# Patient Record
Sex: MALE | Race: OTHER | HISPANIC OR LATINO | Employment: UNEMPLOYED | URBAN - METROPOLITAN AREA
[De-identification: names, ages, dates, MRNs, and addresses within clinical notes are randomized per-mention and may not be internally consistent; named-entity substitution may affect disease eponyms.]

---

## 2024-02-15 ENCOUNTER — HOSPITAL ENCOUNTER (INPATIENT)
Facility: HOSPITAL | Age: 79
LOS: 2 days | Discharge: OTHER INSTITUTION | DRG: 281 | End: 2024-02-21
Attending: INTERNAL MEDICINE | Admitting: INTERNAL MEDICINE

## 2024-02-15 DIAGNOSIS — E83.51 HYPOCALCEMIA: ICD-10-CM

## 2024-02-15 DIAGNOSIS — I25.10 CORONARY ARTERY DISEASE, UNSPECIFIED VESSEL OR LESION TYPE, UNSPECIFIED WHETHER ANGINA PRESENT, UNSPECIFIED WHETHER NATIVE OR TRANSPLANTED HEART: ICD-10-CM

## 2024-02-15 DIAGNOSIS — N17.9 AKI (ACUTE KIDNEY INJURY): Primary | ICD-10-CM

## 2024-02-15 DIAGNOSIS — I48.91 ATRIAL FIBRILLATION, UNSPECIFIED TYPE: ICD-10-CM

## 2024-02-15 DIAGNOSIS — I21.4 NSTEMI (NON-ST ELEVATED MYOCARDIAL INFARCTION): ICD-10-CM

## 2024-02-15 DIAGNOSIS — I35.1 AORTIC VALVE INSUFFICIENCY, ETIOLOGY OF CARDIAC VALVE DISEASE UNSPECIFIED: ICD-10-CM

## 2024-02-15 DIAGNOSIS — I35.0 SEVERE AORTIC STENOSIS: ICD-10-CM

## 2024-02-15 DIAGNOSIS — R07.9 CHEST PAIN: ICD-10-CM

## 2024-02-15 PROBLEM — I42.9 CARDIOMYOPATHY: Status: ACTIVE | Noted: 2024-02-15

## 2024-02-15 PROBLEM — I35.2 NONRHEUMATIC AORTIC INSUFFICIENCY WITH AORTIC STENOSIS: Status: ACTIVE | Noted: 2024-02-15

## 2024-02-15 PROBLEM — I25.110 CORONARY ARTERY DISEASE INVOLVING NATIVE CORONARY ARTERY OF NATIVE HEART WITH UNSTABLE ANGINA PECTORIS: Status: ACTIVE | Noted: 2024-02-15

## 2024-02-15 PROBLEM — I10 HYPERTENSION: Status: ACTIVE | Noted: 2024-02-15

## 2024-02-15 PROBLEM — E78.2 MIXED HYPERLIPIDEMIA: Status: ACTIVE | Noted: 2024-02-15

## 2024-02-15 LAB
ALBUMIN SERPL-MCNC: 3.9 G/DL (ref 3.4–4.8)
ALBUMIN/GLOB SERPL: 1 RATIO (ref 1.1–2)
ALP SERPL-CCNC: 82 UNIT/L (ref 40–150)
ALT SERPL-CCNC: 19 UNIT/L (ref 0–55)
APTT PPP: 42.6 SECONDS (ref 23.2–33.7)
AST SERPL-CCNC: 19 UNIT/L (ref 5–34)
BASOPHILS # BLD AUTO: 0.06 X10(3)/MCL
BASOPHILS NFR BLD AUTO: 0.6 %
BILIRUB SERPL-MCNC: 0.8 MG/DL
BNP BLD-MCNC: 2145.5 PG/ML
BUN SERPL-MCNC: 36.5 MG/DL (ref 8.4–25.7)
CALCIUM SERPL-MCNC: 9 MG/DL (ref 8.8–10)
CHLORIDE SERPL-SCNC: 103 MMOL/L (ref 98–107)
CO2 SERPL-SCNC: 26 MMOL/L (ref 23–31)
CREAT SERPL-MCNC: 2.15 MG/DL (ref 0.73–1.18)
EOSINOPHIL # BLD AUTO: 0.27 X10(3)/MCL (ref 0–0.9)
EOSINOPHIL NFR BLD AUTO: 2.9 %
ERYTHROCYTE [DISTWIDTH] IN BLOOD BY AUTOMATED COUNT: 15.2 % (ref 11.5–17)
FLUAV AG UPPER RESP QL IA.RAPID: NOT DETECTED
FLUBV AG UPPER RESP QL IA.RAPID: NOT DETECTED
GFR SERPLBLD CREATININE-BSD FMLA CKD-EPI: 31 MLS/MIN/1.73/M2
GLOBULIN SER-MCNC: 3.9 GM/DL (ref 2.4–3.5)
GLUCOSE SERPL-MCNC: 120 MG/DL (ref 82–115)
HCT VFR BLD AUTO: 39.4 % (ref 42–52)
HGB BLD-MCNC: 12.2 G/DL (ref 14–18)
HOLD SPECIMEN: NORMAL
IMM GRANULOCYTES # BLD AUTO: 0.08 X10(3)/MCL (ref 0–0.04)
IMM GRANULOCYTES NFR BLD AUTO: 0.9 %
INR PPP: 1.8
LYMPHOCYTES # BLD AUTO: 1.61 X10(3)/MCL (ref 0.6–4.6)
LYMPHOCYTES NFR BLD AUTO: 17.3 %
MCH RBC QN AUTO: 27.4 PG (ref 27–31)
MCHC RBC AUTO-ENTMCNC: 31 G/DL (ref 33–36)
MCV RBC AUTO: 88.5 FL (ref 80–94)
MONOCYTES # BLD AUTO: 0.71 X10(3)/MCL (ref 0.1–1.3)
MONOCYTES NFR BLD AUTO: 7.7 %
NEUTROPHILS # BLD AUTO: 6.55 X10(3)/MCL (ref 2.1–9.2)
NEUTROPHILS NFR BLD AUTO: 70.6 %
NRBC BLD AUTO-RTO: 0 %
OHS QRS DURATION: 178 MS
OHS QTC CALCULATION: 571 MS
PLATELET # BLD AUTO: 304 X10(3)/MCL (ref 130–400)
PMV BLD AUTO: 11 FL (ref 7.4–10.4)
POTASSIUM SERPL-SCNC: 4.8 MMOL/L (ref 3.5–5.1)
PROT SERPL-MCNC: 7.8 GM/DL (ref 5.8–7.6)
PROTHROMBIN TIME: 20.9 SECONDS (ref 11.4–14)
RBC # BLD AUTO: 4.45 X10(6)/MCL (ref 4.7–6.1)
RSV A 5' UTR RNA NPH QL NAA+PROBE: NOT DETECTED
SARS-COV-2 RNA RESP QL NAA+PROBE: NOT DETECTED
SODIUM SERPL-SCNC: 138 MMOL/L (ref 136–145)
TROPONIN I SERPL-MCNC: 0.13 NG/ML (ref 0–0.04)
TROPONIN I SERPL-MCNC: 0.17 NG/ML (ref 0–0.04)
TROPONIN I SERPL-MCNC: 0.17 NG/ML (ref 0–0.04)
WBC # SPEC AUTO: 9.28 X10(3)/MCL (ref 4.5–11.5)

## 2024-02-15 PROCEDURE — 25000003 PHARM REV CODE 250

## 2024-02-15 PROCEDURE — G0378 HOSPITAL OBSERVATION PER HR: HCPCS

## 2024-02-15 PROCEDURE — 0241U COVID/RSV/FLU A&B PCR: CPT | Performed by: EMERGENCY MEDICINE

## 2024-02-15 PROCEDURE — 80053 COMPREHEN METABOLIC PANEL: CPT | Performed by: EMERGENCY MEDICINE

## 2024-02-15 PROCEDURE — 96365 THER/PROPH/DIAG IV INF INIT: CPT

## 2024-02-15 PROCEDURE — 84484 ASSAY OF TROPONIN QUANT: CPT | Mod: 91

## 2024-02-15 PROCEDURE — 63600175 PHARM REV CODE 636 W HCPCS

## 2024-02-15 PROCEDURE — 85730 THROMBOPLASTIN TIME PARTIAL: CPT | Mod: 91

## 2024-02-15 PROCEDURE — 83880 ASSAY OF NATRIURETIC PEPTIDE: CPT | Performed by: EMERGENCY MEDICINE

## 2024-02-15 PROCEDURE — 96366 THER/PROPH/DIAG IV INF ADDON: CPT

## 2024-02-15 PROCEDURE — 84484 ASSAY OF TROPONIN QUANT: CPT | Performed by: EMERGENCY MEDICINE

## 2024-02-15 PROCEDURE — 85610 PROTHROMBIN TIME: CPT | Performed by: EMERGENCY MEDICINE

## 2024-02-15 PROCEDURE — 93005 ELECTROCARDIOGRAM TRACING: CPT

## 2024-02-15 PROCEDURE — 85025 COMPLETE CBC W/AUTO DIFF WBC: CPT | Performed by: EMERGENCY MEDICINE

## 2024-02-15 PROCEDURE — 85730 THROMBOPLASTIN TIME PARTIAL: CPT

## 2024-02-15 PROCEDURE — 99285 EMERGENCY DEPT VISIT HI MDM: CPT | Mod: 25

## 2024-02-15 RX ORDER — NITROGLYCERIN 0.4 MG/1
0.4 TABLET SUBLINGUAL EVERY 5 MIN PRN
Status: DISCONTINUED | OUTPATIENT
Start: 2024-02-15 | End: 2024-02-21 | Stop reason: HOSPADM

## 2024-02-15 RX ORDER — SODIUM CHLORIDE 0.9 % (FLUSH) 0.9 %
10 SYRINGE (ML) INJECTION
Status: DISCONTINUED | OUTPATIENT
Start: 2024-02-15 | End: 2024-02-21 | Stop reason: HOSPADM

## 2024-02-15 RX ORDER — HEPARIN SODIUM,PORCINE/D5W 25000/250
0-40 INTRAVENOUS SOLUTION INTRAVENOUS CONTINUOUS
Status: DISCONTINUED | OUTPATIENT
Start: 2024-02-15 | End: 2024-02-16

## 2024-02-15 RX ORDER — FUROSEMIDE 40 MG/1
40 TABLET ORAL DAILY
Status: ON HOLD | COMMUNITY
End: 2024-03-26 | Stop reason: HOSPADM

## 2024-02-15 RX ORDER — AMIODARONE HYDROCHLORIDE 200 MG/1
400 TABLET ORAL 2 TIMES DAILY
Status: DISCONTINUED | OUTPATIENT
Start: 2024-02-15 | End: 2024-02-16

## 2024-02-15 RX ORDER — ATORVASTATIN CALCIUM 40 MG/1
40 TABLET, FILM COATED ORAL NIGHTLY
Status: DISCONTINUED | OUTPATIENT
Start: 2024-02-15 | End: 2024-02-21 | Stop reason: HOSPADM

## 2024-02-15 RX ORDER — METOPROLOL TARTRATE 25 MG/1
25 TABLET, FILM COATED ORAL 2 TIMES DAILY
Status: DISCONTINUED | OUTPATIENT
Start: 2024-02-15 | End: 2024-02-16

## 2024-02-15 RX ORDER — AMIODARONE HYDROCHLORIDE 200 MG/1
400 TABLET ORAL 2 TIMES DAILY
Status: ON HOLD | COMMUNITY
End: 2024-03-26 | Stop reason: HOSPADM

## 2024-02-15 RX ORDER — POLYETHYLENE GLYCOL 3350 17 G/17G
17 POWDER, FOR SOLUTION ORAL DAILY
Status: DISCONTINUED | OUTPATIENT
Start: 2024-02-16 | End: 2024-02-21 | Stop reason: HOSPADM

## 2024-02-15 RX ORDER — FUROSEMIDE 20 MG/1
40 TABLET ORAL DAILY
Status: DISCONTINUED | OUTPATIENT
Start: 2024-02-16 | End: 2024-02-18

## 2024-02-15 RX ORDER — ASPIRIN 325 MG
325 TABLET, DELAYED RELEASE (ENTERIC COATED) ORAL ONCE
Status: COMPLETED | OUTPATIENT
Start: 2024-02-15 | End: 2024-02-15

## 2024-02-15 RX ORDER — ATORVASTATIN CALCIUM 20 MG/1
40 TABLET, FILM COATED ORAL NIGHTLY
Status: ON HOLD | COMMUNITY
End: 2024-03-26 | Stop reason: HOSPADM

## 2024-02-15 RX ORDER — TALC
6 POWDER (GRAM) TOPICAL NIGHTLY PRN
Status: DISCONTINUED | OUTPATIENT
Start: 2024-02-15 | End: 2024-02-21 | Stop reason: HOSPADM

## 2024-02-15 RX ORDER — ASPIRIN 81 MG/1
81 TABLET ORAL DAILY
Status: DISCONTINUED | OUTPATIENT
Start: 2024-02-16 | End: 2024-02-21 | Stop reason: HOSPADM

## 2024-02-15 RX ORDER — ACETAMINOPHEN 325 MG/1
650 TABLET ORAL EVERY 6 HOURS PRN
Status: DISCONTINUED | OUTPATIENT
Start: 2024-02-15 | End: 2024-02-18

## 2024-02-15 RX ADMIN — HEPARIN SODIUM 12 UNITS/KG/HR: 10000 INJECTION, SOLUTION INTRAVENOUS at 04:02

## 2024-02-15 RX ADMIN — AMIODARONE HYDROCHLORIDE 400 MG: 200 TABLET ORAL at 08:02

## 2024-02-15 RX ADMIN — ATORVASTATIN CALCIUM 40 MG: 40 TABLET, FILM COATED ORAL at 08:02

## 2024-02-15 RX ADMIN — ASPIRIN 325 MG: 325 TABLET, COATED ORAL at 03:02

## 2024-02-15 NOTE — Clinical Note
53 ml of contrast were injected throughout the case. 10 mL of contrast was the total wasted during the case. 63 mL was the total amount used during the case.

## 2024-02-15 NOTE — ED PROVIDER NOTES
Encounter Date: 2/15/2024    I, Michael Wylie MD, did conduct a face to face encounter with this patient initially seen by our advanced practice provider. I did perform a history and physical, did direct the evaluation and management, and do agree with the care as documented.         History     Chief Complaint   Patient presents with    Chest Pain     Chest pain x 8 days. Recent NSTEMI admit.     Patient reports to the emergency room with complaints of chest pain for the past 8 days that is worse at night; family reports that he was recently admitted to our [facility] and discharged for cardiology follow up    The history is provided by the patient.   Chest Pain  The current episode started several days ago. Chest pain occurs intermittently. At its most intense, the chest pain is at 7/10. The chest pain is currently at 4/10. The quality of the pain is described as sharp and squeezing. Pertinent negatives for primary symptoms include no fever, no shortness of breath, no cough, no abdominal pain, no nausea and no vomiting.   Pertinent negatives for associated symptoms include no weakness.   His past medical history is significant for hypertension and MI.     Review of patient's allergies indicates:  No Known Allergies  Past Medical History:   Diagnosis Date    A-fib     Aortic insufficiency     CAD (coronary artery disease)     Hypertension      History reviewed. No pertinent surgical history.  History reviewed. No pertinent family history.  Social History     Tobacco Use    Smoking status: Former     Types: Cigarettes    Smokeless tobacco: Never   Substance Use Topics    Alcohol use: Not Currently    Drug use: Not Currently     Review of Systems   Constitutional:  Negative for fever.   HENT:  Negative for sore throat.    Respiratory:  Negative for cough and shortness of breath.    Cardiovascular:  Positive for chest pain.   Gastrointestinal:  Negative for abdominal pain, nausea and vomiting.    Genitourinary:  Negative for dysuria.   Musculoskeletal:  Negative for back pain.   Skin:  Negative for rash.   Neurological:  Negative for weakness.   Hematological:  Does not bruise/bleed easily.   Psychiatric/Behavioral: Negative.         Physical Exam     Initial Vitals [02/15/24 1217]   BP Pulse Resp Temp SpO2   (!) 102/59 66 16 97.8 °F (36.6 °C) 99 %      MAP       --         Physical Exam    Vitals reviewed.  Constitutional: He appears well-developed and well-nourished.   HENT:   Head: Normocephalic and atraumatic.   Eyes: Conjunctivae and EOM are normal. Pupils are equal, round, and reactive to light.   Neck:   Normal range of motion.  Cardiovascular:  Normal rate, regular rhythm, normal heart sounds and intact distal pulses.           Pulmonary/Chest: Breath sounds normal. No respiratory distress. He has no wheezes. He exhibits no tenderness.   Abdominal: Abdomen is soft. Bowel sounds are normal. He exhibits no distension. There is no abdominal tenderness.   Musculoskeletal:         General: Normal range of motion.      Cervical back: Normal range of motion.     Neurological: He is alert and oriented to person, place, and time. He displays normal reflexes. No cranial nerve deficit or sensory deficit. GCS score is 15. GCS eye subscore is 4. GCS verbal subscore is 5. GCS motor subscore is 6.   Skin: Skin is warm. No pallor.   Psychiatric: He has a normal mood and affect. His behavior is normal. Judgment and thought content normal.         ED Course   Procedures  Labs Reviewed   COMPREHENSIVE METABOLIC PANEL - Abnormal; Notable for the following components:       Result Value    Glucose Level 120 (*)     Blood Urea Nitrogen 36.5 (*)     Creatinine 2.15 (*)     Protein Total 7.8 (*)     Globulin 3.9 (*)     Albumin/Globulin Ratio 1.0 (*)     All other components within normal limits   B-TYPE NATRIURETIC PEPTIDE - Abnormal; Notable for the following components:    Natriuretic Peptide 2,145.5 (*)     All other  components within normal limits   TROPONIN I - Abnormal; Notable for the following components:    Troponin-I 0.166 (*)     All other components within normal limits   PROTIME-INR - Abnormal; Notable for the following components:    PT 20.9 (*)     INR 1.8 (*)     All other components within normal limits   CBC WITH DIFFERENTIAL - Abnormal; Notable for the following components:    RBC 4.45 (*)     Hgb 12.2 (*)     Hct 39.4 (*)     MCHC 31.0 (*)     MPV 11.0 (*)     IG# 0.08 (*)     All other components within normal limits   APTT - Abnormal; Notable for the following components:    PTT 42.6 (*)     All other components within normal limits   COVID/RSV/FLU A&B PCR - Normal    Narrative:     The Xpert Xpress SARS-CoV-2/FLU/RSV plus is a rapid, multiplexed real-time PCR test intended for the simultaneous qualitative detection and differentiation of SARS-CoV-2, Influenza A, Influenza B, and respiratory syncytial virus (RSV) viral RNA in either nasopharyngeal swab or nasal swab specimens.         CBC W/ AUTO DIFFERENTIAL    Narrative:     The following orders were created for panel order CBC auto differential.  Procedure                               Abnormality         Status                     ---------                               -----------         ------                     CBC with Differential[[id number]]       Abnormal            Final result                 Please view results for these tests on the individual orders.   EXTRA TUBES    Narrative:     The following orders were created for panel order EXTRA TUBES.  Procedure                               Abnormality         Status                     ---------                               -----------         ------                     Gold Top Hold[[id number]]                                   Final result               Pink Top Hold[[id number]]                                   Final result                 Please view results for these tests on the individual  orders.   GOLD TOP HOLD   PINK TOP HOLD   EXTRA TUBES    Narrative:     The following orders were created for panel order EXTRA TUBES.  Procedure                               Abnormality         Status                     ---------                               -----------         ------                     Light Green Top Hold[[id number]]                            Final result               Lavender Top Hold[[id number]]                               Final result                 Please view results for these tests on the individual orders.   LIGHT GREEN TOP HOLD   LAVENDER TOP HOLD     EKG Readings: (Independently Interpreted)   Initial Reading: No STEMI. Rhythm: Normal Sinus Rhythm. Heart Rate: 64. Ectopy: No Ectopy. Conduction: Normal. ST Segments: Normal ST Segments. T Waves: Normal. Axis: Right Axis Deviation. Clinical Impression: Normal Sinus Rhythm     ECG Results              EKG 12-lead (Chest Pain) Age >30 (Final result)        Collection Time Result Time QRS Duration OHS QTC Calculation    02/15/24 16:18:47 02/16/24 11:42:12 178 580                     Final result by Interface, Lab In [facility] (02/16/24 11:42:21)                   Narrative:    Test Reason : R07.9,    Vent. Rate : 054 BPM     Atrial Rate : 054 BPM     P-R Int : 232 ms          QRS Dur : 178 ms      QT Int : 612 ms       P-R-T Axes : 048 -32 101 degrees     QTc Int : 580 ms    Sinus bradycardia with 1st degree A-V block  Left axis deviation  Left bundle branch block  Abnormal ECG    Confirmed by Vasile Callahan MD ([id number]) on 2/16/2024 11:42:09 AM    Referred By: [email or web address]   SELF           Confirmed By:Vasile Callahan MD                                     EKG 12-lead (Final result)        Collection Time Result Time QRS Duration OHS QTC Calculation    02/15/24 12:09:22 02/15/24 16:40:27 178 571                     Final result by Interface, Lab In [facility] (02/15/24 16:40:30)                   Narrative:    Test Reason : R07.9,    Vent. Rate  : 064 BPM     Atrial Rate : 064 BPM     P-R Int : 198 ms          QRS Dur : 178 ms      QT Int : 554 ms       P-R-T Axes : 022 146 032 degrees     QTc Int : 571 ms    Normal sinus rhythm  Right axis deviation  Nonspecific intraventricular block  Abnormal ECG  No previous ECGs available  Confirmed by Wendie Rudolph MD ([id number]) on 2/15/2024 4:40:24 PM    Referred By:             Confirmed By:Wendie Rudolph MD                                  Imaging Results              X-Ray Chest PA And Lateral (Final result)  Result time 02/15/24 13:09:38      Final result by Ananth Coker MD (02/15/24 13:09:38)                   Impression:      Increase interstitial markings throughout.    Mild cardiomegaly      Electronically signed by: Ananth Coker  Date:    02/15/2024  Time:    13:09               Narrative:    EXAMINATION:  XR CHEST PA AND LATERAL    CLINICAL HISTORY:  Chest Pain;, .    FINDINGS:  Examination reveals mediastinal silhouette to be within normal limits cardiac silhouette is mildly enlarged lung fields reveal some increase interstitial markings with no focal consolidative changes atelectases effusions or pneumothoraces                                       Medications   atorvastatin tablet 40 mg (40 mg Oral Given 2/16/24 2034)   furosemide tablet 40 mg (40 mg Oral Given 2/16/24 0929)   sodium chloride 0.9% flush 10 mL (has no administration in time range)   melatonin tablet 6 mg (has no administration in time range)   aspirin EC tablet 81 mg (81 mg Oral Given 2/17/24 0930)   nitroGLYCERIN SL tablet 0.4 mg (has no administration in time range)   polyethylene glycol packet 17 g (17 g Oral Given 2/17/24 0930)   acetaminophen tablet 650 mg (has no administration in time range)   pantoprazole EC tablet 40 mg (40 mg Oral Given 2/17/24 0930)   heparin 25,000 units in dextrose 5% (100 units/ml) IV bolus from bag LOW INTENSITY nomogram - LAF (4,000 Units Intravenous Not Given 2/16/24 1530)   heparin 25,000 units  in dextrose 5% 250 mL (100 units/mL) infusion LOW INTENSITY nomogram - LAF (14 Units/kg/hr × 69.7 kg (Adjusted) Intravenous New Bag 2/16/24 1753)   heparin 25,000 units in dextrose 5% (100 units/ml) IV bolus from bag LOW INTENSITY nomogram - LAF (has no administration in time range)   heparin 25,000 units in dextrose 5% (100 units/ml) IV bolus from bag LOW INTENSITY nomogram - LAF (2,090 Units Intravenous Bolus from Bag 2/16/24 1750)   amiodarone tablet 200 mg (200 mg Oral Given 2/17/24 0930)   heparin 25,000 units in dextrose 5% (100 units/ml) IV bolus from bag LOW INTENSITY nomogram - LAF (4,000 Units Intravenous Bolus from Bag 2/15/24 1635)   aspirin EC tablet 325 mg (325 mg Oral Given 2/15/24 1550)     Medical Decision Making  Amount and/or Complexity of Data Reviewed  Labs: ordered.  Radiology: ordered.  Discussion of management or test interpretation with external provider(s): Consulted Internal Medicine, who evaluated the patient in the room and agreed with plan for admit-see note for further details                                      Clinical Impression:  Final diagnoses:  [R07.9] Chest pain  [I21.4] NSTEMI (non-ST elevated myocardial infarction)  [N17.9] JEAN-CLAUDE (acute kidney injury) (Primary)          ED Disposition Condition    Observation                 Hank Foley PA  02/15/24 2553       Michael Wylie MD  02/17/24 [id number]

## 2024-02-15 NOTE — Clinical Note
The radial band was applied to the right radial artery. 7 cc's of air were inserted into the closure device. Fingers warm to touch. Cap refill < 3 sec. Move fingers w/out any pain or discomfort.

## 2024-02-15 NOTE — Clinical Note
The catheter was repositioned into the left subclavian artery. An angiography was performed Multiple views were taken. The angiography was performed via power injection.  LIMA visualized

## 2024-02-16 LAB
ALBUMIN SERPL-MCNC: 3.4 G/DL (ref 3.4–4.8)
ALBUMIN/GLOB SERPL: 1 RATIO (ref 1.1–2)
ALP SERPL-CCNC: 70 UNIT/L (ref 40–150)
ALT SERPL-CCNC: 15 UNIT/L (ref 0–55)
APTT PPP: 127.9 SECONDS (ref 23.2–33.7)
APTT PPP: 50.6 SECONDS (ref 23.2–33.7)
APTT PPP: 62.9 SECONDS (ref 23.2–33.7)
AST SERPL-CCNC: 14 UNIT/L (ref 5–34)
AV INDEX (PROSTH): 0.17
AV MEAN GRADIENT: 50 MMHG
AV PEAK GRADIENT: 79 MMHG
AV REGURGITATION PRESSURE HALF TIME: 302.13 MS
AV VALVE AREA BY VELOCITY RATIO: 0.67 CM²
AV VALVE AREA: 0.66 CM²
AV VELOCITY RATIO: 0.18
BASOPHILS # BLD AUTO: 0.07 X10(3)/MCL
BASOPHILS NFR BLD AUTO: 0.7 %
BILIRUB SERPL-MCNC: 0.7 MG/DL
BSA FOR ECHO PROCEDURE: 1.94 M2
BUN SERPL-MCNC: 33.3 MG/DL (ref 8.4–25.7)
CALCIUM SERPL-MCNC: 8.5 MG/DL (ref 8.8–10)
CHLORIDE SERPL-SCNC: 106 MMOL/L (ref 98–107)
CO2 SERPL-SCNC: 24 MMOL/L (ref 23–31)
CREAT SERPL-MCNC: 1.9 MG/DL (ref 0.73–1.18)
CV ECHO LV RWT: 0.38 CM
DOP CALC AO PEAK VEL: 4.45 M/S
DOP CALC AO VTI: 133.3 CM
DOP CALC LVOT AREA: 3.8 CM2
DOP CALC LVOT DIAMETER: 2.2 CM
DOP CALC LVOT PEAK VEL: 0.79 M/S
DOP CALC LVOT STROKE VOLUME: 87.39 CM3
DOP CALC MV VTI: 42.6 CM
DOP CALCLVOT PEAK VEL VTI: 23 CM
E WAVE DECELERATION TIME: 152.64 MSEC
E/A RATIO: 1.94
E/E' RATIO: 20.6 M/S
ECHO LV POSTERIOR WALL: 1.01 CM (ref 0.6–1.1)
EOSINOPHIL # BLD AUTO: 0.34 X10(3)/MCL (ref 0–0.9)
EOSINOPHIL NFR BLD AUTO: 3.6 %
ERYTHROCYTE [DISTWIDTH] IN BLOOD BY AUTOMATED COUNT: 15.2 % (ref 11.5–17)
EST. AVERAGE GLUCOSE BLD GHB EST-MCNC: 105.4 MG/DL
FRACTIONAL SHORTENING: 22 % (ref 28–44)
GFR SERPLBLD CREATININE-BSD FMLA CKD-EPI: 36 MLS/MIN/1.73/M2
GLOBULIN SER-MCNC: 3.5 GM/DL (ref 2.4–3.5)
GLUCOSE SERPL-MCNC: 92 MG/DL (ref 82–115)
HBA1C MFR BLD: 5.3 %
HCT VFR BLD AUTO: 36.3 % (ref 42–52)
HGB BLD-MCNC: 11.6 G/DL (ref 14–18)
HOLD SPECIMEN: NORMAL
HOLD SPECIMEN: NORMAL
HR MV ECHO: 102 BPM
IMM GRANULOCYTES # BLD AUTO: 0.09 X10(3)/MCL (ref 0–0.04)
IMM GRANULOCYTES NFR BLD AUTO: 1 %
INTERVENTRICULAR SEPTUM: 1.08 CM (ref 0.6–1.1)
LEFT ATRIUM SIZE: 4.46 CM
LEFT ATRIUM VOLUME INDEX MOD: 58.9 ML/M2
LEFT ATRIUM VOLUME MOD: 112 CM3
LEFT INTERNAL DIMENSION IN SYSTOLE: 4.15 CM (ref 2.1–4)
LEFT VENTRICLE DIASTOLIC VOLUME INDEX: 68.42 ML/M2
LEFT VENTRICLE DIASTOLIC VOLUME: 130 ML
LEFT VENTRICLE MASS INDEX: 112 G/M2
LEFT VENTRICLE SYSTOLIC VOLUME INDEX: 41.1 ML/M2
LEFT VENTRICLE SYSTOLIC VOLUME: 78 ML
LEFT VENTRICULAR INTERNAL DIMENSION IN DIASTOLE: 5.31 CM (ref 3.5–6)
LEFT VENTRICULAR MASS: 213.18 G
LV LATERAL E/E' RATIO: 14.71 M/S
LV SEPTAL E/E' RATIO: 34.33 M/S
LVOT MG: 1.33 MMHG
LVOT MV: 0.54 CM/S
LYMPHOCYTES # BLD AUTO: 2.39 X10(3)/MCL (ref 0.6–4.6)
LYMPHOCYTES NFR BLD AUTO: 25.4 %
MCH RBC QN AUTO: 27.8 PG (ref 27–31)
MCHC RBC AUTO-ENTMCNC: 32 G/DL (ref 33–36)
MCV RBC AUTO: 87.1 FL (ref 80–94)
MONOCYTES # BLD AUTO: 0.74 X10(3)/MCL (ref 0.1–1.3)
MONOCYTES NFR BLD AUTO: 7.9 %
MV MEAN GRADIENT: 2 MMHG
MV PEAK A VEL: 0.53 M/S
MV PEAK E VEL: 1.03 M/S
MV PEAK GRADIENT: 5 MMHG
MV STENOSIS PRESSURE HALF TIME: 44.26 MS
MV VALVE AREA BY CONTINUITY EQUATION: 2.05 CM2
MV VALVE AREA P 1/2 METHOD: 4.97 CM2
NEUTROPHILS # BLD AUTO: 5.78 X10(3)/MCL (ref 2.1–9.2)
NEUTROPHILS NFR BLD AUTO: 61.4 %
NRBC BLD AUTO-RTO: 0 %
OHS LV EJECTION FRACTION SIMPSONS BIPLANE MOD: 40 %
OHS QRS DURATION: 176 MS
OHS QRS DURATION: 176 MS
OHS QRS DURATION: 178 MS
OHS QTC CALCULATION: 560 MS
OHS QTC CALCULATION: 573 MS
OHS QTC CALCULATION: 580 MS
PISA AR MAX VEL: 4.14 M/S
PISA TR MAX VEL: 3.92 M/S
PLATELET # BLD AUTO: 287 X10(3)/MCL (ref 130–400)
PMV BLD AUTO: 11.2 FL (ref 7.4–10.4)
POTASSIUM SERPL-SCNC: 4.4 MMOL/L (ref 3.5–5.1)
PROT SERPL-MCNC: 6.9 GM/DL (ref 5.8–7.6)
RA PRESSURE ESTIMATED: 8 MMHG
RBC # BLD AUTO: 4.17 X10(6)/MCL (ref 4.7–6.1)
RIGHT VENTRICULAR END-DIASTOLIC DIMENSION: 3.41 CM
RV TB RVSP: 12 MMHG
SINUS: 3.6 CM
SODIUM SERPL-SCNC: 139 MMOL/L (ref 136–145)
TDI LATERAL: 0.07 M/S
TDI SEPTAL: 0.03 M/S
TDI: 0.05 M/S
TR MAX PG: 61 MMHG
TROPONIN I SERPL-MCNC: 0.14 NG/ML (ref 0–0.04)
TSH SERPL-ACNC: 1.19 UIU/ML (ref 0.35–4.94)
TV REST PULMONARY ARTERY PRESSURE: 69 MMHG
WBC # SPEC AUTO: 9.41 X10(3)/MCL (ref 4.5–11.5)
Z-SCORE OF LEFT VENTRICULAR DIMENSION IN END DIASTOLE: -0.04
Z-SCORE OF LEFT VENTRICULAR DIMENSION IN END SYSTOLE: 1.85

## 2024-02-16 PROCEDURE — 84443 ASSAY THYROID STIM HORMONE: CPT | Performed by: STUDENT IN AN ORGANIZED HEALTH CARE EDUCATION/TRAINING PROGRAM

## 2024-02-16 PROCEDURE — 94761 N-INVAS EAR/PLS OXIMETRY MLT: CPT

## 2024-02-16 PROCEDURE — 80053 COMPREHEN METABOLIC PANEL: CPT

## 2024-02-16 PROCEDURE — 96366 THER/PROPH/DIAG IV INF ADDON: CPT

## 2024-02-16 PROCEDURE — 83036 HEMOGLOBIN GLYCOSYLATED A1C: CPT | Performed by: STUDENT IN AN ORGANIZED HEALTH CARE EDUCATION/TRAINING PROGRAM

## 2024-02-16 PROCEDURE — 85730 THROMBOPLASTIN TIME PARTIAL: CPT | Mod: 91 | Performed by: INTERNAL MEDICINE

## 2024-02-16 PROCEDURE — G0378 HOSPITAL OBSERVATION PER HR: HCPCS

## 2024-02-16 PROCEDURE — 25000003 PHARM REV CODE 250

## 2024-02-16 PROCEDURE — 93005 ELECTROCARDIOGRAM TRACING: CPT

## 2024-02-16 PROCEDURE — 84484 ASSAY OF TROPONIN QUANT: CPT

## 2024-02-16 PROCEDURE — 85025 COMPLETE CBC W/AUTO DIFF WBC: CPT

## 2024-02-16 PROCEDURE — 63600175 PHARM REV CODE 636 W HCPCS

## 2024-02-16 RX ORDER — AMIODARONE HYDROCHLORIDE 200 MG/1
200 TABLET ORAL 2 TIMES DAILY
Status: DISCONTINUED | OUTPATIENT
Start: 2024-02-16 | End: 2024-02-21 | Stop reason: HOSPADM

## 2024-02-16 RX ORDER — AMIODARONE HYDROCHLORIDE 200 MG/1
400 TABLET ORAL 2 TIMES DAILY
Status: DISCONTINUED | OUTPATIENT
Start: 2024-02-16 | End: 2024-02-16

## 2024-02-16 RX ORDER — HEPARIN SODIUM,PORCINE/D5W 25000/250
0-40 INTRAVENOUS SOLUTION INTRAVENOUS CONTINUOUS
Status: DISCONTINUED | OUTPATIENT
Start: 2024-02-16 | End: 2024-02-20

## 2024-02-16 RX ORDER — PANTOPRAZOLE SODIUM 40 MG/1
40 TABLET, DELAYED RELEASE ORAL DAILY
Status: DISCONTINUED | OUTPATIENT
Start: 2024-02-16 | End: 2024-02-21 | Stop reason: HOSPADM

## 2024-02-16 RX ADMIN — PANTOPRAZOLE SODIUM 40 MG: 40 TABLET, DELAYED RELEASE ORAL at 09:02

## 2024-02-16 RX ADMIN — ASPIRIN 81 MG: 81 TABLET, COATED ORAL at 09:02

## 2024-02-16 RX ADMIN — FUROSEMIDE 40 MG: 20 TABLET ORAL at 09:02

## 2024-02-16 RX ADMIN — ATORVASTATIN CALCIUM 40 MG: 40 TABLET, FILM COATED ORAL at 08:02

## 2024-02-16 RX ADMIN — AMIODARONE HYDROCHLORIDE 200 MG: 200 TABLET ORAL at 08:02

## 2024-02-16 RX ADMIN — HEPARIN SODIUM 12 UNITS/KG/HR: 10000 INJECTION, SOLUTION INTRAVENOUS at 03:02

## 2024-02-16 RX ADMIN — AMIODARONE HYDROCHLORIDE 200 MG: 200 TABLET ORAL at 02:02

## 2024-02-16 RX ADMIN — HEPARIN SODIUM 14 UNITS/KG/HR: 10000 INJECTION, SOLUTION INTRAVENOUS at 05:02

## 2024-02-16 NOTE — CONSULTS
Consult to obtain medical records noted. Left VM at [facility] medical records department, P: [phone], requesting records be faxed to  department, F: [phone].  Release of information form signed by patient has been faxed to [facility] records, F: [phone]. Will scan into patient's chart once received.

## 2024-02-16 NOTE — H&P (VIEW-ONLY)
Cardiology   Mr. Brain Snyder is a 78 y.o. male with:   Chief Complaint   Patient presents with    Chest Pain     Chest pain x 8 days. Recent NSTEMI admit.         [facility]  Cardiology was consulted for Mr. Brain Snyder is a 78 y.o. male with PMH of aortic insufficiency with aortic stenosis, coronary artery disease, hypertension, atrial fibrillation on Eliquis presented to [facility] ED on 2/15/2024  with complaint of chest pain.   A  was used for the encounter.  His daughter was also present.  Patient states that chest pain started several days ago and got worse last night with intensity of 7/10.  Described location as retrosternal in his squeezing pain.  Denies any radiation, alleviating or aggravating factors, or associated symptoms like shortness of breath, cough, abdominal pain, nausea or vomiting.  Patient also complained about nocturia ongoing for months.  Denies any urinary retention.  He also mentioned of mild swelling with mild pain of his left ankle past 2 days but has now resolved.  Of note, [facility] of February [facility].  He was told that he had aortic valve stenosis that required aortic valve replacement however the cost was going to be more than $30,000 (no insurance/self pay) therefore patient was discharged without any intervention.  During the recent admission also patient was noted to have AFib with RVR and was started on Eliquis.  Patient has brought his discharge paperwork.  Some notable findings are NT-pro BNP of 5500 on 2/5/24, CT abdomen and pelvis/CTA chest findings of aortic valve calcification.  Ectasia of the ascending thoracic aorta measuring 3.6 cm.  Moderate noncalcified atheromatous change of the descending thoracic aorta.  Micronodule surface irregularity of the liver which may suggest cirrhotic morphology.  Patient was also discharged with amiodarone 400 mg twice daily for 14 days, Lopressor 25  mg b.i.d., Lipitor 40 mg nightly, Lasix 40 mg daily, potassium chloride 20.      In the ED, vitals notable for hypotension 102/59, rest vital signs stable.  CBC remarkable for normocytic anemia, CMP remarkable for elevated renal indices of BUN/creatinine at 36.5/2.15.  INR elevated at 1.8.  BNP significant for 2145 (to note that decreased from the 5000 NT-pro BNP on 02/05/2024).  Troponin were elevated this visit at 0.166.  EKG showed normal sinus rhythm with vent rate of 64 and prolonged QTC of 571 millisecond.  Chest x-ray remarkable for mild cardiomegaly and some interstitial markings but no focal consolidative changes.  Hospital Medicine was consulted for admission and further management of NSTEMI.     ROS:  Please see HPI    PMH:    Patient Active Problem List   Diagnosis    Mixed hyperlipidemia    Cardiomyopathy    Nonrheumatic aortic insufficiency with aortic stenosis    Chest pain    Coronary artery disease involving native coronary artery of native heart with unstable angina pectoris    Hypertension     Surgical Hx:  History reviewed. No pertinent surgical history.    Social History     Socioeconomic History    Marital status:    Tobacco Use    Smoking status: Former     Types: Cigarettes    Smokeless tobacco: Never   Substance and Sexual Activity    Alcohol use: Not Currently    Drug use: Not Currently       History reviewed. No pertinent family history.    Review of patient's allergies indicates:  No Known Allergies    Current Medications:    Current Outpatient Medications   Medication Instructions    amiodarone (PACERONE) 400 mg, Oral, 2 times daily    atorvastatin (LIPITOR) 40 mg, Oral, Nightly    furosemide (LASIX) 40 mg, Oral, Daily       ROS: Negative for all symptoms other than those listed in HPI      BP Readings from Last 3 Encounters:   02/16/24 113/67        Pulse Readings from Last 3 Encounters:   02/16/24 (!) 58        Temp Readings from Last 3 Encounters:   02/16/24 97.4 °F (36.3 °C)  "(Oral)     Wt Readings from Last 3 Encounters:   02/16/24 82.1 kg (181 lb)     BMI:  30.12 kg/m^2    PE  Blood pressure 113/67, pulse (!) 58, temperature 97.4 °F (36.3 °C), temperature source Oral, resp. rate 18, height 5' 5" (1.651 m), weight 82.1 kg (181 lb), SpO2 98 %.  CONSTITUTIONAL:  No acute distress.  Not ill appearing.  NECK:  Supple.    CARDIOVASCULAR:  Normal rate. Murmur noted   PULMONARY:  No respiratory distress.  No audible wheezing.    ABDOMINAL:  No distention.    MUSCULOSKELETAL:  No deformity.    SKIN:  No bruising.  No rash.  NEURO  Oriented x 3      CARDIAC TESTS  ECHO  No results found for this or any previous visit.    STRESS TEST  No results found for this or any previous visit.    LHC  No results found for this or any previous visit.      LABS  Last BMP  Lab Results   Component Value Date     02/16/2024    K 4.4 02/16/2024    CO2 24 02/16/2024    BUN 33.3 (H) 02/16/2024    CREATININE 1.90 (H) 02/16/2024    CALCIUM 8.5 (L) 02/16/2024    EGFRNORACEVR 36 02/16/2024       Lab Results   Component Value Date    CREATININE 1.90 (H) 02/16/2024    CREATININE 2.15 (H) 02/15/2024     Lab Results   Component Value Date    BNP 2,145.5 (H) 02/15/2024     Lab Results   Component Value Date    TROPONINI 0.140 (H) 02/16/2024    TROPONINI 0.133 (H) 02/15/2024    TROPONINI 0.171 (H) 02/15/2024     Last CBC     Lab Results   Component Value Date    WBC 9.41 02/16/2024    HGB 11.6 (L) 02/16/2024    HCT 36.3 (L) 02/16/2024    MCV 87.1 02/16/2024     02/16/2024           Last lipids  No results found for: "CHOL", "HDL", "LDL", "TRIG", "TOTALCHOLEST"    LFT   No components found for: "LFT"    ASSESSMENT    Aortic Insufficiency with severe aortic stenosis   CAD  HTN  Afib   Elevated troponin   Prolonged QTc    PLAN    Elevated troponin likely secondary to severe AS  Plan tentatively for [facility] on Monday  D/c Heparin, start eliquis   Restart amiodarone   Will need Structural Heart Specialist referral at " discharge        Dawood Harris DO   [employer] IM PGY-1  [facility] Cardiology

## 2024-02-16 NOTE — PLAN OF CARE
02/16/24 1543   Discharge Assessment   Assessment Type Discharge Planning Assessment   Confirmed/corrected address, phone number and insurance Yes   Confirmed Demographics Correct on Facesheet   Source of Information patient   When was your last doctors appointment?   (Does not have a PCP)   Does patient/caregiver understand observation status Yes   Communicated CLAY with patient/caregiver Date not available/Unable to determine   Reason For Admission NSTEMI; JEAN-CLAUDE; Chest pain   People in Home child(chery), adult;grandchild(chery)   Facility Arrived From: Home   Do you expect to return to your current living situation? Yes   Do you have help at home or someone to help you manage your care at home? Yes   Who are your caregiver(s) and their phone number(s)? Heidy, wife; Brii, daughter   Prior to hospitilization cognitive status: Alert/Oriented;No Deficits   Current cognitive status: Alert/Oriented;No Deficits   Walking or Climbing Stairs Difficulty no   Dressing/Bathing Difficulty no   Home Accessibility wheelchair accessible   Home Layout Able to live on 1st floor   Equipment Currently Used at Home none   Readmission within 30 days? No   Patient currently being followed by outpatient case management? No   Do you currently have service(s) that help you manage your care at home? No   Do you have prescription coverage? No   Do you have any problems affording any of your prescribed medications? TBD   How do you get to doctors appointments? family or friend will provide   Are you on dialysis? No   Do you take coumadin? No   Discharge Plan A Home with family   DME Needed Upon Discharge    (TBD)   Discharge Plan discussed with: Patient   Transition of Care Barriers Unisured   Physical Activity   On average, how many days per week do you engage in moderate to strenuous exercise (like a brisk walk)? 0 days   On average, how many minutes do you engage in exercise at this level? 0 min   Financial Resource Strain   How hard is it  for you to pay for the very basics like food, housing, medical care, and heating? Not hard   Housing Stability   In the last 12 months, was there a time when you were not able to pay the mortgage or rent on time? N   In the last 12 months, how many places have you lived? 1   In the last 12 months, was there a time when you did not have a steady place to sleep or slept in a shelter (including now)? N   Transportation Needs   In the past 12 months, has lack of transportation kept you from medical appointments or from getting medications? no   In the past 12 months, has lack of transportation kept you from meetings, work, or from getting things needed for daily living? No   Food Insecurity   Within the past 12 months, you worried that your food would run out before you got the money to buy more. Never true   Within the past 12 months, the food you bought just didn't last and you didn't have money to get more. Never true   Stress   Do you feel stress - tense, restless, nervous, or anxious, or unable to sleep at night because your mind is troubled all the time - these days? To some exte   Social Connections   In a typical week, how many times do you talk on the phone with family, friends, or neighbors? More than 3   How often do you get together with friends or relatives? More than 3   How often do you attend Cheondoism or Sikh services? More than 4   Do you belong to any clubs or organizations such as Cheondoism groups, unions, fraternal or athletic groups, or school groups? No   How often do you attend meetings of the clubs or organizations you belong to? Never   Are you , , , , never , or living with a partner?    Alcohol Use   Q1: How often do you have a drink containing alcohol? Never   Q2: How many drinks containing alcohol do you have on a typical day when you are drinking? None   Q3: How often do you have six or more drinks on one occasion? Never   OTHER   Name(s) of  People in Home Heidy, wife; Brii, daughter     Patient is undocumented and uninsured; CM will follow for DC planning needs.

## 2024-02-16 NOTE — MEDICAL/APP STUDENT
[facility] Medicine Wards   Progress Note     Resident Team: [facility] Medicine List 1  Attending Physician: Katya Centeno MD  Resident: Nilda Patel MD  Intern: All Del Castillo MD   Date of Admit: 2/15/2024    Subjective:      Brief HPI:  Brain Snyder is a 78 y.o. male with PMH of aortic insufficiency with aortic stenosis, coronary artery disease, hypertension, atrial fibrillation on Eliquis presented to [facility] ED on 2/15/2024  with complaint of chest pain.   A  was used for the encounter.  His daughter was also present.  Patient states that chest pain started several days ago and got worse last night with intensity of 7/10.  Described location as retrosternal in his squeezing pain.  Denies any radiation, alleviating or aggravating factors, or associated symptoms like shortness of breath, cough, abdominal pain, nausea or vomiting.  Patient also complained about nocturia ongoing for months.  Denies any urinary retention.  He also mentioned of mild swelling with mild pain of his left ankle past 2 days but has now resolved.  Of note, patient was recently admitted to our [facility] in December of 2023 and beginning of February 2024.  He was told that he had aortic valve stenosis that required aortic valve replacement however the cost was going to be more than $30,000 (no insurance/self pay) therefore patient was discharged without any intervention.  During the recent admission also patient was noted to have AFib with RVR and was started on Eliquis.  Patient has brought his discharge paperwork.  Some notable findings are NT-pro BNP of 5500 on 2/5/24, CT abdomen and pelvis/CTA chest findings of aortic valve calcification.  Ectasia of the ascending thoracic aorta measuring 3.6 cm.  Moderate noncalcified atheromatous change of the descending thoracic aorta.  Micronodule surface irregularity of the liver which may suggest cirrhotic morphology.  Patient was also discharged with amiodarone 400 mg twice daily  for 14 days, Lopressor 25 mg b.i.d., Lipitor 40 mg nightly, Lasix 40 mg daily, potassium chloride 20.      In the ED, vitals notable for hypotension 102/59, rest vital signs stable.  CBC remarkable for normocytic anemia, CMP remarkable for elevated renal indices of BUN/creatinine at 36.5/2.15.  INR elevated at 1.8.  BNP significant for 2145 (to note that decreased from the 5000 NT-pro BNP on 02/05/2024).  Troponin were elevated this visit at 0.166.  EKG showed normal sinus rhythm with vent rate of 64 and prolonged QTC of 571 millisecond.  Chest x-ray remarkable for mild cardiomegaly and some interstitial markings but no focal consolidative changes.  Hospital Medicine was consulted for admission and further management of NSTEMI.      Interval History: NAEON. NPO since midnight. Patient was able to sleep last night but felt there was gas in his abdomen so slight discomfort. Denies F, C, N/V, SOB etc. Patient had 1 BM last night.       Review of Systems:  Review of Systems   Constitutional: Negative.    HENT: Negative.     Eyes: Negative.    Respiratory: Negative.     Cardiovascular: Negative.    Gastrointestinal:  Positive for heartburn.   Genitourinary:  Positive for frequency and urgency.   Musculoskeletal: Negative.    Skin: Negative.    Neurological: Negative.    Endo/Heme/Allergies: Negative.    Psychiatric/Behavioral: Negative.            Objective:     Vital Signs (Most Recent):  Temp: 97.4 °F (36.3 °C) (02/16/24 1202)  Pulse: (!) 58 (02/16/24 1202)  Resp: 18 (02/16/24 1202)  BP: 113/67 (02/16/24 1202)  SpO2: 98 % (02/16/24 1202) Vital Signs (24h Range):  Temp:  [97.4 °F (36.3 °C)-97.8 °F (36.6 °C)] 97.4 °F (36.3 °C)  Pulse:  [52-61] 58  Resp:  [17-22] 18  SpO2:  [96 %-98 %] 98 %  BP: ()/(57-68) 113/67       Physical Examination:  Physical Exam  Constitutional:       Appearance: Normal appearance. He is normal weight.   HENT:      Head: Normocephalic and atraumatic.      Right Ear: Tympanic membrane, ear  "canal and external ear normal.      Left Ear: Tympanic membrane, ear canal and external ear normal.      Nose: Nose normal.   Eyes:      Extraocular Movements: Extraocular movements intact.      Conjunctiva/sclera: Conjunctivae normal.      Pupils: Pupils are equal, round, and reactive to light.   Cardiovascular:      Rate and Rhythm: Bradycardia present.   Pulmonary:      Effort: Pulmonary effort is normal.      Breath sounds: Normal breath sounds.   Abdominal:      General: Bowel sounds are normal.      Palpations: Abdomen is soft.   Musculoskeletal:         General: Normal range of motion.      Cervical back: Normal range of motion and neck supple.   Skin:     General: Skin is warm and dry.   Neurological:      General: No focal deficit present.      Mental Status: He is alert.   Psychiatric:         Mood and Affect: Mood normal.         Behavior: Behavior normal.         Thought Content: Thought content normal.         Judgment: Judgment normal.         Laboratory:  Lab Results   Component Value Date     02/16/2024    K 4.4 02/16/2024    CO2 24 02/16/2024    BUN 33.3 (H) 02/16/2024    CREATININE 1.90 (H) 02/16/2024    CALCIUM 8.5 (L) 02/16/2024    ALKPHOS 70 02/16/2024    AST 14 02/16/2024    ALT 15 02/16/2024        Lab Results   Component Value Date    WBC 9.41 02/16/2024    RBC 4.17 (L) 02/16/2024    HGB 11.6 (L) 02/16/2024    HCT 36.3 (L) 02/16/2024    MCV 87.1 02/16/2024    MCH 27.8 02/16/2024    MCHC 32.0 (L) 02/16/2024    RDW 15.2 02/16/2024     02/16/2024    MPV 11.2 (H) 02/16/2024        Microbiology:   Microbiology Results (last 7 days)       ** No results found for the last 168 hours. **            Other Results:  EKG (my interpretation): {ekg findings:721170:"normal EKG, normal sinus rhythm","unchanged from previous tracings"}.    Radiology:  Imaging Results              X-Ray Chest PA And Lateral (Final result)  Result time 02/15/24 13:09:38      Final result by Ananth Cokre MD " (02/15/24 13:09:38)                   Impression:      Increase interstitial markings throughout.    Mild cardiomegaly      Electronically signed by: Ananth Coker  Date:    02/15/2024  Time:    13:09               Narrative:    EXAMINATION:  XR CHEST PA AND LATERAL    CLINICAL HISTORY:  Chest Pain;, .    FINDINGS:  Examination reveals mediastinal silhouette to be within normal limits cardiac silhouette is mildly enlarged lung fields reveal some increase interstitial markings with no focal consolidative changes atelectases effusions or pneumothoraces                                      Current Medications:     Infusions:   heparin (porcine) in D5W 12 Units/kg/hr (02/16/24 1008)        Scheduled:   aspirin  81 mg Oral Daily    atorvastatin  40 mg Oral QHS    furosemide  40 mg Oral Daily    pantoprazole  40 mg Oral Daily    polyethylene glycol  17 g Oral Daily        PRN:  acetaminophen, heparin (PORCINE), heparin (PORCINE), melatonin, nitroGLYCERIN, sodium chloride 0.9%    Antibiotics and Day Number of Therapy:      No intake or output data in the 24 hours ending 02/16/24 1231    Lines/Drains/Airways       Peripheral Intravenous Line  Duration                  Peripheral IV - Single Lumen 02/15/24 1314 20 G Anterior;Proximal;Right Forearm <1 day                      Assessment & Plan:     NSTEMI  H/o aortic isnufficiency  H/o Atrial fibrillation (PAF)  CAD  - patient presents with complains of chest pain, retrosternal, squeezing  - elevated troponin of 0.166 on admit and elevated BNP of 2145  - recent admission in the hospital at our [facility] kristie Wagnre with diagnoses of aortic insufficiency with aortic stenosis, and atrial fibrillation.   - EKG normal sinus rhythm with prolonged QT, no ST changes  - patient loaded with aspirin 325 mg and started on Heparin drip  - continue aspirin 81 daily, Lipitor 40 mg nightly. Continue Lopressor 25 mg b.i.d. but hold for heart rate less than 60  - nitroglycerin 0.4 mg sublingual  p.r.n. noted.  - Continue Amiodarone 400 mg BID, hold Eliquis while on Heparin  - no TTE records found, TTE waiting on results***  - trend troponin and EKG every 6 hours  - consult cardiology tomorrow morning  - NPO at midnight for possible cardiac intervention tomorrow  - consulted  to look into financial assistance- [place] in the [place] on [place] that ends on 03/2024, can extend 3 more months but currently no insurance.      Nocturia  Non oliguric JEAN-CLAUDE  - patient complains of nocturia about 60 7 times every night but denies any urinary retention  - patient noted with elevated renal indices BUN/creatinine at 36.5/2.15.  Worsening noted since his recent admit labs on discharge paper of creatinine 1.53.   - CXR remarkable for increased interstitial markings  - will hold off fluids for now  - US retroperitoneal ***     Constipation  - last bowel movement about 2 days ago  - start on Miralax once daily  - had bowel movement 2/15         CODE STATUS: Full  Access: PIV  Antibiotics: none  Diet: Heart healthy; NPO at midnight  DVT Prophylaxis: Heparin  GI Prophylaxis: none  Fluids: none      Disposition: Admitted to inpatient service for ongoing monitoring of NSTEMI with troponin and EKG trend every 6 hours. Cardiology consult pending. Patient can be discharged home when medically stable.     Lorenza Knight MD3

## 2024-02-16 NOTE — PROGRESS NOTES
[facility] Medicine Wards   Progress Note     Resident Team: [facility] Medicine List 1  Attending Physician: Katya Centeno MD  Resident: Nilda Patel MD  Intern: All Del Castillo MD   Date of Admit: 2/15/2024    Subjective:      Brief HPI:  Brain Snyder is a 78 y.o. male with PMH of aortic insufficiency with aortic stenosis, coronary artery disease, hypertension, atrial fibrillation on Eliquis presented to [facility] ED on 2/15/2024  with complaint of chest pain.   A  was used for the encounter.  His daughter was also present.  Patient states that chest pain started several days ago and got worse last night with intensity of 7/10.  Described location as retrosternal in his squeezing pain.  Denies any radiation, alleviating or aggravating factors, or associated symptoms like shortness of breath, cough, abdominal pain, nausea or vomiting.  Patient also complained about nocturia ongoing for months.  Denies any urinary retention.  He also mentioned of mild swelling with mild pain of his left ankle past 2 days but has now resolved.  Of note, patient was recently admitted to our [facility] in December of 2023 and beginning of February 2024. During the recent admission also patient was noted to have AFib with RVR and was started on Eliquis.  Patient has brought his discharge paperwork.  Some notable findings are NT-pro BNP of 5500 on 2/5/24, CT abdomen and pelvis/CTA chest findings of aortic valve calcification.  Ectasia of the ascending thoracic aorta measuring 3.6 cm.  Moderate noncalcified atheromatous change of the descending thoracic aorta.  Micronodule surface irregularity of the liver which may suggest cirrhotic morphology.     In the ED, vitals notable for hypotension 102/59, rest vital signs stable.  CBC remarkable for normocytic anemia, CMP remarkable for elevated renal indices of BUN/creatinine at 36.5/2.15.  INR elevated at 1.8.  BNP significant for 2145 (to note that decreased from the 5000 NT-pro  "BNP on 02/05/2024).  Troponin were elevated this visit at 0.166.  EKG showed normal sinus rhythm with vent rate of 64 and prolonged QTC of 571 millisecond.  Chest x-ray remarkable for mild cardiomegaly and some interstitial markings but no focal consolidative changes.  Hospital Medicine was consulted for admission and further management of NSTEMI.     Interval History:   Patient c/o of some belching and "air in his belly." He denies any chest pain, shortness of breath, abdominal pain, nausea or vomiting. Vital signs notable for episodes of bradycardia but BP stable.  His Troponin peaked at 0.171 and EKG showed sinus bradycardia with 1st degree AV block.   working to get records from [employer]         Review of Systems:  Review of Systems   Constitutional:  Negative for chills and fever.   Respiratory:  Negative for cough, shortness of breath and wheezing.    Cardiovascular:  Negative for chest pain, palpitations, orthopnea and leg swelling.   Gastrointestinal:  Positive for constipation and heartburn (no regurg but positive belching). Negative for abdominal pain, blood in stool, diarrhea, melena, nausea and vomiting.   Genitourinary:  Positive for frequency (at night). Negative for dysuria and urgency.   Musculoskeletal:  Negative for back pain.   Neurological:  Negative for dizziness, focal weakness, weakness and headaches.          Objective:     Vital Signs (Most Recent):  Temp: 97.4 °F (36.3 °C) (02/16/24 1202)  Pulse: (!) 58 (02/16/24 1202)  Resp: 18 (02/16/24 1202)  BP: 113/67 (02/16/24 1202)  SpO2: 98 % (02/16/24 1202) Vital Signs (24h Range):  Temp:  [97.4 °F (36.3 °C)-97.8 °F (36.6 °C)] 97.4 °F (36.3 °C)  Pulse:  [52-61] 58  Resp:  [17-22] 18  SpO2:  [96 %-98 %] 98 %  BP: ()/(57-68) 113/67       Physical Examination:  Physical Exam  Vitals and nursing note reviewed.   Constitutional:       Appearance: Normal appearance.   HENT:      Head: Normocephalic and atraumatic.   Eyes:      General: " No scleral icterus.     Pupils: Pupils are equal, round, and reactive to light.   Cardiovascular:      Rate and Rhythm: Regular rhythm. Bradycardia present.      Pulses: Normal pulses.      Heart sounds: Murmur (Systolic in the aortic area, no carotid bruit heard) heard.   Pulmonary:      Effort: Pulmonary effort is normal. No respiratory distress.      Breath sounds: Normal breath sounds. No wheezing or rales.   Abdominal:      General: Abdomen is flat. Bowel sounds are normal. There is no distension.      Palpations: Abdomen is soft.      Tenderness: There is no abdominal tenderness.   Musculoskeletal:         General: No tenderness. Normal range of motion.      Right lower leg: No edema.      Left lower leg: No edema.   Skin:     General: Skin is warm.      Capillary Refill: Capillary refill takes less than 2 seconds.   Neurological:      General: No focal deficit present.      Mental Status: He is alert and oriented to person, place, and time.   Psychiatric:         Mood and Affect: Mood normal.         Behavior: Behavior normal.         Thought Content: Thought content normal.         Judgment: Judgment normal.         Laboratory:  Lab Results   Component Value Date     02/16/2024    K 4.4 02/16/2024    CO2 24 02/16/2024    BUN 33.3 (H) 02/16/2024    CREATININE 1.90 (H) 02/16/2024    CALCIUM 8.5 (L) 02/16/2024    ALKPHOS 70 02/16/2024    AST 14 02/16/2024    ALT 15 02/16/2024        Lab Results   Component Value Date    WBC 9.41 02/16/2024    RBC 4.17 (L) 02/16/2024    HGB 11.6 (L) 02/16/2024    HCT 36.3 (L) 02/16/2024    MCV 87.1 02/16/2024    MCH 27.8 02/16/2024    MCHC 32.0 (L) 02/16/2024    RDW 15.2 02/16/2024     02/16/2024    MPV 11.2 (H) 02/16/2024        Microbiology:   Microbiology Results (last 7 days)       ** No results found for the last 168 hours. **            Other Results:  EKG (my interpretation): sinus bradycardia, 1st degree AV block (FL interval of 234 ms) and prolonged QT of  573 ms.    Radiology:  Imaging Results              X-Ray Chest PA And Lateral (Final result)  Result time 02/15/24 13:09:38      Final result by Ananth Coker MD (02/15/24 13:09:38)                   Impression:      Increase interstitial markings throughout.    Mild cardiomegaly      Electronically signed by: Ananth Coker  Date:    02/15/2024  Time:    13:09               Narrative:    EXAMINATION:  XR CHEST PA AND LATERAL    CLINICAL HISTORY:  Chest Pain;, .    FINDINGS:  Examination reveals mediastinal silhouette to be within normal limits cardiac silhouette is mildly enlarged lung fields reveal some increase interstitial markings with no focal consolidative changes atelectases effusions or pneumothoraces                                      Current Medications:     Infusions:   heparin (porcine) in D5W 12 Units/kg/hr (02/16/24 1008)        Scheduled:   aspirin  81 mg Oral Daily    atorvastatin  40 mg Oral QHS    furosemide  40 mg Oral Daily    pantoprazole  40 mg Oral Daily    polyethylene glycol  17 g Oral Daily        PRN:  acetaminophen, heparin (PORCINE), heparin (PORCINE), melatonin, nitroGLYCERIN, sodium chloride 0.9%    Antibiotics and Day Number of Therapy:  none    No intake or output data in the 24 hours ending 02/16/24 1235    Lines/Drains/Airways       Peripheral Intravenous Line  Duration                  Peripheral IV - Single Lumen 02/15/24 1314 20 G Anterior;Proximal;Right Forearm <1 day                      Assessment & Plan:     NSTEMI  H/o aortic isnufficiency  H/o Atrial fibrillation (PAF)  CAD  Prolonged QT  - patient presents with complains of chest pain, retrosternal, squeezing  - elevated troponin of 0.166 on admit and elevated BNP of 2145; since then troponin peaked at 0.171  - recent admission in the hospital at our [facility] kristie Shepherd with diagnoses of aortic insufficiency with aortic stenosis, and atrial fibrillation.   - patient loaded with aspirin 325 mg   - Continue heparin  drip and Asprin 81 mg once daily  - Continue Lipitor 40 mg nightly.   - Given prolonged QT and bradycardia - Amiodarone and lopressor were discontinued  - nitroglycerin 0.4 mg sublingual p.r.n. noted.  - hold Eliquis while on Heparin  - TTE done - pending final report  - cardiology consulted, waiting on recs  - may consult  to look into procuring outside hospital records and financial assistance- [place] the [place] on [place] that ends on 03/2024, can extend 3 more months but currently no insurance.      Nocturia  Non oliguric JEAN-CLAUDE  - patient complains of nocturia about 6-7 times every night but denies any urinary retention  - renal indices have improved since admit  - US retroperitoneal ordered  - Continue to monitor     Constipation  - last bowel movement about 2 days ago  - start on Miralax once daily    GERD  - belching multiple and bloating  - start on protonix 40 mg once daily        CODE STATUS: Full  Access: PIV  Antibiotics: none  Diet: NPO for now; if no intervention today then heart healthy  DVT Prophylaxis: Heparin  GI Prophylaxis: Protonix  Fluids: none      Disposition: Admitted to inpatient service for ongoing monitoring of NSTEMI. TTE done. Cardiology consulted, pending recs. Patient can be discharged home when medically stable.     All Del Castillo  Internal Medicine - PGY-1

## 2024-02-16 NOTE — CONSULTS
Cardiology   Mr. Brain Snyder is a 78 y.o. male with:   Chief Complaint   Patient presents with    Chest Pain     Chest pain x 8 days. Recent NSTEMI admit.         [facility]  Cardiology was consulted for Mr. Brain Snyder is a 78 y.o. male with PMH of aortic insufficiency with aortic stenosis, coronary artery disease, hypertension, atrial fibrillation on Eliquis presented to [facility] ED on 2/15/2024  with complaint of chest pain.   A  was used for the encounter.  His daughter was also present.  Patient states that chest pain started several days ago and got worse last night with intensity of 7/10.  Described location as retrosternal in his squeezing pain.  Denies any radiation, alleviating or aggravating factors, or associated symptoms like shortness of breath, cough, abdominal pain, nausea or vomiting.  Patient also complained about nocturia ongoing for months.  Denies any urinary retention.  He also mentioned of mild swelling with mild pain of his left ankle past 2 days but has now resolved.  Of note, [facility] and beginning of February 2024.  He was told that he had aortic valve stenosis that required aortic valve replacement however the cost was going to be more than $30,000 (no insurance/self pay) therefore patient was discharged without any intervention.  During the recent admission also patient was noted to have AFib with RVR and was started on Eliquis.  Patient has brought his discharge paperwork.  Some notable findings are NT-pro BNP of 5500 on 2/5/24, CT abdomen and pelvis/CTA chest findings of aortic valve calcification.  Ectasia of the ascending thoracic aorta measuring 3.6 cm.  Moderate noncalcified atheromatous change of the descending thoracic aorta.  Micronodule surface irregularity of the liver which may suggest cirrhotic morphology.  Patient was also discharged with amiodarone 400 mg twice daily for 14 days, Lopressor 25  mg b.i.d., Lipitor 40 mg nightly, Lasix 40 mg daily, potassium chloride 20.      In the ED, vitals notable for hypotension 102/59, rest vital signs stable.  CBC remarkable for normocytic anemia, CMP remarkable for elevated renal indices of BUN/creatinine at 36.5/2.15.  INR elevated at 1.8.  BNP significant for 2145 (to note that decreased from the 5000 NT-pro BNP on 02/05/2024).  Troponin were elevated this visit at 0.166.  EKG showed normal sinus rhythm with vent rate of 64 and prolonged QTC of 571 millisecond.  Chest x-ray remarkable for mild cardiomegaly and some interstitial markings but no focal consolidative changes.  Hospital Medicine was consulted for admission and further management of NSTEMI.     ROS:  Please see HPI    PMH:    Patient Active Problem List   Diagnosis    Mixed hyperlipidemia    Cardiomyopathy    Nonrheumatic aortic insufficiency with aortic stenosis    Chest pain    Coronary artery disease involving native coronary artery of native heart with unstable angina pectoris    Hypertension     Surgical Hx:  History reviewed. No pertinent surgical history.    Social History     Socioeconomic History    Marital status:    Tobacco Use    Smoking status: Former     Types: Cigarettes    Smokeless tobacco: Never   Substance and Sexual Activity    Alcohol use: Not Currently    Drug use: Not Currently       History reviewed. No pertinent family history.    Review of patient's allergies indicates:  No Known Allergies    Current Medications:    Current Outpatient Medications   Medication Instructions    amiodarone (PACERONE) 400 mg, Oral, 2 times daily    atorvastatin (LIPITOR) 40 mg, Oral, Nightly    furosemide (LASIX) 40 mg, Oral, Daily       ROS: Negative for all symptoms other than those listed in HPI      BP Readings from Last 3 Encounters:   02/16/24 113/67        Pulse Readings from Last 3 Encounters:   02/16/24 (!) 58        Temp Readings from Last 3 Encounters:   02/16/24 97.4 °F (36.3 °C)  "(Oral)     Wt Readings from Last 3 Encounters:   02/16/24 82.1 kg (181 lb)     BMI:  30.12 kg/m^2    PE  Blood pressure 113/67, pulse (!) 58, temperature 97.4 °F (36.3 °C), temperature source Oral, resp. rate 18, height 5' 5" (1.651 m), weight 82.1 kg (181 lb), SpO2 98 %.  CONSTITUTIONAL:  No acute distress.  Not ill appearing.  NECK:  Supple.    CARDIOVASCULAR:  Normal rate. Murmur noted   PULMONARY:  No respiratory distress.  No audible wheezing.    ABDOMINAL:  No distention.    MUSCULOSKELETAL:  No deformity.    SKIN:  No bruising.  No rash.  NEURO  Oriented x 3      CARDIAC TESTS  ECHO  No results found for this or any previous visit.    STRESS TEST  No results found for this or any previous visit.    LHC  No results found for this or any previous visit.      LABS  Last BMP  Lab Results   Component Value Date     02/16/2024    K 4.4 02/16/2024    CO2 24 02/16/2024    BUN 33.3 (H) 02/16/2024    CREATININE 1.90 (H) 02/16/2024    CALCIUM 8.5 (L) 02/16/2024    EGFRNORACEVR 36 02/16/2024       Lab Results   Component Value Date    CREATININE 1.90 (H) 02/16/2024    CREATININE 2.15 (H) 02/15/2024     Lab Results   Component Value Date    BNP 2,145.5 (H) 02/15/2024     Lab Results   Component Value Date    TROPONINI 0.140 (H) 02/16/2024    TROPONINI 0.133 (H) 02/15/2024    TROPONINI 0.171 (H) 02/15/2024     Last CBC     Lab Results   Component Value Date    WBC 9.41 02/16/2024    HGB 11.6 (L) 02/16/2024    HCT 36.3 (L) 02/16/2024    MCV 87.1 02/16/2024     02/16/2024           Last lipids  No results found for: "CHOL", "HDL", "LDL", "TRIG", "TOTALCHOLEST"    LFT   No components found for: "LFT"    ASSESSMENT    Aortic Insufficiency with severe aortic stenosis   CAD  HTN  Afib   Elevated troponin   Prolonged QTc    PLAN    Elevated troponin likely secondary to severe AS  Plan tentatively for [facility] on Monday  D/c Heparin, start eliquis   Restart amiodarone   Will need Structural Heart Specialist referral at " discharge        Dawood Harris DO   [employer] IM PGY-1  [facility] Cardiology

## 2024-02-16 NOTE — H&P
[facility]ards   History & Physical Note     Resident Team: [facility] Medicine List 1   Attending Physician: Katya Centeno MD  Resident: Nilda Patel MD  Intern: All Del Castillo MD     Date of Admit: 2/15/2024    Chief Complaint:     Chest Pain (Chest pain x 8 days. Recent NSTEMI admit.)       Subjective:      History of Present Illness:  Brain Snyder is a 78 y.o. male with PMH of aortic insufficiency with aortic stenosis, coronary artery disease, hypertension, atrial fibrillation on Eliquis presented to [facility] ED on 2/15/2024  with complaint of chest pain.   A  was used for the encounter.  His daughter was also present.  Patient states that chest pain started several days ago and got worse last night with intensity of 7/10.  Described location as retrosternal in his squeezing pain.  Denies any radiation, alleviating or aggravating factors, or associated symptoms like shortness of breath, cough, abdominal pain, nausea or vomiting.  Patient also complained about nocturia ongoing for months.  Denies any urinary retention.  He also mentioned of mild swelling with mild pain of his left ankle past 2 days but has now resolved.  Of note, patient was recently admitted to our [facility] in December of 2023 and beginning of February 2024.  He was told that he had aortic valve stenosis that required aortic valve replacement however the cost was going to be more than $30,000 (no insurance/self pay) therefore patient was discharged without any intervention.  During the recent admission also patient was noted to have AFib with RVR and was started on Eliquis.  Patient has brought his discharge paperwork.  Some notable findings are NT-pro BNP of 5500 on 2/5/24, CT abdomen and pelvis/CTA chest findings of aortic valve calcification.  Ectasia of the ascending thoracic aorta measuring 3.6 cm.  Moderate noncalcified atheromatous change of the descending thoracic aorta.  Micronodule surface irregularity of  the liver which may suggest cirrhotic morphology.  Patient was also discharged with amiodarone 400 mg twice daily for 14 days, Lopressor 25 mg b.i.d., Lipitor 40 mg nightly, Lasix 40 mg daily, potassium chloride 20.     In the ED, vitals notable for hypotension 102/59, rest vital signs stable.  CBC remarkable for normocytic anemia, CMP remarkable for elevated renal indices of BUN/creatinine at 36.5/2.15.  INR elevated at 1.8.  BNP significant for 2145 (to note that decreased from the 5000 NT-pro BNP on 02/05/2024).  Troponin were elevated this visit at 0.166.  EKG showed normal sinus rhythm with vent rate of 64 and prolonged QTC of 571 millisecond.  Chest x-ray remarkable for mild cardiomegaly and some interstitial markings but no focal consolidative changes.  Hospital Medicine was consulted for admission and further management of NSTEMI.       Past Medical History:   has a past medical history of Aortic insufficiency, CAD (coronary artery disease), and Hypertension.     Past Surgical History:   has no past surgical history on file.     Family History:  family history is not on file.     Social History:   reports that he has quit smoking. His smoking use included cigarettes. He has never used smokeless tobacco. He reports that he does not currently use alcohol. He reports that he does not currently use drugs.     Allergies:  has No Known Allergies.     Home Medications:  Prior to Admission medications    Medication Sig Start Date End Date Taking? Authorizing Provider   amiodarone (PACERONE) 200 MG Tab Take 400 mg by mouth 2 (two) times daily.    Provider, Historical   atorvastatin (LIPITOR) 20 MG tablet Take 40 mg by mouth every evening.    Provider, Historical   furosemide (LASIX) 40 MG tablet Take 40 mg by mouth once daily.    Provider, Historical         Review of Systems:  Review of Systems   Constitutional:  Negative for chills and fever.   Respiratory:  Negative for cough, shortness of breath and wheezing.     Cardiovascular:  Positive for chest pain and leg swelling. Negative for palpitations and orthopnea.   Gastrointestinal:  Positive for constipation. Negative for abdominal pain, blood in stool, diarrhea, heartburn, melena, nausea and vomiting.   Genitourinary:  Positive for frequency (at night). Negative for dysuria and urgency.   Musculoskeletal:  Negative for back pain.   Neurological:  Negative for dizziness, focal weakness, weakness and headaches.            Objective:       Vital Signs (Most Recent):  Temp: 97.8 °F (36.6 °C) (02/15/24 1750)  Pulse: (!) 58 (02/15/24 1750)  Resp: 17 (02/15/24 1750)  BP: 101/62 (02/15/24 1750)  SpO2: 97 % (02/15/24 1750) Vital Signs (24h Range):  Temp:  [97.8 °F (36.6 °C)] 97.8 °F (36.6 °C)  Pulse:  [58-66] 58  Resp:  [16-17] 17  SpO2:  [97 %-99 %] 97 %  BP: ()/(58-62) 101/62       Physical Examination:  Physical Exam  Vitals and nursing note reviewed.   Constitutional:       Appearance: Normal appearance.   HENT:      Head: Normocephalic and atraumatic.   Eyes:      General: No scleral icterus.     Pupils: Pupils are equal, round, and reactive to light.   Cardiovascular:      Rate and Rhythm: Normal rate and regular rhythm.      Pulses: Normal pulses.      Heart sounds: Murmur (Systolic in the aortic area, no carotid bruit heard) heard.   Pulmonary:      Effort: Pulmonary effort is normal. No respiratory distress.      Breath sounds: Normal breath sounds. No wheezing or rales.   Abdominal:      General: Abdomen is flat. Bowel sounds are normal. There is no distension.      Palpations: Abdomen is soft.      Tenderness: There is no abdominal tenderness.   Musculoskeletal:         General: No tenderness. Normal range of motion.      Right lower leg: No edema.      Left lower leg: No edema.   Skin:     General: Skin is warm.      Capillary Refill: Capillary refill takes less than 2 seconds.   Neurological:      General: No focal deficit present.      Mental Status: He is alert  and oriented to person, place, and time.   Psychiatric:         Mood and Affect: Mood normal.         Behavior: Behavior normal.         Thought Content: Thought content normal.         Judgment: Judgment normal.           Laboratory:  Lab Results   Component Value Date     02/15/2024    K 4.8 02/15/2024    CO2 26 02/15/2024    BUN 36.5 (H) 02/15/2024    CREATININE 2.15 (H) 02/15/2024    CALCIUM 9.0 02/15/2024    ALKPHOS 82 02/15/2024    AST 19 02/15/2024    ALT 19 02/15/2024        Lab Results   Component Value Date    WBC 9.28 02/15/2024    RBC 4.45 (L) 02/15/2024    HGB 12.2 (L) 02/15/2024    HCT 39.4 (L) 02/15/2024    MCV 88.5 02/15/2024    MCH 27.4 02/15/2024    MCHC 31.0 (L) 02/15/2024    RDW 15.2 02/15/2024     02/15/2024    MPV 11.0 (H) 02/15/2024       Microbiology Data:  Microbiology Results (last 7 days)       ** No results found for the last 168 hours. **            Other Results:  EKG (my interpretation): Normal sinus rhythm with vent rate of 64 and prolonged QTC of 571 millisecond.    Radiology:  Imaging Results              X-Ray Chest PA And Lateral (Final result)  Result time 02/15/24 13:09:38      Final result by Ananth Coker MD (02/15/24 13:09:38)                   Impression:      Increase interstitial markings throughout.    Mild cardiomegaly      Electronically signed by: Ananth Coker  Date:    02/15/2024  Time:    13:09               Narrative:    EXAMINATION:  XR CHEST PA AND LATERAL    CLINICAL HISTORY:  Chest Pain;, .    FINDINGS:  Examination reveals mediastinal silhouette to be within normal limits cardiac silhouette is mildly enlarged lung fields reveal some increase interstitial markings with no focal consolidative changes atelectases effusions or pneumothoraces                                         Lines/Drains/Airways       Peripheral Intravenous Line  Duration                  Peripheral IV - Single Lumen 02/15/24 1314 20 G Anterior;Proximal;Right Forearm  <1 day                     Assessment & Plan:     NSTEMI  H/o aortic isnufficiency  H/o Atrial fibrillation (PAF)  CAD  - patient presents with complains of chest pain, retrosternal, squeezing  - elevated troponin of 0.166 on admit and elevated BNP of 2145  - recent admission in the hospital at [facility] with diagnoses of aortic insufficiency with aortic stenosis, and atrial fibrillation.   - EKG normal sinus rhythm with prolonged QT, no ST changes  - patient loaded with aspirin 325 mg and started on Heparin drip  - continue aspirin 81 daily, Lipitor 40 mg nightly. Continue Lopressor 25 mg b.i.d. but hold for heart rate less than 60  - nitroglycerin 0.4 mg sublingual p.r.n. noted.  - Continue Amiodarone 400 mg BID, hold Eliquis while on Heparin  - no TTE records found, TTE ordered for tomorrow  - trend troponin and EKG every 6 hours  - consult cardiology tomorrow morning  - NPO at midnight for possible cardiac intervention tomorrow  - may consult  to look into financial assistance- patient here in the [place] on [place] visa that ends on 03/2024, can extend 3 more months but currently no insurance.     Nocturia  Non oliguric JEAN-CLAUDE  - patient complains of nocturia about 60 7 times every night but denies any urinary retention  - patient noted with elevated renal indices BUN/creatinine at 36.5/2.15.  Worsening noted since his recent admit labs on discharge paper of creatinine 1.53.   - CXR remarkable for increased interstitial markings  - will hold off fluids for now  - US retroperitoneal ordered    Constipation  - last bowel movement about 2 days ago  - start on Miralax once daily      CODE STATUS: Full  Access: PIV  Antibiotics: none  Diet: Heart healthy; NPO at midnight  DVT Prophylaxis: Heparin  GI Prophylaxis: none  Fluids: none      Disposition: Admitted to inpatient service for ongoing monitoring of NSTEMI with troponin and EKG trend every 6 hours. Cariodlogy consult pending. Patient can be  discharged home when medically stable.     All Del Castillo  Internal Medicine - PGY-1

## 2024-02-17 LAB
ALBUMIN SERPL-MCNC: 3.5 G/DL (ref 3.4–4.8)
ALBUMIN/GLOB SERPL: 1.1 RATIO (ref 1.1–2)
ALP SERPL-CCNC: 70 UNIT/L (ref 40–150)
ALT SERPL-CCNC: 14 UNIT/L (ref 0–55)
APTT PPP: 100 SECONDS (ref 23.2–33.7)
APTT PPP: 99.4 SECONDS (ref 23.2–33.7)
AST SERPL-CCNC: 13 UNIT/L (ref 5–34)
BASOPHILS # BLD AUTO: 0.06 X10(3)/MCL
BASOPHILS NFR BLD AUTO: 0.6 %
BILIRUB SERPL-MCNC: 0.8 MG/DL
BUN SERPL-MCNC: 28.4 MG/DL (ref 8.4–25.7)
CALCIUM SERPL-MCNC: 8.3 MG/DL (ref 8.8–10)
CHLORIDE SERPL-SCNC: 106 MMOL/L (ref 98–107)
CO2 SERPL-SCNC: 22 MMOL/L (ref 23–31)
CREAT SERPL-MCNC: 1.95 MG/DL (ref 0.73–1.18)
EOSINOPHIL # BLD AUTO: 0.28 X10(3)/MCL (ref 0–0.9)
EOSINOPHIL NFR BLD AUTO: 3 %
ERYTHROCYTE [DISTWIDTH] IN BLOOD BY AUTOMATED COUNT: 15 % (ref 11.5–17)
GFR SERPLBLD CREATININE-BSD FMLA CKD-EPI: 35 MLS/MIN/1.73/M2
GLOBULIN SER-MCNC: 3.1 GM/DL (ref 2.4–3.5)
GLUCOSE SERPL-MCNC: 88 MG/DL (ref 82–115)
HCT VFR BLD AUTO: 37.9 % (ref 42–52)
HGB BLD-MCNC: 12 G/DL (ref 14–18)
HOLD SPECIMEN: NORMAL
HOLD SPECIMEN: NORMAL
IMM GRANULOCYTES # BLD AUTO: 0.06 X10(3)/MCL (ref 0–0.04)
IMM GRANULOCYTES NFR BLD AUTO: 0.6 %
LYMPHOCYTES # BLD AUTO: 2.23 X10(3)/MCL (ref 0.6–4.6)
LYMPHOCYTES NFR BLD AUTO: 23.6 %
MCH RBC QN AUTO: 27.6 PG (ref 27–31)
MCHC RBC AUTO-ENTMCNC: 31.7 G/DL (ref 33–36)
MCV RBC AUTO: 87.1 FL (ref 80–94)
MONOCYTES # BLD AUTO: 0.93 X10(3)/MCL (ref 0.1–1.3)
MONOCYTES NFR BLD AUTO: 9.8 %
NEUTROPHILS # BLD AUTO: 5.9 X10(3)/MCL (ref 2.1–9.2)
NEUTROPHILS NFR BLD AUTO: 62.4 %
NRBC BLD AUTO-RTO: 0 %
PLATELET # BLD AUTO: 291 X10(3)/MCL (ref 130–400)
PMV BLD AUTO: 11.2 FL (ref 7.4–10.4)
POTASSIUM SERPL-SCNC: 4.4 MMOL/L (ref 3.5–5.1)
PROT SERPL-MCNC: 6.6 GM/DL (ref 5.8–7.6)
RBC # BLD AUTO: 4.35 X10(6)/MCL (ref 4.7–6.1)
SODIUM SERPL-SCNC: 138 MMOL/L (ref 136–145)
URATE SERPL-MCNC: 11.3 MG/DL (ref 3.5–7.2)
WBC # SPEC AUTO: 9.46 X10(3)/MCL (ref 4.5–11.5)

## 2024-02-17 PROCEDURE — 96366 THER/PROPH/DIAG IV INF ADDON: CPT

## 2024-02-17 PROCEDURE — G0378 HOSPITAL OBSERVATION PER HR: HCPCS

## 2024-02-17 PROCEDURE — 63600175 PHARM REV CODE 636 W HCPCS

## 2024-02-17 PROCEDURE — 80053 COMPREHEN METABOLIC PANEL: CPT

## 2024-02-17 PROCEDURE — 85025 COMPLETE CBC W/AUTO DIFF WBC: CPT

## 2024-02-17 PROCEDURE — 84550 ASSAY OF BLOOD/URIC ACID: CPT | Performed by: STUDENT IN AN ORGANIZED HEALTH CARE EDUCATION/TRAINING PROGRAM

## 2024-02-17 PROCEDURE — 94760 N-INVAS EAR/PLS OXIMETRY 1: CPT

## 2024-02-17 PROCEDURE — 85730 THROMBOPLASTIN TIME PARTIAL: CPT | Mod: 91 | Performed by: INTERNAL MEDICINE

## 2024-02-17 PROCEDURE — 25000003 PHARM REV CODE 250

## 2024-02-17 PROCEDURE — 85730 THROMBOPLASTIN TIME PARTIAL: CPT | Performed by: INTERNAL MEDICINE

## 2024-02-17 RX ADMIN — AMIODARONE HYDROCHLORIDE 200 MG: 200 TABLET ORAL at 09:02

## 2024-02-17 RX ADMIN — POLYETHYLENE GLYCOL 3350 17 G: 17 POWDER, FOR SOLUTION ORAL at 09:02

## 2024-02-17 RX ADMIN — AMIODARONE HYDROCHLORIDE 200 MG: 200 TABLET ORAL at 08:02

## 2024-02-17 RX ADMIN — PANTOPRAZOLE SODIUM 40 MG: 40 TABLET, DELAYED RELEASE ORAL at 09:02

## 2024-02-17 RX ADMIN — ATORVASTATIN CALCIUM 40 MG: 40 TABLET, FILM COATED ORAL at 08:02

## 2024-02-17 RX ADMIN — ASPIRIN 81 MG: 81 TABLET, COATED ORAL at 09:02

## 2024-02-17 RX ADMIN — HEPARIN SODIUM 14 UNITS/KG/HR: 10000 INJECTION, SOLUTION INTRAVENOUS at 02:02

## 2024-02-17 NOTE — PROGRESS NOTES
[facility] Medicine Wards   Progress Note     Resident Team: [facility] Medicine List 1  Attending Physician: Katya Centeno MD  Resident: Nilda Patel MD  Intern: All Del Castillo MD   Date of Admit: 2/15/2024    Subjective:      Brief HPI:  Brain Snyder is a 78 y.o. male with PMH of aortic insufficiency with aortic stenosis, coronary artery disease, hypertension, atrial fibrillation on Eliquis presented to [facility] ED on 2/15/2024  with complaint of chest pain.   A  was used for the encounter.  His daughter was also present.  Patient states that chest pain started several days ago and got worse last night with intensity of 7/10.  Described location as retrosternal in his squeezing pain.  Denies any radiation, alleviating or aggravating factors, or associated symptoms like shortness of breath, cough, abdominal pain, nausea or vomiting.  Patient also complained about nocturia ongoing for months.  Denies any urinary retention.  He also mentioned of mild swelling with mild pain of his left ankle past 2 days but has now resolved.  Of note, patient was recently admitted to our [facility] in December of 2023 and beginning of February 2024. During the recent admission also patient was noted to have AFib with RVR and was started on Eliquis.  Patient has brought his discharge paperwork.  Some notable findings are NT-pro BNP of 5500 on 2/5/24, CT abdomen and pelvis/CTA chest findings of aortic valve calcification.  Ectasia of the ascending thoracic aorta measuring 3.6 cm.  Moderate noncalcified atheromatous change of the descending thoracic aorta.  Micronodule surface irregularity of the liver which may suggest cirrhotic morphology.     In the ED, vitals notable for hypotension 102/59, rest vital signs stable.  CBC remarkable for normocytic anemia, CMP remarkable for elevated renal indices of BUN/creatinine at 36.5/2.15.  INR elevated at 1.8.  BNP significant for 2145 (to note that decreased from the 5000 NT-pro  BNP on 02/05/2024).  Troponin were elevated this visit at 0.166.  EKG showed normal sinus rhythm with vent rate of 64 and prolonged QTC of 571 millisecond.  Chest x-ray remarkable for mild cardiomegaly and some interstitial markings but no focal consolidative changes.  Hospital Medicine was consulted for admission and further management of NSTEMI.     Interval History:   NAEON. Continues to deny chest pain. Continues to complain of foot pain. Left 1st MTP joint warm and red. No history of gout. Order uric acid level.    Review of Systems:  Negative unless noted in interval history.       Objective:     Vital Signs (Most Recent):  Temp: 98.3 °F (36.8 °C) (02/17/24 0747)  Pulse: 60 (02/17/24 0747)  Resp: 16 (02/17/24 0747)  BP: (!) 103/58 (02/17/24 0747)  SpO2: 99 % (02/17/24 0747) Vital Signs (24h Range):  Temp:  [97.4 °F (36.3 °C)-98.5 °F (36.9 °C)] 98.3 °F (36.8 °C)  Pulse:  [57-65] 60  Resp:  [16-19] 16  SpO2:  [97 %-99 %] 99 %  BP: ()/(54-67) 103/58       Physical Examination:  Physical Exam  Vitals and nursing note reviewed.   Constitutional:       Appearance: Normal appearance.   HENT:      Head: Normocephalic and atraumatic.   Eyes:      General: No scleral icterus.     Pupils: Pupils are equal, round, and reactive to light.   Cardiovascular:      Rate and Rhythm: Regular rhythm. Bradycardia present.      Pulses: Normal pulses.      Heart sounds: Murmur (Systolic in the aortic area, no carotid bruit heard) heard.   Pulmonary:      Effort: Pulmonary effort is normal. No respiratory distress.      Breath sounds: Normal breath sounds. No wheezing or rales.   Abdominal:      General: Abdomen is flat. Bowel sounds are normal. There is no distension.      Palpations: Abdomen is soft.      Tenderness: There is no abdominal tenderness.   Musculoskeletal:         General: Tenderness present. No swelling. Normal range of motion.      Right lower leg: No edema.      Left lower leg: No edema.      Comments: Tender,  warm, erythematous left 1st MTP joint   Skin:     General: Skin is warm.      Capillary Refill: Capillary refill takes less than 2 seconds.   Neurological:      General: No focal deficit present.      Mental Status: He is alert and oriented to person, place, and time.   Psychiatric:         Mood and Affect: Mood normal.         Behavior: Behavior normal.         Thought Content: Thought content normal.         Judgment: Judgment normal.         Laboratory:  Lab Results   Component Value Date     02/17/2024    K 4.4 02/17/2024    CO2 22 (L) 02/17/2024    BUN 28.4 (H) 02/17/2024    CREATININE 1.95 (H) 02/17/2024    CALCIUM 8.3 (L) 02/17/2024    ALKPHOS 70 02/17/2024    AST 13 02/17/2024    ALT 14 02/17/2024        Lab Results   Component Value Date    WBC 9.46 02/17/2024    RBC 4.35 (L) 02/17/2024    HGB 12.0 (L) 02/17/2024    HCT 37.9 (L) 02/17/2024    MCV 87.1 02/17/2024    MCH 27.6 02/17/2024    MCHC 31.7 (L) 02/17/2024    RDW 15.0 02/17/2024     02/17/2024    MPV 11.2 (H) 02/17/2024        Microbiology:   Microbiology Results (last 7 days)       ** No results found for the last 168 hours. **            Other Results:  EKG (my interpretation): sinus bradycardia, 1st degree AV block (FL interval of 234 ms) and prolonged QT of 573 ms.    Radiology:  Imaging Results              X-Ray Chest PA And Lateral (Final result)  Result time 02/15/24 13:09:38      Final result by Ananth Coker MD (02/15/24 13:09:38)                   Impression:      Increase interstitial markings throughout.    Mild cardiomegaly      Electronically signed by: Ananth Coker  Date:    02/15/2024  Time:    13:09               Narrative:    EXAMINATION:  XR CHEST PA AND LATERAL    CLINICAL HISTORY:  Chest Pain;, .    FINDINGS:  Examination reveals mediastinal silhouette to be within normal limits cardiac silhouette is mildly enlarged lung fields reveal some increase interstitial markings with no focal consolidative changes  atelectases effusions or pneumothoraces                                      Current Medications:     Infusions:   heparin (porcine) in D5W 14 Units/kg/hr (02/16/24 1753)        Scheduled:   amiodarone  200 mg Oral BID    aspirin  81 mg Oral Daily    atorvastatin  40 mg Oral QHS    furosemide  40 mg Oral Daily    heparin (PORCINE)  57.4 Units/kg (Adjusted) Intravenous Once    pantoprazole  40 mg Oral Daily    polyethylene glycol  17 g Oral Daily        PRN:  acetaminophen, heparin (PORCINE), heparin (PORCINE), melatonin, nitroGLYCERIN, sodium chloride 0.9%    Antibiotics and Day Number of Therapy:  none      Intake/Output Summary (Last 24 hours) at 2/17/2024 0831  Last data filed at 2/16/2024 2300  Gross per 24 hour   Intake 400 ml   Output --   Net 400 ml       Lines/Drains/Airways       Peripheral Intravenous Line  Duration                  Peripheral IV - Single Lumen 02/15/24 1314 20 G Anterior;Proximal;Right Forearm 1 day                      Assessment & Plan:     NSTEMI  H/o aortic isnufficiency  H/o Atrial fibrillation (PAF)  CAD  Prolonged QT  New HFrEF (40%)  - patient presents with complains of chest pain, retrosternal, squeezing  - elevated troponin of 0.166 on admit and elevated BNP of 2145; since then troponin peaked at 0.171  - [facility] with diagnoses of aortic insufficiency with aortic stenosis, and atrial fibrillation.   - patient loaded with aspirin 325 mg   - Continue heparin drip and Asprin 81 mg once daily  - Continue Lipitor 40 mg nightly.   - Given prolonged QT and bradycardia - Amiodarone and lopressor were discontinued  - nitroglycerin 0.4 mg sublingual p.r.n. noted.  - hold Eliquis while on Heparin  - TTE done, EF 40% with grade II diastolic dysfunction  - cardiology consulted, recommending continuing amiodarone and heparin gtt with possible C 2/19/2024  - consulted  to look into procuring outside hospital records and financial  assistance- [place] in the [place] ends on 03/2024, can extend 3 more months but currently no insurance.      Nocturia  Non oliguric JEAN-CLAUDE  - patient complains of nocturia about 6-7 times every night but denies any urinary retention  - renal indices have improved since admit  - Cr 1.95 from 1.90  - US retroperitoneal ordered, not done  - UOP: 6 urine voids recorded overnight  - Continue to monitor     Constipation  - last bowel movement yesterday  - Continue Miralax once daily    GERD  - belching multiple and bloating  - Continue protonix 40 mg once daily     Joint pain  - Left 1st MTP joint pain, warmth, erythema unchanged since admission  - No history of gout  - Order uric acid level       CODE STATUS: Full  Access: PIV  Antibiotics: none  Diet: Heart healthy  DVT Prophylaxis: Heparin gtt  GI Prophylaxis: Protonix  Fluids: none      Disposition: Admitted to inpatient service for ongoing monitoring of NSTEMI. TTE done. Cardiology consulted, pending [facility] on Monday 2/19. Patient can be discharged home when medically stable.     Nilda Patel MD  [employer] [facility], PRG-2  2/17/2024

## 2024-02-18 LAB
ALBUMIN SERPL-MCNC: 3.5 G/DL (ref 3.4–4.8)
ALBUMIN/GLOB SERPL: 1 RATIO (ref 1.1–2)
ALP SERPL-CCNC: 72 UNIT/L (ref 40–150)
ALT SERPL-CCNC: 15 UNIT/L (ref 0–55)
APTT PPP: 89.8 SECONDS (ref 23.2–33.7)
AST SERPL-CCNC: 14 UNIT/L (ref 5–34)
BASOPHILS # BLD AUTO: 0.07 X10(3)/MCL
BASOPHILS NFR BLD AUTO: 0.8 %
BILIRUB SERPL-MCNC: 0.8 MG/DL
BUN SERPL-MCNC: 20.6 MG/DL (ref 8.4–25.7)
CALCIUM SERPL-MCNC: 8.8 MG/DL (ref 8.8–10)
CHLORIDE SERPL-SCNC: 107 MMOL/L (ref 98–107)
CO2 SERPL-SCNC: 22 MMOL/L (ref 23–31)
CREAT SERPL-MCNC: 1.74 MG/DL (ref 0.73–1.18)
EOSINOPHIL # BLD AUTO: 0.22 X10(3)/MCL (ref 0–0.9)
EOSINOPHIL NFR BLD AUTO: 2.4 %
ERYTHROCYTE [DISTWIDTH] IN BLOOD BY AUTOMATED COUNT: 15 % (ref 11.5–17)
GFR SERPLBLD CREATININE-BSD FMLA CKD-EPI: 40 MLS/MIN/1.73/M2
GLOBULIN SER-MCNC: 3.6 GM/DL (ref 2.4–3.5)
GLUCOSE SERPL-MCNC: 97 MG/DL (ref 82–115)
HCT VFR BLD AUTO: 38.2 % (ref 42–52)
HGB BLD-MCNC: 11.9 G/DL (ref 14–18)
HOLD SPECIMEN: NORMAL
IMM GRANULOCYTES # BLD AUTO: 0.07 X10(3)/MCL (ref 0–0.04)
IMM GRANULOCYTES NFR BLD AUTO: 0.8 %
LYMPHOCYTES # BLD AUTO: 1.7 X10(3)/MCL (ref 0.6–4.6)
LYMPHOCYTES NFR BLD AUTO: 18.7 %
MCH RBC QN AUTO: 27.1 PG (ref 27–31)
MCHC RBC AUTO-ENTMCNC: 31.2 G/DL (ref 33–36)
MCV RBC AUTO: 87 FL (ref 80–94)
MONOCYTES # BLD AUTO: 0.98 X10(3)/MCL (ref 0.1–1.3)
MONOCYTES NFR BLD AUTO: 10.8 %
NEUTROPHILS # BLD AUTO: 6.04 X10(3)/MCL (ref 2.1–9.2)
NEUTROPHILS NFR BLD AUTO: 66.5 %
NRBC BLD AUTO-RTO: 0 %
PLATELET # BLD AUTO: 308 X10(3)/MCL (ref 130–400)
PMV BLD AUTO: 11.5 FL (ref 7.4–10.4)
POTASSIUM SERPL-SCNC: 4.2 MMOL/L (ref 3.5–5.1)
PROT SERPL-MCNC: 7.1 GM/DL (ref 5.8–7.6)
RBC # BLD AUTO: 4.39 X10(6)/MCL (ref 4.7–6.1)
SODIUM SERPL-SCNC: 138 MMOL/L (ref 136–145)
WBC # SPEC AUTO: 9.08 X10(3)/MCL (ref 4.5–11.5)

## 2024-02-18 PROCEDURE — 63600175 PHARM REV CODE 636 W HCPCS

## 2024-02-18 PROCEDURE — G0378 HOSPITAL OBSERVATION PER HR: HCPCS

## 2024-02-18 PROCEDURE — 80053 COMPREHEN METABOLIC PANEL: CPT

## 2024-02-18 PROCEDURE — 94760 N-INVAS EAR/PLS OXIMETRY 1: CPT

## 2024-02-18 PROCEDURE — 85025 COMPLETE CBC W/AUTO DIFF WBC: CPT

## 2024-02-18 PROCEDURE — 85730 THROMBOPLASTIN TIME PARTIAL: CPT

## 2024-02-18 PROCEDURE — 94761 N-INVAS EAR/PLS OXIMETRY MLT: CPT

## 2024-02-18 PROCEDURE — 25000003 PHARM REV CODE 250

## 2024-02-18 PROCEDURE — 96366 THER/PROPH/DIAG IV INF ADDON: CPT

## 2024-02-18 RX ORDER — ACETAMINOPHEN 325 MG/1
650 TABLET ORAL EVERY 6 HOURS PRN
Status: DISCONTINUED | OUTPATIENT
Start: 2024-02-18 | End: 2024-02-21 | Stop reason: HOSPADM

## 2024-02-18 RX ADMIN — ALLOPURINOL 50 MG: 300 TABLET ORAL at 11:02

## 2024-02-18 RX ADMIN — ASPIRIN 81 MG: 81 TABLET, COATED ORAL at 09:02

## 2024-02-18 RX ADMIN — AMIODARONE HYDROCHLORIDE 200 MG: 200 TABLET ORAL at 08:02

## 2024-02-18 RX ADMIN — AMIODARONE HYDROCHLORIDE 200 MG: 200 TABLET ORAL at 09:02

## 2024-02-18 RX ADMIN — HEPARIN SODIUM 14 UNITS/KG/HR: 10000 INJECTION, SOLUTION INTRAVENOUS at 04:02

## 2024-02-18 RX ADMIN — ATORVASTATIN CALCIUM 40 MG: 40 TABLET, FILM COATED ORAL at 08:02

## 2024-02-18 RX ADMIN — PANTOPRAZOLE SODIUM 40 MG: 40 TABLET, DELAYED RELEASE ORAL at 09:02

## 2024-02-18 RX ADMIN — ACETAMINOPHEN 650 MG: 325 TABLET, FILM COATED ORAL at 09:02

## 2024-02-18 RX ADMIN — POLYETHYLENE GLYCOL 3350 17 G: 17 POWDER, FOR SOLUTION ORAL at 09:02

## 2024-02-18 NOTE — PROGRESS NOTES
[facility] Medicine Wards   Progress Note     Resident Team: [facility] Medicine List 1  Attending Physician: Katya Centeno MD  Resident: Nilda Patel MD  Intern: All Del Castillo MD   Date of Admit: 2/15/2024    Subjective:      Brief HPI:  Brain Snyder is a 78 y.o. male with PMH of aortic insufficiency with aortic stenosis, coronary artery disease, hypertension, atrial fibrillation on Eliquis presented to [facility] ED on 2/15/2024  with complaint of chest pain.   A  was used for the encounter.  His daughter was also present.  Patient states that chest pain started several days ago and got worse last night with intensity of 7/10.  Described location as retrosternal in his squeezing pain.  Denies any radiation, alleviating or aggravating factors, or associated symptoms like shortness of breath, cough, abdominal pain, nausea or vomiting.  Patient also complained about nocturia ongoing for months.  Denies any urinary retention.  He also mentioned of mild swelling with mild pain of his left ankle past 2 days but has now resolved.  Of note, patient was recently admitted to our [facility] in December of 2023 and beginning of February 2024. During the recent admission also patient was noted to have AFib with RVR and was started on Eliquis.  Patient has brought his discharge paperwork.  Some notable findings are NT-pro BNP of 5500 on 2/5/24, CT abdomen and pelvis/CTA chest findings of aortic valve calcification.  Ectasia of the ascending thoracic aorta measuring 3.6 cm.  Moderate noncalcified atheromatous change of the descending thoracic aorta.  Micronodule surface irregularity of the liver which may suggest cirrhotic morphology.     In the ED, vitals notable for hypotension 102/59, rest vital signs stable.  CBC remarkable for normocytic anemia, CMP remarkable for elevated renal indices of BUN/creatinine at 36.5/2.15.  INR elevated at 1.8.  BNP significant for 2145 (to note that decreased from the 5000 NT-pro  BNP on 02/05/2024).  Troponin were elevated this visit at 0.166.  EKG showed normal sinus rhythm with vent rate of 64 and prolonged QTC of 571 millisecond.  Chest x-ray remarkable for mild cardiomegaly and some interstitial markings but no focal consolidative changes.  Hospital Medicine was consulted for admission and further management of NSTEMI.     Interval History:   NAEON. Resting comfortably in bed    Review of Systems:  Negative unless noted in interval history.       Objective:     Vital Signs (Most Recent):  Temp: 98 °F (36.7 °C) (02/18/24 0408)  Pulse: (!) 59 (02/18/24 0408)  Resp: 16 (02/18/24 0408)  BP: 107/66 (02/18/24 0408)  SpO2: 97 % (02/18/24 0408) Vital Signs (24h Range):  Temp:  [97.7 °F (36.5 °C)-98.7 °F (37.1 °C)] 98 °F (36.7 °C)  Pulse:  [59-69] 59  Resp:  [16-20] 16  SpO2:  [96 %-100 %] 97 %  BP: (103-124)/(58-69) 107/66       Physical Examination:  Physical Exam  Vitals and nursing note reviewed.   Constitutional:       Appearance: Normal appearance.   HENT:      Head: Normocephalic and atraumatic.   Eyes:      General: No scleral icterus.     Pupils: Pupils are equal, round, and reactive to light.   Cardiovascular:      Rate and Rhythm: Regular rhythm. Bradycardia present.      Pulses: Normal pulses.      Heart sounds: Murmur (Systolic in the aortic area, no carotid bruit heard) heard.   Pulmonary:      Effort: Pulmonary effort is normal. No respiratory distress.      Breath sounds: Normal breath sounds. No wheezing or rales.   Abdominal:      General: Abdomen is flat. Bowel sounds are normal. There is no distension.      Palpations: Abdomen is soft.      Tenderness: There is no abdominal tenderness.   Musculoskeletal:         General: Tenderness present. No swelling. Normal range of motion.      Right lower leg: No edema.      Left lower leg: No edema.      Comments: Tender, warm, erythematous left 1st MTP joint   Skin:     General: Skin is warm.      Capillary Refill: Capillary refill  takes less than 2 seconds.   Neurological:      General: No focal deficit present.      Mental Status: He is alert and oriented to person, place, and time.   Psychiatric:         Mood and Affect: Mood normal.         Behavior: Behavior normal.         Thought Content: Thought content normal.         Judgment: Judgment normal.         Laboratory:  Lab Results   Component Value Date     02/18/2024    K 4.2 02/18/2024    CO2 22 (L) 02/18/2024    BUN 20.6 02/18/2024    CREATININE 1.74 (H) 02/18/2024    CALCIUM 8.8 02/18/2024    ALKPHOS 72 02/18/2024    AST 14 02/18/2024    ALT 15 02/18/2024        Lab Results   Component Value Date    WBC 9.08 02/18/2024    RBC 4.39 (L) 02/18/2024    HGB 11.9 (L) 02/18/2024    HCT 38.2 (L) 02/18/2024    MCV 87.0 02/18/2024    MCH 27.1 02/18/2024    MCHC 31.2 (L) 02/18/2024    RDW 15.0 02/18/2024     02/18/2024    MPV 11.5 (H) 02/18/2024        Microbiology:   Microbiology Results (last 7 days)       ** No results found for the last 168 hours. **            Other Results:  EKG (my interpretation): sinus bradycardia, 1st degree AV block (VA interval of 234 ms) and prolonged QT of 573 ms.    Radiology:  Imaging Results              X-Ray Chest PA And Lateral (Final result)  Result time 02/15/24 13:09:38      Final result by Ananth Coker MD (02/15/24 13:09:38)                   Impression:      Increase interstitial markings throughout.    Mild cardiomegaly      Electronically signed by: Ananth Coker  Date:    02/15/2024  Time:    13:09               Narrative:    EXAMINATION:  XR CHEST PA AND LATERAL    CLINICAL HISTORY:  Chest Pain;, .    FINDINGS:  Examination reveals mediastinal silhouette to be within normal limits cardiac silhouette is mildly enlarged lung fields reveal some increase interstitial markings with no focal consolidative changes atelectases effusions or pneumothoraces                                      Current Medications:     Infusions:    heparin (porcine) in D5W 14 Units/kg/hr (02/17/24 1826)        Scheduled:   amiodarone  200 mg Oral BID    aspirin  81 mg Oral Daily    atorvastatin  40 mg Oral QHS    furosemide  40 mg Oral Daily    heparin (PORCINE)  57.4 Units/kg (Adjusted) Intravenous Once    pantoprazole  40 mg Oral Daily    polyethylene glycol  17 g Oral Daily        PRN:  acetaminophen, heparin (PORCINE), heparin (PORCINE), melatonin, nitroGLYCERIN, sodium chloride 0.9%    Antibiotics and Day Number of Therapy:  none      Intake/Output Summary (Last 24 hours) at 2/18/2024 0637  Last data filed at 2/17/2024 1826  Gross per 24 hour   Intake 273.5 ml   Output --   Net 273.5 ml         Lines/Drains/Airways       Peripheral Intravenous Line  Duration                  Peripheral IV - Single Lumen 02/15/24 1314 20 G Anterior;Proximal;Right Forearm 2 days                      Assessment & Plan:     NSTEMI  H/o aortic isnufficiency  H/o Atrial fibrillation (PAF)  CAD  Prolonged QT  New HFrEF (40%)  - patient presents with complains of chest pain, retrosternal, squeezing  - elevated troponin of 0.166 on admit and elevated BNP of 2145; since then troponin peaked at 0.171  - [facility] the [facility] with diagnoses of aortic insufficiency with aortic stenosis, and atrial fibrillation.   - patient loaded with aspirin 325 mg   - Continue heparin drip and Asprin 81 mg once daily  - Continue Lipitor 40 mg nightly.   - prolonged QT noted on EKG  - nitroglycerin 0.4 mg sublingual p.r.n. noted.  - hold Eliquis while on Heparin  - TTE done, EF 40% with grade II diastolic dysfunction  - cardiology consulted, recommending continuing amiodarone and heparin gtt with possible LHC 2/19/2024, recs also to consider switching to Xarelto 20 mg nightly in lieu of Elqiuis (cost related) when off Heparin  - consulted  to look into procuring outside hospital records and financial assistance- [place]  ends on 2024, can extend 3 more months but currently no insurance.      Nocturia  Non oliguric JEAN-CLAUDE  - patient complains of nocturia about 6-7 times every night but denies any urinary retention  - renal indices have improved since admit  - improved renal indices  - US retroperitoneal ordered, not done yet  - Continue to monitor     Constipation  - last bowel movement yesterday  - Continue Miralax once daily    GERD  - belching multiple and bloating  - Continue protonix 40 mg once daily     Gout  - Left 1st MTP joint pain, warmth, erythema unchanged since admission  - No history of gout  - uric acid level elevated  - unable to start Colchicine given patient on Amiodarone (CY inhibitor), due to renal dysfunction and recent NSTEMI  - will start Allopurinol 50 mg once daily (renal dosing)  - start Tylenol 650 mg q6h prn for pain control       CODE STATUS: Full  Access: PIV  Antibiotics: none  Diet: Heart healthy, NPO at midnight  DVT Prophylaxis: Heparin gtt  GI Prophylaxis: Protonix  Fluids: none      Disposition: Admitted to inpatient service for ongoing monitoring of NSTEMI. TTE done. Cardiology consulted, pending [facility] on . Patient can be discharged home when medically stable.     All Del Castillo  Internal Medicine - PGY-1

## 2024-02-19 LAB
ALBUMIN SERPL-MCNC: 3.5 G/DL (ref 3.4–4.8)
ALBUMIN/GLOB SERPL: 1 RATIO (ref 1.1–2)
ALP SERPL-CCNC: 73 UNIT/L (ref 40–150)
ALT SERPL-CCNC: 15 UNIT/L (ref 0–55)
APPEARANCE UR: CLEAR
APTT PPP: 87.8 SECONDS (ref 23.2–33.7)
AST SERPL-CCNC: 14 UNIT/L (ref 5–34)
BACTERIA #/AREA URNS AUTO: ABNORMAL /HPF
BASOPHILS # BLD AUTO: 0.07 X10(3)/MCL
BASOPHILS NFR BLD AUTO: 0.8 %
BILIRUB SERPL-MCNC: 0.7 MG/DL
BILIRUB UR QL STRIP.AUTO: NEGATIVE
BNP BLD-MCNC: 857.4 PG/ML
BUN SERPL-MCNC: 14.8 MG/DL (ref 8.4–25.7)
CALCIUM SERPL-MCNC: 8.7 MG/DL (ref 8.8–10)
CHLORIDE SERPL-SCNC: 108 MMOL/L (ref 98–107)
CO2 SERPL-SCNC: 22 MMOL/L (ref 23–31)
COLOR UR AUTO: ABNORMAL
CREAT SERPL-MCNC: 1.58 MG/DL (ref 0.73–1.18)
DEPRECATED CALCIDIOL+CALCIFEROL SERPL-MC: 25.5 NG/ML (ref 30–80)
EOSINOPHIL # BLD AUTO: 0.28 X10(3)/MCL (ref 0–0.9)
EOSINOPHIL NFR BLD AUTO: 3.1 %
ERYTHROCYTE [DISTWIDTH] IN BLOOD BY AUTOMATED COUNT: 15.2 % (ref 11.5–17)
GFR SERPLBLD CREATININE-BSD FMLA CKD-EPI: 45 MLS/MIN/1.73/M2
GLOBULIN SER-MCNC: 3.4 GM/DL (ref 2.4–3.5)
GLUCOSE SERPL-MCNC: 86 MG/DL (ref 82–115)
GLUCOSE UR QL STRIP.AUTO: NORMAL
HCT VFR BLD AUTO: 39.4 % (ref 42–52)
HGB BLD-MCNC: 12 G/DL (ref 14–18)
HYALINE CASTS #/AREA URNS LPF: ABNORMAL /LPF
IMM GRANULOCYTES # BLD AUTO: 0.07 X10(3)/MCL (ref 0–0.04)
IMM GRANULOCYTES NFR BLD AUTO: 0.8 %
KETONES UR QL STRIP.AUTO: NEGATIVE
LEUKOCYTE ESTERASE UR QL STRIP.AUTO: NEGATIVE
LYMPHOCYTES # BLD AUTO: 2.05 X10(3)/MCL (ref 0.6–4.6)
LYMPHOCYTES NFR BLD AUTO: 22.4 %
MCH RBC QN AUTO: 26.5 PG (ref 27–31)
MCHC RBC AUTO-ENTMCNC: 30.5 G/DL (ref 33–36)
MCV RBC AUTO: 87.2 FL (ref 80–94)
MONOCYTES # BLD AUTO: 0.92 X10(3)/MCL (ref 0.1–1.3)
MONOCYTES NFR BLD AUTO: 10.1 %
MUCOUS THREADS URNS QL MICRO: ABNORMAL /LPF
NEUTROPHILS # BLD AUTO: 5.76 X10(3)/MCL (ref 2.1–9.2)
NEUTROPHILS NFR BLD AUTO: 62.8 %
NITRITE UR QL STRIP.AUTO: NEGATIVE
NRBC BLD AUTO-RTO: 0 %
PH UR STRIP.AUTO: 7 [PH]
PLATELET # BLD AUTO: 323 X10(3)/MCL (ref 130–400)
PMV BLD AUTO: 11.8 FL (ref 7.4–10.4)
POTASSIUM SERPL-SCNC: 4.5 MMOL/L (ref 3.5–5.1)
PROT SERPL-MCNC: 6.9 GM/DL (ref 5.8–7.6)
PROT UR QL STRIP.AUTO: NEGATIVE
RBC # BLD AUTO: 4.52 X10(6)/MCL (ref 4.7–6.1)
RBC #/AREA URNS AUTO: ABNORMAL /HPF
RBC UR QL AUTO: NEGATIVE
SODIUM SERPL-SCNC: 140 MMOL/L (ref 136–145)
SP GR UR STRIP.AUTO: 1.01 (ref 1–1.03)
SQUAMOUS #/AREA URNS LPF: ABNORMAL /HPF
UROBILINOGEN UR STRIP-ACNC: ABNORMAL
WBC # SPEC AUTO: 9.15 X10(3)/MCL (ref 4.5–11.5)
WBC #/AREA URNS AUTO: ABNORMAL /HPF

## 2024-02-19 PROCEDURE — 63600175 PHARM REV CODE 636 W HCPCS

## 2024-02-19 PROCEDURE — 96366 THER/PROPH/DIAG IV INF ADDON: CPT

## 2024-02-19 PROCEDURE — 82306 VITAMIN D 25 HYDROXY: CPT | Performed by: INTERNAL MEDICINE

## 2024-02-19 PROCEDURE — 21400001 HC TELEMETRY ROOM

## 2024-02-19 PROCEDURE — 94760 N-INVAS EAR/PLS OXIMETRY 1: CPT

## 2024-02-19 PROCEDURE — 81001 URINALYSIS AUTO W/SCOPE: CPT

## 2024-02-19 PROCEDURE — 25000003 PHARM REV CODE 250

## 2024-02-19 PROCEDURE — 83880 ASSAY OF NATRIURETIC PEPTIDE: CPT

## 2024-02-19 PROCEDURE — 85730 THROMBOPLASTIN TIME PARTIAL: CPT

## 2024-02-19 PROCEDURE — 80053 COMPREHEN METABOLIC PANEL: CPT

## 2024-02-19 PROCEDURE — 85025 COMPLETE CBC W/AUTO DIFF WBC: CPT

## 2024-02-19 PROCEDURE — 94761 N-INVAS EAR/PLS OXIMETRY MLT: CPT

## 2024-02-19 RX ORDER — SODIUM CHLORIDE, SODIUM LACTATE, POTASSIUM CHLORIDE, CALCIUM CHLORIDE 600; 310; 30; 20 MG/100ML; MG/100ML; MG/100ML; MG/100ML
INJECTION, SOLUTION INTRAVENOUS CONTINUOUS
Status: DISCONTINUED | OUTPATIENT
Start: 2024-02-19 | End: 2024-02-21 | Stop reason: HOSPADM

## 2024-02-19 RX ADMIN — AMIODARONE HYDROCHLORIDE 200 MG: 200 TABLET ORAL at 09:02

## 2024-02-19 RX ADMIN — AMIODARONE HYDROCHLORIDE 200 MG: 200 TABLET ORAL at 08:02

## 2024-02-19 RX ADMIN — POLYETHYLENE GLYCOL 3350 17 G: 17 POWDER, FOR SOLUTION ORAL at 09:02

## 2024-02-19 RX ADMIN — ALLOPURINOL 50 MG: 300 TABLET ORAL at 09:02

## 2024-02-19 RX ADMIN — ASPIRIN 81 MG: 81 TABLET, COATED ORAL at 09:02

## 2024-02-19 RX ADMIN — HEPARIN SODIUM 14 UNITS/KG/HR: 10000 INJECTION, SOLUTION INTRAVENOUS at 08:02

## 2024-02-19 RX ADMIN — ATORVASTATIN CALCIUM 40 MG: 40 TABLET, FILM COATED ORAL at 08:02

## 2024-02-19 RX ADMIN — PANTOPRAZOLE SODIUM 40 MG: 40 TABLET, DELAYED RELEASE ORAL at 09:02

## 2024-02-19 RX ADMIN — SODIUM CHLORIDE, POTASSIUM CHLORIDE, SODIUM LACTATE AND CALCIUM CHLORIDE: 600; 310; 30; 20 INJECTION, SOLUTION INTRAVENOUS at 03:02

## 2024-02-19 NOTE — PROGRESS NOTES
Inpatient Nutrition Assessment    Admit Date: 2/15/2024   Total duration of encounter: 4 days   Patient Age: 78 y.o.    Nutrition Recommendation/Prescription     Heart Healthy diet  Boost (provides 240 kcal, 10 g protein per serving) TID  Monitor Weight Daily     Communication of Recommendations: reviewed with patient and reviewed with caregiver    Nutrition Assessment     Malnutrition Assessment/Nutrition-Focused Physical Exam    Malnutrition Context: acute illness or injury (02/19/24 1324)  Malnutrition Level:  (does not meet criteria) (02/19/24 1324)  Energy Intake (Malnutrition): less than 75% for greater than 7 days (02/19/24 1324)                                                     A minimum of two characteristics is recommended for diagnosis of either severe or non-severe malnutrition.    Chart Review    Reason Seen: continuous nutrition monitoring    Malnutrition Screening Tool Results   Have you recently lost weight without trying?: No  Have you been eating poorly because of a decreased appetite?: No   MST Score: 0   Diagnosis:  NSTEMI, CAD, Prolonged QT, New HFreEF, Nocturia, Non oliguria JEAN-CLAUDE, Constipation, GERD, Gout    Relevant Medical History: Aortic insuffiencey with aortic stenosis, CAD, HTN, Afib    Scheduled Medications:  allopurinoL, 50 mg, Daily  amiodarone, 200 mg, BID  aspirin, 81 mg, Daily  atorvastatin, 40 mg, QHS  heparin (PORCINE), 57.4 Units/kg (Adjusted), Once  pantoprazole, 40 mg, Daily  polyethylene glycol, 17 g, Daily    Continuous Infusions:  heparin (porcine) in D5W, Last Rate: 14 Units/kg/hr (02/18/24 1655)  lactated ringers    PRN Medications: acetaminophen, heparin (PORCINE), heparin (PORCINE), melatonin, nitroGLYCERIN, sodium chloride 0.9%    Calorie Containing IV Medications: no significant kcals from medications at this time    Recent Labs   Lab 02/15/24  1241 02/16/24  0548 02/16/24  1011 02/17/24  0342 02/18/24  0530 02/19/24  0359    139  --  138 138 140   K 4.8 4.4  --  " 4.4 4.2 4.5   CALCIUM 9.0 8.5*  --  8.3* 8.8 8.7*   CHLORIDE 103 106  --  106 107 108*   CO2 26 24  --  22* 22* 22*   BUN 36.5* 33.3*  --  28.4* 20.6 14.8   CREATININE 2.15* 1.90*  --  1.95* 1.74* 1.58*   EGFRNORACEVR 31 36  --  35 40 45   GLUCOSE 120* 92  --  88 97 86   BILITOT 0.8 0.7  --  0.8 0.8 0.7   ALKPHOS 82 70  --  70 72 73   ALT 19 15  --  14 15 15   AST 19 14  --  13 14 14   ALBUMIN 3.9 3.4  --  3.5 3.5 3.5   HGBA1C  --   --  5.3  --   --   --    WBC 9.28 9.41  --  9.46 9.08 9.15   HGB 12.2* 11.6*  --  12.0* 11.9* 12.0*   HCT 39.4* 36.3*  --  37.9* 38.2* 39.4*     Nutrition Orders:  Diet heart healthy  Dietary nutrition supplements Boost Original; TID    Appetite/Oral Intake: poor/25-50% of meals  Factors Affecting Nutritional Intake: decreased appetite  Food/Scientologist/Cultural Preferences:  Likes fruit  Food Allergies: no known food allergies  Last Bowel Movement: 24  Wound(s):  skin intact    Comments    24 --  [id number] used during visit; pt reports decreased appetite over the last 2 weeks, no n/v, abdominal pain with constipation reported -- now resolved, Last reported BM , on bowel regimen; ~3% weight loss, fluid vs wt loss related to decreased po intake, will continue to monitor -- pt willing to try Boost ONS    Anthropometrics    Height: 5' 5" (165.1 cm),    Last Weight: 81.1 kg (178 lb 12.8 oz) (24 0600), Weight Method: Bed Scale  BMI (Calculated): 29.8  BMI Classification: overweight (BMI 25-29.9)        Ideal Body Weight (IBW), Male: 136 lb     % Ideal Body Weight, Male (lb): 133.09 %                 Usual Body Weight (UBW), k.6 kg  % Usual Body Weight: 97.22     Usual Weight Provided By: patient and family/caregiver    Wt Readings from Last 5 Encounters:   24 81.1 kg (178 lb 12.8 oz)     Weight Change(s) Since Admission:   Wt Readings from Last 1 Encounters:   24 0600 81.1 kg (178 lb 12.8 oz)   24 0500 79.6 kg (175 lb 7.8 oz)   24 0726 " 82.1 kg (181 lb)   02/15/24 1217 82.4 kg (181 lb 10.5 oz)   Admit Weight: 82.4 kg (181 lb 10.5 oz) (02/15/24 1217), Weight Method: Bed Scale    Estimated Needs    Weight Used For Calorie Calculations: 80.9 kg (178 lb 5.6 oz)  Energy Calorie Requirements (kcal): 5294-1004 kcal (25 kcal/kg)  Energy Need Method: Kcal/kg  Weight Used For Protein Calculations: 80.9 kg (178 lb 5.6 oz)  Protein Requirements: 73-88 gm (0.9-1.1 gm/kg)  Fluid Requirements (mL): 1762-1614 ml (1ml/kcal)    Enteral Nutrition     Patient not receiving enteral nutrition at this time.    Parenteral Nutrition     Patient not receiving parenteral nutrition support at this time.    Evaluation of Received Nutrient Intake    Calories: not meeting estimated needs  Protein: not meeting estimated needs    Patient Education     Not applicable.    Nutrition Diagnosis     PES: Inadequate oral intake related to acute illness as evidenced by < 75% nutrition intake > 7 days. (new)     Nutrition Interventions     Intervention(s): modified composition of meals/snacks, commercial beverage, and collaboration with other providers    Goal: Meet greater than 80% of nutritional needs by follow-up. (new)  Goal: Maintain weight throughout hospitalization. (new)    Nutrition Goals & Monitoring     Dietitian will monitor: food and beverage intake and weight change    Nutrition Risk/Follow-Up: moderate (follow-up in 3-5 days)   Please consult if re-assessment needed sooner.

## 2024-02-19 NOTE — PROGRESS NOTES
[facility] Medicine Wards   Progress Note     Resident Team: [facility] Medicine List 1  Attending Physician: aKtya Centeno MD  Resident: Nilda Patel MD  Intern: All Del Castillo MD   Date of Admit: 2/15/2024    Subjective:      Brief HPI:  Brain Snyder is a 78 y.o. male with PMH of aortic insufficiency with aortic stenosis, coronary artery disease, hypertension, atrial fibrillation on Eliquis presented to [facility] ED on 2/15/2024  with complaint of chest pain.   A  was used for the encounter.  His daughter was also present.  Patient states that chest pain started several days ago and got worse last night with intensity of 7/10.  Described location as retrosternal in his squeezing pain.  Denies any radiation, alleviating or aggravating factors, or associated symptoms like shortness of breath, cough, abdominal pain, nausea or vomiting.  Patient also complained about nocturia ongoing for months.  Denies any urinary retention.  He also mentioned of mild swelling with mild pain of his left ankle past 2 days but has now resolved.  Of note, patient was recently admitted to our [facility] in December of 2023 and beginning of February 2024. During the recent admission also patient was noted to have AFib with RVR and was started on Eliquis.  Patient has brought his discharge paperwork.  Some notable findings are NT-pro BNP of 5500 on 2/5/24, CT abdomen and pelvis/CTA chest findings of aortic valve calcification.  Ectasia of the ascending thoracic aorta measuring 3.6 cm.  Moderate noncalcified atheromatous change of the descending thoracic aorta.  Micronodule surface irregularity of the liver which may suggest cirrhotic morphology.     In the ED, vitals notable for hypotension 102/59, rest vital signs stable.  CBC remarkable for normocytic anemia, CMP remarkable for elevated renal indices of BUN/creatinine at 36.5/2.15.  INR elevated at 1.8.  BNP significant for 2145 (to note that decreased from the 5000 NT-pro  BNP on 02/05/2024).  Troponin were elevated this visit at 0.166.  EKG showed normal sinus rhythm with vent rate of 64 and prolonged QTC of 571 millisecond.  Chest x-ray remarkable for mild cardiomegaly and some interstitial markings but no focal consolidative changes.  Hospital Medicine was consulted for admission and further management of NSTEMI.     Interval History:   NAEON. Resting comfortably in bed.  He denies any chest pain, syncope, shortness of breath.  No interventional Cardiology today.  We will attempt to get left heart catheterization on Wednesday.     Review of Systems:  Negative unless noted in interval history.       Objective:     Vital Signs (Most Recent):  Temp: 98.2 °F (36.8 °C) (02/19/24 0703)  Pulse: 60 (02/19/24 0703)  Resp: 18 (02/19/24 0703)  BP: (!) 95/54 (02/19/24 0703)  SpO2: 98 % (02/19/24 0703) Vital Signs (24h Range):  Temp:  [97.6 °F (36.4 °C)-98.2 °F (36.8 °C)] 98.2 °F (36.8 °C)  Pulse:  [60-70] 60  Resp:  [17-19] 18  SpO2:  [98 %-100 %] 98 %  BP: ()/(54-77) 95/54       Physical Examination:  Physical Exam  Vitals and nursing note reviewed.   Constitutional:       Appearance: Normal appearance.   HENT:      Head: Normocephalic and atraumatic.   Eyes:      General: No scleral icterus.     Pupils: Pupils are equal, round, and reactive to light.   Cardiovascular:      Rate and Rhythm: Regular rhythm. Bradycardia present.      Pulses: Normal pulses.      Heart sounds: Murmur (Systolic in the aortic area, no carotid bruit heard) heard.   Pulmonary:      Effort: Pulmonary effort is normal. No respiratory distress.      Breath sounds: Normal breath sounds. No wheezing or rales.   Abdominal:      General: Abdomen is flat. Bowel sounds are normal. There is no distension.      Palpations: Abdomen is soft.      Tenderness: There is no abdominal tenderness.   Musculoskeletal:         General: Tenderness present. No swelling. Normal range of motion.      Right lower leg: No edema.      Left  lower leg: No edema.      Comments: Tender, warm, erythematous left 1st MTP joint   Skin:     General: Skin is warm.      Capillary Refill: Capillary refill takes less than 2 seconds.   Neurological:      General: No focal deficit present.      Mental Status: He is alert and oriented to person, place, and time.   Psychiatric:         Mood and Affect: Mood normal.         Behavior: Behavior normal.         Thought Content: Thought content normal.         Judgment: Judgment normal.         Laboratory:  Lab Results   Component Value Date     02/19/2024    K 4.5 02/19/2024    CO2 22 (L) 02/19/2024    BUN 14.8 02/19/2024    CREATININE 1.58 (H) 02/19/2024    CALCIUM 8.7 (L) 02/19/2024    ALKPHOS 73 02/19/2024    AST 14 02/19/2024    ALT 15 02/19/2024        Lab Results   Component Value Date    WBC 9.15 02/19/2024    RBC 4.52 (L) 02/19/2024    HGB 12.0 (L) 02/19/2024    HCT 39.4 (L) 02/19/2024    MCV 87.2 02/19/2024    MCH 26.5 (L) 02/19/2024    MCHC 30.5 (L) 02/19/2024    RDW 15.2 02/19/2024     02/19/2024    MPV 11.8 (H) 02/19/2024        Microbiology:   Microbiology Results (last 7 days)       ** No results found for the last 168 hours. **            Other Results:  EKG (my interpretation): sinus bradycardia, 1st degree AV block (AK interval of 234 ms) and prolonged QT of 573 ms.    Radiology:  Imaging Results              X-Ray Chest PA And Lateral (Final result)  Result time 02/15/24 13:09:38      Final result by Ananth Coker MD (02/15/24 13:09:38)                   Impression:      Increase interstitial markings throughout.    Mild cardiomegaly      Electronically signed by: Ananth Coker  Date:    02/15/2024  Time:    13:09               Narrative:    EXAMINATION:  XR CHEST PA AND LATERAL    CLINICAL HISTORY:  Chest Pain;, .    FINDINGS:  Examination reveals mediastinal silhouette to be within normal limits cardiac silhouette is mildly enlarged lung fields reveal some increase interstitial  markings with no focal consolidative changes atelectases effusions or pneumothoraces                                      Current Medications:     Infusions:   heparin (porcine) in D5W 14 Units/kg/hr (02/18/24 1655)        Scheduled:   allopurinoL  50 mg Oral Daily    amiodarone  200 mg Oral BID    aspirin  81 mg Oral Daily    atorvastatin  40 mg Oral QHS    heparin (PORCINE)  57.4 Units/kg (Adjusted) Intravenous Once    pantoprazole  40 mg Oral Daily    polyethylene glycol  17 g Oral Daily        PRN:  acetaminophen, heparin (PORCINE), heparin (PORCINE), melatonin, nitroGLYCERIN, sodium chloride 0.9%    Antibiotics and Day Number of Therapy:  none    No intake or output data in the 24 hours ending 02/19/24 0705      Lines/Drains/Airways       Peripheral Intravenous Line  Duration                  Peripheral IV - Single Lumen 02/15/24 1314 20 G Anterior;Proximal;Right Forearm 3 days                      Assessment & Plan:     NSTEMI  H/o aortic isnufficiency  H/o Atrial fibrillation (PAF)  CAD  Prolonged QT  New HFrEF (40%)  - patient presents with complains of chest pain, retrosternal, squeezing  - elevated troponin of 0.166 on admit and elevated BNP of 2145; since then troponin peaked at 0.171  - recent [facility] in the [facility] with diagnoses of aortic insufficiency with aortic stenosis, and atrial fibrillation.   - patient loaded with aspirin 325 mg   - Continue heparin drip and Asprin 81 mg once daily  - Continue Lipitor 40 mg nightly.   - prolonged QT noted on EKG  - nitroglycerin 0.4 mg sublingual p.r.n. noted.  - hold Eliquis while on Heparin  - TTE done, EF 40% with grade II diastolic dysfunction  - cardiology consulted, recommending continuing amiodarone and heparin gtt with possible LHC 2/21/2024, recs also to consider switching to Xarelto 20 mg nightly in lieu of Elqiuis (cost related) when off Heparin  - consulted  to look into procuring outside hospital records and  financial assistance- patient [place] in the [place] on [place] that ends on 2024, can extend 3 more months but currently no insurance.      Nocturia  Non oliguric JEAN-CLAUDE  - patient complains of nocturia about 6-7 times every night but denies any urinary retention  - renal indices have improved since admit  - improved renal indices  - US retroperitoneal ordered, not done yet  - Continue to monitor  -UA pending     Constipation  - last bowel movement yesterday  - Continue Miralax once daily    GERD  - belching multiple and bloating  - Continue protonix 40 mg once daily     Gout  - Left 1st MTP joint pain, warmth, erythema unchanged since admission  - No history of gout  - uric acid level elevated  - unable to start Colchicine given patient on Amiodarone (CY inhibitor), due to renal dysfunction and recent NSTEMI  - will start Allopurinol 50 mg once daily (renal dosing)  - start Tylenol 650 mg q6h prn for pain control       CODE STATUS: Full  Access: PIV  Antibiotics: none  Diet: Heart healthy, NPO at midnight  DVT Prophylaxis: Heparin gtt  GI Prophylaxis: Protonix  Fluids:  LR 75 cc      Disposition: Admitted to inpatient service for ongoing monitoring of NSTEMI. TTE done. Cardiology consulted, pending [facility] on . Patient can be discharged home when medically stable.     Xi Levine M.D  [employer] Internal Medicine PGY-1

## 2024-02-20 LAB
ALBUMIN SERPL-MCNC: 3.4 G/DL (ref 3.4–4.8)
ALBUMIN/GLOB SERPL: 0.9 RATIO (ref 1.1–2)
ALP SERPL-CCNC: 72 UNIT/L (ref 40–150)
ALT SERPL-CCNC: 16 UNIT/L (ref 0–55)
APTT PPP: 79.9 SECONDS (ref 23.2–33.7)
AST SERPL-CCNC: 16 UNIT/L (ref 5–34)
BASOPHILS # BLD AUTO: 0.06 X10(3)/MCL
BASOPHILS NFR BLD AUTO: 0.6 %
BILIRUB SERPL-MCNC: 0.7 MG/DL
BUN SERPL-MCNC: 11.9 MG/DL (ref 8.4–25.7)
CALCIUM SERPL-MCNC: 8.7 MG/DL (ref 8.8–10)
CHLORIDE SERPL-SCNC: 108 MMOL/L (ref 98–107)
CO2 SERPL-SCNC: 21 MMOL/L (ref 23–31)
CREAT SERPL-MCNC: 1.66 MG/DL (ref 0.73–1.18)
EOSINOPHIL # BLD AUTO: 0.32 X10(3)/MCL (ref 0–0.9)
EOSINOPHIL NFR BLD AUTO: 3.2 %
ERYTHROCYTE [DISTWIDTH] IN BLOOD BY AUTOMATED COUNT: 15.3 % (ref 11.5–17)
FERRITIN SERPL-MCNC: 225.79 NG/ML (ref 21.81–274.66)
FOLATE SERPL-MCNC: 10.7 NG/ML (ref 7–31.4)
GFR SERPLBLD CREATININE-BSD FMLA CKD-EPI: 42 MLS/MIN/1.73/M2
GLOBULIN SER-MCNC: 3.6 GM/DL (ref 2.4–3.5)
GLUCOSE SERPL-MCNC: 92 MG/DL (ref 82–115)
HAV IGM SERPL QL IA: NONREACTIVE
HBV CORE IGM SERPL QL IA: NONREACTIVE
HBV SURFACE AG SERPL QL IA: NONREACTIVE
HCT VFR BLD AUTO: 38.5 % (ref 42–52)
HCV AB SERPL QL IA: NONREACTIVE
HGB BLD-MCNC: 11.9 G/DL (ref 14–18)
HIV 1+2 AB+HIV1 P24 AG SERPL QL IA: NONREACTIVE
HOLD SPECIMEN: NORMAL
IMM GRANULOCYTES # BLD AUTO: 0.06 X10(3)/MCL (ref 0–0.04)
IMM GRANULOCYTES NFR BLD AUTO: 0.6 %
IRON SATN MFR SERPL: 17 % (ref 20–50)
IRON SERPL-MCNC: 45 UG/DL (ref 65–175)
LYMPHOCYTES # BLD AUTO: 1.98 X10(3)/MCL (ref 0.6–4.6)
LYMPHOCYTES NFR BLD AUTO: 19.7 %
MCH RBC QN AUTO: 27 PG (ref 27–31)
MCHC RBC AUTO-ENTMCNC: 30.9 G/DL (ref 33–36)
MCV RBC AUTO: 87.3 FL (ref 80–94)
MONOCYTES # BLD AUTO: 0.9 X10(3)/MCL (ref 0.1–1.3)
MONOCYTES NFR BLD AUTO: 9 %
NEUTROPHILS # BLD AUTO: 6.71 X10(3)/MCL (ref 2.1–9.2)
NEUTROPHILS NFR BLD AUTO: 66.9 %
NRBC BLD AUTO-RTO: 0 %
OSMOLALITY SERPL: 290 MOSM/KG (ref 280–300)
PLATELET # BLD AUTO: 318 X10(3)/MCL (ref 130–400)
PMV BLD AUTO: 11.1 FL (ref 7.4–10.4)
POCT GLUCOSE: 145 MG/DL (ref 70–110)
POTASSIUM SERPL-SCNC: 4.6 MMOL/L (ref 3.5–5.1)
PROT SERPL-MCNC: 7 GM/DL (ref 5.8–7.6)
PSA SERPL-MCNC: 3.09 NG/ML
RBC # BLD AUTO: 4.41 X10(6)/MCL (ref 4.7–6.1)
RET# (OHS): 0.17 X10E6/UL (ref 0.03–0.1)
RETICULOCYTE COUNT AUTOMATED (OLG): 3.94 % (ref 1.1–2.1)
SODIUM SERPL-SCNC: 138 MMOL/L (ref 136–145)
TIBC SERPL-MCNC: 217 UG/DL (ref 69–240)
TIBC SERPL-MCNC: 262 UG/DL (ref 250–450)
TRANSFERRIN SERPL-MCNC: 245 MG/DL (ref 163–344)
VIT B12 SERPL-MCNC: 825 PG/ML (ref 213–816)
WBC # SPEC AUTO: 10.03 X10(3)/MCL (ref 4.5–11.5)

## 2024-02-20 PROCEDURE — C1887 CATHETER, GUIDING: HCPCS | Performed by: INTERNAL MEDICINE

## 2024-02-20 PROCEDURE — 25000003 PHARM REV CODE 250

## 2024-02-20 PROCEDURE — B2151ZZ FLUOROSCOPY OF LEFT HEART USING LOW OSMOLAR CONTRAST: ICD-10-PCS | Performed by: INTERNAL MEDICINE

## 2024-02-20 PROCEDURE — 63600175 PHARM REV CODE 636 W HCPCS: Performed by: INTERNAL MEDICINE

## 2024-02-20 PROCEDURE — 21400001 HC TELEMETRY ROOM

## 2024-02-20 PROCEDURE — 93459 L HRT ART/GRFT ANGIO: CPT | Performed by: INTERNAL MEDICINE

## 2024-02-20 PROCEDURE — C1894 INTRO/SHEATH, NON-LASER: HCPCS | Performed by: INTERNAL MEDICINE

## 2024-02-20 PROCEDURE — B2111ZZ FLUOROSCOPY OF MULTIPLE CORONARY ARTERIES USING LOW OSMOLAR CONTRAST: ICD-10-PCS | Performed by: INTERNAL MEDICINE

## 2024-02-20 PROCEDURE — 85025 COMPLETE CBC W/AUTO DIFF WBC: CPT

## 2024-02-20 PROCEDURE — 25500020 PHARM REV CODE 255: Performed by: INTERNAL MEDICINE

## 2024-02-20 PROCEDURE — 83930 ASSAY OF BLOOD OSMOLALITY: CPT

## 2024-02-20 PROCEDURE — 85045 AUTOMATED RETICULOCYTE COUNT: CPT

## 2024-02-20 PROCEDURE — 94761 N-INVAS EAR/PLS OXIMETRY MLT: CPT

## 2024-02-20 PROCEDURE — 4A023N7 MEASUREMENT OF CARDIAC SAMPLING AND PRESSURE, LEFT HEART, PERCUTANEOUS APPROACH: ICD-10-PCS | Performed by: INTERNAL MEDICINE

## 2024-02-20 PROCEDURE — 80053 COMPREHEN METABOLIC PANEL: CPT

## 2024-02-20 PROCEDURE — 82746 ASSAY OF FOLIC ACID SERUM: CPT

## 2024-02-20 PROCEDURE — 82728 ASSAY OF FERRITIN: CPT

## 2024-02-20 PROCEDURE — 97161 PT EVAL LOW COMPLEX 20 MIN: CPT

## 2024-02-20 PROCEDURE — 80074 ACUTE HEPATITIS PANEL: CPT

## 2024-02-20 PROCEDURE — 99152 MOD SED SAME PHYS/QHP 5/>YRS: CPT | Performed by: INTERNAL MEDICINE

## 2024-02-20 PROCEDURE — 87389 HIV-1 AG W/HIV-1&-2 AB AG IA: CPT

## 2024-02-20 PROCEDURE — 63600175 PHARM REV CODE 636 W HCPCS

## 2024-02-20 PROCEDURE — 83540 ASSAY OF IRON: CPT

## 2024-02-20 PROCEDURE — 84153 ASSAY OF PSA TOTAL: CPT

## 2024-02-20 PROCEDURE — C1769 GUIDE WIRE: HCPCS | Performed by: INTERNAL MEDICINE

## 2024-02-20 PROCEDURE — 82607 VITAMIN B-12: CPT

## 2024-02-20 PROCEDURE — 25000003 PHARM REV CODE 250: Performed by: INTERNAL MEDICINE

## 2024-02-20 PROCEDURE — 85730 THROMBOPLASTIN TIME PARTIAL: CPT

## 2024-02-20 RX ORDER — ACETAMINOPHEN 325 MG/1
650 TABLET ORAL EVERY 4 HOURS PRN
Status: DISCONTINUED | OUTPATIENT
Start: 2024-02-20 | End: 2024-02-21 | Stop reason: HOSPADM

## 2024-02-20 RX ORDER — SIMETHICONE 80 MG
1 TABLET,CHEWABLE ORAL 3 TIMES DAILY PRN
Status: DISCONTINUED | OUTPATIENT
Start: 2024-02-20 | End: 2024-02-21 | Stop reason: HOSPADM

## 2024-02-20 RX ORDER — MIDAZOLAM HYDROCHLORIDE 5 MG/ML
INJECTION INTRAMUSCULAR; INTRAVENOUS
Status: DISCONTINUED | OUTPATIENT
Start: 2024-02-20 | End: 2024-02-20 | Stop reason: HOSPADM

## 2024-02-20 RX ORDER — FENTANYL CITRATE 50 UG/ML
INJECTION, SOLUTION INTRAMUSCULAR; INTRAVENOUS
Status: DISCONTINUED | OUTPATIENT
Start: 2024-02-20 | End: 2024-02-20 | Stop reason: HOSPADM

## 2024-02-20 RX ORDER — DIPHENHYDRAMINE HYDROCHLORIDE 50 MG/ML
INJECTION INTRAMUSCULAR; INTRAVENOUS
Status: DISCONTINUED | OUTPATIENT
Start: 2024-02-20 | End: 2024-02-20 | Stop reason: HOSPADM

## 2024-02-20 RX ORDER — NITROGLYCERIN 20 MG/100ML
INJECTION INTRAVENOUS
Status: DISCONTINUED | OUTPATIENT
Start: 2024-02-20 | End: 2024-02-21 | Stop reason: HOSPADM

## 2024-02-20 RX ORDER — ONDANSETRON 4 MG/1
8 TABLET, ORALLY DISINTEGRATING ORAL EVERY 8 HOURS PRN
Status: DISCONTINUED | OUTPATIENT
Start: 2024-02-20 | End: 2024-02-21 | Stop reason: HOSPADM

## 2024-02-20 RX ORDER — LIDOCAINE HYDROCHLORIDE 10 MG/ML
INJECTION INFILTRATION; PERINEURAL
Status: DISCONTINUED | OUTPATIENT
Start: 2024-02-20 | End: 2024-02-20 | Stop reason: HOSPADM

## 2024-02-20 RX ORDER — HEPARIN SODIUM 5000 [USP'U]/ML
INJECTION, SOLUTION INTRAVENOUS; SUBCUTANEOUS
Status: DISCONTINUED | OUTPATIENT
Start: 2024-02-20 | End: 2024-02-20 | Stop reason: HOSPADM

## 2024-02-20 RX ADMIN — PANTOPRAZOLE SODIUM 40 MG: 40 TABLET, DELAYED RELEASE ORAL at 08:02

## 2024-02-20 RX ADMIN — AMIODARONE HYDROCHLORIDE 200 MG: 200 TABLET ORAL at 08:02

## 2024-02-20 RX ADMIN — ASPIRIN 81 MG: 81 TABLET, COATED ORAL at 08:02

## 2024-02-20 RX ADMIN — ALLOPURINOL 50 MG: 300 TABLET ORAL at 08:02

## 2024-02-20 RX ADMIN — SODIUM CHLORIDE, POTASSIUM CHLORIDE, SODIUM LACTATE AND CALCIUM CHLORIDE 500 ML: 600; 310; 30; 20 INJECTION, SOLUTION INTRAVENOUS at 12:02

## 2024-02-20 RX ADMIN — SIMETHICONE 80 MG: 80 TABLET, CHEWABLE ORAL at 02:02

## 2024-02-20 RX ADMIN — SODIUM CHLORIDE, POTASSIUM CHLORIDE, SODIUM LACTATE AND CALCIUM CHLORIDE: 600; 310; 30; 20 INJECTION, SOLUTION INTRAVENOUS at 03:02

## 2024-02-20 RX ADMIN — ATORVASTATIN CALCIUM 40 MG: 40 TABLET, FILM COATED ORAL at 08:02

## 2024-02-20 RX ADMIN — SODIUM CHLORIDE, POTASSIUM CHLORIDE, SODIUM LACTATE AND CALCIUM CHLORIDE: 600; 310; 30; 20 INJECTION, SOLUTION INTRAVENOUS at 11:02

## 2024-02-20 NOTE — CONSULTS
Nephrology Consultation    Date of Consultation: 2/20/24    Consultation Requested By: Xi Levine MD     Reason for Consultation: JEAN-CLAUDE on CKD    History of Present Illness:  This is a 78 y.o. male with past medical history of aortic insufficiency with aortic stenosis, coronary artery disease, hypertension, AFib on Eliquis who presented to [facility] ED on 02/15/2024 with chief complaint of chest pain.  In ER, vitals notable for hypotension on 02/09, BUN/CR 36.5/2.15 with unknown baseline creatinine.  Since admission, patient's renal indices somewhat improved, creatinine remaining elevated 1.5-1.7 over last 3 days despite IVF support. Nephrology consulted for JEAN-CLAUDE on CKD. Patient seen this afternoon, family at bedside.  service utilized for discussion. Patient adamently denies known history of CKD or other kidney related pathology. Denies decreased urine output.     Patient taken for [facility] 2/20/24, triple vessel disease noted, concomitant aortic stenosis as well. Transfer process for inpatient CABG +/- AVR initiated by primary team 2/20/24.    Review of patient's allergies indicates:  No Known Allergies    Review of systems: 12 point review of systems conducted, negative except as stated in the HPI.     Past Medical History:   Past Medical History:   Diagnosis Date    A-fib     Aortic insufficiency     CAD (coronary artery disease)     Hypertension        Procedure History: History reviewed. No pertinent surgical history.    Family History: family history is not on file.    Social History:  reports that he has quit smoking. His smoking use included cigarettes. He has never used smokeless tobacco. He reports that he does not currently use alcohol. He reports that he does not currently use drugs.    Home Medications:   Medications Prior to Admission   Medication Sig Dispense Refill Last Dose    amiodarone (PACERONE) 200 MG Tab Take 400 mg by mouth 2 (two) times daily.       atorvastatin (LIPITOR) 20 MG tablet Take 40  mg by mouth every evening.       furosemide (LASIX) 40 MG tablet Take 40 mg by mouth once daily.          Inpatient Medications:     Current Facility-Administered Medications:     acetaminophen tablet 650 mg, 650 mg, Oral, Q6H PRN, All Del Castillo MD, 650 mg at 02/18/24 0905    acetaminophen tablet 650 mg, 650 mg, Oral, Q4H PRN, Vasile Callahan MD    allopurinol split tablet 50 mg, 50 mg, Oral, Daily, All Del Castillo MD, 50 mg at 02/20/24 0824    amiodarone tablet 200 mg, 200 mg, Oral, BID, All Del Castillo MD, 200 mg at 02/20/24 0823    aspirin EC tablet 81 mg, 81 mg, Oral, Daily, All Del Castillo MD, 81 mg at 02/20/24 0824    atorvastatin tablet 40 mg, 40 mg, Oral, QHS, All Del Castillo MD, 40 mg at 02/19/24 2016    diphenhydrAMINE injection, , , PRN, Vasile Callahan MD, 25 mg at 02/20/24 1041    fentaNYL injection, , , PRN, Vasile Callahan MD, 25 mcg at 02/20/24 1041    heparin (porcine) injection, , , PRN, Vasile Callahan MD, 4,000 Units at 02/20/24 1045    heparin 25,000 units in dextrose 5% (100 units/ml) IV bolus from bag LOW INTENSITY nomogram - LAF, 57.4 Units/kg (Adjusted), Intravenous, Once, All Del Castillo MD    heparin 25,000 units in dextrose 5% (100 units/ml) IV bolus from bag LOW INTENSITY nomogram - LAF, 57.4 Units/kg (Adjusted), Intravenous, PRN, All Del Castillo MD    heparin 25,000 units in dextrose 5% (100 units/ml) IV bolus from bag LOW INTENSITY nomogram - LAF, 30 Units/kg (Adjusted), Intravenous, PRN, All Del Castillo MD, 2,090 Units at 02/16/24 1750    heparin 25,000 units in dextrose 5% 250 mL (100 units/mL) infusion LOW INTENSITY nomogram - LAF, 0-40 Units/kg/hr (Adjusted), Intravenous, Continuous, All Del Castillo MD, Last Rate: 9.8 mL/hr at 02/19/24 2017, 14 Units/kg/hr at 02/19/24 2017    iohexoL (OMNIPAQUE 350) injection, , , PRN, Vasile Callahan MD, 53 mL at 02/20/24 1055    lactated ringers bolus 500 mL, 500 mL, Intravenous, Once, Terry Walker,  MD    lactated ringers infusion, , Intravenous, Continuous, Hyun Dykes MD, Last Rate: 75 mL/hr at 02/19/24 1553, New Bag at 02/19/24 1553    LIDOcaine HCL 10 mg/ml (1%) injection, , , PRN, Vasile Callahan MD, 1 mL at 02/20/24 1041    melatonin tablet 6 mg, 6 mg, Oral, Nightly PRN, All Del Castillo MD    midazolam (VERSED) 5 mg/mL injection, , , PRN, Vasile Callahan MD, 1 mg at 02/20/24 1041    nitroGLYCERIN in 5 % dextrose 50 mg/250 mL (200 mcg/mL) infusion, , , Continuous PRN, Vasile Callahan MD, 100 mcg at 02/20/24 1055    nitroGLYCERIN SL tablet 0.4 mg, 0.4 mg, Sublingual, Q5 Min PRN, All Del Castillo MD    ondansetron disintegrating tablet 8 mg, 8 mg, Oral, Q8H PRN, Vasile Callahan MD    pantoprazole EC tablet 40 mg, 40 mg, Oral, Daily, All Del Castillo MD, 40 mg at 02/20/24 0823    polyethylene glycol packet 17 g, 17 g, Oral, Daily, All Del Castillo MD, 17 g at 02/19/24 0936    simethicone chewable tablet 80 mg, 1 tablet, Oral, TID PRN, Socorro Comer MD, 80 mg at 02/20/24 0213    sodium chloride 0.9% flush 10 mL, 10 mL, Intravenous, PRN, All Del Castillo MD     Laboratory Data:   Recent Results (from the past 24 hour(s))   Urinalysis, Reflex to Urine Culture    Collection Time: 02/19/24  4:12 PM    Specimen: Urine   Result Value Ref Range    Color, UA Light-Yellow Yellow, Light-Yellow, Dark Yellow, Nicol, Straw    Appearance, UA Clear Clear    Specific Gravity, UA 1.013 1.005 - 1.030    pH, UA 7.0 5.0 - 8.5    Protein, UA Negative Negative    Glucose, UA Normal Negative, Normal    Ketones, UA Negative Negative    Blood, UA Negative Negative    Bilirubin, UA Negative Negative    Urobilinogen, UA 2+ (A) 0.2, 1.0, Normal    Nitrites, UA Negative Negative    Leukocyte Esterase, UA Negative Negative    WBC, UA 0-5 None Seen, 0-2, 3-5, 0-5 /HPF    Bacteria, UA None Seen None Seen /HPF    Squamous Epithelial Cells, UA Trace (A) None Seen /HPF    Mucous, UA Trace (A) None Seen /LPF     Hyaline Casts, UA None Seen None Seen /lpf    RBC, UA 0-5 None Seen, 0-2, 3-5, 0-5 /HPF   Comprehensive Metabolic Panel    Collection Time: 02/20/24  4:49 AM   Result Value Ref Range    Sodium Level 138 136 - 145 mmol/L    Potassium Level 4.6 3.5 - 5.1 mmol/L    Chloride 108 (H) 98 - 107 mmol/L    Carbon Dioxide 21 (L) 23 - 31 mmol/L    Glucose Level 92 82 - 115 mg/dL    Blood Urea Nitrogen 11.9 8.4 - 25.7 mg/dL    Creatinine 1.66 (H) 0.73 - 1.18 mg/dL    Calcium Level Total 8.7 (L) 8.8 - 10.0 mg/dL    Protein Total 7.0 5.8 - 7.6 gm/dL    Albumin Level 3.4 3.4 - 4.8 g/dL    Globulin 3.6 (H) 2.4 - 3.5 gm/dL    Albumin/Globulin Ratio 0.9 (L) 1.1 - 2.0 ratio    Bilirubin Total 0.7 <=1.5 mg/dL    Alkaline Phosphatase 72 40 - 150 unit/L    Alanine Aminotransferase 16 0 - 55 unit/L    Aspartate Aminotransferase 16 5 - 34 unit/L    eGFR 42 mls/min/1.73/m2   APTT    Collection Time: 02/20/24  4:49 AM   Result Value Ref Range    PTT 79.9 (H) 23.2 - 33.7 seconds   CBC with Differential    Collection Time: 02/20/24  4:49 AM   Result Value Ref Range    WBC 10.03 4.50 - 11.50 x10(3)/mcL    RBC 4.41 (L) 4.70 - 6.10 x10(6)/mcL    Hgb 11.9 (L) 14.0 - 18.0 g/dL    Hct 38.5 (L) 42.0 - 52.0 %    MCV 87.3 80.0 - 94.0 fL    MCH 27.0 27.0 - 31.0 pg    MCHC 30.9 (L) 33.0 - 36.0 g/dL    RDW 15.3 11.5 - 17.0 %    Platelet 318 130 - 400 x10(3)/mcL    MPV 11.1 (H) 7.4 - 10.4 fL    Neut % 66.9 %    Lymph % 19.7 %    Mono % 9.0 %    Eos % 3.2 %    Basophil % 0.6 %    Lymph # 1.98 0.6 - 4.6 x10(3)/mcL    Neut # 6.71 2.1 - 9.2 x10(3)/mcL    Mono # 0.90 0.1 - 1.3 x10(3)/mcL    Eos # 0.32 0 - 0.9 x10(3)/mcL    Baso # 0.06 <=0.2 x10(3)/mcL    IG# 0.06 (H) 0 - 0.04 x10(3)/mcL    IG% 0.6 %    NRBC% 0.0 %       Imaging:  US Retroperitoneal Limited   Final Result      No significant abnormalities identified.      Limited exam due to bowel gas and patient's body habitus         Electronically signed by: Ananth Coker   Date:    02/19/2024  "  Time:    14:58      X-Ray Chest PA And Lateral   Final Result      Increase interstitial markings throughout.      Mild cardiomegaly         Electronically signed by: Ananth Coker   Date:    02/15/2024   Time:    13:09          Physical Exam:   /62   Pulse 64   Temp 97.6 °F (36.4 °C) (Oral)   Resp 17   Ht 5' 5" (1.651 m)   Wt 81.1 kg (178 lb 12.8 oz)   SpO2 98%   BMI 29.75 kg/m²  Body mass index is 29.75 kg/m².  Physical Exam  Vitals and nursing note reviewed.   Constitutional:       General: He is not in acute distress.     Appearance: Normal appearance. He is not ill-appearing, toxic-appearing or diaphoretic.   HENT:      Head: Normocephalic.   Cardiovascular:      Rate and Rhythm: Normal rate.      Heart sounds: Murmur (S1 murmur noted, most appreciated over RT 2nd intercostal space.) heard.   Pulmonary:      Effort: Pulmonary effort is normal. No respiratory distress.      Breath sounds: Normal breath sounds. No wheezing or rales.   Abdominal:      General: Abdomen is flat. There is no distension.      Palpations: Abdomen is soft.      Tenderness: There is no abdominal tenderness. There is no guarding.   Musculoskeletal:         General: Tenderness present. No swelling. Normal range of motion.      Right lower leg: No edema.      Left lower leg: Edema present.      Comments: Mild tenderness with palpation bilateral lower extremities, trace edema noted greater on LLE.   Skin:     Capillary Refill: Capillary refill takes less than 2 seconds.   Neurological:      General: No focal deficit present.      Mental Status: He is alert and oriented to person, place, and time. Mental status is at baseline.           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 Unknown baseline Cr. Patient denying known history of CKD or kidney-related pathology.   Will order urine studies, HIV, Hep acute panel, strict " I/Os.  Increased LR rate from 75 to 125 cc/hr.  Ordered bolus of  cc to assist with IV contrast utilized during LHC.  Avoid nephrotoxic agents.  Continue appropriate IVF support as needed for optimization of bp/IV contrast clearance.  2) Normocytic Anemia - Ordered iron studies, B12, folate, retic count. Given bilirubin wnl, low suspicion for intravascular hemolysis at this time.    Thank you very much for your consultation    Terry Walker MD  Internal Medicine - PGY-2

## 2024-02-20 NOTE — PROGRESS NOTES
[facility] Medicine Wards   Progress Note     Resident Team: [facility] Medicine List 1  Attending Physician: Katya Centeno MD  Resident: Nilda Patel MD  Intern: All Del Castillo MD   Date of Admit: 2/15/2024    Subjective:      Brief HPI:  Brain Snyder is a 78 y.o. male with PMH of aortic insufficiency with aortic stenosis, coronary artery disease, hypertension, atrial fibrillation on Eliquis presented to [facility] ED on 2/15/2024  with complaint of chest pain.   A  was used for the encounter.  His daughter was also present.  Patient states that chest pain started several days ago and got worse last night with intensity of 7/10.  Described location as retrosternal in his squeezing pain.  Denies any radiation, alleviating or aggravating factors, or associated symptoms like shortness of breath, cough, abdominal pain, nausea or vomiting.  Patient also complained about nocturia ongoing for months.  Denies any urinary retention.  He also mentioned of mild swelling with mild pain of his left ankle past 2 days but has now resolved.  Of note, patient was recently admitted to our [facility] in December of 2023 and beginning of February 2024. During the recent admission also patient was noted to have AFib with RVR and was started on Eliquis.  Patient has brought his discharge paperwork.  Some notable findings are NT-pro BNP of 5500 on 2/5/24, CT abdomen and pelvis/CTA chest findings of aortic valve calcification.  Ectasia of the ascending thoracic aorta measuring 3.6 cm.  Moderate noncalcified atheromatous change of the descending thoracic aorta.  Micronodule surface irregularity of the liver which may suggest cirrhotic morphology.     In the ED, vitals notable for hypotension 102/59, rest vital signs stable.  CBC remarkable for normocytic anemia, CMP remarkable for elevated renal indices of BUN/creatinine at 36.5/2.15.  INR elevated at 1.8.  BNP significant for 2145 (to note that decreased from the 5000 NT-pro  BNP on 02/05/2024).  Troponin were elevated this visit at 0.166.  EKG showed normal sinus rhythm with vent rate of 64 and prolonged QTC of 571 millisecond.  Chest x-ray remarkable for mild cardiomegaly and some interstitial markings but no focal consolidative changes.  Hospital Medicine was consulted for admission and further management of NSTEMI.     Interval History:   NAEON. Resting comfortably in bed.  He denies any chest pain, syncope, shortness of breath.   We will attempt to get left heart catheterization today.    Review of Systems:  Negative unless noted in interval history.       Objective:     Vital Signs (Most Recent):  Temp: 97.6 °F (36.4 °C) (02/20/24 0341)  Pulse: 61 (02/20/24 0341)  Resp: 18 (02/20/24 0341)  BP: 114/67 (02/20/24 0341)  SpO2: 98 % (02/20/24 0341) Vital Signs (24h Range):  Temp:  [97.6 °F (36.4 °C)-98.5 °F (36.9 °C)] 97.6 °F (36.4 °C)  Pulse:  [60-72] 61  Resp:  [18] 18  SpO2:  [97 %-100 %] 98 %  BP: ()/(54-78) 114/67       Physical Examination:  Physical Exam  Vitals and nursing note reviewed.   Constitutional:       Appearance: Normal appearance.   HENT:      Head: Normocephalic and atraumatic.   Eyes:      General: No scleral icterus.     Pupils: Pupils are equal, round, and reactive to light.   Cardiovascular:      Rate and Rhythm: Regular rhythm. Bradycardia present.      Pulses: Normal pulses.      Heart sounds: Murmur (Systolic in the aortic area, no carotid bruit heard) heard.   Pulmonary:      Effort: Pulmonary effort is normal. No respiratory distress.      Breath sounds: Normal breath sounds. No wheezing or rales.   Abdominal:      General: Abdomen is flat. Bowel sounds are normal. There is no distension.      Palpations: Abdomen is soft.      Tenderness: There is no abdominal tenderness.   Musculoskeletal:         General: Tenderness present. No swelling. Normal range of motion.      Right lower leg: No edema.      Left lower leg: No edema.      Comments: Tender,  warm, erythematous left 1st MTP joint   Skin:     General: Skin is warm.      Capillary Refill: Capillary refill takes less than 2 seconds.   Neurological:      General: No focal deficit present.      Mental Status: He is alert and oriented to person, place, and time.   Psychiatric:         Mood and Affect: Mood normal.         Behavior: Behavior normal.         Thought Content: Thought content normal.         Judgment: Judgment normal.         Laboratory:  Lab Results   Component Value Date     02/20/2024    K 4.6 02/20/2024    CO2 21 (L) 02/20/2024    BUN 11.9 02/20/2024    CREATININE 1.66 (H) 02/20/2024    CALCIUM 8.7 (L) 02/20/2024    ALKPHOS 72 02/20/2024    AST 16 02/20/2024    ALT 16 02/20/2024        Lab Results   Component Value Date    WBC 10.03 02/20/2024    RBC 4.41 (L) 02/20/2024    HGB 11.9 (L) 02/20/2024    HCT 38.5 (L) 02/20/2024    MCV 87.3 02/20/2024    MCH 27.0 02/20/2024    MCHC 30.9 (L) 02/20/2024    RDW 15.3 02/20/2024     02/20/2024    MPV 11.1 (H) 02/20/2024        Microbiology:   Microbiology Results (last 7 days)       ** No results found for the last 168 hours. **            Other Results:  EKG (my interpretation): sinus bradycardia, 1st degree AV block (OR interval of 234 ms) and prolonged QT of 573 ms.    Radiology:  Imaging Results              X-Ray Chest PA And Lateral (Final result)  Result time 02/15/24 13:09:38      Final result by Ananth Coker MD (02/15/24 13:09:38)                   Impression:      Increase interstitial markings throughout.    Mild cardiomegaly      Electronically signed by: Ananth Coker  Date:    02/15/2024  Time:    13:09               Narrative:    EXAMINATION:  XR CHEST PA AND LATERAL    CLINICAL HISTORY:  Chest Pain;, .    FINDINGS:  Examination reveals mediastinal silhouette to be within normal limits cardiac silhouette is mildly enlarged lung fields reveal some increase interstitial markings with no focal consolidative changes  atelectases effusions or pneumothoraces                                      Current Medications:     Infusions:   heparin (porcine) in D5W 14 Units/kg/hr (02/19/24 2017)    lactated ringers 75 mL/hr at 02/19/24 1553        Scheduled:   allopurinoL  50 mg Oral Daily    amiodarone  200 mg Oral BID    aspirin  81 mg Oral Daily    atorvastatin  40 mg Oral QHS    heparin (PORCINE)  57.4 Units/kg (Adjusted) Intravenous Once    pantoprazole  40 mg Oral Daily    polyethylene glycol  17 g Oral Daily        PRN:  acetaminophen, heparin (PORCINE), heparin (PORCINE), melatonin, nitroGLYCERIN, simethicone, sodium chloride 0.9%    Antibiotics and Day Number of Therapy:  none    No intake or output data in the 24 hours ending 02/20/24 0637      Lines/Drains/Airways       Peripheral Intravenous Line  Duration                  Peripheral IV - Double Lumen 02/17/24 1430 20 G Anterior;Distal;Left Forearm 2 days         Peripheral IV - Double Lumen 02/20/24 0031 20 G Anterior;Right Forearm <1 day                      Assessment & Plan:     NSTEMI  H/o aortic isnufficiency  H/o Atrial fibrillation (PAF)  CAD  Prolonged QT  New HFrEF (40%)  - patient presents with complains of chest pain, retrosternal, squeezing  - elevated troponin of 0.166 on admit and elevated BNP of 2145; since then troponin peaked at 0.171  - recent admission in the hospital at our [facility]  [place] with diagnoses of aortic insufficiency with aortic stenosis, and atrial fibrillation.   - patient loaded with aspirin 325 mg   - Continue heparin drip and Asprin 81 mg once daily  - Continue Lipitor 40 mg nightly.   - prolonged QT noted on EKG  - nitroglycerin 0.4 mg sublingual p.r.n. noted.  - hold Eliquis while on Heparin  - TTE done, EF 40% with grade II diastolic dysfunction  - cardiology consulted, recommending continuing amiodarone and heparin gtt with possible LHC 2/21/2024, recs also to consider switching to Xarelto 20 mg nightly in lieu of Elqiuis (cost related)  when off Heparin  - consulted  to look into procuring outside hospital records and financial assistance- patient here in the [place] on [place] visa that ends on 2024, can extend 3 more months but currently no insurance.      Nocturia  Non oliguric JEAN-CLAUDE  - patient complains of nocturia about 6-7 times every night but denies any urinary retention  - renal indices have improved since admit but currently stable  - improved renal indices  - US retroperitoneal not suggestive of any pathology  -UA with 2+ urobilinogen   -LFTs within normal limits, ALP within normal limits     Constipation  - last bowel movement yesterday  - Continue Miralax once daily    GERD  - belching multiple and bloating  - Continue protonix 40 mg once daily     Gout  - Left 1st MTP joint pain, warmth, erythema unchanged since admission  - No history of gout  - uric acid level elevated  - unable to start Colchicine given patient on Amiodarone (CY inhibitor), due to renal dysfunction and recent NSTEMI  - will start Allopurinol 50 mg once daily (renal dosing)  - start Tylenol 650 mg q6h prn for pain control       CODE STATUS: Full  Access: PIV  Antibiotics: none  Diet: Heart healthy, NPO at midnight  DVT Prophylaxis: Heparin gtt  GI Prophylaxis: Protonix  Fluids:  LR 75 cc      Disposition: Admitted to inpatient service for ongoing monitoring of NSTEMI. TTE done. Cardiology consulted, pending [place] on . Patient can be discharged home when medically stable.     Xi Levine M.D  [employer] Internal Medicine PGY-1

## 2024-02-20 NOTE — INTERVAL H&P NOTE
Brain Snyder was evaluated as an inpatient and scheduled for [facility].  The patient is stable to proceed with the procedure.  No significant change in the patient's status noted.      Vasile Callahan MD

## 2024-02-20 NOTE — BRIEF OP NOTE
ANGIOGRAM, CORONARY ARTERY Brief Op Note  Patient Name: Brain Snyder  MRN: [id number]  : 1945  Date of Procedure: 2024  Location of Procedure: [facility] CATH LAB    Procedure: ANGIOGRAM, CORONARY ARTERY    Pre-op Dx:   NSTEMI     Post-op Dx:   Severe CAD     Staff: Surgeon(s):  Vasile Callahan MD     Access:   Right radial artery    Findings:   Triple vessel CAD  LVEDP: 30mmHg           Complications:  No immediate complications            Disposition:  return to inpatient room           Condition:  stable     Impression:  Successful coronary angiogram    Plan:  Consider CABG       Vasile Callahan MD  Interventional Cardiology   2024 11:30 AM

## 2024-02-20 NOTE — PT/OT/SLP EVAL
Physical Therapy Evaluation & Discharge Note    Patient Name:  Brain Snyder   MRN:  [id number]    Recommendations     Therapy Intensity Recommendations at Discharge: No Therapy Indicated  Discharge Equipment Recommendations: none   Equipment to be obtained for discharge: none.  Barriers to discharge: none and medical diagnosis    Assessment     Brain Snyder is a 78 y.o. male admitted with a medical diagnosis of Chest pain.    He presents with the following impairments/functional limitations:   .    Patient's current level of function is at baseline (PLOF).  Patient does not require further acute PT services.     Recent Surgery: Procedure(s) (LRB):  ANGIOGRAM, CORONARY ARTERY (N/A) Day of Surgery    Plan     Patient discharged from acute PT Services on 02/20/24.    Based on current functional levels observed, patient does not require further acute PT services.    Re-consult PT Services if patient's status changes and therapy services warranted.    Subjective     Communicated with patient's nurse Jeff prior to session.    Patient agreeable to participate in evaluation.     Chief Complaint: No complaints.  Patient/Family Comments/goals: To get transferred to another facility for surgery.  Pain/Comfort:  Pain Rating 1: 0/10  Pain Rating Post-Intervention 1: 0/10    Patients cultural, spiritual, Advent conflicts given the current situation: no    Social History  Living Environment: Patient lives with their family in a single level home, with no steps, with tub-shower combo.  Functional Level: Prior to admission patient was independent in ADL's.  Equipment Used at Home: none  Equipment owned (not currently used): none.  Assistance Upon Discharge: spouse and family.    Objective     Patient found supine in bed and with HOB elevated with peripheral IV, telemetry  upon PT entry to room. Team of doctors interrupted subjective questions to go over the patients POC and need to be transitioned to another facility  for his impending cardiac surgery. Utilized interpretor and completed questioning after the team of doctors left.     General Precautions: Standard,     Orthopedic Precautions:N/A   Braces: N/A  Respiratory Status: room air    Exams:  Orientation: Patient is oriented to person, place, time, situation  Commands: Patient follows commands consistently  (B) UE ROM and MMT = WFL  RLE ROM: WFL  RLE Strength: WFL  LLE ROM: WFL  LLE Strength: WFL    Functional Mobility:    Bed Mobility:  Supine to Sit: independence    Transfers:  Sit to Stand: independence with no assistive device    Gait:  Patient ambulated 130ft with no assistive device and independence.  Patient demonstrates :       steady gait       symmetrical step length.    Other Mobility:  not assessed    Balance:  Sit  Static: NORMAL: No deviations seen in posture held statically  Dynamic: NORMAL: No deviations seen in posture held dynamically  Stand  Static: NORMAL: No deviations seen in posture held statically  Dynamic: NORMAL: No deviations seen in posture held dynamically    Additional Treatment Session  n/a    Patient left sitting edge of bed and family present with his lunch  with all lines intact, call button in reach, and patient' nurse notified.    Education     White board updated regarding patient's safest level of mobility with staff assistance.    Goals - No STG's or LTG's established secondary to patient was seen for evaluation and discharge     Multidisciplinary Problems       Physical Therapy Goals       Not on file                    History     Past Medical History:   Diagnosis Date    A-fib     Aortic insufficiency     CAD (coronary artery disease)     Hypertension      History reviewed. No pertinent surgical history.  Time Tracking     PT Received On: 02/20/24  PT Start Time: 1321     PT Stop Time: 1355  PT Total Time (min): 34 min     Billable Minutes: Evaluation 25 02/20/2024

## 2024-02-21 ENCOUNTER — HOSPITAL ENCOUNTER (INPATIENT)
Facility: HOSPITAL | Age: 79
LOS: 34 days | Discharge: HOME | DRG: 215 | End: 2024-03-26
Attending: INTERNAL MEDICINE | Admitting: INTERNAL MEDICINE

## 2024-02-21 VITALS
BODY MASS INDEX: 29.79 KG/M2 | HEIGHT: 65 IN | TEMPERATURE: 97 F | WEIGHT: 178.81 LBS | HEART RATE: 71 BPM | RESPIRATION RATE: 19 BRPM | DIASTOLIC BLOOD PRESSURE: 63 MMHG | SYSTOLIC BLOOD PRESSURE: 109 MMHG | OXYGEN SATURATION: 96 %

## 2024-02-21 DIAGNOSIS — I48.91 A-FIB: ICD-10-CM

## 2024-02-21 DIAGNOSIS — M79.89 PAIN AND SWELLING OF RIGHT LOWER LEG: ICD-10-CM

## 2024-02-21 DIAGNOSIS — I25.10 CAD (CORONARY ARTERY DISEASE): ICD-10-CM

## 2024-02-21 DIAGNOSIS — I25.10 ATHEROSCLEROSIS OF NATIVE CORONARY ARTERY OF NATIVE HEART WITHOUT ANGINA PECTORIS: ICD-10-CM

## 2024-02-21 DIAGNOSIS — I25.10 CORONARY ARTERY DISEASE, UNSPECIFIED VESSEL OR LESION TYPE, UNSPECIFIED WHETHER ANGINA PRESENT, UNSPECIFIED WHETHER NATIVE OR TRANSPLANTED HEART: Primary | ICD-10-CM

## 2024-02-21 DIAGNOSIS — R07.9 CHEST PAIN: ICD-10-CM

## 2024-02-21 DIAGNOSIS — R57.0 CARDIOGENIC SHOCK: ICD-10-CM

## 2024-02-21 DIAGNOSIS — M79.661 PAIN AND SWELLING OF RIGHT LOWER LEG: ICD-10-CM

## 2024-02-21 DIAGNOSIS — I35.9 AORTIC VALVE DISEASE: ICD-10-CM

## 2024-02-21 DIAGNOSIS — N17.9 AKI (ACUTE KIDNEY INJURY): ICD-10-CM

## 2024-02-21 DIAGNOSIS — I42.9 CARDIOMYOPATHY, UNSPECIFIED TYPE: ICD-10-CM

## 2024-02-21 LAB
ALBUMIN SERPL-MCNC: 3.1 G/DL (ref 3.4–4.8)
ALBUMIN/GLOB SERPL: 1 RATIO (ref 1.1–2)
ALP SERPL-CCNC: 68 UNIT/L (ref 40–150)
ALT SERPL-CCNC: 16 UNIT/L (ref 0–55)
APTT PPP: 34.2 SECONDS (ref 23.2–33.7)
APTT PPP: 60.1 SECONDS (ref 23.2–33.7)
AST SERPL-CCNC: 14 UNIT/L (ref 5–34)
BASOPHILS # BLD AUTO: 0.04 X10(3)/MCL
BASOPHILS # BLD AUTO: 0.05 X10(3)/MCL
BASOPHILS NFR BLD AUTO: 0.5 %
BASOPHILS NFR BLD AUTO: 0.5 %
BILIRUB SERPL-MCNC: 0.6 MG/DL
BUN SERPL-MCNC: 12.3 MG/DL (ref 8.4–25.7)
CALCIUM SERPL-MCNC: 8.7 MG/DL (ref 8.8–10)
CHLORIDE SERPL-SCNC: 109 MMOL/L (ref 98–107)
CO2 SERPL-SCNC: 23 MMOL/L (ref 23–31)
CREAT SERPL-MCNC: 1.63 MG/DL (ref 0.73–1.18)
EOSINOPHIL # BLD AUTO: 0.37 X10(3)/MCL (ref 0–0.9)
EOSINOPHIL # BLD AUTO: 0.45 X10(3)/MCL (ref 0–0.9)
EOSINOPHIL NFR BLD AUTO: 3.7 %
EOSINOPHIL NFR BLD AUTO: 5.6 %
ERYTHROCYTE [DISTWIDTH] IN BLOOD BY AUTOMATED COUNT: 15.3 % (ref 11.5–17)
ERYTHROCYTE [DISTWIDTH] IN BLOOD BY AUTOMATED COUNT: 15.6 % (ref 11.5–17)
GFR SERPLBLD CREATININE-BSD FMLA CKD-EPI: 43 MLS/MIN/1.73/M2
GLOBULIN SER-MCNC: 3.1 GM/DL (ref 2.4–3.5)
GLUCOSE SERPL-MCNC: 86 MG/DL (ref 82–115)
HCT VFR BLD AUTO: 36.7 % (ref 42–52)
HCT VFR BLD AUTO: 36.7 % (ref 42–52)
HGB BLD-MCNC: 11.3 G/DL (ref 14–18)
HGB BLD-MCNC: 11.5 G/DL (ref 14–18)
IMM GRANULOCYTES # BLD AUTO: 0.05 X10(3)/MCL (ref 0–0.04)
IMM GRANULOCYTES # BLD AUTO: 0.06 X10(3)/MCL (ref 0–0.04)
IMM GRANULOCYTES NFR BLD AUTO: 0.5 %
IMM GRANULOCYTES NFR BLD AUTO: 0.7 %
INR PPP: 1.1
LYMPHOCYTES # BLD AUTO: 1.08 X10(3)/MCL (ref 0.6–4.6)
LYMPHOCYTES # BLD AUTO: 1.68 X10(3)/MCL (ref 0.6–4.6)
LYMPHOCYTES NFR BLD AUTO: 10.7 %
LYMPHOCYTES NFR BLD AUTO: 20.8 %
MCH RBC QN AUTO: 27.2 PG (ref 27–31)
MCH RBC QN AUTO: 27.6 PG (ref 27–31)
MCHC RBC AUTO-ENTMCNC: 30.8 G/DL (ref 33–36)
MCHC RBC AUTO-ENTMCNC: 31.3 G/DL (ref 33–36)
MCV RBC AUTO: 88 FL (ref 80–94)
MCV RBC AUTO: 88.4 FL (ref 80–94)
MONOCYTES # BLD AUTO: 0.78 X10(3)/MCL (ref 0.1–1.3)
MONOCYTES # BLD AUTO: 0.84 X10(3)/MCL (ref 0.1–1.3)
MONOCYTES NFR BLD AUTO: 8.3 %
MONOCYTES NFR BLD AUTO: 9.6 %
NEUTROPHILS # BLD AUTO: 5.08 X10(3)/MCL (ref 2.1–9.2)
NEUTROPHILS # BLD AUTO: 7.68 X10(3)/MCL (ref 2.1–9.2)
NEUTROPHILS NFR BLD AUTO: 62.8 %
NEUTROPHILS NFR BLD AUTO: 76.3 %
NRBC BLD AUTO-RTO: 0 %
NRBC BLD AUTO-RTO: 0 %
PLATELET # BLD AUTO: 291 X10(3)/MCL (ref 130–400)
PLATELET # BLD AUTO: 302 X10(3)/MCL (ref 130–400)
PMV BLD AUTO: 10.4 FL (ref 7.4–10.4)
PMV BLD AUTO: 11 FL (ref 7.4–10.4)
POCT GLUCOSE: 102 MG/DL (ref 70–110)
POCT GLUCOSE: 123 MG/DL (ref 70–110)
POCT GLUCOSE: 85 MG/DL (ref 70–110)
POTASSIUM SERPL-SCNC: 4.3 MMOL/L (ref 3.5–5.1)
PROT SERPL-MCNC: 6.2 GM/DL (ref 5.8–7.6)
PROTHROMBIN TIME: 14.6 SECONDS (ref 11.4–14)
RBC # BLD AUTO: 4.15 X10(6)/MCL (ref 4.7–6.1)
RBC # BLD AUTO: 4.17 X10(6)/MCL (ref 4.7–6.1)
SODIUM SERPL-SCNC: 138 MMOL/L (ref 136–145)
WBC # SPEC AUTO: 10.07 X10(3)/MCL (ref 4.5–11.5)
WBC # SPEC AUTO: 8.09 X10(3)/MCL (ref 4.5–11.5)

## 2024-02-21 PROCEDURE — 85025 COMPLETE CBC W/AUTO DIFF WBC: CPT

## 2024-02-21 PROCEDURE — 63600175 PHARM REV CODE 636 W HCPCS

## 2024-02-21 PROCEDURE — P9045 ALBUMIN (HUMAN), 5%, 250 ML: HCPCS | Mod: JG

## 2024-02-21 PROCEDURE — P9047 ALBUMIN (HUMAN), 25%, 50ML: HCPCS | Mod: JG

## 2024-02-21 PROCEDURE — 85730 THROMBOPLASTIN TIME PARTIAL: CPT | Performed by: INTERNAL MEDICINE

## 2024-02-21 PROCEDURE — 25000003 PHARM REV CODE 250

## 2024-02-21 PROCEDURE — 63600175 PHARM REV CODE 636 W HCPCS: Performed by: INTERNAL MEDICINE

## 2024-02-21 PROCEDURE — 80053 COMPREHEN METABOLIC PANEL: CPT

## 2024-02-21 PROCEDURE — 25000003 PHARM REV CODE 250: Performed by: INTERNAL MEDICINE

## 2024-02-21 PROCEDURE — 94761 N-INVAS EAR/PLS OXIMETRY MLT: CPT

## 2024-02-21 PROCEDURE — 85730 THROMBOPLASTIN TIME PARTIAL: CPT

## 2024-02-21 PROCEDURE — 85610 PROTHROMBIN TIME: CPT

## 2024-02-21 PROCEDURE — 11000001 HC ACUTE MED/SURG PRIVATE ROOM

## 2024-02-21 RX ORDER — FOLIC ACID 1 MG/1
1 TABLET ORAL DAILY
Status: DISCONTINUED | OUTPATIENT
Start: 2024-02-21 | End: 2024-02-21 | Stop reason: HOSPADM

## 2024-02-21 RX ORDER — SODIUM CHLORIDE, SODIUM LACTATE, POTASSIUM CHLORIDE, CALCIUM CHLORIDE 600; 310; 30; 20 MG/100ML; MG/100ML; MG/100ML; MG/100ML
INJECTION, SOLUTION INTRAVENOUS CONTINUOUS
Status: DISCONTINUED | OUTPATIENT
Start: 2024-02-21 | End: 2024-02-21

## 2024-02-21 RX ORDER — TALC
6 POWDER (GRAM) TOPICAL NIGHTLY PRN
Status: DISCONTINUED | OUTPATIENT
Start: 2024-02-21 | End: 2024-03-26 | Stop reason: HOSPADM

## 2024-02-21 RX ORDER — NITROGLYCERIN 0.4 MG/1
0.4 TABLET SUBLINGUAL EVERY 5 MIN PRN
Status: DISCONTINUED | OUTPATIENT
Start: 2024-02-21 | End: 2024-03-26 | Stop reason: HOSPADM

## 2024-02-21 RX ORDER — ACETAMINOPHEN 325 MG/1
650 TABLET ORAL EVERY 4 HOURS PRN
Status: DISCONTINUED | OUTPATIENT
Start: 2024-02-21 | End: 2024-03-01

## 2024-02-21 RX ORDER — ACETAMINOPHEN 325 MG/1
650 TABLET ORAL EVERY 6 HOURS PRN
Status: DISCONTINUED | OUTPATIENT
Start: 2024-02-21 | End: 2024-03-01

## 2024-02-21 RX ORDER — HEPARIN SODIUM,PORCINE/D5W 25000/250
0-40 INTRAVENOUS SOLUTION INTRAVENOUS CONTINUOUS
Status: DISCONTINUED | OUTPATIENT
Start: 2024-02-21 | End: 2024-02-21 | Stop reason: HOSPADM

## 2024-02-21 RX ORDER — LANOLIN ALCOHOL/MO/W.PET/CERES
1 CREAM (GRAM) TOPICAL EVERY OTHER DAY
Status: DISCONTINUED | OUTPATIENT
Start: 2024-02-21 | End: 2024-02-21 | Stop reason: HOSPADM

## 2024-02-21 RX ORDER — FOLIC ACID 1 MG/1
1 TABLET ORAL DAILY
Status: DISCONTINUED | OUTPATIENT
Start: 2024-02-22 | End: 2024-02-27

## 2024-02-21 RX ORDER — AMIODARONE HYDROCHLORIDE 200 MG/1
200 TABLET ORAL 2 TIMES DAILY
Status: DISCONTINUED | OUTPATIENT
Start: 2024-02-21 | End: 2024-02-28

## 2024-02-21 RX ORDER — ASPIRIN 81 MG/1
81 TABLET ORAL DAILY
Status: DISCONTINUED | OUTPATIENT
Start: 2024-02-22 | End: 2024-02-27

## 2024-02-21 RX ORDER — HEPARIN SODIUM,PORCINE/D5W 25000/250
0-40 INTRAVENOUS SOLUTION INTRAVENOUS CONTINUOUS
Status: DISCONTINUED | OUTPATIENT
Start: 2024-02-21 | End: 2024-02-23

## 2024-02-21 RX ORDER — ATORVASTATIN CALCIUM 40 MG/1
40 TABLET, FILM COATED ORAL NIGHTLY
Status: DISCONTINUED | OUTPATIENT
Start: 2024-02-21 | End: 2024-03-15

## 2024-02-21 RX ORDER — ONDANSETRON 4 MG/1
8 TABLET, ORALLY DISINTEGRATING ORAL EVERY 8 HOURS PRN
Status: DISCONTINUED | OUTPATIENT
Start: 2024-02-21 | End: 2024-03-26 | Stop reason: HOSPADM

## 2024-02-21 RX ORDER — SIMETHICONE 80 MG
1 TABLET,CHEWABLE ORAL 3 TIMES DAILY PRN
Status: DISCONTINUED | OUTPATIENT
Start: 2024-02-21 | End: 2024-03-26 | Stop reason: HOSPADM

## 2024-02-21 RX ORDER — PANTOPRAZOLE SODIUM 40 MG/1
40 TABLET, DELAYED RELEASE ORAL DAILY
Status: DISCONTINUED | OUTPATIENT
Start: 2024-02-22 | End: 2024-03-26 | Stop reason: HOSPADM

## 2024-02-21 RX ORDER — SODIUM CHLORIDE 0.9 % (FLUSH) 0.9 %
10 SYRINGE (ML) INJECTION
Status: DISCONTINUED | OUTPATIENT
Start: 2024-02-21 | End: 2024-03-20

## 2024-02-21 RX ORDER — POLYETHYLENE GLYCOL 3350 17 G/17G
17 POWDER, FOR SOLUTION ORAL DAILY
Status: DISCONTINUED | OUTPATIENT
Start: 2024-02-22 | End: 2024-03-09

## 2024-02-21 RX ORDER — LANOLIN ALCOHOL/MO/W.PET/CERES
1 CREAM (GRAM) TOPICAL EVERY OTHER DAY
Status: DISCONTINUED | OUTPATIENT
Start: 2024-02-23 | End: 2024-03-26 | Stop reason: HOSPADM

## 2024-02-21 RX ADMIN — FERROUS SULFATE TAB 325 MG (65 MG ELEMENTAL FE) 1 EACH: 325 (65 FE) TAB at 11:02

## 2024-02-21 RX ADMIN — ASPIRIN 81 MG: 81 TABLET, COATED ORAL at 09:02

## 2024-02-21 RX ADMIN — ALLOPURINOL 50 MG: 300 TABLET ORAL at 09:02

## 2024-02-21 RX ADMIN — FOLIC ACID 1 MG: 1 TABLET ORAL at 11:02

## 2024-02-21 RX ADMIN — ACETAMINOPHEN 650 MG: 325 TABLET, FILM COATED ORAL at 09:02

## 2024-02-21 RX ADMIN — SIMETHICONE 80 MG: 80 TABLET, CHEWABLE ORAL at 09:02

## 2024-02-21 RX ADMIN — HEPARIN SODIUM 12 UNITS/KG/HR: 10000 INJECTION, SOLUTION INTRAVENOUS at 10:02

## 2024-02-21 RX ADMIN — ATORVASTATIN CALCIUM 40 MG: 40 TABLET, FILM COATED ORAL at 09:02

## 2024-02-21 RX ADMIN — POLYETHYLENE GLYCOL 3350 17 G: 17 POWDER, FOR SOLUTION ORAL at 09:02

## 2024-02-21 RX ADMIN — PANTOPRAZOLE SODIUM 40 MG: 40 TABLET, DELAYED RELEASE ORAL at 09:02

## 2024-02-21 RX ADMIN — ONDANSETRON 8 MG: 4 TABLET, ORALLY DISINTEGRATING ORAL at 12:02

## 2024-02-21 RX ADMIN — AMIODARONE HYDROCHLORIDE 200 MG: 200 TABLET ORAL at 09:02

## 2024-02-21 RX ADMIN — SODIUM CHLORIDE, POTASSIUM CHLORIDE, SODIUM LACTATE AND CALCIUM CHLORIDE: 600; 310; 30; 20 INJECTION, SOLUTION INTRAVENOUS at 09:02

## 2024-02-21 RX ADMIN — HEPARIN SODIUM 12 UNITS/KG/HR: 10000 INJECTION, SOLUTION INTRAVENOUS at 03:02

## 2024-02-21 NOTE — Clinical Note
The PA catheter was inserted into the right atrium. Hemodynamics were performed. catheter repositioned to RV, PA, and PCW

## 2024-02-21 NOTE — Clinical Note
The catheter insertion attempt was made into the left ventricle. The catheter was unable to access the area..

## 2024-02-21 NOTE — PROGRESS NOTES
"Nephrology Progress Note    Hospital course:   This is a 78 y.o. male with past medical history of aortic insufficiency with aortic stenosis, coronary artery disease, hypertension, AFib on Eliquis who presented to [facility] ED on 02/15/2024 with chief complaint of chest pain.  In ER, vitals notable for hypotension on 02/09, BUN/CR 36.5/2.15 with unknown baseline creatinine.  Since admission, patient's renal indices somewhat improved, creatinine remaining elevated 1.5-1.7 over last 3 days despite IVF support. Nephrology consulted for JEAN-CLAUDE on CKD. Patient seen this afternoon, family at bedside.  service utilized for discussion. Patient adamently denies known history of CKD or other kidney related pathology. Denies decreased urine output.      Patient taken for [facility] 2/20/24, triple vessel disease noted, concomitant aortic stenosis as well. Transfer process for inpatient CABG +/- AVR initiated by primary team 2/20/24.    Subjective:   NAEON. Continues to have intermittent heaviness over chest, but denies any urinary complaints. Transfer request for inpatient need of CABG +/- AVR accepted, pending bed opening. Discussed at length diagnosis of likely underlying CKD, and emphasis placed on avoidance of NSAIDs, optimal control of bp and blood sugar, and goal Na intake < 2g daily. Patient  (and daughter at bedside) endorsed understanding.    Objective:   /65   Pulse 67   Temp 97.9 °F (36.6 °C) (Oral)   Resp 20   Ht 5' 5" (1.651 m)   Wt 81.1 kg (178 lb 12.8 oz)   SpO2 99%   BMI 29.75 kg/m²     Intake/Output Summary (Last 24 hours) at 2/21/2024 1052  Last data filed at 2/21/2024 0328  Gross per 24 hour   Intake --   Output 1 ml   Net -1 ml        Physical exam:   Vitals and nursing note reviewed.   Constitutional:       General: He is not in acute distress.     Appearance: Normal appearance. He is not ill-appearing, toxic-appearing or diaphoretic.   HENT:      Head: Normocephalic.   Cardiovascular:      Rate and " Rhythm: Normal rate.      Heart sounds: Murmur (S1 murmur noted, most appreciated over RT 2nd intercostal space.) heard.   Pulmonary:      Effort: Pulmonary effort is normal. No respiratory distress.      Breath sounds: Normal breath sounds. No wheezing or rales.   Abdominal:      General: Abdomen is flat. There is no distension.      Palpations: Abdomen is soft.      Tenderness: There is no abdominal tenderness. There is no guarding.   Musculoskeletal:         General: Tenderness present. No swelling. Normal range of motion.      Right lower leg: No edema.      Left lower leg: Edema present.      Comments: Mild tenderness with palpation bilateral lower extremities, trace edema noted greater on LLE.   Skin:     Capillary Refill: Capillary refill takes less than 2 seconds.   Neurological:      General: No focal deficit present.      Mental Status: He is alert and oriented to person, place, and time. Mental status is at baseline.     Laboratory data:   Recent Results (from the past 24 hour(s))   PSA, Screening    Collection Time: 02/20/24  1:10 PM   Result Value Ref Range    Prostate Specific Antigen 3.09 <=4.00 ng/mL   Osmolality, Serum    Collection Time: 02/20/24  1:10 PM   Result Value Ref Range    Osmolality 290 280 - 300 mOsm/kg   HIV 1/2 Ag/Ab (4th Gen)    Collection Time: 02/20/24  1:10 PM   Result Value Ref Range    HIV Nonreactive Nonreactive   Hepatitis Panel, Acute    Collection Time: 02/20/24  1:10 PM   Result Value Ref Range    Hepatitis A IgM Nonreactive Nonreactive    Hepatitis B Core IgM Nonreactive Nonreactive    Hepatitis B Surface Antigen Nonreactive Nonreactive    Hep C Ab Interp Nonreactive Nonreactive   Ferritin    Collection Time: 02/20/24  1:10 PM   Result Value Ref Range    Ferritin Level 225.79 21.81 - 274.66 ng/mL   Folate    Collection Time: 02/20/24  1:10 PM   Result Value Ref Range    Folate Level 10.7 7.0 - 31.4 ng/mL   Iron and TIBC    Collection Time: 02/20/24  1:10 PM   Result Value  Ref Range    Iron Binding Capacity Unsaturated 217 69 - 240 ug/dL    Iron Level 45 (L) 65 - 175 ug/dL    Transferrin 245 163 - 344 mg/dL    Iron Binding Capacity Total 262 250 - 450 ug/dL    Iron Saturation 17 (L) 20 - 50 %   Reticulocytes    Collection Time: 02/20/24  1:10 PM   Result Value Ref Range    Retic Cnt Auto 3.94 (H) 1.1 - 2.1 %    RET# 0.1651 (H) 0.026 - 0.095 x10e6/uL   Vitamin B12    Collection Time: 02/20/24  1:10 PM   Result Value Ref Range    Vitamin B12 Level 825 (H) 213 - 816 pg/mL   Light Blue Top Hold    Collection Time: 02/20/24  1:35 PM   Result Value Ref Range    Extra Tube Hold for add-ons.    POCT glucose    Collection Time: 02/20/24  8:37 PM   Result Value Ref Range    POCT Glucose 145 (H) 70 - 110 mg/dL   Comprehensive Metabolic Panel    Collection Time: 02/21/24  3:47 AM   Result Value Ref Range    Sodium Level 138 136 - 145 mmol/L    Potassium Level 4.3 3.5 - 5.1 mmol/L    Chloride 109 (H) 98 - 107 mmol/L    Carbon Dioxide 23 23 - 31 mmol/L    Glucose Level 86 82 - 115 mg/dL    Blood Urea Nitrogen 12.3 8.4 - 25.7 mg/dL    Creatinine 1.63 (H) 0.73 - 1.18 mg/dL    Calcium Level Total 8.7 (L) 8.8 - 10.0 mg/dL    Protein Total 6.2 5.8 - 7.6 gm/dL    Albumin Level 3.1 (L) 3.4 - 4.8 g/dL    Globulin 3.1 2.4 - 3.5 gm/dL    Albumin/Globulin Ratio 1.0 (L) 1.1 - 2.0 ratio    Bilirubin Total 0.6 <=1.5 mg/dL    Alkaline Phosphatase 68 40 - 150 unit/L    Alanine Aminotransferase 16 0 - 55 unit/L    Aspartate Aminotransferase 14 5 - 34 unit/L    eGFR 43 mls/min/1.73/m2   CBC with Differential    Collection Time: 02/21/24  3:47 AM   Result Value Ref Range    WBC 8.09 4.50 - 11.50 x10(3)/mcL    RBC 4.15 (L) 4.70 - 6.10 x10(6)/mcL    Hgb 11.3 (L) 14.0 - 18.0 g/dL    Hct 36.7 (L) 42.0 - 52.0 %    MCV 88.4 80.0 - 94.0 fL    MCH 27.2 27.0 - 31.0 pg    MCHC 30.8 (L) 33.0 - 36.0 g/dL    RDW 15.6 11.5 - 17.0 %    Platelet 302 130 - 400 x10(3)/mcL    MPV 11.0 (H) 7.4 - 10.4 fL    Neut % 62.8 %    Lymph % 20.8  %    Mono % 9.6 %    Eos % 5.6 %    Basophil % 0.5 %    Lymph # 1.68 0.6 - 4.6 x10(3)/mcL    Neut # 5.08 2.1 - 9.2 x10(3)/mcL    Mono # 0.78 0.1 - 1.3 x10(3)/mcL    Eos # 0.45 0 - 0.9 x10(3)/mcL    Baso # 0.04 <=0.2 x10(3)/mcL    IG# 0.06 (H) 0 - 0.04 x10(3)/mcL    IG% 0.7 %    NRBC% 0.0 %   POCT glucose    Collection Time: 02/21/24  8:18 AM   Result Value Ref Range    POCT Glucose 102 70 - 110 mg/dL       Imaging:   Imaging Results              X-Ray Chest PA And Lateral (Final result)  Result time 02/15/24 13:09:38      Final result by Ananth Coker MD (02/15/24 13:09:38)                   Impression:      Increase interstitial markings throughout.    Mild cardiomegaly      Electronically signed by: Ananth Coker  Date:    02/15/2024  Time:    13:09               Narrative:    EXAMINATION:  XR CHEST PA AND LATERAL    CLINICAL HISTORY:  Chest Pain;, .    FINDINGS:  Examination reveals mediastinal silhouette to be within normal limits cardiac silhouette is mildly enlarged lung fields reveal some increase interstitial markings with no focal consolidative changes atelectases effusions or pneumothoraces                                       Medications:     Current Facility-Administered Medications:     acetaminophen tablet 650 mg, 650 mg, Oral, Q6H PRN, All Del Castillo MD, 650 mg at 02/18/24 0905    acetaminophen tablet 650 mg, 650 mg, Oral, Q4H PRN, Vasile Callahan MD    allopurinol split tablet 50 mg, 50 mg, Oral, Daily, All Del Castillo MD, 50 mg at 02/21/24 0920    amiodarone tablet 200 mg, 200 mg, Oral, BID, All Del Castillo MD, 200 mg at 02/21/24 0917    aspirin EC tablet 81 mg, 81 mg, Oral, Daily, All Del Castillo MD, 81 mg at 02/21/24 0917    atorvastatin tablet 40 mg, 40 mg, Oral, QHS, All Del Castillo MD, 40 mg at 02/20/24 2044    lactated ringers infusion, , Intravenous, Continuous, Hyun Dykes MD, Last Rate: 125 mL/hr at 02/21/24 0921, New Bag at 02/21/24 0921     melatonin tablet 6 mg, 6 mg, Oral, Nightly PRN, All Del Castillo MD    nitroGLYCERIN in 5 % dextrose 50 mg/250 mL (200 mcg/mL) infusion, , , Continuous PRN, Vasile Callahan MD, 100 mcg at 02/20/24 1055    nitroGLYCERIN SL tablet 0.4 mg, 0.4 mg, Sublingual, Q5 Min PRN, All Del Castillo MD    ondansetron disintegrating tablet 8 mg, 8 mg, Oral, Q8H PRN, Vasile Callahan MD    pantoprazole EC tablet 40 mg, 40 mg, Oral, Daily, All Del Castillo MD, 40 mg at 02/21/24 0917    polyethylene glycol packet 17 g, 17 g, Oral, Daily, All Del Castillo MD, 17 g at 02/21/24 0920    simethicone chewable tablet 80 mg, 1 tablet, Oral, TID PRN, Socorro Comer MD, 80 mg at 02/20/24 0213    sodium chloride 0.9% flush 10 mL, 10 mL, Intravenous, PRN, All Del Castillo MD     Impression/Plan:     1) JEAN-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  HIV and Hepatitis acute panel nonreactive 2/20/24.  Agree with LR for fluid maintenance.  Avoid nephrotoxic agents.    2) Normocytic Anemia - folate low normal range, component of VALENTE as well. Given impending transfer, will start on ferrous sulfate 65 mg PO every other day. Added on folic acid 1 mg daily as well.      Terry Walker MD  Internal Medicine - PGY-2

## 2024-02-21 NOTE — Clinical Note
The catheter was inserted into the abdominal aorta. An angiography was performed of the abdominal aorta. The angiography was performed via power injection.

## 2024-02-21 NOTE — Clinical Note
The skin was dry, was intact, was without bleeding and was without hematoma. Site covered with tegaderm.

## 2024-02-21 NOTE — CARE UPDATE
Patient underwent coronary angiogram earlier today for NSTEMI. He was found to have severe triple vessel CAD, specifically lesions in OM segment x 2. Lesions were not eligible for stenting. Ideally, patient would obtain subsequent CABG procedure in an inpatient setting as LHC findings were equivalent to left main coronary artery occlusion.     Patient can be restarted on heparin if symptomatic once radial band is removed to avoid risk of post-LHC bleeding.         Elisa Swann MD   [facility]   [facility] HO-1

## 2024-02-21 NOTE — PROGRESS NOTES
[facility] Medicine Wards   Progress Note     Resident Team: [facility] Medicine List 1  Attending Physician: Katya Centeno MD  Resident: Nilda Patel MD  Intern: All Del Castillo MD   Date of Admit: 2/15/2024    Subjective:      Brief HPI:  Brain Snyder is a 78 y.o. male with PMH of aortic insufficiency with aortic stenosis, coronary artery disease, hypertension, atrial fibrillation on Eliquis presented to [facility] ED on 2/15/2024  with complaint of chest pain.   A  was used for the encounter.  His daughter was also present.  Patient states that chest pain started several days ago and got worse last night with intensity of 7/10.  Described location as retrosternal in his squeezing pain.  Denies any radiation, alleviating or aggravating factors, or associated symptoms like shortness of breath, cough, abdominal pain, nausea or vomiting.  Patient also complained about nocturia ongoing for months.  Denies any urinary retention.  He also mentioned of mild swelling with mild pain of his left ankle past 2 days but has now resolved.  Of note, patient was recently admitted to our [facility] in December of 2023 and beginning of February 2024. During the recent admission also patient was noted to have AFib with RVR and was started on Eliquis.  Patient has brought his discharge paperwork.  Some notable findings are NT-pro BNP of 5500 on 2/5/24, CT abdomen and pelvis/CTA chest findings of aortic valve calcification.  Ectasia of the ascending thoracic aorta measuring 3.6 cm.  Moderate noncalcified atheromatous change of the descending thoracic aorta.  Micronodule surface irregularity of the liver which may suggest cirrhotic morphology.     In the ED, vitals notable for hypotension 102/59, rest vital signs stable.  CBC remarkable for normocytic anemia, CMP remarkable for elevated renal indices of BUN/creatinine at 36.5/2.15.  INR elevated at 1.8.  BNP significant for 2145 (to note that decreased from the 5000 NT-pro  BNP on 02/05/2024).  Troponin were elevated this visit at 0.166.  EKG showed normal sinus rhythm with vent rate of 64 and prolonged QTC of 571 millisecond.  Chest x-ray remarkable for mild cardiomegaly and some interstitial markings but no focal consolidative changes.  Hospital Medicine was consulted for admission and further management of NSTEMI.     Interval History:   Patient reports last night having a feeling of choking in his chest which lasted about half an hour.  He denies radiation anywhere.  He says the feeling subsided.  During that episode he was resting in bed.  No other acute events overnight.  Spoke with the transfer center and CT surgery, they are willing to accept patient.  We will await transfer when beds become available.    Review of Systems:  Negative unless noted in interval history.       Objective:     Vital Signs (Most Recent):  Temp: 97.9 °F (36.6 °C) (02/21/24 0814)  Pulse: 67 (02/21/24 0814)  Resp: 20 (02/21/24 0814)  BP: 108/65 (02/21/24 0814)  SpO2: 99 % (02/21/24 0814) Vital Signs (24h Range):  Temp:  [97.6 °F (36.4 °C)-98.3 °F (36.8 °C)] 97.9 °F (36.6 °C)  Pulse:  [64-80] 67  Resp:  [16-20] 20  SpO2:  [96 %-100 %] 99 %  BP: (100-130)/(57-75) 108/65       Physical Examination:  Physical Exam  Vitals and nursing note reviewed.   Constitutional:       Appearance: Normal appearance.   HENT:      Head: Normocephalic and atraumatic.   Eyes:      General: No scleral icterus.     Pupils: Pupils are equal, round, and reactive to light.   Cardiovascular:      Rate and Rhythm: Regular rhythm. Bradycardia present.      Pulses: Normal pulses.      Heart sounds: Murmur (Systolic in the aortic area, no carotid bruit heard) heard.   Pulmonary:      Effort: Pulmonary effort is normal. No respiratory distress.      Breath sounds: Normal breath sounds. No wheezing or rales.   Abdominal:      General: Abdomen is flat. Bowel sounds are normal. There is no distension.      Palpations: Abdomen is soft.       Tenderness: There is no abdominal tenderness.   Musculoskeletal:         General: Tenderness present. No swelling. Normal range of motion.      Right lower leg: No edema.      Left lower leg: No edema.      Comments: Tender, warm, erythematous left 1st MTP joint   Skin:     General: Skin is warm.      Capillary Refill: Capillary refill takes less than 2 seconds.   Neurological:      General: No focal deficit present.      Mental Status: He is alert and oriented to person, place, and time.   Psychiatric:         Mood and Affect: Mood normal.         Behavior: Behavior normal.         Thought Content: Thought content normal.         Judgment: Judgment normal.         Laboratory:  Lab Results   Component Value Date     02/21/2024    K 4.3 02/21/2024    CO2 23 02/21/2024    BUN 12.3 02/21/2024    CREATININE 1.63 (H) 02/21/2024    CALCIUM 8.7 (L) 02/21/2024    ALKPHOS 68 02/21/2024    AST 14 02/21/2024    ALT 16 02/21/2024        Lab Results   Component Value Date    WBC 8.09 02/21/2024    RBC 4.15 (L) 02/21/2024    HGB 11.3 (L) 02/21/2024    HCT 36.7 (L) 02/21/2024    MCV 88.4 02/21/2024    MCH 27.2 02/21/2024    MCHC 30.8 (L) 02/21/2024    RDW 15.6 02/21/2024     02/21/2024    MPV 11.0 (H) 02/21/2024        Microbiology:   Microbiology Results (last 7 days)       ** No results found for the last 168 hours. **            Other Results:  EKG (my interpretation): sinus bradycardia, 1st degree AV block (NJ interval of 234 ms) and prolonged QT of 573 ms.    Radiology:  Imaging Results              X-Ray Chest PA And Lateral (Final result)  Result time 02/15/24 13:09:38      Final result by Ananth Coker MD (02/15/24 13:09:38)                   Impression:      Increase interstitial markings throughout.    Mild cardiomegaly      Electronically signed by: Ananth Coker  Date:    02/15/2024  Time:    13:09               Narrative:    EXAMINATION:  XR CHEST PA AND LATERAL    CLINICAL HISTORY:  Chest  Pain;, .    FINDINGS:  Examination reveals mediastinal silhouette to be within normal limits cardiac silhouette is mildly enlarged lung fields reveal some increase interstitial markings with no focal consolidative changes atelectases effusions or pneumothoraces                                      Current Medications:     Infusions:   lactated ringers 125 mL/hr at 02/21/24 0921    nitroGLYCERIN in 5 % dextrose          Scheduled:   allopurinoL  50 mg Oral Daily    amiodarone  200 mg Oral BID    aspirin  81 mg Oral Daily    atorvastatin  40 mg Oral QHS    pantoprazole  40 mg Oral Daily    polyethylene glycol  17 g Oral Daily        PRN:  acetaminophen, acetaminophen, melatonin, nitroGLYCERIN in 5 % dextrose, nitroGLYCERIN, ondansetron, simethicone, sodium chloride 0.9%    Antibiotics and Day Number of Therapy:  none      Intake/Output Summary (Last 24 hours) at 2/21/2024 1100  Last data filed at 2/21/2024 0328  Gross per 24 hour   Intake --   Output 1 ml   Net -1 ml         Lines/Drains/Airways       Peripheral Intravenous Line  Duration                  Peripheral IV - Double Lumen 02/17/24 1430 20 G Anterior;Distal;Left Forearm 3 days         Peripheral IV - Double Lumen 02/20/24 0031 20 G Anterior;Right Forearm 1 day                      Assessment & Plan:     NSTEMI  H/o aortic isnufficiency  H/o Atrial fibrillation (PAF)  CAD  Prolonged QT  New HFrEF (40%)  - patient presents with complains of chest pain, retrosternal, squeezing  - elevated troponin of 0.166 on admit and elevated BNP of 2145; since then troponin peaked at 0.171  - recent admission in the hospital at our [facility] with diagnoses of aortic insufficiency with aortic stenosis, and atrial fibrillation.   - patient loaded with aspirin 325 mg   - Continue heparin drip and Asprin 81 mg once daily  - Continue Lipitor 40 mg nightly.   - prolonged QT noted on EKG  - nitroglycerin 0.4 mg sublingual p.r.n. noted.  - hold Eliquis while on Heparin  - TTE  done, EF 40% with grade II diastolic dysfunction. There is moderate aortic valve sclerosis. Severely restricted motion. There is severe stenosis.  Patient will likely require a TAVR  - cardiology consulted, recommending continuing amiodarone and heparin gtt with possible LHC 2024, recs also to consider switching to Xarelto 20 mg nightly in lieu of Elqiuis (cost related) when off Heparin  - consulted  to look into procuring outside hospital records and financial assistance- patient here in the [place] on [place] visa that ends on 2024, can extend 3 more months but currently no insurance.   -left heart catheterization completed yesterday:  Severe multivessel stenosis with most significant being 1st marginal with 80% stenosis, proximal LAD lesion with 70% stenosis, and the osT circumflex to proximal circumflex lesion 80% stenosis.   -transfer for CABG and aortic valve replacement initiated.     Nocturia  Non oliguric JEAN-CLAUDE  - patient complains of nocturia about 6-7 times every night but denies any urinary retention  - renal indices have improved since admit but currently stable  - improved renal indices  - US retroperitoneal not suggestive of any pathology  -UA with 2+ urobilinogen   -LFTs within normal limits, ALP within normal limits     Constipation  - last bowel movement yesterday  - Continue Miralax once daily    GERD  - belching multiple and bloating  - Continue protonix 40 mg once daily     Gout  - Left 1st MTP joint pain, warmth, erythema unchanged since admission  - No history of gout  - uric acid level elevated  - unable to start Colchicine given patient on Amiodarone (CY inhibitor), due to renal dysfunction and recent NSTEMI  - will start Allopurinol 50 mg once daily (renal dosing)  - start Tylenol 650 mg q6h prn for pain control       CODE STATUS: Full  Access: PIV  Antibiotics: none  Diet: Heart healthy, NPO at midnight  DVT Prophylaxis: Heparin gtt  GI Prophylaxis: Protonix  Fluids:  LR  75 cc      Disposition: Admitted to inpatient service for ongoing monitoring of NSTEMI. TTE done. Cardiology consulted, left heart catheterization showed multivessel disease.  Echo showed severe aortic stenosis.  Transfer for CABG and aortic valve replacement initiated.    Xi Levine M.D  [facility] PGY-1

## 2024-02-22 PROBLEM — I35.1 AORTIC VALVE REGURGITATION: Status: ACTIVE | Noted: 2024-02-15

## 2024-02-22 PROBLEM — I21.4 NSTEMI (NON-ST ELEVATED MYOCARDIAL INFARCTION): Status: ACTIVE | Noted: 2024-02-22

## 2024-02-22 PROBLEM — I48.91 ATRIAL FIBRILLATION: Status: ACTIVE | Noted: 2024-02-22

## 2024-02-22 LAB
ABORH RETYPE: NORMAL
ALBUMIN SERPL-MCNC: 3.4 G/DL (ref 3.4–4.8)
ALBUMIN/GLOB SERPL: 1.2 RATIO (ref 1.1–2)
ALP SERPL-CCNC: 71 UNIT/L (ref 40–150)
ALT SERPL-CCNC: 18 UNIT/L (ref 0–55)
APTT PPP: 43.5 SECONDS (ref 23.2–33.7)
APTT PPP: 62.3 SECONDS (ref 23.2–33.7)
APTT PPP: 67 SECONDS (ref 23.2–33.7)
APTT PPP: 80.8 SECONDS (ref 23.2–33.7)
AST SERPL-CCNC: 18 UNIT/L (ref 5–34)
BASOPHILS # BLD AUTO: 0.07 X10(3)/MCL
BASOPHILS NFR BLD AUTO: 0.8 %
BILIRUB SERPL-MCNC: 0.7 MG/DL
BUN SERPL-MCNC: 11.7 MG/DL (ref 8.4–25.7)
CALCIUM SERPL-MCNC: 8.9 MG/DL (ref 8.8–10)
CHLORIDE SERPL-SCNC: 107 MMOL/L (ref 98–107)
CO2 SERPL-SCNC: 26 MMOL/L (ref 23–31)
CREAT SERPL-MCNC: 1.6 MG/DL (ref 0.73–1.18)
CREAT UR-MCNC: 31.5 MG/DL (ref 63–166)
CREAT UR-MCNC: 31.6 MG/DL (ref 63–166)
EOSINOPHIL # BLD AUTO: 0.41 X10(3)/MCL (ref 0–0.9)
EOSINOPHIL NFR BLD AUTO: 4.6 %
ERYTHROCYTE [DISTWIDTH] IN BLOOD BY AUTOMATED COUNT: 15.5 % (ref 11.5–17)
GFR SERPLBLD CREATININE-BSD FMLA CKD-EPI: 44 MLS/MIN/1.73/M2
GLOBULIN SER-MCNC: 2.8 GM/DL (ref 2.4–3.5)
GLUCOSE SERPL-MCNC: 89 MG/DL (ref 82–115)
GROUP & RH: NORMAL
HCT VFR BLD AUTO: 38.8 % (ref 42–52)
HGB BLD-MCNC: 11.5 G/DL (ref 14–18)
IMM GRANULOCYTES # BLD AUTO: 0.05 X10(3)/MCL (ref 0–0.04)
IMM GRANULOCYTES NFR BLD AUTO: 0.6 %
INDIRECT COOMBS: NORMAL
LYMPHOCYTES # BLD AUTO: 1.85 X10(3)/MCL (ref 0.6–4.6)
LYMPHOCYTES NFR BLD AUTO: 20.9 %
MCH RBC QN AUTO: 26.7 PG (ref 27–31)
MCHC RBC AUTO-ENTMCNC: 29.6 G/DL (ref 33–36)
MCV RBC AUTO: 90 FL (ref 80–94)
MONOCYTES # BLD AUTO: 0.8 X10(3)/MCL (ref 0.1–1.3)
MONOCYTES NFR BLD AUTO: 9 %
NEUTROPHILS # BLD AUTO: 5.66 X10(3)/MCL (ref 2.1–9.2)
NEUTROPHILS NFR BLD AUTO: 64.1 %
NRBC BLD AUTO-RTO: 0 %
OSMOLALITY UR: 162 MOSM/KG (ref 300–1300)
PLATELET # BLD AUTO: 292 X10(3)/MCL (ref 130–400)
PMV BLD AUTO: 10.9 FL (ref 7.4–10.4)
POCT GLUCOSE: 122 MG/DL (ref 70–110)
POCT GLUCOSE: 153 MG/DL (ref 70–110)
POTASSIUM SERPL-SCNC: 4.9 MMOL/L (ref 3.5–5.1)
POTASSIUM UR-SCNC: 18.5 MMOL/L
PROT SERPL-MCNC: 6.2 GM/DL (ref 5.8–7.6)
PROT UR STRIP-MCNC: <6.8 MG/DL
RBC # BLD AUTO: 4.31 X10(6)/MCL (ref 4.7–6.1)
SODIUM SERPL-SCNC: 138 MMOL/L (ref 136–145)
SODIUM UR-SCNC: 37 MMOL/L
SPECIMEN OUTDATE: NORMAL
UUN UR-MCNC: 117 MG/DL
WBC # SPEC AUTO: 8.84 X10(3)/MCL (ref 4.5–11.5)

## 2024-02-22 PROCEDURE — 25000003 PHARM REV CODE 250: Performed by: INTERNAL MEDICINE

## 2024-02-22 PROCEDURE — 85025 COMPLETE CBC W/AUTO DIFF WBC: CPT | Performed by: INTERNAL MEDICINE

## 2024-02-22 PROCEDURE — 84520 ASSAY OF UREA NITROGEN: CPT | Performed by: INTERNAL MEDICINE

## 2024-02-22 PROCEDURE — 82570 ASSAY OF URINE CREATININE: CPT | Performed by: INTERNAL MEDICINE

## 2024-02-22 PROCEDURE — 80053 COMPREHEN METABOLIC PANEL: CPT | Performed by: INTERNAL MEDICINE

## 2024-02-22 PROCEDURE — 63600175 PHARM REV CODE 636 W HCPCS: Performed by: INTERNAL MEDICINE

## 2024-02-22 PROCEDURE — 84133 ASSAY OF URINE POTASSIUM: CPT | Performed by: INTERNAL MEDICINE

## 2024-02-22 PROCEDURE — 93451 RIGHT HEART CATH: CPT | Performed by: INTERNAL MEDICINE

## 2024-02-22 PROCEDURE — 63600175 PHARM REV CODE 636 W HCPCS: Performed by: NURSE PRACTITIONER

## 2024-02-22 PROCEDURE — 83935 ASSAY OF URINE OSMOLALITY: CPT | Performed by: INTERNAL MEDICINE

## 2024-02-22 PROCEDURE — 21400001 HC TELEMETRY ROOM

## 2024-02-22 PROCEDURE — 84300 ASSAY OF URINE SODIUM: CPT | Performed by: INTERNAL MEDICINE

## 2024-02-22 PROCEDURE — C1894 INTRO/SHEATH, NON-LASER: HCPCS | Performed by: INTERNAL MEDICINE

## 2024-02-22 PROCEDURE — 85730 THROMBOPLASTIN TIME PARTIAL: CPT | Performed by: INTERNAL MEDICINE

## 2024-02-22 PROCEDURE — 86901 BLOOD TYPING SEROLOGIC RH(D): CPT | Performed by: PHYSICIAN ASSISTANT

## 2024-02-22 PROCEDURE — 99223 1ST HOSP IP/OBS HIGH 75: CPT | Mod: 57,,, | Performed by: PHYSICIAN ASSISTANT

## 2024-02-22 PROCEDURE — 4A023N6 MEASUREMENT OF CARDIAC SAMPLING AND PRESSURE, RIGHT HEART, PERCUTANEOUS APPROACH: ICD-10-PCS | Performed by: INTERNAL MEDICINE

## 2024-02-22 PROCEDURE — C1751 CATH, INF, PER/CENT/MIDLINE: HCPCS | Performed by: INTERNAL MEDICINE

## 2024-02-22 PROCEDURE — 86923 COMPATIBILITY TEST ELECTRIC: CPT | Performed by: PHYSICIAN ASSISTANT

## 2024-02-22 RX ORDER — LIDOCAINE HYDROCHLORIDE 10 MG/ML
INJECTION, SOLUTION EPIDURAL; INFILTRATION; INTRACAUDAL; PERINEURAL
Status: DISCONTINUED | OUTPATIENT
Start: 2024-02-22 | End: 2024-02-22 | Stop reason: HOSPADM

## 2024-02-22 RX ORDER — FENTANYL CITRATE 50 UG/ML
INJECTION, SOLUTION INTRAMUSCULAR; INTRAVENOUS
Status: DISCONTINUED | OUTPATIENT
Start: 2024-02-22 | End: 2024-02-22 | Stop reason: HOSPADM

## 2024-02-22 RX ORDER — CEFAZOLIN SODIUM 2 G/50ML
2 SOLUTION INTRAVENOUS
Status: DISCONTINUED | OUTPATIENT
Start: 2024-02-22 | End: 2024-02-23 | Stop reason: HOSPADM

## 2024-02-22 RX ORDER — MUPIROCIN 20 MG/G
OINTMENT TOPICAL
Status: DISCONTINUED | OUTPATIENT
Start: 2024-02-22 | End: 2024-02-23 | Stop reason: HOSPADM

## 2024-02-22 RX ORDER — SODIUM CHLORIDE 0.9 % (FLUSH) 0.9 %
10 SYRINGE (ML) INJECTION
Status: DISCONTINUED | OUTPATIENT
Start: 2024-02-22 | End: 2024-03-26 | Stop reason: HOSPADM

## 2024-02-22 RX ORDER — MIDAZOLAM HYDROCHLORIDE 1 MG/ML
INJECTION INTRAMUSCULAR; INTRAVENOUS
Status: DISCONTINUED | OUTPATIENT
Start: 2024-02-22 | End: 2024-02-22 | Stop reason: HOSPADM

## 2024-02-22 RX ORDER — FUROSEMIDE 10 MG/ML
40 INJECTION INTRAMUSCULAR; INTRAVENOUS EVERY 12 HOURS
Status: DISCONTINUED | OUTPATIENT
Start: 2024-02-22 | End: 2024-02-25

## 2024-02-22 RX ADMIN — AMIODARONE HYDROCHLORIDE 200 MG: 200 TABLET ORAL at 10:02

## 2024-02-22 RX ADMIN — ATORVASTATIN CALCIUM 40 MG: 40 TABLET, FILM COATED ORAL at 09:02

## 2024-02-22 RX ADMIN — POLYETHYLENE GLYCOL 3350 17 G: 17 POWDER, FOR SOLUTION ORAL at 10:02

## 2024-02-22 RX ADMIN — ASPIRIN 81 MG: 81 TABLET, COATED ORAL at 10:02

## 2024-02-22 RX ADMIN — AMIODARONE HYDROCHLORIDE 200 MG: 200 TABLET ORAL at 09:02

## 2024-02-22 RX ADMIN — FUROSEMIDE 40 MG: 10 INJECTION, SOLUTION INTRAMUSCULAR; INTRAVENOUS at 09:02

## 2024-02-22 RX ADMIN — FOLIC ACID 1 MG: 1 TABLET ORAL at 10:02

## 2024-02-22 RX ADMIN — ONDANSETRON 8 MG: 4 TABLET, ORALLY DISINTEGRATING ORAL at 05:02

## 2024-02-22 RX ADMIN — HEPARIN SODIUM 15 UNITS/KG/HR: 10000 INJECTION, SOLUTION INTRAVENOUS at 09:02

## 2024-02-22 RX ADMIN — PANTOPRAZOLE SODIUM 40 MG: 40 TABLET, DELAYED RELEASE ORAL at 10:02

## 2024-02-22 RX ADMIN — ALLOPURINOL 50 MG: 300 TABLET ORAL at 10:02

## 2024-02-22 RX ADMIN — FUROSEMIDE 40 MG: 10 INJECTION, SOLUTION INTRAMUSCULAR; INTRAVENOUS at 03:02

## 2024-02-22 RX ADMIN — Medication 6 MG: at 10:02

## 2024-02-22 NOTE — H&P
[facility] Hospital Medicine History & Physical Examination       Patient Name: Brain Snyder  MRN: [id number]  Patient Class: IP- Inpatient   Admission Date: 2/21/2024  7:54 PM  Length of Stay: 0  Admitting Service: Hospital Medicine   Attending Physician: Tanner Rdz MD   Primary Care Provider: Lilliana, Primary Doctor  History source: EMR, patient and/or patient's family    CHIEF COMPLAINT   Chest pain    HISTORY OF PRESENT ILLNESS:   78-year-old male with a history of aortic insufficiency/stenosis, CAD, CKD IIIb, HTN AFib on Eliquis admitted to [facility] 02/15/2024 with chest pain, found to have NSTEMI (peak troponin 0.17) and underwent [facility] 02/20/2024 showing severe multivessel stenosis and therefore was transferred here for CABG evaluation.    He arrived afebrile hemodynamically stable on a heparin drip.  He currently denies chest pain.  Consultations are being placed to Cardiothoracic surgery and Cardiology.  Currently at bedside patient is chest pain-free.    PAST MEDICAL HISTORY:   Multivessel CAD  Paroxysmal AFib   Aortic insufficiency/stenosis  CKD IIIb (presumed baseline creatinine 1.6)  Essential hypertension     PAST SURGICAL HISTORY:     Past Surgical History:   Procedure Laterality Date    CORONARY ANGIOGRAPHY N/A 2/20/2024    Procedure: ANGIOGRAM, CORONARY ARTERY;  Surgeon: Vasile Callahan MD;  Location: [facility] CATH LAB;  Service: Cardiology;  Laterality: N/A;       ALLERGIES:   Patient has no known allergies.    FAMILY HISTORY:   Reviewed and non-contributory     SOCIAL HISTORY:     Social History     Tobacco Use    Smoking status: Former     Types: Cigarettes    Smokeless tobacco: Never   Substance Use Topics    Alcohol use: Not Currently        HOME MEDICATIONS:     Prior to Admission medications    Medication Sig Start Date End Date Taking? Authorizing Provider   amiodarone (PACERONE) 200 MG Tab Take 400 mg by mouth 2 (two) times daily.    Provider, Historical  "  atorvastatin (LIPITOR) 20 MG tablet Take 40 mg by mouth every evening.    Provider, Historical   furosemide (LASIX) 40 MG tablet Take 40 mg by mouth once daily.    Provider, Historical       REVIEW OF SYSTEMS:   Except as documented, all other systems reviewed and negative     PHYSICAL EXAM:   T     BP     P     RR     O2    GENERAL: awake, alert, oriented and in no acute distress, non-toxic appearing   HEENT: normocephalic atraumatic   NECK: supple   LUNGS: Clear bilaterally, no wheezing or rales, no accessory muscle use   CVS: Regular rate and rhythm, normal peripheral perfusion  ABD: Soft, non-tender, non-distended, bowel sounds present  EXTREMITIES: no clubbing or cyanosis  SKIN: Warm, dry.   NEURO: alert and oriented, grossly without focal deficits   PSYCHIATRIC: Cooperative    LABS AND IMAGING:     Recent Labs     02/21/24  0347 02/21/24  1418   WBC 8.09 10.07   RBC 4.15* 4.17*   HGB 11.3* 11.5*   HCT 36.7* 36.7*   MCV 88.4 88.0   MCH 27.2 27.6   MCHC 30.8* 31.3*   RDW 15.6 15.3    291     No results for input(s): "LACTIC" in the last 72 hours.  Recent Labs     02/21/24  1418   INR 1.1     No results for input(s): "HGBA1C", "CHOL", "TRIG", "LDL", "VLDL", "HDL" in the last 72 hours.   Recent Labs     02/20/24  0449 02/20/24  1310 02/21/24  0347     --  138   K 4.6  --  4.3   CHLORIDE 108*  --  109*   CO2 21*  --  23   BUN 11.9  --  12.3   CREATININE 1.66*  --  1.63*   GLUCOSE 92  --  86   CALCIUM 8.7*  --  8.7*   ALBUMIN 3.4  --  3.1*   GLOBULIN 3.6*  --  3.1   ALKPHOS 72  --  68   ALT 16  --  16   AST 16  --  14   BILITOT 0.7  --  0.6   FERRITIN  --  225.79  --    IRON  --  45*  --    TIBC  --  262  --      Recent Labs     02/19/24  0726   .4*          Cardiac catheterization  Addendum:   The Prox LAD lesion was 70% stenosed.     The Ost Cx to Prox Cx lesion was 80% stenosed.     The 1st Mrg lesion was 80% stenosed.     The 2nd Mrg-1 lesion was 70% stenosed.     The 2nd Mrg-2 lesion was " 50% stenosed.     The Mid LAD lesion was 50% stenosed.     The Prox RCA lesion was 60% stenosed.     The estimated blood loss was <50 mL.     There was three vessel coronary artery disease.     LVEDP:  30 mmHg     RECOMMENDATIONS   Refer for CABG evaluation and AVR   Risk factor modifications    Activity -- avoid straining with affected limb for one week   Exercise -- as tolerated   Precautions post D/C -- come to ER for hematoma, unusual pain, erythema,  or unusual drainage at access site   Precautions post D/C -- if develop bleeding or hematoma at access site,  hold pressure at access site, and come to ER     POST-CATH GUIDELINE FOR INPATIENT DISCHARGE   No hematoma or swelling at access site   No unusual tenderness at access site   Adequate distal pulse or capillary refill in limb(s) accessed    No unusual difficulty with ambulation after bed rest is over      The procedure log was documented by Documenter: Whit Bolden and  verified by Vasile Callahan MD.     Date: 2/20/2024  Time: 11:04 AM  Narrative:   The Prox LAD lesion was 70% stenosed.    The Ost Cx to Prox Cx lesion was 80% stenosed.    The 1st Mrg lesion was 80% stenosed.    The 2nd Mrg-1 lesion was 70% stenosed.    The 2nd Mrg-2 lesion was 50% stenosed.    The Mid LAD lesion was 50% stenosed.    The Prox RCA lesion was 60% stenosed.    The estimated blood loss was <50 mL.    There was three vessel coronary artery disease.    RECOMMENDATIONS  Refer for CABG evaluation and AVR  Risk factor modifications   Activity -- avoid straining with affected limb for one week  Exercise -- as tolerated  Precautions post D/C -- come to ER for hematoma, unusual pain, erythema,   or unusual drainage at access site  Precautions post D/C -- if develop bleeding or hematoma at access site,   hold pressure at access site, and come to ER    POST-CATH GUIDELINE FOR INPATIENT DISCHARGE  No hematoma or swelling at access site  No unusual tenderness at access  site  Adequate distal pulse or capillary refill in limb(s) accessed   No unusual difficulty with ambulation after bed rest is over     The procedure log was documented by Documenter: Whit Bolden and   verified by Vasile Callahan MD.    Date: 2/20/2024  Time: 11:04 AM      ASSESSMENT & PLAN:   NSTEMI, on heparin drip  Multivessel CAD, transferred for CABG eval  Combined systolic/diastolic CHF (EF 40%, grade 2 DD)  Paroxysmal AFib, on amiodarone   Aortic insufficiency/stenosis  CKD IIIb (presumed baseline creatinine 1.6)  Essential hypertension     -continue heparin protocol  -consultation to Cardiology and Cardiothoracic surgery   -continue home medications as appropriate    DVT prophylaxis:  Heparin drip  Code status:  Full    If patient was admitted under observational status it is with my approval/permission.     At least 55 min was spent on this history and physical.  Time seen: 11PM 2/21  Critical care time = 35 min; Critical care diagnosis = NSTEMI/heparin drip  Tanner Rdz MD

## 2024-02-22 NOTE — H&P (VIEW-ONLY)
Inpatient consult to Cardiology  Consult performed by: Millie Jimenez NP  Consult ordered by: Tanner Rdz MD  Reason for consult: CAD          [facility] Tele[facility]    Cardiology  Consult Note    Patient Name: Brain Snyder  MRN: [id number]  Admission Date: 2/21/2024  Hospital Length of Stay: 1 days  Code Status: Full Code   Attending Provider: Whit Omer MD   Consulting Provider: Millie Jimenez NP  Primary Care Physician: Nidia Shepherd NP  Principal Problem:CAD (coronary artery disease)    Patient information was obtained from patient, past medical records, and ER records.     Subjective:     Reason for Consult:  CAD    HPI: 78-year-old female that is unknown to [facility] with a PMHx of PAF on Eliquis Aortic insufficiency, MV CAD, HTN. He is Serbian speaking and an  was used for interview. He was orginally admitted to [facility] on 2.15.24 with CP & NSTEMI and underwent a LHC on 2.20.24 taht showed MV CAD (reported noted below) He was transferred to [facility] on 2.21.24 for a CABG/AVR evaluation. On arrive he was hemodynamically stable on a heparin infusion. He denied chest pain, SOB, and nausea on current exam, [facility] was consulted for CAD      PMH: PAF (on Eliquis), Aortic insufficiency, MV CAD, HTN  PSH: [facility]  Family History: none reported  Social History: former smoker, denies ETOH or illicit drug use    Previous Cardiac Diagnostics:   [facility] (2.20.24):   Prox LAD lesion was 70% stenosed.  Ost Cx to Prox Cx lesion was 80% stenosed.  1st Mrg lesion was 80% stenosed.  2nd Mrg-1 lesion was 70% stenosed.  2nd Mrg-2 lesion was 50% stenosed.  Mid LAD lesion was 50% stenosed.  Prox RCA lesion was 60% stenosed.  estimated blood loss was <50 mL.  There was three vessel coronary artery disease.  LVEDP: 30mmHg  Recommendations:   Refer for CABG evaluation and AVR   Preformed by Dr. Flannery at [facility]    ECHO (2.15.24):  Left Ventricle: The left ventricle is normal in size. Mildly increased  ventricular mass. Mildly increased wall thickness. There is mild eccentric hypertrophy. Moderate global hypokinesis present. Septal motion is consistent with bundle branch block. There is moderately reduced systolic function. Biplane (2D) method of discs ejection fraction is 40%. Grade II diastolic dysfunction.  Right Ventricle: Right ventricular enlargement. Systolic function is normal.  Left Atrium: Left atrium is severely dilated.  Right Atrium: Right atrium is moderately dilated.  Aortic Valve: There is moderate aortic valve sclerosis. Severely restricted motion. There is severe stenosis. Aortic valve area by VTI is 0.66 cm². Aortic valve peak velocity is 4.45 m/s. Mean gradient is 50 mmHg. The dimensionless index is 0.17. There is mild to moderate aortic regurgitation.  Mitral Valve: Mildly calcified leaflets. There is no stenosis. There is mild regurgitation.  Tricuspid Valve: The tricuspid valve is structurally normal. There is mild to moderate regurgitation.  Pulmonic Valve: There is no significant regurgitation.  Aorta: Aortic root is upper limit of normal measuring 3.6 cm.  Pulmonary Artery: There is severe pulmonary hypertension. The estimated pulmonary artery systolic pressure is 69 mmHg.  IVC/SVC: Intermediate venous pressure at 8 mmHg.  Pericardium: There is no pericardial effusion.      Review of patient's allergies indicates:  No Known Allergies  Current Facility-Administered Medications on File Prior to Encounter   Medication    [COMPLETED] heparin 25,000 units in dextrose 5% (100 units/ml) IV bolus from bag LOW INTENSITY nomogram - LAF    [DISCONTINUED] acetaminophen tablet 650 mg    [DISCONTINUED] acetaminophen tablet 650 mg    [DISCONTINUED] allopurinol split tablet 50 mg    [DISCONTINUED] amiodarone tablet 200 mg    [DISCONTINUED] aspirin EC tablet 81 mg    [DISCONTINUED] atorvastatin tablet 40 mg    [DISCONTINUED] ferrous sulfate tablet 1 each    [DISCONTINUED] folic acid tablet 1 mg     [DISCONTINUED] heparin 25,000 units in dextrose 5% (100 units/ml) IV bolus from bag LOW INTENSITY nomogram - LAF    [DISCONTINUED] heparin 25,000 units in dextrose 5% (100 units/ml) IV bolus from bag LOW INTENSITY nomogram - LAF    [DISCONTINUED] heparin 25,000 units in dextrose 5% 250 mL (100 units/mL) infusion LOW INTENSITY nomogram - LAF    [DISCONTINUED] lactated ringers infusion    [DISCONTINUED] melatonin tablet 6 mg    [DISCONTINUED] nitroGLYCERIN in 5 % dextrose 50 mg/250 mL (200 mcg/mL) infusion    [DISCONTINUED] nitroGLYCERIN SL tablet 0.4 mg    [DISCONTINUED] ondansetron disintegrating tablet 8 mg    [DISCONTINUED] pantoprazole EC tablet 40 mg    [DISCONTINUED] polyethylene glycol packet 17 g    [DISCONTINUED] simethicone chewable tablet 80 mg    [DISCONTINUED] sodium chloride 0.9% flush 10 mL     Current Outpatient Medications on File Prior to Encounter   Medication Sig    amiodarone (PACERONE) 200 MG Tab Take 400 mg by mouth 2 (two) times daily.    atorvastatin (LIPITOR) 20 MG tablet Take 40 mg by mouth every evening.    furosemide (LASIX) 40 MG tablet Take 40 mg by mouth once daily.           Review of Systems   Constitutional:  Negative for chills and fatigue.   Respiratory: Negative.  Negative for chest tightness and shortness of breath.    Cardiovascular:  Negative for chest pain and palpitations.   Gastrointestinal:  Negative for abdominal pain, nausea and vomiting.   Genitourinary: Negative.    Musculoskeletal: Negative.    Skin: Negative.    Neurological: Negative.    Psychiatric/Behavioral: Negative.       Objective:     Vital Signs (Most Recent):  Temp: 97.7 °F (36.5 °C) (02/22/24 0820)  Pulse: 60 (02/22/24 0820)  Resp: 16 (02/22/24 0820)  BP: (!) 102/53 (02/22/24 0820)  SpO2: 100 % (02/22/24 1006) Vital Signs (24h Range):  Temp:  [97 °F (36.1 °C)-98.1 °F (36.7 °C)] 97.7 °F (36.5 °C)  Pulse:  [60-94] 60  Resp:  [16-19] 16  SpO2:  [93 %-100 %] 100 %  BP: ()/(53-85) 102/53   Weight: 82.2 kg  (181 lb 3.5 oz)  Body mass index is 30.16 kg/m².  SpO2: 100 %       Intake/Output Summary (Last 24 hours) at 2/22/2024 1102  Last data filed at 2/22/2024 0314  Gross per 24 hour   Intake 280 ml   Output 350 ml   Net -70 ml     Lines/Drains/Airways       Peripheral Intravenous Line  Duration                  Peripheral IV - Double Lumen 02/17/24 1430 20 G Anterior;Distal;Left Forearm 4 days         Peripheral IV - Double Lumen 02/20/24 0031 20 G Anterior;Right Forearm 2 days                  Significant Labs:  Recent Results (from the past 72 hour(s))   Urinalysis, Reflex to Urine Culture    Collection Time: 02/19/24  4:12 PM    Specimen: Urine   Result Value Ref Range    Color, UA Light-Yellow Yellow, Light-Yellow, Dark Yellow, Nicol, Straw    Appearance, UA Clear Clear    Specific Gravity, UA 1.013 1.005 - 1.030    pH, UA 7.0 5.0 - 8.5    Protein, UA Negative Negative    Glucose, UA Normal Negative, Normal    Ketones, UA Negative Negative    Blood, UA Negative Negative    Bilirubin, UA Negative Negative    Urobilinogen, UA 2+ (A) 0.2, 1.0, Normal    Nitrites, UA Negative Negative    Leukocyte Esterase, UA Negative Negative    WBC, UA 0-5 None Seen, 0-2, 3-5, 0-5 /HPF    Bacteria, UA None Seen None Seen /HPF    Squamous Epithelial Cells, UA Trace (A) None Seen /HPF    Mucous, UA Trace (A) None Seen /LPF    Hyaline Casts, UA None Seen None Seen /lpf    RBC, UA 0-5 None Seen, 0-2, 3-5, 0-5 /HPF   Comprehensive Metabolic Panel    Collection Time: 02/20/24  4:49 AM   Result Value Ref Range    Sodium Level 138 136 - 145 mmol/L    Potassium Level 4.6 3.5 - 5.1 mmol/L    Chloride 108 (H) 98 - 107 mmol/L    Carbon Dioxide 21 (L) 23 - 31 mmol/L    Glucose Level 92 82 - 115 mg/dL    Blood Urea Nitrogen 11.9 8.4 - 25.7 mg/dL    Creatinine 1.66 (H) 0.73 - 1.18 mg/dL    Calcium Level Total 8.7 (L) 8.8 - 10.0 mg/dL    Protein Total 7.0 5.8 - 7.6 gm/dL    Albumin Level 3.4 3.4 - 4.8 g/dL    Globulin 3.6 (H) 2.4 - 3.5 gm/dL     Albumin/Globulin Ratio 0.9 (L) 1.1 - 2.0 ratio    Bilirubin Total 0.7 <=1.5 mg/dL    Alkaline Phosphatase 72 40 - 150 unit/L    Alanine Aminotransferase 16 0 - 55 unit/L    Aspartate Aminotransferase 16 5 - 34 unit/L    eGFR 42 mls/min/1.73/m2   APTT    Collection Time: 02/20/24  4:49 AM   Result Value Ref Range    PTT 79.9 (H) 23.2 - 33.7 seconds   CBC with Differential    Collection Time: 02/20/24  4:49 AM   Result Value Ref Range    WBC 10.03 4.50 - 11.50 x10(3)/mcL    RBC 4.41 (L) 4.70 - 6.10 x10(6)/mcL    Hgb 11.9 (L) 14.0 - 18.0 g/dL    Hct 38.5 (L) 42.0 - 52.0 %    MCV 87.3 80.0 - 94.0 fL    MCH 27.0 27.0 - 31.0 pg    MCHC 30.9 (L) 33.0 - 36.0 g/dL    RDW 15.3 11.5 - 17.0 %    Platelet 318 130 - 400 x10(3)/mcL    MPV 11.1 (H) 7.4 - 10.4 fL    Neut % 66.9 %    Lymph % 19.7 %    Mono % 9.0 %    Eos % 3.2 %    Basophil % 0.6 %    Lymph # 1.98 0.6 - 4.6 x10(3)/mcL    Neut # 6.71 2.1 - 9.2 x10(3)/mcL    Mono # 0.90 0.1 - 1.3 x10(3)/mcL    Eos # 0.32 0 - 0.9 x10(3)/mcL    Baso # 0.06 <=0.2 x10(3)/mcL    IG# 0.06 (H) 0 - 0.04 x10(3)/mcL    IG% 0.6 %    NRBC% 0.0 %   PSA, Screening    Collection Time: 02/20/24  1:10 PM   Result Value Ref Range    Prostate Specific Antigen 3.09 <=4.00 ng/mL   Osmolality, Serum    Collection Time: 02/20/24  1:10 PM   Result Value Ref Range    Osmolality 290 280 - 300 mOsm/kg   HIV 1/2 Ag/Ab (4th Gen)    Collection Time: 02/20/24  1:10 PM   Result Value Ref Range    HIV Nonreactive Nonreactive   Hepatitis Panel, Acute    Collection Time: 02/20/24  1:10 PM   Result Value Ref Range    Hepatitis A IgM Nonreactive Nonreactive    Hepatitis B Core IgM Nonreactive Nonreactive    Hepatitis B Surface Antigen Nonreactive Nonreactive    Hep C Ab Interp Nonreactive Nonreactive   Ferritin    Collection Time: 02/20/24  1:10 PM   Result Value Ref Range    Ferritin Level 225.79 21.81 - 274.66 ng/mL   Folate    Collection Time: 02/20/24  1:10 PM   Result Value Ref Range    Folate Level 10.7 7.0 - 31.4  ng/mL   Iron and TIBC    Collection Time: 02/20/24  1:10 PM   Result Value Ref Range    Iron Binding Capacity Unsaturated 217 69 - 240 ug/dL    Iron Level 45 (L) 65 - 175 ug/dL    Transferrin 245 163 - 344 mg/dL    Iron Binding Capacity Total 262 250 - 450 ug/dL    Iron Saturation 17 (L) 20 - 50 %   Reticulocytes    Collection Time: 02/20/24  1:10 PM   Result Value Ref Range    Retic Cnt Auto 3.94 (H) 1.1 - 2.1 %    RET# 0.1651 (H) 0.026 - 0.095 x10e6/uL   Vitamin B12    Collection Time: 02/20/24  1:10 PM   Result Value Ref Range    Vitamin B12 Level 825 (H) 213 - 816 pg/mL   Light Blue Top Hold    Collection Time: 02/20/24  1:35 PM   Result Value Ref Range    Extra Tube Hold for add-ons.    POCT glucose    Collection Time: 02/20/24  8:37 PM   Result Value Ref Range    POCT Glucose 145 (H) 70 - 110 mg/dL   Comprehensive Metabolic Panel    Collection Time: 02/21/24  3:47 AM   Result Value Ref Range    Sodium Level 138 136 - 145 mmol/L    Potassium Level 4.3 3.5 - 5.1 mmol/L    Chloride 109 (H) 98 - 107 mmol/L    Carbon Dioxide 23 23 - 31 mmol/L    Glucose Level 86 82 - 115 mg/dL    Blood Urea Nitrogen 12.3 8.4 - 25.7 mg/dL    Creatinine 1.63 (H) 0.73 - 1.18 mg/dL    Calcium Level Total 8.7 (L) 8.8 - 10.0 mg/dL    Protein Total 6.2 5.8 - 7.6 gm/dL    Albumin Level 3.1 (L) 3.4 - 4.8 g/dL    Globulin 3.1 2.4 - 3.5 gm/dL    Albumin/Globulin Ratio 1.0 (L) 1.1 - 2.0 ratio    Bilirubin Total 0.6 <=1.5 mg/dL    Alkaline Phosphatase 68 40 - 150 unit/L    Alanine Aminotransferase 16 0 - 55 unit/L    Aspartate Aminotransferase 14 5 - 34 unit/L    eGFR 43 mls/min/1.73/m2   CBC with Differential    Collection Time: 02/21/24  3:47 AM   Result Value Ref Range    WBC 8.09 4.50 - 11.50 x10(3)/mcL    RBC 4.15 (L) 4.70 - 6.10 x10(6)/mcL    Hgb 11.3 (L) 14.0 - 18.0 g/dL    Hct 36.7 (L) 42.0 - 52.0 %    MCV 88.4 80.0 - 94.0 fL    MCH 27.2 27.0 - 31.0 pg    MCHC 30.8 (L) 33.0 - 36.0 g/dL    RDW 15.6 11.5 - 17.0 %    Platelet 302 130 - 400  x10(3)/mcL    MPV 11.0 (H) 7.4 - 10.4 fL    Neut % 62.8 %    Lymph % 20.8 %    Mono % 9.6 %    Eos % 5.6 %    Basophil % 0.5 %    Lymph # 1.68 0.6 - 4.6 x10(3)/mcL    Neut # 5.08 2.1 - 9.2 x10(3)/mcL    Mono # 0.78 0.1 - 1.3 x10(3)/mcL    Eos # 0.45 0 - 0.9 x10(3)/mcL    Baso # 0.04 <=0.2 x10(3)/mcL    IG# 0.06 (H) 0 - 0.04 x10(3)/mcL    IG% 0.7 %    NRBC% 0.0 %   POCT glucose    Collection Time: 02/21/24  8:18 AM   Result Value Ref Range    POCT Glucose 102 70 - 110 mg/dL   POCT glucose    Collection Time: 02/21/24 12:10 PM   Result Value Ref Range    POCT Glucose 85 70 - 110 mg/dL   APTT    Collection Time: 02/21/24  2:18 PM   Result Value Ref Range    PTT 34.2 (H) 23.2 - 33.7 seconds   Protime-INR    Collection Time: 02/21/24  2:18 PM   Result Value Ref Range    PT 14.6 (H) 11.4 - 14.0 seconds    INR 1.1 <=1.3   CBC with Differential    Collection Time: 02/21/24  2:18 PM   Result Value Ref Range    WBC 10.07 4.50 - 11.50 x10(3)/mcL    RBC 4.17 (L) 4.70 - 6.10 x10(6)/mcL    Hgb 11.5 (L) 14.0 - 18.0 g/dL    Hct 36.7 (L) 42.0 - 52.0 %    MCV 88.0 80.0 - 94.0 fL    MCH 27.6 27.0 - 31.0 pg    MCHC 31.3 (L) 33.0 - 36.0 g/dL    RDW 15.3 11.5 - 17.0 %    Platelet 291 130 - 400 x10(3)/mcL    MPV 10.4 7.4 - 10.4 fL    Neut % 76.3 %    Lymph % 10.7 %    Mono % 8.3 %    Eos % 3.7 %    Basophil % 0.5 %    Lymph # 1.08 0.6 - 4.6 x10(3)/mcL    Neut # 7.68 2.1 - 9.2 x10(3)/mcL    Mono # 0.84 0.1 - 1.3 x10(3)/mcL    Eos # 0.37 0 - 0.9 x10(3)/mcL    Baso # 0.05 <=0.2 x10(3)/mcL    IG# 0.05 (H) 0 - 0.04 x10(3)/mcL    IG% 0.5 %    NRBC% 0.0 %   POCT glucose    Collection Time: 02/21/24  5:20 PM   Result Value Ref Range    POCT Glucose 123 (H) 70 - 110 mg/dL   APTT    Collection Time: 02/21/24 10:11 PM   Result Value Ref Range    PTT 60.1 (H) 23.2 - 33.7 seconds   Protein/Creatinine Ratio, Urine    Collection Time: 02/22/24 12:59 AM   Result Value Ref Range    Urine Protein Level <6.8 mg/dL    Urine Creatinine 31.6 (L) 63.0 - 166.0  mg/dL   Potassium, Random Urine    Collection Time: 02/22/24 12:59 AM   Result Value Ref Range    Urine Potassium 18.5 mmol/L   Osmolality, Urine    Collection Time: 02/22/24 12:59 AM   Result Value Ref Range    Urine Osmolality 162 (L) 300 - 1,300 mOsm/kg   Creatinine, Random Urine    Collection Time: 02/22/24 12:59 AM   Result Value Ref Range    Urine Creatinine 31.5 (L) 63.0 - 166.0 mg/dL   Sodium, Random Urine    Collection Time: 02/22/24 12:59 AM   Result Value Ref Range    Urine Sodium 37.0 mmol/L   Urea Nitrogen, Random Urine    Collection Time: 02/22/24 12:59 AM   Result Value Ref Range    Urine Urea Nitrogen 117.0 mg/dL   APTT    Collection Time: 02/22/24  5:15 AM   Result Value Ref Range    PTT 43.5 (H) 23.2 - 33.7 seconds   Comprehensive metabolic panel    Collection Time: 02/22/24  5:15 AM   Result Value Ref Range    Sodium Level 138 136 - 145 mmol/L    Potassium Level 4.9 3.5 - 5.1 mmol/L    Chloride 107 98 - 107 mmol/L    Carbon Dioxide 26 23 - 31 mmol/L    Glucose Level 89 82 - 115 mg/dL    Blood Urea Nitrogen 11.7 8.4 - 25.7 mg/dL    Creatinine 1.60 (H) 0.73 - 1.18 mg/dL    Calcium Level Total 8.9 8.8 - 10.0 mg/dL    Protein Total 6.2 5.8 - 7.6 gm/dL    Albumin Level 3.4 3.4 - 4.8 g/dL    Globulin 2.8 2.4 - 3.5 gm/dL    Albumin/Globulin Ratio 1.2 1.1 - 2.0 ratio    Bilirubin Total 0.7 <=1.5 mg/dL    Alkaline Phosphatase 71 40 - 150 unit/L    Alanine Aminotransferase 18 0 - 55 unit/L    Aspartate Aminotransferase 18 5 - 34 unit/L    eGFR 44 mls/min/1.73/m2   CBC with Differential    Collection Time: 02/22/24  5:15 AM   Result Value Ref Range    WBC 8.84 4.50 - 11.50 x10(3)/mcL    RBC 4.31 (L) 4.70 - 6.10 x10(6)/mcL    Hgb 11.5 (L) 14.0 - 18.0 g/dL    Hct 38.8 (L) 42.0 - 52.0 %    MCV 90.0 80.0 - 94.0 fL    MCH 26.7 (L) 27.0 - 31.0 pg    MCHC 29.6 (L) 33.0 - 36.0 g/dL    RDW 15.5 11.5 - 17.0 %    Platelet 292 130 - 400 x10(3)/mcL    MPV 10.9 (H) 7.4 - 10.4 fL    Neut % 64.1 %    Lymph % 20.9 %    Mono  % 9.0 %    Eos % 4.6 %    Basophil % 0.8 %    Lymph # 1.85 0.6 - 4.6 x10(3)/mcL    Neut # 5.66 2.1 - 9.2 x10(3)/mcL    Mono # 0.80 0.1 - 1.3 x10(3)/mcL    Eos # 0.41 0 - 0.9 x10(3)/mcL    Baso # 0.07 <=0.2 x10(3)/mcL    IG# 0.05 (H) 0 - 0.04 x10(3)/mcL    IG% 0.6 %    NRBC% 0.0 %     Significant Imaging:    EKG:       Telemetry:  NSR    Physical Exam  HENT:      Head: Normocephalic.      Nose: Nose normal.      Mouth/Throat:      Mouth: Mucous membranes are moist.   Cardiovascular:      Rate and Rhythm: Regular rhythm. Bradycardia present.      Pulses: Normal pulses.      Heart sounds: Normal heart sounds. No murmur heard.  Pulmonary:      Effort: Pulmonary effort is normal. No respiratory distress.      Breath sounds: Normal breath sounds.   Abdominal:      General: Abdomen is flat. There is no distension.      Palpations: Abdomen is soft.   Skin:     General: Skin is warm.   Neurological:      Mental Status: He is alert and oriented to person, place, and time.   Psychiatric:         Mood and Affect: Mood normal.         Behavior: Behavior normal.         Judgment: Judgment normal.       Home Medications:   Current Facility-Administered Medications on File Prior to Encounter   Medication Dose Route Frequency Provider Last Rate Last Admin    [COMPLETED] heparin 25,000 units in dextrose 5% (100 units/ml) IV bolus from bag LOW INTENSITY nomogram - [facility]  57.7 Units/kg (Adjusted) Intravenous Once Sravani Nguyen MD   4,000 Units at 02/21/24 1502    [DISCONTINUED] acetaminophen tablet 650 mg  650 mg Oral Q6H PRN All Del Castillo MD   650 mg at 02/18/24 0905    [DISCONTINUED] acetaminophen tablet 650 mg  650 mg Oral Q4H PRN Vasile Callahan MD        [DISCONTINUED] allopurinol split tablet 50 mg  50 mg Oral Daily All Del Castillo MD   50 mg at 02/21/24 0920    [DISCONTINUED] amiodarone tablet 200 mg  200 mg Oral BID All Del Castillo MD   200 mg at 02/21/24 0917    [DISCONTINUED] aspirin EC tablet 81 mg  81 mg Oral  Daily All Del Castillo MD   81 mg at 02/21/24 0917    [DISCONTINUED] atorvastatin tablet 40 mg  40 mg Oral QHS All Del Castillo MD   40 mg at 02/20/24 2044    [DISCONTINUED] ferrous sulfate tablet 1 each  1 tablet Oral Every other day Terry Walker MD   1 each at 02/21/24 1125    [DISCONTINUED] folic acid tablet 1 mg  1 mg Oral Daily Terry Walker MD   1 mg at 02/21/24 1125    [DISCONTINUED] heparin 25,000 units in dextrose 5% (100 units/ml) IV bolus from bag LOW INTENSITY nomogram - [facility]  57.7 Units/kg (Adjusted) Intravenous PRN Sravani Nguyen MD        [DISCONTINUED] heparin 25,000 units in dextrose 5% (100 units/ml) IV bolus from bag LOW INTENSITY nomogram - [facility]  30 Units/kg (Adjusted) Intravenous PRN Sravani Nguyen MD        [DISCONTINUED] heparin 25,000 units in dextrose 5% 250 mL (100 units/mL) infusion LOW INTENSITY nomogram - [facility]  0-40 Units/kg/hr (Adjusted) Intravenous Continuous Sravani Nguyen MD 8.3 mL/hr at 02/21/24 1500 12 Units/kg/hr at 02/21/24 1500    [DISCONTINUED] lactated ringers infusion   Intravenous Continuous Hyun Dykes  mL/hr at 02/21/24 0921 New Bag at 02/21/24 0921    [DISCONTINUED] melatonin tablet 6 mg  6 mg Oral Nightly PRN All Del Castillo MD        [DISCONTINUED] nitroGLYCERIN in 5 % dextrose 50 mg/250 mL (200 mcg/mL) infusion    Continuous PRN Vasile Callahan MD   100 mcg at 02/20/24 1055    [DISCONTINUED] nitroGLYCERIN SL tablet 0.4 mg  0.4 mg Sublingual Q5 Min PRN All Del Castillo MD        [DISCONTINUED] ondansetron disintegrating tablet 8 mg  8 mg Oral Q8H PRN Vasile Callahan MD   8 mg at 02/21/24 1207    [DISCONTINUED] pantoprazole EC tablet 40 mg  40 mg Oral Daily All Del Castillo MD   40 mg at 02/21/24 0917    [DISCONTINUED] polyethylene glycol packet 17 g  17 g Oral Daily All Del Castillo MD   17 g at 02/21/24 0920    [DISCONTINUED] simethicone chewable tablet 80 mg  1 tablet Oral TID PRN Socorro Comer MD   80 mg at 02/20/24 0215     [DISCONTINUED] sodium chloride 0.9% flush 10 mL  10 mL Intravenous PRN All Del Castillo MD         Current Outpatient Medications on File Prior to Encounter   Medication Sig Dispense Refill    amiodarone (PACERONE) 200 MG Tab Take 400 mg by mouth 2 (two) times daily.      atorvastatin (LIPITOR) 20 MG tablet Take 40 mg by mouth every evening.      furosemide (LASIX) 40 MG tablet Take 40 mg by mouth once daily.       Current Inpatient Medications:    Current Facility-Administered Medications:     acetaminophen tablet 650 mg, 650 mg, Oral, Q6H PRN, Tanner Rdz MD    acetaminophen tablet 650 mg, 650 mg, Oral, Q4H PRN, Tanner Rdz MD, 650 mg at 02/21/24 2122    allopurinol split tablet 50 mg, 50 mg, Oral, Daily, Tanner Rdz MD, 50 mg at 02/22/24 1007    amiodarone tablet 200 mg, 200 mg, Oral, BID, Tanner Rdz MD, 200 mg at 02/22/24 1007    aspirin EC tablet 81 mg, 81 mg, Oral, Daily, Tanner Rdz MD, 81 mg at 02/22/24 1009    atorvastatin tablet 40 mg, 40 mg, Oral, QHS, Tanner Rdz MD, 40 mg at 02/21/24 2122    [START ON 2/23/2024] ferrous sulfate tablet 1 each, 1 tablet, Oral, Every other day, Tanner Rdz MD    folic acid tablet 1 mg, 1 mg, Oral, Daily, Tanner Rdz MD, 1 mg at 02/22/24 1009    heparin 25,000 units in dextrose 5% (100 units/ml) IV bolus from bag LOW INTENSITY nomogram - [facility], 57.3 Units/kg (Adjusted), Intravenous, PRN, Tanner Rdz MD, 4,000 Units at 02/22/24 0731    heparin 25,000 units in dextrose 5% 250 mL (100 units/mL) infusion LOW INTENSITY nomogram - [facility], 0-40 Units/kg/hr (Adjusted), Intravenous, Continuous, Tanner Rdz MD, Last Rate: 10.5 mL/hr at 02/22/24 0957, 15 Units/kg/hr at 02/22/24 0957    melatonin tablet 6 mg, 6 mg, Oral, Nightly PRN, Tanner Rdz MD    nitroGLYCERIN SL tablet 0.4 mg, 0.4 mg, Sublingual, Q5 Min PRN, Tanner Rdz MD    ondansetron  disintegrating tablet 8 mg, 8 mg, Oral, Q8H PRN, Tanner Rdz MD    pantoprazole EC tablet 40 mg, 40 mg, Oral, Daily, Tanner Rdz MD, 40 mg at 02/22/24 1007    polyethylene glycol packet 17 g, 17 g, Oral, Daily, Tanner Rdz MD, 17 g at 02/22/24 1008    simethicone chewable tablet 80 mg, 1 tablet, Oral, TID PRN, Tanner Rdz MD, 80 mg at 02/21/24 2114    sodium chloride 0.9% flush 10 mL, 10 mL, Intravenous, PRN, Tanner Rdz MD  VTE Risk Mitigation (From admission, onward)           Ordered     heparin 25,000 units in dextrose 5% 250 mL (100 units/mL) infusion LOW INTENSITY nomogram - [facility]  Continuous        Question:  Begin at (units/kg/hr)  Answer:  12    02/21/24 2056     IP VTE HIGH RISK PATIENT  Once         02/21/24 2057     Place sequential compression device  Until discontinued         02/21/24 2057     heparin 25,000 units in dextrose 5% (100 units/ml) IV bolus from bag LOW INTENSITY nomogram - [facility]  As needed (PRN)        Question:  Heparin Infusion Adjustment (DO NOT MODIFY ANSWER)  Answer:  [email or web address]\[email or web address] LOW INTENSITY nomogram for [facility] [email or web address]    02/21/24 2056                  Assessment:   MV CAD   - LHC (2.19.24):pLAD lesion 70%, oLCx 80%, OM1 80%, OM2 1 lesion 70% 2nd lesion 50%, mLAD 50%, pRCA 60%  Aortic Insuffiencey    - ECHO (2.16.24): Aortic Valve: There is moderate aortic valve sclerosis. Severely restricted motion. There is severe stenosis. Aortic valve area by VTI is 0.66 cm². Aortic valve peak velocity is 4.45 m/s. Mean gradient is 50 mmHg. The dimensionless index is 0.17.   PAF   - [id number] = 5 points   - on Eliquis as an outpatient ~ currently on hold  Chronic combined heart failure   - ECHO (2.16.24): EF 40%, grade II diastolic dysfunction  VHD   - ECHO (2.16.24): Severe AS, mild MR, mild to moderate TR  Pulmonary HTN   - ECHO PASP 69mmHg   CKD stage 3b   - baseline Cr 1.6  HTN        Plan:    LHC & ECHO reviewed  Will consult CTS for CABG/AVR/LAAL evaluation  Cont. ASA 81mg & Statin   Cont. PO amiodarone   Hold Eliquis for now   Cont. Heparin infusion   AM labs: CBC, CMP, Mag  Will continue to follow       Thank you for your consult.     Millie Jimenez NP  Cardiology  [facility]  02/22/2024    Physician addendum:  I have seen and examined this patient as a split-shared visit with the AR[facility] d/t complicated medical management of above problems written in assessment and high acuity requiring physician expertise in medical decision-making. I performed the substantive portion of the history and exam. Above medical decision-making is also formulated by me.     line use  No edema  Mild distress    Films reviewed - distal LM, encroaching into OLAD and OLCx, with mid RCA severe lesions  Echo - sev AS with sev pulm HTN  H/o Afib    Plan:  [facility] - CABG pending, med Rx for now. If turn down from surgery, for sev PAP, then impella with high risk bifurcating PCI to LM, LAD and LCx, with RCA  Sev. AS - AVR with upcoming CABG vs TAVR  H/o Afib - MOISE ligation with bypass  Sev. Pulm HTN - PFTs pending. May consider RHC today    All Montoya MD [facility]  Int. Cardiologist

## 2024-02-22 NOTE — NURSING
Nurses Note -- 4 Eyes      2/22/2024   6:23 AM      Skin assessed during: Transfer      [x] No Altered Skin Integrity Present    [x]Prevention Measures Documented      [] Yes- Altered Skin Integrity Present or Discovered   [] LDA Added if Not in Epic (Describe Wound)   [] New Altered Skin Integrity was Present on Admit and Documented in LDA   [] Wound Image Taken    Wound Care Consulted? No    Attending Nurse:  Frida Cast RN/Staff Member:   Maximo SANDERS

## 2024-02-22 NOTE — CONSULTS
[facility]   Consult Note  Cardiothoracic Surgery    SUBJECTIVE:     Chief Complaint/Reason for Admission: chest pain    History of Present Illness:  Patient is a 78 y.o. male presents with chest pain, nstemi  Lhc shows cad, ai/as  40%ef.      Family History of Heart Disease: no    Current Facility-Administered Medications on File Prior to Encounter   Medication Dose Route Frequency Provider Last Rate Last Admin    [COMPLETED] heparin 25,000 units in dextrose 5% (100 units/ml) IV bolus from bag LOW INTENSITY nomogram - LAF  57.7 Units/kg (Adjusted) Intravenous Once Sravani Nguyen MD   4,000 Units at 02/21/24 1502    [DISCONTINUED] acetaminophen tablet 650 mg  650 mg Oral Q6H PRN All Del Castillo MD   650 mg at 02/18/24 0905    [DISCONTINUED] acetaminophen tablet 650 mg  650 mg Oral Q4H PRN Vasile Callahan MD        [DISCONTINUED] allopurinol split tablet 50 mg  50 mg Oral Daily All Del Castillo MD   50 mg at 02/21/24 0920    [DISCONTINUED] amiodarone tablet 200 mg  200 mg Oral BID All Del Castillo MD   200 mg at 02/21/24 0917    [DISCONTINUED] aspirin EC tablet 81 mg  81 mg Oral Daily All Del Castillo MD   81 mg at 02/21/24 0917    [DISCONTINUED] atorvastatin tablet 40 mg  40 mg Oral QHS All Del Castillo MD   40 mg at 02/20/24 2044    [DISCONTINUED] ferrous sulfate tablet 1 each  1 tablet Oral Every other day Terry Walker MD   1 each at 02/21/24 1125    [DISCONTINUED] folic acid tablet 1 mg  1 mg Oral Daily Terry Walker MD   1 mg at 02/21/24 1125    [DISCONTINUED] heparin 25,000 units in dextrose 5% (100 units/ml) IV bolus from bag LOW INTENSITY nomogram - LAF  57.7 Units/kg (Adjusted) Intravenous PRN Sravani Nguyen MD        [DISCONTINUED] heparin 25,000 units in dextrose 5% (100 units/ml) IV bolus from bag LOW INTENSITY nomogram - LAF  30 Units/kg (Adjusted) Intravenous PRN Sravani Nguyen MD        [DISCONTINUED] heparin 25,000 units in dextrose 5% 250 mL (100 units/mL)  infusion LOW INTENSITY nomogram - LAF  0-40 Units/kg/hr (Adjusted) Intravenous Continuous Sravani Nguyen MD 8.3 mL/hr at 02/21/24 1500 12 Units/kg/hr at 02/21/24 1500    [DISCONTINUED] lactated ringers infusion   Intravenous Continuous Hyun Dykes  mL/hr at 02/21/24 0921 New Bag at 02/21/24 0921    [DISCONTINUED] melatonin tablet 6 mg  6 mg Oral Nightly PRN All Del Castillo MD        [DISCONTINUED] nitroGLYCERIN in 5 % dextrose 50 mg/250 mL (200 mcg/mL) infusion    Continuous PRN Vasile Callahan MD   100 mcg at 02/20/24 1055    [DISCONTINUED] nitroGLYCERIN SL tablet 0.4 mg  0.4 mg Sublingual Q5 Min PRN All Del Castillo MD        [DISCONTINUED] ondansetron disintegrating tablet 8 mg  8 mg Oral Q8H PRN Vasile Callahan MD   8 mg at 02/21/24 1207    [DISCONTINUED] pantoprazole EC tablet 40 mg  40 mg Oral Daily All Del Castillo MD   40 mg at 02/21/24 0917    [DISCONTINUED] polyethylene glycol packet 17 g  17 g Oral Daily All Del Castillo MD   17 g at 02/21/24 0920    [DISCONTINUED] simethicone chewable tablet 80 mg  1 tablet Oral TID PRN Socorro Comer MD   80 mg at 02/20/24 0213    [DISCONTINUED] sodium chloride 0.9% flush 10 mL  10 mL Intravenous PRN All Del Castillo MD         Current Outpatient Medications on File Prior to Encounter   Medication Sig Dispense Refill    amiodarone (PACERONE) 200 MG Tab Take 400 mg by mouth 2 (two) times daily.      atorvastatin (LIPITOR) 20 MG tablet Take 40 mg by mouth every evening.      furosemide (LASIX) 40 MG tablet Take 40 mg by mouth once daily.          Review of patient's allergies indicates:  No Known Allergies     Past Medical History:   Diagnosis Date    A-fib     Aortic insufficiency     CAD (coronary artery disease)     Hypertension         Past Surgical History:   Procedure Laterality Date    CORONARY ANGIOGRAPHY N/A 2/20/2024    Procedure: ANGIOGRAM, CORONARY ARTERY;  Surgeon: Vasile Callahan MD;  Location: [facility] CATH LAB;  Service:  Cardiology;  Laterality: N/A;           Review of Systems:  Review of Systems   Cardiovascular:  Positive for chest pain.   All other systems reviewed and are negative.       OBJECTIVE:        Physical Exam:  Physical Exam  Vitals reviewed.   HENT:      Head: Normocephalic.      Right Ear: Tympanic membrane normal.      Left Ear: Tympanic membrane normal.      Nose: Nose normal.      Mouth/Throat:      Mouth: Mucous membranes are moist.   Eyes:      Pupils: Pupils are equal, round, and reactive to light.   Cardiovascular:      Rate and Rhythm: Normal rate and regular rhythm.      Pulses: Normal pulses.   Pulmonary:      Effort: Pulmonary effort is normal.      Breath sounds: Normal breath sounds.   Abdominal:      Palpations: Abdomen is soft.   Musculoskeletal:         General: Normal range of motion.      Cervical back: Normal range of motion.   Skin:     General: Skin is warm.   Neurological:      General: No focal deficit present.      Mental Status: He is alert.   Psychiatric:         Mood and Affect: Mood normal.          Laboratory:  I have reviewed all pertinent lab results within the past 24 hours.    Diagnostic Results:  Cardiac Cath: Reviewed          ASSESSMENT/PLAN:     A: CAD/AS/AI  P: CAB/AVR 2/23

## 2024-02-22 NOTE — PLAN OF CARE
02/22/24 1031   Discharge Assessment   Assessment Type Discharge Planning Assessment   Confirmed/corrected address, phone number and insurance Yes   Confirmed Demographics Correct on Facesheet   Source of Information family  (Brii Snyder (Daughter) [phone] (Mobile))   If unable to respond/provide information was family/caregiver contacted? Yes   Contact Name/Number Brii Snyder (Daughter) [phone] (Mobile)   Communicated CLAY with patient/caregiver Date not available/Unable to determine   Reason For Admission CHIEF COMPLAINT: Chest pain. HISTORY OF PRESENT ILLNESS:  78-year-old male with a history of aortic insufficiency/stenosis, CAD, CKD IIIb, HTN AFib on Eliquis admitted to [facility] 02/15/2024 with chest pain, found to have NSTEMI (peak troponin 0.17) and underwent [facility] 02/20/2024 showing severe multivessel stenosis and therefore was transferred here for CABG evaluation.     He arrived afebrile hemodynamically stable on a heparin drip.  He currently denies chest pain.  Consultations are being placed to Cardiothoracic surgery and Cardiology.  Currently at bedside patient is chest pain-free.   People in Home child(chery), dependent  (The patient lives with his daughter: Allegra ([phone]) in his private residence.)   Facility Arrived From: Private residence.   Do you expect to return to your current living situation? Yes   Do you have help at home or someone to help you manage your care at home? Yes   Who are your caregiver(s) and their phone number(s)? The patient lives with his daughter: Allegra ([phone]) in his private residence.   Prior to hospitilization cognitive status: Alert/Oriented   Current cognitive status: Alert/Oriented   Walking or Climbing Stairs Difficulty no   Dressing/Bathing Difficulty no   Home Accessibility   (There are (2) steps to climb prior to entering his private residence.)   Home Layout Able to live on 1st floor   Equipment Currently Used at Home none   Readmission  within 30 days? Yes   Patient currently being followed by outpatient case management? No   Do you currently have service(s) that help you manage your care at home? No   Do you take prescription medications? Yes   Do you have prescription coverage? Yes   Coverage Payor: MEDICAID - PENDING MEDICAID -   Do you have any problems affording any of your prescribed medications? No   Is the patient taking medications as prescribed? yes   Who is going to help you get home at discharge? The patient lives with his daughter: Allegra ([phone]) in his private residence.   How do you get to doctors appointments? family or friend will provide   Are you on dialysis? No   Do you take coumadin? No   Discharge Plan A Home with family   Discharge Plan B Home with family   DME Needed Upon Discharge  none   Discharge Plan discussed with: Patient;Adult children  (Brii Snyder (Daughter) [phone] (Mobile))   Transition of Care Barriers None   Social Connections   In a typical week, how many times do you talk on the phone with family, friends, or neighbors? Twice a week   How often do you get together with friends or relatives? Twice   How often do you attend Yazidism or Episcopalian services? 1 to 4   Do you belong to any clubs or organizations such as Yazidism groups, unions, fraternal or athletic groups, or school groups? Yes  (The patient belongs to [email or web address]   How often do you attend meetings of the clubs or organizations you belong to? 1 to 4   Are you , , , , never , or living with a partner?    Alcohol Use   Q1: How often do you have a drink containing alcohol? Never   Q2: How many drinks containing alcohol do you have on a typical day when you are drinking? None   Q3: How often do you have six or more drinks on one occasion? Never   OTHER   Name(s) of People in Home The patient lives with his daughter: Allegra in his private residence. Daughter:  Brii Snyder  (Daughter) [phone] (Mobile) is actively involved in his care.

## 2024-02-22 NOTE — CONSULTS
Inpatient consult to Cardiology  Consult performed by: Millie Jimenez NP  Consult ordered by: Tanner Rdz MD  Reason for consult: CAD          [facility] Tele[facility]    Cardiology  Consult Note    Patient Name: Brain Snyder  MRN: [id number]  Admission Date: 2/21/2024  Hospital Length of Stay: 1 days  Code Status: Full Code   Attending Provider: hWit Omer MD   Consulting Provider: Millie Jimenez NP  Primary Care Physician: Nidia Shepherd NP  Principal Problem:CAD (coronary artery disease)    Patient information was obtained from patient, past medical records, and ER records.     Subjective:     Reason for Consult:  CAD    HPI: 78-year-old female that is unknown to [facility] with a PMHx of PAF on Eliquis Aortic insufficiency, MV CAD, HTN. He is Turkmen speaking and an  was used for interview. He was orginally admitted to [facility] on 2.15.24 with CP & NSTEMI and underwent a LHC on 2.20.24 taht showed MV CAD (reported noted below) He was transferred to [facility] on 2.21.24 for a CABG/AVR evaluation. On arrive he was hemodynamically stable on a heparin infusion. He denied chest pain, SOB, and nausea on current exam, [facility] was consulted for CAD      PMH: PAF (on Eliquis), Aortic insufficiency, MV CAD, HTN  PSH: [facility]  Family History: none reported  Social History: former smoker, denies ETOH or illicit drug use    Previous Cardiac Diagnostics:   [facility] (2.20.24):   Prox LAD lesion was 70% stenosed.  Ost Cx to Prox Cx lesion was 80% stenosed.  1st Mrg lesion was 80% stenosed.  2nd Mrg-1 lesion was 70% stenosed.  2nd Mrg-2 lesion was 50% stenosed.  Mid LAD lesion was 50% stenosed.  Prox RCA lesion was 60% stenosed.  estimated blood loss was <50 mL.  There was three vessel coronary artery disease.  LVEDP: 30mmHg  Recommendations:   Refer for CABG evaluation and AVR   Preformed by Dr. Flannery at [facility]    ECHO (2.15.24):  Left Ventricle: The left ventricle is normal in size. Mildly increased  ventricular mass. Mildly increased wall thickness. There is mild eccentric hypertrophy. Moderate global hypokinesis present. Septal motion is consistent with bundle branch block. There is moderately reduced systolic function. Biplane (2D) method of discs ejection fraction is 40%. Grade II diastolic dysfunction.  Right Ventricle: Right ventricular enlargement. Systolic function is normal.  Left Atrium: Left atrium is severely dilated.  Right Atrium: Right atrium is moderately dilated.  Aortic Valve: There is moderate aortic valve sclerosis. Severely restricted motion. There is severe stenosis. Aortic valve area by VTI is 0.66 cm². Aortic valve peak velocity is 4.45 m/s. Mean gradient is 50 mmHg. The dimensionless index is 0.17. There is mild to moderate aortic regurgitation.  Mitral Valve: Mildly calcified leaflets. There is no stenosis. There is mild regurgitation.  Tricuspid Valve: The tricuspid valve is structurally normal. There is mild to moderate regurgitation.  Pulmonic Valve: There is no significant regurgitation.  Aorta: Aortic root is upper limit of normal measuring 3.6 cm.  Pulmonary Artery: There is severe pulmonary hypertension. The estimated pulmonary artery systolic pressure is 69 mmHg.  IVC/SVC: Intermediate venous pressure at 8 mmHg.  Pericardium: There is no pericardial effusion.      Review of patient's allergies indicates:  No Known Allergies  Current Facility-Administered Medications on File Prior to Encounter   Medication    [COMPLETED] heparin 25,000 units in dextrose 5% (100 units/ml) IV bolus from bag LOW INTENSITY nomogram - LAF    [DISCONTINUED] acetaminophen tablet 650 mg    [DISCONTINUED] acetaminophen tablet 650 mg    [DISCONTINUED] allopurinol split tablet 50 mg    [DISCONTINUED] amiodarone tablet 200 mg    [DISCONTINUED] aspirin EC tablet 81 mg    [DISCONTINUED] atorvastatin tablet 40 mg    [DISCONTINUED] ferrous sulfate tablet 1 each    [DISCONTINUED] folic acid tablet 1 mg     [DISCONTINUED] heparin 25,000 units in dextrose 5% (100 units/ml) IV bolus from bag LOW INTENSITY nomogram - LAF    [DISCONTINUED] heparin 25,000 units in dextrose 5% (100 units/ml) IV bolus from bag LOW INTENSITY nomogram - LAF    [DISCONTINUED] heparin 25,000 units in dextrose 5% 250 mL (100 units/mL) infusion LOW INTENSITY nomogram - LAF    [DISCONTINUED] lactated ringers infusion    [DISCONTINUED] melatonin tablet 6 mg    [DISCONTINUED] nitroGLYCERIN in 5 % dextrose 50 mg/250 mL (200 mcg/mL) infusion    [DISCONTINUED] nitroGLYCERIN SL tablet 0.4 mg    [DISCONTINUED] ondansetron disintegrating tablet 8 mg    [DISCONTINUED] pantoprazole EC tablet 40 mg    [DISCONTINUED] polyethylene glycol packet 17 g    [DISCONTINUED] simethicone chewable tablet 80 mg    [DISCONTINUED] sodium chloride 0.9% flush 10 mL     Current Outpatient Medications on File Prior to Encounter   Medication Sig    amiodarone (PACERONE) 200 MG Tab Take 400 mg by mouth 2 (two) times daily.    atorvastatin (LIPITOR) 20 MG tablet Take 40 mg by mouth every evening.    furosemide (LASIX) 40 MG tablet Take 40 mg by mouth once daily.           Review of Systems   Constitutional:  Negative for chills and fatigue.   Respiratory: Negative.  Negative for chest tightness and shortness of breath.    Cardiovascular:  Negative for chest pain and palpitations.   Gastrointestinal:  Negative for abdominal pain, nausea and vomiting.   Genitourinary: Negative.    Musculoskeletal: Negative.    Skin: Negative.    Neurological: Negative.    Psychiatric/Behavioral: Negative.       Objective:     Vital Signs (Most Recent):  Temp: 97.7 °F (36.5 °C) (02/22/24 0820)  Pulse: 60 (02/22/24 0820)  Resp: 16 (02/22/24 0820)  BP: (!) 102/53 (02/22/24 0820)  SpO2: 100 % (02/22/24 1006) Vital Signs (24h Range):  Temp:  [97 °F (36.1 °C)-98.1 °F (36.7 °C)] 97.7 °F (36.5 °C)  Pulse:  [60-94] 60  Resp:  [16-19] 16  SpO2:  [93 %-100 %] 100 %  BP: ()/(53-85) 102/53   Weight: 82.2 kg  (181 lb 3.5 oz)  Body mass index is 30.16 kg/m².  SpO2: 100 %       Intake/Output Summary (Last 24 hours) at 2/22/2024 1102  Last data filed at 2/22/2024 0314  Gross per 24 hour   Intake 280 ml   Output 350 ml   Net -70 ml     Lines/Drains/Airways       Peripheral Intravenous Line  Duration                  Peripheral IV - Double Lumen 02/17/24 1430 20 G Anterior;Distal;Left Forearm 4 days         Peripheral IV - Double Lumen 02/20/24 0031 20 G Anterior;Right Forearm 2 days                  Significant Labs:  Recent Results (from the past 72 hour(s))   Urinalysis, Reflex to Urine Culture    Collection Time: 02/19/24  4:12 PM    Specimen: Urine   Result Value Ref Range    Color, UA Light-Yellow Yellow, Light-Yellow, Dark Yellow, Nicol, Straw    Appearance, UA Clear Clear    Specific Gravity, UA 1.013 1.005 - 1.030    pH, UA 7.0 5.0 - 8.5    Protein, UA Negative Negative    Glucose, UA Normal Negative, Normal    Ketones, UA Negative Negative    Blood, UA Negative Negative    Bilirubin, UA Negative Negative    Urobilinogen, UA 2+ (A) 0.2, 1.0, Normal    Nitrites, UA Negative Negative    Leukocyte Esterase, UA Negative Negative    WBC, UA 0-5 None Seen, 0-2, 3-5, 0-5 /HPF    Bacteria, UA None Seen None Seen /HPF    Squamous Epithelial Cells, UA Trace (A) None Seen /HPF    Mucous, UA Trace (A) None Seen /LPF    Hyaline Casts, UA None Seen None Seen /lpf    RBC, UA 0-5 None Seen, 0-2, 3-5, 0-5 /HPF   Comprehensive Metabolic Panel    Collection Time: 02/20/24  4:49 AM   Result Value Ref Range    Sodium Level 138 136 - 145 mmol/L    Potassium Level 4.6 3.5 - 5.1 mmol/L    Chloride 108 (H) 98 - 107 mmol/L    Carbon Dioxide 21 (L) 23 - 31 mmol/L    Glucose Level 92 82 - 115 mg/dL    Blood Urea Nitrogen 11.9 8.4 - 25.7 mg/dL    Creatinine 1.66 (H) 0.73 - 1.18 mg/dL    Calcium Level Total 8.7 (L) 8.8 - 10.0 mg/dL    Protein Total 7.0 5.8 - 7.6 gm/dL    Albumin Level 3.4 3.4 - 4.8 g/dL    Globulin 3.6 (H) 2.4 - 3.5 gm/dL     Albumin/Globulin Ratio 0.9 (L) 1.1 - 2.0 ratio    Bilirubin Total 0.7 <=1.5 mg/dL    Alkaline Phosphatase 72 40 - 150 unit/L    Alanine Aminotransferase 16 0 - 55 unit/L    Aspartate Aminotransferase 16 5 - 34 unit/L    eGFR 42 mls/min/1.73/m2   APTT    Collection Time: 02/20/24  4:49 AM   Result Value Ref Range    PTT 79.9 (H) 23.2 - 33.7 seconds   CBC with Differential    Collection Time: 02/20/24  4:49 AM   Result Value Ref Range    WBC 10.03 4.50 - 11.50 x10(3)/mcL    RBC 4.41 (L) 4.70 - 6.10 x10(6)/mcL    Hgb 11.9 (L) 14.0 - 18.0 g/dL    Hct 38.5 (L) 42.0 - 52.0 %    MCV 87.3 80.0 - 94.0 fL    MCH 27.0 27.0 - 31.0 pg    MCHC 30.9 (L) 33.0 - 36.0 g/dL    RDW 15.3 11.5 - 17.0 %    Platelet 318 130 - 400 x10(3)/mcL    MPV 11.1 (H) 7.4 - 10.4 fL    Neut % 66.9 %    Lymph % 19.7 %    Mono % 9.0 %    Eos % 3.2 %    Basophil % 0.6 %    Lymph # 1.98 0.6 - 4.6 x10(3)/mcL    Neut # 6.71 2.1 - 9.2 x10(3)/mcL    Mono # 0.90 0.1 - 1.3 x10(3)/mcL    Eos # 0.32 0 - 0.9 x10(3)/mcL    Baso # 0.06 <=0.2 x10(3)/mcL    IG# 0.06 (H) 0 - 0.04 x10(3)/mcL    IG% 0.6 %    NRBC% 0.0 %   PSA, Screening    Collection Time: 02/20/24  1:10 PM   Result Value Ref Range    Prostate Specific Antigen 3.09 <=4.00 ng/mL   Osmolality, Serum    Collection Time: 02/20/24  1:10 PM   Result Value Ref Range    Osmolality 290 280 - 300 mOsm/kg   HIV 1/2 Ag/Ab (4th Gen)    Collection Time: 02/20/24  1:10 PM   Result Value Ref Range    HIV Nonreactive Nonreactive   Hepatitis Panel, Acute    Collection Time: 02/20/24  1:10 PM   Result Value Ref Range    Hepatitis A IgM Nonreactive Nonreactive    Hepatitis B Core IgM Nonreactive Nonreactive    Hepatitis B Surface Antigen Nonreactive Nonreactive    Hep C Ab Interp Nonreactive Nonreactive   Ferritin    Collection Time: 02/20/24  1:10 PM   Result Value Ref Range    Ferritin Level 225.79 21.81 - 274.66 ng/mL   Folate    Collection Time: 02/20/24  1:10 PM   Result Value Ref Range    Folate Level 10.7 7.0 - 31.4  ng/mL   Iron and TIBC    Collection Time: 02/20/24  1:10 PM   Result Value Ref Range    Iron Binding Capacity Unsaturated 217 69 - 240 ug/dL    Iron Level 45 (L) 65 - 175 ug/dL    Transferrin 245 163 - 344 mg/dL    Iron Binding Capacity Total 262 250 - 450 ug/dL    Iron Saturation 17 (L) 20 - 50 %   Reticulocytes    Collection Time: 02/20/24  1:10 PM   Result Value Ref Range    Retic Cnt Auto 3.94 (H) 1.1 - 2.1 %    RET# 0.1651 (H) 0.026 - 0.095 x10e6/uL   Vitamin B12    Collection Time: 02/20/24  1:10 PM   Result Value Ref Range    Vitamin B12 Level 825 (H) 213 - 816 pg/mL   Light Blue Top Hold    Collection Time: 02/20/24  1:35 PM   Result Value Ref Range    Extra Tube Hold for add-ons.    POCT glucose    Collection Time: 02/20/24  8:37 PM   Result Value Ref Range    POCT Glucose 145 (H) 70 - 110 mg/dL   Comprehensive Metabolic Panel    Collection Time: 02/21/24  3:47 AM   Result Value Ref Range    Sodium Level 138 136 - 145 mmol/L    Potassium Level 4.3 3.5 - 5.1 mmol/L    Chloride 109 (H) 98 - 107 mmol/L    Carbon Dioxide 23 23 - 31 mmol/L    Glucose Level 86 82 - 115 mg/dL    Blood Urea Nitrogen 12.3 8.4 - 25.7 mg/dL    Creatinine 1.63 (H) 0.73 - 1.18 mg/dL    Calcium Level Total 8.7 (L) 8.8 - 10.0 mg/dL    Protein Total 6.2 5.8 - 7.6 gm/dL    Albumin Level 3.1 (L) 3.4 - 4.8 g/dL    Globulin 3.1 2.4 - 3.5 gm/dL    Albumin/Globulin Ratio 1.0 (L) 1.1 - 2.0 ratio    Bilirubin Total 0.6 <=1.5 mg/dL    Alkaline Phosphatase 68 40 - 150 unit/L    Alanine Aminotransferase 16 0 - 55 unit/L    Aspartate Aminotransferase 14 5 - 34 unit/L    eGFR 43 mls/min/1.73/m2   CBC with Differential    Collection Time: 02/21/24  3:47 AM   Result Value Ref Range    WBC 8.09 4.50 - 11.50 x10(3)/mcL    RBC 4.15 (L) 4.70 - 6.10 x10(6)/mcL    Hgb 11.3 (L) 14.0 - 18.0 g/dL    Hct 36.7 (L) 42.0 - 52.0 %    MCV 88.4 80.0 - 94.0 fL    MCH 27.2 27.0 - 31.0 pg    MCHC 30.8 (L) 33.0 - 36.0 g/dL    RDW 15.6 11.5 - 17.0 %    Platelet 302 130 - 400  x10(3)/mcL    MPV 11.0 (H) 7.4 - 10.4 fL    Neut % 62.8 %    Lymph % 20.8 %    Mono % 9.6 %    Eos % 5.6 %    Basophil % 0.5 %    Lymph # 1.68 0.6 - 4.6 x10(3)/mcL    Neut # 5.08 2.1 - 9.2 x10(3)/mcL    Mono # 0.78 0.1 - 1.3 x10(3)/mcL    Eos # 0.45 0 - 0.9 x10(3)/mcL    Baso # 0.04 <=0.2 x10(3)/mcL    IG# 0.06 (H) 0 - 0.04 x10(3)/mcL    IG% 0.7 %    NRBC% 0.0 %   POCT glucose    Collection Time: 02/21/24  8:18 AM   Result Value Ref Range    POCT Glucose 102 70 - 110 mg/dL   POCT glucose    Collection Time: 02/21/24 12:10 PM   Result Value Ref Range    POCT Glucose 85 70 - 110 mg/dL   APTT    Collection Time: 02/21/24  2:18 PM   Result Value Ref Range    PTT 34.2 (H) 23.2 - 33.7 seconds   Protime-INR    Collection Time: 02/21/24  2:18 PM   Result Value Ref Range    PT 14.6 (H) 11.4 - 14.0 seconds    INR 1.1 <=1.3   CBC with Differential    Collection Time: 02/21/24  2:18 PM   Result Value Ref Range    WBC 10.07 4.50 - 11.50 x10(3)/mcL    RBC 4.17 (L) 4.70 - 6.10 x10(6)/mcL    Hgb 11.5 (L) 14.0 - 18.0 g/dL    Hct 36.7 (L) 42.0 - 52.0 %    MCV 88.0 80.0 - 94.0 fL    MCH 27.6 27.0 - 31.0 pg    MCHC 31.3 (L) 33.0 - 36.0 g/dL    RDW 15.3 11.5 - 17.0 %    Platelet 291 130 - 400 x10(3)/mcL    MPV 10.4 7.4 - 10.4 fL    Neut % 76.3 %    Lymph % 10.7 %    Mono % 8.3 %    Eos % 3.7 %    Basophil % 0.5 %    Lymph # 1.08 0.6 - 4.6 x10(3)/mcL    Neut # 7.68 2.1 - 9.2 x10(3)/mcL    Mono # 0.84 0.1 - 1.3 x10(3)/mcL    Eos # 0.37 0 - 0.9 x10(3)/mcL    Baso # 0.05 <=0.2 x10(3)/mcL    IG# 0.05 (H) 0 - 0.04 x10(3)/mcL    IG% 0.5 %    NRBC% 0.0 %   POCT glucose    Collection Time: 02/21/24  5:20 PM   Result Value Ref Range    POCT Glucose 123 (H) 70 - 110 mg/dL   APTT    Collection Time: 02/21/24 10:11 PM   Result Value Ref Range    PTT 60.1 (H) 23.2 - 33.7 seconds   Protein/Creatinine Ratio, Urine    Collection Time: 02/22/24 12:59 AM   Result Value Ref Range    Urine Protein Level <6.8 mg/dL    Urine Creatinine 31.6 (L) 63.0 - 166.0  mg/dL   Potassium, Random Urine    Collection Time: 02/22/24 12:59 AM   Result Value Ref Range    Urine Potassium 18.5 mmol/L   Osmolality, Urine    Collection Time: 02/22/24 12:59 AM   Result Value Ref Range    Urine Osmolality 162 (L) 300 - 1,300 mOsm/kg   Creatinine, Random Urine    Collection Time: 02/22/24 12:59 AM   Result Value Ref Range    Urine Creatinine 31.5 (L) 63.0 - 166.0 mg/dL   Sodium, Random Urine    Collection Time: 02/22/24 12:59 AM   Result Value Ref Range    Urine Sodium 37.0 mmol/L   Urea Nitrogen, Random Urine    Collection Time: 02/22/24 12:59 AM   Result Value Ref Range    Urine Urea Nitrogen 117.0 mg/dL   APTT    Collection Time: 02/22/24  5:15 AM   Result Value Ref Range    PTT 43.5 (H) 23.2 - 33.7 seconds   Comprehensive metabolic panel    Collection Time: 02/22/24  5:15 AM   Result Value Ref Range    Sodium Level 138 136 - 145 mmol/L    Potassium Level 4.9 3.5 - 5.1 mmol/L    Chloride 107 98 - 107 mmol/L    Carbon Dioxide 26 23 - 31 mmol/L    Glucose Level 89 82 - 115 mg/dL    Blood Urea Nitrogen 11.7 8.4 - 25.7 mg/dL    Creatinine 1.60 (H) 0.73 - 1.18 mg/dL    Calcium Level Total 8.9 8.8 - 10.0 mg/dL    Protein Total 6.2 5.8 - 7.6 gm/dL    Albumin Level 3.4 3.4 - 4.8 g/dL    Globulin 2.8 2.4 - 3.5 gm/dL    Albumin/Globulin Ratio 1.2 1.1 - 2.0 ratio    Bilirubin Total 0.7 <=1.5 mg/dL    Alkaline Phosphatase 71 40 - 150 unit/L    Alanine Aminotransferase 18 0 - 55 unit/L    Aspartate Aminotransferase 18 5 - 34 unit/L    eGFR 44 mls/min/1.73/m2   CBC with Differential    Collection Time: 02/22/24  5:15 AM   Result Value Ref Range    WBC 8.84 4.50 - 11.50 x10(3)/mcL    RBC 4.31 (L) 4.70 - 6.10 x10(6)/mcL    Hgb 11.5 (L) 14.0 - 18.0 g/dL    Hct 38.8 (L) 42.0 - 52.0 %    MCV 90.0 80.0 - 94.0 fL    MCH 26.7 (L) 27.0 - 31.0 pg    MCHC 29.6 (L) 33.0 - 36.0 g/dL    RDW 15.5 11.5 - 17.0 %    Platelet 292 130 - 400 x10(3)/mcL    MPV 10.9 (H) 7.4 - 10.4 fL    Neut % 64.1 %    Lymph % 20.9 %    Mono  % 9.0 %    Eos % 4.6 %    Basophil % 0.8 %    Lymph # 1.85 0.6 - 4.6 x10(3)/mcL    Neut # 5.66 2.1 - 9.2 x10(3)/mcL    Mono # 0.80 0.1 - 1.3 x10(3)/mcL    Eos # 0.41 0 - 0.9 x10(3)/mcL    Baso # 0.07 <=0.2 x10(3)/mcL    IG# 0.05 (H) 0 - 0.04 x10(3)/mcL    IG% 0.6 %    NRBC% 0.0 %     Significant Imaging:    EKG:       Telemetry:  NSR    Physical Exam  HENT:      Head: Normocephalic.      Nose: Nose normal.      Mouth/Throat:      Mouth: Mucous membranes are moist.   Cardiovascular:      Rate and Rhythm: Regular rhythm. Bradycardia present.      Pulses: Normal pulses.      Heart sounds: Normal heart sounds. No murmur heard.  Pulmonary:      Effort: Pulmonary effort is normal. No respiratory distress.      Breath sounds: Normal breath sounds.   Abdominal:      General: Abdomen is flat. There is no distension.      Palpations: Abdomen is soft.   Skin:     General: Skin is warm.   Neurological:      Mental Status: He is alert and oriented to person, place, and time.   Psychiatric:         Mood and Affect: Mood normal.         Behavior: Behavior normal.         Judgment: Judgment normal.       Home Medications:   Current Facility-Administered Medications on File Prior to Encounter   Medication Dose Route Frequency Provider Last Rate Last Admin    [COMPLETED] heparin 25,000 units in dextrose 5% (100 units/ml) IV bolus from bag LOW INTENSITY nomogram - [facility]  57.7 Units/kg (Adjusted) Intravenous Once Sravani Nguyen MD   4,000 Units at 02/21/24 1502    [DISCONTINUED] acetaminophen tablet 650 mg  650 mg Oral Q6H PRN All Del Castillo MD   650 mg at 02/18/24 0905    [DISCONTINUED] acetaminophen tablet 650 mg  650 mg Oral Q4H PRN Vasile Callahan MD        [DISCONTINUED] allopurinol split tablet 50 mg  50 mg Oral Daily All Del Castillo MD   50 mg at 02/21/24 0920    [DISCONTINUED] amiodarone tablet 200 mg  200 mg Oral BID All Del Castillo MD   200 mg at 02/21/24 0917    [DISCONTINUED] aspirin EC tablet 81 mg  81 mg Oral  Daily All Del Castillo MD   81 mg at 02/21/24 0917    [DISCONTINUED] atorvastatin tablet 40 mg  40 mg Oral QHS All Del Castillo MD   40 mg at 02/20/24 2044    [DISCONTINUED] ferrous sulfate tablet 1 each  1 tablet Oral Every other day Terry Walker MD   1 each at 02/21/24 1125    [DISCONTINUED] folic acid tablet 1 mg  1 mg Oral Daily Terry Walker MD   1 mg at 02/21/24 1125    [DISCONTINUED] heparin 25,000 units in dextrose 5% (100 units/ml) IV bolus from bag LOW INTENSITY nomogram - [facility]  57.7 Units/kg (Adjusted) Intravenous PRN Sravani Nguyen MD        [DISCONTINUED] heparin 25,000 units in dextrose 5% (100 units/ml) IV bolus from bag LOW INTENSITY nomogram - L[facility]  30 Units/kg (Adjusted) Intravenous PRN Sravani Nguyen MD        [DISCONTINUED] heparin 25,000 units in dextrose 5% 250 mL (100 units/mL) infusion LOW INTENSITY nomogram - [facility]  0-40 Units/kg/hr (Adjusted) Intravenous Continuous Sravani Nguyen MD 8.3 mL/hr at 02/21/24 1500 12 Units/kg/hr at 02/21/24 1500    [DISCONTINUED] lactated ringers infusion   Intravenous Continuous Hyun Dykes  mL/hr at 02/21/24 0921 New Bag at 02/21/24 0921    [DISCONTINUED] melatonin tablet 6 mg  6 mg Oral Nightly PRN All Del Castillo MD        [DISCONTINUED] nitroGLYCERIN in 5 % dextrose 50 mg/250 mL (200 mcg/mL) infusion    Continuous PRN Vasile Callahan MD   100 mcg at 02/20/24 1055    [DISCONTINUED] nitroGLYCERIN SL tablet 0.4 mg  0.4 mg Sublingual Q5 Min PRN All Del Castillo MD        [DISCONTINUED] ondansetron disintegrating tablet 8 mg  8 mg Oral Q8H PRN Vasile Callahan MD   8 mg at 02/21/24 1207    [DISCONTINUED] pantoprazole EC tablet 40 mg  40 mg Oral Daily All Del Castillo MD   40 mg at 02/21/24 0917    [DISCONTINUED] polyethylene glycol packet 17 g  17 g Oral Daily All Del Castillo MD   17 g at 02/21/24 0920    [DISCONTINUED] simethicone chewable tablet 80 mg  1 tablet Oral TID PRN Socorro Comer MD   80 mg at 02/20/24 0210     [DISCONTINUED] sodium chloride 0.9% flush 10 mL  10 mL Intravenous PRN All Del Castillo MD         Current Outpatient Medications on File Prior to Encounter   Medication Sig Dispense Refill    amiodarone (PACERONE) 200 MG Tab Take 400 mg by mouth 2 (two) times daily.      atorvastatin (LIPITOR) 20 MG tablet Take 40 mg by mouth every evening.      furosemide (LASIX) 40 MG tablet Take 40 mg by mouth once daily.       Current Inpatient Medications:    Current Facility-Administered Medications:     acetaminophen tablet 650 mg, 650 mg, Oral, Q6H PRN, aTnner Rdz MD    acetaminophen tablet 650 mg, 650 mg, Oral, Q4H PRN, Tanner Rdz MD, 650 mg at 02/21/24 2122    allopurinol split tablet 50 mg, 50 mg, Oral, Daily, Tanner Rdz MD, 50 mg at 02/22/24 1007    amiodarone tablet 200 mg, 200 mg, Oral, BID, Tanner Rdz MD, 200 mg at 02/22/24 1007    aspirin EC tablet 81 mg, 81 mg, Oral, Daily, Tanner Rdz MD, 81 mg at 02/22/24 1009    atorvastatin tablet 40 mg, 40 mg, Oral, QHS, Tanner Rdz MD, 40 mg at 02/21/24 2122    [START ON 2/23/2024] ferrous sulfate tablet 1 each, 1 tablet, Oral, Every other day, Tanner Rdz MD    folic acid tablet 1 mg, 1 mg, Oral, Daily, Tanner Rdz MD, 1 mg at 02/22/24 1009    heparin 25,000 units in dextrose 5% (100 units/ml) IV bolus from bag LOW INTENSITY nomogram - [facility], 57.3 Units/kg (Adjusted), Intravenous, PRN, Tanner Rdz MD, 4,000 Units at 02/22/24 0731    heparin 25,000 units in dextrose 5% 250 mL (100 units/mL) infusion LOW INTENSITY nomogram - [facility], 0-40 Units/kg/hr (Adjusted), Intravenous, Continuous, Tanner Rdz MD, Last Rate: 10.5 mL/hr at 02/22/24 0957, 15 Units/kg/hr at 02/22/24 0957    melatonin tablet 6 mg, 6 mg, Oral, Nightly PRN, Tanner Rdz MD    nitroGLYCERIN SL tablet 0.4 mg, 0.4 mg, Sublingual, Q5 Min PRN, Tanner Rdz MD    ondansetron  disintegrating tablet 8 mg, 8 mg, Oral, Q8H PRN, Tanner Rdz MD    pantoprazole EC tablet 40 mg, 40 mg, Oral, Daily, Tanner Rdz MD, 40 mg at 02/22/24 1007    polyethylene glycol packet 17 g, 17 g, Oral, Daily, Tanner Rdz MD, 17 g at 02/22/24 1008    simethicone chewable tablet 80 mg, 1 tablet, Oral, TID PRN, Tanner Rdz MD, 80 mg at 02/21/24 2114    sodium chloride 0.9% flush 10 mL, 10 mL, Intravenous, PRN, Tanner Rdz MD  VTE Risk Mitigation (From admission, onward)           Ordered     heparin 25,000 units in dextrose 5% 250 mL (100 units/mL) infusion LOW INTENSITY nomogram - L[facility]  Continuous        Question:  Begin at (units/kg/hr)  Answer:  12    02/21/24 2056     IP VTE HIGH RISK PATIENT  Once         02/21/24 2057     Place sequential compression device  Until discontinued         02/21/24 2057     heparin 25,000 units in dextrose 5% (100 units/ml) IV bolus from bag LOW INTENSITY nomogram - [facility]  As needed (PRN)        Question:  Heparin Infusion Adjustment (DO NOT MODIFY ANSWER)  Answer:  [email or web address]\heparin LOW INTENSITY nomogram for [facility] [id number]    02/21/24 2056                  Assessment:   MV CAD   - LHC (2.19.24):pLAD lesion 70%, oLCx 80%, OM1 80%, OM2 1 lesion 70% 2nd lesion 50%, mLAD 50%, pRCA 60%  Aortic Insuffiencey    - ECHO (2.16.24): Aortic Valve: There is moderate aortic valve sclerosis. Severely restricted motion. There is severe stenosis. Aortic valve area by VTI is 0.66 cm². Aortic valve peak velocity is 4.45 m/s. Mean gradient is 50 mmHg. The dimensionless index is 0.17.   PAF   - [id number] = 5 points   - on Eliquis as an outpatient ~ currently on hold  Chronic combined heart failure   - ECHO (2.16.24): EF 40%, grade II diastolic dysfunction  VHD   - ECHO (2.16.24): Severe AS, mild MR, mild to moderate TR  Pulmonary HTN   - ECHO PASP 69mmHg   CKD stage 3b   - baseline Cr 1.6  HTN        Plan:    LHC & ECHO reviewed  Will consult CTS for CABG/AVR/LAAL evaluation  Cont. ASA 81mg & Statin   Cont. PO amiodarone   Hold Eliquis for now   Cont. Heparin infusion   AM labs: CBC, CMP, Mag  Will continue to follow       Thank you for your consult.     Millie Jimenez NP  Cardiology  [facility]  02/22/2024    Physician addendum:  I have seen and examined this patient as a split-shared visit with the [facility] d/t complicated medical management of above problems written in assessment and high acuity requiring physician expertise in medical decision-making. I performed the substantive portion of the history and exam. Above medical decision-making is also formulated by me.     line use  No edema  Mild distress    Films reviewed - distal LM, encroaching into OLAD and OLCx, with mid RCA severe lesions  Echo - sev AS with sev pulm HTN  H/o Afib    Plan:  MVCAD - CABG pending, med Rx for now. If turn down from surgery, for sev PAP, then impella with high risk bifurcating PCI to LM, LAD and LCx, with RCA  Sev. AS - AVR with upcoming CABG vs TAVR  H/o Afib - MOISE ligation with bypass  Sev. Pulm HTN - PFTs pending. May consider RHC today    All Montoya MD [facility]  Int. Cardiologist

## 2024-02-22 NOTE — DISCHARGE SUMMARY
U Internal Medicine Discharge Summary    Admitting Physician: Tanner Rdz MD  Attending Physician: Whit Omer MD  Date of Admit: 2/21/2024  Date of Discharge: 2/22/2024    Condition: Fair  Outcome:  Patient was transferred for procedure  DISPOSITION:  Transferred to [facility] for procedure evaluation          Discharge Diagnoses     Patient Active Problem List   Diagnosis    Mixed hyperlipidemia    Cardiomyopathy    Nonrheumatic aortic insufficiency with aortic stenosis    Chest pain    CAD (coronary artery disease)    Hypertension       Principal Problem:  CAD (coronary artery disease)    Consultants and Procedures     Consultants:  IP CONSULT TO CARDIOTHORACIC SURGERY  IP CONSULT TO CARDIOLOGY  IP CONSULT TO CARDIOTHORACIC SURGERY  IP CONSULT TO PULMONOLOGY  IP CONSULT TO CARDIAC REHAB    Procedures:   Procedure(s) (LRB):  INSERTION, CATHETER, RIGHT HEART (N/A)     Brief Admission History      Brain Snyder is a 78 y.o. male with PMH of aortic insufficiency with aortic stenosis, coronary artery disease, hypertension, atrial fibrillation on Eliquis presented to [facility] ED on 2/15/2024  with complaint of chest pain.   A  was used for the encounter.  His daughter was also present.  Patient states that chest pain started several days ago and got worse last night with intensity of 7/10.  Described location as retrosternal in his squeezing pain.  Denies any radiation, alleviating or aggravating factors, or associated symptoms like shortness of breath, cough, abdominal pain, nausea or vomiting.  Patient also complained about nocturia ongoing for months.  Denies any urinary retention.  He also mentioned of mild swelling with mild pain of his left ankle past 2 days but has now resolved.  Of note, patient [facility] and beginning of February 2024. During the recent admission also patient was noted to have AFib with RVR and was started on  Eliquis.  Patient has brought his discharge paperwork.  Some notable findings are NT-pro BNP of 5500 on 2/5/24, CT abdomen and pelvis/CTA chest findings of aortic valve calcification.  Ectasia of the ascending thoracic aorta measuring 3.6 cm.  Moderate noncalcified atheromatous change of the descending thoracic aorta.  Micronodule surface irregularity of the liver which may suggest cirrhotic morphology.     In the ED, vitals notable for hypotension 102/59, rest vital signs stable.  CBC remarkable for normocytic anemia, CMP remarkable for elevated renal indices of BUN/creatinine at 36.5/2.15.  INR elevated at 1.8.  BNP significant for 2145 (to note that decreased from the 5000 NT-pro BNP on 02/05/2024).  Troponin were elevated this visit at 0.166.  EKG showed normal sinus rhythm with vent rate of 64 and prolonged QTC of 571 millisecond.  Chest x-ray remarkable for mild cardiomegaly and some interstitial markings but no focal consolidative changes.  Hospital Medicine was consulted for admission and further management of NSTEMI.     Hospital Course with Pertinent Findings     Patient admitted to [facility] for NSTEMI.  He presented with a complaint of chest pain that was characterize as squeezing in the retrosternal area.  At that time his troponin was elevated 0.166 as well as BNP was elevated at 2145.  Per chart review it is also noted that patient was recently [facility] with a diagnosis of AFib, aortic insufficiency, and aortic stenosis.  Patient loaded with aspirin 325 and started on heparin drip.  Patient was given Lipitor 40 and troponins were trended as well as serial EKGs ordered.  Patient given a nitroglycerin tablet and home Eliquis was held.   An echo showed an ejection fraction of 40%, pulmonary hypertension, with moderate aortic valve sclerosis with severe restricted motion and severe stenosis of the aortic valve.  Cardiology was consulted which recommended continuing amiodarone.  During  "hospital stay patient also reported chest pain once again and a left heart catheterization was completed which showed several multivessel stenosis with most significant being in the 1st marginal 80% stenosis, proximal LAD lesion with 70%  stenosis in the OST circumflex and the proximal circumflex lesion at 80% stenosis.  Patient was transferred to [facility] for possible CABG and aortic valve replacement.    Discharge physical exam:  Vitals  BP: 100/63  Temp: 98.5 °F (36.9 °C)  Temp Source: Oral  Pulse: 63  Resp: 18  SpO2: (!) 93 %  Height: 5' 5" (165.1 cm)  Weight: 82.2 kg (181 lb 3.5 oz)    Physical Exam  Vitals and nursing note reviewed.   Constitutional:       Appearance: Normal appearance.   HENT:      Head: Normocephalic and atraumatic.   Eyes:      General: No scleral icterus.     Pupils: Pupils are equal, round, and reactive to light.   Cardiovascular:      Rate and Rhythm: Regular rhythm. Bradycardia present.      Pulses: Normal pulses.      Heart sounds: Murmur (Systolic in the aortic area, no carotid bruit heard) heard.   Pulmonary:      Effort: Pulmonary effort is normal. No respiratory distress.      Breath sounds: Normal breath sounds. No wheezing or rales.   Abdominal:      General: Abdomen is flat. Bowel sounds are normal. There is no distension.      Palpations: Abdomen is soft.      Tenderness: There is no abdominal tenderness.   Musculoskeletal:         General: Tenderness present. No swelling. Normal range of motion.      Right lower leg: No edema.      Left lower leg: No edema.      Comments: Tender, warm, erythematous left 1st MTP joint   Skin:     General: Skin is warm.      Capillary Refill: Capillary refill takes less than 2 seconds.   Neurological:      General: No focal deficit present.      Mental Status: He is alert and oriented to person, place, and time.   Psychiatric:         Mood and Affect: Mood normal.         Behavior: Behavior normal.         Thought Content: Thought content normal.    "      Judgment: Judgment normal.     TIME SPENT ON DISCHARGE: 60 minutes    Discharge Medications        Medication List        ASK your doctor about these medications      amiodarone 200 MG Tab  Commonly known as: PACERONE     atorvastatin 20 MG tablet  Commonly known as: LIPITOR     furosemide 40 MG tablet  Commonly known as: LASIX              Discharge Information:     Patient transferred to [facility] for evaluation of CABG versus TAVR.    Xi Levine M.D  [facility] Internal Medicine PGY-1

## 2024-02-22 NOTE — PROGRESS NOTES
[facility]  Hospital Medicine Progress Note        Chief Complaint: Inpatient Follow-up for  CABG evaluation.    HPI: 78-year-old male with a history of aortic insufficiency/stenosis, CAD, CKD IIIb, HTN AFib on Eliquis admitted to [facility] 02/15/2024 with chest pain, found to have NSTEMI (peak troponin 0.17) and underwent C 02/20/2024 showing severe multivessel stenosis and therefore was transferred here for CABG evaluation.     He arrived afebrile hemodynamically stable on a heparin drip.  He currently denies chest pain.  Consultations are being placed to Cardiothoracic surgery and Cardiology.  Currently at bedside patient is chest pain-free.    Interval Hx:   Cardiology and Cardiothoracic surgery following.  Continues on heparin drip    Objective/physical exam:  General: In no acute distress, afebrile  Chest: Clear to auscultation bilaterally  Heart: RRR, +S1, S2, no appreciable murmur  Abdomen: Soft, nontender, BS +  MSK: Warm, no lower extremity edema, no clubbing or cyanosis  Neurologic: Alert and oriented x4, Cranial nerve II-XII intact, Strength 5/5 in all 4 extremities    VITAL SIGNS: 24 HRS MIN & MAX LAST   Temp  Min: 97.4 °F (36.3 °C)  Max: 98.5 °F (36.9 °C) 97.4 °F (36.3 °C)   BP  Min: 95/53  Max: 128/68 120/70   Pulse  Min: 60  Max: 79  66   Resp  Min: 16  Max: 19 18   SpO2  Min: 92 %  Max: 100 % 98 %     I have reviewed the following labs:  Recent Labs   Lab 02/21/24  0347 02/21/24  1418 02/22/24  0515   WBC 8.09 10.07 8.84   RBC 4.15* 4.17* 4.31*   HGB 11.3* 11.5* 11.5*   HCT 36.7* 36.7* 38.8*   MCV 88.4 88.0 90.0   MCH 27.2 27.6 26.7*   MCHC 30.8* 31.3* 29.6*   RDW 15.6 15.3 15.5    291 292   MPV 11.0* 10.4 10.9*     Recent Labs   Lab 02/20/24  0449 02/21/24  0347 02/22/24  0515    138 138   K 4.6 4.3 4.9   CO2 21* 23 26   BUN 11.9 12.3 11.7   CREATININE 1.66* 1.63* 1.60*   CALCIUM 8.7* 8.7* 8.9   ALBUMIN 3.4 3.1* 3.4   ALKPHOS 72 68 71   ALT 16 16 18   AST 16  14 18   BILITOT 0.7 0.6 0.7     Microbiology Results (last 7 days)       Procedure Component Value Units Date/Time    MRSA Screen by PCR [[id number]]     Order Status: Sent Specimen: Nasopharyngeal Swab from Nasal              See below for Radiology    Scheduled Med:   allopurinoL  50 mg Oral Daily    amiodarone  200 mg Oral BID    aspirin  81 mg Oral Daily    atorvastatin  40 mg Oral QHS    [START ON 2/23/2024] ferrous sulfate  1 tablet Oral Every other day    folic acid  1 mg Oral Daily    furosemide (LASIX) injection  40 mg Intravenous Q12H    pantoprazole  40 mg Oral Daily    polyethylene glycol  17 g Oral Daily      Continuous Infusions:   heparin (porcine) in D5W 15 Units/kg/hr (02/22/24 1355)      PRN Meds:  acetaminophen, acetaminophen, ceFAZolin (Ancef) IV (PEDS and ADULTS), heparin (PORCINE), melatonin, mupirocin, nitroGLYCERIN, ondansetron, simethicone, sodium chloride 0.9%, sodium chloride 0.9%     Assessment/Plan:  NSTEMI on heparin drip, multivessel CAD, transferred for CABG eval  Aortic Insuffiencey , VHD -Severe AS, mild MR, mild to moderate TR  Chronic combined heart failure  PAF   Pulmonary hypertension  CKD, stage III  Hypertension    Plan   Continued on heparin drip, Cardiology, Cardiothoracic surgery on board.  For Cardiology, for severe aortic stenosis-consideration is AVR with upcoming CABG vs TAVR  We will follow up with Cardiothoracic surgery on the plan  Strict Is&Os, on IV diuretics b.i.d., Monitor  Electrolytes and renal function.   Continue to monitor on telemetry.   Continue supportive care and other appropriate home medications.      Critical care Time Spent>35 min  NSTEMI requiring heparin drip; heart failure on IV diuretics    VTE prophylaxis:  On heparin      Anticipated discharge and Disposition:   To be decided      All diagnosis and differential diagnosis have been reviewed; assessment and plan has been documented; I have personally reviewed the labs and test results that are  presently available; I have reviewed the patients medication list; I have reviewed the consulting providers response and recommendations. I have reviewed or attempted to review medical records based upon their availability    All of the patient's questions have been  addressed and answered. Patient's is agreeable to the above stated plan. I will continue to monitor closely and make adjustments to medical management as needed.  _____________________________________________________________________    Nutrition Status:    Radiology:  I have personally reviewed the following imaging and agree with the radiologist.     CT Chest Without Contrast  Narrative: EXAMINATION:  CT CHEST WITHOUT CONTRAST    CLINICAL HISTORY:  size and porcelain aorta;    TECHNIQUE:  Helically acquired axial images, sagittal and coronal reformations were obtained from the thoracic inlet to the lung bases without the IV administration of contrast.    Automated tube current modulation, weight-based exposure dosing, and/or iterative reconstruction technique utilized to reach lowest reasonably achievable exposure rate.    DLP: 184 mGy*cm    COMPARISON:  Chest radiograph 02/15/2024    FINDINGS:  BASE OF NECK: No significant abnormality.    AORTA: Mild to moderate aortic atherosclerosis.  Sino-tubular junction measures 2.9 cm.  Ascending aorta measures 3.8 cm in diameter.  Transverse arch measures 3.3 cm.  Descending aorta measures 2.9 cm at the level of the right pulmonary artery.    HEART: Mild cardiomegaly.  There are calcifications in the region the aortic valve.  There are coronary artery calcifications.    ROSEMARY/MEDIASTINUM: No enlarged lymph nodes by size criteria.  Evaluation of hilar lymphadenopathy is limited without intravenous contrast.    AIRWAYS: Patent.    LUNGS: There is septal thickening within the lungs.  Mild dependent atelectasis.    PLEURA: Dependently layering small bilateral pleural effusions.    UPPER ABDOMEN: The liver is  hyperdense as can be seen with certain medications such as amiodarone or with iron deposition.  Probable vicarious excretion of contrast at the gallbladder.    THORACIC SOFT TISSUES: Unremarkable.    BONES: No acute fracture. No suspicious lytic or sclerotic lesions.  Impression: Cardiomegaly with pulmonary interstitial edema and small pleural effusions    Mild to moderate aortic atherosclerosis    Calcification at the aortic valve.    The liver is hyperdense as can be seen with certain medications such as amiodarone or with iron deposition.    Electronically signed by: Elida Fiore  Date:    02/22/2024  Time:    16:57  Cardiac catheterization    Right heart catheterization demonstrating severe pulmonary hypertension   84/34 and PCWP 24 mmHg    Reduced cardiac output/cardiac index at 3.43/1.81 L/min/m2 with   pulmonary artery saturations 49.3%    For applied to the right femoral vein following removal of the 7 Ethiopian   sheath    The estimated blood loss was none.    The procedure log was documented by No documenter listed and verified by   Shreya Lomax MD.    Date: 2/22/2024  Time: 1:21 PM    Procedure:   Right heart catheterization    Indication:  Preoperative assessment prior to AVR    Consent: The patient was brought to the cardiac catheterization lab. Was   instructed and explained about the risk, benefit and alternatives of the   procedure included but not limited to sudden cardiac death, myocardial   infarction, bleeding, vascular injury, renal failure, stroke, contrast   allergy, risk of conscious sedation and need for emergent bypass surgery.    the patient was agreeable to proceed.  Signed the consent form.  time-out was completed verifying correct patient, procedure, site,   positioning, and special equipment if applicable.     Access:  The patient was prepped using the usual sterile fashion.  The Right common femoral venous  was accessed with micropuncture   technique, ultrasound guidance.  A 7  Upper sorbian venous sheath was  advanced   over a J-wire in anticipation for right heart catheterization     Hemodynamics:      Right heart catheterization performed showed the following:  RA= 10 mm Hg  RV= 80/6 mm Hg  PA= 84/34 mm Hg  PCWP= 24 mm Hg  AO saturation=95%  PA saturation= 49.3 %  Cardiac output/index(Nolvia)= 3.43/1.81 L/min/m2    Access management :    At the end of the procedure the venous sheath  was pulled manual pressure   was applied      Whit Omer MD  Department of Hospital Medicine   [facility]   02/22/2024

## 2024-02-22 NOTE — PLAN OF CARE
Problem: Adult Inpatient Plan of Care  Goal: Plan of Care Review  2/22/2024 0559 by Frida Cilne RN  Outcome: Ongoing, Progressing  2/22/2024 0557 by Frida Cline RN  Outcome: Ongoing, Progressing  Flowsheets (Taken 2/22/2024 0557)  Plan of Care Reviewed With:   patient   family  Goal: Patient-Specific Goal (Individualized)  2/22/2024 0559 by Frida Cline RN  Outcome: Ongoing, Progressing  2/22/2024 0557 by Frida Cline RN  Outcome: Ongoing, Progressing  Goal: Absence of Hospital-Acquired Illness or Injury  2/22/2024 0559 by Frida Cline RN  Outcome: Ongoing, Progressing  2/22/2024 0557 by Frida Cline RN  Outcome: Ongoing, Progressing  Goal: Optimal Comfort and Wellbeing  2/22/2024 0559 by Frida Cline RN  Outcome: Ongoing, Progressing  2/22/2024 0557 by Frida Cline RN  Outcome: Ongoing, Progressing  Intervention: Monitor Pain and Promote Comfort  Flowsheets (Taken 2/22/2024 0557)  Pain Management Interventions:   around-the-clock dosing utilized   quiet environment facilitated   position adjusted   medication offered   care clustered  Intervention: Provide Person-Centered Care  Flowsheets (Taken 2/22/2024 0557)  Trust Relationship/Rapport:   care explained   choices provided   reassurance provided   thoughts/feelings acknowledged   emotional support provided   empathic listening provided   questions encouraged  Goal: Readiness for Transition of Care  2/22/2024 0559 by Frida Cline RN  Outcome: Ongoing, Progressing  2/22/2024 0557 by Frida Cline RN  Outcome: Ongoing, Progressing     Problem: Fall Injury Risk  Goal: Absence of Fall and Fall-Related Injury  Intervention: Promote Injury-Free Environment  2/22/2024 0559 by Frida Cline RN  Flowsheets (Taken 2/22/2024 0559)  Safety Promotion/Fall Prevention:   assistive device/personal item within reach   nonskid shoes/socks when out of bed   pulse ox   supervised activity   medications reviewed   lighting adjusted   Fall Risk  reviewed with patient/family  2/22/2024 0557 by Frida Cline, RN  Flowsheets (Taken 2/22/2024 0557)  Safety Promotion/Fall Prevention:   assistive device/personal item within reach   nonskid shoes/socks when out of bed   lighting adjusted   medications reviewed   family to remain at bedside   Fall Risk reviewed with patient/family

## 2024-02-22 NOTE — NURSING
Patient heparin paused as he is transferred to cath lab.   1355: spoke to IRVIN Jimenez, GRANT, ok to restart heparin at previous rate of 15 units/kg. Lab is on way to draw PTT.

## 2024-02-22 NOTE — INTERVAL H&P NOTE
Patient name: Brain Snyder  MRN: [id number]  : 1945  Cath Lab Procedure H&P Update    Pre-Procedure Assessment:    I saw and examined the patient face to face. The patient has been re-evaluated and his condition is unchanged. The reason for admission, procedure and care is still present.  Based on the patients H&P, pre-procedure physical exam, relevant diagnostic studies, NPO status and information obtained from the patient, I determine the patient is an appropriate candidate for the proposed procedure and anesthesia planned. I further certify the anesthesia risks, benefits and options have been explained to the patient to which he agrees as documented on the procedural consent.

## 2024-02-23 LAB
ANION GAP SERPL CALC-SCNC: 11 MEQ/L
APTT PPP: 189.1 SECONDS (ref 23.2–33.7)
BASOPHILS # BLD AUTO: 0.06 X10(3)/MCL
BASOPHILS NFR BLD AUTO: 0.6 %
BUN SERPL-MCNC: 13.1 MG/DL (ref 8.4–25.7)
CALCIUM SERPL-MCNC: 9.1 MG/DL (ref 8.8–10)
CHLORIDE SERPL-SCNC: 103 MMOL/L (ref 98–107)
CO2 SERPL-SCNC: 24 MMOL/L (ref 23–31)
CREAT SERPL-MCNC: 1.86 MG/DL (ref 0.73–1.18)
CREAT/UREA NIT SERPL: 7
EOSINOPHIL # BLD AUTO: 0.29 X10(3)/MCL (ref 0–0.9)
EOSINOPHIL NFR BLD AUTO: 2.7 %
ERYTHROCYTE [DISTWIDTH] IN BLOOD BY AUTOMATED COUNT: 15.6 % (ref 11.5–17)
GFR SERPLBLD CREATININE-BSD FMLA CKD-EPI: 37 MLS/MIN/1.73/M2
GLUCOSE SERPL-MCNC: 82 MG/DL (ref 82–115)
HCT VFR BLD AUTO: 41.8 % (ref 42–52)
HGB BLD-MCNC: 12.5 G/DL (ref 14–18)
IMM GRANULOCYTES # BLD AUTO: 0.05 X10(3)/MCL (ref 0–0.04)
IMM GRANULOCYTES NFR BLD AUTO: 0.5 %
LACTATE SERPL-SCNC: 1.2 MMOL/L (ref 0.5–2.2)
LACTATE SERPL-SCNC: 1.2 MMOL/L (ref 0.5–2.2)
LEFT CCA DIST DIAS: 9 CM/S
LEFT CCA DIST SYS: 64 CM/S
LEFT CCA PROX DIAS: 8 CM/S
LEFT CCA PROX SYS: 51 CM/S
LEFT ECA DIAS: 0 CM/S
LEFT ECA SYS: 47 CM/S
LEFT ICA DIST DIAS: 11 CM/S
LEFT ICA DIST SYS: 58 CM/S
LEFT ICA MID DIAS: 12 CM/S
LEFT ICA MID SYS: 76 CM/S
LEFT ICA PROX DIAS: 13 CM/S
LEFT ICA PROX SYS: 63 CM/S
LEFT VERTEBRAL DIAS: 9 CM/S
LEFT VERTEBRAL SYS: 45 CM/S
LYMPHOCYTES # BLD AUTO: 1.98 X10(3)/MCL (ref 0.6–4.6)
LYMPHOCYTES NFR BLD AUTO: 18.5 %
MAGNESIUM SERPL-MCNC: 2.2 MG/DL (ref 1.6–2.6)
MCH RBC QN AUTO: 26.8 PG (ref 27–31)
MCHC RBC AUTO-ENTMCNC: 29.9 G/DL (ref 33–36)
MCV RBC AUTO: 89.5 FL (ref 80–94)
MONOCYTES # BLD AUTO: 0.81 X10(3)/MCL (ref 0.1–1.3)
MONOCYTES NFR BLD AUTO: 7.6 %
MRSA PCR SCRN (OHS): DETECTED
NEUTROPHILS # BLD AUTO: 7.53 X10(3)/MCL (ref 2.1–9.2)
NEUTROPHILS NFR BLD AUTO: 70.1 %
NRBC BLD AUTO-RTO: 0 %
OHS CV CAROTID RIGHT ICA EDV HIGHEST: 17
OHS CV CAROTID ULTRASOUND LEFT ICA/CCA RATIO: 1.19
OHS CV CAROTID ULTRASOUND RIGHT ICA/CCA RATIO: 1.2
OHS CV PV CAROTID LEFT HIGHEST CCA: 64
OHS CV PV CAROTID LEFT HIGHEST ICA: 76
OHS CV PV CAROTID RIGHT HIGHEST CCA: 59
OHS CV PV CAROTID RIGHT HIGHEST ICA: 71
OHS CV US CAROTID LEFT HIGHEST EDV: 13
OHS QRS DURATION: 176 MS
OHS QTC CALCULATION: 557 MS
PHOSPHATE SERPL-MCNC: 4.7 MG/DL (ref 2.3–4.7)
PLATELET # BLD AUTO: 295 X10(3)/MCL (ref 130–400)
PMV BLD AUTO: 11.2 FL (ref 7.4–10.4)
POTASSIUM SERPL-SCNC: 4.4 MMOL/L (ref 3.5–5.1)
RBC # BLD AUTO: 4.67 X10(6)/MCL (ref 4.7–6.1)
RIGHT CCA DIST DIAS: 10 CM/S
RIGHT CCA DIST SYS: 59 CM/S
RIGHT CCA PROX DIAS: 5 CM/S
RIGHT CCA PROX SYS: 50 CM/S
RIGHT ECA DIAS: 0 CM/S
RIGHT ECA SYS: 52 CM/S
RIGHT ICA DIST DIAS: 17 CM/S
RIGHT ICA DIST SYS: 71 CM/S
RIGHT ICA MID DIAS: 12 CM/S
RIGHT ICA MID SYS: 50 CM/S
RIGHT ICA PROX DIAS: 9 CM/S
RIGHT ICA PROX SYS: 53 CM/S
RIGHT VERTEBRAL DIAS: 1 CM/S
RIGHT VERTEBRAL SYS: 23 CM/S
SODIUM SERPL-SCNC: 138 MMOL/L (ref 136–145)
WBC # SPEC AUTO: 10.72 X10(3)/MCL (ref 4.5–11.5)

## 2024-02-23 PROCEDURE — C1769 GUIDE WIRE: HCPCS | Performed by: INTERNAL MEDICINE

## 2024-02-23 PROCEDURE — C1894 INTRO/SHEATH, NON-LASER: HCPCS | Performed by: INTERNAL MEDICINE

## 2024-02-23 PROCEDURE — 93005 ELECTROCARDIOGRAM TRACING: CPT

## 2024-02-23 PROCEDURE — 93451 RIGHT HEART CATH: CPT | Performed by: INTERNAL MEDICINE

## 2024-02-23 PROCEDURE — 85730 THROMBOPLASTIN TIME PARTIAL: CPT | Performed by: INTERNAL MEDICINE

## 2024-02-23 PROCEDURE — 83735 ASSAY OF MAGNESIUM: CPT | Performed by: INTERNAL MEDICINE

## 2024-02-23 PROCEDURE — 80048 BASIC METABOLIC PNL TOTAL CA: CPT | Performed by: INTERNAL MEDICINE

## 2024-02-23 PROCEDURE — 83605 ASSAY OF LACTIC ACID: CPT | Performed by: INTERNAL MEDICINE

## 2024-02-23 PROCEDURE — 63600175 PHARM REV CODE 636 W HCPCS: Performed by: INTERNAL MEDICINE

## 2024-02-23 PROCEDURE — 85347 COAGULATION TIME ACTIVATED: CPT | Performed by: INTERNAL MEDICINE

## 2024-02-23 PROCEDURE — 25000003 PHARM REV CODE 250: Performed by: INTERNAL MEDICINE

## 2024-02-23 PROCEDURE — 99153 MOD SED SAME PHYS/QHP EA: CPT | Performed by: INTERNAL MEDICINE

## 2024-02-23 PROCEDURE — 02HA3RZ INSERTION OF SHORT-TERM EXTERNAL HEART ASSIST SYSTEM INTO HEART, PERCUTANEOUS APPROACH: ICD-10-PCS | Performed by: INTERNAL MEDICINE

## 2024-02-23 PROCEDURE — 93010 ELECTROCARDIOGRAM REPORT: CPT | Mod: ,,, | Performed by: INTERNAL MEDICINE

## 2024-02-23 PROCEDURE — 25500020 PHARM REV CODE 255: Performed by: INTERNAL MEDICINE

## 2024-02-23 PROCEDURE — 87641 MR-STAPH DNA AMP PROBE: CPT | Performed by: INTERNAL MEDICINE

## 2024-02-23 PROCEDURE — 84100 ASSAY OF PHOSPHORUS: CPT | Performed by: INTERNAL MEDICINE

## 2024-02-23 PROCEDURE — 20000000 HC ICU ROOM

## 2024-02-23 PROCEDURE — 63600175 PHARM REV CODE 636 W HCPCS: Performed by: NURSE PRACTITIONER

## 2024-02-23 PROCEDURE — C1751 CATH, INF, PER/CENT/MIDLINE: HCPCS | Performed by: INTERNAL MEDICINE

## 2024-02-23 PROCEDURE — 99152 MOD SED SAME PHYS/QHP 5/>YRS: CPT | Performed by: INTERNAL MEDICINE

## 2024-02-23 PROCEDURE — 85025 COMPLETE CBC W/AUTO DIFF WBC: CPT | Performed by: INTERNAL MEDICINE

## 2024-02-23 PROCEDURE — 4A023N8 MEASUREMENT OF CARDIAC SAMPLING AND PRESSURE, BILATERAL, PERCUTANEOUS APPROACH: ICD-10-PCS | Performed by: INTERNAL MEDICINE

## 2024-02-23 PROCEDURE — 27801859 OPTIME CATHETER, IMPELLA: Performed by: INTERNAL MEDICINE

## 2024-02-23 PROCEDURE — 5A0221D ASSISTANCE WITH CARDIAC OUTPUT USING IMPELLER PUMP, CONTINUOUS: ICD-10-PCS | Performed by: INTERNAL MEDICINE

## 2024-02-23 PROCEDURE — 33990 INSJ PERQ VAD L HRT ARTERIAL: CPT | Performed by: INTERNAL MEDICINE

## 2024-02-23 PROCEDURE — 27000221 HC OXYGEN, UP TO 24 HOURS

## 2024-02-23 PROCEDURE — B2111ZZ FLUOROSCOPY OF MULTIPLE CORONARY ARTERIES USING LOW OSMOLAR CONTRAST: ICD-10-PCS | Performed by: INTERNAL MEDICINE

## 2024-02-23 RX ORDER — MIDAZOLAM HYDROCHLORIDE 1 MG/ML
INJECTION, SOLUTION INTRAMUSCULAR; INTRAVENOUS
Status: DISCONTINUED | OUTPATIENT
Start: 2024-02-23 | End: 2024-02-23 | Stop reason: HOSPADM

## 2024-02-23 RX ORDER — LIDOCAINE HYDROCHLORIDE 20 MG/ML
JELLY TOPICAL ONCE
Status: DISCONTINUED | OUTPATIENT
Start: 2024-02-23 | End: 2024-03-07

## 2024-02-23 RX ORDER — HYDROMORPHONE HYDROCHLORIDE 2 MG/ML
0.5 INJECTION, SOLUTION INTRAMUSCULAR; INTRAVENOUS; SUBCUTANEOUS EVERY 4 HOURS PRN
Status: DISCONTINUED | OUTPATIENT
Start: 2024-02-23 | End: 2024-02-26

## 2024-02-23 RX ORDER — FENTANYL CITRATE 50 UG/ML
INJECTION, SOLUTION INTRAMUSCULAR; INTRAVENOUS
Status: DISCONTINUED | OUTPATIENT
Start: 2024-02-23 | End: 2024-02-23 | Stop reason: HOSPADM

## 2024-02-23 RX ORDER — HEPARIN SODIUM 1000 [USP'U]/ML
INJECTION, SOLUTION INTRAVENOUS; SUBCUTANEOUS
Status: DISCONTINUED | OUTPATIENT
Start: 2024-02-23 | End: 2024-02-23 | Stop reason: HOSPADM

## 2024-02-23 RX ORDER — HEPARIN SODIUM 10000 [USP'U]/100ML
3 INJECTION, SOLUTION INTRAVENOUS CONTINUOUS
Status: DISCONTINUED | OUTPATIENT
Start: 2024-02-23 | End: 2024-02-24

## 2024-02-23 RX ORDER — LIDOCAINE HYDROCHLORIDE 10 MG/ML
INJECTION, SOLUTION EPIDURAL; INFILTRATION; INTRACAUDAL; PERINEURAL
Status: DISCONTINUED | OUTPATIENT
Start: 2024-02-23 | End: 2024-02-23 | Stop reason: HOSPADM

## 2024-02-23 RX ADMIN — SIMETHICONE 80 MG: 80 TABLET, CHEWABLE ORAL at 05:02

## 2024-02-23 RX ADMIN — AMIODARONE HYDROCHLORIDE 200 MG: 200 TABLET ORAL at 09:02

## 2024-02-23 RX ADMIN — HYDROMORPHONE HYDROCHLORIDE 0.5 MG: 2 INJECTION INTRAMUSCULAR; INTRAVENOUS; SUBCUTANEOUS at 05:02

## 2024-02-23 RX ADMIN — HYDROMORPHONE HYDROCHLORIDE 0.5 MG: 2 INJECTION INTRAMUSCULAR; INTRAVENOUS; SUBCUTANEOUS at 09:02

## 2024-02-23 RX ADMIN — SODIUM BICARBONATE: 84 INJECTION, SOLUTION INTRAVENOUS at 02:02

## 2024-02-23 RX ADMIN — FUROSEMIDE 40 MG: 10 INJECTION, SOLUTION INTRAMUSCULAR; INTRAVENOUS at 09:02

## 2024-02-23 RX ADMIN — HYDROMORPHONE HYDROCHLORIDE 0.5 MG: 2 INJECTION INTRAMUSCULAR; INTRAVENOUS; SUBCUTANEOUS at 01:02

## 2024-02-23 RX ADMIN — HEPARIN SODIUM 10 UNITS/KG/HR: 10000 INJECTION, SOLUTION INTRAVENOUS at 12:02

## 2024-02-23 RX ADMIN — ATORVASTATIN CALCIUM 40 MG: 40 TABLET, FILM COATED ORAL at 09:02

## 2024-02-23 RX ADMIN — ONDANSETRON 8 MG: 4 TABLET, ORALLY DISINTEGRATING ORAL at 01:02

## 2024-02-23 NOTE — H&P
Critical Care Medicine History and Physical Note   [facility] - [facility] ICU      Patient Name: Brain Snyder  MRN: [id number]  Admission Date: 2/21/2024  Hospital Length of Stay: 2 days  Code Status: Full Code  Attending Provider: Pablo Yepez MD  Primary Care Provider: Nidia Shepherd NP   Principal Problem: CAD (coronary artery disease)        HPI:  The patient is a 78-year-old [place]ian-speaking male with a history of aortic insufficiency/stenosis, coronary artery disease, chronic kidney disease stage 3, hypertension, atrial fibrillation on Eliquis, who presented to the ICU status post going to the cath lab for PCI.  Patient was transferred from [facility] after being admitted there on 2/15 with chest pain found to have NSTEMI with left heart catheterization initially on 02/20 demonstrating severe multivessel stenosis and was subsequently transferred here for CABG evaluation.  Patient was taken to the cath lab today and subsequently had an Impella placed and was transferred to the ICU with Impella currently set at P7.  Patient is awake alert moves all extremities follows commands with a [place]ian language barrier.  He has no complaints at this time.        Past Medical History:   Diagnosis Date    A-fib     Aortic insufficiency     CAD (coronary artery disease)     Hypertension          Past Surgical History:   Procedure Laterality Date    CORONARY ANGIOGRAPHY N/A 2/20/2024    Procedure: ANGIOGRAM, CORONARY ARTERY;  Surgeon: Vasile Callahan MD;  Location: [facility] CATH LAB;  Service: Cardiology;  Laterality: N/A;    RIGHT HEART CATHETERIZATION N/A 2/22/2024    Procedure: INSERTION, CATHETER, RIGHT HEART;  Surgeon: Shreya Lomax MD;  Location: [facility] CATH LAB;  Service: Cardiology;  Laterality: N/A;         Social History     Socioeconomic History    Marital status:    Tobacco Use    Smoking status: Former     Types: Cigarettes    Smokeless tobacco: Never   Substance and  Sexual Activity    Alcohol use: Not Currently    Drug use: Not Currently     Social Determinants of Health     Financial Resource Strain: Low Risk  (2/16/2024)    Overall Financial Resource Strain (CARDIA)     Difficulty of Paying Living Expenses: Not hard at all   Food Insecurity: No Food Insecurity (2/16/2024)    Hunger Vital Sign     Worried About Running Out of Food in the Last Year: Never true     Ran Out of Food in the Last Year: Never true   Transportation Needs: No Transportation Needs (2/16/2024)    PRAPARE - Transportation     Lack of Transportation (Medical): No     Lack of Transportation (Non-Medical): No   Physical Activity: Inactive (2/16/2024)    Exercise Vital Sign     Days of Exercise per Week: 0 days     Minutes of Exercise per Session: 0 min   Stress: Stress Concern Present (2/16/2024)    [employer] - Occupational Stress Questionnaire     Feeling of Stress : To some extent   Social Connections: Socially Integrated (2/22/2024)    Social Connection and Isolation Panel [NHANES]     Frequency of Communication with Friends and Family: Twice a week     Frequency of Social Gatherings with Friends and Family: Twice a week     Attends Protestant Services: 1 to 4 times per year     Active Member of Clubs or Organizations: Yes     Attends Club or Organization Meetings: 1 to 4 times per year     Marital Status:    Housing Stability: Low Risk  (2/16/2024)    Housing Stability Vital Sign     Unable to Pay for Housing in the Last Year: No     Number of Places Lived in the Last Year: 1     Unstable Housing in the Last Year: No         No family history on file.      Drug Allergies:   Review of patient's allergies indicates:  No Known Allergies      Current Infusions:   heparin (porcine) 12,500 Units in dextrose 5 % (D5W) 500 mL infusion      heparin (porcine) in 5 % dex 10 Units/kg/hr (02/23/24 1221)    sodium bicarbonate 25 mEq in dextrose 5 % (D5W) 1,000 mL infusion            Scheduled Medications:     allopurinoL  50 mg Oral Daily    amiodarone  200 mg Oral BID    aspirin  81 mg Oral Daily    atorvastatin  40 mg Oral QHS    ferrous sulfate  1 tablet Oral Every other day    folic acid  1 mg Oral Daily    furosemide (LASIX) injection  40 mg Intravenous Q12H    LIDOcaine HCl 2%   Mucous Membrane Once    pantoprazole  40 mg Oral Daily    polyethylene glycol  17 g Oral Daily         PRN Medications:   acetaminophen, acetaminophen, HYDROmorphone, melatonin, nitroGLYCERIN, ondansetron, simethicone, sodium chloride 0.9%, sodium chloride 0.9%      Review of Systems   Constitutional:  Negative for chills, diaphoresis, fever, malaise/fatigue and weight loss.   HENT:  Negative for congestion, ear discharge, ear pain, hearing loss, nosebleeds and sore throat.    Eyes:  Negative for blurred vision, double vision, pain, discharge and redness.   Respiratory:  Negative for cough, hemoptysis, sputum production, shortness of breath, wheezing and stridor.    Cardiovascular:  Negative for chest pain, palpitations, orthopnea, claudication, leg swelling and PND.   Gastrointestinal:  Negative for abdominal pain, blood in stool, constipation, diarrhea, heartburn, melena, nausea and vomiting.   Genitourinary:  Negative for dysuria and hematuria.   Musculoskeletal:  Negative for back pain, joint pain, myalgias and neck pain.   Skin:  Negative for itching and rash.   Neurological:  Negative for dizziness, focal weakness, weakness and headaches.   Endo/Heme/Allergies:  Does not bruise/bleed easily.         Vital Signs:    Vitals:    02/23/24 1358   BP:    Pulse:    Resp: 16   Temp:          Fluid Balance:   No intake or output data in the 24 hours ending 02/23/24 1404      Physical Exam  Vitals and nursing note reviewed.   Constitutional:       General: He is not in acute distress.     Appearance: Normal appearance. He is not ill-appearing or toxic-appearing.   HENT:      Head: Normocephalic and atraumatic.  "     Right Ear: External ear normal.      Left Ear: External ear normal.      Nose: Nose normal.      Mouth/Throat:      Mouth: Mucous membranes are moist.      Pharynx: Oropharynx is clear. No oropharyngeal exudate or posterior oropharyngeal erythema.   Eyes:      General: No scleral icterus.     Extraocular Movements: Extraocular movements intact.      Conjunctiva/sclera: Conjunctivae normal.      Pupils: Pupils are equal, round, and reactive to light.   Neck:      Vascular: No carotid bruit.   Cardiovascular:      Rate and Rhythm: Normal rate and regular rhythm.      Pulses: Normal pulses.      Heart sounds: Normal heart sounds. No murmur heard.     No friction rub. No gallop.      Comments: Impella in place in right groin  Pulmonary:      Effort: Pulmonary effort is normal. No respiratory distress.      Breath sounds: Normal breath sounds. No wheezing, rhonchi or rales.   Abdominal:      General: Abdomen is flat. Bowel sounds are normal. There is no distension.      Palpations: Abdomen is soft.      Tenderness: There is no abdominal tenderness. There is no guarding or rebound.   Genitourinary:     Comments: deferred  Musculoskeletal:         General: No swelling or deformity. Normal range of motion.      Cervical back: Normal range of motion and neck supple. No rigidity or tenderness.   Lymphadenopathy:      Cervical: No cervical adenopathy.   Skin:     General: Skin is warm and dry.      Capillary Refill: Capillary refill takes less than 2 seconds.      Coloration: Skin is not jaundiced.      Findings: No bruising, lesion or rash.   Neurological:      General: No focal deficit present.      Mental Status: He is alert.      Sensory: No sensory deficit.      Motor: No weakness.   Psychiatric:         Mood and Affect: Mood normal.           Ventilator  Oxygen Concentration (%): 98 (02/22/24 1626)      Laboratory Studies:     No results for input(s): "PH", "PCO2", "PO2", "HCO3", "POCSATURATED", "BE" in the last 24 " hours.    Recent Labs   Lab 02/23/24  0716   WBC 10.72   RBC 4.67*   HGB 12.5*   HCT 41.8*      MCV 89.5   MCH 26.8*   MCHC 29.9*       Recent Labs   Lab 02/23/24  0716   GLUCOSE 82      K 4.4   CO2 24   BUN 13.1   CREATININE 1.86*   CALCIUM 9.1   MG 2.20         Microbiology Data:   Microbiology Results (last 7 days)       Procedure Component Value Units Date/Time    MRSA Screen by PCR [[id number]]     Order Status: Sent Specimen: Nasopharyngeal Swab from Nasal             Imaging reviewed:  X-Ray Chest AP Portable  Narrative: EXAMINATION:  XR CHEST AP PORTABLE    CLINICAL HISTORY:  Impella;    TECHNIQUE:  Single frontal view of the chest was performed.    COMPARISON:  None    FINDINGS:  Examination reveals mediastinal silhouette to be within normal limits cardiac silhouette is enlarged there is increase in interstitial and pulmonary vascular markings indicating the degree of pulmonary vascular congestion and cardiac decompensation.    There has been interval insertion of a ventricular assist device is type catheter with the tip in the ventricle.    Otherwise no focal consolidative changes no other abnormalities identified  Impression: Changes of pulmonary vascular congestion and cardiac decompensation.    Mild cardiomegaly.    Interval insertion of Impella catheter    Electronically signed by: Ananth Coker  Date:    02/23/2024  Time:    12:04  CV Ultrasound Bilateral Doppler Carotid  The bilateral internal carotid artery demonstrated less than 50% stenosis.     The bilateral vertebral arteries were patent with antegrade flow.   Cardiac catheterization    The estimated blood loss was between 50 mL and 150 mL.    Successful placement of an Impella CP    Procedure:   Left heart catheterization  Right heart catheterization  The Impella insertion( CP)  Moderate (Conscious) Sedation        Indication:  Acute HF, valvular HF, NSTEMI, MVCAD     Consent: The patient was brought to the cardiac  "catheterization lab. Was   instructed and explained about the risk, benefit and alternatives of the   procedure included but not limited to sudden cardiac death, myocardial   infarction, bleeding, vascular injury, renal failure, stroke, contrast   allergy, risk of conscious sedation and need for emergent bypass surgery.    the patient was agreeable to proceed.  Signed the consent form.  time-out was completed verifying correct patient, procedure, site,   positioning, and special equipment if applicable.     Sedation: Moderate (Conscious) Sedation performed with fentanyl and   Versed.      Access:  The patient was prepped using the usual sterile fashion.     Right common femoral artery.The right common femoral artery angiogram   showed adequate entry of the femoral artery above the bifurcation below   the inferior epigastric vessel.  was accessed with micropuncture   technique, ultrasound guidance. RCFV access gained with 7Fr system with US   guidance.  An image of the ultrasound was put in the paper chart for   purpose of documentation.  Sheath size:14 F      The 7  F Venous sheath was inserted using ultrasound guidance   micropuncture technique in the R CF vein         Hemodynamics:                      Right heart catheterization performed showed the following:    PA= 61/24 (27) mm Hg  PCWP=  31/26 (27) mm Hg  AO saturation= 93 % RA  PA saturation= 60% with Impella (49% yesterday)    (02/22/24) -  0.5     Following that we elected to advance the Impella via right common femoral   artery approach:    - Abiomed stiff wire  - 14 Dominican peel-away sheath  - Heparin 10,000 unit bolus given peripherally  - Dilator removed  - 0.035" J-wire  - 6 Dominican pigtail catheter positioned in the left ventricle  - Abiomed 0.025" wire  - Impella CP positioned in left ventricle  - Pulsatile motor current (appropriate positioning)  - Placed the marker just below the aortic valve  - Cardiac output was 2.8 L/min provided by the " device.  Impella was at   84cm        Access Closure :    Sheath sutured to the groin  Secured.     Attestation:I was present for and supervised all key portions of the   procedure     Impression/plan:    Successful Impella insertion and swan-Chelo in the setting of Acute HF/low   cardiac output/precardiogenic shock/Critcal-severe AS/MVCAD - LM distal    The procedure log was documented by No documenter listed and verified by   All Montoya MD.    Date: 2/23/2024  Time: 9:46 AM  Cardiac catheterization    Right heart catheterization demonstrating severe pulmonary hypertension   84/34 and PCWP 24 mmHg    Reduced cardiac output/cardiac index at 3.43/1.81 L/min/m2 with   pulmonary artery saturations 49.3%    For applied to the right femoral vein following removal of the 7 Polish   sheath    The estimated blood loss was none.    The procedure log was documented by No documenter listed and verified by   Shreya Lomax MD.    Date: 2/22/2024  Time: 1:21 PM    Procedure:   Right heart catheterization    Indication:  Preoperative assessment prior to AVR    Consent: The patient was brought to the cardiac catheterization lab. Was   instructed and explained about the risk, benefit and alternatives of the   procedure included but not limited to sudden cardiac death, myocardial   infarction, bleeding, vascular injury, renal failure, stroke, contrast   allergy, risk of conscious sedation and need for emergent bypass surgery.    the patient was agreeable to proceed.  Signed the consent form.  time-out was completed verifying correct patient, procedure, site,   positioning, and special equipment if applicable.     Access:  The patient was prepped using the usual sterile fashion.  The Right common femoral venous  was accessed with micropuncture   technique, ultrasound guidance.  A 7 Polish venous sheath was  advanced   over a J-wire in anticipation for right heart catheterization     Hemodynamics:      Right heart catheterization  "performed showed the following:  RA= 10 mm Hg  RV= 80/6 mm Hg  PA= 84/34 mm Hg  PCWP= 24 mm Hg  AO saturation=95%  PA saturation= 49.3 %  Cardiac output/index(Nolvia)= 3.43/1.81 L/min/m2    Access management :    At the end of the procedure the venous sheath  was pulled manual pressure   was applied        2D ECHO Results    No results found in the last 24 hours.       Pulmonary Functions Testing Results:    No results found for: "FEV1", "FVC", "RDH5PNM", "TLC", "DLCO"      Assessment and Plan:    Assessment:  Multivessel coronary artery disease   Moderate Aortic insufficiency   Severe aortic stenosis   Paroxysmal atrial fibrillation on Eliquis  Severe pulmonary hypertension, PASP of 69 mmHg  Acute on chronic systolic and diastolic CHF, left ventricular ejection fraction 40% with grade 2 diastolic dysfunction   Mild mitral regurgitation   Mild-to-moderate tricuspid regurgitation   Chronic kidney disease stage 3   Hypertension        Plan:  -currently on Impella set to P 7 continue for now per Cardiology   -patient to be evaluated by CV surgery for consideration for bypass multivessel coronary disease but is a poor candidate currently due to severe pulmonary hypertension   -plan is to leave the patient on Impella over the course of the weekend and re-evaluate on Monday determine if his pulmonary pressures are more appropriate  -hold Eliquis for now per Cardiology   -continue aspirin statin   -diurese aggressively with Lasix   -May plan for high-risk PCI with Impella versus CABG  -continue heparin drip  -continue p.o. amiodarone      DVT ppx/tx with heparin      Pablo Yepez MD  2/23/2024  Pulmonology/Critical Care    "

## 2024-02-23 NOTE — NURSING
Patient had very small nose bleed. Patient son at bedside and stated it happens every time he is in the hospital on oxygen. Patient nasal passage dry. Added humidified oxygen at 3L/nc. No further bleeding noted.

## 2024-02-23 NOTE — PROGRESS NOTES
[facility] - [facility] Medical Telemetry    Cardiology  Progress Note    Patient Name: Brain Snyder  MRN: [id number]  Admission Date: 2/21/2024  Hospital Length of Stay: 2 days  Code Status: Full Code   Attending Physician: Whit Omer MD   Primary Care Physician: Nidia Shepherd NP  Expected Discharge Date:   Principal Problem:CAD (coronary artery disease)    Subjective:     Reason for Consult:  CAD     HPI: 78-year-old female that is unknown to [facility] with a PMHx of PAF on Eliquis Aortic insufficiency, MV CAD, HTN. He is Tongan speaking and an  was used for interview. He was orginally admitted to [facility] on 2.15.24 with CP & NSTEMI and underwent a LHC on 2.20.24 taht showed MV CAD (reported noted below) He was transferred to [facility] on 2.21.24 for a CABG/AVR evaluation. On arrive he was hemodynamically stable on a heparin infusion. He denied chest pain, SOB, and nausea on current exam, [facility] was consulted for CAD    Hospital Course:   2.23.24: Patient seen resting in bed. He denies SOB or CP. He remains on heparin infusion. Family at bedside     PMH: PAF (on Eliquis), Aortic insufficiency, MV CAD, HTN  PSH: [facility]  Family History: none reported  Social History: former smoker, denies ETOH or illicit drug us    Previous Diagnostics:  RHC (2.22.24):  Right heart catheterization demonstrating severe pulmonary hypertension 84/34 and PCWP 24 mmHg  Reduced cardiac output/cardiac index at 3.43/1.81 L/min/m2 with pulmonary artery saturations 49.3%  For applied to the right femoral vein following removal of the 7 [place] sheath  The estimated blood loss was none.    Carotid US (2.22.24):  The bilateral internal carotid artery demonstrated less than 50% stenosis.   The bilateral vertebral arteries were patent with antegrade flow    LHC (2.20.24):   Prox LAD lesion was 70% stenosed.  Ost Cx to Prox Cx lesion was 80% stenosed.  1st Mrg lesion was 80% stenosed.  2nd Mrg-1 lesion was 70% stenosed.  2nd Mrg-2  lesion was 50% stenosed.  Mid LAD lesion was 50% stenosed.  Prox RCA lesion was 60% stenosed.  estimated blood loss was <50 mL.  There was three vessel coronary artery disease.  LVEDP: 30mmHg  Recommendations:   Refer for CABG evaluation and AVR   Preformed by Dr. Flannery at [facility]     ECHO (2.15.24):  Left Ventricle: The left ventricle is normal in size. Mildly increased ventricular mass. Mildly increased wall thickness. There is mild eccentric hypertrophy. Moderate global hypokinesis present. Septal motion is consistent with bundle branch block. There is moderately reduced systolic function. Biplane (2D) method of discs ejection fraction is 40%. Grade II diastolic dysfunction.  Right Ventricle: Right ventricular enlargement. Systolic function is normal.  Left Atrium: Left atrium is severely dilated.  Right Atrium: Right atrium is moderately dilated.  Aortic Valve: There is moderate aortic valve sclerosis. Severely restricted motion. There is severe stenosis. Aortic valve area by VTI is 0.66 cm². Aortic valve peak velocity is 4.45 m/s. Mean gradient is 50 mmHg. The dimensionless index is 0.17. There is mild to moderate aortic regurgitation.  Mitral Valve: Mildly calcified leaflets. There is no stenosis. There is mild regurgitation.  Tricuspid Valve: The tricuspid valve is structurally normal. There is mild to moderate regurgitation.  Pulmonic Valve: There is no significant regurgitation.  Aorta: Aortic root is upper limit of normal measuring 3.6 cm.  Pulmonary Artery: There is severe pulmonary hypertension. The estimated pulmonary artery systolic pressure is 69 mmHg.  IVC/SVC: Intermediate venous pressure at 8 mmHg.  Pericardium: There is no pericardial effusion.       Review of Systems   Constitutional: Negative for chills and fever.   Cardiovascular:  Negative for chest pain, leg swelling and palpitations.   Respiratory:  Negative for cough and shortness of breath.    Gastrointestinal:  Negative for nausea and vomiting.      Objective:     Vital Signs (Most Recent):  Temp: 97.7 °F (36.5 °C) (02/23/24 0003)  Pulse: 61 (02/23/24 0003)  Resp: 18 (02/22/24 1211)  BP: 118/67 (02/23/24 0003)  SpO2: 99 % (02/23/24 0003) Vital Signs (24h Range):  Temp:  [97.4 °F (36.3 °C)-98.5 °F (36.9 °C)] 97.7 °F (36.5 °C)  Pulse:  [60-75] 61  Resp:  [16-18] 18  SpO2:  [92 %-100 %] 99 %  BP: (100-128)/(53-70) 118/67   Weight: 78.2 kg (172 lb 6.4 oz)  Body mass index is 28.69 kg/m².  SpO2: 99 %     No intake or output data in the 24 hours ending 02/23/24 0800  Lines/Drains/Airways       Peripheral Intravenous Line  Duration                  Peripheral IV - Double Lumen 02/17/24 1430 20 G Anterior;Distal;Left Forearm 5 days         Peripheral IV - Double Lumen 02/20/24 0031 20 G Anterior;Right Forearm 3 days                  Significant Labs:   Recent Results (from the past 72 hour(s))   PSA, Screening    Collection Time: 02/20/24  1:10 PM   Result Value Ref Range    Prostate Specific Antigen 3.09 <=4.00 ng/mL   Osmolality, Serum    Collection Time: 02/20/24  1:10 PM   Result Value Ref Range    Osmolality 290 280 - 300 mOsm/kg   HIV 1/2 Ag/Ab (4th Gen)    Collection Time: 02/20/24  1:10 PM   Result Value Ref Range    HIV Nonreactive Nonreactive   Hepatitis Panel, Acute    Collection Time: 02/20/24  1:10 PM   Result Value Ref Range    Hepatitis A IgM Nonreactive Nonreactive    Hepatitis B Core IgM Nonreactive Nonreactive    Hepatitis B Surface Antigen Nonreactive Nonreactive    Hep C Ab Interp Nonreactive Nonreactive   Ferritin    Collection Time: 02/20/24  1:10 PM   Result Value Ref Range    Ferritin Level 225.79 21.81 - 274.66 ng/mL   Folate    Collection Time: 02/20/24  1:10 PM   Result Value Ref Range    Folate Level 10.7 7.0 - 31.4 ng/mL   Iron and TIBC    Collection Time: 02/20/24  1:10 PM   Result Value Ref Range    Iron Binding Capacity Unsaturated 217 69 - 240 ug/dL    Iron Level 45 (L) 65 - 175 ug/dL    Transferrin 245 163 - 344 mg/dL    Iron  Binding Capacity Total 262 250 - 450 ug/dL    Iron Saturation 17 (L) 20 - 50 %   Reticulocytes    Collection Time: 02/20/24  1:10 PM   Result Value Ref Range    Retic Cnt Auto 3.94 (H) 1.1 - 2.1 %    RET# 0.1651 (H) 0.026 - 0.095 x10e6/uL   Vitamin B12    Collection Time: 02/20/24  1:10 PM   Result Value Ref Range    Vitamin B12 Level 825 (H) 213 - 816 pg/mL   Light Blue Top Hold    Collection Time: 02/20/24  1:35 PM   Result Value Ref Range    Extra Tube Hold for add-ons.    POCT glucose    Collection Time: 02/20/24  8:37 PM   Result Value Ref Range    POCT Glucose 145 (H) 70 - 110 mg/dL   Comprehensive Metabolic Panel    Collection Time: 02/21/24  3:47 AM   Result Value Ref Range    Sodium Level 138 136 - 145 mmol/L    Potassium Level 4.3 3.5 - 5.1 mmol/L    Chloride 109 (H) 98 - 107 mmol/L    Carbon Dioxide 23 23 - 31 mmol/L    Glucose Level 86 82 - 115 mg/dL    Blood Urea Nitrogen 12.3 8.4 - 25.7 mg/dL    Creatinine 1.63 (H) 0.73 - 1.18 mg/dL    Calcium Level Total 8.7 (L) 8.8 - 10.0 mg/dL    Protein Total 6.2 5.8 - 7.6 gm/dL    Albumin Level 3.1 (L) 3.4 - 4.8 g/dL    Globulin 3.1 2.4 - 3.5 gm/dL    Albumin/Globulin Ratio 1.0 (L) 1.1 - 2.0 ratio    Bilirubin Total 0.6 <=1.5 mg/dL    Alkaline Phosphatase 68 40 - 150 unit/L    Alanine Aminotransferase 16 0 - 55 unit/L    Aspartate Aminotransferase 14 5 - 34 unit/L    eGFR 43 mls/min/1.73/m2   CBC with Differential    Collection Time: 02/21/24  3:47 AM   Result Value Ref Range    WBC 8.09 4.50 - 11.50 x10(3)/mcL    RBC 4.15 (L) 4.70 - 6.10 x10(6)/mcL    Hgb 11.3 (L) 14.0 - 18.0 g/dL    Hct 36.7 (L) 42.0 - 52.0 %    MCV 88.4 80.0 - 94.0 fL    MCH 27.2 27.0 - 31.0 pg    MCHC 30.8 (L) 33.0 - 36.0 g/dL    RDW 15.6 11.5 - 17.0 %    Platelet 302 130 - 400 x10(3)/mcL    MPV 11.0 (H) 7.4 - 10.4 fL    Neut % 62.8 %    Lymph % 20.8 %    Mono % 9.6 %    Eos % 5.6 %    Basophil % 0.5 %    Lymph # 1.68 0.6 - 4.6 x10(3)/mcL    Neut # 5.08 2.1 - 9.2 x10(3)/mcL    Mono # 0.78 0.1  - 1.3 x10(3)/mcL    Eos # 0.45 0 - 0.9 x10(3)/mcL    Baso # 0.04 <=0.2 x10(3)/mcL    IG# 0.06 (H) 0 - 0.04 x10(3)/mcL    IG% 0.7 %    NRBC% 0.0 %   POCT glucose    Collection Time: 02/21/24  8:18 AM   Result Value Ref Range    POCT Glucose 102 70 - 110 mg/dL   POCT glucose    Collection Time: 02/21/24 12:10 PM   Result Value Ref Range    POCT Glucose 85 70 - 110 mg/dL   APTT    Collection Time: 02/21/24  2:18 PM   Result Value Ref Range    PTT 34.2 (H) 23.2 - 33.7 seconds   Protime-INR    Collection Time: 02/21/24  2:18 PM   Result Value Ref Range    PT 14.6 (H) 11.4 - 14.0 seconds    INR 1.1 <=1.3   CBC with Differential    Collection Time: 02/21/24  2:18 PM   Result Value Ref Range    WBC 10.07 4.50 - 11.50 x10(3)/mcL    RBC 4.17 (L) 4.70 - 6.10 x10(6)/mcL    Hgb 11.5 (L) 14.0 - 18.0 g/dL    Hct 36.7 (L) 42.0 - 52.0 %    MCV 88.0 80.0 - 94.0 fL    MCH 27.6 27.0 - 31.0 pg    MCHC 31.3 (L) 33.0 - 36.0 g/dL    RDW 15.3 11.5 - 17.0 %    Platelet 291 130 - 400 x10(3)/mcL    MPV 10.4 7.4 - 10.4 fL    Neut % 76.3 %    Lymph % 10.7 %    Mono % 8.3 %    Eos % 3.7 %    Basophil % 0.5 %    Lymph # 1.08 0.6 - 4.6 x10(3)/mcL    Neut # 7.68 2.1 - 9.2 x10(3)/mcL    Mono # 0.84 0.1 - 1.3 x10(3)/mcL    Eos # 0.37 0 - 0.9 x10(3)/mcL    Baso # 0.05 <=0.2 x10(3)/mcL    IG# 0.05 (H) 0 - 0.04 x10(3)/mcL    IG% 0.5 %    NRBC% 0.0 %   POCT glucose    Collection Time: 02/21/24  5:20 PM   Result Value Ref Range    POCT Glucose 123 (H) 70 - 110 mg/dL   APTT    Collection Time: 02/21/24 10:11 PM   Result Value Ref Range    PTT 60.1 (H) 23.2 - 33.7 seconds   Protein/Creatinine Ratio, Urine    Collection Time: 02/22/24 12:59 AM   Result Value Ref Range    Urine Protein Level <6.8 mg/dL    Urine Creatinine 31.6 (L) 63.0 - 166.0 mg/dL   Potassium, Random Urine    Collection Time: 02/22/24 12:59 AM   Result Value Ref Range    Urine Potassium 18.5 mmol/L   Osmolality, Urine    Collection Time: 02/22/24 12:59 AM   Result Value Ref Range    Urine  Osmolality 162 (L) 300 - 1,300 mOsm/kg   Creatinine, Random Urine    Collection Time: 02/22/24 12:59 AM   Result Value Ref Range    Urine Creatinine 31.5 (L) 63.0 - 166.0 mg/dL   Sodium, Random Urine    Collection Time: 02/22/24 12:59 AM   Result Value Ref Range    Urine Sodium 37.0 mmol/L   Urea Nitrogen, Random Urine    Collection Time: 02/22/24 12:59 AM   Result Value Ref Range    Urine Urea Nitrogen 117.0 mg/dL   APTT    Collection Time: 02/22/24  5:15 AM   Result Value Ref Range    PTT 43.5 (H) 23.2 - 33.7 seconds   Comprehensive metabolic panel    Collection Time: 02/22/24  5:15 AM   Result Value Ref Range    Sodium Level 138 136 - 145 mmol/L    Potassium Level 4.9 3.5 - 5.1 mmol/L    Chloride 107 98 - 107 mmol/L    Carbon Dioxide 26 23 - 31 mmol/L    Glucose Level 89 82 - 115 mg/dL    Blood Urea Nitrogen 11.7 8.4 - 25.7 mg/dL    Creatinine 1.60 (H) 0.73 - 1.18 mg/dL    Calcium Level Total 8.9 8.8 - 10.0 mg/dL    Protein Total 6.2 5.8 - 7.6 gm/dL    Albumin Level 3.4 3.4 - 4.8 g/dL    Globulin 2.8 2.4 - 3.5 gm/dL    Albumin/Globulin Ratio 1.2 1.1 - 2.0 ratio    Bilirubin Total 0.7 <=1.5 mg/dL    Alkaline Phosphatase 71 40 - 150 unit/L    Alanine Aminotransferase 18 0 - 55 unit/L    Aspartate Aminotransferase 18 5 - 34 unit/L    eGFR 44 mls/min/1.73/m2   CBC with Differential    Collection Time: 02/22/24  5:15 AM   Result Value Ref Range    WBC 8.84 4.50 - 11.50 x10(3)/mcL    RBC 4.31 (L) 4.70 - 6.10 x10(6)/mcL    Hgb 11.5 (L) 14.0 - 18.0 g/dL    Hct 38.8 (L) 42.0 - 52.0 %    MCV 90.0 80.0 - 94.0 fL    MCH 26.7 (L) 27.0 - 31.0 pg    MCHC 29.6 (L) 33.0 - 36.0 g/dL    RDW 15.5 11.5 - 17.0 %    Platelet 292 130 - 400 x10(3)/mcL    MPV 10.9 (H) 7.4 - 10.4 fL    Neut % 64.1 %    Lymph % 20.9 %    Mono % 9.0 %    Eos % 4.6 %    Basophil % 0.8 %    Lymph # 1.85 0.6 - 4.6 x10(3)/mcL    Neut # 5.66 2.1 - 9.2 x10(3)/mcL    Mono # 0.80 0.1 - 1.3 x10(3)/mcL    Eos # 0.41 0 - 0.9 x10(3)/mcL    Baso # 0.07 <=0.2 x10(3)/mcL     IG# 0.05 (H) 0 - 0.04 x10(3)/mcL    IG% 0.6 %    NRBC% 0.0 %   POCT glucose    Collection Time: 02/22/24 11:08 AM   Result Value Ref Range    POCT Glucose 153 (H) 70 - 110 mg/dL   PTT Heparin Monitoring    Collection Time: 02/22/24  2:11 PM   Result Value Ref Range    PTT Heparin Monitor 67.0 (H) 23.2 - 33.7 seconds   Prepare RBC 4 Units; w    Collection Time: 02/22/24  3:13 PM   Result Value Ref Range    UNIT NUMBER [id number]     UNIT ABO/RH O POS     DISPENSE STATUS Selected     Unit Expiration [id number]     Product Code H9233Y94     Unit Blood Type Code 5100     CROSSMATCH INTERPRETATION Compatible     UNIT NUMBER [id number]     UNIT ABO/RH O POS     DISPENSE STATUS Selected     Unit Expiration [id number]     Product Code T8781T38     Unit Blood Type Code 5100     CROSSMATCH INTERPRETATION Compatible     UNIT NUMBER [id number]     UNIT ABO/RH O POS     DISPENSE STATUS Selected     Unit Expiration [id number]     Product Code E1278P11     Unit Blood Type Code 5100     CROSSMATCH INTERPRETATION Compatible     UNIT NUMBER [id number]     UNIT ABO/RH O POS     DISPENSE STATUS Selected     Unit Expiration [id number]     Product Code V5359P30     Unit Blood Type Code 5100     CROSSMATCH INTERPRETATION Compatible    Type & Screen    Collection Time: 02/22/24  3:13 PM   Result Value Ref Range    Group & Rh O POS     Indirect Ugo GEL NEG     Specimen Outdate 02/25/2024 23:59    POCT glucose    Collection Time: 02/22/24  5:13 PM   Result Value Ref Range    POCT Glucose 122 (H) 70 - 110 mg/dL   ABORH RETYPE    Collection Time: 02/22/24  6:07 PM   Result Value Ref Range    ABORH Retype O POS    PTT Heparin Monitoring    Collection Time: 02/22/24  6:07 PM   Result Value Ref Range    PTT Heparin Monitor 62.3 (H) 23.2 - 33.7 seconds   PTT Heparin Monitoring    Collection Time: 02/22/24  7:55 PM   Result Value Ref Range    PTT Heparin Monitor 80.8 (H) 23.2 - 33.7 seconds   CV Ultrasound Bilateral  Doppler Carotid    Collection Time: 02/22/24  8:35 PM   Result Value Ref Range    Right CCA prox sys 50 cm/s    Right CCA prox das 5 cm/s    Right CCA dist sys 59 cm/s    Right CCA dist das 10 cm/s    Right ICA prox sys 53 cm/s    Right ICA prox das 9 cm/s    Right ICA mid sys 50 cm/s    Right ICA mid das 12 cm/s    Right ICA dist sys 71 cm/s    Right ICA dist das 17 cm/s    Right ECA sys 52 cm/s    Right ECA das 0 cm/s    Right vertebral sys 23 cm/s    Right vertebral das 1 cm/s    Right ICA/CCA ratio 1.20     Right Highest ICA 71.00     Right Highest EDV 17.00     Right Highest CCA 59     Left CCA prox sys 51 cm/s    Left CCA prox das 8 cm/s    Left CCA dist sys 64 cm/s    Left CCA dist das 9 cm/s    Left ICA prox sys 63 cm/s    Left ICA prox das 13 cm/s    Left ICA mid sys 76 cm/s    Left ICA mid das 12 cm/s    Left ICA dist sys 58 cm/s    Left ICA dist das 11 cm/s    Left ECA sys 47 cm/s    Left ECA das 0 cm/s    Left vertebral sys 45 cm/s    Left vertebral das 9 cm/s    Left ICA/CCA ratio 1.19     Left Highest ICA 76.00     LT Highest EDV 13.00     Left Highest CCA 64    CBC with Differential    Collection Time: 02/23/24  7:16 AM   Result Value Ref Range    WBC 10.72 4.50 - 11.50 x10(3)/mcL    RBC 4.67 (L) 4.70 - 6.10 x10(6)/mcL    Hgb 12.5 (L) 14.0 - 18.0 g/dL    Hct 41.8 (L) 42.0 - 52.0 %    MCV 89.5 80.0 - 94.0 fL    MCH 26.8 (L) 27.0 - 31.0 pg    MCHC 29.9 (L) 33.0 - 36.0 g/dL    RDW 15.6 11.5 - 17.0 %    Platelet 295 130 - 400 x10(3)/mcL    MPV 11.2 (H) 7.4 - 10.4 fL    Neut % 70.1 %    Lymph % 18.5 %    Mono % 7.6 %    Eos % 2.7 %    Basophil % 0.6 %    Lymph # 1.98 0.6 - 4.6 x10(3)/mcL    Neut # 7.53 2.1 - 9.2 x10(3)/mcL    Mono # 0.81 0.1 - 1.3 x10(3)/mcL    Eos # 0.29 0 - 0.9 x10(3)/mcL    Baso # 0.06 <=0.2 x10(3)/mcL    IG# 0.05 (H) 0 - 0.04 x10(3)/mcL    IG% 0.5 %    NRBC% 0.0 %     Telemetry:  NSR    Physical Exam  HENT:      Head: Normocephalic.      Nose: Nose normal.    Cardiovascular:      Rate and Rhythm: Normal rate and regular rhythm.      Pulses: Normal pulses.      Heart sounds: Murmur heard.   Pulmonary:      Breath sounds: Rhonchi present.   Abdominal:      General: Abdomen is flat.   Skin:     General: Skin is warm.   Neurological:      Mental Status: He is alert and oriented to person, place, and time.   Psychiatric:         Mood and Affect: Mood normal.         Behavior: Behavior normal.         Judgment: Judgment normal.       Current Inpatient Medications:    Current Facility-Administered Medications:     acetaminophen tablet 650 mg, 650 mg, Oral, Q6H PRN, Tanner Rdz MD    acetaminophen tablet 650 mg, 650 mg, Oral, Q4H PRN, Tanner Rdz MD, 650 mg at 02/21/24 2122    allopurinol split tablet 50 mg, 50 mg, Oral, Daily, Tanner Rdz MD, 50 mg at 02/22/24 1007    amiodarone tablet 200 mg, 200 mg, Oral, BID, Tanner Rdz MD, 200 mg at 02/22/24 2134    aspirin EC tablet 81 mg, 81 mg, Oral, Daily, Tanner Rdz MD, 81 mg at 02/22/24 1009    atorvastatin tablet 40 mg, 40 mg, Oral, QHS, Tanner Rdz MD, 40 mg at 02/22/24 2134    cefazolin (ANCEF) 2 gram in dextrose 5% 50 mL IVPB (premix), 2 g, Intravenous, On Call Procedure, Tin Chow PA    ferrous sulfate tablet 1 each, 1 tablet, Oral, Every other day, Tanner Rdz MD    folic acid tablet 1 mg, 1 mg, Oral, Daily, Tanner Rdz MD, 1 mg at 02/22/24 1009    furosemide injection 40 mg, 40 mg, Intravenous, Q12H, Millie Jimenez NP, 40 mg at 02/22/24 2134    heparin 25,000 units in dextrose 5% (100 units/ml) IV bolus from bag LOW INTENSITY nomogram - [facility], 57.3 Units/kg (Adjusted), Intravenous, PRN, Tanner Rdz MD, 4,000 Units at 02/22/24 0731    heparin 25,000 units in dextrose 5% 250 mL (100 units/mL) infusion LOW INTENSITY nomogram - [facility], 0-40 Units/kg/hr (Adjusted), Intravenous, Continuous, Tanner Rdz MD, Last Rate:  10.5 mL/hr at 02/22/24 1355, 15 Units/kg/hr at 02/22/24 1355    melatonin tablet 6 mg, 6 mg, Oral, Nightly PRN, Tanner Rdz MD, 6 mg at 02/22/24 2207    mupirocin 2 % ointment, , Nasal, On Call Procedure, Tin Chow, PA    nitroGLYCERIN SL tablet 0.4 mg, 0.4 mg, Sublingual, Q5 Min PRN, Tanner Rdz MD    ondansetron disintegrating tablet 8 mg, 8 mg, Oral, Q8H PRN, Tanner Rdz MD, 8 mg at 02/22/24 1700    pantoprazole EC tablet 40 mg, 40 mg, Oral, Daily, Tanner Rdz MD, 40 mg at 02/22/24 1007    polyethylene glycol packet 17 g, 17 g, Oral, Daily, Tanner Rdz MD, 17 g at 02/22/24 1008    simethicone chewable tablet 80 mg, 1 tablet, Oral, TID PRN, Tanner Rdz MD, 80 mg at 02/21/24 2114    sodium chloride 0.9% flush 10 mL, 10 mL, Intravenous, PRN, Tanner Rdz MD    sodium chloride 0.9% flush 10 mL, 10 mL, Intravenous, PRN, Millie Jimenez NP  VTE Risk Mitigation (From admission, onward)           Ordered     IP VTE HIGH RISK PATIENT  Once         02/22/24 1458     Place SUSIE hose  Until discontinued         02/22/24 1458     heparin 25,000 units in dextrose 5% 250 mL (100 units/mL) infusion LOW INTENSITY nomogram - [facility]  Continuous        Question:  Begin at (units/kg/hr)  Answer:  12    02/21/24 2056     Place sequential compression device  Until discontinued         02/21/24 2057     heparin 25,000 units in dextrose 5% (100 units/ml) IV bolus from bag LOW INTENSITY nomogram - [facility]  As needed (PRN)        Question:  Heparin Infusion Adjustment (DO NOT MODIFY ANSWER)  Answer:  [email or web address]\[email or web address]\heparin LOW INTENSITY nomogram for [facility] [id number]    02/21/24 2056                  Assessment:   MV CAD   - C (2.19.24):pLAD lesion 70%, oLCx 80%, OM1 80%, OM2 1 lesion 70% 2nd lesion 50%, mLAD 50%, pRCA 60%  Aortic Insuffiencey    - ECHO (2.16.24): Aortic Valve: There is moderate aortic valve sclerosis. Severely  restricted motion. There is severe stenosis. Aortic valve area by VTI is 0.66 cm². Aortic valve peak velocity is 4.45 m/s. Mean gradient is 50 mmHg. The dimensionless index is 0.17.   Pulmonary HTN   - ECHO PASP 69mmHg    - RHC (2.22.24): PA 84/34, PCWP 24 mmHd  PAF   - [id number] = 5 points   - on Eliquis as an outpatient ~ currently on hold  Chronic combined heart failure   - ECHO (2.16.24): EF 40%, grade II diastolic dysfunction  VHD   - ECHO (2.16.24): Severe AS, mild MR, mild to moderate TR  CKD stage 3b   - baseline Cr 1.6  HTN      Plan:   LHC, ECHO, RHC reviewed   CTS following for CABG/AVR/LAAL evaluation ~ not a canidate at this time due to poor severe pulmonary HTN  Cont. ASA 81mg & Statin   Cont. PO amiodarone   Cont. IV lasix 40mg BID  Hold Eliquis for now   Cont. Heparin infusion   Obtain Accurate I&Os and daily weight   Will plan for Impella insertion today with swan placement   Risk and benefits discussed with the patient and are willing to proceed ~ informed consent placed on chart   Plan to reevaluate if patient is a candidate for CABG/AVR or needs high risk PCI with impella support for  PCI to LM, LAD, & RCA  AM labs: CBC, CMP, Mag              Millie Jimenez NP  Cardiology  [facility] - [facility] Medical Telemetry  02/23/2024    Physician addendum:  I have seen and examined this patient as a split-shared visit with the ARLEY d/t complicated medical management of above problems written in assessment and high acuity requiring physician expertise in medical decision-making. I performed the substantive portion of the history and exam. Above medical decision-making is also formulated by me.    Cardiovascular exam:  S1, S2, Reg-reg  Lungs:  Fine crackles at bases.  Extremities:  1+ Edema bilaterally    Plan:  Patient with sev/Crit AS,  critical LM/ MVCAD, reduced EF, severe pulmonary hypertension with  0.5 and PA sats 49%. Patient attempted to diurese overnight with minimal output.   Patient with severe risk of decompensation for the patient hemodynamics, improved coronary blood flow and reduce heart failure symptoms.  The patient on the cusp cardiogenic shock due to multiple cardiac issues.  Long discussion with family today who states agreement with Impella.  Monday patient either to go for high-risk PCI with Dr. Quintero or coronary bypass grafting.  Continue with ACS management    All Montoya MD [facility]  [facility]. Cardiologist

## 2024-02-23 NOTE — PROCEDURES
"Brain Snyder is a 78 y.o. male patient.    Temp: 97.7 °F (36.5 °C) (02/23/24 0003)  Pulse: 61 (02/23/24 0003)  Resp: 16 (02/23/24 1358)  BP: 118/67 (02/23/24 0003)  SpO2: 99 % (02/23/24 0003)  Weight: 78.2 kg (172 lb 6.4 oz) (02/23/24 0629)  Height: 5' 5" (165.1 cm) (02/21/24 2005)  Mallampati Scale: Class III  ASA Classification: Class 3    Bladder Cath    Date/Time: 2/23/2024 4:00 PM  Location procedure was performed: [facility]    Performed by: Nicol Diaz AGACNP-BC  Authorized by: Nicol Diaz AGACNP-BC  Pre-operative diagnosis: urethral stricture  Post-operative diagnosis: urethral stricture  Indications: urethral obstruction    Patient sedated: no  Preparation: Patient was prepped and draped in the usual sterile fashion.  Catheter insertion: indwelling  Catheter type: Gustafson ([place])  Catheter size: 16 Fr  Complicated insertion: yes  Altered anatomy: yes  Altered anatomy details: urethral stricture  Bladder irrigation: no  Urine characteristics: clear and yellow  Technical procedures used: guidewire advanced into bladder without difficulty; urethra dilated up to 18fr and 16fr [place] gustafson placed over guidewire  Complications: No  Guidewire: guidewire removed intactPatient tolerance: Patient tolerated the procedure well with no immediate complications          2/23/2024      Speeg:  The patient is seen and examined in Nicol's note is reviewed.  I agree with her assessment and plan.  Successful dilation and Gustafson catheter placement.  Urine is clear.  We will continue to monitor  "

## 2024-02-23 NOTE — CONSULTS
Brain Claudio 1945  [id number]  2/23/2024    CONSULTING PHYSICIAN: Dr. Yepez    REASON FOR CONSULTATION:  Unable to place urinary catheter     HPI:  The patient is a 78-year-old Citizen of [place]-speaking male with a history of aortic stenosis, CAD, CKD, hypertension, atrial fibrillation on Eliquis who was admitted to [facility] ICU today after PCI, Impella placed.  He initially presented to [facility] on 02/15/2024 with chest pain and was diagnosed with NSTEMI.  Nursing has attempted multiple catheter placements without success.  Urology has been consulted for Nguyen placement.  He was urinating without difficulty prior to arrival.  No known history of urologic surgeries.  He is afebrile and hemodynamically stable.  Lab work today reveals WBC 10.72, H&H 12.5/41.8, BUN and creatinine 13.1/1.86.  UA from 02/19/2024 with clear light yellow urine, negative blood, negative nitrites, negative leukocytes, 0-5 RBC and WBC, no bacteria.    Past Medical History:   Diagnosis Date    A-fib     Aortic insufficiency     CAD (coronary artery disease)     Hypertension      Past Surgical History:   Procedure Laterality Date    CORONARY ANGIOGRAPHY N/A 2/20/2024    Procedure: ANGIOGRAM, CORONARY ARTERY;  Surgeon: Vasile Callahan MD;  Location: [facility] CATH LAB;  Service: Cardiology;  Laterality: N/A;    RIGHT HEART CATHETERIZATION N/A 2/22/2024    Procedure: INSERTION, CATHETER, RIGHT HEART;  Surgeon: Shreya Lomax MD;  Location: [facility] CATH LAB;  Service: Cardiology;  Laterality: N/A;     No family history on file.  Social History     Tobacco Use    Smoking status: Former     Types: Cigarettes    Smokeless tobacco: Never   Substance Use Topics    Alcohol use: Not Currently    Drug use: Not Currently     Current Facility-Administered Medications   Medication Dose Route Frequency Provider Last Rate Last Admin    acetaminophen tablet 650 mg  650 mg Oral Q6H PRN Tanner Rdz MD        acetaminophen tablet 650 mg  650 mg Oral  Q4H PRN Tanner Rdz MD   650 mg at 02/21/24 2122    allopurinol split tablet 50 mg  50 mg Oral Daily Tanner Rdz MD   50 mg at 02/22/24 1007    amiodarone tablet 200 mg  200 mg Oral BID Tanner Rdz MD   200 mg at 02/22/24 2134    aspirin EC tablet 81 mg  81 mg Oral Daily Tanner Rdz MD   81 mg at 02/22/24 1009    atorvastatin tablet 40 mg  40 mg Oral QHS Tanner Rdz MD   40 mg at 02/22/24 2134    ferrous sulfate tablet 1 each  1 tablet Oral Every other day Tanner Rdz MD        folic acid tablet 1 mg  1 mg Oral Daily Tanner Rdz MD   1 mg at 02/22/24 1009    furosemide injection 40 mg  40 mg Intravenous Q12H Millie Jimenez NP   40 mg at 02/22/24 2134    heparin (porcine) 12,500 Units in dextrose 5 % (D5W) 500 mL infusion   Impella Device Continuous All Montoya MD        heparin 25,000 units in dextrose 5% 250 mL (100 units/mL) infusion (heparin infusion - NO NOMOGRAM)  3 Units/kg/hr (Adjusted) Intravenous Continuous All Montoya MD 6.8 mL/hr at 02/23/24 1221 10 Units/kg/hr at 02/23/24 1221    HYDROmorphone (PF) injection 0.5 mg  0.5 mg Intravenous Q4H PRN Pablo Yepez MD   0.5 mg at 02/23/24 1358    LIDOcaine HCl 2% urojet   Mucous Membrane Once Nicol Diaz, AGACNP-BC        melatonin tablet 6 mg  6 mg Oral Nightly PRN Tanner Rdz MD   6 mg at 02/22/24 2207    nitroGLYCERIN SL tablet 0.4 mg  0.4 mg Sublingual Q5 Min PRN Tanner Rdz MD        ondansetron disintegrating tablet 8 mg  8 mg Oral Q8H PRN Tanner Rdz MD   8 mg at 02/23/24 1302    pantoprazole EC tablet 40 mg  40 mg Oral Daily Tanner Rdz MD   40 mg at 02/22/24 1007    polyethylene glycol packet 17 g  17 g Oral Daily Tanner Rdz MD   17 g at 02/22/24 1008    simethicone chewable tablet 80 mg  1 tablet Oral TID PRN Tanner Rdz MD   80 mg at 02/21/24 2114    sodium bicarbonate 25 mEq in dextrose 5 %  (D5W) 1,000 mL infusion   Intravenous Continuous Simone Mora MD 10 mL/hr at 02/23/24 1458 New Bag at 02/23/24 1458    sodium chloride 0.9% flush 10 mL  10 mL Intravenous PRN Tanner Rdz MD        sodium chloride 0.9% flush 10 mL  10 mL Intravenous PRN Millie Jimenez NP         Review of patient's allergies indicates:  No Known Allergies    ROS: 12 point review of systems negative other than the HPI    PHYSICAL EXAM:  Vitals:    02/22/24 2135 02/23/24 0003 02/23/24 0629 02/23/24 1358   BP:  118/67     Pulse: 75 61     Resp:    16   Temp:  97.7 °F (36.5 °C)     TempSrc:  Oral     SpO2:  99%     Weight:   78.2 kg (172 lb 6.4 oz)    Height:         No intake or output data in the 24 hours ending 02/23/24 1554    GEN: WN/WD NAD  HEENT: normocephalic, atraumatic, PERRLA, EOMI, OP clear, nares patent  CV: RRR  RESP: Even and unlabored  ABD:  Soft, NT ND  :  Urethral stricture dilated and 16 [place] catheter placed with clear yellow urine draining to  bag following placement.  Please see procedure note for additional details.  EXT: no C/C/E; Impella in place in right groin  NEURO: no focal deficits, MAEW, AAOx4    LABS:  Recent Results (from the past 24 hour(s))   POCT glucose    Collection Time: 02/22/24  5:13 PM   Result Value Ref Range    POCT Glucose 122 (H) 70 - 110 mg/dL   ABORH RETYPE    Collection Time: 02/22/24  6:07 PM   Result Value Ref Range    ABORH Retype O POS    PTT Heparin Monitoring    Collection Time: 02/22/24  6:07 PM   Result Value Ref Range    PTT Heparin Monitor 62.3 (H) 23.2 - 33.7 seconds   PTT Heparin Monitoring    Collection Time: 02/22/24  7:55 PM   Result Value Ref Range    PTT Heparin Monitor 80.8 (H) 23.2 - 33.7 seconds   CV Ultrasound Bilateral Doppler Carotid    Collection Time: 02/22/24  8:35 PM   Result Value Ref Range    Right CCA prox sys 50 cm/s    Right CCA prox das 5 cm/s    Right CCA dist sys 59 cm/s    Right CCA dist das 10 cm/s    Right ICA prox sys 53  cm/s    Right ICA prox das 9 cm/s    Right ICA mid sys 50 cm/s    Right ICA mid das 12 cm/s    Right ICA dist sys 71 cm/s    Right ICA dist das 17 cm/s    Right ECA sys 52 cm/s    Right ECA das 0 cm/s    Right vertebral sys 23 cm/s    Right vertebral das 1 cm/s    Right ICA/CCA ratio 1.20     Right Highest ICA 71.00     Right Highest EDV 17.00     Right Highest CCA 59     Left CCA prox sys 51 cm/s    Left CCA prox das 8 cm/s    Left CCA dist sys 64 cm/s    Left CCA dist das 9 cm/s    Left ICA prox sys 63 cm/s    Left ICA prox das 13 cm/s    Left ICA mid sys 76 cm/s    Left ICA mid das 12 cm/s    Left ICA dist sys 58 cm/s    Left ICA dist das 11 cm/s    Left ECA sys 47 cm/s    Left ECA das 0 cm/s    Left vertebral sys 45 cm/s    Left vertebral das 9 cm/s    Left ICA/CCA ratio 1.19     Left Highest ICA 76.00     LT Highest EDV 13.00     Left Highest CCA 64    MRSA PCR    Collection Time: 02/23/24 12:59 AM   Result Value Ref Range    MRSA PCR SCRN ([facility]) Detected (A) Not Detected   Basic Metabolic Panel    Collection Time: 02/23/24  7:16 AM   Result Value Ref Range    Sodium Level 138 136 - 145 mmol/L    Potassium Level 4.4 3.5 - 5.1 mmol/L    Chloride 103 98 - 107 mmol/L    Carbon Dioxide 24 23 - 31 mmol/L    Glucose Level 82 82 - 115 mg/dL    Blood Urea Nitrogen 13.1 8.4 - 25.7 mg/dL    Creatinine 1.86 (H) 0.73 - 1.18 mg/dL    BUN/Creatinine Ratio 7     Calcium Level Total 9.1 8.8 - 10.0 mg/dL    Anion Gap 11.0 mEq/L    eGFR 37 mls/min/1.73/m2   Magnesium    Collection Time: 02/23/24  7:16 AM   Result Value Ref Range    Magnesium Level 2.20 1.60 - 2.60 mg/dL   Phosphorus    Collection Time: 02/23/24  7:16 AM   Result Value Ref Range    Phosphorus Level 4.7 2.3 - 4.7 mg/dL   PTT Heparin Monitoring    Collection Time: 02/23/24  7:16 AM   Result Value Ref Range    PTT Heparin Monitor 189.1 (HH) 23.2 - 33.7 seconds   CBC with Differential    Collection Time: 02/23/24  7:16 AM   Result Value Ref Range    WBC  10.72 4.50 - 11.50 x10(3)/mcL    RBC 4.67 (L) 4.70 - 6.10 x10(6)/mcL    Hgb 12.5 (L) 14.0 - 18.0 g/dL    Hct 41.8 (L) 42.0 - 52.0 %    MCV 89.5 80.0 - 94.0 fL    MCH 26.8 (L) 27.0 - 31.0 pg    MCHC 29.9 (L) 33.0 - 36.0 g/dL    RDW 15.6 11.5 - 17.0 %    Platelet 295 130 - 400 x10(3)/mcL    MPV 11.2 (H) 7.4 - 10.4 fL    Neut % 70.1 %    Lymph % 18.5 %    Mono % 7.6 %    Eos % 2.7 %    Basophil % 0.6 %    Lymph # 1.98 0.6 - 4.6 x10(3)/mcL    Neut # 7.53 2.1 - 9.2 x10(3)/mcL    Mono # 0.81 0.1 - 1.3 x10(3)/mcL    Eos # 0.29 0 - 0.9 x10(3)/mcL    Baso # 0.06 <=0.2 x10(3)/mcL    IG# 0.05 (H) 0 - 0.04 x10(3)/mcL    IG% 0.5 %    NRBC% 0.0 %   Echo    Collection Time: 02/23/24 11:08 AM   Result Value Ref Range    TAPSE 1.85 cm   Lactic Acid, Plasma    Collection Time: 02/23/24  1:07 PM   Result Value Ref Range    Lactic Acid Level 1.2 0.5 - 2.2 mmol/L       IMAGING:  No urologic imaging    ASSESSMENT:  -urethral stricture status post dilation and Nguyen placement       PLAN:  -continue indwelling Nguyen for now.  Once patient's acute issues are improved and he is mobilizing, okay for void trial.  Please call as needed with any issues.  Discussed with nursing      GRANT Kwon:  The patient is seen and examined in Nicol's note is reviewed.  I agree with her assessment and plan.  Successful dilation and Nguyen catheter placement.  Urine is clear.  We will continue to monitor

## 2024-02-23 NOTE — CARE UPDATE
Unable to see the patient today. No acute events reported to the MD.Pt left for Cardiac procedure before the MD evaluated the patient this AM. Staff was notified that the plan is Impella insertion today and would be potentially  transferred to the Intensive Care Unit after that.

## 2024-02-23 NOTE — NURSING
Nurses Note -- 4 Eyes      2/23/2024   3:24 PM      Skin assessed during: Daily Assessment      [x] No Altered Skin Integrity Present    [x]Prevention Measures Documented      [] Yes- Altered Skin Integrity Present or Discovered   [] LDA Added if Not in Epic (Describe Wound)   [] New Altered Skin Integrity was Present on Admit and Documented in LDA   [] Wound Image Taken    Wound Care Consulted? No    Attending Nurse:  Pablo Cast RN/Staff Member:   Nyasia

## 2024-02-24 LAB
ALBUMIN SERPL-MCNC: 3.7 G/DL (ref 3.4–4.8)
ALBUMIN/GLOB SERPL: 0.9 RATIO (ref 1.1–2)
ALP SERPL-CCNC: 79 UNIT/L (ref 40–150)
ALT SERPL-CCNC: 21 UNIT/L (ref 0–55)
AST SERPL-CCNC: 66 UNIT/L (ref 5–34)
BASOPHILS # BLD AUTO: 0.09 X10(3)/MCL
BASOPHILS NFR BLD AUTO: 0.7 %
BILIRUB SERPL-MCNC: 1.5 MG/DL
BUN SERPL-MCNC: 21.5 MG/DL (ref 8.4–25.7)
CALCIUM SERPL-MCNC: 9.3 MG/DL (ref 8.8–10)
CHLORIDE SERPL-SCNC: 99 MMOL/L (ref 98–107)
CO2 SERPL-SCNC: 27 MMOL/L (ref 23–31)
CREAT SERPL-MCNC: 2.1 MG/DL (ref 0.73–1.18)
EOSINOPHIL # BLD AUTO: 0.24 X10(3)/MCL (ref 0–0.9)
EOSINOPHIL NFR BLD AUTO: 1.8 %
ERYTHROCYTE [DISTWIDTH] IN BLOOD BY AUTOMATED COUNT: 15.2 % (ref 11.5–17)
GFR SERPLBLD CREATININE-BSD FMLA CKD-EPI: 32 MLS/MIN/1.73/M2
GLOBULIN SER-MCNC: 3.9 GM/DL (ref 2.4–3.5)
GLUCOSE SERPL-MCNC: 115 MG/DL (ref 82–115)
HCT VFR BLD AUTO: 38.2 % (ref 42–52)
HGB BLD-MCNC: 12 G/DL (ref 14–18)
IMM GRANULOCYTES # BLD AUTO: 0.06 X10(3)/MCL (ref 0–0.04)
IMM GRANULOCYTES NFR BLD AUTO: 0.5 %
LACTATE SERPL-SCNC: 1.2 MMOL/L (ref 0.5–2.2)
LYMPHOCYTES # BLD AUTO: 1.39 X10(3)/MCL (ref 0.6–4.6)
LYMPHOCYTES NFR BLD AUTO: 10.5 %
MCH RBC QN AUTO: 27 PG (ref 27–31)
MCHC RBC AUTO-ENTMCNC: 31.4 G/DL (ref 33–36)
MCV RBC AUTO: 85.8 FL (ref 80–94)
MONOCYTES # BLD AUTO: 1.42 X10(3)/MCL (ref 0.1–1.3)
MONOCYTES NFR BLD AUTO: 10.8 %
NEUTROPHILS # BLD AUTO: 10 X10(3)/MCL (ref 2.1–9.2)
NEUTROPHILS NFR BLD AUTO: 75.7 %
NRBC BLD AUTO-RTO: 0 %
PLATELET # BLD AUTO: 271 X10(3)/MCL (ref 130–400)
PMV BLD AUTO: 11.3 FL (ref 7.4–10.4)
POTASSIUM SERPL-SCNC: 4.3 MMOL/L (ref 3.5–5.1)
PROT SERPL-MCNC: 7.6 GM/DL (ref 5.8–7.6)
RBC # BLD AUTO: 4.45 X10(6)/MCL (ref 4.7–6.1)
SODIUM SERPL-SCNC: 135 MMOL/L (ref 136–145)
TRICUSPID ANNULAR PLANE SYSTOLIC EXCURSION: 1.85 CM
WBC # SPEC AUTO: 13.2 X10(3)/MCL (ref 4.5–11.5)

## 2024-02-24 PROCEDURE — 25000003 PHARM REV CODE 250

## 2024-02-24 PROCEDURE — 03HY32Z INSERTION OF MONITORING DEVICE INTO UPPER ARTERY, PERCUTANEOUS APPROACH: ICD-10-PCS | Performed by: INTERNAL MEDICINE

## 2024-02-24 PROCEDURE — 83605 ASSAY OF LACTIC ACID: CPT | Performed by: INTERNAL MEDICINE

## 2024-02-24 PROCEDURE — 63600175 PHARM REV CODE 636 W HCPCS

## 2024-02-24 PROCEDURE — C1751 CATH, INF, PER/CENT/MIDLINE: HCPCS

## 2024-02-24 PROCEDURE — 36620 INSERTION CATHETER ARTERY: CPT

## 2024-02-24 PROCEDURE — 63600175 PHARM REV CODE 636 W HCPCS: Performed by: INTERNAL MEDICINE

## 2024-02-24 PROCEDURE — 25000003 PHARM REV CODE 250: Performed by: NURSE PRACTITIONER

## 2024-02-24 PROCEDURE — 80053 COMPREHEN METABOLIC PANEL: CPT | Performed by: INTERNAL MEDICINE

## 2024-02-24 PROCEDURE — 27202094 HC TUBING, IMPELLA

## 2024-02-24 PROCEDURE — 27000513 HC SENSOR FLOTRAC

## 2024-02-24 PROCEDURE — 25000003 PHARM REV CODE 250: Performed by: INTERNAL MEDICINE

## 2024-02-24 PROCEDURE — 20000000 HC ICU ROOM

## 2024-02-24 PROCEDURE — 85025 COMPLETE CBC W/AUTO DIFF WBC: CPT | Performed by: INTERNAL MEDICINE

## 2024-02-24 RX ORDER — SODIUM CHLORIDE, SODIUM LACTATE, POTASSIUM CHLORIDE, CALCIUM CHLORIDE 600; 310; 30; 20 MG/100ML; MG/100ML; MG/100ML; MG/100ML
INJECTION, SOLUTION INTRAVENOUS CONTINUOUS
Status: DISCONTINUED | OUTPATIENT
Start: 2024-02-24 | End: 2024-02-24

## 2024-02-24 RX ORDER — LIDOCAINE HYDROCHLORIDE 10 MG/ML
1 INJECTION INFILTRATION; PERINEURAL ONCE
Status: COMPLETED | OUTPATIENT
Start: 2024-02-24 | End: 2024-02-24

## 2024-02-24 RX ORDER — ONDANSETRON HYDROCHLORIDE 2 MG/ML
INJECTION, SOLUTION INTRAVENOUS
Status: COMPLETED
Start: 2024-02-24 | End: 2024-02-24

## 2024-02-24 RX ORDER — ONDANSETRON HYDROCHLORIDE 2 MG/ML
8 INJECTION, SOLUTION INTRAVENOUS EVERY 6 HOURS PRN
Status: DISCONTINUED | OUTPATIENT
Start: 2024-02-24 | End: 2024-03-20

## 2024-02-24 RX ORDER — HEPARIN SODIUM 10000 [USP'U]/100ML
3 INJECTION, SOLUTION INTRAVENOUS CONTINUOUS
Status: DISCONTINUED | OUTPATIENT
Start: 2024-02-24 | End: 2024-02-24

## 2024-02-24 RX ORDER — SODIUM CHLORIDE 9 MG/ML
INJECTION, SOLUTION INTRAVENOUS CONTINUOUS
Status: DISCONTINUED | OUTPATIENT
Start: 2024-02-24 | End: 2024-02-28

## 2024-02-24 RX ORDER — HEPARIN SODIUM,PORCINE/D5W 25000/250
0-40 INTRAVENOUS SOLUTION INTRAVENOUS CONTINUOUS
Status: DISCONTINUED | OUTPATIENT
Start: 2024-02-24 | End: 2024-02-24

## 2024-02-24 RX ADMIN — POLYETHYLENE GLYCOL 3350 17 G: 17 POWDER, FOR SOLUTION ORAL at 09:02

## 2024-02-24 RX ADMIN — HYDROMORPHONE HYDROCHLORIDE 0.5 MG: 2 INJECTION INTRAMUSCULAR; INTRAVENOUS; SUBCUTANEOUS at 04:02

## 2024-02-24 RX ADMIN — ONDANSETRON 8 MG: 2 INJECTION INTRAMUSCULAR; INTRAVENOUS at 04:02

## 2024-02-24 RX ADMIN — ONDANSETRON 8 MG: 2 INJECTION INTRAMUSCULAR; INTRAVENOUS at 11:02

## 2024-02-24 RX ADMIN — SODIUM CHLORIDE: 9 INJECTION, SOLUTION INTRAVENOUS at 10:02

## 2024-02-24 RX ADMIN — ACETAMINOPHEN 650 MG: 325 TABLET, FILM COATED ORAL at 02:02

## 2024-02-24 RX ADMIN — Medication 6 MG: at 08:02

## 2024-02-24 RX ADMIN — AMIODARONE HYDROCHLORIDE 200 MG: 200 TABLET ORAL at 08:02

## 2024-02-24 RX ADMIN — AMIODARONE HYDROCHLORIDE 200 MG: 200 TABLET ORAL at 09:02

## 2024-02-24 RX ADMIN — PANTOPRAZOLE SODIUM 40 MG: 40 TABLET, DELAYED RELEASE ORAL at 09:02

## 2024-02-24 RX ADMIN — ATORVASTATIN CALCIUM 40 MG: 40 TABLET, FILM COATED ORAL at 08:02

## 2024-02-24 RX ADMIN — ALLOPURINOL 50 MG: 300 TABLET ORAL at 09:02

## 2024-02-24 RX ADMIN — LIDOCAINE HYDROCHLORIDE 1 ML: 10 INJECTION, SOLUTION INFILTRATION; PERINEURAL at 08:02

## 2024-02-24 RX ADMIN — ASPIRIN 81 MG: 81 TABLET, COATED ORAL at 09:02

## 2024-02-24 RX ADMIN — ACETAMINOPHEN 650 MG: 325 TABLET, FILM COATED ORAL at 08:02

## 2024-02-24 RX ADMIN — FOLIC ACID 1 MG: 1 TABLET ORAL at 09:02

## 2024-02-24 NOTE — PROGRESS NOTES
" [facility]    Cardiology  Progress Note    Patient Name: Brain Snyder  MRN: [id number]  Admission Date: 2/21/2024  Hospital Length of Stay: 3 days  Code Status: Full Code   Attending Physician: Pablo Yepez MD   Primary Care Physician: Nidia Shepherd NP  Expected Discharge Date:   Principal Problem:CAD (coronary artery disease)    Subjective:     Reason for Consult:  CAD     HPI: 78-year-old female that is unknown to [facility] with a PMHx of PAF on Eliquis Aortic insufficiency, MV CAD, HTN. He is Chinese speaking and an  was used for interview. He was orginally admitted to [facility] on 2.15.24 with CP & NSTEMI and underwent a LHC on 2.20.24 taht showed MV CAD (reported noted below) He was transferred to [facility] on 2.21.24 for a CABG/AVR evaluation. On arrive he was hemodynamically stable on a heparin infusion. He denied chest pain, SOB, and nausea on current exam, [facility] was consulted for CAD    Hospital Course:   2.23.24: Patient seen resting in bed. He denies SOB or CP. He remains on heparin infusion. Family at bedside   2.24.24: NAD. S/p Impella Placement/[place]-Chelo Catheter. "I am ok." + Blood Clots from Nose/Mouth, Hematuria 2/2 traumatic Indwelling Catheter Placement. Heparin Drip per Protocol. Impella P5    PMH: PAF (on Eliquis), Aortic insufficiency, MV CAD, HTN  PSH: [facility]  Family History: none reported  Social History: former smoker, denies ETOH or illicit drug us    Previous Diagnostics:  RHC/Impella Placement (2.23.24):  Right heart catheterization performed showed the following:  PA= 61/24 (27) mm Hg  PCWP=  31/26 (27) mm Hg  AO saturation= 93 % RA  PA saturation= 60% with Impella (49% yesterday)    (02/22/24) -  0.5  Following that we elected to advance the Impella via right common femoral artery approach:  - Abiomed stiff wire  - 14 Tamazight peel-away sheath  - Heparin 10,000 unit bolus given peripherally  - Dilator removed  - 0.035" J-wire  - 6 Tamazight pigtail catheter positioned in " "the left ventricle  - Abiomed 0.025" wire  - Impella CP positioned in left ventricle  - Pulsatile motor current (appropriate positioning)  - Placed the marker just below the aortic valve  - Cardiac output was 2.8 L/min provided by the device.  Impella was at 84cm  Access Closure :    Sheath sutured to the groin  Secured.  Attestation:I was present for and supervised all key portions of the procedure  Impression/plan:   Successful Impella insertion and swan-Chelo in the setting of Acute HF/low cardiac output/precardiogenic shock/Critcal-severe AS/MVCAD - LM distal    RHC (2.22.24):  Right heart catheterization demonstrating severe pulmonary hypertension 84/34 and PCWP 24 mmHg  Reduced cardiac output/cardiac index at 3.43/1.81 L/min/m2 with pulmonary artery saturations 49.3%  For applied to the right femoral vein following removal of the 7 [place] sheath  The estimated blood loss was none.    Carotid US (2.22.24):  The bilateral internal carotid artery demonstrated less than 50% stenosis.   The bilateral vertebral arteries were patent with antegrade flow    LHC (2.20.24):   Prox LAD lesion was 70% stenosed.  Ost Cx to Prox Cx lesion was 80% stenosed.  1st Mrg lesion was 80% stenosed.  2nd Mrg-1 lesion was 70% stenosed.  2nd Mrg-2 lesion was 50% stenosed.  Mid LAD lesion was 50% stenosed.  Prox RCA lesion was 60% stenosed.  estimated blood loss was <50 mL.  There was three vessel coronary artery disease.  LVEDP: 30mmHg  Recommendations:   Refer for CABG evaluation and AVR   Preformed by Dr. Flannery at [facility]     ECHO (2.15.24):  Left Ventricle: The left ventricle is normal in size. Mildly increased ventricular mass. Mildly increased wall thickness. There is mild eccentric hypertrophy. Moderate global hypokinesis present. Septal motion is consistent with bundle branch block. There is moderately reduced systolic function. Biplane (2D) method of discs ejection fraction is 40%. Grade II diastolic dysfunction.  Right Ventricle: Right " ventricular enlargement. Systolic function is normal.  Left Atrium: Left atrium is severely dilated.  Right Atrium: Right atrium is moderately dilated.  Aortic Valve: There is moderate aortic valve sclerosis. Severely restricted motion. There is severe stenosis. Aortic valve area by VTI is 0.66 cm². Aortic valve peak velocity is 4.45 m/s. Mean gradient is 50 mmHg. The dimensionless index is 0.17. There is mild to moderate aortic regurgitation.  Mitral Valve: Mildly calcified leaflets. There is no stenosis. There is mild regurgitation.  Tricuspid Valve: The tricuspid valve is structurally normal. There is mild to moderate regurgitation.  Pulmonic Valve: There is no significant regurgitation.  Aorta: Aortic root is upper limit of normal measuring 3.6 cm.  Pulmonary Artery: There is severe pulmonary hypertension. The estimated pulmonary artery systolic pressure is 69 mmHg.  IVC/SVC: Intermediate venous pressure at 8 mmHg.  Pericardium: There is no pericardial effusion.     Review of Systems   Constitutional: Negative for chills and fever.   HENT:          Blood Clots from Nares/Mouth   Cardiovascular:  Negative for chest pain, leg swelling and palpitations.   Respiratory:  Negative for cough and shortness of breath.    Gastrointestinal:  Negative for nausea and vomiting.   Genitourinary:  Positive for hematuria.   All other systems reviewed and are negative.    Objective:     Vital Signs (Most Recent):  Temp: 98.8 °F (37.1 °C) (02/24/24 0800)  Pulse: 65 (02/24/24 1000)  Resp: 15 (02/24/24 1000)  BP: 137/79 (02/24/24 1000)  SpO2: 96 % (02/24/24 1000) Vital Signs (24h Range):  Temp:  [97.7 °F (36.5 °C)-98.8 °F (37.1 °C)] 98.8 °F (37.1 °C)  Pulse:  [58-73] 65  Resp:  [11-23] 15  SpO2:  [96 %-100 %] 96 %  BP: (116-169)/(68-94) 137/79  Arterial Line BP: (158-181)/(67-77) 166/70   Weight: 78.2 kg (172 lb 6.4 oz)  Body mass index is 28.69 kg/m².  SpO2: 96 %       Intake/Output Summary (Last 24 hours) at 2/24/2024 1100  Last  data filed at 2/24/2024 1044  Gross per 24 hour   Intake 342.19 ml   Output 1470 ml   Net -1127.81 ml     Lines/Drains/Airways       Drain  Duration                  Urethral Catheter 02/23/24 1451 Latex <1 day              Arterial Line  Duration             Arterial Line 02/24/24 0758 Right Radial <1 day              Line  Duration                  VAD 02/24/24 0700 Impella <1 day              Peripheral Intravenous Line  Duration                  Peripheral IV - Double Lumen 02/17/24 1430 20 G Anterior;Distal;Left Forearm 6 days         Peripheral IV - Double Lumen 02/20/24 0031 20 G Anterior;Right Forearm 4 days         Sheath 02/23/24 0903 Right anterior;proximal 1 day         Sheath 02/23/24 0905 Right anterior;proximal 1 day                  Significant Labs:   Recent Results (from the past 72 hour(s))   POCT glucose    Collection Time: 02/21/24 12:10 PM   Result Value Ref Range    POCT Glucose 85 70 - 110 mg/dL   APTT    Collection Time: 02/21/24  2:18 PM   Result Value Ref Range    PTT 34.2 (H) 23.2 - 33.7 seconds   Protime-INR    Collection Time: 02/21/24  2:18 PM   Result Value Ref Range    PT 14.6 (H) 11.4 - 14.0 seconds    INR 1.1 <=1.3   CBC with Differential    Collection Time: 02/21/24  2:18 PM   Result Value Ref Range    WBC 10.07 4.50 - 11.50 x10(3)/mcL    RBC 4.17 (L) 4.70 - 6.10 x10(6)/mcL    Hgb 11.5 (L) 14.0 - 18.0 g/dL    Hct 36.7 (L) 42.0 - 52.0 %    MCV 88.0 80.0 - 94.0 fL    MCH 27.6 27.0 - 31.0 pg    MCHC 31.3 (L) 33.0 - 36.0 g/dL    RDW 15.3 11.5 - 17.0 %    Platelet 291 130 - 400 x10(3)/mcL    MPV 10.4 7.4 - 10.4 fL    Neut % 76.3 %    Lymph % 10.7 %    Mono % 8.3 %    Eos % 3.7 %    Basophil % 0.5 %    Lymph # 1.08 0.6 - 4.6 x10(3)/mcL    Neut # 7.68 2.1 - 9.2 x10(3)/mcL    Mono # 0.84 0.1 - 1.3 x10(3)/mcL    Eos # 0.37 0 - 0.9 x10(3)/mcL    Baso # 0.05 <=0.2 x10(3)/mcL    IG# 0.05 (H) 0 - 0.04 x10(3)/mcL    IG% 0.5 %    NRBC% 0.0 %   POCT glucose    Collection Time: 02/21/24  5:20 PM    Result Value Ref Range    POCT Glucose 123 (H) 70 - 110 mg/dL   APTT    Collection Time: 02/21/24 10:11 PM   Result Value Ref Range    PTT 60.1 (H) 23.2 - 33.7 seconds   Protein/Creatinine Ratio, Urine    Collection Time: 02/22/24 12:59 AM   Result Value Ref Range    Urine Protein Level <6.8 mg/dL    Urine Creatinine 31.6 (L) 63.0 - 166.0 mg/dL   Potassium, Random Urine    Collection Time: 02/22/24 12:59 AM   Result Value Ref Range    Urine Potassium 18.5 mmol/L   Osmolality, Urine    Collection Time: 02/22/24 12:59 AM   Result Value Ref Range    Urine Osmolality 162 (L) 300 - 1,300 mOsm/kg   Creatinine, Random Urine    Collection Time: 02/22/24 12:59 AM   Result Value Ref Range    Urine Creatinine 31.5 (L) 63.0 - 166.0 mg/dL   Sodium, Random Urine    Collection Time: 02/22/24 12:59 AM   Result Value Ref Range    Urine Sodium 37.0 mmol/L   Urea Nitrogen, Random Urine    Collection Time: 02/22/24 12:59 AM   Result Value Ref Range    Urine Urea Nitrogen 117.0 mg/dL   APTT    Collection Time: 02/22/24  5:15 AM   Result Value Ref Range    PTT 43.5 (H) 23.2 - 33.7 seconds   Comprehensive metabolic panel    Collection Time: 02/22/24  5:15 AM   Result Value Ref Range    Sodium Level 138 136 - 145 mmol/L    Potassium Level 4.9 3.5 - 5.1 mmol/L    Chloride 107 98 - 107 mmol/L    Carbon Dioxide 26 23 - 31 mmol/L    Glucose Level 89 82 - 115 mg/dL    Blood Urea Nitrogen 11.7 8.4 - 25.7 mg/dL    Creatinine 1.60 (H) 0.73 - 1.18 mg/dL    Calcium Level Total 8.9 8.8 - 10.0 mg/dL    Protein Total 6.2 5.8 - 7.6 gm/dL    Albumin Level 3.4 3.4 - 4.8 g/dL    Globulin 2.8 2.4 - 3.5 gm/dL    Albumin/Globulin Ratio 1.2 1.1 - 2.0 ratio    Bilirubin Total 0.7 <=1.5 mg/dL    Alkaline Phosphatase 71 40 - 150 unit/L    Alanine Aminotransferase 18 0 - 55 unit/L    Aspartate Aminotransferase 18 5 - 34 unit/L    eGFR 44 mls/min/1.73/m2   CBC with Differential    Collection Time: 02/22/24  5:15 AM   Result Value Ref Range    WBC 8.84 4.50 -  11.50 x10(3)/mcL    RBC 4.31 (L) 4.70 - 6.10 x10(6)/mcL    Hgb 11.5 (L) 14.0 - 18.0 g/dL    Hct 38.8 (L) 42.0 - 52.0 %    MCV 90.0 80.0 - 94.0 fL    MCH 26.7 (L) 27.0 - 31.0 pg    MCHC 29.6 (L) 33.0 - 36.0 g/dL    RDW 15.5 11.5 - 17.0 %    Platelet 292 130 - 400 x10(3)/mcL    MPV 10.9 (H) 7.4 - 10.4 fL    Neut % 64.1 %    Lymph % 20.9 %    Mono % 9.0 %    Eos % 4.6 %    Basophil % 0.8 %    Lymph # 1.85 0.6 - 4.6 x10(3)/mcL    Neut # 5.66 2.1 - 9.2 x10(3)/mcL    Mono # 0.80 0.1 - 1.3 x10(3)/mcL    Eos # 0.41 0 - 0.9 x10(3)/mcL    Baso # 0.07 <=0.2 x10(3)/mcL    IG# 0.05 (H) 0 - 0.04 x10(3)/mcL    IG% 0.6 %    NRBC% 0.0 %   POCT glucose    Collection Time: 02/22/24 11:08 AM   Result Value Ref Range    POCT Glucose 153 (H) 70 - 110 mg/dL   PTT Heparin Monitoring    Collection Time: 02/22/24  2:11 PM   Result Value Ref Range    PTT Heparin Monitor 67.0 (H) 23.2 - 33.7 seconds   Prepare RBC 4 Units; w    Collection Time: 02/22/24  3:13 PM   Result Value Ref Range    UNIT NUMBER [id number]     UNIT ABO/RH O POS     DISPENSE STATUS Selected     Unit Expiration [id number]     Product Code E1558I66     Unit Blood Type Code 5100     CROSSMATCH INTERPRETATION Compatible     UNIT NUMBER [id number]     UNIT ABO/RH O POS     DISPENSE STATUS Selected     Unit Expiration [id number]     Product Code V2230O36     Unit Blood Type Code 5100     CROSSMATCH INTERPRETATION Compatible     UNIT NUMBER [id number]     UNIT ABO/RH O POS     DISPENSE STATUS Selected     Unit Expiration [id number]     Product Code S3419R68     Unit Blood Type Code 5100     CROSSMATCH INTERPRETATION Compatible     UNIT NUMBER [id number]     UNIT ABO/RH O POS     DISPENSE STATUS Selected     Unit Expiration [id number]     Product Code P1569Z69     Unit Blood Type Code 5100     CROSSMATCH INTERPRETATION Compatible    Type & Screen    Collection Time: 02/22/24  3:13 PM   Result Value Ref Range    Group & Rh O POS     Indirect Ugo GEL NEG      Specimen Outdate 02/25/2024 23:59    POCT glucose    Collection Time: 02/22/24  5:13 PM   Result Value Ref Range    POCT Glucose 122 (H) 70 - 110 mg/dL   ABORH RETYPE    Collection Time: 02/22/24  6:07 PM   Result Value Ref Range    ABORH Retype O POS    PTT Heparin Monitoring    Collection Time: 02/22/24  6:07 PM   Result Value Ref Range    PTT Heparin Monitor 62.3 (H) 23.2 - 33.7 seconds   PTT Heparin Monitoring    Collection Time: 02/22/24  7:55 PM   Result Value Ref Range    PTT Heparin Monitor 80.8 (H) 23.2 - 33.7 seconds   CV Ultrasound Bilateral Doppler Carotid    Collection Time: 02/22/24  8:35 PM   Result Value Ref Range    Right CCA prox sys 50 cm/s    Right CCA prox das 5 cm/s    Right CCA dist sys 59 cm/s    Right CCA dist das 10 cm/s    Right ICA prox sys 53 cm/s    Right ICA prox das 9 cm/s    Right ICA mid sys 50 cm/s    Right ICA mid das 12 cm/s    Right ICA dist sys 71 cm/s    Right ICA dist das 17 cm/s    Right ECA sys 52 cm/s    Right ECA das 0 cm/s    Right vertebral sys 23 cm/s    Right vertebral das 1 cm/s    Right ICA/CCA ratio 1.20     Right Highest ICA 71.00     Right Highest EDV 17.00     Right Highest CCA 59     Left CCA prox sys 51 cm/s    Left CCA prox das 8 cm/s    Left CCA dist sys 64 cm/s    Left CCA dist das 9 cm/s    Left ICA prox sys 63 cm/s    Left ICA prox das 13 cm/s    Left ICA mid sys 76 cm/s    Left ICA mid das 12 cm/s    Left ICA dist sys 58 cm/s    Left ICA dist das 11 cm/s    Left ECA sys 47 cm/s    Left ECA das 0 cm/s    Left vertebral sys 45 cm/s    Left vertebral das 9 cm/s    Left ICA/CCA ratio 1.19     Left Highest ICA 76.00     LT Highest EDV 13.00     Left Highest CCA 64    MRSA PCR    Collection Time: 02/23/24 12:59 AM   Result Value Ref Range    MRSA PCR SCRN ([facility]) Detected (A) Not Detected   Basic Metabolic Panel    Collection Time: 02/23/24  7:16 AM   Result Value Ref Range    Sodium Level 138 136 - 145 mmol/L    Potassium Level 4.4 3.5 - 5.1  mmol/L    Chloride 103 98 - 107 mmol/L    Carbon Dioxide 24 23 - 31 mmol/L    Glucose Level 82 82 - 115 mg/dL    Blood Urea Nitrogen 13.1 8.4 - 25.7 mg/dL    Creatinine 1.86 (H) 0.73 - 1.18 mg/dL    BUN/Creatinine Ratio 7     Calcium Level Total 9.1 8.8 - 10.0 mg/dL    Anion Gap 11.0 mEq/L    eGFR 37 mls/min/1.73/m2   Magnesium    Collection Time: 02/23/24  7:16 AM   Result Value Ref Range    Magnesium Level 2.20 1.60 - 2.60 mg/dL   Phosphorus    Collection Time: 02/23/24  7:16 AM   Result Value Ref Range    Phosphorus Level 4.7 2.3 - 4.7 mg/dL   PTT Heparin Monitoring    Collection Time: 02/23/24  7:16 AM   Result Value Ref Range    PTT Heparin Monitor 189.1 (HH) 23.2 - 33.7 seconds   CBC with Differential    Collection Time: 02/23/24  7:16 AM   Result Value Ref Range    WBC 10.72 4.50 - 11.50 x10(3)/mcL    RBC 4.67 (L) 4.70 - 6.10 x10(6)/mcL    Hgb 12.5 (L) 14.0 - 18.0 g/dL    Hct 41.8 (L) 42.0 - 52.0 %    MCV 89.5 80.0 - 94.0 fL    MCH 26.8 (L) 27.0 - 31.0 pg    MCHC 29.9 (L) 33.0 - 36.0 g/dL    RDW 15.6 11.5 - 17.0 %    Platelet 295 130 - 400 x10(3)/mcL    MPV 11.2 (H) 7.4 - 10.4 fL    Neut % 70.1 %    Lymph % 18.5 %    Mono % 7.6 %    Eos % 2.7 %    Basophil % 0.6 %    Lymph # 1.98 0.6 - 4.6 x10(3)/mcL    Neut # 7.53 2.1 - 9.2 x10(3)/mcL    Mono # 0.81 0.1 - 1.3 x10(3)/mcL    Eos # 0.29 0 - 0.9 x10(3)/mcL    Baso # 0.06 <=0.2 x10(3)/mcL    IG# 0.05 (H) 0 - 0.04 x10(3)/mcL    IG% 0.5 %    NRBC% 0.0 %   Echo    Collection Time: 02/23/24 11:08 AM   Result Value Ref Range    TAPSE 1.85 cm   EKG 12-lead    Collection Time: 02/23/24 11:24 AM   Result Value Ref Range    QRS Duration 176 ms    [facility] QTC Calculation 557 ms   Lactic Acid, Plasma    Collection Time: 02/23/24  1:07 PM   Result Value Ref Range    Lactic Acid Level 1.2 0.5 - 2.2 mmol/L   Lactic Acid, Plasma    Collection Time: 02/23/24  6:30 PM   Result Value Ref Range    Lactic Acid Level 1.2 0.5 - 2.2 mmol/L   Comprehensive metabolic panel    Collection  Time: 02/24/24  1:44 AM   Result Value Ref Range    Sodium Level 135 (L) 136 - 145 mmol/L    Potassium Level 4.3 3.5 - 5.1 mmol/L    Chloride 99 98 - 107 mmol/L    Carbon Dioxide 27 23 - 31 mmol/L    Glucose Level 115 82 - 115 mg/dL    Blood Urea Nitrogen 21.5 8.4 - 25.7 mg/dL    Creatinine 2.10 (H) 0.73 - 1.18 mg/dL    Calcium Level Total 9.3 8.8 - 10.0 mg/dL    Protein Total 7.6 5.8 - 7.6 gm/dL    Albumin Level 3.7 3.4 - 4.8 g/dL    Globulin 3.9 (H) 2.4 - 3.5 gm/dL    Albumin/Globulin Ratio 0.9 (L) 1.1 - 2.0 ratio    Bilirubin Total 1.5 <=1.5 mg/dL    Alkaline Phosphatase 79 40 - 150 unit/L    Alanine Aminotransferase 21 0 - 55 unit/L    Aspartate Aminotransferase 66 (H) 5 - 34 unit/L    eGFR 32 mls/min/1.73/m2   CBC with Differential    Collection Time: 02/24/24  1:44 AM   Result Value Ref Range    WBC 13.20 (H) 4.50 - 11.50 x10(3)/mcL    RBC 4.45 (L) 4.70 - 6.10 x10(6)/mcL    Hgb 12.0 (L) 14.0 - 18.0 g/dL    Hct 38.2 (L) 42.0 - 52.0 %    MCV 85.8 80.0 - 94.0 fL    MCH 27.0 27.0 - 31.0 pg    MCHC 31.4 (L) 33.0 - 36.0 g/dL    RDW 15.2 11.5 - 17.0 %    Platelet 271 130 - 400 x10(3)/mcL    MPV 11.3 (H) 7.4 - 10.4 fL    Neut % 75.7 %    Lymph % 10.5 %    Mono % 10.8 %    Eos % 1.8 %    Basophil % 0.7 %    Lymph # 1.39 0.6 - 4.6 x10(3)/mcL    Neut # 10.00 (H) 2.1 - 9.2 x10(3)/mcL    Mono # 1.42 (H) 0.1 - 1.3 x10(3)/mcL    Eos # 0.24 0 - 0.9 x10(3)/mcL    Baso # 0.09 <=0.2 x10(3)/mcL    IG# 0.06 (H) 0 - 0.04 x10(3)/mcL    IG% 0.5 %    NRBC% 0.0 %   Lactic Acid, Plasma    Collection Time: 02/24/24  8:42 AM   Result Value Ref Range    Lactic Acid Level 1.2 0.5 - 2.2 mmol/L     Telemetry: SR    Physical Exam  Constitutional:       General: He is not in acute distress.     Appearance: Normal appearance. He is not ill-appearing.   HENT:      Head: Normocephalic.      Nose: Nose normal.      Comments: Blood Clots     Mouth/Throat:      Mouth: Mucous membranes are moist.      Comments: Blood Clots  Eyes:      Extraocular  Movements: Extraocular movements intact.      Conjunctiva/sclera: Conjunctivae normal.   Cardiovascular:      Rate and Rhythm: Normal rate and regular rhythm.      Pulses: Normal pulses.      Heart sounds: Murmur heard.   Pulmonary:      Breath sounds: Rhonchi present.   Abdominal:      General: Abdomen is flat.   Skin:     General: Skin is warm.      Comments: R Groin Impella at P5, Soft/Flat, Non-Tender, No Sign of Bleed/Infection. +1 BLE Palpable Pedal Pulses     Neurological:      Mental Status: He is alert and oriented to person, place, and time.   Psychiatric:         Mood and Affect: Mood normal.         Behavior: Behavior normal.         Judgment: Judgment normal.       Current Inpatient Medications:    Current Facility-Administered Medications:     0.9%  NaCl infusion, , Intravenous, Continuous, Kaleb Rdz, KWAN, Last Rate: 100 mL/hr at 02/24/24 1033, New Bag at 02/24/24 1033    acetaminophen tablet 650 mg, 650 mg, Oral, Q6H PRN, Tanner Rdz MD    acetaminophen tablet 650 mg, 650 mg, Oral, Q4H PRN, Tanner Rdz MD, 650 mg at 02/21/24 2122    allopurinol split tablet 50 mg, 50 mg, Oral, Daily, Tanner Rdz MD, 50 mg at 02/24/24 0927    amiodarone tablet 200 mg, 200 mg, Oral, BID, Tanner Rdz MD, 200 mg at 02/24/24 0927    aspirin EC tablet 81 mg, 81 mg, Oral, Daily, Tanner Rdz MD, 81 mg at 02/24/24 0927    atorvastatin tablet 40 mg, 40 mg, Oral, QHS, Tanner Rdz MD, 40 mg at 02/23/24 2129    ferrous sulfate tablet 1 each, 1 tablet, Oral, Every other day, Tanner Rdz MD    folic acid tablet 1 mg, 1 mg, Oral, Daily, Tanner Rdz MD, 1 mg at 02/24/24 0927    furosemide injection 40 mg, 40 mg, Intravenous, Q12H, Millie Jimenez NP, 40 mg at 02/23/24 2129    heparin 25,000 units in dextrose 5% (100 units/ml) IV bolus from bag LOW INTENSITY nomogram - [facility], 58.7 Units/kg (Adjusted), Intravenous, Once, Bart Herbert, DO     heparin 25,000 units in dextrose 5% (100 units/ml) IV bolus from bag LOW INTENSITY nomogram - [facility], 58.7 Units/kg (Adjusted), Intravenous, PRN, Bart Herbert, DO    heparin 25,000 units in dextrose 5% (100 units/ml) IV bolus from bag LOW INTENSITY nomogram - [facility], 30 Units/kg (Adjusted), Intravenous, PRN, Zion Herbertet, DO    heparin 25,000 units in dextrose 5% 250 mL (100 units/mL) infusion LOW INTENSITY nomogram - [facility], 0-40 Units/kg/hr (Adjusted), Intravenous, Continuous, Zion Herbertet, DO    HYDROmorphone (PF) injection 0.5 mg, 0.5 mg, Intravenous, Q4H PRN, Pablo Yepez MD, 0.5 mg at 02/23/24 2156    lactated ringers bolus 500 mL, 500 mL, Intravenous, Once, Fransico Schrader,     lactated ringers infusion, , Intravenous, Continuous, Stroda, Fransico, DO    LIDOcaine HCl 2% urojet, , Mucous Membrane, Once, Nicol Diaz, AGACNP-BC    melatonin tablet 6 mg, 6 mg, Oral, Nightly PRN, Tanner Rdz MD, 6 mg at 02/22/24 2207    nitroGLYCERIN SL tablet 0.4 mg, 0.4 mg, Sublingual, Q5 Min PRN, Tanner Rdz MD    ondansetron disintegrating tablet 8 mg, 8 mg, Oral, Q8H PRN, Tanner Rdz MD, 8 mg at 02/23/24 1302    pantoprazole EC tablet 40 mg, 40 mg, Oral, Daily, Tanner Rdz MD, 40 mg at 02/24/24 0927    polyethylene glycol packet 17 g, 17 g, Oral, Daily, Tanner Rdz MD, 17 g at 02/24/24 0927    simethicone chewable tablet 80 mg, 1 tablet, Oral, TID PRN, Tanner Rdz MD, 80 mg at 02/23/24 1743    sodium bicarbonate 25 mEq in dextrose 5 % (D5W) 1,000 mL infusion, , Intravenous, Continuous, Simone Mora MD, Last Rate: 10 mL/hr at 02/24/24 0928, Rate Verify at 02/24/24 0928    sodium chloride 0.9% flush 10 mL, 10 mL, Intravenous, PRN, Tanner Rdz MD    sodium chloride 0.9% flush 10 mL, 10 mL, Intravenous, PRN, Millie Jimenez NP  VTE Risk Mitigation (From admission, onward)           Ordered     heparin 25,000 units in dextrose 5% (100  units/ml) IV bolus from bag LOW INTENSITY nomogram - [facility]  As needed (PRN)        Question:  Heparin Infusion Adjustment (DO NOT MODIFY ANSWER)  Answer:  [email or web address]\Images\Pharmacy\HeparinInfusions\heparin LOW INTENSITY nomogram for [facility] [id number]    02/24/24 1046     heparin 25,000 units in dextrose 5% (100 units/ml) IV bolus from bag LOW INTENSITY nomogram - [facility]  As needed (PRN)        Question:  Heparin Infusion Adjustment (DO NOT MODIFY ANSWER)  Answer:  [email or web address]\Images\Pharmacy\HeparinInfusions\heparin LOW INTENSITY nomogram for [facility] [id number]    02/24/24 1046     heparin 25,000 units in dextrose 5% (100 units/ml) IV bolus from bag LOW INTENSITY nomogram - [facility]  Once        Question:  Heparin Infusion Adjustment (DO NOT MODIFY ANSWER)  Answer:  [email or web address]\Images\Pharmacy\HeparinInfusions\heparin LOW INTENSITY nomogram for [facility] [id number].pdf    02/24/24 1046     heparin 25,000 units in dextrose 5% 250 mL (100 units/mL) infusion LOW INTENSITY nomogram - [facility]  Continuous        Question:  Begin at (units/kg/hr)  Answer:  12    02/24/24 1046     Place SUSIE hose  Until discontinued         02/22/24 1458     Place sequential compression device  Until discontinued         02/21/24 2057                  Assessment:   MVCAD    - RHC/Impella Placement (2.23.24)    - [facility] (2.19.24):pLAD lesion 70%, oLCx 80%, OM1 80%, OM2 1 lesion 70% 2nd lesion 50%, mLAD 50%, pRCA 60%  Aortic Insuffiencey     - ECHO (2.16.24): Aortic Valve: There is moderate aortic valve sclerosis. Severely restricted motion. There is severe stenosis. Aortic valve area by VTI is 0.66 cm². Aortic valve peak velocity is 4.45 m/s. Mean gradient is 50 mmHg. The dimensionless index is 0.17.   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%    - ECHO (2.16.24): EF 40%, Grade II DD  VHD    - ECHO (2.16.24): Severe AS, mild MR, mild to moderate TR  JEAN-CLAUDE/CKD Stage IIb    - Baseline Cr 1.6  Leukocytosis   HTN  No Hx of GIB    Plan:   CV Surgery Following: Pending CABG/AVR/MOISE Ligation Evaluation   Continue ASA, Statin, Amiodarone   Continue Diuresis with IV Lasix 40mg IVP BID  Accurate I&Os and Daily Weights  Stop Heparin Drip 2/2 Hematuria and Blood Clots from Oral Cavity/Nares  Urology Following for Hematuria   Impella Support to Bridge to Intervention (CABG vs High Risk PCI on Monday 2.26.24)  Labs in AM: CBC, CMP and Mg    KWAN Sherwood  Cardiology  [facility]   02/24/2024      I agree with the findings of the complexity of problems addressed and take responsibility for the plan's risks and complications. I approved the plan documented by Kaleb Rdz NP.

## 2024-02-24 NOTE — PROGRESS NOTES
UROLOGY  PROGRESS  NOTE    Brain Snyder 1945  [id number]  2/24/2024    Primary Urologist: N/A  On Call Urology: Judd Green MD    Subjective:  78-year-old man difficult Nguyen catheter placement yesterday.  I came to see him on the floor he is currently in the cath lab      Objective:  Wt Readings from Last 3 Encounters:   02/23/24 78.2 kg (172 lb 6.4 oz)   02/19/24 81.1 kg (178 lb 12.8 oz)     Temp Readings from Last 3 Encounters:   02/24/24 98.4 °F (36.9 °C) (Oral)   02/21/24 97.4 °F (36.3 °C) (Oral)     BP Readings from Last 3 Encounters:   02/24/24 117/76   02/21/24 109/63     Pulse Readings from Last 3 Encounters:   02/24/24 68   02/21/24 71       Intake/Output:  I/O this shift:  In: 342.2 [I.V.:342.2]  Out: 205 [Urine:205]  I/O last 3 completed shifts:  In: -   Out: 1440 [Urine:1440]       Exam:    NCAT  Card RRR  Resp unlabored  Abd n/a   n.a  Extremity no C/C/E      Recent Results (from the past 24 hour(s))   Lactic Acid, Plasma    Collection Time: 02/23/24  6:30 PM   Result Value Ref Range    Lactic Acid Level 1.2 0.5 - 2.2 mmol/L   Comprehensive metabolic panel    Collection Time: 02/24/24  1:44 AM   Result Value Ref Range    Sodium Level 135 (L) 136 - 145 mmol/L    Potassium Level 4.3 3.5 - 5.1 mmol/L    Chloride 99 98 - 107 mmol/L    Carbon Dioxide 27 23 - 31 mmol/L    Glucose Level 115 82 - 115 mg/dL    Blood Urea Nitrogen 21.5 8.4 - 25.7 mg/dL    Creatinine 2.10 (H) 0.73 - 1.18 mg/dL    Calcium Level Total 9.3 8.8 - 10.0 mg/dL    Protein Total 7.6 5.8 - 7.6 gm/dL    Albumin Level 3.7 3.4 - 4.8 g/dL    Globulin 3.9 (H) 2.4 - 3.5 gm/dL    Albumin/Globulin Ratio 0.9 (L) 1.1 - 2.0 ratio    Bilirubin Total 1.5 <=1.5 mg/dL    Alkaline Phosphatase 79 40 - 150 unit/L    Alanine Aminotransferase 21 0 - 55 unit/L    Aspartate Aminotransferase 66 (H) 5 - 34 unit/L    eGFR 32 mls/min/1.73/m2   CBC with Differential    Collection Time: 02/24/24  1:44 AM   Result Value Ref Range    WBC 13.20 (H) 4.50 -  11.50 x10(3)/mcL    RBC 4.45 (L) 4.70 - 6.10 x10(6)/mcL    Hgb 12.0 (L) 14.0 - 18.0 g/dL    Hct 38.2 (L) 42.0 - 52.0 %    MCV 85.8 80.0 - 94.0 fL    MCH 27.0 27.0 - 31.0 pg    MCHC 31.4 (L) 33.0 - 36.0 g/dL    RDW 15.2 11.5 - 17.0 %    Platelet 271 130 - 400 x10(3)/mcL    MPV 11.3 (H) 7.4 - 10.4 fL    Neut % 75.7 %    Lymph % 10.5 %    Mono % 10.8 %    Eos % 1.8 %    Basophil % 0.7 %    Lymph # 1.39 0.6 - 4.6 x10(3)/mcL    Neut # 10.00 (H) 2.1 - 9.2 x10(3)/mcL    Mono # 1.42 (H) 0.1 - 1.3 x10(3)/mcL    Eos # 0.24 0 - 0.9 x10(3)/mcL    Baso # 0.09 <=0.2 x10(3)/mcL    IG# 0.06 (H) 0 - 0.04 x10(3)/mcL    IG% 0.5 %    NRBC% 0.0 %   Lactic Acid, Plasma    Collection Time: 02/24/24  8:42 AM   Result Value Ref Range    Lactic Acid Level 1.2 0.5 - 2.2 mmol/L             Assessment:  Difficult Nguyen catheter      Plan:  Continue Nguyen catheter.  Good urine output.  We will follow    Judd Green MD

## 2024-02-24 NOTE — NURSING
Nurses Note -- 4 Eyes      2/24/2024   3:07 PM      Skin assessed during: Q Shift Change      [x] No Altered Skin Integrity Present    [x]Prevention Measures Documented      [] Yes- Altered Skin Integrity Present or Discovered   [] LDA Added if Not in Epic (Describe Wound)   [] New Altered Skin Integrity was Present on Admit and Documented in LDA   [] Wound Image Taken    Wound Care Consulted? No    Attending Nurse:  Vince Cast RN/Staff Member:  luann clark RN

## 2024-02-24 NOTE — PROCEDURES
Arterial Catheter Insertion Procedure Note     Procedure: Insertion of Arterial Catheter     Indications: Hemodynamic Monitoring     Procedure Details     Maximum sterile technique was used including antiseptics, cap, sterile gloves, sterile gown, hand hygiene, mask and sterile maximum barrier drape.     Under sterile conditions the skin above the L radial  artery was prepped with Chloroprep and covered with a sterile drape. Local anesthesia was applied to the skin and subcutaneous tissues. Ultrasound guidance was used to identify the artery. A 20 -gauge catheter over a needle was then inserted into the artery. A guide wire was then passed easily through the needle. The needle was then withdrawn. A arterial catheter was then inserted into the vessel over the guide wire. The needle was then withdrawn and was connected to the monitor to ensure a proper waveform. The catheter was sutured into place and a sterile dressing was applied.     Ultrasound/Sonosite was used during the procedure.      Estimated blood loss: 6 ml    Patient tolerated procedure well.    Fransico Schrader DO  2/24/2024

## 2024-02-24 NOTE — PROGRESS NOTES
[facility] - [facility] ICU  Pulmonary Critical Care Note    Patient Name: Brain Snyder  MRN: [id number]  Admission Date: 2/21/2024  Hospital Length of Stay: 3 days  Code Status: Full Code  Attending Provider: Pablo Yepez MD  Primary Care Provider: Nidia Shepherd NP     Subjective:     HPI:   The patient is a 78-year-old Swedish-speaking male with a history of aortic insufficiency/stenosis, coronary artery disease, chronic kidney disease stage 3, hypertension, atrial fibrillation on Eliquis, who presented to the ICU status post going to the cath lab for PCI.  Patient was transferred from [facility] after being admitted there on 2/15 with chest pain found to have NSTEMI with left heart catheterization initially on 02/20 demonstrating severe multivessel stenosis and was subsequently transferred here for CABG evaluation.  Patient was taken to the cath lab today and subsequently had an Impella placed and was transferred to the ICU with Impella currently set at P7.  Patient is awake alert moves all extremities follows commands with a Swedish language barrier.  He has no complaints at this time.       Hospital Course/Significant events:  02/23/2024 admitted to ICU post Impella placement    24 Hour Interval History:  Seen this morning, no acute events overnight.  Noted to have blood from nose which progressed to hemoptysis with large clots per nursing staff, and noted to have hematuria.  Nursing staff discussed with Cardiology who wanted to hold heparin drip.  Patient complaining of left lower back pain, but otherwise denies chest pain, shortness for breath, nausea, vomiting.    Afebrile last 24 hours with T-max 98.8° F. saturating well on room air.  Hemodynamically stable.    Input 0 cc, urine output 1400 cc last 24 hours, net negative -1400 cc.       Past Medical History:   Diagnosis Date    A-fib     Aortic insufficiency     CAD (coronary artery disease)     Hypertension        Past  Surgical History:   Procedure Laterality Date    CORONARY ANGIOGRAPHY N/A 2/20/2024    Procedure: ANGIOGRAM, CORONARY ARTERY;  Surgeon: Vasile Callahan MD;  Location: [facility] CATH LAB;  Service: Cardiology;  Laterality: N/A;    RIGHT HEART CATHETERIZATION N/A 2/22/2024    Procedure: INSERTION, CATHETER, RIGHT HEART;  Surgeon: Shreya Lomax MD;  Location: [facility] CATH LAB;  Service: Cardiology;  Laterality: N/A;       Social History     Socioeconomic History    Marital status:    Tobacco Use    Smoking status: Former     Types: Cigarettes    Smokeless tobacco: Never   Substance and Sexual Activity    Alcohol use: Not Currently    Drug use: Not Currently     Social Determinants of Health     Financial Resource Strain: Low Risk  (2/16/2024)    Overall Financial Resource Strain (CARDIA)     Difficulty of Paying Living Expenses: Not hard at all   Food Insecurity: No Food Insecurity (2/16/2024)    Hunger Vital Sign     Worried About Running Out of Food in the Last Year: Never true     Ran Out of Food in the Last Year: Never true   Transportation Needs: No Transportation Needs (2/16/2024)    PRAPARE - Transportation     Lack of Transportation (Medical): No     Lack of Transportation (Non-Medical): No   Physical Activity: Inactive (2/16/2024)    Exercise Vital Sign     Days of Exercise per Week: 0 days     Minutes of Exercise per Session: 0 min   Stress: Stress Concern Present (2/16/2024)    [employer] [place] [employer] - Occupational Stress Questionnaire     Feeling of Stress : To some extent   Social Connections: Socially Integrated (2/22/2024)    Social Connection and Isolation Panel [NHANES]     Frequency of Communication with Friends and Family: Twice a week     Frequency of Social Gatherings with Friends and Family: Twice a week     Attends Muslim Services: 1 to 4 times per year     Active Member of Clubs or Organizations: Yes     Attends Club or Organization Meetings: 1 to 4 times per year      Marital Status:    Housing Stability: Low Risk  (2/16/2024)    Housing Stability Vital Sign     Unable to Pay for Housing in the Last Year: No     Number of Places Lived in the Last Year: 1     Unstable Housing in the Last Year: No           Current Outpatient Medications   Medication Instructions    amiodarone (PACERONE) 400 mg, Oral, 2 times daily    atorvastatin (LIPITOR) 40 mg, Oral, Nightly    furosemide (LASIX) 40 mg, Oral, Daily       Current Inpatient Medications   allopurinoL  50 mg Oral Daily    amiodarone  200 mg Oral BID    aspirin  81 mg Oral Daily    atorvastatin  40 mg Oral QHS    ferrous sulfate  1 tablet Oral Every other day    folic acid  1 mg Oral Daily    furosemide (LASIX) injection  40 mg Intravenous Q12H    lactated ringers  500 mL Intravenous Once    LIDOcaine HCl 2%   Mucous Membrane Once    pantoprazole  40 mg Oral Daily    polyethylene glycol  17 g Oral Daily       Current Intravenous Infusions   sodium chloride 0.9%      heparin (porcine) 12,500 Units in dextrose 5 % (D5W) 500 mL infusion      heparin (porcine) in 5 % dex 882 Units/hr (02/24/24 0928)    lactated ringers      sodium bicarbonate 25 mEq in dextrose 5 % (D5W) 1,000 mL infusion 10 mL/hr at 02/24/24 0928         Review of Systems   Constitutional:  Negative for chills and fever.   HENT:  Positive for nosebleeds.    Respiratory:  Positive for hemoptysis. Negative for shortness of breath.    Cardiovascular:  Negative for chest pain and palpitations.   Gastrointestinal:  Negative for abdominal pain, heartburn, nausea and vomiting.   Genitourinary:  Positive for hematuria. Negative for dysuria, frequency and urgency.   Musculoskeletal:  Positive for back pain.          Objective:       Intake/Output Summary (Last 24 hours) at 2/24/2024 1028  Last data filed at 2/24/2024 0928  Gross per 24 hour   Intake 331.53 ml   Output 1470 ml   Net -1138.47 ml         Vital Signs (Most Recent):  Temp: 98.8 °F (37.1 °C) (02/24/24  0800)  Pulse: 73 (02/24/24 0800)  Resp: 17 (02/24/24 0800)  BP: (!) 155/86 (02/24/24 0800)  SpO2: 96 % (02/24/24 0800)  Body mass index is 28.69 kg/m².  Weight: 78.2 kg (172 lb 6.4 oz) Vital Signs (24h Range):  Temp:  [97.7 °F (36.5 °C)-98.8 °F (37.1 °C)] 98.8 °F (37.1 °C)  Pulse:  [58-73] 73  Resp:  [11-23] 17  SpO2:  [96 %-100 %] 96 %  BP: (116-169)/(68-94) 155/86  Arterial Line BP: (181)/(77) 181/77     Physical Exam  Vitals and nursing note reviewed.   Constitutional:       Appearance: Normal appearance.   HENT:      Head: Normocephalic and atraumatic.      Nose: No nasal deformity.      Right Nostril: Epistaxis present.      Left Nostril: Epistaxis present.      Mouth/Throat:      Mouth: Mucous membranes are moist.      Pharynx: Oropharynx is clear.      Comments: Some blood in her superior aspect over pharynx  Eyes:      Conjunctiva/sclera: Conjunctivae normal.      Pupils: Pupils are equal, round, and reactive to light.   Cardiovascular:      Rate and Rhythm: Normal rate and regular rhythm.      Pulses: Normal pulses.      Heart sounds: Normal heart sounds.   Abdominal:      General: Bowel sounds are normal.      Palpations: Abdomen is soft.   Genitourinary:     Comments: Dark red blood noted in Nguyen catheter bag  Musculoskeletal:         General: Normal range of motion.      Right lower leg: No edema.      Left lower leg: No edema.      Comments: Paraspinal tenderness in left lower back   Skin:     General: Skin is warm and dry.      Comments: Right femoral region with Impella place with overlying sheath and dressing   Neurological:      General: No focal deficit present.      Mental Status: He is alert and oriented to person, place, and time. Mental status is at baseline.           Lines/Drains/Airways       Drain  Duration                  Urethral Catheter 02/23/24 1451 Latex <1 day              Arterial Line  Duration             Arterial Line 02/24/24 0758 Right Radial <1 day              Line   "Duration                  VAD 02/24/24 0700 Impella <1 day              Peripheral Intravenous Line  Duration                  Peripheral IV - Double Lumen 02/17/24 1430 20 G Anterior;Distal;Left Forearm 6 days         Peripheral IV - Double Lumen 02/20/24 0031 20 G Anterior;Right Forearm 4 days         Sheath 02/23/24 0903 Right anterior;proximal 1 day         Sheath 02/23/24 0905 Right anterior;proximal 1 day                    Significant Labs:    Lab Results   Component Value Date    WBC 13.20 (H) 02/24/2024    HGB 12.0 (L) 02/24/2024    HCT 38.2 (L) 02/24/2024    MCV 85.8 02/24/2024     02/24/2024           BMP  Lab Results   Component Value Date     (L) 02/24/2024    K 4.3 02/24/2024    CHLORIDE 99 02/24/2024    CO2 27 02/24/2024    BUN 21.5 02/24/2024    CREATININE 2.10 (H) 02/24/2024    CALCIUM 9.3 02/24/2024    AGAP 11.0 02/23/2024         ABG  No results for input(s): "PH", "PO2", "PCO2", "HCO3", "POCBASEDEF" in the last 168 hours.    Mechanical Ventilation Support:  Oxygen Concentration (%): 98 (02/22/24 1626)      Significant Imaging:  I have reviewed the pertinent imaging within the past 24 hours.        Assessment/Plan:     Assessment  Multivessel coronary artery disease   Moderate Aortic insufficiency   Severe aortic stenosis   Paroxysmal atrial fibrillation on Eliquis  Severe pulmonary hypertension, PASP of 69 mmHg  Acute on chronic systolic and diastolic CHF, left ventricular ejection fraction 40% with grade 2 diastolic dysfunction   Mild mitral regurgitation   Mild-to-moderate tricuspid regurgitation   Chronic kidney disease stage 3   Hypertension  Hemoptysis and hematuria      Plan  -still on Impella set to P 7, continue for now per Cardiology   -patient to be evaluated by CV surgery for consideration for bypass multivessel coronary disease but is a poor candidate currently due to severe pulmonary hypertension   -plan is to leave the patient on Impella over the course of the weekend " and re-evaluate on Monday determine if his pulmonary pressures are more appropriate  -hold Eliquis for now per Cardiology   -continue aspirin statin   -diurese aggressively with Lasix, currently on Lasix 40 mg b.i.d.  -May plan for high-risk PCI with Impella versus CABG  -holding heparin drip in the setting of hemoptysis and hematuria per Cardiology (2/24/24)  -continue p.o. amiodarone    DVT Prophylaxis: SCDs, holding heparin drip  GI Prophylaxis:       Bart Herbert DO  Pulmonary Critical Care Medicine  [facility] - [facility] ICU  DOS: 02/24/2024

## 2024-02-25 LAB
ALBUMIN SERPL-MCNC: 3.2 G/DL (ref 3.4–4.8)
ALBUMIN/GLOB SERPL: 1.1 RATIO (ref 1.1–2)
ALP SERPL-CCNC: 69 UNIT/L (ref 40–150)
ALT SERPL-CCNC: 18 UNIT/L (ref 0–55)
APTT PPP: 31.8 SECONDS (ref 23.2–33.7)
APTT PPP: 33.9 SECONDS (ref 23.2–33.7)
APTT PPP: 53.4 SECONDS (ref 23.2–33.7)
AST SERPL-CCNC: 66 UNIT/L (ref 5–34)
BASOPHILS # BLD AUTO: 0.03 X10(3)/MCL
BASOPHILS # BLD AUTO: 0.04 X10(3)/MCL
BASOPHILS NFR BLD AUTO: 0.3 %
BASOPHILS NFR BLD AUTO: 0.4 %
BILIRUB SERPL-MCNC: 1.3 MG/DL
BUN SERPL-MCNC: 20 MG/DL (ref 8.4–25.7)
CALCIUM SERPL-MCNC: 8.3 MG/DL (ref 8.8–10)
CHLORIDE SERPL-SCNC: 103 MMOL/L (ref 98–107)
CO2 SERPL-SCNC: 25 MMOL/L (ref 23–31)
CREAT SERPL-MCNC: 1.69 MG/DL (ref 0.73–1.18)
EOSINOPHIL # BLD AUTO: 0.23 X10(3)/MCL (ref 0–0.9)
EOSINOPHIL # BLD AUTO: 0.29 X10(3)/MCL (ref 0–0.9)
EOSINOPHIL NFR BLD AUTO: 2.3 %
EOSINOPHIL NFR BLD AUTO: 2.6 %
ERYTHROCYTE [DISTWIDTH] IN BLOOD BY AUTOMATED COUNT: 15.3 % (ref 11.5–17)
ERYTHROCYTE [DISTWIDTH] IN BLOOD BY AUTOMATED COUNT: 15.4 % (ref 11.5–17)
GFR SERPLBLD CREATININE-BSD FMLA CKD-EPI: 41 MLS/MIN/1.73/M2
GLOBULIN SER-MCNC: 2.8 GM/DL (ref 2.4–3.5)
GLUCOSE SERPL-MCNC: 103 MG/DL (ref 82–115)
HCT VFR BLD AUTO: 30.2 % (ref 42–52)
HCT VFR BLD AUTO: 31.7 % (ref 42–52)
HGB BLD-MCNC: 10.1 G/DL (ref 14–18)
HGB BLD-MCNC: 9.4 G/DL (ref 14–18)
IMM GRANULOCYTES # BLD AUTO: 0.04 X10(3)/MCL (ref 0–0.04)
IMM GRANULOCYTES # BLD AUTO: 0.04 X10(3)/MCL (ref 0–0.04)
IMM GRANULOCYTES NFR BLD AUTO: 0.4 %
IMM GRANULOCYTES NFR BLD AUTO: 0.4 %
INR PPP: 1.3
LYMPHOCYTES # BLD AUTO: 0.8 X10(3)/MCL (ref 0.6–4.6)
LYMPHOCYTES # BLD AUTO: 1.14 X10(3)/MCL (ref 0.6–4.6)
LYMPHOCYTES NFR BLD AUTO: 10.2 %
LYMPHOCYTES NFR BLD AUTO: 8 %
MAGNESIUM SERPL-MCNC: 2.1 MG/DL (ref 1.6–2.6)
MCH RBC QN AUTO: 27.4 PG (ref 27–31)
MCH RBC QN AUTO: 27.4 PG (ref 27–31)
MCHC RBC AUTO-ENTMCNC: 31.1 G/DL (ref 33–36)
MCHC RBC AUTO-ENTMCNC: 31.9 G/DL (ref 33–36)
MCV RBC AUTO: 85.9 FL (ref 80–94)
MCV RBC AUTO: 88 FL (ref 80–94)
MONOCYTES # BLD AUTO: 1.04 X10(3)/MCL (ref 0.1–1.3)
MONOCYTES # BLD AUTO: 1.29 X10(3)/MCL (ref 0.1–1.3)
MONOCYTES NFR BLD AUTO: 10.4 %
MONOCYTES NFR BLD AUTO: 11.5 %
NEUTROPHILS # BLD AUTO: 7.87 X10(3)/MCL (ref 2.1–9.2)
NEUTROPHILS # BLD AUTO: 8.44 X10(3)/MCL (ref 2.1–9.2)
NEUTROPHILS NFR BLD AUTO: 75 %
NEUTROPHILS NFR BLD AUTO: 78.5 %
NRBC BLD AUTO-RTO: 0 %
NRBC BLD AUTO-RTO: 0 %
PLATELET # BLD AUTO: 192 X10(3)/MCL (ref 130–400)
PLATELET # BLD AUTO: 207 X10(3)/MCL (ref 130–400)
PMV BLD AUTO: 10.9 FL (ref 7.4–10.4)
PMV BLD AUTO: 11.2 FL (ref 7.4–10.4)
POC ACTIVATED CLOTTING TIME K: 147 SEC (ref 74–137)
POC ACTIVATED CLOTTING TIME K: 158 SEC (ref 74–137)
POC ACTIVATED CLOTTING TIME K: 163 SEC (ref 74–137)
POC ACTIVATED CLOTTING TIME K: 163 SEC (ref 74–137)
POC ACTIVATED CLOTTING TIME K: 168 SEC (ref 74–137)
POC ACTIVATED CLOTTING TIME K: 174 SEC (ref 74–137)
POC ACTIVATED CLOTTING TIME K: 174 SEC (ref 74–137)
POC ACTIVATED CLOTTING TIME K: 179 SEC (ref 74–137)
POC ACTIVATED CLOTTING TIME K: 196 SEC (ref 74–137)
POTASSIUM SERPL-SCNC: 4.2 MMOL/L (ref 3.5–5.1)
PROT SERPL-MCNC: 6 GM/DL (ref 5.8–7.6)
PROTHROMBIN TIME: 16.1 SECONDS (ref 12.5–14.5)
RBC # BLD AUTO: 3.43 X10(6)/MCL (ref 4.7–6.1)
RBC # BLD AUTO: 3.69 X10(6)/MCL (ref 4.7–6.1)
SAMPLE: ABNORMAL
SODIUM SERPL-SCNC: 134 MMOL/L (ref 136–145)
WBC # SPEC AUTO: 10.02 X10(3)/MCL (ref 4.5–11.5)
WBC # SPEC AUTO: 11.23 X10(3)/MCL (ref 4.5–11.5)

## 2024-02-25 PROCEDURE — 36430 TRANSFUSION BLD/BLD COMPNT: CPT

## 2024-02-25 PROCEDURE — 83735 ASSAY OF MAGNESIUM: CPT | Performed by: NURSE PRACTITIONER

## 2024-02-25 PROCEDURE — 85730 THROMBOPLASTIN TIME PARTIAL: CPT | Performed by: NURSE PRACTITIONER

## 2024-02-25 PROCEDURE — 25000003 PHARM REV CODE 250: Performed by: NURSE PRACTITIONER

## 2024-02-25 PROCEDURE — 63600175 PHARM REV CODE 636 W HCPCS: Performed by: INTERNAL MEDICINE

## 2024-02-25 PROCEDURE — 85730 THROMBOPLASTIN TIME PARTIAL: CPT

## 2024-02-25 PROCEDURE — P9016 RBC LEUKOCYTES REDUCED: HCPCS | Performed by: PHYSICIAN ASSISTANT

## 2024-02-25 PROCEDURE — 85025 COMPLETE CBC W/AUTO DIFF WBC: CPT | Performed by: INTERNAL MEDICINE

## 2024-02-25 PROCEDURE — 20000000 HC ICU ROOM

## 2024-02-25 PROCEDURE — 85025 COMPLETE CBC W/AUTO DIFF WBC: CPT | Performed by: NURSE PRACTITIONER

## 2024-02-25 PROCEDURE — 85610 PROTHROMBIN TIME: CPT | Performed by: NURSE PRACTITIONER

## 2024-02-25 PROCEDURE — 25000003 PHARM REV CODE 250: Performed by: INTERNAL MEDICINE

## 2024-02-25 PROCEDURE — 25000003 PHARM REV CODE 250

## 2024-02-25 PROCEDURE — 80053 COMPREHEN METABOLIC PANEL: CPT | Performed by: INTERNAL MEDICINE

## 2024-02-25 PROCEDURE — 85730 THROMBOPLASTIN TIME PARTIAL: CPT | Performed by: INTERNAL MEDICINE

## 2024-02-25 PROCEDURE — 63600175 PHARM REV CODE 636 W HCPCS: Performed by: NURSE PRACTITIONER

## 2024-02-25 RX ORDER — HYDROCODONE BITARTRATE AND ACETAMINOPHEN 500; 5 MG/1; MG/1
TABLET ORAL
Status: DISCONTINUED | OUTPATIENT
Start: 2024-02-25 | End: 2024-02-29

## 2024-02-25 RX ORDER — TIZANIDINE 2 MG/1
2 TABLET ORAL EVERY 8 HOURS PRN
Status: DISCONTINUED | OUTPATIENT
Start: 2024-02-25 | End: 2024-02-25

## 2024-02-25 RX ORDER — HYDROCODONE BITARTRATE AND ACETAMINOPHEN 5; 325 MG/1; MG/1
1 TABLET ORAL EVERY 4 HOURS PRN
Status: DISCONTINUED | OUTPATIENT
Start: 2024-02-25 | End: 2024-03-01

## 2024-02-25 RX ORDER — HEPARIN SODIUM,PORCINE/D5W 25000/250
0-40 INTRAVENOUS SOLUTION INTRAVENOUS CONTINUOUS
Status: DISCONTINUED | OUTPATIENT
Start: 2024-02-25 | End: 2024-02-28

## 2024-02-25 RX ADMIN — ATORVASTATIN CALCIUM 40 MG: 40 TABLET, FILM COATED ORAL at 09:02

## 2024-02-25 RX ADMIN — Medication 6 MG: at 07:02

## 2024-02-25 RX ADMIN — SODIUM CHLORIDE: 9 INJECTION, SOLUTION INTRAVENOUS at 01:02

## 2024-02-25 RX ADMIN — HYDROMORPHONE HYDROCHLORIDE 0.5 MG: 2 INJECTION INTRAMUSCULAR; INTRAVENOUS; SUBCUTANEOUS at 07:02

## 2024-02-25 RX ADMIN — FERROUS SULFATE TAB 325 MG (65 MG ELEMENTAL FE) 1 EACH: 325 (65 FE) TAB at 08:02

## 2024-02-25 RX ADMIN — AMIODARONE HYDROCHLORIDE 200 MG: 200 TABLET ORAL at 09:02

## 2024-02-25 RX ADMIN — FOLIC ACID 1 MG: 1 TABLET ORAL at 08:02

## 2024-02-25 RX ADMIN — HEPARIN SODIUM 12 UNITS/KG/HR: 10000 INJECTION, SOLUTION INTRAVENOUS at 01:02

## 2024-02-25 RX ADMIN — SIMETHICONE 80 MG: 80 TABLET, CHEWABLE ORAL at 08:02

## 2024-02-25 RX ADMIN — AMIODARONE HYDROCHLORIDE 200 MG: 200 TABLET ORAL at 08:02

## 2024-02-25 RX ADMIN — SODIUM CHLORIDE 500 ML: 9 INJECTION, SOLUTION INTRAVENOUS at 01:02

## 2024-02-25 RX ADMIN — ASPIRIN 81 MG: 81 TABLET, COATED ORAL at 08:02

## 2024-02-25 RX ADMIN — ALLOPURINOL 50 MG: 300 TABLET ORAL at 08:02

## 2024-02-25 RX ADMIN — HYDROMORPHONE HYDROCHLORIDE 0.5 MG: 2 INJECTION INTRAMUSCULAR; INTRAVENOUS; SUBCUTANEOUS at 12:02

## 2024-02-25 RX ADMIN — HYDROCODONE BITARTRATE AND ACETAMINOPHEN 1 TABLET: 5; 325 TABLET ORAL at 08:02

## 2024-02-25 RX ADMIN — PANTOPRAZOLE SODIUM 40 MG: 40 TABLET, DELAYED RELEASE ORAL at 08:02

## 2024-02-25 RX ADMIN — TIZANIDINE 2 MG: 2 TABLET ORAL at 11:02

## 2024-02-25 RX ADMIN — NOREPINEPHRINE BITARTRATE 0.02 MCG/KG/MIN: 1 INJECTION, SOLUTION, CONCENTRATE INTRAVENOUS at 03:02

## 2024-02-25 NOTE — NURSING
Nurses Note -- 4 Eyes      2/25/2024   4:49 PM      Skin assessed during: Q Shift Change      [x] No Altered Skin Integrity Present    [x]Prevention Measures Documented      [] Yes- Altered Skin Integrity Present or Discovered   [] LDA Added if Not in Epic (Describe Wound)   [] New Altered Skin Integrity was Present on Admit and Documented in LDA   [] Wound Image Taken    Wound Care Consulted? No    Attending Nurse:  Vince Cast RN/Staff Member:   omari dewitt RN

## 2024-02-25 NOTE — PROGRESS NOTES
"  [facility]    Cardiology  Progress Note    Patient Name: Brain Snyder  MRN: [id number]  Admission Date: 2/21/2024  Hospital Length of Stay: 4 days  Code Status: Full Code   Attending Physician: Pablo Yepez MD   Primary Care Physician: Nidia Shepherd NP  Expected Discharge Date:   Principal Problem:CAD (coronary artery disease)    Subjective:     Reason for Consult:  CAD     HPI: 78-year-old female that is unknown to [facility] with a PMHx of PAF on Eliquis Aortic insufficiency, MV CAD, HTN. He is Thai speaking and an  was used for interview. He was orginally admitted to [facility] on 2.15.24 with CP & NSTEMI and underwent a LHC on 2.20.24 taht showed MV CAD (reported noted below) He was transferred to [facility] on 2.21.24 for a CABG/AVR evaluation. On arrive he was hemodynamically stable on a heparin infusion. He denied chest pain, SOB, and nausea on current exam, [facility] was consulted for CAD    Hospital Course:   2.23.24: Patient seen resting in bed. He denies SOB or CP. He remains on heparin infusion. Family at bedside   2.24.24: NAD. S/p Impella Placement/[place]-Chelo Catheter. "I am ok." + Blood Clots from Nose/Mouth, Hematuria 2/2 traumatic Indwelling Catheter Placement. Heparin Drip per Protocol. Impella P5  2.25.24: NAD. "I'm ok." Denies CP, SOB and Palps. PA Pressure Reading is not Accurate. CVP 5. Impella at P5. R Groin Impella P7. Remains Off Heparin Drip per Protocol. Now in SR.     PMH: PAF (on Eliquis), Aortic insufficiency, MV CAD, HTN  PSH: [facility]  Family History: None reported  Social History: former smoker, denies ETOH or illicit drug us    Previous Diagnostics:  RHC/Impella Placement (2.23.24):  Right heart catheterization performed showed the following:  PA= 61/24 (27) mm Hg  PCWP=  31/26 (27) mm Hg  AO saturation= 93 % RA  PA saturation= 60% with Impella (49% yesterday)    (02/22/24) -  0.5  Following that we elected to advance the Impella via right common femoral artery " "approach:  - Abiomed stiff wire  - 14 [place] peel-away sheath  - Heparin 10,000 unit bolus given peripherally  - Dilator removed  - 0.035" J-wire  - 6 [place] pigtail catheter positioned in the left ventricle  - Abiomed 0.025" wire  - Impella CP positioned in left ventricle  - Pulsatile motor current (appropriate positioning)  - Placed the marker just below the aortic valve  - Cardiac output was 2.8 L/min provided by the device.  Impella was at 84cm  Access Closure :    Sheath sutured to the groin  Secured.  Attestation:I was present for and supervised all key portions of the procedure  Impression/plan:   Successful Impella insertion and swan-Chelo in the setting of Acute HF/low cardiac output/precardiogenic shock/Critcal-severe AS/MVCAD - LM distal    RHC (2.22.24):  Right heart catheterization demonstrating severe pulmonary hypertension 84/34 and PCWP 24 mmHg  Reduced cardiac output/cardiac index at 3.43/1.81 L/min/m2 with pulmonary artery saturations 49.3%  For applied to the right femoral vein following removal of the 7 [place] sheath  The estimated blood loss was none.    Carotid US (2.22.24):  The bilateral internal carotid artery demonstrated less than 50% stenosis.   The bilateral vertebral arteries were patent with antegrade flow    LHC (2.20.24):   Prox LAD lesion was 70% stenosed.  Ost Cx to Prox Cx lesion was 80% stenosed.  1st Mrg lesion was 80% stenosed.  2nd Mrg-1 lesion was 70% stenosed.  2nd Mrg-2 lesion was 50% stenosed.  Mid LAD lesion was 50% stenosed.  Prox RCA lesion was 60% stenosed.  estimated blood loss was <50 mL.  There was three vessel coronary artery disease.  LVEDP: 30mmHg  Recommendations:   Refer for CABG evaluation and AVR   Preformed by Dr. Flannery at [facility]     ECHO (2.15.24):  Left Ventricle: The left ventricle is normal in size. Mildly increased ventricular mass. Mildly increased wall thickness. There is mild eccentric hypertrophy. Moderate global hypokinesis present. Septal motion is " consistent with bundle branch block. There is moderately reduced systolic function. Biplane (2D) method of discs ejection fraction is 40%. Grade II diastolic dysfunction.  Right Ventricle: Right ventricular enlargement. Systolic function is normal.  Left Atrium: Left atrium is severely dilated.  Right Atrium: Right atrium is moderately dilated.  Aortic Valve: There is moderate aortic valve sclerosis. Severely restricted motion. There is severe stenosis. Aortic valve area by VTI is 0.66 cm². Aortic valve peak velocity is 4.45 m/s. Mean gradient is 50 mmHg. The dimensionless index is 0.17. There is mild to moderate aortic regurgitation.  Mitral Valve: Mildly calcified leaflets. There is no stenosis. There is mild regurgitation.  Tricuspid Valve: The tricuspid valve is structurally normal. There is mild to moderate regurgitation.  Pulmonic Valve: There is no significant regurgitation.  Aorta: Aortic root is upper limit of normal measuring 3.6 cm.  Pulmonary Artery: There is severe pulmonary hypertension. The estimated pulmonary artery systolic pressure is 69 mmHg.  IVC/SVC: Intermediate venous pressure at 8 mmHg.  Pericardium: There is no pericardial effusion.     Review of Systems   Constitutional: Negative for chills and fever.   HENT:          Resolved Clotting   Cardiovascular:  Negative for chest pain, leg swelling and palpitations.   Respiratory:  Negative for shortness of breath.    Gastrointestinal:  Negative for nausea and vomiting.   Genitourinary:  Negative for hematuria.   All other systems reviewed and are negative.    Objective:     Vital Signs (Most Recent):  Temp: 98.1 °F (36.7 °C) (02/25/24 0800)  Pulse: 70 (02/25/24 1200)  Resp: 17 (02/25/24 1200)  BP: 111/61 (02/25/24 1200)  SpO2: 97 % (02/25/24 1200) Vital Signs (24h Range):  Temp:  [97.9 °F (36.6 °C)-99.3 °F (37.4 °C)] 98.1 °F (36.7 °C)  Pulse:  [51-97] 70  Resp:  [11-26] 17  SpO2:  [92 %-100 %] 97 %  BP: ()/(49-94) 111/61  Arterial Line BP:  (-)/(-50-76) 104/55   Weight: 78.2 kg (172 lb 6.4 oz)  Body mass index is 28.69 kg/m².  SpO2: 97 %       Intake/Output Summary (Last 24 hours) at 2/25/2024 1303  Last data filed at 2/25/2024 1200  Gross per 24 hour   Intake 3427.35 ml   Output 3895 ml   Net -467.65 ml       Lines/Drains/Airways       Central Venous Catheter Line  Duration             Pulmonary Artery Catheter Assessment  02/24/24 0700 Femoral Right 1 day              Drain  Duration                  Urethral Catheter 02/23/24 1451 Latex 1 day              Arterial Line  Duration             Arterial Line 02/24/24 0758 Right Radial 1 day              Line  Duration                  VAD 02/24/24 0700 Impella 1 day              Peripheral Intravenous Line  Duration                  Peripheral IV - Double Lumen 02/17/24 1430 20 G Anterior;Distal;Left Forearm 7 days         Peripheral IV - Double Lumen 02/20/24 0031 20 G Anterior;Right Forearm 5 days         Sheath 02/23/24 0903 Right anterior;proximal 2 days         Sheath 02/23/24 0905 Right anterior;proximal 2 days                  Significant Labs:   Recent Results (from the past 72 hour(s))   PTT Heparin Monitoring    Collection Time: 02/22/24  2:11 PM   Result Value Ref Range    PTT Heparin Monitor 67.0 (H) 23.2 - 33.7 seconds   Prepare RBC 4 Units; w    Collection Time: 02/22/24  3:13 PM   Result Value Ref Range    UNIT NUMBER [id number]     UNIT ABO/RH O POS     DISPENSE STATUS Selected     Unit Expiration [id number]     Product Code I4846E04     Unit Blood Type Code 5100     CROSSMATCH INTERPRETATION Compatible     UNIT NUMBER [id number]     UNIT ABO/RH O POS     DISPENSE STATUS Selected     Unit Expiration [id number]     Product Code Q9148Q66     Unit Blood Type Code 5100     CROSSMATCH INTERPRETATION Compatible     UNIT NUMBER [id number]     UNIT ABO/RH O POS     DISPENSE STATUS Selected     Unit Expiration [id number]     Product Code I0335R51     Unit Blood Type Code 5100      CROSSMATCH INTERPRETATION Compatible     UNIT NUMBER [id number]     UNIT ABO/RH O POS     DISPENSE STATUS Selected     Unit Expiration [id number]     Product Code E4063A67     Unit Blood Type Code 5100     CROSSMATCH INTERPRETATION Compatible    Type & Screen    Collection Time: 02/22/24  3:13 PM   Result Value Ref Range    Group & Rh O POS     Indirect Ugo GEL NEG     Specimen Outdate 02/25/2024 23:59    POCT glucose    Collection Time: 02/22/24  5:13 PM   Result Value Ref Range    POCT Glucose 122 (H) 70 - 110 mg/dL   ABORH RETYPE    Collection Time: 02/22/24  6:07 PM   Result Value Ref Range    ABORH Retype O POS    PTT Heparin Monitoring    Collection Time: 02/22/24  6:07 PM   Result Value Ref Range    PTT Heparin Monitor 62.3 (H) 23.2 - 33.7 seconds   PTT Heparin Monitoring    Collection Time: 02/22/24  7:55 PM   Result Value Ref Range    PTT Heparin Monitor 80.8 (H) 23.2 - 33.7 seconds   CV Ultrasound Bilateral Doppler Carotid    Collection Time: 02/22/24  8:35 PM   Result Value Ref Range    Right CCA prox sys 50 cm/s    Right CCA prox das 5 cm/s    Right CCA dist sys 59 cm/s    Right CCA dist das 10 cm/s    Right ICA prox sys 53 cm/s    Right ICA prox das 9 cm/s    Right ICA mid sys 50 cm/s    Right ICA mid das 12 cm/s    Right ICA dist sys 71 cm/s    Right ICA dist das 17 cm/s    Right ECA sys 52 cm/s    Right ECA das 0 cm/s    Right vertebral sys 23 cm/s    Right vertebral das 1 cm/s    Right ICA/CCA ratio 1.20     Right Highest ICA 71.00     Right Highest EDV 17.00     Right Highest CCA 59     Left CCA prox sys 51 cm/s    Left CCA prox das 8 cm/s    Left CCA dist sys 64 cm/s    Left CCA dist das 9 cm/s    Left ICA prox sys 63 cm/s    Left ICA prox das 13 cm/s    Left ICA mid sys 76 cm/s    Left ICA mid das 12 cm/s    Left ICA dist sys 58 cm/s    Left ICA dist das 11 cm/s    Left ECA sys 47 cm/s    Left ECA das 0 cm/s    Left vertebral sys 45 cm/s    Left vertebral das 9 cm/s     Left ICA/CCA ratio 1.19     Left Highest ICA 76.00     LT Highest EDV 13.00     Left Highest CCA 64    MRSA PCR    Collection Time: 02/23/24 12:59 AM   Result Value Ref Range    MRSA PCR SCRN ([facility]) Detected (A) Not Detected   Basic Metabolic Panel    Collection Time: 02/23/24  7:16 AM   Result Value Ref Range    Sodium Level 138 136 - 145 mmol/L    Potassium Level 4.4 3.5 - 5.1 mmol/L    Chloride 103 98 - 107 mmol/L    Carbon Dioxide 24 23 - 31 mmol/L    Glucose Level 82 82 - 115 mg/dL    Blood Urea Nitrogen 13.1 8.4 - 25.7 mg/dL    Creatinine 1.86 (H) 0.73 - 1.18 mg/dL    BUN/Creatinine Ratio 7     Calcium Level Total 9.1 8.8 - 10.0 mg/dL    Anion Gap 11.0 mEq/L    eGFR 37 mls/min/1.73/m2   Magnesium    Collection Time: 02/23/24  7:16 AM   Result Value Ref Range    Magnesium Level 2.20 1.60 - 2.60 mg/dL   Phosphorus    Collection Time: 02/23/24  7:16 AM   Result Value Ref Range    Phosphorus Level 4.7 2.3 - 4.7 mg/dL   PTT Heparin Monitoring    Collection Time: 02/23/24  7:16 AM   Result Value Ref Range    PTT Heparin Monitor 189.1 (HH) 23.2 - 33.7 seconds   CBC with Differential    Collection Time: 02/23/24  7:16 AM   Result Value Ref Range    WBC 10.72 4.50 - 11.50 x10(3)/mcL    RBC 4.67 (L) 4.70 - 6.10 x10(6)/mcL    Hgb 12.5 (L) 14.0 - 18.0 g/dL    Hct 41.8 (L) 42.0 - 52.0 %    MCV 89.5 80.0 - 94.0 fL    MCH 26.8 (L) 27.0 - 31.0 pg    MCHC 29.9 (L) 33.0 - 36.0 g/dL    RDW 15.6 11.5 - 17.0 %    Platelet 295 130 - 400 x10(3)/mcL    MPV 11.2 (H) 7.4 - 10.4 fL    Neut % 70.1 %    Lymph % 18.5 %    Mono % 7.6 %    Eos % 2.7 %    Basophil % 0.6 %    Lymph # 1.98 0.6 - 4.6 x10(3)/mcL    Neut # 7.53 2.1 - 9.2 x10(3)/mcL    Mono # 0.81 0.1 - 1.3 x10(3)/mcL    Eos # 0.29 0 - 0.9 x10(3)/mcL    Baso # 0.06 <=0.2 x10(3)/mcL    IG# 0.05 (H) 0 - 0.04 x10(3)/mcL    IG% 0.5 %    NRBC% 0.0 %   Echo    Collection Time: 02/23/24 11:08 AM   Result Value Ref Range    TAPSE 1.85 cm   ISTAT ACT-K    Collection Time: 02/23/24 11:10 AM    Result Value Ref Range    POC ACTIVATED CLOTTING TIME K 196 (H) 74 - 137 sec    Sample UNKNOWN    EKG 12-lead    Collection Time: 02/23/24 11:24 AM   Result Value Ref Range    QRS Duration 176 ms    [facility] QTC Calculation 557 ms   ISTAT ACT-K    Collection Time: 02/23/24 12:11 PM   Result Value Ref Range    POC ACTIVATED CLOTTING TIME K 179 (H) 74 - 137 sec    Sample UNKNOWN    Lactic Acid, Plasma    Collection Time: 02/23/24  1:07 PM   Result Value Ref Range    Lactic Acid Level 1.2 0.5 - 2.2 mmol/L   ISTAT ACT-K    Collection Time: 02/23/24  1:12 PM   Result Value Ref Range    POC ACTIVATED CLOTTING TIME K 168 (H) 74 - 137 sec    Sample UNKNOWN    ISTAT ACT-K    Collection Time: 02/23/24  2:06 PM   Result Value Ref Range    POC ACTIVATED CLOTTING TIME K 168 (H) 74 - 137 sec    Sample UNKNOWN    ISTAT ACT-K    Collection Time: 02/23/24  2:19 PM   Result Value Ref Range    POC ACTIVATED CLOTTING TIME K 174 (H) 74 - 137 sec    Sample unknown    ISTAT ACT-K    Collection Time: 02/23/24  5:09 PM   Result Value Ref Range    POC ACTIVATED CLOTTING TIME K 174 (H) 74 - 137 sec    Sample unknown    Lactic Acid, Plasma    Collection Time: 02/23/24  6:30 PM   Result Value Ref Range    Lactic Acid Level 1.2 0.5 - 2.2 mmol/L   ISTAT ACT-K    Collection Time: 02/23/24  9:16 PM   Result Value Ref Range    POC ACTIVATED CLOTTING TIME K 163 (H) 74 - 137 sec    Sample UNKNOWN    ISTAT ACT-K    Collection Time: 02/24/24  1:16 AM   Result Value Ref Range    POC ACTIVATED CLOTTING TIME K 168 (H) 74 - 137 sec    Sample UNKNOWN    Comprehensive metabolic panel    Collection Time: 02/24/24  1:44 AM   Result Value Ref Range    Sodium Level 135 (L) 136 - 145 mmol/L    Potassium Level 4.3 3.5 - 5.1 mmol/L    Chloride 99 98 - 107 mmol/L    Carbon Dioxide 27 23 - 31 mmol/L    Glucose Level 115 82 - 115 mg/dL    Blood Urea Nitrogen 21.5 8.4 - 25.7 mg/dL    Creatinine 2.10 (H) 0.73 - 1.18 mg/dL    Calcium Level Total 9.3 8.8 - 10.0 mg/dL     Protein Total 7.6 5.8 - 7.6 gm/dL    Albumin Level 3.7 3.4 - 4.8 g/dL    Globulin 3.9 (H) 2.4 - 3.5 gm/dL    Albumin/Globulin Ratio 0.9 (L) 1.1 - 2.0 ratio    Bilirubin Total 1.5 <=1.5 mg/dL    Alkaline Phosphatase 79 40 - 150 unit/L    Alanine Aminotransferase 21 0 - 55 unit/L    Aspartate Aminotransferase 66 (H) 5 - 34 unit/L    eGFR 32 mls/min/1.73/m2   CBC with Differential    Collection Time: 02/24/24  1:44 AM   Result Value Ref Range    WBC 13.20 (H) 4.50 - 11.50 x10(3)/mcL    RBC 4.45 (L) 4.70 - 6.10 x10(6)/mcL    Hgb 12.0 (L) 14.0 - 18.0 g/dL    Hct 38.2 (L) 42.0 - 52.0 %    MCV 85.8 80.0 - 94.0 fL    MCH 27.0 27.0 - 31.0 pg    MCHC 31.4 (L) 33.0 - 36.0 g/dL    RDW 15.2 11.5 - 17.0 %    Platelet 271 130 - 400 x10(3)/mcL    MPV 11.3 (H) 7.4 - 10.4 fL    Neut % 75.7 %    Lymph % 10.5 %    Mono % 10.8 %    Eos % 1.8 %    Basophil % 0.7 %    Lymph # 1.39 0.6 - 4.6 x10(3)/mcL    Neut # 10.00 (H) 2.1 - 9.2 x10(3)/mcL    Mono # 1.42 (H) 0.1 - 1.3 x10(3)/mcL    Eos # 0.24 0 - 0.9 x10(3)/mcL    Baso # 0.09 <=0.2 x10(3)/mcL    IG# 0.06 (H) 0 - 0.04 x10(3)/mcL    IG% 0.5 %    NRBC% 0.0 %   ISTAT ACT-K    Collection Time: 02/24/24  5:52 AM   Result Value Ref Range    POC ACTIVATED CLOTTING TIME K 158 (H) 74 - 137 sec    Sample UNKNOWN    ISTAT ACT-K    Collection Time: 02/24/24  7:12 AM   Result Value Ref Range    POC ACTIVATED CLOTTING TIME K 147 (H) 74 - 137 sec    Sample UNKNOWN    ISTAT ACT-K    Collection Time: 02/24/24  8:13 AM   Result Value Ref Range    POC ACTIVATED CLOTTING TIME K 163 (H) 74 - 137 sec    Sample unknown    Lactic Acid, Plasma    Collection Time: 02/24/24  8:42 AM   Result Value Ref Range    Lactic Acid Level 1.2 0.5 - 2.2 mmol/L   ISTAT ACT-K    Collection Time: 02/24/24  9:13 AM   Result Value Ref Range    POC ACTIVATED CLOTTING TIME K 158 (H) 74 - 137 sec    Sample unknown    ISTAT ACT-K    Collection Time: 02/24/24 10:29 AM   Result Value Ref Range    POC ACTIVATED CLOTTING TIME K 158 (H) 74  - 137 sec    Sample unknown    Comprehensive metabolic panel    Collection Time: 02/25/24  2:32 AM   Result Value Ref Range    Sodium Level 134 (L) 136 - 145 mmol/L    Potassium Level 4.2 3.5 - 5.1 mmol/L    Chloride 103 98 - 107 mmol/L    Carbon Dioxide 25 23 - 31 mmol/L    Glucose Level 103 82 - 115 mg/dL    Blood Urea Nitrogen 20.0 8.4 - 25.7 mg/dL    Creatinine 1.69 (H) 0.73 - 1.18 mg/dL    Calcium Level Total 8.3 (L) 8.8 - 10.0 mg/dL    Protein Total 6.0 5.8 - 7.6 gm/dL    Albumin Level 3.2 (L) 3.4 - 4.8 g/dL    Globulin 2.8 2.4 - 3.5 gm/dL    Albumin/Globulin Ratio 1.1 1.1 - 2.0 ratio    Bilirubin Total 1.3 <=1.5 mg/dL    Alkaline Phosphatase 69 40 - 150 unit/L    Alanine Aminotransferase 18 0 - 55 unit/L    Aspartate Aminotransferase 66 (H) 5 - 34 unit/L    eGFR 41 mls/min/1.73/m2   Magnesium    Collection Time: 02/25/24  2:32 AM   Result Value Ref Range    Magnesium Level 2.10 1.60 - 2.60 mg/dL   APTT    Collection Time: 02/25/24  2:32 AM   Result Value Ref Range    PTT 31.8 23.2 - 33.7 seconds   CBC with Differential    Collection Time: 02/25/24  2:32 AM   Result Value Ref Range    WBC 11.23 4.50 - 11.50 x10(3)/mcL    RBC 3.69 (L) 4.70 - 6.10 x10(6)/mcL    Hgb 10.1 (L) 14.0 - 18.0 g/dL    Hct 31.7 (L) 42.0 - 52.0 %    MCV 85.9 80.0 - 94.0 fL    MCH 27.4 27.0 - 31.0 pg    MCHC 31.9 (L) 33.0 - 36.0 g/dL    RDW 15.3 11.5 - 17.0 %    Platelet 207 130 - 400 x10(3)/mcL    MPV 10.9 (H) 7.4 - 10.4 fL    Neut % 75.0 %    Lymph % 10.2 %    Mono % 11.5 %    Eos % 2.6 %    Basophil % 0.3 %    Lymph # 1.14 0.6 - 4.6 x10(3)/mcL    Neut # 8.44 2.1 - 9.2 x10(3)/mcL    Mono # 1.29 0.1 - 1.3 x10(3)/mcL    Eos # 0.29 0 - 0.9 x10(3)/mcL    Baso # 0.03 <=0.2 x10(3)/mcL    IG# 0.04 0 - 0.04 x10(3)/mcL    IG% 0.4 %    NRBC% 0.0 %     Telemetry: SR    Physical Exam  Constitutional:       General: He is not in acute distress.     Appearance: Normal appearance. He is not ill-appearing.   HENT:      Head: Normocephalic.       Mouth/Throat:      Mouth: Mucous membranes are moist.   Eyes:      Extraocular Movements: Extraocular movements intact.      Conjunctiva/sclera: Conjunctivae normal.   Cardiovascular:      Rate and Rhythm: Normal rate and regular rhythm.      Pulses: Normal pulses.      Heart sounds: Murmur heard.   Pulmonary:      Effort: No respiratory distress.      Breath sounds: No rhonchi.      Comments: NC O2  Abdominal:      General: Abdomen is flat.   Skin:     General: Skin is warm.      Comments: R Groin Impella at P7, Soft/Flat, Non-Tender, No Sign of Bleed/Infection. +1 BLE Palpable Pedal Pulses. + Vale Catheter   Neurological:      Mental Status: He is alert and oriented to person, place, and time.   Psychiatric:         Mood and Affect: Mood normal.         Behavior: Behavior normal.         Judgment: Judgment normal.       Current Inpatient Medications:    Current Facility-Administered Medications:     0.9%  NaCl infusion, , Intravenous, Continuous, Kaleb Rdz, ANP, Last Rate: 100 mL/hr at 02/25/24 0629, Rate Verify at 02/25/24 0629    acetaminophen tablet 650 mg, 650 mg, Oral, Q6H PRN, Tanner Rdz MD    acetaminophen tablet 650 mg, 650 mg, Oral, Q4H PRN, Tanner Rdz MD, 650 mg at 02/24/24 2021    allopurinol split tablet 50 mg, 50 mg, Oral, Daily, Tanner Rdz MD, 50 mg at 02/25/24 0803    amiodarone tablet 200 mg, 200 mg, Oral, BID, Tanner Rdz MD, 200 mg at 02/25/24 0804    aspirin EC tablet 81 mg, 81 mg, Oral, Daily, Tanner Rdz MD, 81 mg at 02/25/24 0804    atorvastatin tablet 40 mg, 40 mg, Oral, QHS, Tanner Rdz MD, 40 mg at 02/24/24 2020    ferrous sulfate tablet 1 each, 1 tablet, Oral, Every other day, Tanner Rdz MD, 1 each at 02/25/24 0804    folic acid tablet 1 mg, 1 mg, Oral, Daily, Tanner Rdz MD, 1 mg at 02/25/24 0804    furosemide injection 40 mg, 40 mg, Intravenous, Q12H, Millie Jimenez NP, 40 mg at 02/23/24  2129    heparin 25,000 units in dextrose 5% (100 units/ml) IV bolus from bag LOW INTENSITY nomogram - [facility], 58.7 Units/kg (Adjusted), Intravenous, PRN, Kaleb Rdz, KWAN    heparin 25,000 units in dextrose 5% (100 units/ml) IV bolus from bag LOW INTENSITY nomogram - [facility], 30 Units/kg (Adjusted), Intravenous, PRN, Kaleb Rdz, ANP    heparin 25,000 units in dextrose 5% 250 mL (100 units/mL) infusion LOW INTENSITY nomogram - [facility], 0-40 Units/kg/hr (Adjusted), Intravenous, Continuous, Kaleb Rdz, KWAN    HYDROmorphone (PF) injection 0.5 mg, 0.5 mg, Intravenous, Q4H PRN, Pablo Yepez MD, 0.5 mg at 02/25/24 0051    lactated ringers bolus 500 mL, 500 mL, Intravenous, Once, Stroda, Fransico, DO    LIDOcaine HCl 2% urojet, , Mucous Membrane, Once, Nicol Diaz, AGAP-BC    melatonin tablet 6 mg, 6 mg, Oral, Nightly PRN, Tanner Rdz MD, 6 mg at 02/24/24 2021    nitroGLYCERIN SL tablet 0.4 mg, 0.4 mg, Sublingual, Q5 Min PRN, Tanner Rdz MD    ondansetron disintegrating tablet 8 mg, 8 mg, Oral, Q8H PRN, Tanner Rdz MD, 8 mg at 02/23/24 1302    ondansetron injection 8 mg, 8 mg, Intravenous, Q6H PRN, Pablo Yepez MD, 8 mg at 02/24/24 2341    pantoprazole EC tablet 40 mg, 40 mg, Oral, Daily, Tanner Rdz MD, 40 mg at 02/25/24 0804    polyethylene glycol packet 17 g, 17 g, Oral, Daily, Tanner Rdz MD, 17 g at 02/24/24 0927    simethicone chewable tablet 80 mg, 1 tablet, Oral, TID PRN, Tanner Rdz MD, 80 mg at 02/23/24 1743    sodium bicarbonate 25 mEq in dextrose 5 % (D5W) 1,000 mL infusion, , Intravenous, Continuous, Simone Mora MD, Last Rate: 10 mL/hr at 02/25/24 0629, Rate Verify at 02/25/24 0629    sodium chloride 0.9% flush 10 mL, 10 mL, Intravenous, PRN, Tanner Rdz MD    sodium chloride 0.9% flush 10 mL, 10 mL, Intravenous, PRN, Millie Jimenez, NP    tiZANidine tablet 2 mg, 2 mg, Oral, Q8H PRN, Bart Herbert DO, 2 mg at  02/25/24 1122  VTE Risk Mitigation (From admission, onward)           Ordered     heparin 25,000 units in dextrose 5% (100 units/ml) IV bolus from bag LOW INTENSITY nomogram - [facility]  As needed (PRN)        Question:  Heparin Infusion Adjustment (DO NOT MODIFY ANSWER)  Answer:  [email or web address]\Images\Pharmacy\HeparinInfusions\heparin LOW INTENSITY nomogram for [facility] [id number].pdf    02/25/24 1156     heparin 25,000 units in dextrose 5% (100 units/ml) IV bolus from bag LOW INTENSITY nomogram - [facility]  As needed (PRN)        Question:  Heparin Infusion Adjustment (DO NOT MODIFY ANSWER)  Answer:  [email or web address]\Images\Pharmacy\HeparinInfusions\heparin LOW INTENSITY nomogram for [facility] [id number].pdf    02/25/24 1156     heparin 25,000 units in dextrose 5% 250 mL (100 units/mL) infusion LOW INTENSITY nomogram - [facility]  Continuous        Question:  Begin at (units/kg/hr)  Answer:  12    02/25/24 1156     Place SUSIE hose  Until discontinued         02/22/24 1458     Place sequential compression device  Until discontinued         02/21/24 2057                  Assessment:   MVCAD    - RHC/Impella Placement and [place] Catheter (2.23.24)    - LHC (2.19.24):pLAD lesion 70%, oLCx 80%, OM1 80%, OM2 1 lesion 70% 2nd lesion 50%, mLAD 50%, pRCA 60%  Aortic Insuffiencey     - ECHO (2.16.24): Aortic Valve: There is moderate aortic valve sclerosis. Severely restricted motion. There is severe stenosis. Aortic valve area by VTI is 0.66 cm². Aortic valve peak velocity is 4.45 m/s. Mean gradient is 50 mmHg. The dimensionless index is 0.17.   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% - Compensated    - CVP (2.25.24) on Rounds - 5    - ECHO (2.16.24): EF 40%, Grade II DD  VHD    - ECHO  (2.16.24): Severe AS, mild MR, mild to moderate TR  JEAN-CLAUDE/CKD Stage IIb    - Baseline Cr 1.6  Leukocytosis   HTN  No Hx of GIB    Plan:   CV Surgery Following: Pending CABG/AVR/MOISE Ligation Evaluation (Most Likely will Plan for High Risk PCI)  Keep NPO after MN  Continue ASA, Statin, Amiodarone   D/C IV Lasix 2/2 Low CVR  NS Bolus 500mL x 1 Now  Accurate I&Os and Daily Weights  Resume Heparin Drip per Protocol without Bolus Re: Stroke Risk Reduction (PAF)/Impella  Urology Following for Hematuria   Impella Support to Bridge to Intervention (CABG vs High Risk PCI on Monday 2.26.24) - Discuss with Surgery in AM, Most Likely will be High Risk PCI with Dr. Quintero followed by TAVR for Severe AS  Will use Proximal Port for CV monitoring; No Need for Wedge at this time as we are not looking to escalate Therapy.  CXR to be Reviewed by Dr. Borja   Labs in AM: CBC, CMP and Mg    KWAN Sherwood  Cardiology  [facility]   02/25/2024      I agree with the findings of the complexity of problems addressed and take responsibility for the plan's risks and complications. I approved the plan documented by Kaleb Rdz NP.

## 2024-02-25 NOTE — PROGRESS NOTES
Subjective     Chief Complaint:  Rash    HPI:  The patient presents to the office today accompanied by his brother. He complains of a rash on his neck that first started 4-5 days ago. Please see assessment and plan below.    Patient's PMR from outside medical facility reviewed and noted.    Past Medical History:   Past Medical History:   Diagnosis Date    Cataract     Diabetes mellitus     Falls     Gait abnormality     GERD (gastroesophageal reflux disease)     Hypothyroidism     Muscle spasm     Nicotine dependence     Rhabdomyolysis     Tremor      Past Surgical History:  Past Surgical History:   Procedure Laterality Date    COLONOSCOPY N/A 8/16/2023    Procedure: COLONOSCOPY WITH ANESTHESIA;  Surgeon: Ryley Del Cid DO;  Location: [facility] ENDOSCOPY;  Service: Gastroenterology;  Laterality: N/A;  Pre: Encounter for screening for malignant neoplasm of colon;  Post: Sub optimal prep, polyps;  Dr. Jurado    OTHER SURGICAL HISTORY      cyst on tailbone, left big toe     Social History:  reports that he has been smoking cigarettes. He has a 40.00 pack-year smoking history. He has never used smokeless tobacco. He reports current drug use. Drug: Marijuana. He reports that he does not drink alcohol.    Family History: family history includes Alzheimer's disease in his father; No Known Problems in his mother.      Allergies:  No Known Allergies  Medications:  Prior to Admission medications    Medication Sig Start Date End Date Taking? Authorizing Provider   acetaminophen (TYLENOL) 325 MG tablet Take 2 tablets by mouth Every 4 (Four) Hours.   Yes Provider, MD Deanna   aspirin 81 MG EC tablet Take 1 tablet by mouth Daily. 12/1/22  Yes Diana Luz APRN   Continuous Blood Gluc  (Dexcom G6 ) device 1 each Take As Directed. 2/28/22  Yes Kesha Del Cid APRN   Continuous Blood Gluc Sensor (Dexcom G6 Sensor) Every 10 (Ten) Days. 7/23/21  Yes Raissa Olivera APRN   Continuous Blood  [facility]  Pulmonary Critical Care Note    Patient Name: Brain Snyder  MRN: [id number]  Admission Date: 2/21/2024  Hospital Length of Stay: 4 days  Code Status: Full Code  Attending Provider: Pablo Yepez MD  Primary Care Provider: Nidia Shepherd NP     Subjective:     HPI:   The patient is a 78-year-old Danish-speaking male with a history of aortic insufficiency/stenosis, coronary artery disease, chronic kidney disease stage 3, hypertension, atrial fibrillation on Eliquis, who presented to the ICU status post going to the cath lab for PCI.  [facility] being admitted there on 2/15 with chest pain found to have NSTEMI with left heart catheterization initially on 02/20 demonstrating severe multivessel stenosis and was subsequently transferred here for CABG evaluation.  Patient was taken to the cath lab today and subsequently had an Impella placed and was transferred to the ICU with Impella currently set at P7.  Patient is awake alert moves all extremities follows commands with a Danish language barrier.  He has no complaints at this time.       Hospital Course/Significant events:  02/23/2024 admitted to ICU post Impella placement    24 Hour Interval History:  Patient went into AFib RVR overnight and required 1 L fluid bolus and subsequently resolved.  Nguyen catheter exchange yesterday per nursing staff.  Epistaxis/hemoptysis/hematuria has resolved.  This morning patient continues to complain of left-sided back pain lower thoracic region.  Will trial tizanidine and obtain he was retroperitoneal ultrasound.  Discussing with Cardiology to restart heparin drip.  Awaiting decision regarding CABG versus high-risk PCI.  Afebrile last 24 hours with T-max 99.3° F. Saturating well on room air.  Hemodynamically stable. Continues to have impella in place currently at P7.  H&H at 10.1/31 12/3.2.  CMP with low sodium at 1:34 a.m., BUN/creatinine  "Gluc Transmit (Dexcom G6 Transmitter) misc 1 each Take As Directed. 2/28/22  Yes Kesha Del Cid APRN   Glucagon (Baqsimi One Pack) 3 MG/DOSE powder 1 each into the nostril(s) as directed by provider As Needed (Hypoglycemia). Apply intranasal if hypoglycemia 3/31/23  Yes Tee Gonsales APRN   ibuprofen (ADVIL,MOTRIN) 200 MG tablet Take 3 tablets by mouth Every 6 (Six) Hours As Needed for Moderate Pain . 7/23/21  Yes Raissa Olivera APRN   insulin aspart (NovoLOG FlexPen) 100 UNIT/ML solution pen-injector sc pen 4 up to 6 untis plus meals sliding scale:151-200-2 UNITS 201-250 -4 UNITS 251-300-6 UNITS 301-350-8 UNITS 351-400-10 UNITS 401 +-12 UNITS 8/15/22  Yes Tee Gonsales APRN   Insulin Glargine (Lantus SoloStar) 100 UNIT/ML injection pen Inject 20 Units under the skin into the appropriate area as directed Every Night. 6/21/23  Yes Tee Gonsales APRN   Insulin Pen Needle (Unifine Pentips Plus) 32G X 4 MM misc USE AS DIRECTED WITH LANTUS AND NOVOLOG 7/24/23  Yes Tee Gonsales APRN   levothyroxine (SYNTHROID, LEVOTHROID) 175 MCG tablet TAKE ONE TABLET BY MOUTH EVERY DAY 1/23/23  Yes Diana Luz APRN   tamsulosin (FLOMAX) 0.4 MG capsule 24 hr capsule Take 1 capsule by mouth Every Night. 7/13/23  Yes Mendoza Alberts PA   vitamin D (ERGOCALCIFEROL) 1.25 MG (64165 UT) capsule capsule TAKE ONE CAPSULE BY MOUTH EVERY WEEK ON WEDNESDAY 5/10/23  Yes Diana Luz APRN   loratadine (Claritin) 10 MG tablet Take 1 tablet by mouth Daily for 7 days. 8/16/23 8/23/23  Diana Luz APRN   triamcinolone (KENALOG) 0.1 % ointment Apply 1 application  topically to the appropriate area as directed 2 (Two) Times a Day for 7 days. 8/16/23 8/23/23  Diana Luz, APRN       Objective     Vital Signs: BP (!) 88/52   Pulse 69   Temp 97.1 øF (36.2 øC) (Infrared)   Ht 182.9 cm (72.01\")   Wt 77.1 kg (170 lb)   SpO2 98%   BMI 23.05 kg/mý   Physical Exam  Vitals " 20/1.69, mild uptake in AST to 66.   Input 3769 cc, urine output 3150 cc last 24 hours, net +619 cc.       Past Medical History:   Diagnosis Date    A-fib     Aortic insufficiency     CAD (coronary artery disease)     Hypertension        Past Surgical History:   Procedure Laterality Date    CORONARY ANGIOGRAPHY N/A 2/20/2024    Procedure: ANGIOGRAM, CORONARY ARTERY;  Surgeon: Vasile Callahan MD;  Location: [facility] CATH LAB;  Service: Cardiology;  Laterality: N/A;    RIGHT HEART CATHETERIZATION N/A 2/22/2024    Procedure: INSERTION, CATHETER, RIGHT HEART;  Surgeon: Shreya Lomax MD;  Location: [facility] CATH LAB;  Service: Cardiology;  Laterality: N/A;       Social History     Socioeconomic History    Marital status:    Tobacco Use    Smoking status: Former     Types: Cigarettes    Smokeless tobacco: Never   Substance and Sexual Activity    Alcohol use: Not Currently    Drug use: Not Currently     Social Determinants of Health     Financial Resource Strain: Low Risk  (2/16/2024)    Overall Financial Resource Strain (CARDIA)     Difficulty of Paying Living Expenses: Not hard at all   Food Insecurity: No Food Insecurity (2/16/2024)    Hunger Vital Sign     Worried About Running Out of Food in the Last Year: Never true     Ran Out of Food in the Last Year: Never true   Transportation Needs: No Transportation Needs (2/16/2024)    PRAPARE - Transportation     Lack of Transportation (Medical): No     Lack of Transportation (Non-Medical): No   Physical Activity: Inactive (2/16/2024)    Exercise Vital Sign     Days of Exercise per Week: 0 days     Minutes of Exercise per Session: 0 min   Stress: Stress Concern Present (2/16/2024)    [employer] [place] [employer] - Occupational Stress Questionnaire     Feeling of Stress : To some extent   Social Connections: Socially Integrated (2/22/2024)    Social Connection and Isolation Panel [NHANES]     Frequency of Communication with Friends and Family: Twice a week      Frequency of Social Gatherings with Friends and Family: Twice a week     Attends Qu[employer] Services: 1 to 4 times per year     Active Member of Clubs or Organizations: Yes     Attends Club or Organization Meetings: 1 to 4 times per year     Marital Status:    Housing Stability: Low Risk  (2/16/2024)    Housing Stability Vital Sign     Unable to Pay for Housing in the Last Year: No     Number of Places Lived in the Last Year: 1     Unstable Housing in the Last Year: No           Current Outpatient Medications   Medication Instructions    amiodarone (PACERONE) 400 mg, Oral, 2 times daily    atorvastatin (LIPITOR) 40 mg, Oral, Nightly    furosemide (LASIX) 40 mg, Oral, Daily       Current Inpatient Medications   allopurinoL  50 mg Oral Daily    amiodarone  200 mg Oral BID    aspirin  81 mg Oral Daily    atorvastatin  40 mg Oral QHS    ferrous sulfate  1 tablet Oral Every other day    folic acid  1 mg Oral Daily    furosemide (LASIX) injection  40 mg Intravenous Q12H    lactated ringers  500 mL Intravenous Once    LIDOcaine HCl 2%   Mucous Membrane Once    pantoprazole  40 mg Oral Daily    polyethylene glycol  17 g Oral Daily       Current Intravenous Infusions   sodium chloride 0.9% 100 mL/hr at 02/25/24 0629    sodium bicarbonate 25 mEq in dextrose 5 % (D5W) 1,000 mL infusion 10 mL/hr at 02/25/24 0629         Review of Systems   Constitutional:  Negative for chills and fever.   HENT:  Negative for nosebleeds.    Respiratory:  Negative for cough, hemoptysis and shortness of breath.    Cardiovascular:  Negative for chest pain and palpitations.   Gastrointestinal:  Negative for abdominal pain, heartburn, nausea and vomiting.   Genitourinary:  Positive for flank pain. Negative for dysuria, frequency, hematuria and urgency.   Musculoskeletal:  Positive for back pain.          Objective:       Intake/Output Summary (Last 24 hours) at 2/25/2024 1025  Last data filed at 2/25/2024 0900  Gross per 24 hour   Intake  and nursing note reviewed.   Constitutional:       General: He is not in acute distress.     Appearance: He is not ill-appearing or toxic-appearing.   HENT:      Head: Normocephalic and atraumatic.        Comments: Scattered macular annular lesions noted on the neck and scalp.     Mouth/Throat:      Mouth: Mucous membranes are moist.      Pharynx: Oropharynx is clear.   Cardiovascular:      Rate and Rhythm: Normal rate and regular rhythm.      Pulses: Normal pulses.      Heart sounds: Normal heart sounds.   Pulmonary:      Effort: Pulmonary effort is normal.      Breath sounds: No wheezing, rhonchi or rales.   Abdominal:      General: Bowel sounds are normal. There is no distension.      Palpations: Abdomen is soft.      Tenderness: There is no abdominal tenderness.   Musculoskeletal:         General: No swelling or tenderness. Normal range of motion.      Cervical back: Normal range of motion and neck supple. No tenderness.   Skin:     General: Skin is warm and dry.      Findings: Rash present. No erythema.             Comments: Patchy erythema noted.  There are a couple of scabbed lesions noted to the left clavicle.   Neurological:      General: No focal deficit present.      Mental Status: He is alert and oriented to person, place, and time.   Psychiatric:         Mood and Affect: Mood normal.         Behavior: Behavior normal.         Thought Content: Thought content normal.         Judgment: Judgment normal.     BMI is within normal parameters. No other follow-up for BMI required.    Assessment / Plan     Assessment/Plan:  Diagnoses and all orders for this visit:    1. Allergic contact dermatitis, unspecified trigger (Primary)  -     loratadine (Claritin) 10 MG tablet; Take 1 tablet by mouth Daily for 7 days.  Dispense: 7 tablet; Refill: 0  -     triamcinolone acetonide (KENALOG-40) injection 40 mg  -     triamcinolone (KENALOG) 0.1 % ointment; Apply 1 application  topically to the appropriate area as directed  2 (Two) Times a Day for 7 days.  Dispense: 14 g; Refill: 0       The patient presents to the office today with complaints of a rash which he noted for 5 days ago.  For the first couple of days after noting this he did use Neosporin which did not seem to help much with itching.  The nurse at the assisted living facility advised him on a different cream to use for this, but he is unsure of the name.  This did not seem to help much with the itching either.  The patient is not necessarily having pain unless he is scratching vigorously.  He believes that the rash started on his right clavicle which then spread across his chest into the left clavicle.  His brother noted rash on his neck and scalp yesterday.    The patient switched shampoos a few months ago, but not recently.  He otherwise denies any new cleaning or toiletry products.  The facility washes his bedding and clothes for him and he is unsure if they have switched detergents.  He did find a bug in his shirt prior to the onset of the rash, and he thought this was a santos, with similar in appearance.  He is unsure if he was bitten by that or other insects.  He does go outside at the facility to smoke but does not go in the grass or woods.  He does sometimes see red ants around the assisted living facility.    The patient denies any shortness of breath, feelings of throat closing.  At this time, will treat for allergic contact dermatitis with Kenalog injection in the office.  He will also be directed to take loratadine for 7 days and use Kenalog ointment directly to this area to help with itching.  The patient is to call for any worsening or no improvement of his symptoms.    The patient's blood pressure is low in the office today at 88/52. He has hypotension at baseline. He is asymptomatic with this. Of note he did have a colonoscopy today so may be mildly dehydrated and anesthesia may be effecting this as well.     Return for Next scheduled follow up. unless  3438.01 ml   Output 3440 ml   Net -1.99 ml           Vital Signs (Most Recent):  Temp: 98.1 °F (36.7 °C) (02/25/24 0800)  Pulse: 62 (02/25/24 0900)  Resp: 14 (02/25/24 0900)  BP: 116/68 (02/25/24 0900)  SpO2: 96 % (02/25/24 0900)  Body mass index is 28.69 kg/m².  Weight: 78.2 kg (172 lb 6.4 oz) Vital Signs (24h Range):  Temp:  [97.9 °F (36.6 °C)-99.3 °F (37.4 °C)] 98.1 °F (36.7 °C)  Pulse:  [51-97] 62  Resp:  [11-26] 14  SpO2:  [92 %-100 %] 96 %  BP: ()/(49-94) 116/68  Arterial Line BP: (-)/(-50-76) 125/54     Physical Exam  Vitals and nursing note reviewed.   Constitutional:       Appearance: Normal appearance.   HENT:      Head: Normocephalic and atraumatic.      Nose: No nasal deformity.      Mouth/Throat:      Mouth: Mucous membranes are moist.      Pharynx: Oropharynx is clear.   Eyes:      Conjunctiva/sclera: Conjunctivae normal.      Pupils: Pupils are equal, round, and reactive to light.   Cardiovascular:      Rate and Rhythm: Normal rate and regular rhythm.      Pulses: Normal pulses.      Heart sounds: Normal heart sounds.   Pulmonary:      Effort: Pulmonary effort is normal. No respiratory distress.      Breath sounds: Normal breath sounds. No wheezing.   Abdominal:      General: Bowel sounds are normal.      Palpations: Abdomen is soft.      Tenderness: There is left CVA tenderness.   Musculoskeletal:         General: Normal range of motion.      Right lower leg: No edema.      Left lower leg: No edema.      Comments: Paraspinal tenderness in left lower back   Skin:     General: Skin is warm and dry.      Comments: Right femoral region with Impella place with overlying sheath and dressing   Neurological:      General: No focal deficit present.      Mental Status: He is alert and oriented to person, place, and time. Mental status is at baseline.           Lines/Drains/Airways       Central Venous Catheter Line  Duration             Pulmonary Artery Catheter Assessment  02/24/24 0700 Femoral  "Right 1 day              Drain  Duration                  Urethral Catheter 02/23/24 1451 Latex 1 day              Arterial Line  Duration             Arterial Line 02/24/24 0758 Right Radial 1 day              Line  Duration                  VAD 02/24/24 0700 Impella 1 day              Peripheral Intravenous Line  Duration                  Peripheral IV - Double Lumen 02/17/24 1430 20 G Anterior;Distal;Left Forearm 7 days         Peripheral IV - Double Lumen 02/20/24 0031 20 G Anterior;Right Forearm 5 days         Sheath 02/23/24 0903 Right anterior;proximal 2 days         Sheath 02/23/24 0905 Right anterior;proximal 2 days                    Significant Labs:    Lab Results   Component Value Date    WBC 11.23 02/25/2024    HGB 10.1 (L) 02/25/2024    HCT 31.7 (L) 02/25/2024    MCV 85.9 02/25/2024     02/25/2024           BMP  Lab Results   Component Value Date     (L) 02/25/2024    K 4.2 02/25/2024    CHLORIDE 103 02/25/2024    CO2 25 02/25/2024    BUN 20.0 02/25/2024    CREATININE 1.69 (H) 02/25/2024    CALCIUM 8.3 (L) 02/25/2024    AGAP 11.0 02/23/2024         ABG  No results for input(s): "PH", "PO2", "PCO2", "HCO3", "POCBASEDEF" in the last 168 hours.    Mechanical Ventilation Support:  Oxygen Concentration (%): 98 (02/22/24 1626)      Significant Imaging:  I have reviewed the pertinent imaging within the past 24 hours.        Assessment/Plan:     Assessment  Multivessel coronary artery disease   Moderate Aortic insufficiency   Severe aortic stenosis   Paroxysmal atrial fibrillation on Eliquis  Severe pulmonary hypertension, PASP of 69 mmHg  Acute on chronic systolic and diastolic CHF, left ventricular ejection fraction 40% with grade 2 diastolic dysfunction   Mild mitral regurgitation   Mild-to-moderate tricuspid regurgitation   Chronic kidney disease stage 3   Hypertension  Hemoptysis and hematuria - resolved      Plan  -still on Impella set to P 7, continue for now per Cardiology   -patient to " patient needs to be seen sooner or acute issues arise.    I have discussed the patient results/orders and and plan/recommendation with them at today's visit.      Diana Luz, ELANA   08/16/2023         be evaluated by CV surgery for consideration for bypass multivessel coronary disease but is a poor candidate currently due to severe pulmonary hypertension   -plan is to leave the patient on Impella over the course of the weekend and re-evaluate on Monday determine if his pulmonary pressures are more appropriate  -hold Eliquis for now per Cardiology   -continue aspirin&statin   -diuresed held yesterday given euvolemic state per CVP parameters  -May plan for high-risk PCI with Impella versus CABG  -holding heparin drip in the setting of hemoptysis and hematuria per Cardiology (2/24/24); will discussed with Cardiology to resume heparin drip  -continue p.o. amiodarone  -tizanidine 2 mg q.8 p.r.n. for back pain; obtain U/S retroperitoneal for persistent back pain in the setting of JEAN-CLAUDE    DVT Prophylaxis: SCDs, holding heparin drip  GI Prophylaxis:  Famotidine       Bart Herbert DO  Pulmonary Critical Care Medicine  [facility]  DOS: 02/25/2024

## 2024-02-25 NOTE — PROGRESS NOTES
UROLOGY  PROGRESS  NOTE    Brain Claudio 1945  [id number]  2/25/2024    Primary Urologist: n/a  On Call Urology: Judd Green MD    Subjective:  78-year-old man underwent difficult Nguyen catheter placement      Objective:  Wt Readings from Last 3 Encounters:   02/23/24 78.2 kg (172 lb 6.4 oz)   02/19/24 81.1 kg (178 lb 12.8 oz)     Temp Readings from Last 3 Encounters:   02/25/24 98.1 °F (36.7 °C) (Oral)   02/21/24 97.4 °F (36.3 °C) (Oral)     BP Readings from Last 3 Encounters:   02/25/24 111/61   02/21/24 109/63     Pulse Readings from Last 3 Encounters:   02/25/24 70   02/21/24 71       Intake/Output:  I/O this shift:  In: -   Out: 895 [Urine:895]  I/O last 3 completed shifts:  In: 3769.5 [I.V.:3769.5]  Out: 4345 [Urine:4345]       Exam:    NCAT  Card RRR  Resp unlabored  Abd soft nontender nondistended   Nguyen catheter clear yellow urine  Extremity no C/C/E      Recent Results (from the past 24 hour(s))   Comprehensive metabolic panel    Collection Time: 02/25/24  2:32 AM   Result Value Ref Range    Sodium Level 134 (L) 136 - 145 mmol/L    Potassium Level 4.2 3.5 - 5.1 mmol/L    Chloride 103 98 - 107 mmol/L    Carbon Dioxide 25 23 - 31 mmol/L    Glucose Level 103 82 - 115 mg/dL    Blood Urea Nitrogen 20.0 8.4 - 25.7 mg/dL    Creatinine 1.69 (H) 0.73 - 1.18 mg/dL    Calcium Level Total 8.3 (L) 8.8 - 10.0 mg/dL    Protein Total 6.0 5.8 - 7.6 gm/dL    Albumin Level 3.2 (L) 3.4 - 4.8 g/dL    Globulin 2.8 2.4 - 3.5 gm/dL    Albumin/Globulin Ratio 1.1 1.1 - 2.0 ratio    Bilirubin Total 1.3 <=1.5 mg/dL    Alkaline Phosphatase 69 40 - 150 unit/L    Alanine Aminotransferase 18 0 - 55 unit/L    Aspartate Aminotransferase 66 (H) 5 - 34 unit/L    eGFR 41 mls/min/1.73/m2   Magnesium    Collection Time: 02/25/24  2:32 AM   Result Value Ref Range    Magnesium Level 2.10 1.60 - 2.60 mg/dL   APTT    Collection Time: 02/25/24  2:32 AM   Result Value Ref Range    PTT 31.8 23.2 - 33.7 seconds   CBC with Differential     Collection Time: 02/25/24  2:32 AM   Result Value Ref Range    WBC 11.23 4.50 - 11.50 x10(3)/mcL    RBC 3.69 (L) 4.70 - 6.10 x10(6)/mcL    Hgb 10.1 (L) 14.0 - 18.0 g/dL    Hct 31.7 (L) 42.0 - 52.0 %    MCV 85.9 80.0 - 94.0 fL    MCH 27.4 27.0 - 31.0 pg    MCHC 31.9 (L) 33.0 - 36.0 g/dL    RDW 15.3 11.5 - 17.0 %    Platelet 207 130 - 400 x10(3)/mcL    MPV 10.9 (H) 7.4 - 10.4 fL    Neut % 75.0 %    Lymph % 10.2 %    Mono % 11.5 %    Eos % 2.6 %    Basophil % 0.3 %    Lymph # 1.14 0.6 - 4.6 x10(3)/mcL    Neut # 8.44 2.1 - 9.2 x10(3)/mcL    Mono # 1.29 0.1 - 1.3 x10(3)/mcL    Eos # 0.29 0 - 0.9 x10(3)/mcL    Baso # 0.03 <=0.2 x10(3)/mcL    IG# 0.04 0 - 0.04 x10(3)/mcL    IG% 0.4 %    NRBC% 0.0 %             Assessment:  Difficult Nguyen catheter      Plan:  No additional urologic recommendations.  Call when ready for voiding trial.    Judd Green MD

## 2024-02-26 ENCOUNTER — ANESTHESIA EVENT (OUTPATIENT)
Dept: SURGERY | Facility: HOSPITAL | Age: 79
DRG: 215 | End: 2024-02-26

## 2024-02-26 LAB
ABO + RH BLD: NORMAL
ALBUMIN SERPL-MCNC: 3 G/DL (ref 3.4–4.8)
ALBUMIN/GLOB SERPL: 1.1 RATIO (ref 1.1–2)
ALP SERPL-CCNC: 61 UNIT/L (ref 40–150)
ALT SERPL-CCNC: 18 UNIT/L (ref 0–55)
APTT PPP: 74.9 SECONDS (ref 23.2–33.7)
APTT PPP: 88.2 SECONDS (ref 23.2–33.7)
AST SERPL-CCNC: 53 UNIT/L (ref 5–34)
BASOPHILS # BLD AUTO: 0.05 X10(3)/MCL
BASOPHILS NFR BLD AUTO: 0.5 %
BILIRUB SERPL-MCNC: 1.1 MG/DL
BLD PROD TYP BPU: NORMAL
BLOOD UNIT EXPIRATION DATE: NORMAL
BLOOD UNIT TYPE CODE: 5100
BUN SERPL-MCNC: 13.8 MG/DL (ref 8.4–25.7)
CALCIUM SERPL-MCNC: 8 MG/DL (ref 8.8–10)
CHLORIDE SERPL-SCNC: 110 MMOL/L (ref 98–107)
CO2 SERPL-SCNC: 22 MMOL/L (ref 23–31)
CREAT SERPL-MCNC: 1.49 MG/DL (ref 0.73–1.18)
CROSSMATCH INTERPRETATION: NORMAL
DISPENSE STATUS: NORMAL
EOSINOPHIL # BLD AUTO: 0.38 X10(3)/MCL (ref 0–0.9)
EOSINOPHIL NFR BLD AUTO: 3.7 %
ERYTHROCYTE [DISTWIDTH] IN BLOOD BY AUTOMATED COUNT: 15.6 % (ref 11.5–17)
GFR SERPLBLD CREATININE-BSD FMLA CKD-EPI: 48 MLS/MIN/1.73/M2
GLOBULIN SER-MCNC: 2.8 GM/DL (ref 2.4–3.5)
GLUCOSE SERPL-MCNC: 84 MG/DL (ref 82–115)
GROUP & RH: NORMAL
HCT VFR BLD AUTO: 33.1 % (ref 42–52)
HGB BLD-MCNC: 10.6 G/DL (ref 14–18)
IMM GRANULOCYTES # BLD AUTO: 0.07 X10(3)/MCL (ref 0–0.04)
IMM GRANULOCYTES NFR BLD AUTO: 0.7 %
INDIRECT COOMBS: NORMAL
LYMPHOCYTES # BLD AUTO: 1.81 X10(3)/MCL (ref 0.6–4.6)
LYMPHOCYTES NFR BLD AUTO: 17.5 %
MAGNESIUM SERPL-MCNC: 2.2 MG/DL (ref 1.6–2.6)
MCH RBC QN AUTO: 27.7 PG (ref 27–31)
MCHC RBC AUTO-ENTMCNC: 32 G/DL (ref 33–36)
MCV RBC AUTO: 86.4 FL (ref 80–94)
MONOCYTES # BLD AUTO: 1.22 X10(3)/MCL (ref 0.1–1.3)
MONOCYTES NFR BLD AUTO: 11.8 %
NEUTROPHILS # BLD AUTO: 6.81 X10(3)/MCL (ref 2.1–9.2)
NEUTROPHILS NFR BLD AUTO: 65.8 %
NRBC BLD AUTO-RTO: 0 %
PLATELET # BLD AUTO: 183 X10(3)/MCL (ref 130–400)
PMV BLD AUTO: 11.2 FL (ref 7.4–10.4)
POTASSIUM SERPL-SCNC: 4.7 MMOL/L (ref 3.5–5.1)
PROT SERPL-MCNC: 5.8 GM/DL (ref 5.8–7.6)
RBC # BLD AUTO: 3.83 X10(6)/MCL (ref 4.7–6.1)
RBCS: NORMAL
SODIUM SERPL-SCNC: 137 MMOL/L (ref 136–145)
SPECIMEN OUTDATE: NORMAL
UNIT NUMBER: NORMAL
WBC # SPEC AUTO: 10.34 X10(3)/MCL (ref 4.5–11.5)

## 2024-02-26 PROCEDURE — 63600175 PHARM REV CODE 636 W HCPCS: Performed by: INTERNAL MEDICINE

## 2024-02-26 PROCEDURE — 83735 ASSAY OF MAGNESIUM: CPT | Performed by: NURSE PRACTITIONER

## 2024-02-26 PROCEDURE — 99024 POSTOP FOLLOW-UP VISIT: CPT | Mod: ,,, | Performed by: PHYSICIAN ASSISTANT

## 2024-02-26 PROCEDURE — 80053 COMPREHEN METABOLIC PANEL: CPT | Performed by: INTERNAL MEDICINE

## 2024-02-26 PROCEDURE — 25000003 PHARM REV CODE 250: Performed by: NURSE PRACTITIONER

## 2024-02-26 PROCEDURE — 85025 COMPLETE CBC W/AUTO DIFF WBC: CPT | Performed by: INTERNAL MEDICINE

## 2024-02-26 PROCEDURE — 25000003 PHARM REV CODE 250

## 2024-02-26 PROCEDURE — 25000003 PHARM REV CODE 250: Performed by: INTERNAL MEDICINE

## 2024-02-26 PROCEDURE — 20000000 HC ICU ROOM

## 2024-02-26 PROCEDURE — 85730 THROMBOPLASTIN TIME PARTIAL: CPT | Performed by: INTERNAL MEDICINE

## 2024-02-26 PROCEDURE — 86850 RBC ANTIBODY SCREEN: CPT | Performed by: PHYSICIAN ASSISTANT

## 2024-02-26 RX ORDER — CLOPIDOGREL BISULFATE 75 MG/1
75 TABLET ORAL DAILY
Status: DISCONTINUED | OUTPATIENT
Start: 2024-02-27 | End: 2024-02-26

## 2024-02-26 RX ORDER — HYDROMORPHONE HYDROCHLORIDE 2 MG/ML
0.5 INJECTION, SOLUTION INTRAMUSCULAR; INTRAVENOUS; SUBCUTANEOUS EVERY 4 HOURS PRN
Status: DISCONTINUED | OUTPATIENT
Start: 2024-02-26 | End: 2024-03-01

## 2024-02-26 RX ORDER — MUPIROCIN 20 MG/G
OINTMENT TOPICAL
Status: CANCELLED | OUTPATIENT
Start: 2024-02-26

## 2024-02-26 RX ORDER — CLOPIDOGREL BISULFATE 300 MG/1
600 TABLET, FILM COATED ORAL ONCE
Status: DISCONTINUED | OUTPATIENT
Start: 2024-02-26 | End: 2024-02-26

## 2024-02-26 RX ADMIN — ATORVASTATIN CALCIUM 40 MG: 40 TABLET, FILM COATED ORAL at 08:02

## 2024-02-26 RX ADMIN — ASPIRIN 81 MG: 81 TABLET, COATED ORAL at 09:02

## 2024-02-26 RX ADMIN — AMIODARONE HYDROCHLORIDE 200 MG: 200 TABLET ORAL at 09:02

## 2024-02-26 RX ADMIN — AMIODARONE HYDROCHLORIDE 200 MG: 200 TABLET ORAL at 08:02

## 2024-02-26 RX ADMIN — HYDROCODONE BITARTRATE AND ACETAMINOPHEN 1 TABLET: 5; 325 TABLET ORAL at 05:02

## 2024-02-26 RX ADMIN — HYDROCODONE BITARTRATE AND ACETAMINOPHEN 1 TABLET: 5; 325 TABLET ORAL at 02:02

## 2024-02-26 RX ADMIN — SODIUM CHLORIDE: 9 INJECTION, SOLUTION INTRAVENOUS at 08:02

## 2024-02-26 RX ADMIN — SODIUM CHLORIDE: 9 INJECTION, SOLUTION INTRAVENOUS at 09:02

## 2024-02-26 RX ADMIN — Medication 6 MG: at 08:02

## 2024-02-26 RX ADMIN — ONDANSETRON 8 MG: 2 INJECTION INTRAMUSCULAR; INTRAVENOUS at 04:02

## 2024-02-26 NOTE — PROGRESS NOTES
"  [facility]    Cardiology  Progress Note    Patient Name: Brain Snyder  MRN: [id number]  Admission Date: 2/21/2024  Hospital Length of Stay: 5 days  Code Status: Full Code   Attending Physician: Pablo Yepez MD   Primary Care Physician: Nidia Shepherd NP  Expected Discharge Date:   Principal Problem:CAD (coronary artery disease)    Subjective:   Reason for Consult:  CAD     HPI: 78-year-old female that is unknown to [facility] with a PMHx of PAF on Eliquis Aortic insufficiency, MV CAD, HTN. He is [place] speaking and an  was used for interview. He was orginally admitted to [facility] on 2.15.24 with CP & NSTEMI and underwent a LHC on 2.20.24 taht showed MV CAD (reported noted below) He was transferred to [facility] on 2.21.24 for a CABG/AVR evaluation. On arrive he was hemodynamically stable on a heparin infusion. He denied chest pain, SOB, and nausea on current exam, [facility] was consulted for CAD    Hospital Course:   2.23.24: Patient seen resting in bed. He denies SOB or CP. He remains on heparin infusion. Family at bedside   2.24.24: NAD. S/p Impella Placement/Whitlash-Chelo Catheter. "I am ok." + Blood Clots from Nose/Mouth, Hematuria 2/2 traumatic Indwelling Catheter Placement. Heparin Drip per Protocol. Impella P5  2.25.24: NAD. "I'm ok." Denies CP, SOB and Palps. PA Pressure Reading is not Accurate. CVP 5. Impella at P5. R Groin Impella P7. Remains Off Heparin Drip per Protocol. Now in SR.   2.26.24: NAD Noted. SR on Tele. Right Groin Impella in place, bruising with no hematoma noted. Distal pulses DP intact. Legs warm. NC 2 L/Min. Denies CP/SOB. Consent obtained from his daughter Brii Snyder. NPO for MV PCI Today.    PMH: PAF (on Eliquis), Aortic insufficiency, MV CAD, HTN  PSH: [facility]  Family History: None reported  Social History: former smoker, denies ETOH or illicit drug us    Previous Diagnostics:  RHC/Impella Placement (2.23.24):  Right heart catheterization performed showed the " "following:  PA= 61/24 (27) mm Hg  PCWP=  31/26 (27) mm Hg  AO saturation= 93 % RA  PA saturation= 60% with Impella (49% yesterday)    (02/22/24) -  0.5  Following that we elected to advance the Impella via right common femoral artery approach:  - Abiomed stiff wire  - 14 Norwegian peel-away sheath  - Heparin 10,000 unit bolus given peripherally  - Dilator removed  - 0.035" J-wire  - 6 Norwegian pigtail catheter positioned in the left ventricle  - Abiomed 0.025" wire  - Impella CP positioned in left ventricle  - Pulsatile motor current (appropriate positioning)  - Placed the marker just below the aortic valve  - Cardiac output was 2.8 L/min provided by the device.  Impella was at 84cm  Access Closure :    Sheath sutured to the groin  Secured.  Attestation:I was present for and supervised all key portions of the procedure  Impression/plan:   Successful Impella insertion and swan-Chelo in the setting of Acute HF/low cardiac output/precardiogenic shock/Critcal-severe AS/MVCAD - LM distal    RHC (2.22.24):  Right heart catheterization demonstrating severe pulmonary hypertension 84/34 and PCWP 24 mmHg  Reduced cardiac output/cardiac index at 3.43/1.81 L/min/m2 with pulmonary artery saturations 49.3%  For applied to the right femoral vein following removal of the 7 Norwegian sheath  The estimated blood loss was none.    Carotid US (2.22.24):  The bilateral internal carotid artery demonstrated less than 50% stenosis.   The bilateral vertebral arteries were patent with antegrade flow    LHC (2.20.24):   Prox LAD lesion was 70% stenosed.  Ost Cx to Prox Cx lesion was 80% stenosed.  1st Mrg lesion was 80% stenosed.  2nd Mrg-1 lesion was 70% stenosed.  2nd Mrg-2 lesion was 50% stenosed.  Mid LAD lesion was 50% stenosed.  Prox RCA lesion was 60% stenosed.  estimated blood loss was <50 mL.  There was three vessel coronary artery disease.  LVEDP: 30mmHg  Recommendations:   Refer for CABG evaluation and AVR   Preformed by Dr. Flannery at [facility]   "   ECHO (2.15.24):  Left Ventricle: The left ventricle is normal in size. Mildly increased ventricular mass. Mildly increased wall thickness. There is mild eccentric hypertrophy. Moderate global hypokinesis present. Septal motion is consistent with bundle branch block. There is moderately reduced systolic function. Biplane (2D) method of discs ejection fraction is 40%. Grade II diastolic dysfunction.  Right Ventricle: Right ventricular enlargement. Systolic function is normal.  Left Atrium: Left atrium is severely dilated.  Right Atrium: Right atrium is moderately dilated.  Aortic Valve: There is moderate aortic valve sclerosis. Severely restricted motion. There is severe stenosis. Aortic valve area by VTI is 0.66 cm². Aortic valve peak velocity is 4.45 m/s. Mean gradient is 50 mmHg. The dimensionless index is 0.17. There is mild to moderate aortic regurgitation.  Mitral Valve: Mildly calcified leaflets. There is no stenosis. There is mild regurgitation.  Tricuspid Valve: The tricuspid valve is structurally normal. There is mild to moderate regurgitation.  Pulmonic Valve: There is no significant regurgitation.  Aorta: Aortic root is upper limit of normal measuring 3.6 cm.  Pulmonary Artery: There is severe pulmonary hypertension. The estimated pulmonary artery systolic pressure is 69 mmHg.  IVC/SVC: Intermediate venous pressure at 8 mmHg.  Pericardium: There is no pericardial effusion.     Review of Systems   Cardiovascular:  Negative for chest pain.   Respiratory:  Negative for shortness of breath.    Neurological:  Positive for dizziness.     Objective:     Vital Signs (Most Recent):  Temp: 98 °F (36.7 °C) (02/26/24 0400)  Pulse: 60 (02/26/24 0600)  Resp: 14 (02/26/24 0600)  BP: 106/69 (02/26/24 0600)  SpO2: 100 % (02/26/24 0600) Vital Signs (24h Range):  Temp:  [97.8 °F (36.6 °C)-98.8 °F (37.1 °C)] 98 °F (36.7 °C)  Pulse:  [51-78] 60  Resp:  [12-24] 14  SpO2:  [96 %-100 %] 100 %  BP: ()/(45-80)  106/69  Arterial Line BP: ()/() 81/69   Weight: 78.2 kg (172 lb 6.4 oz)  Body mass index is 28.69 kg/m².  SpO2: 100 %       Intake/Output Summary (Last 24 hours) at 2/26/2024 0849  Last data filed at 2/26/2024 0604  Gross per 24 hour   Intake 4142.76 ml   Output 4910 ml   Net -767.24 ml       Lines/Drains/Airways       Central Venous Catheter Line  Duration             Pulmonary Artery Catheter Assessment  02/24/24 0700 Femoral Right 2 days              Drain  Duration                  Urethral Catheter 02/23/24 1451 Latex 2 days              Arterial Line  Duration             Arterial Line 02/24/24 0758 Right Radial 2 days              Line  Duration                  VAD 02/24/24 0700 Impella 2 days              Peripheral Intravenous Line  Duration                  Peripheral IV - Double Lumen 02/17/24 1430 20 G Anterior;Distal;Left Forearm 8 days         Peripheral IV - Double Lumen 02/20/24 0031 20 G Anterior;Right Forearm 6 days         Sheath 02/23/24 0903 Right anterior;proximal 2 days         Sheath 02/23/24 0905 Right anterior;proximal 2 days                  Significant Labs:   Recent Results (from the past 72 hour(s))   Echo    Collection Time: 02/23/24 11:08 AM   Result Value Ref Range    TAPSE 1.85 cm   ISTAT ACT-K    Collection Time: 02/23/24 11:10 AM   Result Value Ref Range    POC ACTIVATED CLOTTING TIME K 196 (H) 74 - 137 sec    Sample UNKNOWN    EKG 12-lead    Collection Time: 02/23/24 11:24 AM   Result Value Ref Range    QRS Duration 176 ms    [facility] QTC Calculation 557 ms   ISTAT ACT-K    Collection Time: 02/23/24 12:11 PM   Result Value Ref Range    POC ACTIVATED CLOTTING TIME K 179 (H) 74 - 137 sec    Sample UNKNOWN    Lactic Acid, Plasma    Collection Time: 02/23/24  1:07 PM   Result Value Ref Range    Lactic Acid Level 1.2 0.5 - 2.2 mmol/L   ISTAT ACT-K    Collection Time: 02/23/24  1:12 PM   Result Value Ref Range    POC ACTIVATED CLOTTING TIME K 168 (H) 74 - 137 sec    Sample  UNKNOWN    ISTAT ACT-K    Collection Time: 02/23/24  2:06 PM   Result Value Ref Range    POC ACTIVATED CLOTTING TIME K 168 (H) 74 - 137 sec    Sample UNKNOWN    ISTAT ACT-K    Collection Time: 02/23/24  2:19 PM   Result Value Ref Range    POC ACTIVATED CLOTTING TIME K 174 (H) 74 - 137 sec    Sample unknown    ISTAT ACT-K    Collection Time: 02/23/24  5:09 PM   Result Value Ref Range    POC ACTIVATED CLOTTING TIME K 174 (H) 74 - 137 sec    Sample unknown    Lactic Acid, Plasma    Collection Time: 02/23/24  6:30 PM   Result Value Ref Range    Lactic Acid Level 1.2 0.5 - 2.2 mmol/L   ISTAT ACT-K    Collection Time: 02/23/24  9:16 PM   Result Value Ref Range    POC ACTIVATED CLOTTING TIME K 163 (H) 74 - 137 sec    Sample UNKNOWN    ISTAT ACT-K    Collection Time: 02/24/24  1:16 AM   Result Value Ref Range    POC ACTIVATED CLOTTING TIME K 168 (H) 74 - 137 sec    Sample UNKNOWN    Comprehensive metabolic panel    Collection Time: 02/24/24  1:44 AM   Result Value Ref Range    Sodium Level 135 (L) 136 - 145 mmol/L    Potassium Level 4.3 3.5 - 5.1 mmol/L    Chloride 99 98 - 107 mmol/L    Carbon Dioxide 27 23 - 31 mmol/L    Glucose Level 115 82 - 115 mg/dL    Blood Urea Nitrogen 21.5 8.4 - 25.7 mg/dL    Creatinine 2.10 (H) 0.73 - 1.18 mg/dL    Calcium Level Total 9.3 8.8 - 10.0 mg/dL    Protein Total 7.6 5.8 - 7.6 gm/dL    Albumin Level 3.7 3.4 - 4.8 g/dL    Globulin 3.9 (H) 2.4 - 3.5 gm/dL    Albumin/Globulin Ratio 0.9 (L) 1.1 - 2.0 ratio    Bilirubin Total 1.5 <=1.5 mg/dL    Alkaline Phosphatase 79 40 - 150 unit/L    Alanine Aminotransferase 21 0 - 55 unit/L    Aspartate Aminotransferase 66 (H) 5 - 34 unit/L    eGFR 32 mls/min/1.73/m2   CBC with Differential    Collection Time: 02/24/24  1:44 AM   Result Value Ref Range    WBC 13.20 (H) 4.50 - 11.50 x10(3)/mcL    RBC 4.45 (L) 4.70 - 6.10 x10(6)/mcL    Hgb 12.0 (L) 14.0 - 18.0 g/dL    Hct 38.2 (L) 42.0 - 52.0 %    MCV 85.8 80.0 - 94.0 fL    MCH 27.0 27.0 - 31.0 pg    MCHC  31.4 (L) 33.0 - 36.0 g/dL    RDW 15.2 11.5 - 17.0 %    Platelet 271 130 - 400 x10(3)/mcL    MPV 11.3 (H) 7.4 - 10.4 fL    Neut % 75.7 %    Lymph % 10.5 %    Mono % 10.8 %    Eos % 1.8 %    Basophil % 0.7 %    Lymph # 1.39 0.6 - 4.6 x10(3)/mcL    Neut # 10.00 (H) 2.1 - 9.2 x10(3)/mcL    Mono # 1.42 (H) 0.1 - 1.3 x10(3)/mcL    Eos # 0.24 0 - 0.9 x10(3)/mcL    Baso # 0.09 <=0.2 x10(3)/mcL    IG# 0.06 (H) 0 - 0.04 x10(3)/mcL    IG% 0.5 %    NRBC% 0.0 %   ISTAT ACT-K    Collection Time: 02/24/24  5:52 AM   Result Value Ref Range    POC ACTIVATED CLOTTING TIME K 158 (H) 74 - 137 sec    Sample UNKNOWN    ISTAT ACT-K    Collection Time: 02/24/24  7:12 AM   Result Value Ref Range    POC ACTIVATED CLOTTING TIME K 147 (H) 74 - 137 sec    Sample UNKNOWN    ISTAT ACT-K    Collection Time: 02/24/24  8:13 AM   Result Value Ref Range    POC ACTIVATED CLOTTING TIME K 163 (H) 74 - 137 sec    Sample unknown    Lactic Acid, Plasma    Collection Time: 02/24/24  8:42 AM   Result Value Ref Range    Lactic Acid Level 1.2 0.5 - 2.2 mmol/L   ISTAT ACT-K    Collection Time: 02/24/24  9:13 AM   Result Value Ref Range    POC ACTIVATED CLOTTING TIME K 158 (H) 74 - 137 sec    Sample unknown    ISTAT ACT-K    Collection Time: 02/24/24 10:29 AM   Result Value Ref Range    POC ACTIVATED CLOTTING TIME K 158 (H) 74 - 137 sec    Sample unknown    Comprehensive metabolic panel    Collection Time: 02/25/24  2:32 AM   Result Value Ref Range    Sodium Level 134 (L) 136 - 145 mmol/L    Potassium Level 4.2 3.5 - 5.1 mmol/L    Chloride 103 98 - 107 mmol/L    Carbon Dioxide 25 23 - 31 mmol/L    Glucose Level 103 82 - 115 mg/dL    Blood Urea Nitrogen 20.0 8.4 - 25.7 mg/dL    Creatinine 1.69 (H) 0.73 - 1.18 mg/dL    Calcium Level Total 8.3 (L) 8.8 - 10.0 mg/dL    Protein Total 6.0 5.8 - 7.6 gm/dL    Albumin Level 3.2 (L) 3.4 - 4.8 g/dL    Globulin 2.8 2.4 - 3.5 gm/dL    Albumin/Globulin Ratio 1.1 1.1 - 2.0 ratio    Bilirubin Total 1.3 <=1.5 mg/dL    Alkaline  Phosphatase 69 40 - 150 unit/L    Alanine Aminotransferase 18 0 - 55 unit/L    Aspartate Aminotransferase 66 (H) 5 - 34 unit/L    eGFR 41 mls/min/1.73/m2   Magnesium    Collection Time: 02/25/24  2:32 AM   Result Value Ref Range    Magnesium Level 2.10 1.60 - 2.60 mg/dL   APTT    Collection Time: 02/25/24  2:32 AM   Result Value Ref Range    PTT 31.8 23.2 - 33.7 seconds   CBC with Differential    Collection Time: 02/25/24  2:32 AM   Result Value Ref Range    WBC 11.23 4.50 - 11.50 x10(3)/mcL    RBC 3.69 (L) 4.70 - 6.10 x10(6)/mcL    Hgb 10.1 (L) 14.0 - 18.0 g/dL    Hct 31.7 (L) 42.0 - 52.0 %    MCV 85.9 80.0 - 94.0 fL    MCH 27.4 27.0 - 31.0 pg    MCHC 31.9 (L) 33.0 - 36.0 g/dL    RDW 15.3 11.5 - 17.0 %    Platelet 207 130 - 400 x10(3)/mcL    MPV 10.9 (H) 7.4 - 10.4 fL    Neut % 75.0 %    Lymph % 10.2 %    Mono % 11.5 %    Eos % 2.6 %    Basophil % 0.3 %    Lymph # 1.14 0.6 - 4.6 x10(3)/mcL    Neut # 8.44 2.1 - 9.2 x10(3)/mcL    Mono # 1.29 0.1 - 1.3 x10(3)/mcL    Eos # 0.29 0 - 0.9 x10(3)/mcL    Baso # 0.03 <=0.2 x10(3)/mcL    IG# 0.04 0 - 0.04 x10(3)/mcL    IG% 0.4 %    NRBC% 0.0 %   APTT    Collection Time: 02/25/24 12:28 PM   Result Value Ref Range    PTT 33.9 (H) 23.2 - 33.7 seconds   Protime-INR    Collection Time: 02/25/24 12:28 PM   Result Value Ref Range    PT 16.1 (H) 12.5 - 14.5 seconds    INR 1.3 <=1.3   CBC with Differential    Collection Time: 02/25/24 12:28 PM   Result Value Ref Range    WBC 10.02 4.50 - 11.50 x10(3)/mcL    RBC 3.43 (L) 4.70 - 6.10 x10(6)/mcL    Hgb 9.4 (L) 14.0 - 18.0 g/dL    Hct 30.2 (L) 42.0 - 52.0 %    MCV 88.0 80.0 - 94.0 fL    MCH 27.4 27.0 - 31.0 pg    MCHC 31.1 (L) 33.0 - 36.0 g/dL    RDW 15.4 11.5 - 17.0 %    Platelet 192 130 - 400 x10(3)/mcL    MPV 11.2 (H) 7.4 - 10.4 fL    Neut % 78.5 %    Lymph % 8.0 %    Mono % 10.4 %    Eos % 2.3 %    Basophil % 0.4 %    Lymph # 0.80 0.6 - 4.6 x10(3)/mcL    Neut # 7.87 2.1 - 9.2 x10(3)/mcL    Mono # 1.04 0.1 - 1.3 x10(3)/mcL    Eos #  0.23 0 - 0.9 x10(3)/mcL    Baso # 0.04 <=0.2 x10(3)/mcL    IG# 0.04 0 - 0.04 x10(3)/mcL    IG% 0.4 %    NRBC% 0.0 %   PTT Heparin Monitoring    Collection Time: 02/25/24  7:24 PM   Result Value Ref Range    PTT Heparin Monitor 53.4 (H) 23.2 - 33.7 seconds   Comprehensive metabolic panel    Collection Time: 02/26/24  2:56 AM   Result Value Ref Range    Sodium Level 137 136 - 145 mmol/L    Potassium Level 4.7 3.5 - 5.1 mmol/L    Chloride 110 (H) 98 - 107 mmol/L    Carbon Dioxide 22 (L) 23 - 31 mmol/L    Glucose Level 84 82 - 115 mg/dL    Blood Urea Nitrogen 13.8 8.4 - 25.7 mg/dL    Creatinine 1.49 (H) 0.73 - 1.18 mg/dL    Calcium Level Total 8.0 (L) 8.8 - 10.0 mg/dL    Protein Total 5.8 5.8 - 7.6 gm/dL    Albumin Level 3.0 (L) 3.4 - 4.8 g/dL    Globulin 2.8 2.4 - 3.5 gm/dL    Albumin/Globulin Ratio 1.1 1.1 - 2.0 ratio    Bilirubin Total 1.1 <=1.5 mg/dL    Alkaline Phosphatase 61 40 - 150 unit/L    Alanine Aminotransferase 18 0 - 55 unit/L    Aspartate Aminotransferase 53 (H) 5 - 34 unit/L    eGFR 48 mls/min/1.73/m2   Magnesium    Collection Time: 02/26/24  2:56 AM   Result Value Ref Range    Magnesium Level 2.20 1.60 - 2.60 mg/dL   PTT Heparin Monitoring    Collection Time: 02/26/24  2:56 AM   Result Value Ref Range    PTT Heparin Monitor 74.9 (H) 23.2 - 33.7 seconds   CBC with Differential    Collection Time: 02/26/24  2:56 AM   Result Value Ref Range    WBC 10.34 4.50 - 11.50 x10(3)/mcL    RBC 3.83 (L) 4.70 - 6.10 x10(6)/mcL    Hgb 10.6 (L) 14.0 - 18.0 g/dL    Hct 33.1 (L) 42.0 - 52.0 %    MCV 86.4 80.0 - 94.0 fL    MCH 27.7 27.0 - 31.0 pg    MCHC 32.0 (L) 33.0 - 36.0 g/dL    RDW 15.6 11.5 - 17.0 %    Platelet 183 130 - 400 x10(3)/mcL    MPV 11.2 (H) 7.4 - 10.4 fL    Neut % 65.8 %    Lymph % 17.5 %    Mono % 11.8 %    Eos % 3.7 %    Basophil % 0.5 %    Lymph # 1.81 0.6 - 4.6 x10(3)/mcL    Neut # 6.81 2.1 - 9.2 x10(3)/mcL    Mono # 1.22 0.1 - 1.3 x10(3)/mcL    Eos # 0.38 0 - 0.9 x10(3)/mcL    Baso # 0.05 <=0.2  x10(3)/mcL    IG# 0.07 (H) 0 - 0.04 x10(3)/mcL    IG% 0.7 %    NRBC% 0.0 %     Telemetry: Sinus Rhythm    Physical Exam  Vitals and nursing note reviewed.   Constitutional:       Appearance: Normal appearance.   HENT:      Head: Normocephalic.      Mouth/Throat:      Mouth: Mucous membranes are moist.      Pharynx: Oropharynx is clear.   Cardiovascular:      Rate and Rhythm: Normal rate and regular rhythm.   Pulmonary:      Effort: Pulmonary effort is normal. No respiratory distress.      Breath sounds: Normal breath sounds.      Comments: NC 2 L/Min  Abdominal:      General: There is no distension.      Palpations: Abdomen is soft.      Tenderness: There is no abdominal tenderness. There is no guarding.   Musculoskeletal:         General: Normal range of motion.      Cervical back: Neck supple.      Right lower leg: No edema.      Left lower leg: No edema.      Comments: BLE Warm to Touch. BLE BP + Doppler Pulses.   Skin:     General: Skin is warm and dry.      Comments: Right Groin soft with bruising noted. Impella right groin is secured, no bleeding is noted.   Neurological:      Mental Status: He is alert. Mental status is at baseline.      Comments: Some Difficulty Understanding Clinical Condition. Awake and Alert.    Psychiatric:         Behavior: Behavior normal.       Current Inpatient Medications:    Current Facility-Administered Medications:     0.9%  NaCl infusion (for blood administration), , Intravenous, Q24H PRN, Kaleb Rdz ANP    0.9%  NaCl infusion, , Intravenous, Continuous, Kaleb Rdz ANP, Last Rate: 100 mL/hr at 02/26/24 0604, Rate Verify at 02/26/24 0604    acetaminophen tablet 650 mg, 650 mg, Oral, Q6H PRN, Tanner Rdz MD    acetaminophen tablet 650 mg, 650 mg, Oral, Q4H PRN, Tanner Rdz MD, 650 mg at 02/24/24 2021    allopurinol split tablet 50 mg, 50 mg, Oral, Daily, Tanner Rdz MD, 50 mg at 02/25/24 0803    amiodarone tablet 200 mg, 200 mg, Oral,  BID, Tanner Rdz MD, 200 mg at 02/25/24 2100    aspirin EC tablet 81 mg, 81 mg, Oral, Daily, Tanner Rdz MD, 81 mg at 02/25/24 0804    atorvastatin tablet 40 mg, 40 mg, Oral, QHS, Tanner Rdz MD, 40 mg at 02/25/24 2100    ferrous sulfate tablet 1 each, 1 tablet, Oral, Every other day, Tanner Rdz MD, 1 each at 02/25/24 0804    folic acid tablet 1 mg, 1 mg, Oral, Daily, Tanner Rdz MD, 1 mg at 02/25/24 0804    heparin 25,000 units in dextrose 5% (100 units/ml) IV bolus from bag LOW INTENSITY nomogram - [facility], 58.7 Units/kg (Adjusted), Intravenous, PRN, Kaleb Rdz ANP    heparin 25,000 units in dextrose 5% (100 units/ml) IV bolus from bag LOW INTENSITY nomogram - [facility], 30 Units/kg (Adjusted), Intravenous, PRN, Kaleb Rdz ANP    heparin 25,000 units in dextrose 5% 250 mL (100 units/mL) infusion LOW INTENSITY nomogram - [facility], 0-40 Units/kg/hr (Adjusted), Intravenous, Continuous, Kaleb Rdz ANP, Last Rate: 9.5 mL/hr at 02/26/24 0604, 14 Units/kg/hr at 02/26/24 0604    HYDROcodone-acetaminophen 5-325 mg per tablet 1 tablet, 1 tablet, Oral, Q4H PRN, Bart Herbert DO, 1 tablet at 02/26/24 0500    HYDROmorphone (PF) injection 0.5 mg, 0.5 mg, Intravenous, Q4H PRN, Pablo Yepez MD, 0.5 mg at 02/25/24 1906    lactated ringers bolus 500 mL, 500 mL, Intravenous, Once, Fransico Schrader DO    LIDOcaine HCl 2% urojet, , Mucous Membrane, Once, Nicol Diaz, AGACNP-BC    melatonin tablet 6 mg, 6 mg, Oral, Nightly PRN, Tanner Rdz MD, 6 mg at 02/25/24 1905    nitroGLYCERIN SL tablet 0.4 mg, 0.4 mg, Sublingual, Q5 Min PRN, Tanner Rdz MD    NORepinephrine 32 mg in dextrose 5 % (D5W) 250 mL infusion, 0-3 mcg/kg/min, Intravenous, Continuous, Pablo Yepez MD, Stopped at 02/25/24 2221    ondansetron disintegrating tablet 8 mg, 8 mg, Oral, Q8H PRN, Tanner Rdz MD, 8 mg at 02/23/24 1302    ondansetron injection 8 mg, 8 mg,  Intravenous, Q6H PRN, Pablo Yepez MD, 8 mg at 02/24/24 2341    pantoprazole EC tablet 40 mg, 40 mg, Oral, Daily, Tanner Rdz MD, 40 mg at 02/25/24 0804    polyethylene glycol packet 17 g, 17 g, Oral, Daily, Tanner Rdz MD, 17 g at 02/24/24 0927    simethicone chewable tablet 80 mg, 1 tablet, Oral, TID PRN, Tanner Rdz MD, 80 mg at 02/25/24 2015    sodium bicarbonate 25 mEq in dextrose 5 % (D5W) 1,000 mL infusion, , Intravenous, Continuous, Simone Mora MD, Last Rate: 10 mL/hr at 02/26/24 0604, Rate Verify at 02/26/24 0604    sodium chloride 0.9% flush 10 mL, 10 mL, Intravenous, PRN, Tanner Rdz MD    sodium chloride 0.9% flush 10 mL, 10 mL, Intravenous, PRN, Millie Jimenez NP  VTE Risk Mitigation (From admission, onward)           Ordered     heparin 25,000 units in dextrose 5% (100 units/ml) IV bolus from bag LOW INTENSITY nomogram - [facility]  As needed (PRN)        Question:  Heparin Infusion Adjustment (DO NOT MODIFY ANSWER)  Answer:  [email or web address]\Images\Pharmacy\HeparinInfusions\heparin LOW INTENSITY nomogram for [facility] [id number]    02/25/24 1156     heparin 25,000 units in dextrose 5% (100 units/ml) IV bolus from bag LOW INTENSITY nomogram - [facility]  As needed (PRN)        Question:  Heparin Infusion Adjustment (DO NOT MODIFY ANSWER)  Answer:  \\[email or web address]\Images\Pharmacy\HeparinInfusions\heparin LOW INTENSITY nomogram for [facility] [id number].pdf    02/25/24 1156     heparin 25,000 units in dextrose 5% 250 mL (100 units/mL) infusion LOW INTENSITY nomogram - [facility]  Continuous        Question:  Begin at (units/kg/hr)  Answer:  12    02/25/24 1156     Place SUSIE hose  Until discontinued         02/22/24 1458     Place sequential compression device  Until discontinued         02/21/24 2057                  Assessment:   CAD (Multivessel)    - RHC/Impella Placement and [place] Catheter (2.23.24)    - LHC (2.19.24):pLAD lesion 70%, oLCx 80%, OM1 80%, OM2 1 lesion 70% 2nd  lesion 50%, mLAD 50%, pRCA 60%  Aortic Insuffiencey     - ECHO (2.16.24): Aortic Valve: There is moderate aortic valve sclerosis. Severely restricted motion. There is severe stenosis. Aortic valve area by VTI is 0.66 cm². Aortic valve peak velocity is 4.45 m/s. Mean gradient is 50 mmHg. The dimensionless index is 0.17.   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 Start Plavix 75 Mg Daily in AM. Continue Aspirin 81 Mg Daily.  No plan for surgical intervention given patient's elevated risk (Discussed with CT Surgery this AM)  Continue Impella Support at P7  Continue Heparin Infusion per Protocol- Patient is tolerating   Plan for High Risk MV PCI Today with Dr. Quintero  Risk/Benefits/Alternatives of the [facility] with PCI Discussed with patient and his daughter Brii Snyder. It is unclear how much the patient comprehends of his medication condition, therefore, verbal telephone consent was obtained from his daughter after having discussed the risks/benefits/alternatives of the angiogram. She elects to proceed. Consent is on chart.  Routine Labs in AM  Will plan outpatient TAVR Evaluation in the future  Will optimize guideline directed medical therapy for ICMO once Impella is out and when BP Permits.    Harjinder Whittington,  [facility]  Cardiology  [facility]   02/26/2024    I agree with the findings of the complexity of problems addressed and take responsibility for the plan's risks and complications. I approved the plan documented by Harjinder Whittington NP.

## 2024-02-26 NOTE — PROGRESS NOTES
Pulmonary & Critical Care Medicine   Progress Note      Presenting History/HPI:    The patient is a 78-year-old Ivorian-speaking male with a history of aortic insufficiency/stenosis, coronary artery disease, chronic kidney disease stage 3, hypertension, atrial fibrillation on Eliquis, who presented to the ICU status post going to the cath lab for PCI.  Patient was transferred from [facility] after being admitted there on 2/15 with chest pain found to have NSTEMI with left heart catheterization initially on 02/20 demonstrating severe multivessel stenosis and was subsequently transferred here for CABG evaluation.  Patient was taken to the cath lab today and subsequently had an Impella placed and was transferred to the ICU with Impella currently set at P7.  Patient is awake alert moves all extremities follows commands with a Ivorian language barrier.  He has no complaints at this time.      -patient admitted to ICU on 02/23 status post Impella placement      Interval History:  -Impella in place at P7   -plans for high-risk PCI today   -patient complaining of dizziness this morning without headache blurred vision double vision or any focal neurologic deficits  -urine output of 5 L over the past 24 hours  -currently off of norepinephrine  - plans to transfuse 2 units packed red blood cells per Cardiology      Scheduled Medications:    allopurinoL  50 mg Oral Daily    amiodarone  200 mg Oral BID    aspirin  81 mg Oral Daily    atorvastatin  40 mg Oral QHS    clopidogreL  600 mg Oral Once    [START ON 2/27/2024] clopidogreL  75 mg Oral Daily    ferrous sulfate  1 tablet Oral Every other day    folic acid  1 mg Oral Daily    lactated ringers  500 mL Intravenous Once    LIDOcaine HCl 2%   Mucous Membrane Once    pantoprazole  40 mg Oral Daily    polyethylene glycol  17 g Oral Daily       PRN Medications:   0.9%  NaCl infusion (for blood administration), acetaminophen, acetaminophen, heparin (PORCINE), heparin  (PORCINE), HYDROcodone-acetaminophen, HYDROmorphone, melatonin, nitroGLYCERIN, ondansetron, ondansetron, simethicone, sodium chloride 0.9%, sodium chloride 0.9%      Infusions:     sodium chloride 0.9% 100 mL/hr at 02/26/24 0604    heparin (porcine) in D5W 14 Units/kg/hr (02/26/24 0604)    NORepinephrine bitartrate-D5W Stopped (02/25/24 2221)    sodium bicarbonate 25 mEq in dextrose 5 % (D5W) 1,000 mL infusion 10 mL/hr at 02/26/24 0604         Fluid Balance:     Intake/Output Summary (Last 24 hours) at 2/26/2024 0904  Last data filed at 2/26/2024 0604  Gross per 24 hour   Intake 4142.76 ml   Output 4705 ml   Net -562.24 ml         Vital Signs:   Vitals:    02/26/24 0600   BP: 106/69   Pulse: 60   Resp: 14   Temp:          Physical Exam  Vitals and nursing note reviewed.   Constitutional:       General: He is not in acute distress.     Appearance: Normal appearance. He is not ill-appearing or toxic-appearing.   HENT:      Head: Normocephalic and atraumatic.      Right Ear: External ear normal.      Left Ear: External ear normal.      Nose: Nose normal.      Mouth/Throat:      Mouth: Mucous membranes are moist.      Pharynx: Oropharynx is clear. No oropharyngeal exudate or posterior oropharyngeal erythema.   Eyes:      General: No scleral icterus.     Extraocular Movements: Extraocular movements intact.      Conjunctiva/sclera: Conjunctivae normal.      Pupils: Pupils are equal, round, and reactive to light.   Neck:      Vascular: No carotid bruit.   Cardiovascular:      Rate and Rhythm: Normal rate and regular rhythm.      Pulses: Normal pulses.      Heart sounds: Normal heart sounds. No murmur heard.     No friction rub. No gallop.      Comments:  Impella catheter in place in the right groin [place] catheter in place in the right groin  Pulmonary:      Effort: Pulmonary effort is normal. No respiratory distress.      Breath sounds: Normal breath sounds. No wheezing, rhonchi or rales.   Abdominal:      General: Abdomen  "is flat. Bowel sounds are normal. There is no distension.      Palpations: Abdomen is soft.      Tenderness: There is no abdominal tenderness. There is no guarding or rebound.   Genitourinary:     Comments: deferred  Musculoskeletal:         General: No swelling or deformity. Normal range of motion.      Cervical back: Normal range of motion and neck supple. No rigidity or tenderness.   Lymphadenopathy:      Cervical: No cervical adenopathy.   Skin:     General: Skin is warm and dry.      Capillary Refill: Capillary refill takes less than 2 seconds.      Coloration: Skin is not jaundiced.      Findings: No bruising, lesion or rash.   Neurological:      General: No focal deficit present.      Mental Status: He is alert.      Sensory: No sensory deficit.      Motor: No weakness.   Psychiatric:         Mood and Affect: Mood normal.           Ventilator Settings  Oxygen Concentration (%): 1 (02/25/24 1601)      Laboratory Studies:   No results for input(s): "PH", "PCO2", "PO2", "HCO3", "POCSATURATED", "BE" in the last 24 hours.  Recent Labs   Lab 02/26/24  0256   WBC 10.34   RBC 3.83*   HGB 10.6*   HCT 33.1*      MCV 86.4   MCH 27.7   MCHC 32.0*     Recent Labs   Lab 02/26/24  0256   GLUCOSE 84      K 4.7   CO2 22*   BUN 13.8   CREATININE 1.49*   CALCIUM 8.0*   MG 2.20         Microbiology Data:   Microbiology Results (last 7 days)       Procedure Component Value Units Date/Time    MRSA Screen by PCR [[id number]]     Order Status: Sent Specimen: Nasopharyngeal Swab from Nasal               Imaging:   Cardiac catheterization    The estimated blood loss was between 50 mL and 150 mL.    Successful placement of an Impella CP    Procedure:   Left heart catheterization  Right heart catheterization  The Impella insertion( CP)  Moderate (Conscious) Sedation        Indication:  Acute HF, valvular HF, NSTEMI, MVCAD     Consent: The patient was brought to the cardiac catheterization lab. Was   instructed and explained " "about the risk, benefit and alternatives of the   procedure included but not limited to sudden cardiac death, myocardial   infarction, bleeding, vascular injury, renal failure, stroke, contrast   allergy, risk of conscious sedation and need for emergent bypass surgery.    the patient was agreeable to proceed.  Signed the consent form.  time-out was completed verifying correct patient, procedure, site,   positioning, and special equipment if applicable.     Sedation: Moderate (Conscious) Sedation performed with fentanyl and   Versed.      Access:  The patient was prepped using the usual sterile fashion.     Right common femoral artery.The right common femoral artery angiogram   showed adequate entry of the femoral artery above the bifurcation below   the inferior epigastric vessel.  was accessed with micropuncture   technique, ultrasound guidance. RCFV access gained with 7Fr system with US   guidance.  An image of the ultrasound was put in the paper chart for   purpose of documentation.  Sheath size:14 F      The 7  F Venous sheath was inserted using ultrasound guidance   micropuncture technique in the R CF vein         Hemodynamics:                      Right heart catheterization performed showed the following:    PA= 61/24 (27) mm Hg  PCWP=  31/26 (27) mm Hg  AO saturation= 93 % RA  PA saturation= 60% with Impella (49% yesterday)    (02/22/24) -  0.5     Following that we elected to advance the Impella via right common femoral   artery approach:    - Abiomed stiff wire  - 14 [place] peel-away sheath  - Heparin 10,000 unit bolus given peripherally  - Dilator removed  - 0.035" J-wire  - 6 [place] pigtail catheter positioned in the left ventricle  - Abiomed 0.025" wire  - Impella CP positioned in left ventricle  - Pulsatile motor current (appropriate positioning)  - Placed the marker just below the aortic valve  - Cardiac output was 2.8 L/min provided by the device.  Impella was at   84cm        Access Closure :  "   Sheath sutured to the groin  Secured.     Attestation:I was present for and supervised all key portions of the   procedure     Impression/plan:    Successful Impella insertion and swan-Chelo in the setting of Acute HF/low   cardiac output/precardiogenic shock/Critcal-severe AS/MVCAD - LM distal    The procedure log was documented by No documenter listed and verified by   All Montoya MD.    Date: 2/23/2024  Time: 9:46 AM          Assessment and Plan    Assessment:  Multivessel coronary artery disease   Moderate Aortic insufficiency   Severe aortic stenosis   Paroxysmal atrial fibrillation on Eliquis  Severe pulmonary hypertension, PASP of 69 mmHg  Acute on chronic systolic and diastolic CHF, left ventricular ejection fraction 40% with grade 2 diastolic dysfunction   Mild mitral regurgitation   Mild-to-moderate tricuspid regurgitation   Chronic kidney disease stage 3   Hypertension  Hemoptysis and hematuria - resolved      Plan:   - continue Impella set to P 7 per Cardiology plans to go back to the cath lab today for high-risk PCI and then in the future to go for TAVR  -continue to hold Eliquis per Cardiology   -continue aspirin statin   - diurese as appropriate   -plans for 2 units packed red blood cells today prior to high-risk PCI   - continuing p.o. amiodarone for atrial fibrillation   -good urine output of 5 L over past 24 hours with a decline in creatinine, continue to monitor creatinine especially after Impella placement/ removal and high-risk PCI      DVT ppx/tx with SCD  GI ppx with pepcid        Pablo Yepez MD  2/26/2024  Pulmonology/Critical Care

## 2024-02-26 NOTE — NURSING
Nurses Note -- 4 Eyes      2/26/2024   5:25 PM      Skin assessed during: Q Shift Change      [x] No Altered Skin Integrity Present    [x]Prevention Measures Documented      [] Yes- Altered Skin Integrity Present or Discovered   [] LDA Added if Not in Epic (Describe Wound)   [] New Altered Skin Integrity was Present on Admit and Documented in LDA   [] Wound Image Taken    Wound Care Consulted? No    Attending Nurse:  Vince Cast RN/Staff Member:  balwinder fernández RN

## 2024-02-27 ENCOUNTER — ANESTHESIA (OUTPATIENT)
Dept: SURGERY | Facility: HOSPITAL | Age: 79
DRG: 215 | End: 2024-02-27

## 2024-02-27 LAB
ABO + RH BLD: NORMAL
ALBUMIN SERPL-MCNC: 3 G/DL (ref 3.4–4.8)
ALBUMIN SERPL-MCNC: 3.6 G/DL (ref 3.4–4.8)
ALBUMIN/GLOB SERPL: 0.9 RATIO (ref 1.1–2)
ALBUMIN/GLOB SERPL: 3 RATIO (ref 1.1–2)
ALP SERPL-CCNC: 34 UNIT/L (ref 40–150)
ALP SERPL-CCNC: 75 UNIT/L (ref 40–150)
ALT SERPL-CCNC: 15 UNIT/L (ref 0–55)
ALT SERPL-CCNC: 23 UNIT/L (ref 0–55)
ANION GAP SERPL CALC-SCNC: 10 MEQ/L
APTT PPP: 43.1 SECONDS (ref 23.2–33.7)
APTT PPP: 47.1 SECONDS (ref 23.2–33.7)
APTT PPP: 66.2 SECONDS (ref 23.2–33.7)
AST SERPL-CCNC: 37 UNIT/L (ref 5–34)
AST SERPL-CCNC: 50 UNIT/L (ref 5–34)
BASOPHILS # BLD AUTO: 0.05 X10(3)/MCL
BASOPHILS # BLD AUTO: 0.05 X10(3)/MCL
BASOPHILS NFR BLD AUTO: 0.3 %
BASOPHILS NFR BLD AUTO: 0.4 %
BILIRUB SERPL-MCNC: 1.1 MG/DL
BILIRUB SERPL-MCNC: 1.7 MG/DL
BLD PROD TYP BPU: NORMAL
BLOOD UNIT EXPIRATION DATE: NORMAL
BLOOD UNIT TYPE CODE: 5100
BLOOD UNIT TYPE CODE: 7300
BLOOD UNIT TYPE CODE: 7300
BLOOD UNIT TYPE CODE: 8400
BUN SERPL-MCNC: 14.7 MG/DL (ref 8.4–25.7)
BUN SERPL-MCNC: 14.8 MG/DL (ref 8.4–25.7)
BUN SERPL-MCNC: 14.9 MG/DL (ref 8.4–25.7)
CALCIUM SERPL-MCNC: 8.2 MG/DL (ref 8.8–10)
CALCIUM SERPL-MCNC: 8.3 MG/DL (ref 8.8–10)
CALCIUM SERPL-MCNC: 9 MG/DL (ref 8.8–10)
CHLORIDE SERPL-SCNC: 110 MMOL/L (ref 98–107)
CHLORIDE SERPL-SCNC: 112 MMOL/L (ref 98–107)
CHLORIDE SERPL-SCNC: 113 MMOL/L (ref 98–107)
CO2 SERPL-SCNC: 19 MMOL/L (ref 23–31)
CO2 SERPL-SCNC: 21 MMOL/L (ref 23–31)
CO2 SERPL-SCNC: 22 MMOL/L (ref 23–31)
CREAT SERPL-MCNC: 1.47 MG/DL (ref 0.73–1.18)
CREAT SERPL-MCNC: 1.52 MG/DL (ref 0.73–1.18)
CREAT SERPL-MCNC: 1.67 MG/DL (ref 0.73–1.18)
CREAT/UREA NIT SERPL: 10
CROSSMATCH INTERPRETATION: NORMAL
DISPENSE STATUS: NORMAL
EOSINOPHIL # BLD AUTO: 0.11 X10(3)/MCL (ref 0–0.9)
EOSINOPHIL # BLD AUTO: 0.19 X10(3)/MCL (ref 0–0.9)
EOSINOPHIL NFR BLD AUTO: 0.7 %
EOSINOPHIL NFR BLD AUTO: 1.6 %
ERYTHROCYTE [DISTWIDTH] IN BLOOD BY AUTOMATED COUNT: 15.4 % (ref 11.5–17)
ERYTHROCYTE [DISTWIDTH] IN BLOOD BY AUTOMATED COUNT: 15.6 % (ref 11.5–17)
ERYTHROCYTE [DISTWIDTH] IN BLOOD BY AUTOMATED COUNT: 15.7 % (ref 11.5–17)
FIO2: 100
FIO2: 100
FIO2: 60
FIO2: 65
FIO2: 70
FIO2: 75
FIO2: 96
GFR SERPLBLD CREATININE-BSD FMLA CKD-EPI: 42 MLS/MIN/1.73/M2
GFR SERPLBLD CREATININE-BSD FMLA CKD-EPI: 47 MLS/MIN/1.73/M2
GFR SERPLBLD CREATININE-BSD FMLA CKD-EPI: 49 MLS/MIN/1.73/M2
GLOBULIN SER-MCNC: 1.2 GM/DL (ref 2.4–3.5)
GLOBULIN SER-MCNC: 3.3 GM/DL (ref 2.4–3.5)
GLUCOSE SERPL-MCNC: 104 MG/DL (ref 82–115)
GLUCOSE SERPL-MCNC: 111 MG/DL (ref 70–110)
GLUCOSE SERPL-MCNC: 111 MG/DL (ref 82–115)
GLUCOSE SERPL-MCNC: 113 MG/DL (ref 70–110)
GLUCOSE SERPL-MCNC: 125 MG/DL (ref 82–115)
GLUCOSE SERPL-MCNC: 126 MG/DL (ref 70–110)
GLUCOSE SERPL-MCNC: 127 MG/DL (ref 70–110)
GLUCOSE SERPL-MCNC: 129 MG/DL (ref 70–110)
GLUCOSE SERPL-MCNC: 131 MG/DL (ref 70–110)
GLUCOSE SERPL-MCNC: 99 MG/DL (ref 70–110)
HCO3 UR-SCNC: 21.1 MMOL/L (ref 24–28)
HCO3 UR-SCNC: 21.8 MMOL/L (ref 24–28)
HCO3 UR-SCNC: 22.7 MMOL/L (ref 24–28)
HCO3 UR-SCNC: 23.7 MMOL/L (ref 24–28)
HCO3 UR-SCNC: 24.2 MMOL/L (ref 24–28)
HCO3 UR-SCNC: 25.3 MMOL/L (ref 24–28)
HCO3 UR-SCNC: 26.3 MMOL/L (ref 24–28)
HCT VFR BLD AUTO: 19.1 % (ref 42–52)
HCT VFR BLD AUTO: 19.9 % (ref 42–52)
HCT VFR BLD AUTO: 34.7 % (ref 42–52)
HCT VFR BLD CALC: 20 %PCV (ref 36–54)
HCT VFR BLD CALC: 21 %PCV (ref 36–54)
HCT VFR BLD CALC: 21 %PCV (ref 36–54)
HCT VFR BLD CALC: 22 %PCV (ref 36–54)
HCT VFR BLD CALC: 26 %PCV (ref 36–54)
HCT VFR BLD CALC: 27 %PCV (ref 36–54)
HCT VFR BLD CALC: 32 %PCV (ref 36–54)
HGB BLD-MCNC: 10.6 G/DL (ref 14–18)
HGB BLD-MCNC: 11 G/DL
HGB BLD-MCNC: 6.2 G/DL (ref 14–18)
HGB BLD-MCNC: 6.3 G/DL (ref 14–18)
HGB BLD-MCNC: 7 G/DL
HGB BLD-MCNC: 8 G/DL
HGB BLD-MCNC: 9 G/DL
HGB BLD-MCNC: 9 G/DL
IMM GRANULOCYTES # BLD AUTO: 0.08 X10(3)/MCL (ref 0–0.04)
IMM GRANULOCYTES # BLD AUTO: 0.21 X10(3)/MCL (ref 0–0.04)
IMM GRANULOCYTES NFR BLD AUTO: 0.7 %
IMM GRANULOCYTES NFR BLD AUTO: 1.4 %
INR PPP: 1.9
INR PPP: 2
LYMPHOCYTES # BLD AUTO: 1.09 X10(3)/MCL (ref 0.6–4.6)
LYMPHOCYTES # BLD AUTO: 1.67 X10(3)/MCL (ref 0.6–4.6)
LYMPHOCYTES NFR BLD AUTO: 11.1 %
LYMPHOCYTES NFR BLD AUTO: 9.2 %
MAGNESIUM SERPL-MCNC: 2.6 MG/DL (ref 1.6–2.6)
MCH RBC QN AUTO: 27 PG (ref 27–31)
MCH RBC QN AUTO: 28.1 PG (ref 27–31)
MCH RBC QN AUTO: 28.6 PG (ref 27–31)
MCHC RBC AUTO-ENTMCNC: 30.5 G/DL (ref 33–36)
MCHC RBC AUTO-ENTMCNC: 31.7 G/DL (ref 33–36)
MCHC RBC AUTO-ENTMCNC: 32.5 G/DL (ref 33–36)
MCV RBC AUTO: 88 FL (ref 80–94)
MCV RBC AUTO: 88.3 FL (ref 80–94)
MCV RBC AUTO: 88.8 FL (ref 80–94)
MONOCYTES # BLD AUTO: 1.19 X10(3)/MCL (ref 0.1–1.3)
MONOCYTES # BLD AUTO: 1.42 X10(3)/MCL (ref 0.1–1.3)
MONOCYTES NFR BLD AUTO: 12 %
MONOCYTES NFR BLD AUTO: 7.9 %
NEUTROPHILS # BLD AUTO: 11.79 X10(3)/MCL (ref 2.1–9.2)
NEUTROPHILS # BLD AUTO: 9 X10(3)/MCL (ref 2.1–9.2)
NEUTROPHILS NFR BLD AUTO: 76.1 %
NEUTROPHILS NFR BLD AUTO: 78.6 %
NRBC BLD AUTO-RTO: 0 %
PCO2 BLDA: 36.3 MMHG (ref 35–45)
PCO2 BLDA: 38.6 MMHG (ref 35–45)
PCO2 BLDA: 39.7 MMHG (ref 35–45)
PCO2 BLDA: 40.5 MMHG (ref 35–45)
PCO2 BLDA: 43 MMHG (ref 35–45)
PCO2 BLDA: 44.9 MMHG (ref 35–45)
PCO2 BLDA: 49.4 MMHG (ref 35–45)
PH SMN: 7.32 [PH] (ref 7.35–7.45)
PH SMN: 7.32 [PH] (ref 7.35–7.45)
PH SMN: 7.36 [PH] (ref 7.35–7.45)
PH SMN: 7.36 [PH] (ref 7.35–7.45)
PH SMN: 7.38 [PH] (ref 7.35–7.45)
PH SMN: 7.38 [PH] (ref 7.35–7.45)
PH SMN: 7.4 [PH] (ref 7.35–7.45)
PHOSPHATE SERPL-MCNC: 4 MG/DL (ref 2.3–4.7)
PLATELET # BLD AUTO: 108 X10(3)/MCL (ref 130–400)
PLATELET # BLD AUTO: 146 X10(3)/MCL (ref 130–400)
PLATELET # BLD AUTO: 70 X10(3)/MCL (ref 130–400)
PLATELETS.RETICULATED NFR BLD AUTO: 4.2 % (ref 0.9–11.2)
PMV BLD AUTO: 10.6 FL (ref 7.4–10.4)
PMV BLD AUTO: 12 FL (ref 7.4–10.4)
PMV BLD AUTO: 9.9 FL (ref 7.4–10.4)
PO2 BLDA: 117 MMHG (ref 80–100)
PO2 BLDA: 266 MMHG (ref 80–100)
PO2 BLDA: 272 MMHG (ref 80–100)
PO2 BLDA: 300 MMHG (ref 80–100)
PO2 BLDA: 357 MMHG (ref 80–100)
PO2 BLDA: 44 MMHG (ref 40–60)
PO2 BLDA: 91 MMHG (ref 80–100)
POC BE: -1 MMOL/L
POC BE: -2 MMOL/L
POC BE: -4 MMOL/L
POC BE: -5 MMOL/L
POC BE: 1 MMOL/L
POC IONIZED CALCIUM: 1 MMOL/L (ref 1.06–1.42)
POC IONIZED CALCIUM: 1 MMOL/L (ref 1.06–1.42)
POC IONIZED CALCIUM: 1.02 MMOL/L (ref 1.06–1.42)
POC IONIZED CALCIUM: 1.11 MMOL/L (ref 1.06–1.42)
POC IONIZED CALCIUM: 1.22 MMOL/L (ref 1.06–1.42)
POC IONIZED CALCIUM: 1.29 MMOL/L (ref 1.06–1.42)
POC IONIZED CALCIUM: 1.59 MMOL/L (ref 1.06–1.42)
POC PCO2 TEMP: 34.8 MMHG
POC PCO2 TEMP: 40.5 MMHG
POC PH TEMP: 7.32
POC PH TEMP: 7.42
POC PO2 TEMP: 111 MMHG
POC PO2 TEMP: 44 MMHG
POC SATURATED O2: 100 % (ref 95–100)
POC SATURATED O2: 76 % (ref 95–100)
POC SATURATED O2: 97 % (ref 95–100)
POC SATURATED O2: 99 % (ref 95–100)
POC TCO2: 22 MMOL/L (ref 24–29)
POC TCO2: 23 MMOL/L (ref 23–27)
POC TCO2: 24 MMOL/L (ref 23–27)
POC TCO2: 25 MMOL/L (ref 23–27)
POC TCO2: 26 MMOL/L (ref 23–27)
POC TCO2: 27 MMOL/L (ref 23–27)
POC TCO2: 28 MMOL/L (ref 23–27)
POC TEMPERATURE: ABNORMAL
POC TEMPERATURE: ABNORMAL
POCT GLUCOSE: 120 MG/DL (ref 70–110)
POCT GLUCOSE: 135 MG/DL (ref 70–110)
POTASSIUM BLD-SCNC: 3.4 MMOL/L (ref 3.5–5.1)
POTASSIUM BLD-SCNC: 3.5 MMOL/L (ref 3.5–5.1)
POTASSIUM BLD-SCNC: 4.1 MMOL/L (ref 3.5–5.1)
POTASSIUM BLD-SCNC: 4.1 MMOL/L (ref 3.5–5.1)
POTASSIUM BLD-SCNC: 4.2 MMOL/L (ref 3.5–5.1)
POTASSIUM BLD-SCNC: 4.3 MMOL/L (ref 3.5–5.1)
POTASSIUM BLD-SCNC: 4.6 MMOL/L (ref 3.5–5.1)
POTASSIUM SERPL-SCNC: 3.5 MMOL/L (ref 3.5–5.1)
POTASSIUM SERPL-SCNC: 3.8 MMOL/L (ref 3.5–5.1)
POTASSIUM SERPL-SCNC: 4.4 MMOL/L (ref 3.5–5.1)
PROT SERPL-MCNC: 4.8 GM/DL (ref 5.8–7.6)
PROT SERPL-MCNC: 6.3 GM/DL (ref 5.8–7.6)
PROTHROMBIN TIME: 21.4 SECONDS (ref 12.5–14.5)
PROTHROMBIN TIME: 22.7 SECONDS (ref 12.5–14.5)
RBC # BLD AUTO: 2.17 X10(6)/MCL (ref 4.7–6.1)
RBC # BLD AUTO: 2.24 X10(6)/MCL (ref 4.7–6.1)
RBC # BLD AUTO: 3.93 X10(6)/MCL (ref 4.7–6.1)
SAMPLE: ABNORMAL
SODIUM BLD-SCNC: 138 MMOL/L (ref 136–145)
SODIUM BLD-SCNC: 140 MMOL/L (ref 136–145)
SODIUM BLD-SCNC: 140 MMOL/L (ref 136–145)
SODIUM BLD-SCNC: 141 MMOL/L (ref 136–145)
SODIUM BLD-SCNC: 142 MMOL/L (ref 136–145)
SODIUM BLD-SCNC: 142 MMOL/L (ref 136–145)
SODIUM BLD-SCNC: 143 MMOL/L (ref 136–145)
SODIUM SERPL-SCNC: 136 MMOL/L (ref 136–145)
SODIUM SERPL-SCNC: 143 MMOL/L (ref 136–145)
SODIUM SERPL-SCNC: 145 MMOL/L (ref 136–145)
UNIT NUMBER: NORMAL
WBC # SPEC AUTO: 10.87 X10(3)/MCL (ref 4.5–11.5)
WBC # SPEC AUTO: 11.83 X10(3)/MCL (ref 4.5–11.5)
WBC # SPEC AUTO: 15.02 X10(3)/MCL (ref 4.5–11.5)

## 2024-02-27 PROCEDURE — 33405 REPLACEMENT AORTIC VALVE OPN: CPT | Mod: AS,,, | Performed by: PHYSICIAN ASSISTANT

## 2024-02-27 PROCEDURE — 85027 COMPLETE CBC AUTOMATED: CPT | Performed by: PHYSICIAN ASSISTANT

## 2024-02-27 PROCEDURE — 85025 COMPLETE CBC W/AUTO DIFF WBC: CPT | Performed by: STUDENT IN AN ORGANIZED HEALTH CARE EDUCATION/TRAINING PROGRAM

## 2024-02-27 PROCEDURE — 63600175 PHARM REV CODE 636 W HCPCS: Performed by: PHYSICIAN ASSISTANT

## 2024-02-27 PROCEDURE — P9016 RBC LEUKOCYTES REDUCED: HCPCS | Performed by: PHYSICIAN ASSISTANT

## 2024-02-27 PROCEDURE — 33405 REPLACEMENT AORTIC VALVE OPN: CPT | Mod: ,,, | Performed by: STUDENT IN AN ORGANIZED HEALTH CARE EDUCATION/TRAINING PROGRAM

## 2024-02-27 PROCEDURE — 63600175 PHARM REV CODE 636 W HCPCS: Performed by: STUDENT IN AN ORGANIZED HEALTH CARE EDUCATION/TRAINING PROGRAM

## 2024-02-27 PROCEDURE — 33508 ENDOSCOPIC VEIN HARVEST: CPT | Mod: AS,59,, | Performed by: PHYSICIAN ASSISTANT

## 2024-02-27 PROCEDURE — 36620 INSERTION CATHETER ARTERY: CPT

## 2024-02-27 PROCEDURE — C1729 CATH, DRAINAGE: HCPCS | Performed by: STUDENT IN AN ORGANIZED HEALTH CARE EDUCATION/TRAINING PROGRAM

## 2024-02-27 PROCEDURE — 30233N1 TRANSFUSION OF NONAUTOLOGOUS RED BLOOD CELLS INTO PERIPHERAL VEIN, PERCUTANEOUS APPROACH: ICD-10-PCS | Performed by: INTERNAL MEDICINE

## 2024-02-27 PROCEDURE — 27201423 OPTIME MED/SURG SUP & DEVICES STERILE SUPPLY: Performed by: STUDENT IN AN ORGANIZED HEALTH CARE EDUCATION/TRAINING PROGRAM

## 2024-02-27 PROCEDURE — 99024 POSTOP FOLLOW-UP VISIT: CPT | Mod: ,,, | Performed by: PHYSICIAN ASSISTANT

## 2024-02-27 PROCEDURE — 35226 REPAIR BLOOD VESSEL DIR LXTR: CPT | Mod: 59,51,RT, | Performed by: STUDENT IN AN ORGANIZED HEALTH CARE EDUCATION/TRAINING PROGRAM

## 2024-02-27 PROCEDURE — C1751 CATH, INF, PER/CENT/MIDLINE: HCPCS | Performed by: STUDENT IN AN ORGANIZED HEALTH CARE EDUCATION/TRAINING PROGRAM

## 2024-02-27 PROCEDURE — 25000003 PHARM REV CODE 250: Performed by: PHYSICIAN ASSISTANT

## 2024-02-27 PROCEDURE — 94002 VENT MGMT INPAT INIT DAY: CPT

## 2024-02-27 PROCEDURE — 25000003 PHARM REV CODE 250: Performed by: INTERNAL MEDICINE

## 2024-02-27 PROCEDURE — 25000003 PHARM REV CODE 250

## 2024-02-27 PROCEDURE — 36000712 HC OR TIME LEV V 1ST 15 MIN: Performed by: STUDENT IN AN ORGANIZED HEALTH CARE EDUCATION/TRAINING PROGRAM

## 2024-02-27 PROCEDURE — 02PA0RZ REMOVAL OF SHORT-TERM EXTERNAL HEART ASSIST SYSTEM FROM HEART, OPEN APPROACH: ICD-10-PCS | Performed by: STUDENT IN AN ORGANIZED HEALTH CARE EDUCATION/TRAINING PROGRAM

## 2024-02-27 PROCEDURE — 5A1221Z PERFORMANCE OF CARDIAC OUTPUT, CONTINUOUS: ICD-10-PCS | Performed by: STUDENT IN AN ORGANIZED HEALTH CARE EDUCATION/TRAINING PROGRAM

## 2024-02-27 PROCEDURE — 33518 CABG ARTERY-VEIN TWO: CPT | Mod: AS,,, | Performed by: PHYSICIAN ASSISTANT

## 2024-02-27 PROCEDURE — 93325 DOPPLER ECHO COLOR FLOW MAPG: CPT | Mod: 26,,, | Performed by: ANESTHESIOLOGY

## 2024-02-27 PROCEDURE — 27100171 HC OXYGEN HIGH FLOW UP TO 24 HOURS

## 2024-02-27 PROCEDURE — 85730 THROMBOPLASTIN TIME PARTIAL: CPT | Performed by: STUDENT IN AN ORGANIZED HEALTH CARE EDUCATION/TRAINING PROGRAM

## 2024-02-27 PROCEDURE — 85730 THROMBOPLASTIN TIME PARTIAL: CPT | Performed by: PHYSICIAN ASSISTANT

## 2024-02-27 PROCEDURE — 86923 COMPATIBILITY TEST ELECTRIC: CPT | Mod: 91 | Performed by: INTERNAL MEDICINE

## 2024-02-27 PROCEDURE — C1894 INTRO/SHEATH, NON-LASER: HCPCS | Performed by: STUDENT IN AN ORGANIZED HEALTH CARE EDUCATION/TRAINING PROGRAM

## 2024-02-27 PROCEDURE — 85610 PROTHROMBIN TIME: CPT | Performed by: PHYSICIAN ASSISTANT

## 2024-02-27 PROCEDURE — 93503 INSERT/PLACE HEART CATHETER: CPT | Mod: 59,,, | Performed by: ANESTHESIOLOGY

## 2024-02-27 PROCEDURE — C1887 CATHETER, GUIDING: HCPCS | Performed by: STUDENT IN AN ORGANIZED HEALTH CARE EDUCATION/TRAINING PROGRAM

## 2024-02-27 PROCEDURE — 02100Z9 BYPASS CORONARY ARTERY, ONE ARTERY FROM LEFT INTERNAL MAMMARY, OPEN APPROACH: ICD-10-PCS | Performed by: STUDENT IN AN ORGANIZED HEALTH CARE EDUCATION/TRAINING PROGRAM

## 2024-02-27 PROCEDURE — 25000003 PHARM REV CODE 250: Performed by: STUDENT IN AN ORGANIZED HEALTH CARE EDUCATION/TRAINING PROGRAM

## 2024-02-27 PROCEDURE — 37000009 HC ANESTHESIA EA ADD 15 MINS: Performed by: STUDENT IN AN ORGANIZED HEALTH CARE EDUCATION/TRAINING PROGRAM

## 2024-02-27 PROCEDURE — 03HY32Z INSERTION OF MONITORING DEVICE INTO UPPER ARTERY, PERCUTANEOUS APPROACH: ICD-10-PCS | Performed by: INTERNAL MEDICINE

## 2024-02-27 PROCEDURE — 02L70CK OCCLUSION OF LEFT ATRIAL APPENDAGE WITH EXTRALUMINAL DEVICE, OPEN APPROACH: ICD-10-PCS | Performed by: STUDENT IN AN ORGANIZED HEALTH CARE EDUCATION/TRAINING PROGRAM

## 2024-02-27 PROCEDURE — 93312 ECHO TRANSESOPHAGEAL: CPT | Mod: 26,59,, | Performed by: ANESTHESIOLOGY

## 2024-02-27 PROCEDURE — 021109W BYPASS CORONARY ARTERY, TWO ARTERIES FROM AORTA WITH AUTOLOGOUS VENOUS TISSUE, OPEN APPROACH: ICD-10-PCS | Performed by: STUDENT IN AN ORGANIZED HEALTH CARE EDUCATION/TRAINING PROGRAM

## 2024-02-27 PROCEDURE — P9035 PLATELET PHERES LEUKOREDUCED: HCPCS | Performed by: INTERNAL MEDICINE

## 2024-02-27 PROCEDURE — 30233K1 TRANSFUSION OF NONAUTOLOGOUS FROZEN PLASMA INTO PERIPHERAL VEIN, PERCUTANEOUS APPROACH: ICD-10-PCS | Performed by: INTERNAL MEDICINE

## 2024-02-27 PROCEDURE — D9220A PRA ANESTHESIA: Mod: ,,, | Performed by: ANESTHESIOLOGY

## 2024-02-27 PROCEDURE — 63600175 PHARM REV CODE 636 W HCPCS: Performed by: NURSE PRACTITIONER

## 2024-02-27 PROCEDURE — C1713 ANCHOR/SCREW BN/BN,TIS/BN: HCPCS | Performed by: STUDENT IN AN ORGANIZED HEALTH CARE EDUCATION/TRAINING PROGRAM

## 2024-02-27 PROCEDURE — 33533 CABG ARTERIAL SINGLE: CPT | Mod: AS,51,, | Performed by: PHYSICIAN ASSISTANT

## 2024-02-27 PROCEDURE — 94761 N-INVAS EAR/PLS OXIMETRY MLT: CPT

## 2024-02-27 PROCEDURE — 85730 THROMBOPLASTIN TIME PARTIAL: CPT | Performed by: INTERNAL MEDICINE

## 2024-02-27 PROCEDURE — P9017 PLASMA 1 DONOR FRZ W/IN 8 HR: HCPCS | Performed by: INTERNAL MEDICINE

## 2024-02-27 PROCEDURE — 36000713 HC OR TIME LEV V EA ADD 15 MIN: Performed by: STUDENT IN AN ORGANIZED HEALTH CARE EDUCATION/TRAINING PROGRAM

## 2024-02-27 PROCEDURE — 63600367 HC NITRIC OXIDE PER HOUR

## 2024-02-27 PROCEDURE — S5010 5% DEXTROSE AND 0.45% SALINE: HCPCS | Performed by: PHYSICIAN ASSISTANT

## 2024-02-27 PROCEDURE — P9037 PLATE PHERES LEUKOREDU IRRAD: HCPCS | Performed by: INTERNAL MEDICINE

## 2024-02-27 PROCEDURE — 86923 COMPATIBILITY TEST ELECTRIC: CPT | Mod: 91 | Performed by: PHYSICIAN ASSISTANT

## 2024-02-27 PROCEDURE — 80053 COMPREHEN METABOLIC PANEL: CPT | Performed by: INTERNAL MEDICINE

## 2024-02-27 PROCEDURE — C1768 GRAFT, VASCULAR: HCPCS | Performed by: STUDENT IN AN ORGANIZED HEALTH CARE EDUCATION/TRAINING PROGRAM

## 2024-02-27 PROCEDURE — 85610 PROTHROMBIN TIME: CPT | Performed by: STUDENT IN AN ORGANIZED HEALTH CARE EDUCATION/TRAINING PROGRAM

## 2024-02-27 PROCEDURE — 93320 DOPPLER ECHO COMPLETE: CPT | Mod: 26,,, | Performed by: ANESTHESIOLOGY

## 2024-02-27 PROCEDURE — C9248 INJ, CLEVIDIPINE BUTYRATE: HCPCS | Performed by: PHYSICIAN ASSISTANT

## 2024-02-27 PROCEDURE — 30233L1 TRANSFUSION OF NONAUTOLOGOUS FRESH PLASMA INTO PERIPHERAL VEIN, PERCUTANEOUS APPROACH: ICD-10-PCS | Performed by: INTERNAL MEDICINE

## 2024-02-27 PROCEDURE — 85025 COMPLETE CBC W/AUTO DIFF WBC: CPT | Performed by: NURSE PRACTITIONER

## 2024-02-27 PROCEDURE — 37000008 HC ANESTHESIA 1ST 15 MINUTES: Performed by: STUDENT IN AN ORGANIZED HEALTH CARE EDUCATION/TRAINING PROGRAM

## 2024-02-27 PROCEDURE — 06BQ4ZZ EXCISION OF LEFT SAPHENOUS VEIN, PERCUTANEOUS ENDOSCOPIC APPROACH: ICD-10-PCS | Performed by: STUDENT IN AN ORGANIZED HEALTH CARE EDUCATION/TRAINING PROGRAM

## 2024-02-27 PROCEDURE — 02RF08Z REPLACEMENT OF AORTIC VALVE WITH ZOOPLASTIC TISSUE, OPEN APPROACH: ICD-10-PCS | Performed by: STUDENT IN AN ORGANIZED HEALTH CARE EDUCATION/TRAINING PROGRAM

## 2024-02-27 PROCEDURE — 30233R1 TRANSFUSION OF NONAUTOLOGOUS PLATELETS INTO PERIPHERAL VEIN, PERCUTANEOUS APPROACH: ICD-10-PCS | Performed by: INTERNAL MEDICINE

## 2024-02-27 PROCEDURE — A4216 STERILE WATER/SALINE, 10 ML: HCPCS

## 2024-02-27 PROCEDURE — 20000000 HC ICU ROOM

## 2024-02-27 PROCEDURE — A6010 COLLAGEN BASED WOUND FILLER: HCPCS | Performed by: STUDENT IN AN ORGANIZED HEALTH CARE EDUCATION/TRAINING PROGRAM

## 2024-02-27 PROCEDURE — P9016 RBC LEUKOCYTES REDUCED: HCPCS | Performed by: INTERNAL MEDICINE

## 2024-02-27 PROCEDURE — 35226 REPAIR BLOOD VESSEL DIR LXTR: CPT | Mod: AS,59,51,RT | Performed by: PHYSICIAN ASSISTANT

## 2024-02-27 PROCEDURE — 84100 ASSAY OF PHOSPHORUS: CPT | Performed by: PHYSICIAN ASSISTANT

## 2024-02-27 PROCEDURE — 63600175 PHARM REV CODE 636 W HCPCS

## 2024-02-27 PROCEDURE — C1889 IMPLANT/INSERT DEVICE, NOC: HCPCS | Performed by: STUDENT IN AN ORGANIZED HEALTH CARE EDUCATION/TRAINING PROGRAM

## 2024-02-27 PROCEDURE — 99900035 HC TECH TIME PER 15 MIN (STAT)

## 2024-02-27 PROCEDURE — 83735 ASSAY OF MAGNESIUM: CPT | Performed by: PHYSICIAN ASSISTANT

## 2024-02-27 PROCEDURE — 27800903 OPTIME MED/SURG SUP & DEVICES OTHER IMPLANTS: Performed by: STUDENT IN AN ORGANIZED HEALTH CARE EDUCATION/TRAINING PROGRAM

## 2024-02-27 PROCEDURE — 33533 CABG ARTERIAL SINGLE: CPT | Mod: 51,,, | Performed by: STUDENT IN AN ORGANIZED HEALTH CARE EDUCATION/TRAINING PROGRAM

## 2024-02-27 PROCEDURE — 33518 CABG ARTERY-VEIN TWO: CPT | Mod: ,,, | Performed by: STUDENT IN AN ORGANIZED HEALTH CARE EDUCATION/TRAINING PROGRAM

## 2024-02-27 PROCEDURE — 33508 ENDOSCOPIC VEIN HARVEST: CPT | Mod: 59,,, | Performed by: STUDENT IN AN ORGANIZED HEALTH CARE EDUCATION/TRAINING PROGRAM

## 2024-02-27 PROCEDURE — 80053 COMPREHEN METABOLIC PANEL: CPT | Performed by: PHYSICIAN ASSISTANT

## 2024-02-27 DEVICE — COLLAGEN CELLERATE ACTIVATED 1GM: Type: IMPLANTABLE DEVICE | Site: GROIN | Status: FUNCTIONAL

## 2024-02-27 RX ORDER — ONDANSETRON HYDROCHLORIDE 2 MG/ML
4 INJECTION, SOLUTION INTRAVENOUS EVERY 4 HOURS PRN
Status: DISCONTINUED | OUTPATIENT
Start: 2024-02-27 | End: 2024-03-01

## 2024-02-27 RX ORDER — ETOMIDATE 2 MG/ML
INJECTION INTRAVENOUS
Status: DISCONTINUED | OUTPATIENT
Start: 2024-02-27 | End: 2024-02-27

## 2024-02-27 RX ORDER — VASOPRESSIN 20 [USP'U]/ML
INJECTION, SOLUTION INTRAMUSCULAR; SUBCUTANEOUS
Status: DISCONTINUED | OUTPATIENT
Start: 2024-02-27 | End: 2024-02-27

## 2024-02-27 RX ORDER — HEPARIN SODIUM 1000 [USP'U]/ML
INJECTION, SOLUTION INTRAVENOUS; SUBCUTANEOUS
Status: DISCONTINUED | OUTPATIENT
Start: 2024-02-27 | End: 2024-02-27

## 2024-02-27 RX ORDER — DEXMEDETOMIDINE HYDROCHLORIDE 4 UG/ML
0-1.4 INJECTION, SOLUTION INTRAVENOUS CONTINUOUS
Status: DISCONTINUED | OUTPATIENT
Start: 2024-02-27 | End: 2024-03-02

## 2024-02-27 RX ORDER — PAPAVERINE HYDROCHLORIDE 30 MG/ML
INJECTION INTRAMUSCULAR; INTRAVENOUS
Status: DISCONTINUED | OUTPATIENT
Start: 2024-02-27 | End: 2024-02-27 | Stop reason: HOSPADM

## 2024-02-27 RX ORDER — FAMOTIDINE 10 MG/ML
20 INJECTION INTRAVENOUS DAILY
Status: DISCONTINUED | OUTPATIENT
Start: 2024-02-28 | End: 2024-03-02

## 2024-02-27 RX ORDER — ROCURONIUM BROMIDE 10 MG/ML
INJECTION, SOLUTION INTRAVENOUS
Status: DISCONTINUED | OUTPATIENT
Start: 2024-02-27 | End: 2024-02-27

## 2024-02-27 RX ORDER — ALBUMIN HUMAN 50 G/1000ML
12.5 SOLUTION INTRAVENOUS
Status: DISCONTINUED | OUTPATIENT
Start: 2024-02-27 | End: 2024-03-20

## 2024-02-27 RX ORDER — CALCIUM GLUCONATE 20 MG/ML
2 INJECTION, SOLUTION INTRAVENOUS
Status: DISCONTINUED | OUTPATIENT
Start: 2024-02-27 | End: 2024-03-02

## 2024-02-27 RX ORDER — POTASSIUM CHLORIDE 14.9 MG/ML
20 INJECTION INTRAVENOUS
Status: DISCONTINUED | OUTPATIENT
Start: 2024-02-27 | End: 2024-03-02

## 2024-02-27 RX ORDER — ACETAMINOPHEN 650 MG/20.3ML
650 LIQUID ORAL EVERY 6 HOURS PRN
Status: DISCONTINUED | OUTPATIENT
Start: 2024-02-27 | End: 2024-03-26 | Stop reason: HOSPADM

## 2024-02-27 RX ORDER — TRANEXAMIC ACID 100 MG/ML
INJECTION, SOLUTION INTRAVENOUS
Status: DISCONTINUED | OUTPATIENT
Start: 2024-02-27 | End: 2024-02-27

## 2024-02-27 RX ORDER — METOPROLOL TARTRATE 25 MG/1
12.5 TABLET ORAL 2 TIMES DAILY
Status: DISCONTINUED | OUTPATIENT
Start: 2024-02-28 | End: 2024-03-01

## 2024-02-27 RX ORDER — MAGNESIUM SULFATE HEPTAHYDRATE 40 MG/ML
2 INJECTION, SOLUTION INTRAVENOUS
Status: DISCONTINUED | OUTPATIENT
Start: 2024-02-27 | End: 2024-03-02

## 2024-02-27 RX ORDER — MAGNESIUM SULFATE HEPTAHYDRATE 40 MG/ML
4 INJECTION, SOLUTION INTRAVENOUS
Status: DISCONTINUED | OUTPATIENT
Start: 2024-02-27 | End: 2024-03-02

## 2024-02-27 RX ORDER — LOPERAMIDE HYDROCHLORIDE 2 MG/1
2 CAPSULE ORAL CONTINUOUS PRN
Status: DISCONTINUED | OUTPATIENT
Start: 2024-02-27 | End: 2024-03-26 | Stop reason: HOSPADM

## 2024-02-27 RX ORDER — DOCUSATE SODIUM 100 MG/1
100 CAPSULE, LIQUID FILLED ORAL 2 TIMES DAILY
Status: DISCONTINUED | OUTPATIENT
Start: 2024-02-28 | End: 2024-03-09

## 2024-02-27 RX ORDER — LACTULOSE 10 G/15ML
20 SOLUTION ORAL EVERY 6 HOURS PRN
Status: DISCONTINUED | OUTPATIENT
Start: 2024-02-27 | End: 2024-03-26 | Stop reason: HOSPADM

## 2024-02-27 RX ORDER — MIDAZOLAM HYDROCHLORIDE 1 MG/ML
INJECTION INTRAMUSCULAR; INTRAVENOUS
Status: DISCONTINUED | OUTPATIENT
Start: 2024-02-27 | End: 2024-02-27

## 2024-02-27 RX ORDER — DESMOPRESSIN ACETATE 4 UG/ML
INJECTION, SOLUTION INTRAVENOUS; SUBCUTANEOUS
Status: DISCONTINUED | OUTPATIENT
Start: 2024-02-27 | End: 2024-02-27

## 2024-02-27 RX ORDER — CALCIUM GLUCONATE 20 MG/ML
3 INJECTION, SOLUTION INTRAVENOUS
Status: DISCONTINUED | OUTPATIENT
Start: 2024-02-27 | End: 2024-03-02

## 2024-02-27 RX ORDER — MUPIROCIN 20 MG/G
OINTMENT TOPICAL 2 TIMES DAILY
Status: COMPLETED | OUTPATIENT
Start: 2024-02-27 | End: 2024-02-29

## 2024-02-27 RX ORDER — METOCLOPRAMIDE HYDROCHLORIDE 5 MG/ML
5 INJECTION INTRAMUSCULAR; INTRAVENOUS EVERY 6 HOURS PRN
Status: DISCONTINUED | OUTPATIENT
Start: 2024-02-27 | End: 2024-03-26 | Stop reason: HOSPADM

## 2024-02-27 RX ORDER — HYDROCODONE BITARTRATE AND ACETAMINOPHEN 5; 325 MG/1; MG/1
1 TABLET ORAL EVERY 4 HOURS PRN
Status: DISCONTINUED | OUTPATIENT
Start: 2024-02-27 | End: 2024-03-01

## 2024-02-27 RX ORDER — CALCIUM CHLORIDE INJECTION 100 MG/ML
INJECTION, SOLUTION INTRAVENOUS
Status: DISCONTINUED | OUTPATIENT
Start: 2024-02-27 | End: 2024-02-27

## 2024-02-27 RX ORDER — LIDOCAINE HYDROCHLORIDE 20 MG/ML
INJECTION INTRAVENOUS
Status: DISCONTINUED | OUTPATIENT
Start: 2024-02-27 | End: 2024-02-27

## 2024-02-27 RX ORDER — HEPARIN 100 UNIT/ML
5 SYRINGE INTRAVENOUS
Status: DISCONTINUED | OUTPATIENT
Start: 2024-02-27 | End: 2024-03-20

## 2024-02-27 RX ORDER — FOLIC ACID 1 MG/1
1 TABLET ORAL DAILY
Status: DISCONTINUED | OUTPATIENT
Start: 2024-02-28 | End: 2024-03-26 | Stop reason: HOSPADM

## 2024-02-27 RX ORDER — CEFAZOLIN SODIUM 1 G/3ML
INJECTION, POWDER, FOR SOLUTION INTRAMUSCULAR; INTRAVENOUS
Status: DISCONTINUED | OUTPATIENT
Start: 2024-02-27 | End: 2024-02-27

## 2024-02-27 RX ORDER — CEFAZOLIN SODIUM 2 G/50ML
2 SOLUTION INTRAVENOUS
Status: COMPLETED | OUTPATIENT
Start: 2024-02-28 | End: 2024-02-28

## 2024-02-27 RX ORDER — CALCIUM GLUCONATE 20 MG/ML
1 INJECTION, SOLUTION INTRAVENOUS
Status: DISCONTINUED | OUTPATIENT
Start: 2024-02-27 | End: 2024-03-02

## 2024-02-27 RX ORDER — DEXTROSE MONOHYDRATE AND SODIUM CHLORIDE 5; .45 G/100ML; G/100ML
INJECTION, SOLUTION INTRAVENOUS CONTINUOUS
Status: DISCONTINUED | OUTPATIENT
Start: 2024-02-27 | End: 2024-03-02

## 2024-02-27 RX ORDER — HEPARIN SODIUM 5000 [USP'U]/ML
INJECTION, SOLUTION INTRAVENOUS; SUBCUTANEOUS
Status: DISCONTINUED | OUTPATIENT
Start: 2024-02-27 | End: 2024-02-27 | Stop reason: HOSPADM

## 2024-02-27 RX ORDER — FENTANYL CITRATE 50 UG/ML
INJECTION, SOLUTION INTRAMUSCULAR; INTRAVENOUS
Status: DISCONTINUED | OUTPATIENT
Start: 2024-02-27 | End: 2024-02-27

## 2024-02-27 RX ORDER — OXYCODONE HYDROCHLORIDE 10 MG/1
10 TABLET ORAL EVERY 4 HOURS PRN
Status: DISCONTINUED | OUTPATIENT
Start: 2024-02-27 | End: 2024-03-01

## 2024-02-27 RX ORDER — MILRINONE LACTATE 0.2 MG/ML
0.38 INJECTION, SOLUTION INTRAVENOUS CONTINUOUS
Status: DISCONTINUED | OUTPATIENT
Start: 2024-02-27 | End: 2024-02-28

## 2024-02-27 RX ORDER — PHENYLEPHRINE HCL IN 0.9% NACL 1 MG/10 ML
SYRINGE (ML) INTRAVENOUS
Status: DISCONTINUED | OUTPATIENT
Start: 2024-02-27 | End: 2024-02-27

## 2024-02-27 RX ORDER — CALCIUM CHLORIDE INJECTION 100 MG/ML
INJECTION, SOLUTION INTRAVENOUS
Status: DISCONTINUED | OUTPATIENT
Start: 2024-02-27 | End: 2024-02-27 | Stop reason: HOSPADM

## 2024-02-27 RX ORDER — SUCRALFATE 1 G/1
1 TABLET ORAL
Status: DISCONTINUED | OUTPATIENT
Start: 2024-02-28 | End: 2024-03-22

## 2024-02-27 RX ORDER — ASPIRIN 81 MG/1
81 TABLET ORAL DAILY
Status: DISCONTINUED | OUTPATIENT
Start: 2024-02-28 | End: 2024-03-26 | Stop reason: HOSPADM

## 2024-02-27 RX ORDER — MORPHINE SULFATE 4 MG/ML
4 INJECTION, SOLUTION INTRAMUSCULAR; INTRAVENOUS EVERY 4 HOURS PRN
Status: DISCONTINUED | OUTPATIENT
Start: 2024-02-27 | End: 2024-02-29

## 2024-02-27 RX ORDER — POTASSIUM CHLORIDE 14.9 MG/ML
60 INJECTION INTRAVENOUS
Status: DISCONTINUED | OUTPATIENT
Start: 2024-02-27 | End: 2024-03-02

## 2024-02-27 RX ORDER — POTASSIUM CHLORIDE 14.9 MG/ML
40 INJECTION INTRAVENOUS
Status: DISCONTINUED | OUTPATIENT
Start: 2024-02-27 | End: 2024-03-02

## 2024-02-27 RX ORDER — PROTAMINE SULFATE 10 MG/ML
INJECTION, SOLUTION INTRAVENOUS
Status: DISCONTINUED | OUTPATIENT
Start: 2024-02-27 | End: 2024-02-27

## 2024-02-27 RX ADMIN — MUPIROCIN: 20 OINTMENT TOPICAL at 10:02

## 2024-02-27 RX ADMIN — MIDAZOLAM HYDROCHLORIDE 1 MG: 1 INJECTION, SOLUTION INTRAMUSCULAR; INTRAVENOUS at 06:02

## 2024-02-27 RX ADMIN — VASOPRESSIN 2 UNITS: 20 INJECTION INTRAVENOUS at 07:02

## 2024-02-27 RX ADMIN — VASOPRESSIN 1 UNITS: 20 INJECTION INTRAVENOUS at 03:02

## 2024-02-27 RX ADMIN — DESMOPRESSIN ACETATE 6 MCG: 4 SOLUTION INTRAVENOUS at 07:02

## 2024-02-27 RX ADMIN — Medication 100 MCG: at 07:02

## 2024-02-27 RX ADMIN — SODIUM CHLORIDE 2 UNITS/HR: 9 INJECTION, SOLUTION INTRAVENOUS at 04:02

## 2024-02-27 RX ADMIN — VASOPRESSIN 0.5 UNITS: 20 INJECTION INTRAVENOUS at 03:02

## 2024-02-27 RX ADMIN — EPINEPHRINE 0.02 MCG/KG/MIN: 1 INJECTION, SOLUTION, CONCENTRATE INTRAVENOUS at 07:02

## 2024-02-27 RX ADMIN — LIDOCAINE HYDROCHLORIDE 80 MG: 20 INJECTION INTRAVENOUS at 04:02

## 2024-02-27 RX ADMIN — ROCURONIUM BROMIDE 70 MG: 10 SOLUTION INTRAVENOUS at 01:02

## 2024-02-27 RX ADMIN — TRANEXAMIC ACID 800 MG: 100 INJECTION, SOLUTION INTRAVENOUS at 02:02

## 2024-02-27 RX ADMIN — DEXTROSE AND SODIUM CHLORIDE: 5; 450 INJECTION, SOLUTION INTRAVENOUS at 09:02

## 2024-02-27 RX ADMIN — HEPARIN SODIUM 30000 UNITS: 1000 INJECTION, SOLUTION INTRAVENOUS; SUBCUTANEOUS at 03:02

## 2024-02-27 RX ADMIN — LIDOCAINE HYDROCHLORIDE 80 MG: 20 INJECTION INTRAVENOUS at 01:02

## 2024-02-27 RX ADMIN — TRANEXAMIC ACID 800 MG: 100 INJECTION, SOLUTION INTRAVENOUS at 07:02

## 2024-02-27 RX ADMIN — CALCIUM CHLORIDE INJECTION 500 MG: 100 INJECTION, SOLUTION INTRAVENOUS at 07:02

## 2024-02-27 RX ADMIN — SODIUM CHLORIDE: 9 INJECTION, SOLUTION INTRAVENOUS at 01:02

## 2024-02-27 RX ADMIN — CALCIUM CHLORIDE INJECTION 500 MG: 100 INJECTION, SOLUTION INTRAVENOUS at 08:02

## 2024-02-27 RX ADMIN — ROCURONIUM BROMIDE 25 MG: 10 SOLUTION INTRAVENOUS at 04:02

## 2024-02-27 RX ADMIN — VASOPRESSIN 1 UNITS: 20 INJECTION INTRAVENOUS at 07:02

## 2024-02-27 RX ADMIN — Medication 100 MCG: at 03:02

## 2024-02-27 RX ADMIN — MIDAZOLAM HYDROCHLORIDE 1 MG: 1 INJECTION, SOLUTION INTRAMUSCULAR; INTRAVENOUS at 04:02

## 2024-02-27 RX ADMIN — EPINEPHRINE 0.02 MCG/KG/MIN: 1 INJECTION INTRAMUSCULAR; INTRAVENOUS; SUBCUTANEOUS at 09:02

## 2024-02-27 RX ADMIN — FENTANYL CITRATE 250 MCG: 50 INJECTION, SOLUTION INTRAMUSCULAR; INTRAVENOUS at 01:02

## 2024-02-27 RX ADMIN — Medication 200 MCG: at 08:02

## 2024-02-27 RX ADMIN — CALCIUM CHLORIDE INJECTION 250 MG: 100 INJECTION, SOLUTION INTRAVENOUS at 07:02

## 2024-02-27 RX ADMIN — CEFAZOLIN 2 G: 330 INJECTION, POWDER, FOR SOLUTION INTRAMUSCULAR; INTRAVENOUS at 02:02

## 2024-02-27 RX ADMIN — SIMETHICONE 80 MG: 80 TABLET, CHEWABLE ORAL at 09:02

## 2024-02-27 RX ADMIN — FENTANYL CITRATE 250 MCG: 50 INJECTION, SOLUTION INTRAMUSCULAR; INTRAVENOUS at 02:02

## 2024-02-27 RX ADMIN — ETOMIDATE 14 MCG: 2 INJECTION INTRAVENOUS at 01:02

## 2024-02-27 RX ADMIN — CLEVIPIDINE 2 MG/HR: 0.5 EMULSION INTRAVENOUS at 09:02

## 2024-02-27 RX ADMIN — PROTAMINE SULFATE 400 MG: 10 INJECTION, SOLUTION INTRAVENOUS at 07:02

## 2024-02-27 RX ADMIN — HYDROCODONE BITARTRATE AND ACETAMINOPHEN 1 TABLET: 5; 325 TABLET ORAL at 12:02

## 2024-02-27 RX ADMIN — ROCURONIUM BROMIDE 50 MG: 10 SOLUTION INTRAVENOUS at 02:02

## 2024-02-27 RX ADMIN — HEPARIN SODIUM 14 UNITS/KG/HR: 10000 INJECTION, SOLUTION INTRAVENOUS at 04:02

## 2024-02-27 RX ADMIN — DEXMEDETOMIDINE 0.7 MCG/KG/HR: 200 INJECTION, SOLUTION INTRAVENOUS at 07:02

## 2024-02-27 RX ADMIN — MILRINONE LACTATE IN DEXTROSE 0.38 MCG/KG/MIN: 200 INJECTION, SOLUTION INTRAVENOUS at 07:02

## 2024-02-27 RX ADMIN — Medication 200 MCG: at 03:02

## 2024-02-27 RX ADMIN — MIDAZOLAM HYDROCHLORIDE 3 MG: 1 INJECTION, SOLUTION INTRAMUSCULAR; INTRAVENOUS at 01:02

## 2024-02-27 RX ADMIN — ROCURONIUM BROMIDE 25 MG: 10 SOLUTION INTRAVENOUS at 06:02

## 2024-02-27 RX ADMIN — POTASSIUM CHLORIDE 20 MEQ: 14.9 INJECTION, SOLUTION INTRAVENOUS at 10:02

## 2024-02-27 RX ADMIN — ROCURONIUM BROMIDE 30 MG: 10 SOLUTION INTRAVENOUS at 02:02

## 2024-02-27 NOTE — ANESTHESIA PROCEDURE NOTES
RAUL-Preprocedure    Diagnosis: Coronary artery disease/AS  Patient location during procedure: OR  Exam type: Baseline    Staffing  Performed: anesthesiologist     Anesthesiologist: Alia Sharp MD        Anesthesiologist Present  Yes      Setup & Induction  Patient preparation: bite block inserted  Probe Insertion: easy  Exam: completeDoppler Echo: 2D, 3D and color flow mapping.          After induction of general endotracheal anesthesia in the operating room, transesophageal echo probe was placed atraumatically the esophagus for evaluation of cardiovascular structures.      1. The ascending aorta, transverse arch and descending thoracic aorta to the level of diaphragm are well-visualized.  The aorta is normal in caliber, rare atheroma is noted.  An Impella device is noted ascending the thoracic aorta over the transverse arch and into the left ventricle through the aortic valve.    2. The aortic valve is heavily calcified the non coronary cusp appears to be completely immobile and secondarily fused to the left coronary cusp.  There does appear to be some insufficiency however it is impossible to ascertain a gradient and assess insufficiency due to the Impella device in place.  The aortic valve annulus appears to be 23.8 mm.    3. Right atrium, left atrium, left atrial appendage are well-visualized.  There is mild left atrial enlargement in the air is no thrombus tumor evidence of an interatrial septal defect.      4. Tricuspid valve is trivial regurgitation only.    5. Right ventricle is mildly dilated, but is only mildly hypokinetic.      6. There is mild amount of pulmonic insufficiency and a pulmonary artery catheter is visualized exiting through the right ventricular outflow tract into the main pulmonary artery.      7. The mitral valve is nondilated, leaflets are freely mobile there is trivial regurgitation only.  No stenosis is seen.      8. EF is 40%, there is left ventricular hypertrophy and mild dilation  of the left ventricle with grade 2 diastolic dysfunction.  The Impella device tip is seen in the left ventricle.    9. No pericardial or pleural effusions are seen.

## 2024-02-27 NOTE — PROGRESS NOTES
Inpatient Nutrition Assessment    Admit Date: 2/21/2024   Total duration of encounter: 6 days   Patient Age: 78 y.o.    Nutrition Recommendation/Prescription     Advance diet as tolerated to cardiac diet  Continue Boost TID to provide 240 kcal and 10 g protein per serving  Continue bowel regimen  If appetite remains decreased, consider appetite stimulant as medically feasible   Monitor labs, intake and weight    Communication of Recommendations:  EMR    Nutrition Assessment     Malnutrition Assessment/Nutrition-Focused Physical Exam    Unable to perform NFPE at this time    Chart Review    Reason Seen: length of stay    Malnutrition Screening Tool Results   Have you recently lost weight without trying?: No  Have you been eating poorly because of a decreased appetite?: No   MST Score: 0   Diagnosis:  Ischemic Cardiomyopathy  Pulmonary HTN  Hematuria 2/2 Traumatic/Difficult Indwelling Catheter Placement   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% - (Compensated)   CKD    Relevant Medical History: PAF on Eliquis, Aortic insufficiency, MVCAD, HTN     Scheduled Medications:  allopurinoL, 50 mg, Daily  amiodarone, 200 mg, BID  aspirin, 81 mg, Daily  atorvastatin, 40 mg, QHS  ferrous sulfate, 1 tablet, Every other day  folic acid, 1 mg, Daily  lactated ringers, 500 mL, Once  LIDOcaine HCl 2%, , Once  pantoprazole, 40 mg, Daily  polyethylene glycol, 17 g, Daily    Continuous Infusions:  sodium chloride 0.9%, Last Rate: 100 mL/hr at 02/27/24 0745  heparin (porcine) in D5W, Last Rate: Stopped (02/27/24 0715)  NORepinephrine bitartrate-D5W, Last Rate: Stopped (02/25/24 2221)  sodium bicarbonate 25 mEq in dextrose 5 % (D5W) 1,000 mL infusion, Last Rate: 10 mL/hr at 02/27/24 0745    PRN Medications: 0.9%  NaCl infusion (for blood administration), acetaminophen, acetaminophen, heparin (PORCINE), heparin (PORCINE), heparin, porcine (PF), heparin, porcine (PF), HYDROcodone-acetaminophen, HYDROmorphone, melatonin,  nitroGLYCERIN, ondansetron, ondansetron, simethicone, sodium chloride 0.9%, sodium chloride 0.9%    Calorie Containing IV Medications: no significant kcals from medications at this time    Recent Labs   Lab 02/21/24  0347 02/21/24  1418 02/22/24  0515 02/23/24  0716 02/24/24  0144 02/25/24  0232 02/25/24  1228 02/26/24  0256 02/27/24  0154     --  138 138 135* 134*  --  137 136   K 4.3  --  4.9 4.4 4.3 4.2  --  4.7 4.4   CALCIUM 8.7*  --  8.9 9.1 9.3 8.3*  --  8.0* 8.3*   PHOS  --   --   --  4.7  --   --   --   --   --    MG  --   --   --  2.20  --  2.10  --  2.20  --    CHLORIDE 109*  --  107 103 99 103  --  110* 110*   CO2 23  --  26 24 27 25  --  22* 19*   BUN 12.3  --  11.7 13.1 21.5 20.0  --  13.8 14.9   CREATININE 1.63*  --  1.60* 1.86* 2.10* 1.69*  --  1.49* 1.67*   EGFRNORACEVR 43  --  44 37 32 41  --  48 42   GLUCOSE 86  --  89 82 115 103  --  84 104   BILITOT 0.6  --  0.7  --  1.5 1.3  --  1.1 1.1   ALKPHOS 68  --  71  --  79 69  --  61 75   ALT 16  --  18  --  21 18  --  18 23   AST 14  --  18  --  66* 66*  --  53* 50*   ALBUMIN 3.1*  --  3.4  --  3.7 3.2*  --  3.0* 3.0*   WBC 8.09   < > 8.84 10.72 13.20* 11.23 10.02 10.34 11.83*   HGB 11.3*   < > 11.5* 12.5* 12.0* 10.1* 9.4* 10.6* 10.6*   HCT 36.7*   < > 38.8* 41.8* 38.2* 31.7* 30.2* 33.1* 34.7*    < > = values in this interval not displayed.     Nutrition Orders:  Diet NPO  Dietary nutrition supplements Boost Original; TID    Appetite/Oral Intake: NPO/NPO  Factors Affecting Nutritional Intake: NPO  Food/Sikhism/Cultural Preferences: unable to obtain  Food Allergies: no known food allergies  Last Bowel Movement: 02/25/24  Wound(s):      Comments    2/27: Pt NPO this AM, in OR for CABG/AVR at time of rounds. Pt with poor intake since admission per EMR. Last BM 2/25, meds noted. Per MST, no reports of decreased appetite or unintentional weight loss prior admission. Weight fluctuations noted past week, likely related to fluid. Will trend  "weights.    Anthropometrics    Height: 5' 5" (165.1 cm),    Last Weight: 78.2 kg (172 lb 6.4 oz) (02/23/24 0629), Weight Method: Bed Scale  BMI (Calculated): 28.7  BMI Classification: overweight (BMI 25-29.9)        Ideal Body Weight (IBW), Male: 136 lb                                Usual Weight Provided By: unable to obtain usual weight    Wt Readings from Last 5 Encounters:   02/23/24 78.2 kg (172 lb 6.4 oz)   02/19/24 81.1 kg (178 lb 12.8 oz)     Weight Change(s) Since Admission:   Wt Readings from Last 1 Encounters:   02/23/24 0629 78.2 kg (172 lb 6.4 oz)   02/21/24 2116 82.2 kg (181 lb 3.5 oz)   Admit Weight: 82.2 kg (181 lb 3.5 oz) (02/21/24 2116), Weight Method: Bed Scale    Estimated Needs    Weight Used For Calorie Calculations: 78.2 kg (172 lb 6.4 oz)  Energy Calorie Requirements (kcal): 1720 kcal (1.2 stress factor)  Energy Need Method: [place]-St Jeor  Weight Used For Protein Calculations: 78.2 kg (172 lb 6.4 oz)  Protein Requirements: 78 g (1.0 g/kg)  Fluid Requirements (mL): 1720 ml (1 ml/kcal)    Enteral Nutrition     Patient not receiving enteral nutrition at this time.    Parenteral Nutrition     Patient not receiving parenteral nutrition support at this time.    Evaluation of Received Nutrient Intake    Calories: not meeting estimated needs  Protein: not meeting estimated needs    Patient Education     Not applicable.    Nutrition Diagnosis     PES: Inadequate oral intake related to acute illness as evidenced by <50% intake for > 5 days. (new)     Nutrition Interventions     Intervention(s): general/healthful diet, commercial beverage, prescription medication, and collaboration with other providers    Goal: Meet greater than 80% of nutritional needs by follow-up. (new)  Goal: Consume % of oral supplements by follow-up. (new)    Nutrition Goals & Monitoring     Dietitian will monitor: food and beverage intake, weight, electrolyte/renal panel, glucose/endocrine profile, and gastrointestinal " profile    Nutrition Risk/Follow-Up: moderate (follow-up in 3-5 days)   Please consult if re-assessment needed sooner.

## 2024-02-27 NOTE — ANESTHESIA PREPROCEDURE EVALUATION
02/26/2024  Brain Snyder is a 78 y.o., male.    with a PMHx of PAF on Eliquis Aortic insufficiency, MV CAD, HTN. He is Portuguese speaking and an  was used for interview. He was orginally admitted to [facility] on 2.15.24 with CP & NSTEMI and underwent a LHC on 2.20.24 taht showed MV CAD (reported noted below) He was transferred to [facility] on 2.21.24 for a CABG/AVR evaluation.   Previous Diagnostics:  RHC (2.22.24):  Right heart catheterization demonstrating severe pulmonary hypertension 84/34 and PCWP 24 mmHg  Reduced cardiac output/cardiac index at 3.43/1.81 L/min/m2 with pulmonary artery saturations 49.3%    LHC (2.20.24):   Prox LAD lesion was 70% stenosed.  Ost Cx to Prox Cx lesion was 80% stenosed.  1st Mrg lesion was 80% stenosed.  2nd Mrg-1 lesion was 70% stenosed.  2nd Mrg-2 lesion was 50% stenosed.  Mid LAD lesion was 50% stenosed.  Prox RCA lesion was 60% stenosed.  estimated blood loss was <50 mL.  There was three vessel coronary artery disease.  LVEDP: 30mmHg    ECHO (2.15.24):  Left Ventricle: The left ventricle is normal in size. Mildly increased ventricular mass. Mildly increased wall thickness. There is mild eccentric hypertrophy. Moderate global hypokinesis present. Septal motion is consistent with bundle branch block. There is moderately reduced systolic function. Biplane (2D) method of discs ejection fraction is 40%. Grade II diastolic dysfunction.  Right Ventricle: Right ventricular enlargement. Systolic function is normal.  Left Atrium: Left atrium is severely dilated.  Right Atrium: Right atrium is moderately dilated.  Aortic Valve: There is moderate aortic valve sclerosis. Severely restricted motion. There is severe stenosis. Aortic valve area by VTI is 0.66 cm². Aortic valve peak velocity is 4.45 m/s. Mean gradient is 50 mmHg. The dimensionless index is 0.17. There is mild to  moderate aortic regurgitation.  Mitral Valve: Mildly calcified leaflets. There is no stenosis. There is mild regurgitation.  Tricuspid Valve: The tricuspid valve is structurally normal. There is mild to moderate regurgitation.  Pulmonic Valve: There is no significant regurgitation.  Aorta: Aortic root is upper limit of normal measuring 3.6 cm.  Pulmonary Artery: There is severe pulmonary hypertension. The estimated pulmonary artery systolic pressure is 69 mmHg.  IVC/SVC: Intermediate venous pressure at 8 mmHg.  Pericardium: There is no pericardial effusion.  Pre-op Assessment    I have reviewed the Patient Summary Reports.     I have reviewed the Nursing Notes. I have reviewed the NPO Status.   I have reviewed the Medications.     Review of Systems      Physical Exam  General: Well nourished, Cooperative, Alert and Oriented    Airway:  Mallampati: II   Mouth Opening: Normal  TM Distance: Normal  Tongue: Normal  Neck ROM: Normal ROM    Dental:  Periodontal disease        Anesthesia Plan  Type of Anesthesia, risks & benefits discussed:    Anesthesia Type: Gen ETT  Intra-op Monitoring Plan: Standard ASA Monitors, Art Line, Central Line, RAUL and CO  Post Op Pain Control Plan: multimodal analgesia and IV/PO Opioids PRN  Induction:  IV  Airway Plan: Direct, Post-Induction  Informed Consent: Informed consent signed with the Patient and all parties understand the risks and agree with anesthesia plan.  All questions answered. Patient consented to blood products? Yes  ASA Score: 4    Ready For Surgery From Anesthesia Perspective.     .

## 2024-02-27 NOTE — ANESTHESIA PROCEDURE NOTES
[place]    Diagnosis: CAD  Patient location during procedure: done in OR  Timeout: 2/27/2024 1:36 PM  Procedure end time: 2/27/2024 1:42 PM    Staffing  Authorizing Provider: Alia Sharp MD  Performing Provider: Alia Sharp MD    Staffing  Performed by: Alia Sharp MD  Authorized by: Alia Sharp MD    Anesthesiologist was present at the time of the procedure.  Preanesthetic Checklist  Completed: patient identified, IV checked, site marked, risks and benefits discussed, surgical consent, monitors and equipment checked, pre-op evaluation, timeout performed and anesthesia consent given  [place] Line  Skin Prep: povidone-iodine 7.5% surgical scrub  Location: right,  internal jugular vein  Vessel Caliber: medium, patent, compressibility normal  Introducer: 9.5 Fr, manometry used.  Device: CCO/Oximetric Catheter   Catheter Size: 8 Fr  Catheter placement by yes. Heme positive aspiration all ports. PAC floated with balloon up not wedgedSterile sheath usedInsertion Attempts: 1  Indication: intravenous therapy, hemodynamic monitoring  Ultrasound Guidance  Needle advanced into vessel with real time Ultrasound guidance.  Image recorded and saved.  Guidewire confirmed in vessel.  Sterile sheath used.  Assessment  Central Line Bundle Protocol followed. Hand hygiene before procedure, surgical cap worn, surgical mask worn, sterile surgical gloves worn, large sterile drape used.  Verification: chest x-ray  Dressing: secured with tape and tegaderm  Patient: Tolerated Well

## 2024-02-27 NOTE — PROGRESS NOTES
"  [facility]    Cardiology  Progress Note    Patient Name: Brain Snyder  MRN: [id number]  Admission Date: 2/21/2024  Hospital Length of Stay: 6 days  Code Status: Full Code   Attending Physician: Pablo Yepez MD   Primary Care Physician: Nidia Shepherd NP  Expected Discharge Date:   Principal Problem:CAD (coronary artery disease)    Subjective:   Reason for Consult:  CAD     HPI: 78-year-old female that is unknown to [facility] with a PMHx of PAF on Eliquis Aortic insufficiency, MV CAD, HTN. He is [place] speaking and an  was used for interview. He was orginally admitted to [facility] on 2.15.24 with CP & NSTEMI and underwent a LHC on 2.20.24 taht showed MV CAD (reported noted below) He was transferred to [facility] on 2.21.24 for a CABG/AVR evaluation. On arrive he was hemodynamically stable on a heparin infusion. He denied chest pain, SOB, and nausea on current exam, [facility] was consulted for CAD    Hospital Course:   2.23.24: Patient seen resting in bed. He denies SOB or CP. He remains on heparin infusion. Family at bedside   2.24.24: NAD. S/p Impella Placement/[place]-Chelo Catheter. "I am ok." + Blood Clots from Nose/Mouth, Hematuria 2/2 traumatic Indwelling Catheter Placement. Heparin Drip per Protocol. Impella P5  2.25.24: NAD. "I'm ok." Denies CP, SOB and Palps. PA Pressure Reading is not Accurate. CVP 5. Impella at P5. R Groin Impella P7. Remains Off Heparin Drip per Protocol. Now in SR.   2.26.24: NAD Noted. SR on Tele. Right Groin Impella in place, bruising with no hematoma noted. Distal pulses DP intact. Legs warm. NC 2 L/Min. Denies CP/SOB. Consent obtained from his daughter Brii Snyder. NPO for MV PCI Today.  2.27.24: NAD Noted. Impella remains in place at P7 Support. Good UA Noted, some pink tinged urine noted. SR on Tele. Pressors off. Denies CP/SOB. NPO for surgery today. Heparin on hold for surgery.    PMH: PAF (on Eliquis), Aortic insufficiency, MV CAD, HTN  PSH: LHC  Family History: " "None reported  Social History: former smoker, denies ETOH or illicit drug us    Previous Diagnostics:  RHC/Impella Placement (2.23.24):  Right heart catheterization performed showed the following:  PA= 61/24 (27) mm Hg  PCWP=  31/26 (27) mm Hg  AO saturation= 93 % RA  PA saturation= 60% with Impella (49% yesterday)    (02/22/24) -  0.5  Following that we elected to advance the Impella via right common femoral artery approach:  - Abiomed stiff wire  - 14 Togolese peel-away sheath  - Heparin 10,000 unit bolus given peripherally  - Dilator removed  - 0.035" J-wire  - 6 [place]ogolese pigtail catheter positioned in the left ventricle  - Abiomed 0.025" wire  - Impella CP positioned in left ventricle  - Pulsatile motor current (appropriate positioning)  - Placed the marker just below the aortic valve  - Cardiac output was 2.8 L/min provided by the device.  Impella was at 84cm  Access Closure :    Sheath sutured to the groin  Secured.  Attestation:I was present for and supervised all key portions of the procedure  Impression/plan:   Successful Impella insertion and swan-Chelo in the setting of Acute HF/low cardiac output/precardiogenic shock/Critcal-severe AS/MVCAD - LM distal    RHC (2.22.24):  Right heart catheterization demonstrating severe pulmonary hypertension 84/34 and PCWP 24 mmHg  Reduced cardiac output/cardiac index at 3.43/1.81 L/min/m2 with pulmonary artery saturations 49.3%  For applied to the right femoral vein following removal of the 7 Togolese sheath  The estimated blood loss was none.    Carotid US (2.22.24):  The bilateral internal carotid artery demonstrated less than 50% stenosis.   The bilateral vertebral arteries were patent with antegrade flow    LHC (2.20.24):   Prox LAD lesion was 70% stenosed.  Ost Cx to Prox Cx lesion was 80% stenosed.  1st Mrg lesion was 80% stenosed.  2nd Mrg-1 lesion was 70% stenosed.  2nd Mrg-2 lesion was 50% stenosed.  Mid LAD lesion was 50% stenosed.  Prox RCA lesion was 60% " stenosed.  estimated blood loss was <50 mL.  There was three vessel coronary artery disease.  LVEDP: 30mmHg  Recommendations:   Refer for CABG evaluation and AVR   Preformed by Dr. Flannery at [facility]     ECHO (2.15.24):  Left Ventricle: The left ventricle is normal in size. Mildly increased ventricular mass. Mildly increased wall thickness. There is mild eccentric hypertrophy. Moderate global hypokinesis present. Septal motion is consistent with bundle branch block. There is moderately reduced systolic function. Biplane (2D) method of discs ejection fraction is 40%. Grade II diastolic dysfunction.  Right Ventricle: Right ventricular enlargement. Systolic function is normal.  Left Atrium: Left atrium is severely dilated.  Right Atrium: Right atrium is moderately dilated.  Aortic Valve: There is moderate aortic valve sclerosis. Severely restricted motion. There is severe stenosis. Aortic valve area by VTI is 0.66 cm². Aortic valve peak velocity is 4.45 m/s. Mean gradient is 50 mmHg. The dimensionless index is 0.17. There is mild to moderate aortic regurgitation.  Mitral Valve: Mildly calcified leaflets. There is no stenosis. There is mild regurgitation.  Tricuspid Valve: The tricuspid valve is structurally normal. There is mild to moderate regurgitation.  Pulmonic Valve: There is no significant regurgitation.  Aorta: Aortic root is upper limit of normal measuring 3.6 cm.  Pulmonary Artery: There is severe pulmonary hypertension. The estimated pulmonary artery systolic pressure is 69 mmHg.  IVC/SVC: Intermediate venous pressure at 8 mmHg.  Pericardium: There is no pericardial effusion.     Review of Systems   Cardiovascular:  Negative for chest pain.   Respiratory:  Negative for shortness of breath.      Objective:     Vital Signs (Most Recent):  Temp: 98.6 °F (37 °C) (02/27/24 1111)  Pulse: 89 (02/27/24 1111)  Resp: (!) 21 (02/27/24 1111)  BP: (!) 79/56 (02/27/24 1111)  SpO2: 98 % (02/27/24 1111) Vital Signs (24h  Range):  Temp:  [98.2 °F (36.8 °C)-99.3 °F (37.4 °C)] 98.6 °F (37 °C)  Pulse:  [62-89] 89  Resp:  [12-24] 21  SpO2:  [93 %-100 %] 98 %  BP: ()/(56-90) 79/56  Arterial Line BP: ()/(58-90) 139/63   Weight: 78.2 kg (172 lb 6.4 oz)  Body mass index is 28.69 kg/m².  SpO2: 98 %       Intake/Output Summary (Last 24 hours) at 2/27/2024 1134  Last data filed at 2/27/2024 0745  Gross per 24 hour   Intake 2620.22 ml   Output 2590 ml   Net 30.22 ml       Lines/Drains/Airways       Central Venous Catheter Line  Duration             Pulmonary Artery Catheter Assessment  02/24/24 0700 Femoral Right 3 days              Drain  Duration                  Urethral Catheter 02/23/24 1451 Latex 3 days              Arterial Line  Duration             Arterial Line 02/24/24 0758 Right Radial 3 days              Line  Duration                  VAD 02/24/24 0700 Impella 3 days              Peripheral Intravenous Line  Duration                  Peripheral IV - Double Lumen 02/17/24 1430 20 G Anterior;Distal;Left Forearm 9 days         Peripheral IV - Double Lumen 02/20/24 0031 20 G Anterior;Right Forearm 7 days         Sheath 02/23/24 0903 Right anterior;proximal 4 days         Sheath 02/23/24 0905 Right anterior;proximal 4 days                  Significant Labs:   Recent Results (from the past 72 hour(s))   Comprehensive metabolic panel    Collection Time: 02/25/24  2:32 AM   Result Value Ref Range    Sodium Level 134 (L) 136 - 145 mmol/L    Potassium Level 4.2 3.5 - 5.1 mmol/L    Chloride 103 98 - 107 mmol/L    Carbon Dioxide 25 23 - 31 mmol/L    Glucose Level 103 82 - 115 mg/dL    Blood Urea Nitrogen 20.0 8.4 - 25.7 mg/dL    Creatinine 1.69 (H) 0.73 - 1.18 mg/dL    Calcium Level Total 8.3 (L) 8.8 - 10.0 mg/dL    Protein Total 6.0 5.8 - 7.6 gm/dL    Albumin Level 3.2 (L) 3.4 - 4.8 g/dL    Globulin 2.8 2.4 - 3.5 gm/dL    Albumin/Globulin Ratio 1.1 1.1 - 2.0 ratio    Bilirubin Total 1.3 <=1.5 mg/dL    Alkaline Phosphatase 69 40 -  150 unit/L    Alanine Aminotransferase 18 0 - 55 unit/L    Aspartate Aminotransferase 66 (H) 5 - 34 unit/L    eGFR 41 mls/min/1.73/m2   Magnesium    Collection Time: 02/25/24  2:32 AM   Result Value Ref Range    Magnesium Level 2.10 1.60 - 2.60 mg/dL   APTT    Collection Time: 02/25/24  2:32 AM   Result Value Ref Range    PTT 31.8 23.2 - 33.7 seconds   CBC with Differential    Collection Time: 02/25/24  2:32 AM   Result Value Ref Range    WBC 11.23 4.50 - 11.50 x10(3)/mcL    RBC 3.69 (L) 4.70 - 6.10 x10(6)/mcL    Hgb 10.1 (L) 14.0 - 18.0 g/dL    Hct 31.7 (L) 42.0 - 52.0 %    MCV 85.9 80.0 - 94.0 fL    MCH 27.4 27.0 - 31.0 pg    MCHC 31.9 (L) 33.0 - 36.0 g/dL    RDW 15.3 11.5 - 17.0 %    Platelet 207 130 - 400 x10(3)/mcL    MPV 10.9 (H) 7.4 - 10.4 fL    Neut % 75.0 %    Lymph % 10.2 %    Mono % 11.5 %    Eos % 2.6 %    Basophil % 0.3 %    Lymph # 1.14 0.6 - 4.6 x10(3)/mcL    Neut # 8.44 2.1 - 9.2 x10(3)/mcL    Mono # 1.29 0.1 - 1.3 x10(3)/mcL    Eos # 0.29 0 - 0.9 x10(3)/mcL    Baso # 0.03 <=0.2 x10(3)/mcL    IG# 0.04 0 - 0.04 x10(3)/mcL    IG% 0.4 %    NRBC% 0.0 %   APTT    Collection Time: 02/25/24 12:28 PM   Result Value Ref Range    PTT 33.9 (H) 23.2 - 33.7 seconds   Protime-INR    Collection Time: 02/25/24 12:28 PM   Result Value Ref Range    PT 16.1 (H) 12.5 - 14.5 seconds    INR 1.3 <=1.3   CBC with Differential    Collection Time: 02/25/24 12:28 PM   Result Value Ref Range    WBC 10.02 4.50 - 11.50 x10(3)/mcL    RBC 3.43 (L) 4.70 - 6.10 x10(6)/mcL    Hgb 9.4 (L) 14.0 - 18.0 g/dL    Hct 30.2 (L) 42.0 - 52.0 %    MCV 88.0 80.0 - 94.0 fL    MCH 27.4 27.0 - 31.0 pg    MCHC 31.1 (L) 33.0 - 36.0 g/dL    RDW 15.4 11.5 - 17.0 %    Platelet 192 130 - 400 x10(3)/mcL    MPV 11.2 (H) 7.4 - 10.4 fL    Neut % 78.5 %    Lymph % 8.0 %    Mono % 10.4 %    Eos % 2.3 %    Basophil % 0.4 %    Lymph # 0.80 0.6 - 4.6 x10(3)/mcL    Neut # 7.87 2.1 - 9.2 x10(3)/mcL    Mono # 1.04 0.1 - 1.3 x10(3)/mcL    Eos # 0.23 0 - 0.9 x10(3)/mcL     Baso # 0.04 <=0.2 x10(3)/mcL    IG# 0.04 0 - 0.04 x10(3)/mcL    IG% 0.4 %    NRBC% 0.0 %   PTT Heparin Monitoring    Collection Time: 02/25/24  7:24 PM   Result Value Ref Range    PTT Heparin Monitor 53.4 (H) 23.2 - 33.7 seconds   Comprehensive metabolic panel    Collection Time: 02/26/24  2:56 AM   Result Value Ref Range    Sodium Level 137 136 - 145 mmol/L    Potassium Level 4.7 3.5 - 5.1 mmol/L    Chloride 110 (H) 98 - 107 mmol/L    Carbon Dioxide 22 (L) 23 - 31 mmol/L    Glucose Level 84 82 - 115 mg/dL    Blood Urea Nitrogen 13.8 8.4 - 25.7 mg/dL    Creatinine 1.49 (H) 0.73 - 1.18 mg/dL    Calcium Level Total 8.0 (L) 8.8 - 10.0 mg/dL    Protein Total 5.8 5.8 - 7.6 gm/dL    Albumin Level 3.0 (L) 3.4 - 4.8 g/dL    Globulin 2.8 2.4 - 3.5 gm/dL    Albumin/Globulin Ratio 1.1 1.1 - 2.0 ratio    Bilirubin Total 1.1 <=1.5 mg/dL    Alkaline Phosphatase 61 40 - 150 unit/L    Alanine Aminotransferase 18 0 - 55 unit/L    Aspartate Aminotransferase 53 (H) 5 - 34 unit/L    eGFR 48 mls/min/1.73/m2   Magnesium    Collection Time: 02/26/24  2:56 AM   Result Value Ref Range    Magnesium Level 2.20 1.60 - 2.60 mg/dL   PTT Heparin Monitoring    Collection Time: 02/26/24  2:56 AM   Result Value Ref Range    PTT Heparin Monitor 74.9 (H) 23.2 - 33.7 seconds   CBC with Differential    Collection Time: 02/26/24  2:56 AM   Result Value Ref Range    WBC 10.34 4.50 - 11.50 x10(3)/mcL    RBC 3.83 (L) 4.70 - 6.10 x10(6)/mcL    Hgb 10.6 (L) 14.0 - 18.0 g/dL    Hct 33.1 (L) 42.0 - 52.0 %    MCV 86.4 80.0 - 94.0 fL    MCH 27.7 27.0 - 31.0 pg    MCHC 32.0 (L) 33.0 - 36.0 g/dL    RDW 15.6 11.5 - 17.0 %    Platelet 183 130 - 400 x10(3)/mcL    MPV 11.2 (H) 7.4 - 10.4 fL    Neut % 65.8 %    Lymph % 17.5 %    Mono % 11.8 %    Eos % 3.7 %    Basophil % 0.5 %    Lymph # 1.81 0.6 - 4.6 x10(3)/mcL    Neut # 6.81 2.1 - 9.2 x10(3)/mcL    Mono # 1.22 0.1 - 1.3 x10(3)/mcL    Eos # 0.38 0 - 0.9 x10(3)/mcL    Baso # 0.05 <=0.2 x10(3)/mcL    IG# 0.07 (H) 0 -  0.04 x10(3)/mcL    IG% 0.7 %    NRBC% 0.0 %   PTT Heparin Monitoring    Collection Time: 02/26/24  9:00 AM   Result Value Ref Range    PTT Heparin Monitor 88.2 (H) 23.2 - 33.7 seconds   Prepare RBC 4 Units; a    Collection Time: 02/26/24  1:20 PM   Result Value Ref Range    UNIT NUMBER [id number]     UNIT ABO/RH O POS     DISPENSE STATUS Selected     Unit Expiration [id number]     Product Code T9635J00     Unit Blood Type Code 5100     CROSSMATCH INTERPRETATION Compatible     UNIT NUMBER [id number]     UNIT ABO/RH O POS     DISPENSE STATUS Selected     Unit Expiration [id number]     Product Code U5082U58     Unit Blood Type Code 5100     CROSSMATCH INTERPRETATION Compatible     UNIT NUMBER [id number]     UNIT ABO/RH O POS     DISPENSE STATUS Selected     Unit Expiration [id number]     Product Code M4420S33     Unit Blood Type Code 5100     CROSSMATCH INTERPRETATION Compatible     UNIT NUMBER [id number]     UNIT ABO/RH O POS     DISPENSE STATUS Selected     Unit Expiration [id number]     Product Code V1714D62     Unit Blood Type Code 5100     CROSSMATCH INTERPRETATION Compatible    Type & Screen    Collection Time: 02/26/24  1:20 PM   Result Value Ref Range    Group & Rh O POS     Indirect Ugo GEL NEG     Specimen Outdate 02/29/2024 23:59    APTT    Collection Time: 02/27/24  1:54 AM   Result Value Ref Range    PTT 66.2 (H) 23.2 - 33.7 seconds   Comprehensive metabolic panel    Collection Time: 02/27/24  1:54 AM   Result Value Ref Range    Sodium Level 136 136 - 145 mmol/L    Potassium Level 4.4 3.5 - 5.1 mmol/L    Chloride 110 (H) 98 - 107 mmol/L    Carbon Dioxide 19 (L) 23 - 31 mmol/L    Glucose Level 104 82 - 115 mg/dL    Blood Urea Nitrogen 14.9 8.4 - 25.7 mg/dL    Creatinine 1.67 (H) 0.73 - 1.18 mg/dL    Calcium Level Total 8.3 (L) 8.8 - 10.0 mg/dL    Protein Total 6.3 5.8 - 7.6 gm/dL    Albumin Level 3.0 (L) 3.4 - 4.8 g/dL    Globulin 3.3 2.4 - 3.5 gm/dL    Albumin/Globulin Ratio 0.9 (L)  1.1 - 2.0 ratio    Bilirubin Total 1.1 <=1.5 mg/dL    Alkaline Phosphatase 75 40 - 150 unit/L    Alanine Aminotransferase 23 0 - 55 unit/L    Aspartate Aminotransferase 50 (H) 5 - 34 unit/L    eGFR 42 mls/min/1.73/m2   CBC with Differential    Collection Time: 02/27/24  1:54 AM   Result Value Ref Range    WBC 11.83 (H) 4.50 - 11.50 x10(3)/mcL    RBC 3.93 (L) 4.70 - 6.10 x10(6)/mcL    Hgb 10.6 (L) 14.0 - 18.0 g/dL    Hct 34.7 (L) 42.0 - 52.0 %    MCV 88.3 80.0 - 94.0 fL    MCH 27.0 27.0 - 31.0 pg    MCHC 30.5 (L) 33.0 - 36.0 g/dL    RDW 15.7 11.5 - 17.0 %    Platelet 146 130 - 400 x10(3)/mcL    MPV 12.0 (H) 7.4 - 10.4 fL    Neut % 76.1 %    Lymph % 9.2 %    Mono % 12.0 %    Eos % 1.6 %    Basophil % 0.4 %    Lymph # 1.09 0.6 - 4.6 x10(3)/mcL    Neut # 9.00 2.1 - 9.2 x10(3)/mcL    Mono # 1.42 (H) 0.1 - 1.3 x10(3)/mcL    Eos # 0.19 0 - 0.9 x10(3)/mcL    Baso # 0.05 <=0.2 x10(3)/mcL    IG# 0.08 (H) 0 - 0.04 x10(3)/mcL    IG% 0.7 %    NRBC% 0.0 %     Telemetry: Sinus Rhythm    Physical Exam  Vitals and nursing note reviewed.   Constitutional:       Appearance: Normal appearance.   HENT:      Head: Normocephalic.      Mouth/Throat:      Mouth: Mucous membranes are moist.      Pharynx: Oropharynx is clear.   Cardiovascular:      Rate and Rhythm: Normal rate and regular rhythm.   Pulmonary:      Effort: Pulmonary effort is normal. No respiratory distress.      Breath sounds: Normal breath sounds.      Comments: RA  Abdominal:      General: There is no distension.      Palpations: Abdomen is soft.      Tenderness: There is no abdominal tenderness. There is no guarding.   Musculoskeletal:         General: Normal range of motion.      Cervical back: Neck supple.      Right lower leg: No edema.      Left lower leg: No edema.      Comments: BLE Warm to Touch. BLE PT + Doppler Pulses.   Skin:     General: Skin is warm and dry.      Comments: Right Groin soft with bruising noted. Impella right groin is secured, no bleeding is  noted.   Neurological:      Mental Status: He is alert. Mental status is at baseline.      Comments: Some Difficulty Understanding Clinical Condition. Awake and Alert.    Psychiatric:         Behavior: Behavior normal.       Current Inpatient Medications:    Current Facility-Administered Medications:     0.9%  NaCl infusion (for blood administration), , Intravenous, Q24H PRN, Kaleb Rdz ANP    0.9%  NaCl infusion, , Intravenous, Continuous, Kaleb Rdz ANP, Last Rate: 100 mL/hr at 02/27/24 0745, Rate Verify at 02/27/24 0745    acetaminophen tablet 650 mg, 650 mg, Oral, Q6H PRN, Tanner Rdz MD    acetaminophen tablet 650 mg, 650 mg, Oral, Q4H PRN, Tanner Rdz MD, 650 mg at 02/24/24 2021    allopurinol split tablet 50 mg, 50 mg, Oral, Daily, Tanner Rdz MD, 50 mg at 02/25/24 0803    amiodarone tablet 200 mg, 200 mg, Oral, BID, Tanner Rdz MD, 200 mg at 02/26/24 2030    aspirin EC tablet 81 mg, 81 mg, Oral, Daily, Tanner Rdz MD, 81 mg at 02/26/24 0917    atorvastatin tablet 40 mg, 40 mg, Oral, QHS, Tanner Rdz MD, 40 mg at 02/26/24 2030    ferrous sulfate tablet 1 each, 1 tablet, Oral, Every other day, Tanner Rdz MD, 1 each at 02/25/24 0804    folic acid tablet 1 mg, 1 mg, Oral, Daily, Tanner Rdz MD, 1 mg at 02/25/24 0804    heparin 25,000 units in dextrose 5% (100 units/ml) IV bolus from bag LOW INTENSITY nomogram - [facility], 58.7 Units/kg (Adjusted), Intravenous, PRN, Kaleb Rdz ANP    heparin 25,000 units in dextrose 5% (100 units/ml) IV bolus from bag LOW INTENSITY nomogram - [facility], 30 Units/kg (Adjusted), Intravenous, PRN, Kaleb Rdz ANP    heparin 25,000 units in dextrose 5% 250 mL (100 units/mL) infusion LOW INTENSITY nomogram - [facility], 0-40 Units/kg/hr (Adjusted), Intravenous, Continuous, Kaleb Rdz, ANP, Stopped at 02/27/24 0715    heparin, porcine (PF) 100 unit/mL injection flush 500 Units, 5 mL,  Intravenous, On Call Procedure, Pablo Yepez MD    heparin, porcine (PF) 100 unit/mL injection flush 500 Units, 5 mL, Intravenous, On Call Procedure, Nita Contreras MD    HYDROcodone-acetaminophen 5-325 mg per tablet 1 tablet, 1 tablet, Oral, Q4H PRN, Bart Herbert DO, 1 tablet at 02/27/24 0038    HYDROmorphone (PF) injection 0.5 mg, 0.5 mg, Intravenous, Q4H PRN, Pablo Yepez MD    lactated ringers bolus 500 mL, 500 mL, Intravenous, Once, StroFransico mistry,     LIDOcaine HCl 2% urojet, , Mucous Membrane, Once, Nicol Diaz, AGAANNYP-BC    melatonin tablet 6 mg, 6 mg, Oral, Nightly PRN, Tanner Rdz MD, 6 mg at 02/26/24 2031    nitroGLYCERIN SL tablet 0.4 mg, 0.4 mg, Sublingual, Q5 Min PRN, Tanner Rdz MD    NORepinephrine 32 mg in dextrose 5 % (D5W) 250 mL infusion, 0-3 mcg/kg/min, Intravenous, Continuous, Pablo Yepez MD, Stopped at 02/25/24 2221    ondansetron disintegrating tablet 8 mg, 8 mg, Oral, Q8H PRN, Tanner Rdz MD, 8 mg at 02/23/24 1302    ondansetron injection 8 mg, 8 mg, Intravenous, Q6H PRN, Pablo Yepez MD, 8 mg at 02/26/24 1616    pantoprazole EC tablet 40 mg, 40 mg, Oral, Daily, Tanner Rdz MD, 40 mg at 02/25/24 0804    polyethylene glycol packet 17 g, 17 g, Oral, Daily, Tanner Rdz MD, 17 g at 02/24/24 0927    simethicone chewable tablet 80 mg, 1 tablet, Oral, TID PRN, Tanner Rdz MD, 80 mg at 02/27/24 0918    sodium bicarbonate 25 mEq in dextrose 5 % (D5W) 1,000 mL infusion, , Intravenous, Continuous, Simone Mora MD, Last Rate: 10 mL/hr at 02/27/24 0745, Rate Verify at 02/27/24 0745    sodium chloride 0.9% flush 10 mL, 10 mL, Intravenous, PRN, Tanner Rdz MD    sodium chloride 0.9% flush 10 mL, 10 mL, Intravenous, PRN, Millie Jimenez NP  VTE Risk Mitigation (From admission, onward)           Ordered     heparin, porcine (PF) 100 unit/mL injection flush 500 Units  On Call Procedure          02/27/24 0718     heparin, porcine (PF) 100 unit/mL injection flush 500 Units  On Call Procedure         02/27/24 0257     heparin 25,000 units in dextrose 5% (100 units/ml) IV bolus from bag LOW INTENSITY nomogram - [facility]  As needed (PRN)        Question:  Heparin Infusion Adjustment (DO NOT MODIFY ANSWER)  Answer:  [email or web address]\Images\Pharmacy\HeparinInfusions\heparin LOW INTENSITY nomogram for [facility] [id number].pdf    02/25/24 1156     heparin 25,000 units in dextrose 5% (100 units/ml) IV bolus from bag LOW INTENSITY nomogram - [facility]  As needed (PRN)        Question:  Heparin Infusion Adjustment (DO NOT MODIFY ANSWER)  Answer:  [email or web address]\Images\Pharmacy\HeparinInfusions\heparin LOW INTENSITY nomogram for [facility] [id number].pdf    02/25/24 1156     heparin 25,000 units in dextrose 5% 250 mL (100 units/mL) infusion LOW INTENSITY nomogram - [facility]  Continuous        Question:  Begin at (units/kg/hr)  Answer:  12    02/25/24 1156     Place SUSIE hose  Until discontinued         02/22/24 1458     Place sequential compression device  Until discontinued         02/21/24 2057                  Assessment:   CAD (Multivessel)    - RHC/Impella Placement and [place] Catheter (2.23.24)    - C (2.19.24):pLAD lesion 70%, oLCx 80%, OM1 80%, OM2 1 lesion 70% 2nd lesion 50%, mLAD 50%, pRCA 60%  Aortic Insuffiencey     - ECHO (2.16.24): Aortic Valve: There is moderate aortic valve sclerosis. Severely restricted motion. There is severe stenosis. Aortic valve area by VTI is 0.66 cm². Aortic valve peak velocity is 4.45 m/s. Mean gradient is 50 mmHg. The dimensionless index is 0.17.   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  Percutaneous coronary intervention canceled yesterday- No plavix was given due to upcoming surgical plans.  Continue Aspirin 81 Mg Daily  Continue Impella Support at P7  Heparin Infusion off awaiting surgery  Will follow closely in the Post Op Period    BLANQUITA Marsh  Cardiology  [facility]   02/27/2024    I agree with the findings of the complexity of problems addressed and take responsibility for the plan's risks and complications. I approved the plan documented by Harjinder Whittington NP.    Pt  for CABG today

## 2024-02-27 NOTE — NURSING
Nurses Note -- 4 Eyes      2/27/2024   3:26 AM      Skin assessed during: Daily Assessment      [x] No Altered Skin Integrity Present    [x]Prevention Measures Documented      [] Yes- Altered Skin Integrity Present or Discovered   [] LDA Added if Not in Epic (Describe Wound)   [] New Altered Skin Integrity was Present on Admit and Documented in LDA   [] Wound Image Taken    Wound Care Consulted? No    Attending Nurse:  JESSICA Shearer    Second RN/Staff Member:   JCARLOS Pearl

## 2024-02-27 NOTE — PROGRESS NOTES
Pulmonary & Critical Care Medicine   Progress Note      Presenting History/HPI:    The patient is a 78-year-old Bangladeshi-speaking male with a history of aortic insufficiency/stenosis, coronary artery disease, chronic kidney disease stage 3, hypertension, atrial fibrillation on Eliquis, who presented to the ICU status post going to the cath lab for PCI.  Patient was transferred from [facility] after being admitted there on 2/15 with chest pain found to have NSTEMI with left heart catheterization initially on 02/20 demonstrating severe multivessel stenosis and was subsequently transferred here for CABG evaluation.  Patient was taken to the cath lab today and subsequently had an Impella placed and was transferred to the ICU with Impella currently set at P7.  Patient is awake alert moves all extremities follows commands with a Bangl[place] language barrier.  He has no complaints at this time.      -patient admitted to ICU on 02/23 status post Impella placement      Interval History:  -Impella in place at P7   -plans for CABG today  -he is complaining of abdominal distention and being gassy today   -bicarbonate 19, creatinine up to 1.67, urine output of 3.1 L over past 24 hours      Scheduled Medications:    allopurinoL  50 mg Oral Daily    amiodarone  200 mg Oral BID    aspirin  81 mg Oral Daily    atorvastatin  40 mg Oral QHS    ferrous sulfate  1 tablet Oral Every other day    folic acid  1 mg Oral Daily    lactated ringers  500 mL Intravenous Once    LIDOcaine HCl 2%   Mucous Membrane Once    pantoprazole  40 mg Oral Daily    polyethylene glycol  17 g Oral Daily       PRN Medications:   0.9%  NaCl infusion (for blood administration), acetaminophen, acetaminophen, heparin (PORCINE), heparin (PORCINE), heparin, porcine (PF), heparin, porcine (PF), HYDROcodone-acetaminophen, HYDROmorphone, melatonin, nitroGLYCERIN, ondansetron, ondansetron, simethicone, sodium chloride 0.9%, sodium chloride 0.9%      Infusions:      sodium chloride 0.9% 100 mL/hr at 02/27/24 0745    heparin (porcine) in D5W Stopped (02/27/24 0715)    NORepinephrine bitartrate-D5W Stopped (02/25/24 2221)    sodium bicarbonate 25 mEq in dextrose 5 % (D5W) 1,000 mL infusion 10 mL/hr at 02/27/24 0745         Fluid Balance:     Intake/Output Summary (Last 24 hours) at 2/27/2024 1003  Last data filed at 2/27/2024 0745  Gross per 24 hour   Intake 2620.22 ml   Output 2685 ml   Net -64.78 ml           Vital Signs:   Vitals:    02/27/24 0902   BP: 128/68   Pulse: 64   Resp: 20   Temp: 98.6 °F (37 °C)         Physical Exam  Vitals and nursing note reviewed.   Constitutional:       General: He is not in acute distress.     Appearance: Normal appearance. He is not ill-appearing or toxic-appearing.   HENT:      Head: Normocephalic and atraumatic.      Right Ear: External ear normal.      Left Ear: External ear normal.      Nose: Nose normal.      Mouth/Throat:      Mouth: Mucous membranes are moist.      Pharynx: Oropharynx is clear. No oropharyngeal exudate or posterior oropharyngeal erythema.   Eyes:      General: No scleral icterus.     Extraocular Movements: Extraocular movements intact.      Conjunctiva/sclera: Conjunctivae normal.      Pupils: Pupils are equal, round, and reactive to light.   Neck:      Vascular: No carotid bruit.   Cardiovascular:      Rate and Rhythm: Normal rate and regular rhythm.      Pulses: Normal pulses.      Heart sounds: Normal heart sounds. No murmur heard.     No friction rub. No gallop.      Comments:  Impella catheter in place in the right groin [place] catheter in place in the right groin  Pulmonary:      Effort: Pulmonary effort is normal. No respiratory distress.      Breath sounds: Normal breath sounds. No wheezing, rhonchi or rales.   Abdominal:      General: Abdomen is flat. Bowel sounds are normal. There is distension.      Palpations: Abdomen is soft.      Tenderness: There is no abdominal tenderness. There is no guarding or  "rebound.   Genitourinary:     Comments: deferred  Musculoskeletal:         General: No swelling or deformity. Normal range of motion.      Cervical back: Normal range of motion and neck supple. No rigidity or tenderness.   Lymphadenopathy:      Cervical: No cervical adenopathy.   Skin:     General: Skin is warm and dry.      Capillary Refill: Capillary refill takes less than 2 seconds.      Coloration: Skin is not jaundiced.      Findings: No bruising, lesion or rash.   Neurological:      General: No focal deficit present.      Mental Status: He is alert.      Sensory: No sensory deficit.      Motor: No weakness.   Psychiatric:         Mood and Affect: Mood normal.           Ventilator Settings  Oxygen Concentration (%): 1 (02/25/24 1601)      Laboratory Studies:   No results for input(s): "PH", "PCO2", "PO2", "HCO3", "POCSATURATED", "BE" in the last 24 hours.  Recent Labs   Lab 02/27/24  0154   WBC 11.83*   RBC 3.93*   HGB 10.6*   HCT 34.7*      MCV 88.3   MCH 27.0   MCHC 30.5*       Recent Labs   Lab 02/27/24  0154   GLUCOSE 104      K 4.4   CO2 19*   BUN 14.9   CREATININE 1.67*   CALCIUM 8.3*           Microbiology Data:   Microbiology Results (last 7 days)       Procedure Component Value Units Date/Time    MRSA Screen by PCR [[id number]]     Order Status: Canceled Specimen: Nasopharyngeal Swab from Nasal               Imaging:   Cardiac catheterization    The estimated blood loss was between 50 mL and 150 mL.    Successful placement of an Impella CP    Procedure:   Left heart catheterization  Right heart catheterization  The Impella insertion( CP)  Moderate (Conscious) Sedation        Indication:  Acute HF, valvular HF, NSTEMI, MVCAD     Consent: The patient was brought to the cardiac catheterization lab. Was   instructed and explained about the risk, benefit and alternatives of the   procedure included but not limited to sudden cardiac death, myocardial   infarction, bleeding, vascular injury, " "renal failure, stroke, contrast   allergy, risk of conscious sedation and need for emergent bypass surgery.    the patient was agreeable to proceed.  Signed the consent form.  time-out was completed verifying correct patient, procedure, site,   positioning, and special equipment if applicable.     Sedation: Moderate (Conscious) Sedation performed with fentanyl and   Versed.      Access:  The patient was prepped using the usual sterile fashion.     Right common femoral artery.The right common femoral artery angiogram   showed adequate entry of the femoral artery above the bifurcation below   the inferior epigastric vessel.  was accessed with micropuncture   technique, ultrasound guidance. RCFV access gained with 7Fr system with US   guidance.  An image of the ultrasound was put in the paper chart for   purpose of documentation.  Sheath size:14 F      The 7  F Venous sheath was inserted using ultrasound guidance   micropuncture technique in the R CF vein         Hemodynamics:                      Right heart catheterization performed showed the following:    PA= 61/24 (27) mm Hg  PCWP=  31/26 (27) mm Hg  AO saturation= 93 % RA  PA saturation= 60% with Impella (49% yesterday)    (02/22/24) -  0.5     Following that we elected to advance the Impella via right common femoral   artery approach:    - Abiomed stiff wire  - 14 [place] peel-away sheath  - Heparin 10,000 unit bolus given peripherally  - Dilator removed  - 0.035" J-wire  - 6 [place] pigtail catheter positioned in the left ventricle  - Abiomed 0.025" wire  - Impella CP positioned in left ventricle  - Pulsatile motor current (appropriate positioning)  - Placed the marker just below the aortic valve  - Cardiac output was 2.8 L/min provided by the device.  Impella was at   84cm        Access Closure :    Sheath sutured to the groin  Secured.     Attestation:I was present for and supervised all key portions of the   procedure     Impression/plan:    Successful " Impella insertion and swan-Chelo in the setting of Acute HF/low   cardiac output/precardiogenic shock/Critcal-severe AS/MVCAD - LM distal    The procedure log was documented by No documenter listed and verified by   All Montoya MD.    Date: 2/23/2024  Time: 9:46 AM          Assessment and Plan    Assessment:  Multivessel coronary artery disease   Moderate Aortic insufficiency   Severe aortic stenosis   Paroxysmal atrial fibrillation on Eliquis  Severe pulmonary hypertension, PASP of 69 mmHg  Acute on chronic systolic and diastolic CHF, left ventricular ejection fraction 40% with grade 2 diastolic dysfunction   Mild mitral regurgitation   Mild-to-moderate tricuspid regurgitation   Chronic kidney disease stage 3   Hypertension  Hemoptysis and hematuria - resolved      Plan:  - continue Impella set to P 7 per Cardiology   -plans for CABG today  -continue to hold Eliquis per Cardiology   -continue aspirin statin   - diurese as appropriate   - continuing p.o. amiodarone for atrial fibrillation   -creatinine up to 1.67 urine output of 3.1 L with a pessary for hours, continue monitor urine output especially after CABG      DVT ppx/tx with SCD  GI ppx with pepcid        Pablo Yepez MD  2/27/2024  Pulmonology/Critical Care

## 2024-02-27 NOTE — ANESTHESIA PROCEDURE NOTES
Intubation    Date/Time: 2/27/2024 1:34 PM    Performed by: Khoa Dixon CRNA  Authorized by: Alia Sharp MD    Intubation:     Induction:  Intravenous    Intubated:  Postinduction    Mask Ventilation:  Easy mask    Attempts:  1    Attempted By:  CRNA    Method of Intubation:  Direct    Blade:  Catal[place] 2    Laryngeal View Grade: Grade IIA - cords partially seen      Difficult Airway Encountered?: No      Complications:  None    Airway Device:  Oral endotracheal tube    Airway Device Size:  8.0    Style/Cuff Inflation:  Cuffed (inflated to minimal occlusive pressure)    Tube secured:  22    Secured at:  The lips    Placement Verified By:  Capnometry    Complicating Factors:  None    Findings Post-Intubation:  BS equal bilateral and atraumatic/condition of teeth unchanged

## 2024-02-28 LAB
ABO + RH BLD: NORMAL
ABS NEUT (OLG): 10.94 X10(3)/MCL (ref 2.1–9.2)
ALBUMIN SERPL-MCNC: 3.1 G/DL (ref 3.4–4.8)
ALBUMIN SERPL-MCNC: 3.2 G/DL (ref 3.4–4.8)
ALBUMIN/GLOB SERPL: 2.3 RATIO (ref 1.1–2)
ALBUMIN/GLOB SERPL: 2.4 RATIO (ref 1.1–2)
ALLENS TEST BLOOD GAS (OHS): ABNORMAL
ALLENS TEST BLOOD GAS (OHS): ABNORMAL
ALLENS TEST BLOOD GAS (OHS): YES
ALP SERPL-CCNC: 34 UNIT/L (ref 40–150)
ALP SERPL-CCNC: 40 UNIT/L (ref 40–150)
ALT SERPL-CCNC: 13 UNIT/L (ref 0–55)
ALT SERPL-CCNC: 15 UNIT/L (ref 0–55)
APPEARANCE UR: ABNORMAL
AST SERPL-CCNC: 35 UNIT/L (ref 5–34)
AST SERPL-CCNC: 37 UNIT/L (ref 5–34)
BACTERIA #/AREA URNS AUTO: ABNORMAL /HPF
BASE EXCESS BLD CALC-SCNC: -2.6 MMOL/L (ref -2–2)
BASE EXCESS BLD CALC-SCNC: -4 MMOL/L (ref -2–2)
BASE EXCESS BLD CALC-SCNC: -5.2 MMOL/L
BASOPHILS # BLD AUTO: 0.06 X10(3)/MCL
BASOPHILS NFR BLD AUTO: 0.5 %
BASOPHILS NFR BLD MANUAL: 0.15 X10(3)/MCL (ref 0–0.2)
BASOPHILS NFR BLD MANUAL: 1 %
BILIRUB SERPL-MCNC: 0.8 MG/DL
BILIRUB SERPL-MCNC: 1 MG/DL
BILIRUB UR QL STRIP.AUTO: NEGATIVE
BLD PROD TYP BPU: NORMAL
BLOOD GAS SAMPLE TYPE (OHS): ABNORMAL
BLOOD UNIT EXPIRATION DATE: NORMAL
BLOOD UNIT TYPE CODE: 5100
BUN SERPL-MCNC: 15.8 MG/DL (ref 8.4–25.7)
BUN SERPL-MCNC: 16 MG/DL (ref 8.4–25.7)
CA-I BLD-SCNC: 1.1 MMOL/L (ref 1.12–1.23)
CA-I BLD-SCNC: 1.15 MMOL/L (ref 1.12–1.23)
CA-I BLD-SCNC: 1.19 MMOL/L (ref 1.12–1.23)
CALCIUM SERPL-MCNC: 7.9 MG/DL (ref 8.8–10)
CALCIUM SERPL-MCNC: 8.3 MG/DL (ref 8.8–10)
CHLORIDE SERPL-SCNC: 109 MMOL/L (ref 98–107)
CHLORIDE SERPL-SCNC: 110 MMOL/L (ref 98–107)
CO2 BLDA-SCNC: 21.1 MMOL/L
CO2 BLDA-SCNC: 21.8 MMOL/L
CO2 BLDA-SCNC: 22.2 MMOL/L
CO2 SERPL-SCNC: 19 MMOL/L (ref 23–31)
CO2 SERPL-SCNC: 19 MMOL/L (ref 23–31)
COHGB MFR BLDA: 1.7 % (ref 0.5–1.5)
COHGB MFR BLDA: 2.3 % (ref 0.5–1.5)
COLOR UR AUTO: YELLOW
CREAT SERPL-MCNC: 1.86 MG/DL (ref 0.73–1.18)
CREAT SERPL-MCNC: 2.07 MG/DL (ref 0.73–1.18)
CROSSMATCH INTERPRETATION: NORMAL
DISPENSE STATUS: NORMAL
DRAWN BY BLOOD GAS (OHS): ABNORMAL
EOSINOPHIL # BLD AUTO: 0.01 X10(3)/MCL (ref 0–0.9)
EOSINOPHIL NFR BLD AUTO: 0.1 %
ERYTHROCYTE [DISTWIDTH] IN BLOOD BY AUTOMATED COUNT: 14.6 % (ref 11.5–17)
ERYTHROCYTE [DISTWIDTH] IN BLOOD BY AUTOMATED COUNT: 15.1 % (ref 11.5–17)
FIBRINOGEN PPP-MCNC: 256 MG/DL (ref 210–463)
FIO2: 60
GFR SERPLBLD CREATININE-BSD FMLA CKD-EPI: 32 MLS/MIN/1.73/M2
GFR SERPLBLD CREATININE-BSD FMLA CKD-EPI: 37 MLS/MIN/1.73/M2
GLOBULIN SER-MCNC: 1.3 GM/DL (ref 2.4–3.5)
GLOBULIN SER-MCNC: 1.4 GM/DL (ref 2.4–3.5)
GLUCOSE SERPL-MCNC: 110 MG/DL (ref 70–110)
GLUCOSE SERPL-MCNC: 110 MG/DL (ref 70–110)
GLUCOSE SERPL-MCNC: 211 MG/DL (ref 82–115)
GLUCOSE SERPL-MCNC: 254 MG/DL (ref 82–115)
GLUCOSE SERPL-MCNC: 99 MG/DL (ref 70–110)
GLUCOSE UR QL STRIP.AUTO: NORMAL
GRAN CASTS #/AREA URNS LPF: >20 /LPF
HCO3 BLDA-SCNC: 20 MMOL/L (ref 22–26)
HCO3 BLDA-SCNC: 20.7 MMOL/L (ref 22–26)
HCO3 BLDA-SCNC: 21.2 MMOL/L (ref 22–26)
HCO3 UR-SCNC: 19.9 MMOL/L (ref 24–28)
HCO3 UR-SCNC: 20 MMOL/L (ref 24–28)
HCO3 UR-SCNC: 26 MMOL/L (ref 24–28)
HCT VFR BLD AUTO: 23.3 % (ref 42–52)
HCT VFR BLD AUTO: 27.3 % (ref 42–52)
HCT VFR BLD AUTO: 28.9 % (ref 42–52)
HCT VFR BLD AUTO: 30.3 % (ref 42–52)
HCT VFR BLD CALC: 20 %PCV (ref 36–54)
HCT VFR BLD CALC: 23 %PCV (ref 36–54)
HCT VFR BLD CALC: 29 %PCV (ref 36–54)
HGB BLD-MCNC: 10 G/DL
HGB BLD-MCNC: 10.1 G/DL (ref 14–18)
HGB BLD-MCNC: 7 G/DL
HGB BLD-MCNC: 7.9 G/DL (ref 14–18)
HGB BLD-MCNC: 8 G/DL
HGB BLD-MCNC: 9 G/DL (ref 14–18)
HGB BLD-MCNC: 9.4 G/DL (ref 14–18)
HYALINE CASTS #/AREA URNS LPF: ABNORMAL /LPF
IMM GRANULOCYTES # BLD AUTO: 0.09 X10(3)/MCL (ref 0–0.04)
IMM GRANULOCYTES NFR BLD AUTO: 0.7 %
INHALED O2 CONCENTRATION: 35 %
INHALED O2 CONCENTRATION: 50 %
INHALED O2 CONCENTRATION: 60 %
INR PPP: 1.9
INSTRUMENT WBC (OLG): 14.98 X10(3)/MCL
KETONES UR QL STRIP.AUTO: NEGATIVE
LEUKOCYTE ESTERASE UR QL STRIP.AUTO: NEGATIVE
LYMPHOCYTES # BLD AUTO: 0.45 X10(3)/MCL (ref 0.6–4.6)
LYMPHOCYTES NFR BLD AUTO: 3.5 %
LYMPHOCYTES NFR BLD MANUAL: 1.8 X10(3)/MCL
LYMPHOCYTES NFR BLD MANUAL: 12 %
MAGNESIUM SERPL-MCNC: 2.2 MG/DL (ref 1.6–2.6)
MCH RBC QN AUTO: 28.8 PG (ref 27–31)
MCH RBC QN AUTO: 28.9 PG (ref 27–31)
MCHC RBC AUTO-ENTMCNC: 33 G/DL (ref 33–36)
MCHC RBC AUTO-ENTMCNC: 33.3 G/DL (ref 33–36)
MCV RBC AUTO: 86.6 FL (ref 80–94)
MCV RBC AUTO: 87.5 FL (ref 80–94)
MECH RR (OHS): 10 B/MIN
MECH RR (OHS): 20 B/MIN
MECH RR (OHS): 22 B/MIN
METAMYELOCYTES NFR BLD MANUAL: 3 %
METHGB MFR BLDA: 0.6 % (ref 0.4–1.5)
METHGB MFR BLDA: 1.3 % (ref 0.4–1.5)
MODE (OHS): ABNORMAL
MODE (OHS): ABNORMAL
MODE (OHS): AC
MONOCYTES # BLD AUTO: 1.56 X10(3)/MCL (ref 0.1–1.3)
MONOCYTES NFR BLD AUTO: 12.2 %
MONOCYTES NFR BLD MANUAL: 1.65 X10(3)/MCL (ref 0.1–1.3)
MONOCYTES NFR BLD MANUAL: 11 %
NEUTROPHILS # BLD AUTO: 10.61 X10(3)/MCL (ref 2.1–9.2)
NEUTROPHILS NFR BLD AUTO: 83 %
NEUTROPHILS NFR BLD MANUAL: 73 %
NITRITE UR QL STRIP.AUTO: NEGATIVE
NRBC BLD AUTO-RTO: 0 %
NRBC BLD AUTO-RTO: 0 %
O2 HB BLOOD GAS (OHS): 95 % (ref 94–97)
O2 HB BLOOD GAS (OHS): 96.9 % (ref 94–97)
OHS QRS DURATION: 180 MS
OHS QTC CALCULATION: 572 MS
OXYGEN DEVICE BLOOD GAS (OHS): ABNORMAL
OXYGEN DEVICE BLOOD GAS (OHS): ABNORMAL
OXYHGB MFR BLDA: 8.6 G/DL (ref 12–16)
OXYHGB MFR BLDA: 9.5 G/DL (ref 12–16)
PCO2 BLDA: 32 MMHG (ref 35–45)
PCO2 BLDA: 35 MMHG (ref 35–45)
PCO2 BLDA: 37 MMHG (ref 35–45)
PCO2 BLDA: 38.2 MMHG (ref 35–45)
PCO2 BLDA: 43.4 MMHG (ref 35–45)
PCO2 BLDA: 49.1 MMHG (ref 35–45)
PEEP RESPIRATORY: 5 CMH2O
PH BLDA: 7.34 [PH] (ref 7.35–7.45)
PH BLDA: 7.38 [PH] (ref 7.35–7.45)
PH BLDA: 7.43 [PH] (ref 7.35–7.45)
PH SMN: 7.27 [PH] (ref 7.35–7.45)
PH SMN: 7.33 [PH] (ref 7.35–7.45)
PH SMN: 7.33 [PH] (ref 7.35–7.45)
PH UR STRIP.AUTO: 5.5 [PH]
PHOSPHATE SERPL-MCNC: 2.2 MG/DL (ref 2.3–4.7)
PLATELET # BLD AUTO: 122 X10(3)/MCL (ref 130–400)
PLATELET # BLD AUTO: 180 X10(3)/MCL (ref 130–400)
PLATELET # BLD EST: ABNORMAL 10*3/UL
PLATELETS.RETICULATED NFR BLD AUTO: 6.4 % (ref 0.9–11.2)
PMV BLD AUTO: 10.3 FL (ref 7.4–10.4)
PMV BLD AUTO: 11 FL (ref 7.4–10.4)
PO2 BLDA: 101 MMHG (ref 80–100)
PO2 BLDA: 277 MMHG (ref 80–100)
PO2 BLDA: 36 MMHG (ref 80–100)
PO2 BLDA: 47 MMHG (ref 80–100)
PO2 BLDA: 63 MMHG (ref 80–100)
PO2 BLDA: 75 MMHG (ref 80–100)
POC BE: -6 MMOL/L
POC BE: -7 MMOL/L
POC BE: 0 MMOL/L
POC IONIZED CALCIUM: 1 MMOL/L (ref 1.06–1.42)
POC IONIZED CALCIUM: 1.02 MMOL/L (ref 1.06–1.42)
POC IONIZED CALCIUM: 1.19 MMOL/L (ref 1.06–1.42)
POC SATURATED O2: 100 % (ref 95–100)
POC SATURATED O2: 65 % (ref 95–100)
POC SATURATED O2: 77 % (ref 95–100)
POC TCO2: 21 MMOL/L (ref 23–27)
POC TCO2: 21 MMOL/L (ref 23–27)
POC TCO2: 27 MMOL/L (ref 23–27)
POCT GLUCOSE: 101 MG/DL (ref 70–110)
POCT GLUCOSE: 113 MG/DL (ref 70–110)
POCT GLUCOSE: 120 MG/DL (ref 70–110)
POCT GLUCOSE: 135 MG/DL (ref 70–110)
POCT GLUCOSE: 151 MG/DL (ref 70–110)
POCT GLUCOSE: 153 MG/DL (ref 70–110)
POCT GLUCOSE: 171 MG/DL (ref 70–110)
POCT GLUCOSE: 183 MG/DL (ref 70–110)
POCT GLUCOSE: 184 MG/DL (ref 70–110)
POCT GLUCOSE: 185 MG/DL (ref 70–110)
POCT GLUCOSE: 185 MG/DL (ref 70–110)
POCT GLUCOSE: 189 MG/DL (ref 70–110)
POCT GLUCOSE: 193 MG/DL (ref 70–110)
POCT GLUCOSE: 204 MG/DL (ref 70–110)
POCT GLUCOSE: 224 MG/DL (ref 70–110)
POCT GLUCOSE: 240 MG/DL (ref 70–110)
POCT GLUCOSE: 243 MG/DL (ref 70–110)
POCT GLUCOSE: 249 MG/DL (ref 70–110)
POCT GLUCOSE: 258 MG/DL (ref 70–110)
POCT GLUCOSE: 93 MG/DL (ref 70–110)
POCT GLUCOSE: 98 MG/DL (ref 70–110)
POCT GLUCOSE: 99 MG/DL (ref 70–110)
POTASSIUM BLD-SCNC: 3.7 MMOL/L (ref 3.5–5.1)
POTASSIUM BLD-SCNC: 3.8 MMOL/L (ref 3.5–5.1)
POTASSIUM BLD-SCNC: 4.1 MMOL/L (ref 3.5–5.1)
POTASSIUM BLOOD GAS (OHS): 3.2 MMOL/L (ref 3.5–5)
POTASSIUM BLOOD GAS (OHS): 3.6 MMOL/L (ref 3.5–5)
POTASSIUM BLOOD GAS (OHS): 4.2 MMOL/L (ref 3.5–5)
POTASSIUM SERPL-SCNC: 3.5 MMOL/L (ref 3.5–5.1)
POTASSIUM SERPL-SCNC: 4.3 MMOL/L (ref 3.5–5.1)
PROT SERPL-MCNC: 4.4 GM/DL (ref 5.8–7.6)
PROT SERPL-MCNC: 4.6 GM/DL (ref 5.8–7.6)
PROT UR QL STRIP.AUTO: ABNORMAL
PROTHROMBIN TIME: 21.8 SECONDS (ref 12.5–14.5)
PS (OHS): 10 CMH2O
RBC # BLD AUTO: 3.12 X10(6)/MCL (ref 4.7–6.1)
RBC # BLD AUTO: 3.5 X10(6)/MCL (ref 4.7–6.1)
RBC #/AREA URNS AUTO: >100 /HPF
RBC MORPH BLD: NORMAL
RBC UR QL AUTO: ABNORMAL
SAMPLE SITE BLOOD GAS (OHS): ABNORMAL
SAMPLE: ABNORMAL
SAO2 % BLDA: 90 %
SAO2 % BLDA: 94.6 %
SAO2 % BLDA: 98 %
SODIUM BLD-SCNC: 138 MMOL/L (ref 136–145)
SODIUM BLD-SCNC: 139 MMOL/L (ref 136–145)
SODIUM BLD-SCNC: 142 MMOL/L (ref 136–145)
SODIUM BLOOD GAS (OHS): 133 MMOL/L (ref 137–145)
SODIUM BLOOD GAS (OHS): 135 MMOL/L (ref 137–145)
SODIUM BLOOD GAS (OHS): 139 MMOL/L (ref 137–145)
SODIUM SERPL-SCNC: 138 MMOL/L (ref 136–145)
SODIUM SERPL-SCNC: 141 MMOL/L (ref 136–145)
SP GR UR STRIP.AUTO: 1.03 (ref 1–1.03)
SPONT+MECH VT ON VENT: 470 ML
SPONT+MECH VT ON VENT: 620 ML
SPONT+MECH VT ON VENT: 620 ML
SQUAMOUS #/AREA URNS LPF: ABNORMAL /HPF
UNIT NUMBER: NORMAL
UROBILINOGEN UR STRIP-ACNC: NORMAL
WBC # SPEC AUTO: 12.78 X10(3)/MCL (ref 4.5–11.5)
WBC # SPEC AUTO: 14.98 X10(3)/MCL (ref 4.5–11.5)
WBC #/AREA URNS AUTO: ABNORMAL /HPF

## 2024-02-28 PROCEDURE — 85018 HEMOGLOBIN: CPT | Performed by: INTERNAL MEDICINE

## 2024-02-28 PROCEDURE — 80053 COMPREHEN METABOLIC PANEL: CPT | Performed by: INTERNAL MEDICINE

## 2024-02-28 PROCEDURE — 63600175 PHARM REV CODE 636 W HCPCS: Performed by: PHYSICIAN ASSISTANT

## 2024-02-28 PROCEDURE — 94761 N-INVAS EAR/PLS OXIMETRY MLT: CPT | Mod: XB

## 2024-02-28 PROCEDURE — P9016 RBC LEUKOCYTES REDUCED: HCPCS | Performed by: INTERNAL MEDICINE

## 2024-02-28 PROCEDURE — 99024 POSTOP FOLLOW-UP VISIT: CPT | Mod: ,,, | Performed by: PHYSICIAN ASSISTANT

## 2024-02-28 PROCEDURE — 99900035 HC TECH TIME PER 15 MIN (STAT)

## 2024-02-28 PROCEDURE — 25000003 PHARM REV CODE 250: Performed by: PHYSICIAN ASSISTANT

## 2024-02-28 PROCEDURE — 85610 PROTHROMBIN TIME: CPT | Performed by: INTERNAL MEDICINE

## 2024-02-28 PROCEDURE — 25000003 PHARM REV CODE 250: Performed by: INTERNAL MEDICINE

## 2024-02-28 PROCEDURE — 63600175 PHARM REV CODE 636 W HCPCS: Performed by: INTERNAL MEDICINE

## 2024-02-28 PROCEDURE — 93010 ELECTROCARDIOGRAM REPORT: CPT | Mod: ,,, | Performed by: INTERNAL MEDICINE

## 2024-02-28 PROCEDURE — P9017 PLASMA 1 DONOR FRZ W/IN 8 HR: HCPCS | Performed by: INTERNAL MEDICINE

## 2024-02-28 PROCEDURE — 84100 ASSAY OF PHOSPHORUS: CPT | Performed by: PHYSICIAN ASSISTANT

## 2024-02-28 PROCEDURE — 81001 URINALYSIS AUTO W/SCOPE: CPT | Performed by: INTERNAL MEDICINE

## 2024-02-28 PROCEDURE — 94003 VENT MGMT INPAT SUBQ DAY: CPT

## 2024-02-28 PROCEDURE — 85027 COMPLETE CBC AUTOMATED: CPT | Performed by: INTERNAL MEDICINE

## 2024-02-28 PROCEDURE — 27100171 HC OXYGEN HIGH FLOW UP TO 24 HOURS

## 2024-02-28 PROCEDURE — 63600175 PHARM REV CODE 636 W HCPCS

## 2024-02-28 PROCEDURE — 63600175 PHARM REV CODE 636 W HCPCS: Mod: JZ,JG | Performed by: PHYSICIAN ASSISTANT

## 2024-02-28 PROCEDURE — 82803 BLOOD GASES ANY COMBINATION: CPT

## 2024-02-28 PROCEDURE — S5010 5% DEXTROSE AND 0.45% SALINE: HCPCS | Performed by: PHYSICIAN ASSISTANT

## 2024-02-28 PROCEDURE — 93005 ELECTROCARDIOGRAM TRACING: CPT

## 2024-02-28 PROCEDURE — 37799 UNLISTED PX VASCULAR SURGERY: CPT

## 2024-02-28 PROCEDURE — 85384 FIBRINOGEN ACTIVITY: CPT | Performed by: INTERNAL MEDICINE

## 2024-02-28 PROCEDURE — 5A2204Z RESTORATION OF CARDIAC RHYTHM, SINGLE: ICD-10-PCS | Performed by: INTERNAL MEDICINE

## 2024-02-28 PROCEDURE — 20000000 HC ICU ROOM

## 2024-02-28 PROCEDURE — 63600175 PHARM REV CODE 636 W HCPCS: Performed by: NURSE PRACTITIONER

## 2024-02-28 PROCEDURE — 85018 HEMOGLOBIN: CPT | Performed by: PHYSICIAN ASSISTANT

## 2024-02-28 PROCEDURE — 83735 ASSAY OF MAGNESIUM: CPT | Performed by: PHYSICIAN ASSISTANT

## 2024-02-28 PROCEDURE — P9045 ALBUMIN (HUMAN), 5%, 250 ML: HCPCS | Mod: JZ,JG | Performed by: PHYSICIAN ASSISTANT

## 2024-02-28 PROCEDURE — 99900026 HC AIRWAY MAINTENANCE (STAT)

## 2024-02-28 RX ORDER — HYDROCODONE BITARTRATE AND ACETAMINOPHEN 500; 5 MG/1; MG/1
TABLET ORAL
Status: DISCONTINUED | OUTPATIENT
Start: 2024-02-28 | End: 2024-02-29

## 2024-02-28 RX ORDER — PHENYLEPHRINE HYDROCHLORIDE 10 MG/ML
INJECTION INTRAVENOUS
Status: DISPENSED
Start: 2024-02-28 | End: 2024-02-28

## 2024-02-28 RX ORDER — MILRINONE LACTATE 0.2 MG/ML
0.19 INJECTION, SOLUTION INTRAVENOUS CONTINUOUS
Status: DISCONTINUED | OUTPATIENT
Start: 2024-02-28 | End: 2024-03-02

## 2024-02-28 RX ORDER — GLUCAGON 1 MG
1 KIT INJECTION
Status: DISCONTINUED | OUTPATIENT
Start: 2024-02-28 | End: 2024-03-02

## 2024-02-28 RX ORDER — FENTANYL CITRATE 50 UG/ML
INJECTION, SOLUTION INTRAMUSCULAR; INTRAVENOUS
Status: DISCONTINUED
Start: 2024-02-28 | End: 2024-02-28 | Stop reason: WASHOUT

## 2024-02-28 RX ORDER — DIGOXIN 0.25 MG/ML
500 INJECTION INTRAMUSCULAR; INTRAVENOUS ONCE
Status: COMPLETED | OUTPATIENT
Start: 2024-02-28 | End: 2024-02-28

## 2024-02-28 RX ORDER — DIGOXIN 0.25 MG/ML
500 INJECTION INTRAMUSCULAR; INTRAVENOUS ONCE
Status: DISCONTINUED | OUTPATIENT
Start: 2024-02-28 | End: 2024-02-28

## 2024-02-28 RX ORDER — INSULIN ASPART 100 [IU]/ML
0-5 INJECTION, SOLUTION INTRAVENOUS; SUBCUTANEOUS EVERY 6 HOURS PRN
Status: DISCONTINUED | OUTPATIENT
Start: 2024-02-28 | End: 2024-03-02

## 2024-02-28 RX ORDER — LIDOCAINE HYDROCHLORIDE 10 MG/ML
INJECTION INFILTRATION; PERINEURAL
Status: DISCONTINUED
Start: 2024-02-28 | End: 2024-02-28 | Stop reason: WASHOUT

## 2024-02-28 RX ORDER — MIDAZOLAM HYDROCHLORIDE 1 MG/ML
INJECTION INTRAMUSCULAR; INTRAVENOUS
Status: COMPLETED
Start: 2024-02-28 | End: 2024-02-28

## 2024-02-28 RX ORDER — AMIODARONE HCL/D5W 450 MG/250
1 PLASTIC BAG, INJECTION (ML) INTRAVENOUS CONTINUOUS
Status: DISPENSED | OUTPATIENT
Start: 2024-02-28 | End: 2024-02-28

## 2024-02-28 RX ORDER — MIDAZOLAM HYDROCHLORIDE 1 MG/ML
2 INJECTION INTRAMUSCULAR; INTRAVENOUS ONCE
Status: COMPLETED | OUTPATIENT
Start: 2024-02-28 | End: 2024-02-28

## 2024-02-28 RX ORDER — SODIUM CHLORIDE, SODIUM GLUCONATE, SODIUM ACETATE, POTASSIUM CHLORIDE AND MAGNESIUM CHLORIDE 30; 37; 368; 526; 502 MG/100ML; MG/100ML; MG/100ML; MG/100ML; MG/100ML
INJECTION, SOLUTION INTRAVENOUS
Status: DISCONTINUED | OUTPATIENT
Start: 2024-02-28 | End: 2024-03-26 | Stop reason: HOSPADM

## 2024-02-28 RX ORDER — DILTIAZEM HYDROCHLORIDE 5 MG/ML
10 INJECTION INTRAVENOUS ONCE
Status: COMPLETED | OUTPATIENT
Start: 2024-02-28 | End: 2024-02-28

## 2024-02-28 RX ORDER — MIDAZOLAM HYDROCHLORIDE 1 MG/ML
INJECTION INTRAMUSCULAR; INTRAVENOUS
Status: DISCONTINUED
Start: 2024-02-28 | End: 2024-02-28 | Stop reason: WASHOUT

## 2024-02-28 RX ORDER — AMIODARONE HCL/D5W 450 MG/250
0.5 PLASTIC BAG, INJECTION (ML) INTRAVENOUS CONTINUOUS
Status: DISCONTINUED | OUTPATIENT
Start: 2024-02-28 | End: 2024-03-02

## 2024-02-28 RX ADMIN — MORPHINE SULFATE 4 MG: 4 INJECTION, SOLUTION INTRAMUSCULAR; INTRAVENOUS at 02:02

## 2024-02-28 RX ADMIN — MUPIROCIN: 20 OINTMENT TOPICAL at 09:02

## 2024-02-28 RX ADMIN — NOREPINEPHRINE BITARTRATE 0.02 MCG/KG/MIN: 1 INJECTION, SOLUTION, CONCENTRATE INTRAVENOUS at 06:02

## 2024-02-28 RX ADMIN — CALCIUM GLUCONATE 1 G: 20 INJECTION, SOLUTION INTRAVENOUS at 12:02

## 2024-02-28 RX ADMIN — MIDAZOLAM HYDROCHLORIDE 2 MG: 1 INJECTION, SOLUTION INTRAMUSCULAR; INTRAVENOUS at 07:02

## 2024-02-28 RX ADMIN — METOPROLOL TARTRATE 12.5 MG: 25 TABLET, FILM COATED ORAL at 09:02

## 2024-02-28 RX ADMIN — ATORVASTATIN CALCIUM 40 MG: 40 TABLET, FILM COATED ORAL at 09:02

## 2024-02-28 RX ADMIN — FAMOTIDINE 20 MG: 10 INJECTION, SOLUTION INTRAVENOUS at 01:02

## 2024-02-28 RX ADMIN — AMIODARONE HYDROCHLORIDE 150 MG: 1.5 INJECTION, SOLUTION INTRAVENOUS at 05:02

## 2024-02-28 RX ADMIN — DEXTROSE AND SODIUM CHLORIDE: 5; 450 INJECTION, SOLUTION INTRAVENOUS at 01:02

## 2024-02-28 RX ADMIN — AMIODARONE HYDROCHLORIDE 150 MG: 1.5 INJECTION, SOLUTION INTRAVENOUS at 08:02

## 2024-02-28 RX ADMIN — MILRINONE LACTATE IN DEXTROSE 0.19 MCG/KG/MIN: 200 INJECTION, SOLUTION INTRAVENOUS at 03:02

## 2024-02-28 RX ADMIN — SODIUM CHLORIDE, SODIUM GLUCONATE, SODIUM ACETATE, POTASSIUM CHLORIDE AND MAGNESIUM CHLORIDE: 526; 502; 368; 37; 30 INJECTION, SOLUTION INTRAVENOUS at 04:02

## 2024-02-28 RX ADMIN — DIGOXIN 500 MCG: 0.25 INJECTION INTRAMUSCULAR; INTRAVENOUS at 08:02

## 2024-02-28 RX ADMIN — PHENYLEPHRINE HYDROCHLORIDE 0.04 MCG/KG/MIN: 10 INJECTION INTRAVENOUS at 07:02

## 2024-02-28 RX ADMIN — ALBUMIN (HUMAN) 12.5 G: 12.5 SOLUTION INTRAVENOUS at 05:02

## 2024-02-28 RX ADMIN — ALBUMIN (HUMAN) 12.5 G: 12.5 SOLUTION INTRAVENOUS at 01:02

## 2024-02-28 RX ADMIN — INSULIN HUMAN 9.8 UNITS/HR: 1 INJECTION, SOLUTION INTRAVENOUS at 12:02

## 2024-02-28 RX ADMIN — MILRINONE LACTATE IN DEXTROSE 0.38 MCG/KG/MIN: 200 INJECTION, SOLUTION INTRAVENOUS at 04:02

## 2024-02-28 RX ADMIN — DILTIAZEM HYDROCHLORIDE 10 MG: 5 INJECTION, SOLUTION INTRAVENOUS at 06:02

## 2024-02-28 RX ADMIN — FOLIC ACID 1 MG: 1 TABLET ORAL at 01:02

## 2024-02-28 RX ADMIN — MORPHINE SULFATE 4 MG: 4 INJECTION, SOLUTION INTRAMUSCULAR; INTRAVENOUS at 07:02

## 2024-02-28 RX ADMIN — ASPIRIN 81 MG: 81 TABLET, COATED ORAL at 01:02

## 2024-02-28 RX ADMIN — CEFAZOLIN SODIUM 2 G: 2 SOLUTION INTRAVENOUS at 01:02

## 2024-02-28 RX ADMIN — AMIODARONE HYDROCHLORIDE 0.5 MG/MIN: 50 INJECTION, SOLUTION INTRAVENOUS at 01:02

## 2024-02-28 RX ADMIN — METHYLENE BLUE 78 MG: 5 INJECTION INTRAVENOUS at 08:02

## 2024-02-28 RX ADMIN — CEFAZOLIN SODIUM 2 G: 2 SOLUTION INTRAVENOUS at 02:02

## 2024-02-28 RX ADMIN — DILTIAZEM HYDROCHLORIDE 10 MG/HR: 5 INJECTION INTRAVENOUS at 06:02

## 2024-02-28 RX ADMIN — SUCRALFATE 1 G: 1 TABLET ORAL at 09:02

## 2024-02-28 RX ADMIN — SODIUM PHOSPHATE, MONOBASIC, MONOHYDRATE AND SODIUM PHOSPHATE, DIBASIC, ANHYDROUS 20.01 MMOL: 142; 276 INJECTION, SOLUTION INTRAVENOUS at 09:02

## 2024-02-28 RX ADMIN — AMIODARONE HYDROCHLORIDE 1 MG/MIN: 50 INJECTION, SOLUTION INTRAVENOUS at 05:02

## 2024-02-28 RX ADMIN — POTASSIUM CHLORIDE 20 MEQ: 14.9 INJECTION, SOLUTION INTRAVENOUS at 06:02

## 2024-02-28 RX ADMIN — MIDAZOLAM HYDROCHLORIDE 2 MG: 1 INJECTION, SOLUTION INTRAMUSCULAR; INTRAVENOUS at 09:02

## 2024-02-28 RX ADMIN — HYDROMORPHONE HYDROCHLORIDE 0.5 MG: 2 INJECTION INTRAMUSCULAR; INTRAVENOUS; SUBCUTANEOUS at 04:02

## 2024-02-28 NOTE — PROGRESS NOTES
"  [facility]    Cardiology  Progress Note    Patient Name: Brain Snyder  MRN: [id number]  Admission Date: 2/21/2024  Hospital Length of Stay: 7 days  Code Status: Full Code   Attending Physician: Pablo Yepez MD   Primary Care Physician: Nidia Shepherd NP  Expected Discharge Date:   Principal Problem:CAD (coronary artery disease)    Subjective:   Reason for Consult:  CAD     HPI: 78-year-old female that is unknown to [facility] with a PMHx of PAF on Eliquis Aortic insufficiency, MV CAD, HTN. He is [place] speaking and an  was used for interview. He was orginally admitted to [facility] on 2.15.24 with CP & NSTEMI and underwent a LHC on 2.20.24 taht showed MV CAD (reported noted below) He was transferred to [facility] on 2.21.24 for a CABG/AVR evaluation. On arrive he was hemodynamically stable on a heparin infusion. He denied chest pain, SOB, and nausea on current exam, [facility] was consulted for CAD    Hospital Course:   2.23.24: Patient seen resting in bed. He denies SOB or CP. He remains on heparin infusion. Family at bedside   2.24.24: NAD. S/p Impella Placement/[place]-Chelo Catheter. "I am ok." + Blood Clots from Nose/Mouth, Hematuria 2/2 traumatic Indwelling Catheter Placement. Heparin Drip per Protocol. Impella P5  2.25.24: NAD. "I'm ok." Denies CP, SOB and Palps. PA Pressure Reading is not Accurate. CVP 5. Impella at P5. R Groin Impella P7. Remains Off Heparin Drip per Protocol. Now in SR.   2.26.24: NAD Noted. SR on Tele. Right Groin Impella in place, bruising with no hematoma noted. Distal pulses DP intact. Legs warm. NC 2 L/Min. Denies CP/SOB. Consent obtained from his daughter Brii Snyder. NPO for MV PCI Today.  2.27.24: NAD Noted. Impella remains in place at P7 Support. Good UA Noted, some pink tinged urine noted. SR on Tele. Pressors off. Denies CP/SOB. NPO for surgery today. Heparin on hold for surgery.  2.28.24: Patient is critically ill. AF RVR on Tele, twice shocked this AM with no " "success. On Amiodarone/Milrinone- Cardizem Infusion now off given hypotension and ICMO. Also no José Miguel/Levophed. Concern for bleeding noted, transfusion noted. Patient is intubated/Mechanically Vented. Hemodynamics: CO/CI 4.3/2.3 CVP 20.    PMH: PAF (on Eliquis), Aortic insufficiency, MV CAD, HTN  PSH: [facility]  Family History: None reported  Social History: former smoker, denies ETOH or illicit drug us    Previous Diagnostics:  CABG/Bio AVR/MOISE Ligation (2.27.24):  Coronary artery bypass grafting X 3, LIMA to LAD, reversed SVG to OM1, Reversed Saphenous venous graft to RCA  Bioprosthetic aortic valve replacement (Epic max 23mm), Endoscopic venous harvesting of left greater saphenous vein, Left atrial appendage ligation, explant of right femoral impella device, right femoral artery repair.    RHC/Impella Placement (2.23.24):  Right heart catheterization performed showed the following:  PA= 61/24 (27) mm Hg  PCWP=  31/26 (27) mm Hg  AO saturation= 93 % RA  PA saturation= 60% with Impella (49% yesterday)    (02/22/24) -  0.5  Following that we elected to advance the Impella via right common femoral artery approach:  - Abiomed stiff wire  - 14 Portuguese peel-away sheath  - Heparin 10,000 unit bolus given peripherally  - Dilator removed  - 0.035" J-wire  - 6 Portuguese pigtail catheter positioned in the left ventricle  - Abiomed 0.025" wire  - Impella CP positioned in left ventricle  - Pulsatile motor current (appropriate positioning)  - Placed the marker just below the aortic valve  - Cardiac output was 2.8 L/min provided by the device.  Impella was at 84cm  Access Closure :    Sheath sutured to the groin  Secured.  Attestation:I was present for and supervised all key portions of the procedure  Impression/plan:   Successful Impella insertion and swan-Chelo in the setting of Acute HF/low cardiac output/precardiogenic shock/Critcal-severe AS/MVCAD - LM distal    RHC (2.22.24):  Right heart catheterization demonstrating severe " pulmonary hypertension 84/34 and PCWP 24 mmHg  Reduced cardiac output/cardiac index at 3.43/1.81 L/min/m2 with pulmonary artery saturations 49.3%  For applied to the right femoral vein following removal of the 7 Persian sheath  The estimated blood loss was none.    Carotid US (2.22.24):  The bilateral internal carotid artery demonstrated less than 50% stenosis.   The bilateral vertebral arteries were patent with antegrade flow    LHC (2.20.24):   Prox LAD lesion was 70% stenosed.  Ost Cx to Prox Cx lesion was 80% stenosed.  1st Mrg lesion was 80% stenosed.  2nd Mrg-1 lesion was 70% stenosed.  2nd Mrg-2 lesion was 50% stenosed.  Mid LAD lesion was 50% stenosed.  Prox RCA lesion was 60% stenosed.  estimated blood loss was <50 mL.  There was three vessel coronary artery disease.  LVEDP: 30mmHg  Recommendations:   Refer for CABG evaluation and AVR   Preformed by Dr. Flannery at [facility]     ECHO (2.15.24):  Left Ventricle: The left ventricle is normal in size. Mildly increased ventricular mass. Mildly increased wall thickness. There is mild eccentric hypertrophy. Moderate global hypokinesis present. Septal motion is consistent with bundle branch block. There is moderately reduced systolic function. Biplane (2D) method of discs ejection fraction is 40%. Grade II diastolic dysfunction.  Right Ventricle: Right ventricular enlargement. Systolic function is normal.  Left Atrium: Left atrium is severely dilated.  Right Atrium: Right atrium is moderately dilated.  Aortic Valve: There is moderate aortic valve sclerosis. Severely restricted motion. There is severe stenosis. Aortic valve area by VTI is 0.66 cm². Aortic valve peak velocity is 4.45 m/s. Mean gradient is 50 mmHg. The dimensionless index is 0.17. There is mild to moderate aortic regurgitation.  Mitral Valve: Mildly calcified leaflets. There is no stenosis. There is mild regurgitation.  Tricuspid Valve: The tricuspid valve is structurally normal. There is mild to moderate  regurgitation.  Pulmonic Valve: There is no significant regurgitation.  Aorta: Aortic root is upper limit of normal measuring 3.6 cm.  Pulmonary Artery: There is severe pulmonary hypertension. The estimated pulmonary artery systolic pressure is 69 mmHg.  IVC/SVC: Intermediate venous pressure at 8 mmHg.  Pericardium: There is no pericardial effusion.     Review of Systems   Unable to perform ROS: Intubated     Objective:     Vital Signs (Most Recent):  Temp: 98.3 °F (36.8 °C) (02/28/24 1047)  Pulse: (!) 113 (02/28/24 1047)  Resp: (!) 23 (02/28/24 1047)  BP: 103/75 (02/28/24 1047)  SpO2: 99 % (02/28/24 1047) Vital Signs (24h Range):  Temp:  [96.8 °F (36 °C)-100.4 °F (38 °C)] 98.3 °F (36.8 °C)  Pulse:  [] 113  Resp:  [9-38] 23  SpO2:  [93 %-100 %] 99 %  BP: ()/(46-83) 103/75  Arterial Line BP: ()/() 100/61   Weight: 78.2 kg (172 lb 6.4 oz)  Body mass index is 28.69 kg/m².  SpO2: 99 %       Intake/Output Summary (Last 24 hours) at 2/28/2024 1059  Last data filed at 2/28/2024 1015  Gross per 24 hour   Intake 8880.65 ml   Output 4100 ml   Net 4780.65 ml       Lines/Drains/Airways       Central Venous Catheter Line  Duration             Pulmonary Artery Catheter Assessment  02/27/24 1336 <1 day              Drain  Duration                  Urethral Catheter 02/23/24 1451 Latex 4 days         Chest Tube 02/27/24 1717 Tube - 1 Anterior Mediastinal 24 Fr. <1 day         Chest Tube 02/27/24 1720 Tube - 1 Left Fourth intercostal space 28 Fr. <1 day         NG/OG Tube 02/27/24 2115 Right mouth <1 day              Airway  Duration                  Airway - Non-Surgical 02/27/24 1334 <1 day              Arterial Line  Duration             Arterial Line 02/27/24 2350 Left Radial <1 day              Line  Duration                  Pacer Wires 02/27/24 2115 <1 day              Peripheral Intravenous Line  Duration                  Peripheral IV - Double Lumen 02/20/24 0031 20 G Anterior;Right Forearm 8 days                   Significant Labs:   Recent Results (from the past 72 hour(s))   APTT    Collection Time: 02/25/24 12:28 PM   Result Value Ref Range    PTT 33.9 (H) 23.2 - 33.7 seconds   Protime-INR    Collection Time: 02/25/24 12:28 PM   Result Value Ref Range    PT 16.1 (H) 12.5 - 14.5 seconds    INR 1.3 <=1.3   CBC with Differential    Collection Time: 02/25/24 12:28 PM   Result Value Ref Range    WBC 10.02 4.50 - 11.50 x10(3)/mcL    RBC 3.43 (L) 4.70 - 6.10 x10(6)/mcL    Hgb 9.4 (L) 14.0 - 18.0 g/dL    Hct 30.2 (L) 42.0 - 52.0 %    MCV 88.0 80.0 - 94.0 fL    MCH 27.4 27.0 - 31.0 pg    MCHC 31.1 (L) 33.0 - 36.0 g/dL    RDW 15.4 11.5 - 17.0 %    Platelet 192 130 - 400 x10(3)/mcL    MPV 11.2 (H) 7.4 - 10.4 fL    Neut % 78.5 %    Lymph % 8.0 %    Mono % 10.4 %    Eos % 2.3 %    Basophil % 0.4 %    Lymph # 0.80 0.6 - 4.6 x10(3)/mcL    Neut # 7.87 2.1 - 9.2 x10(3)/mcL    Mono # 1.04 0.1 - 1.3 x10(3)/mcL    Eos # 0.23 0 - 0.9 x10(3)/mcL    Baso # 0.04 <=0.2 x10(3)/mcL    IG# 0.04 0 - 0.04 x10(3)/mcL    IG% 0.4 %    NRBC% 0.0 %   PTT Heparin Monitoring    Collection Time: 02/25/24  7:24 PM   Result Value Ref Range    PTT Heparin Monitor 53.4 (H) 23.2 - 33.7 seconds   Comprehensive metabolic panel    Collection Time: 02/26/24  2:56 AM   Result Value Ref Range    Sodium Level 137 136 - 145 mmol/L    Potassium Level 4.7 3.5 - 5.1 mmol/L    Chloride 110 (H) 98 - 107 mmol/L    Carbon Dioxide 22 (L) 23 - 31 mmol/L    Glucose Level 84 82 - 115 mg/dL    Blood Urea Nitrogen 13.8 8.4 - 25.7 mg/dL    Creatinine 1.49 (H) 0.73 - 1.18 mg/dL    Calcium Level Total 8.0 (L) 8.8 - 10.0 mg/dL    Protein Total 5.8 5.8 - 7.6 gm/dL    Albumin Level 3.0 (L) 3.4 - 4.8 g/dL    Globulin 2.8 2.4 - 3.5 gm/dL    Albumin/Globulin Ratio 1.1 1.1 - 2.0 ratio    Bilirubin Total 1.1 <=1.5 mg/dL    Alkaline Phosphatase 61 40 - 150 unit/L    Alanine Aminotransferase 18 0 - 55 unit/L    Aspartate Aminotransferase 53 (H) 5 - 34 unit/L    eGFR 48  mls/min/1.73/m2   Magnesium    Collection Time: 02/26/24  2:56 AM   Result Value Ref Range    Magnesium Level 2.20 1.60 - 2.60 mg/dL   PTT Heparin Monitoring    Collection Time: 02/26/24  2:56 AM   Result Value Ref Range    PTT Heparin Monitor 74.9 (H) 23.2 - 33.7 seconds   CBC with Differential    Collection Time: 02/26/24  2:56 AM   Result Value Ref Range    WBC 10.34 4.50 - 11.50 x10(3)/mcL    RBC 3.83 (L) 4.70 - 6.10 x10(6)/mcL    Hgb 10.6 (L) 14.0 - 18.0 g/dL    Hct 33.1 (L) 42.0 - 52.0 %    MCV 86.4 80.0 - 94.0 fL    MCH 27.7 27.0 - 31.0 pg    MCHC 32.0 (L) 33.0 - 36.0 g/dL    RDW 15.6 11.5 - 17.0 %    Platelet 183 130 - 400 x10(3)/mcL    MPV 11.2 (H) 7.4 - 10.4 fL    Neut % 65.8 %    Lymph % 17.5 %    Mono % 11.8 %    Eos % 3.7 %    Basophil % 0.5 %    Lymph # 1.81 0.6 - 4.6 x10(3)/mcL    Neut # 6.81 2.1 - 9.2 x10(3)/mcL    Mono # 1.22 0.1 - 1.3 x10(3)/mcL    Eos # 0.38 0 - 0.9 x10(3)/mcL    Baso # 0.05 <=0.2 x10(3)/mcL    IG# 0.07 (H) 0 - 0.04 x10(3)/mcL    IG% 0.7 %    NRBC% 0.0 %   PTT Heparin Monitoring    Collection Time: 02/26/24  9:00 AM   Result Value Ref Range    PTT Heparin Monitor 88.2 (H) 23.2 - 33.7 seconds   Prepare RBC 4 Units; a    Collection Time: 02/26/24  1:20 PM   Result Value Ref Range    UNIT NUMBER [id number]     UNIT ABO/RH O POS     DISPENSE STATUS Transfused     Unit Expiration [id number]     Product Code [id number]     Unit Blood Type Code 5100     CROSSMATCH INTERPRETATION Compatible     UNIT NUMBER [id number]     UNIT ABO/RH O POS     DISPENSE STATUS Transfused     Unit Expiration [id number]     Product Code A1435U87     Unit Blood Type Code 5100     CROSSMATCH INTERPRETATION Compatible     UNIT NUMBER [id number]     UNIT ABO/RH O POS     DISPENSE STATUS Transfused     Unit Expiration [id number]     Product Code Y7819O48     Unit Blood Type Code 5100     CROSSMATCH INTERPRETATION Compatible     UNIT NUMBER [id number]     UNIT ABO/RH O POS     DISPENSE STATUS  Transfused     Unit Expiration [id number]     Product Code M2989G42     Unit Blood Type Code 5100     CROSSMATCH INTERPRETATION Compatible    Type & Screen    Collection Time: 02/26/24  1:20 PM   Result Value Ref Range    Group & Rh O POS     Indirect Ugo GEL NEG     Specimen Outdate 02/29/2024 23:59    Prepare Platelets 1 Dose    Collection Time: 02/26/24  1:20 PM   Result Value Ref Range    UNIT NUMBER [id number]     UNIT ABO/RH AB POS     DISPENSE STATUS Transfused     Unit Expiration [id number]     Product Code Q9855P43     Unit Blood Type Code 8400     CROSSMATCH INTERPRETATION Not required    Prepare Platelets 1 Dose    Collection Time: 02/26/24  1:20 PM   Result Value Ref Range    UNIT NUMBER [id number]     UNIT ABO/RH B POS     DISPENSE STATUS Transfused     Unit Expiration [id number]     Product Code F4038R47     Unit Blood Type Code 7300     CROSSMATCH INTERPRETATION Not required    Prepare RBC 4 Units; SURGERY    Collection Time: 02/26/24  1:20 PM   Result Value Ref Range    UNIT NUMBER [id number]     UNIT ABO/RH O POS     DISPENSE STATUS Transfused     Unit Expiration [id number]     Product Code W5628E20     Unit Blood Type Code 5100     CROSSMATCH INTERPRETATION Compatible     UNIT NUMBER [id number]     UNIT ABO/RH O POS     DISPENSE STATUS Transfused     Unit Expiration [id number]     Product Code K0537R70     Unit Blood Type Code 5100     CROSSMATCH INTERPRETATION Compatible     UNIT NUMBER [id number]     UNIT ABO/RH O POS     DISPENSE STATUS Selected     Unit Expiration 377096289983     Product Code D8616O25     Unit Blood Type Code 5100     CROSSMATCH INTERPRETATION Compatible     UNIT NUMBER [id number]     UNIT ABO/RH O POS     DISPENSE STATUS Selected     Unit Expiration [id number]     Product Code G8670O18     Unit Blood Type Code 5100     CROSSMATCH INTERPRETATION Compatible    Prepare RBC 4 Units; SURGERY    Collection Time: 02/26/24  1:20 PM   Result Value Ref  Range    UNIT NUMBER [id number]     UNIT ABO/RH O POS     DISPENSE STATUS Transfused     Unit Expiration 202404032359     Product Code V6566T61     Unit Blood Type Code 5100     CROSSMATCH INTERPRETATION Compatible     UNIT NUMBER [id number]     UNIT ABO/RH O POS     DISPENSE STATUS Transfused     Unit Expiration 202404032359     Product Code N5599B50     Unit Blood Type Code 5100     CROSSMATCH INTERPRETATION Compatible     UNIT NUMBER [id number]     UNIT ABO/RH O POS     DISPENSE STATUS Issued     Unit Expiration 202404032359     Product Code P9206G24     Unit Blood Type Code 5100     CROSSMATCH INTERPRETATION Compatible     UNIT NUMBER [id number]     UNIT ABO/RH O POS     DISPENSE STATUS Issued     Unit Expiration 202404032359     Product Code S8261R72     Unit Blood Type Code 5100     CROSSMATCH INTERPRETATION Compatible    Prepare Fresh Frozen Plasma 2 Units    Collection Time: 02/26/24  1:20 PM   Result Value Ref Range    UNIT NUMBER [id number]     UNIT ABO/RH O POS     DISPENSE STATUS Transfused     Unit Expiration 646143793516     Product Code G2210M82     Unit Blood Type Code 5100     CROSSMATCH INTERPRETATION Not required     UNIT NUMBER [id number]     UNIT ABO/RH O POS     DISPENSE STATUS Transfused     Unit Expiration 212816933353     Product Code E2275Q50     Unit Blood Type Code 5100     CROSSMATCH INTERPRETATION Not required    Prepare Platelets 1 Dose    Collection Time: 02/26/24  1:20 PM   Result Value Ref Range    UNIT NUMBER [id number]     UNIT ABO/RH O POS     DISPENSE STATUS Transfused     Unit Expiration 961126663059     Product Code A9885Q73     Unit Blood Type Code 5100     CROSSMATCH INTERPRETATION Not required    Prepare Platelets 1 Dose    Collection Time: 02/26/24  1:20 PM   Result Value Ref Range    UNIT NUMBER [id number]     UNIT ABO/RH B POS     DISPENSE STATUS Transfused     Unit Expiration 469182912840     Product Code H5487N89     Unit Blood Type Code 7300      CROSSMATCH INTERPRETATION Not required    Prepare Fresh Frozen Plasma 1 Unit    Collection Time: 02/26/24  1:20 PM   Result Value Ref Range    UNIT NUMBER [id number]     UNIT ABO/RH O POS     DISPENSE STATUS Issued     Unit Expiration 831910990981     Product Code O7033Z41     Unit Blood Type Code 5100     CROSSMATCH INTERPRETATION Not required    APTT    Collection Time: 02/27/24  1:54 AM   Result Value Ref Range    PTT 66.2 (H) 23.2 - 33.7 seconds   Comprehensive metabolic panel    Collection Time: 02/27/24  1:54 AM   Result Value Ref Range    Sodium Level 136 136 - 145 mmol/L    Potassium Level 4.4 3.5 - 5.1 mmol/L    Chloride 110 (H) 98 - 107 mmol/L    Carbon Dioxide 19 (L) 23 - 31 mmol/L    Glucose Level 104 82 - 115 mg/dL    Blood Urea Nitrogen 14.9 8.4 - 25.7 mg/dL    Creatinine 1.67 (H) 0.73 - 1.18 mg/dL    Calcium Level Total 8.3 (L) 8.8 - 10.0 mg/dL    Protein Total 6.3 5.8 - 7.6 gm/dL    Albumin Level 3.0 (L) 3.4 - 4.8 g/dL    Globulin 3.3 2.4 - 3.5 gm/dL    Albumin/Globulin Ratio 0.9 (L) 1.1 - 2.0 ratio    Bilirubin Total 1.1 <=1.5 mg/dL    Alkaline Phosphatase 75 40 - 150 unit/L    Alanine Aminotransferase 23 0 - 55 unit/L    Aspartate Aminotransferase 50 (H) 5 - 34 unit/L    eGFR 42 mls/min/1.73/m2   CBC with Differential    Collection Time: 02/27/24  1:54 AM   Result Value Ref Range    WBC 11.83 (H) 4.50 - 11.50 x10(3)/mcL    RBC 3.93 (L) 4.70 - 6.10 x10(6)/mcL    Hgb 10.6 (L) 14.0 - 18.0 g/dL    Hct 34.7 (L) 42.0 - 52.0 %    MCV 88.3 80.0 - 94.0 fL    MCH 27.0 27.0 - 31.0 pg    MCHC 30.5 (L) 33.0 - 36.0 g/dL    RDW 15.7 11.5 - 17.0 %    Platelet 146 130 - 400 x10(3)/mcL    MPV 12.0 (H) 7.4 - 10.4 fL    Neut % 76.1 %    Lymph % 9.2 %    Mono % 12.0 %    Eos % 1.6 %    Basophil % 0.4 %    Lymph # 1.09 0.6 - 4.6 x10(3)/mcL    Neut # 9.00 2.1 - 9.2 x10(3)/mcL    Mono # 1.42 (H) 0.1 - 1.3 x10(3)/mcL    Eos # 0.19 0 - 0.9 x10(3)/mcL    Baso # 0.05 <=0.2 x10(3)/mcL    IG# 0.08 (H) 0 - 0.04 x10(3)/mcL     IG% 0.7 %    NRBC% 0.0 %   ISTAT PROCEDURE    Collection Time: 02/27/24  1:52 PM   Result Value Ref Range    POC PH 7.324 (L) 7.35 - 7.45    POC PCO2 40.5 35 - 45 mmHg    POC PO2 44 40 - 60 mmHg    POC HCO3 21.1 (L) 24 - 28 mmol/L    POC BE -5 (L) -2 to 2 mmol/L    POC SATURATED O2 76 95 - 100 %    POC pH Temp 7.324     POC pCO2 Temp 40.5 mmHg    POC pO2 Temp 44 mmHg    POC Glucose 99 70 - 110 mg/dL    POC Sodium 138 136 - 145 mmol/L    POC Potassium 4.2 3.5 - 5.1 mmol/L    POC TCO2 22 (L) 24 - 29 mmol/L    POC Ionized Calcium 1.22 1.06 - 1.42 mmol/L    POC Hematocrit 32 (L) 36 - 54 %PCV    POC HEMOGLOBIN 11 g/dL    Sample VENOUS     FiO2 100     POC Temp 37.0 C    Basic Metabolic Panel    Collection Time: 02/27/24  2:30 PM   Result Value Ref Range    Sodium Level 145 136 - 145 mmol/L    Potassium Level 3.5 3.5 - 5.1 mmol/L    Chloride 113 (H) 98 - 107 mmol/L    Carbon Dioxide 22 (L) 23 - 31 mmol/L    Glucose Level 111 82 - 115 mg/dL    Blood Urea Nitrogen 14.8 8.4 - 25.7 mg/dL    Creatinine 1.52 (H) 0.73 - 1.18 mg/dL    BUN/Creatinine Ratio 10     Calcium Level Total 8.2 (L) 8.8 - 10.0 mg/dL    Anion Gap 10.0 mEq/L    eGFR 47 mls/min/1.73/m2   Protime-INR    Collection Time: 02/27/24  2:30 PM   Result Value Ref Range    PT 22.7 (H) 12.5 - 14.5 seconds    INR 2.0 (H) <=1.3   APTT    Collection Time: 02/27/24  2:30 PM   Result Value Ref Range    PTT 47.1 (H) 23.2 - 33.7 seconds   CBC with Differential    Collection Time: 02/27/24  2:30 PM   Result Value Ref Range    WBC 15.02 (H) 4.50 - 11.50 x10(3)/mcL    RBC 2.24 (L) 4.70 - 6.10 x10(6)/mcL    Hgb 6.3 (L) 14.0 - 18.0 g/dL    Hct 19.9 (LL) 42.0 - 52.0 %    MCV 88.8 80.0 - 94.0 fL    MCH 28.1 27.0 - 31.0 pg    MCHC 31.7 (L) 33.0 - 36.0 g/dL    RDW 15.6 11.5 - 17.0 %    Platelet 108 (L) 130 - 400 x10(3)/mcL    MPV 9.9 7.4 - 10.4 fL    Neut % 78.6 %    Lymph % 11.1 %    Mono % 7.9 %    Eos % 0.7 %    Basophil % 0.3 %    Lymph # 1.67 0.6 - 4.6 x10(3)/mcL    Neut #  11.79 (H) 2.1 - 9.2 x10(3)/mcL    Mono # 1.19 0.1 - 1.3 x10(3)/mcL    Eos # 0.11 0 - 0.9 x10(3)/mcL    Baso # 0.05 <=0.2 x10(3)/mcL    IG# 0.21 (H) 0 - 0.04 x10(3)/mcL    IG% 1.4 %    NRBC% 0.0 %    IPF 4.2 0.9 - 11.2 %   ISTAT PROCEDURE    Collection Time: 02/27/24  3:19 PM   Result Value Ref Range    POC PH 7.272 (LL) 7.35 - 7.45    POC PCO2 43.4 35 - 45 mmHg    POC PO2 47 (LL) 80 - 100 mmHg    POC HCO3 20.0 (L) 24 - 28 mmol/L    POC BE -7 (L) -2 to 2 mmol/L    POC SATURATED O2 77 95 - 100 %    POC Glucose 99 70 - 110 mg/dL    POC Sodium 138 136 - 145 mmol/L    POC Potassium 4.1 3.5 - 5.1 mmol/L    POC TCO2 21 (L) 23 - 27 mmol/L    POC Ionized Calcium 1.19 1.06 - 1.42 mmol/L    POC Hematocrit 29 (L) 36 - 54 %PCV    POC HEMOGLOBIN 10 g/dL    Sample ARTERIAL    ISTAT PROCEDURE    Collection Time: 02/27/24  3:57 PM   Result Value Ref Range    POC PH 7.325 (L) 7.35 - 7.45    POC PCO2 38.2 35 - 45 mmHg    POC PO2 277 (H) 80 - 100 mmHg    POC HCO3 19.9 (L) 24 - 28 mmol/L    POC BE -6 (L) -2 to 2 mmol/L    POC SATURATED O2 100 95 - 100 %    POC Glucose 110 70 - 110 mg/dL    POC Sodium 139 136 - 145 mmol/L    POC Potassium 3.8 3.5 - 5.1 mmol/L    POC TCO2 21 (L) 23 - 27 mmol/L    POC Ionized Calcium 1.00 (L) 1.06 - 1.42 mmol/L    POC Hematocrit 23 (L) 36 - 54 %PCV    POC HEMOGLOBIN 8 g/dL    Sample ARTERIAL     FiO2 60    ISTAT PROCEDURE    Collection Time: 02/27/24  4:04 PM   Result Value Ref Range    POC PH 7.331 (L) 7.35 - 7.45    POC PCO2 49.1 (H) 35 - 45 mmHg    POC PO2 36 (LL) 80 - 100 mmHg    POC HCO3 26.0 24 - 28 mmol/L    POC BE 0 -2 to 2 mmol/L    POC SATURATED O2 65 95 - 100 %    POC Glucose 110 70 - 110 mg/dL    POC Sodium 142 136 - 145 mmol/L    POC Potassium 3.7 3.5 - 5.1 mmol/L    POC TCO2 27 23 - 27 mmol/L    POC Ionized Calcium 1.02 (L) 1.06 - 1.42 mmol/L    POC Hematocrit 20 (LL) 36 - 54 %PCV    POC HEMOGLOBIN 7 g/dL    Sample ARTERIAL    ISTAT PROCEDURE    Collection Time: 02/27/24  4:51 PM   Result  Value Ref Range    POC PH 7.317 (L) 7.35 - 7.45    POC PCO2 49.4 (H) 35 - 45 mmHg    POC PO2 357 (H) 80 - 100 mmHg    POC HCO3 25.3 24 - 28 mmol/L    POC BE -1 -2 to 2 mmol/L    POC SATURATED O2 100 95 - 100 %    POC Glucose 127 (H) 70 - 110 mg/dL    POC Sodium 141 136 - 145 mmol/L    POC Potassium 4.1 3.5 - 5.1 mmol/L    POC TCO2 27 23 - 27 mmol/L    POC Ionized Calcium 1.02 (L) 1.06 - 1.42 mmol/L    POC Hematocrit 22 (L) 36 - 54 %PCV    POC HEMOGLOBIN 8 g/dL    Sample ARTERIAL     FiO2 65    ISTAT PROCEDURE    Collection Time: 02/27/24  5:39 PM   Result Value Ref Range    POC PH 7.384 7.35 - 7.45    POC PCO2 39.7 35 - 45 mmHg    POC PO2 300 (H) 80 - 100 mmHg    POC HCO3 23.7 (L) 24 - 28 mmol/L    POC BE -1 -2 to 2 mmol/L    POC SATURATED O2 100 95 - 100 %    POC Glucose 126 (H) 70 - 110 mg/dL    POC Sodium 140 136 - 145 mmol/L    POC Potassium 4.3 3.5 - 5.1 mmol/L    POC TCO2 25 23 - 27 mmol/L    POC Ionized Calcium 1.00 (L) 1.06 - 1.42 mmol/L    POC Hematocrit 21 (L) 36 - 54 %PCV    POC HEMOGLOBIN 7 g/dL    Sample ARTERIAL     FiO2 60    ISTAT PROCEDURE    Collection Time: 02/27/24  6:18 PM   Result Value Ref Range    POC PH 7.359 7.35 - 7.45    POC PCO2 43.0 35 - 45 mmHg    POC PO2 266 (H) 80 - 100 mmHg    POC HCO3 24.2 24 - 28 mmol/L    POC BE -1 -2 to 2 mmol/L    POC SATURATED O2 100 95 - 100 %    POC Glucose 131 (H) 70 - 110 mg/dL    POC Sodium 140 136 - 145 mmol/L    POC Potassium 4.6 3.5 - 5.1 mmol/L    POC TCO2 26 23 - 27 mmol/L    POC Ionized Calcium 1.00 (L) 1.06 - 1.42 mmol/L    POC Hematocrit 26 (L) 36 - 54 %PCV    POC HEMOGLOBIN 9 g/dL    Sample ARTERIAL     FiO2 70    ISTAT PROCEDURE    Collection Time: 02/27/24  7:01 PM   Result Value Ref Range    POC PH 7.376 7.35 - 7.45    POC PCO2 44.9 35 - 45 mmHg    POC PO2 272 (H) 80 - 100 mmHg    POC HCO3 26.3 24 - 28 mmol/L    POC BE 1 -2 to 2 mmol/L    POC SATURATED O2 100 95 - 100 %    POC Glucose 129 (H) 70 - 110 mg/dL    POC Sodium 142 136 - 145 mmol/L     POC Potassium 4.1 3.5 - 5.1 mmol/L    POC TCO2 28 (H) 23 - 27 mmol/L    POC Ionized Calcium 1.59 (H) 1.06 - 1.42 mmol/L    POC Hematocrit 21 (L) 36 - 54 %PCV    POC HEMOGLOBIN 7 g/dL    Sample ARTERIAL     FiO2 75    ISTAT PROCEDURE    Collection Time: 02/27/24  7:47 PM   Result Value Ref Range    POC PH 7.360 7.35 - 7.45    POC PCO2 38.6 35 - 45 mmHg    POC PO2 91 80 - 100 mmHg    POC HCO3 21.8 (L) 24 - 28 mmol/L    POC BE -4 (L) -2 to 2 mmol/L    POC SATURATED O2 97 95 - 100 %    POC Glucose 111 (H) 70 - 110 mg/dL    POC Sodium 142 136 - 145 mmol/L    POC Potassium 3.5 3.5 - 5.1 mmol/L    POC TCO2 23 23 - 27 mmol/L    POC Ionized Calcium 1.29 1.06 - 1.42 mmol/L    POC Hematocrit 27 (L) 36 - 54 %PCV    POC HEMOGLOBIN 9 g/dL    Sample ARTERIAL     FiO2 100    ISTAT PROCEDURE    Collection Time: 02/27/24  8:41 PM   Result Value Ref Range    POC PH 7.404 7.35 - 7.45    POC PCO2 36.3 35 - 45 mmHg    POC PO2 117 (H) 80 - 100 mmHg    POC HCO3 22.7 (L) 24 - 28 mmol/L    POC BE -2 -2 to 2 mmol/L    POC SATURATED O2 99 95 - 100 %    POC pH Temp 7.418     POC pCO2 Temp 34.8 mmHg    POC pO2 Temp 111 mmHg    POC Glucose 113 (H) 70 - 110 mg/dL    POC Sodium 143 136 - 145 mmol/L    POC Potassium 3.4 (L) 3.5 - 5.1 mmol/L    POC TCO2 24 23 - 27 mmol/L    POC Ionized Calcium 1.11 1.06 - 1.42 mmol/L    POC Hematocrit 20 (LL) 36 - 54 %PCV    POC HEMOGLOBIN 7 g/dL    Sample ARTERIAL     FiO2 96     POC Temp 36.0 C    Magnesium    Collection Time: 02/27/24  9:26 PM   Result Value Ref Range    Magnesium Level 2.60 1.60 - 2.60 mg/dL   Phosphorus    Collection Time: 02/27/24  9:26 PM   Result Value Ref Range    Phosphorus Level 4.0 2.3 - 4.7 mg/dL   Comprehensive Metabolic Panel    Collection Time: 02/27/24  9:26 PM   Result Value Ref Range    Sodium Level 143 136 - 145 mmol/L    Potassium Level 3.8 3.5 - 5.1 mmol/L    Chloride 112 (H) 98 - 107 mmol/L    Carbon Dioxide 21 (L) 23 - 31 mmol/L    Glucose Level 125 (H) 82 - 115 mg/dL     Blood Urea Nitrogen 14.7 8.4 - 25.7 mg/dL    Creatinine 1.47 (H) 0.73 - 1.18 mg/dL    Calcium Level Total 9.0 8.8 - 10.0 mg/dL    Protein Total 4.8 (L) 5.8 - 7.6 gm/dL    Albumin Level 3.6 3.4 - 4.8 g/dL    Globulin 1.2 (L) 2.4 - 3.5 gm/dL    Albumin/Globulin Ratio 3.0 (H) 1.1 - 2.0 ratio    Bilirubin Total 1.7 (H) <=1.5 mg/dL    Alkaline Phosphatase 34 (L) 40 - 150 unit/L    Alanine Aminotransferase 15 0 - 55 unit/L    Aspartate Aminotransferase 37 (H) 5 - 34 unit/L    eGFR 49 mls/min/1.73/m2   Protime-INR    Collection Time: 02/27/24  9:26 PM   Result Value Ref Range    PT 21.4 (H) 12.5 - 14.5 seconds    INR 1.9 (H) <=1.3   APTT    Collection Time: 02/27/24  9:26 PM   Result Value Ref Range    PTT 43.1 (H) 23.2 - 33.7 seconds   POCT glucose    Collection Time: 02/27/24  9:30 PM   Result Value Ref Range    POCT Glucose 120 (H) 70 - 110 mg/dL   POCT glucose    Collection Time: 02/27/24 10:03 PM   Result Value Ref Range    POCT Glucose 135 (H) 70 - 110 mg/dL   CBC Without Differential    Collection Time: 02/27/24 10:27 PM   Result Value Ref Range    WBC 10.87 4.50 - 11.50 x10(3)/mcL    RBC 2.17 (L) 4.70 - 6.10 x10(6)/mcL    Hgb 6.2 (L) 14.0 - 18.0 g/dL    Hct 19.1 (LL) 42.0 - 52.0 %    MCV 88.0 80.0 - 94.0 fL    MCH 28.6 27.0 - 31.0 pg    MCHC 32.5 (L) 33.0 - 36.0 g/dL    RDW 15.4 11.5 - 17.0 %    Platelet 70 (L) 130 - 400 x10(3)/mcL    MPV 10.6 (H) 7.4 - 10.4 fL    NRBC% 0.0 %   POCT glucose    Collection Time: 02/27/24 11:07 PM   Result Value Ref Range    POCT Glucose 153 (H) 70 - 110 mg/dL   RT Blood Gas    Collection Time: 02/28/24 12:00 AM   Result Value Ref Range    Sample Type Arterial Blood     Sample site Arterial Line     Drawn by ht rrt     pH, Blood gas 7.430 7.350 - 7.450    pCO2, Blood gas 32.0 (L) 35.0 - 45.0 mmHg    pO2, Blood gas 101.0 (H) 80.0 - 100.0 mmHg    Sodium, Blood Gas 135 (L) 137 - 145 mmol/L    Potassium, Blood Gas 3.6 3.5 - 5.0 mmol/L    Calcium Level Ionized 1.10 (L) 1.12 - 1.23 mmol/L     TOC2, Blood gas 22.2 mmol/L    Base Excess, Blood gas -2.60 (L) -2.00 - 2.00 mmol/L    sO2, Blood gas 98.0 %    HCO3, Blood gas 21.2 (L) 22.0 - 26.0 mmol/L    THb, Blood gas 9.5 (L) 12 - 16 g/dL    O2 Hb, Blood Gas 96.9 94.0 - 97.0 %    CO Hgb 2.3 (H) 0.5 - 1.5 %    Met Hgb 0.6 0.4 - 1.5 %    Allens Test N/A     MODE SIMV     Oxygen Device, Blood gas Ventilator     FIO2, Blood gas 60 %    Mech Vt 620 ml    Mech RR 20 b/min    PEEP 5.0 cmH2O    PS 10.0 cmH2O   POCT glucose    Collection Time: 02/28/24 12:04 AM   Result Value Ref Range    POCT Glucose 193 (H) 70 - 110 mg/dL   POCT glucose    Collection Time: 02/28/24 12:59 AM   Result Value Ref Range    POCT Glucose 183 (H) 70 - 110 mg/dL   Hemoglobin and Hematocrit    Collection Time: 02/28/24  1:44 AM   Result Value Ref Range    Hgb 9.4 (L) 14.0 - 18.0 g/dL    Hct 28.9 (L) 42.0 - 52.0 %   POCT glucose    Collection Time: 02/28/24  2:09 AM   Result Value Ref Range    POCT Glucose 185 (H) 70 - 110 mg/dL   POCT glucose    Collection Time: 02/28/24  3:11 AM   Result Value Ref Range    POCT Glucose 185 (H) 70 - 110 mg/dL   POCT glucose    Collection Time: 02/28/24  3:57 AM   Result Value Ref Range    POCT Glucose 189 (H) 70 - 110 mg/dL   Phosphorus    Collection Time: 02/28/24  4:59 AM   Result Value Ref Range    Phosphorus Level 2.2 (L) 2.3 - 4.7 mg/dL   Magnesium    Collection Time: 02/28/24  4:59 AM   Result Value Ref Range    Magnesium Level 2.20 1.60 - 2.60 mg/dL   Comprehensive metabolic panel    Collection Time: 02/28/24  4:59 AM   Result Value Ref Range    Sodium Level 141 136 - 145 mmol/L    Potassium Level 3.5 3.5 - 5.1 mmol/L    Chloride 110 (H) 98 - 107 mmol/L    Carbon Dioxide 19 (L) 23 - 31 mmol/L    Glucose Level 211 (H) 82 - 115 mg/dL    Blood Urea Nitrogen 15.8 8.4 - 25.7 mg/dL    Creatinine 1.86 (H) 0.73 - 1.18 mg/dL    Calcium Level Total 8.3 (L) 8.8 - 10.0 mg/dL    Protein Total 4.6 (L) 5.8 - 7.6 gm/dL    Albumin Level 3.2 (L) 3.4 - 4.8 g/dL     Globulin 1.4 (L) 2.4 - 3.5 gm/dL    Albumin/Globulin Ratio 2.3 (H) 1.1 - 2.0 ratio    Bilirubin Total 1.0 <=1.5 mg/dL    Alkaline Phosphatase 40 40 - 150 unit/L    Alanine Aminotransferase 15 0 - 55 unit/L    Aspartate Aminotransferase 37 (H) 5 - 34 unit/L    eGFR 37 mls/min/1.73/m2   CBC with Differential    Collection Time: 02/28/24  4:59 AM   Result Value Ref Range    WBC 12.78 (H) 4.50 - 11.50 x10(3)/mcL    RBC 3.12 (L) 4.70 - 6.10 x10(6)/mcL    Hgb 9.0 (L) 14.0 - 18.0 g/dL    Hct 27.3 (L) 42.0 - 52.0 %    MCV 87.5 80.0 - 94.0 fL    MCH 28.8 27.0 - 31.0 pg    MCHC 33.0 33.0 - 36.0 g/dL    RDW 14.6 11.5 - 17.0 %    Platelet 180 130 - 400 x10(3)/mcL    MPV 10.3 7.4 - 10.4 fL    Neut % 83.0 %    Lymph % 3.5 %    Mono % 12.2 %    Eos % 0.1 %    Basophil % 0.5 %    Lymph # 0.45 (L) 0.6 - 4.6 x10(3)/mcL    Neut # 10.61 (H) 2.1 - 9.2 x10(3)/mcL    Mono # 1.56 (H) 0.1 - 1.3 x10(3)/mcL    Eos # 0.01 0 - 0.9 x10(3)/mcL    Baso # 0.06 <=0.2 x10(3)/mcL    IG# 0.09 (H) 0 - 0.04 x10(3)/mcL    IG% 0.7 %    NRBC% 0.0 %   EKG 12-LEAD    Collection Time: 02/28/24  5:07 AM   Result Value Ref Range    QRS Duration 180 ms    OHS QTC Calculation 572 ms   POCT glucose    Collection Time: 02/28/24  5:25 AM   Result Value Ref Range    POCT Glucose 224 (H) 70 - 110 mg/dL   RT Blood Gas    Collection Time: 02/28/24  5:55 AM   Result Value Ref Range    Sample Type Arterial Blood     Sample site Arterial Line     Drawn by rcl crt     pH, Blood gas 7.340 (L) 7.350 - 7.450    pCO2, Blood gas 37.0 35.0 - 45.0 mmHg    pO2, Blood gas 63.0 (L) 80.0 - 100.0 mmHg    Sodium, Blood Gas 139 137 - 145 mmol/L    Potassium, Blood Gas 3.2 (L) 3.5 - 5.0 mmol/L    Calcium Level Ionized 1.19 1.12 - 1.23 mmol/L    TOC2, Blood gas 21.1 mmol/L    Base Excess, Blood gas -5.20 mmol/L    sO2, Blood gas 90.0 %    HCO3, Blood gas 20.0 (L) 22.0 - 26.0 mmol/L    Allens Test Yes     MODE SIMV     FIO2, Blood gas 35 %    Mech Vt 620 ml    Mech RR 10 b/min    PEEP 5.0  cmH2O   POCT glucose    Collection Time: 02/28/24  5:55 AM   Result Value Ref Range    POCT Glucose 258 (H) 70 - 110 mg/dL   POCT glucose    Collection Time: 02/28/24  7:13 AM   Result Value Ref Range    POCT Glucose 249 (H) 70 - 110 mg/dL   POCT glucose    Collection Time: 02/28/24  8:06 AM   Result Value Ref Range    POCT Glucose 240 (H) 70 - 110 mg/dL   Protime-INR    Collection Time: 02/28/24  8:20 AM   Result Value Ref Range    PT 21.8 (H) 12.5 - 14.5 seconds    INR 1.9 (H) <=1.3   Fibrinogen    Collection Time: 02/28/24  8:20 AM   Result Value Ref Range    Fibrinogen 256 210 - 463 mg/dL   Hemoglobin and Hematocrit    Collection Time: 02/28/24  8:30 AM   Result Value Ref Range    Hgb 7.9 (L) 14.0 - 18.0 g/dL    Hct 23.3 (L) 42.0 - 52.0 %   Comprehensive Metabolic Panel    Collection Time: 02/28/24  8:30 AM   Result Value Ref Range    Sodium Level 138 136 - 145 mmol/L    Potassium Level 4.3 3.5 - 5.1 mmol/L    Chloride 109 (H) 98 - 107 mmol/L    Carbon Dioxide 19 (L) 23 - 31 mmol/L    Glucose Level 254 (H) 82 - 115 mg/dL    Blood Urea Nitrogen 16.0 8.4 - 25.7 mg/dL    Creatinine 2.07 (H) 0.73 - 1.18 mg/dL    Calcium Level Total 7.9 (L) 8.8 - 10.0 mg/dL    Protein Total 4.4 (L) 5.8 - 7.6 gm/dL    Albumin Level 3.1 (L) 3.4 - 4.8 g/dL    Globulin 1.3 (L) 2.4 - 3.5 gm/dL    Albumin/Globulin Ratio 2.4 (H) 1.1 - 2.0 ratio    Bilirubin Total 0.8 <=1.5 mg/dL    Alkaline Phosphatase 34 (L) 40 - 150 unit/L    Alanine Aminotransferase 13 0 - 55 unit/L    Aspartate Aminotransferase 35 (H) 5 - 34 unit/L    eGFR 32 mls/min/1.73/m2   RT Blood Gas    Collection Time: 02/28/24  8:38 AM   Result Value Ref Range    Sample Type Arterial Blood     Sample site Arterial Line     Drawn by sd rrt     pH, Blood gas 7.380 7.350 - 7.450    pCO2, Blood gas 35.0 35.0 - 45.0 mmHg    pO2, Blood gas 75.0 (L) 80.0 - 100.0 mmHg    Sodium, Blood Gas 133 (L) 137 - 145 mmol/L    Potassium, Blood Gas 4.2 3.5 - 5.0 mmol/L    Calcium Level Ionized 1.15  1.12 - 1.23 mmol/L    TOC2, Blood gas 21.8 mmol/L    Base Excess, Blood gas -4.00 (L) -2.00 - 2.00 mmol/L    sO2, Blood gas 94.6 %    HCO3, Blood gas 20.7 (L) 22.0 - 26.0 mmol/L    THb, Blood gas 8.6 (L) 12 - 16 g/dL    O2 Hb, Blood Gas 95.0 94.0 - 97.0 %    CO Hgb 1.7 (H) 0.5 - 1.5 %    Met Hgb 1.3 0.4 - 1.5 %    Allens Test N/A     MODE AC     Oxygen Device, Blood gas Ventilator     FIO2, Blood gas 50 %    Mech Vt 470 ml    Mech RR 22 b/min    PEEP 5.0 cmH2O     EKG:      Telemetry: AF RVR    Physical Exam  Vitals and nursing note reviewed.   Constitutional:       General: He is not in acute distress.     Appearance: He is ill-appearing.   Neck:      Comments: Right IJ Venous Cath   Cardiovascular:      Rate and Rhythm: Tachycardia present. Rhythm irregular.   Pulmonary:      Effort: Pulmonary effort is normal. No respiratory distress.      Comments: Vent Associated Breath Sounds-  Intubated/Vent FIO2 50%  Abdominal:      Palpations: Abdomen is soft.   Genitourinary:     Comments: Urinary Catheter   Musculoskeletal:      Comments: Chest Tubes in Place.    Skin:     General: Skin is dry.      Comments: Mid Sternal Incision with Dressing in Place. Bilateral Groins are soft with no evidence of active bleed noted.    Neurological:      Comments: Obtunded, Sedation Currently off- Prob some degree of residual sedation from Cardioversion.       Current Inpatient Medications:    Current Facility-Administered Medications:     fentaNYL (SUBLIMAZE) 50 mcg/mL injection, , , ,     0.9%  NaCl infusion (for blood administration), , Intravenous, Q24H PRN, Kaleb Rdz ANP    0.9%  NaCl infusion (for blood administration), , Intravenous, Q24H PRN, Vasile Carter, PA-C    0.9%  NaCl infusion (for blood administration), , Intravenous, Q24H PRN, Pablo Yepez MD    0.9%  NaCl infusion (for blood administration), , Intravenous, Q24H PRN, Pablo Yepez MD    acetaminophen oral solution 650 mg, 650 mg, Per OG tube, Q6H  PRN, Vasile Carter PA-C    acetaminophen tablet 650 mg, 650 mg, Oral, Q6H PRN, Tanner Rdz MD    acetaminophen tablet 650 mg, 650 mg, Oral, Q4H PRN, Tanner Rdz MD, 650 mg at 02/24/24 2021    albumin human 5% bottle 12.5 g, 12.5 g, Intravenous, PRN, Vasile Carter PA-C, Stopped at 02/28/24 0610    allopurinol split tablet 50 mg, 50 mg, Oral, Daily, Tanner Rdz MD, 50 mg at 02/25/24 0803    amiodarone 450 mg/250 mL in D5W IV infusion - STANDARD, 1 mg/min, Intravenous, Continuous, Vasile Carter PA-C, Stopped at 02/28/24 0652    amiodarone 450 mg/250 mL in D5W IV infusion - STANDARD, 0.5 mg/min, Intravenous, Continuous, Vasile Carter PA-C    aspirin EC tablet 81 mg, 81 mg, Oral, Daily, Vasile Carter PA-C    atorvastatin tablet 40 mg, 40 mg, Oral, QHS, Tanner Rdz MD, 40 mg at 02/26/24 2030    calcium gluconate 1 g in NS IVPB (premixed), 1 g, Intravenous, PRN, Vasile Carter PA-C, Stopped at 02/28/24 0156    calcium gluconate 1 g in NS IVPB (premixed), 2 g, Intravenous, PRN, Vasile Carter PA-C    calcium gluconate 1 g in NS IVPB (premixed), 3 g, Intravenous, PRN, Vasile Carter PA-C    cefazolin (ANCEF) 2 gram in dextrose 5% 50 mL IVPB (premix), 2 g, Intravenous, Q12H, Vasile Carter PA-C, Stopped at 02/28/24 0236    clevidipine (CLEVIPREX) 25 mg/50 mL infusion, 0-32 mg/hr, Intravenous, Continuous, Vasile Carter PA-C, Stopped at 02/28/24 0115    dexmedetomidine (PRECEDEX) 400mcg/100mL 0.9% NaCL infusion, 0-1.4 mcg/kg/hr, Intravenous, Continuous, Vasile Carter PA-C, Stopped at 02/28/24 0229    dextrose 10% bolus 125 mL 125 mL, 12.5 g, Intravenous, PRN, Vasile Carter PA-C    dextrose 10% bolus 250 mL 250 mL, 25 g, Intravenous, PRN, Vasile Carter PA-C    dextrose 5 % and 0.45 % NaCl infusion, , Intravenous, Continuous, Vasile Carter PA-C, Last Rate: 100 mL/hr at 02/28/24 0835, Rate Verify at 02/28/24 0835    diltiaZEM 125 mg in  dextrose 5 % 125 mL IVPB (ready to mix) (non-titrating), 10 mg/hr, Intravenous, Continuous, Vasile Carter PA-C, Stopped at 02/28/24 0808    docusate sodium capsule 100 mg, 100 mg, Oral, BID, Vasile Carter PA-C    electrolyte-A infusion, , Intravenous, PRN, Pablo Yepez MD    EPINEPHrine (ADRENALIN) 5 mg in dextrose 5 % (D5W) 250 mL infusion, 0-2 mcg/kg/min, Intravenous, Continuous, Vasile Carter PA-C, Stopped at 02/28/24 0621    famotidine (PF) injection 20 mg, 20 mg, Intravenous, Daily, Vasile Carter PA-C    ferrous sulfate tablet 1 each, 1 tablet, Oral, Every other day, Tanner Rdz MD, 1 each at 02/25/24 0804    folic acid tablet 1 mg, 1 mg, Oral, Daily, Vasile Carter PA-C    heparin, porcine (PF) 100 unit/mL injection flush 500 Units, 5 mL, Intravenous, On Call Procedure, Pablo Yepez MD    heparin, porcine (PF) 100 unit/mL injection flush 500 Units, 5 mL, Intravenous, On Call Procedure, Nita Contreras MD    HYDROcodone-acetaminophen 5-325 mg per tablet 1 tablet, 1 tablet, Oral, Q4H PRN, Bart Herbert DO, 1 tablet at 02/27/24 0038    HYDROcodone-acetaminophen 5-325 mg per tablet 1 tablet, 1 tablet, Oral, Q4H PRN, Vasile Carter PA-C    HYDROmorphone (PF) injection 0.5 mg, 0.5 mg, Intravenous, Q4H PRN, Pablo Yepez MD, 0.5 mg at 02/28/24 0450    insulin regular in 0.9 % NaCl 100 unit/100 mL (1 unit/mL) infusion, 0-52 Units/hr, Intravenous, Continuous, Vasile Carter PA-C, Last Rate: 6.8 mL/hr at 02/28/24 0835, 6.8 Units/hr at 02/28/24 0835    lactated ringers bolus 500 mL, 500 mL, Intravenous, Once, Fransico Schrader DO    lactulose 10 gram/15 ml solution 20 g, 20 g, Oral, Q6H PRN, Vasile Carter PA-C    LIDOcaine HCl 2% urojet, , Mucous Membrane, Once, Nicol Diaz, AGACNP-BC    loperamide capsule 2 mg, 2 mg, Oral, Continuous PRN, Vasile Carter PA-C    magnesium sulfate 2g in water 50mL IVPB (premix), 2 g, Intravenous, PRN, Vasile Carter  JENN AVILA    magnesium sulfate 2g in water 50mL IVPB (premix), 4 g, Intravenous, PRN, Vasile Carter PA-C    melatonin tablet 6 mg, 6 mg, Oral, Nightly PRN, Tanner Rdz MD, 6 mg at 02/26/24 2031    metoclopramide injection 5 mg, 5 mg, Intravenous, Q6H PRN, Vasile Carter PA-C    metoprolol tartrate (LOPRESSOR) split tablet 12.5 mg, 12.5 mg, Oral, BID, Vasile Carter PA-C    midazolam (VERSED) 1 mg/mL injection, , , ,     milrinone 20mg in D5W 100 mL infusion, 0.1876 mcg/kg/min, Intravenous, Continuous, Vasile Carter PA-C    morphine injection 4 mg, 4 mg, Intravenous, Q4H PRN, Vasile Carter PA-C, 4 mg at 02/28/24 0205    mupirocin 2 % ointment, , Nasal, BID, Vasile Carter PA-C, Given at 02/27/24 2230    nitroGLYCERIN SL tablet 0.4 mg, 0.4 mg, Sublingual, Q5 Min PRN, Tanner Rdz MD    NORepinephrine 32 mg in dextrose 5 % (D5W) 250 mL infusion, 0-3 mcg/kg/min, Intravenous, Continuous, Pablo Yepez MD, Last Rate: 22 mL/hr at 02/28/24 0835, 0.6 mcg/kg/min at 02/28/24 0835    ondansetron disintegrating tablet 8 mg, 8 mg, Oral, Q8H PRN, Tanner Rdz MD, 8 mg at 02/23/24 1302    ondansetron injection 4 mg, 4 mg, Intravenous, Q4H PRN, Vasile Carter PA-C    ondansetron injection 8 mg, 8 mg, Intravenous, Q6H PRN, Pablo Yepez MD, 8 mg at 02/26/24 1616    oxyCODONE immediate release tablet Tab 10 mg, 10 mg, Oral, Q4H PRN, Vasile Carter PA-C    pantoprazole EC tablet 40 mg, 40 mg, Oral, Daily, Tanner Rdz MD, 40 mg at 02/25/24 0804    PHENYLephrine 10 mg/mL injection, , , ,     PHENYLephrine 20 mg in sodium chloride 0.9% 250 mL infusion, 0-5 mcg/kg/min, Intravenous, Continuous, Pablo Yepez MD, Last Rate: 8.2 mL/hr at 02/28/24 0835, 0.14 mcg/kg/min at 02/28/24 0835    polyethylene glycol packet 17 g, 17 g, Oral, Daily, Tanner Rdz MD, 17 g at 02/24/24 0927    potassium chloride 20 mEq in 100 mL IVPB (FOR CENTRAL LINE ADMINISTRATION  ONLY), 20 mEq, Intravenous, PRN, Vasile Carter PA-C, Last Rate: 50 mL/hr at 02/28/24 0835, Rate Verify at 02/28/24 0835    potassium chloride 20 mEq in 100 mL IVPB (FOR CENTRAL LINE ADMINISTRATION ONLY), 40 mEq, Intravenous, PRN, Vasile Carter PA-C    potassium chloride 20 mEq in 100 mL IVPB (FOR CENTRAL LINE ADMINISTRATION ONLY), 60 mEq, Intravenous, PRN, Vasile Carter PA-C    simethicone chewable tablet 80 mg, 1 tablet, Oral, TID PRN, Tanner Rdz MD, 80 mg at 02/27/24 0918    sodium chloride 0.9% flush 10 mL, 10 mL, Intravenous, PRN, Tanner Rdz MD    sodium chloride 0.9% flush 10 mL, 10 mL, Intravenous, PRN, Millie Jimenez NP    sodium phosphate 15 mmol in dextrose 5 % (D5W) 250 mL IVPB, 15 mmol, Intravenous, PRN, Vasile Carter PA-C    sodium phosphate 20.01 mmol in dextrose 5 % (D5W) 250 mL IVPB, 20.01 mmol, Intravenous, PRN, Vasile Carter PA-C, Last Rate: 62.5 mL/hr at 02/28/24 0953, 20.01 mmol at 02/28/24 0953    sodium phosphate 30 mmol in dextrose 5 % (D5W) 250 mL IVPB, 30 mmol, Intravenous, PRN, Vasile Carter PA-C    sucralfate tablet 1 g, 1 g, Oral, QID (AC & HS), Vasile Carter PA-C  VTE Risk Mitigation (From admission, onward)           Ordered     heparin, porcine (PF) 100 unit/mL injection flush 500 Units  On Call Procedure         02/27/24 0718     heparin, porcine (PF) 100 unit/mL injection flush 500 Units  On Call Procedure         02/27/24 0257     Place SUSIE hose  Until discontinued         02/22/24 1458     Place sequential compression device  Until discontinued         02/21/24 2057                  Assessment:   CAD (Multivessel)    - Status Post CABG (3V) LIMA to LAD, SVG to OM1, SVG to RCA (2.27.24)    - RHC/Impella Placement and Pryor Catheter (2.23.24)- Impella Removal/Femoral Artery Repair in Surgery on 2.27.24    - LHC (2.19.24):pLAD lesion 70%, oLCx 80%, OM1 80%, OM2 1 lesion 70% 2nd lesion 50%, mLAD 50%, pRCA 60%  VHD/AS     - Status Post  Bio AVR (Epic max 23mm) (2.27.24)    - ECHO (2.16.24): Aortic Valve: There is moderate aortic valve sclerosis. Severely restricted motion. There is severe stenosis. Aortic valve area by VTI is 0.66 cm². Aortic valve peak velocity is 4.45 m/s. Mean gradient is 50 mmHg. The dimensionless index is 0.17.   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%     - ECHO (2.16.24): EF 40%, Grade II DD    - Small Pleural Effusions on CT Imaging (2.22.24)  Left Bundle Branch Block   VHD    - ECHO (2.16.24): Severe AS, mild MR, mild to moderate TR  JEAN-CLAUDE/CKD Stage II     - Baseline Cr 1.6  Anemia- Suspect Blood Loss    - Status Post Transfusion  Leukocytosis (Resolved)  No Hx of GI Bleed    Plan:   Continue Post Op Supportive Care  Continue Milrinone Infusion  Wean Vasopressor Support as Able for Goal MAP 65 or Greater  Bedside Cardioversion x 2 Failed- Will defer further shocks at this time and focus on correction of underlying driving factors.  There is concern for post operative bleeding which we are deferring to Surgical Team  Continue Supportive Care as per ICU Team    BLANQUITA Marsh  Cardiology  [facility]   02/28/2024    I agree with the findings of the complexity of problems addressed and take responsibility for the plan's risks and complications. I approved the plan documented by Harjinder Whittington NP.  Contiune supportive care  Dig added  Post-op  bleeding CTS aware

## 2024-02-28 NOTE — NURSING
Nurses Note -- 4 Eyes      2/28/2024   7:48 AM      Skin assessed during: Daily Assessment      [x] No Altered Skin Integrity Present    [x]Prevention Measures Documented      [] Yes- Altered Skin Integrity Present or Discovered   [] LDA Added if Not in Epic (Describe Wound)   [] New Altered Skin Integrity was Present on Admit and Documented in LDA   [] Wound Image Taken    Wound Care Consulted? No    Attending Nurse:  Izzy LOPEZ     Second RN/Staff Member:  Wendy LOPEZ

## 2024-02-28 NOTE — PROGRESS NOTES
Pulmonary & Critical Care Medicine   Progress Note      Presenting History/HPI:    The patient is a 78-year-old Andorran-speaking male with a history of aortic insufficiency/stenosis, coronary artery disease, chronic kidney disease stage 3, hypertension, atrial fibrillation on Eliquis, who presented to the ICU status post going to the cath lab for PCI.  Patient was transferred from [facility] after being admitted there on 2/15 with chest pain found to have NSTEMI with left heart catheterization initially on 02/20 demonstrating severe multivessel stenosis and was subsequently transferred here for CABG evaluation.  Patient was taken to the cath lab today and subsequently had an Impella placed and was transferred to the ICU with Impella currently set at P7.  Patient is awake alert moves all extremities follows commands with a Andor[place] language barrier.  He has no complaints at this time.     Significant Events:  -patient admitted to ICU on 02/23 status post Impella placement  -3v CABG with AVR, left atrial appendage ligation, impella explant, right femoral artery repair 2/27/24    Interval History:    Patient underwent 3v CABG along with bioprosthetic AVR, MOISE ligation, impella explant yesterday. Returned to ICU late in the evening intubated. Received significant blood products during surgery, 4u PRBC 2u FFP 2 u Platelets. He was anemic with significant chest tube output immediately postoperatively. H/H was 6.2/19.1. Received 2u PRBC overnight, H/H improved to 9.4/28.9. He also received 2u more platelets. Went into A-fib w/ RVR overnight. Started on amiodarone bolus + drip protocol. Also remains on epinephrine infusion 0.12 mcg/kg/min, slowly increasing in dose throughout the night. Also on milrinone 0.375 mcg/kg/min. Remains intubated this morning. ABG 7.34/37/63/20 on 10/620/+5/35%. Adjusted Rate/TV this morning.      Scheduled Medications:    allopurinoL  50 mg Oral Daily    amiodarone  200 mg Oral BID     aspirin  81 mg Oral Daily    atorvastatin  40 mg Oral QHS    ceFAZolin (Ancef) IV (PEDS and ADULTS)  2 g Intravenous Q12H    docusate sodium  100 mg Oral BID    famotidine (PF)  20 mg Intravenous Daily    ferrous sulfate  1 tablet Oral Every other day    folic acid  1 mg Oral Daily    lactated ringers  500 mL Intravenous Once    LIDOcaine HCl 2%   Mucous Membrane Once    metoprolol tartrate  12.5 mg Oral BID    mupirocin   Nasal BID    pantoprazole  40 mg Oral Daily    polyethylene glycol  17 g Oral Daily    sucralfate  1 g Oral QID (AC & HS)       PRN Medications:   0.9%  NaCl infusion (for blood administration), acetaminophen, acetaminophen, acetaminophen, albumin human 5%, calcium gluconate IVPB, calcium gluconate IVPB, calcium gluconate IVPB, dextrose 10%, dextrose 10%, heparin, porcine (PF), heparin, porcine (PF), HYDROcodone-acetaminophen, HYDROcodone-acetaminophen, HYDROmorphone, lactulose 10 gram/15 ml, loperamide, magnesium sulfate IVPB, magnesium sulfate IVPB, melatonin, metoclopramide, morphine, nitroGLYCERIN, ondansetron, ondansetron, ondansetron, oxyCODONE, potassium chloride in water, potassium chloride in water, potassium chloride in water, simethicone, sodium chloride 0.9%, sodium chloride 0.9%, sodium phosphate 15 mmol in dextrose 5 % (D5W) 250 mL IVPB, sodium phosphate 20.01 mmol in dextrose 5 % (D5W) 250 mL IVPB, sodium phosphate 30 mmol in dextrose 5 % (D5W) 250 mL IVPB      Infusions:     clevidipine Stopped (02/28/24 0115)    dexmedeTOMIDine (Precedex) infusion (titrating) Stopped (02/28/24 0229)    dextrose 5 % and 0.45 % NaCl 100 mL/hr at 02/28/24 0400    EPINEPHrine 0.06 mcg/kg/min (02/28/24 0400)    insulin regular 1 units/mL infusion orderable (CTS POST-OP) 1.6 Units/hr (02/28/24 0400)    loperamide      milrinone 0.375 mcg/kg/min (02/28/24 0453)    NORepinephrine bitartrate-D5W Stopped (02/25/24 2221)         Fluid Balance:     Intake/Output Summary (Last 24 hours) at 2/28/2024  0506  Last data filed at 2/28/2024 0400  Gross per 24 hour   Intake 8143.12 ml   Output 4140 ml   Net 4003.12 ml           Vital Signs:   Vitals:    02/28/24 0501   BP: (!) 96/58   Pulse: (!) 144   Resp: 17   Temp:          Physical Exam  Vitals and nursing note reviewed.   Constitutional:       General: He is not in acute distress.     Appearance: Normal appearance. He is ill-appearing.   HENT:      Head: Normocephalic and atraumatic.      Right Ear: External ear normal.      Left Ear: External ear normal.      Nose: Nose normal.      Mouth/Throat:      Mouth: Mucous membranes are moist.      Comments: ETT in place  Eyes:      Conjunctiva/sclera: Conjunctivae normal.      Pupils: Pupils are equal, round, and reactive to light.   Cardiovascular:      Rate and Rhythm: Tachycardia present. Rhythm irregular.      Heart sounds: No murmur heard.     No friction rub. No gallop.   Pulmonary:      Effort: Pulmonary effort is normal. No respiratory distress.      Breath sounds: No wheezing, rhonchi or rales.      Comments: Intubated  Abdominal:      General: Abdomen is flat.      Palpations: Abdomen is soft.      Tenderness: There is no abdominal tenderness.   Musculoskeletal:         General: No swelling or deformity. Normal range of motion.      Cervical back: Neck supple. No rigidity.   Skin:     General: Skin is warm and dry.      Capillary Refill: Capillary refill takes less than 2 seconds.   Neurological:      Mental Status: He is alert.      Comments: Intubated, sedated limiting neurologic evaluation           Ventilator Settings  Oxygen Concentration (%): 35 (02/28/24 0400)      Laboratory Studies:   Recent Labs   Lab 02/27/24  2041 02/28/24  0000   PH 7.404 7.430   PCO2 36.3 32.0*   PO2 117* 101.0*   HCO3 22.7* 21.2*   POCSATURATED 99  --    BE -2  --      Recent Labs   Lab 02/27/24  2227 02/28/24  0144   WBC 10.87  --    RBC 2.17*  --    HGB 6.2* 9.4*   HCT 19.1* 28.9*   PLT 70*  --    MCV 88.0  --    MCH 28.6  --     MCHC 32.5*  --        Recent Labs   Lab 02/27/24 2126   GLUCOSE 125*      K 3.8   CO2 21*   BUN 14.7   CREATININE 1.47*   CALCIUM 9.0   MG 2.60           Microbiology Data:   Microbiology Results (last 7 days)       Procedure Component Value Units Date/Time    MRSA Screen by PCR [[id number]]     Order Status: Canceled Specimen: Nasopharyngeal Swab from Nasal               Imaging:   X-Ray Chest AP Portable  Narrative: EXAMINATION:  XR CHEST AP PORTABLE    CLINICAL HISTORY:  Post-op;    TECHNIQUE:  Single frontal view of the chest was performed.    COMPARISON:  02/25/2024    FINDINGS:  Endotracheal tube NG tube [place] catheter left thoracotomy tube are in place.  Heart is enlarged.  There is vascular calcification noted.  Impression: Good position of lines and tubes, definite acute infiltrate is not seen    Electronically signed by: Natanael Howard MD  Date:    02/27/2024  Time:    21:57  Transesophageal echo (RAUL)  Please see the anesthesia P Note for RAUL results.          Assessment and Plan    Assessment:  Multivessel coronary artery disease s/p CABGx3 2/27/24  Moderate Aortic insufficiency, Severe aortic stenosis s/p AVR  Atrial fibrillation w/ RVR, on amiodarone infusion  Severe pulmonary hypertension, PASP of 69 mmHg  Acute on chronic systolic and diastolic CHF, left ventricular ejection fraction 40% with grade 2 diastolic dysfunction   Mild mitral regurgitation   Mild-to-moderate tricuspid regurgitation   Chronic kidney disease stage 3   Anemia requiring blood transfusion  Hypertension  Hemoptysis and hematuria - resolved      Plan:  -Continue post-operative management per CV surgery post op protocol  -Wean mechanical ventilation as tolerated per ARDSnet, extubate when appropriate.  -Titrate vasopressor support to maintain adequate BP with MAP >65  -Currently remains on amiodarone and milrinone infusions  -Continue amiodarone infusion as patient went into A-fib w/ RVR overnight. This morning he was  cardioverted x2 at 120 and 150J with minimal improvement in HR and BP  -Monitor surgical drain output for signs of bleeding. Transfuse as needed  -Multimodal pain control  -Continue all ongoing ICU care  -Continue aspirin, statin  -Continue to monitor renal function and urine output  -Resume anticoagulation when okay per CTS    DVT ppx/tx with SCD  GI ppx with pepcid    Flynn Bass MD  2/28/2024  Pulmonology/Critical Care

## 2024-02-28 NOTE — PROGRESS NOTES
CT SURGERY PROGRESS NOTE  Brain Snyder  78 y.o.  1945    Patients Procedure: Procedure(s) (LRB):  CORONARY ARTERY BYPASS GRAFT (CABG) (N/A)  Replacement-valve-aortic (N/A)  SURGICAL PROCUREMENT, VEIN, ENDOSCOPIC (N/A)  Impella, Removal (Right)    Subjective  Interval History: POD 1   Coronary artery bypass grafting X 3, LIMA to LAD, reversed SVG to OM1, Reversed Saphenous venous graft to RCA, bioprosthetic aortic valve replacement (Epic max 23mm), Endoscopic venous harvesting of left greater saphenous vein, Left atrial appendage ligation, explant of right femoral impella device, right femoral artery repair     ROS    Medication List  Infusions   amiodarone in dextrose 5% 1 mg/min (02/28/24 0600)    amiodarone in dextrose 5%      clevidipine Stopped (02/28/24 0115)    dexmedeTOMIDine (Precedex) infusion (titrating) Stopped (02/28/24 0229)    dextrose 5 % and 0.45 % NaCl 100 mL/hr at 02/28/24 0600    dilTIAZem 10 mg/hr (02/28/24 0653)    EPINEPHrine 0.18 mcg/kg/min (02/28/24 0600)    insulin regular 1 units/mL infusion orderable (CTS POST-OP) 3.8 Units/hr (02/28/24 0600)    loperamide      milrinone 0.375 mcg/kg/min (02/28/24 0600)    NORepinephrine bitartrate-D5W 0.02 mcg/kg/min (02/28/24 0602)     Scheduled   allopurinoL  50 mg Oral Daily    aspirin  81 mg Oral Daily    atorvastatin  40 mg Oral QHS    ceFAZolin (Ancef) IV (PEDS and ADULTS)  2 g Intravenous Q12H    docusate sodium  100 mg Oral BID    famotidine (PF)  20 mg Intravenous Daily    ferrous sulfate  1 tablet Oral Every other day    folic acid  1 mg Oral Daily    lactated ringers  500 mL Intravenous Once    LIDOcaine HCl 2%   Mucous Membrane Once    metoprolol tartrate  12.5 mg Oral BID    mupirocin   Nasal BID    pantoprazole  40 mg Oral Daily    polyethylene glycol  17 g Oral Daily    sucralfate  1 g Oral QID (AC & HS)       Objective:  Recent Vitals:  Temp:  [96.8 °F (36 °C)-98.8 °F (37.1 °C)] 98.4 °F (36.9 °C)  Pulse:  [] 122  Resp:   [9-38] 19  SpO2:  [93 %-100 %] 98 %  BP: ()/(46-89) 69/51  Arterial Line BP: ()/() 111/59    Physical Exam     I/O last 24 hrs:  Intake/Output - Last 3 Shifts         02/26 0700 02/27 0659 02/27 0700  02/28 0659 02/28 0700 02/29 0659    I.V. (mL/kg) 1430.5 (18.3) 2423.8 (31)     Blood  4301     IV Piggyback  1993.1     Total Intake(mL/kg) 1430.5 (18.3) 8717.9 (111.5)     Urine (mL/kg/hr) 3260 (1.7) 3110 (1.7)     Stool       Chest Tube  960     Total Output 3260 4070     Net -1829.5 +4647.9                    Labs  ABGs:   Recent Labs   Lab 02/27/24  2041 02/28/24  0000 02/28/24  0555   PH 7.404   < > 7.340*   PCO2 36.3   < > 37.0   PO2 117*   < > 63.0*   HCO3 22.7*   < > 20.0*   POCSATURATED 99  --   --    BE -2  --   --     < > = values in this interval not displayed.     BMP:   Recent Labs   Lab 02/28/24  0459      K 3.5   CO2 19*   BUN 15.8   CREATININE 1.86*   CALCIUM 8.3*   MG 2.20     CBC:   Recent Labs   Lab 02/28/24  0459   WBC 12.78*   RBC 3.12*   HGB 9.0*   HCT 27.3*      MCV 87.5   MCH 28.8   MCHC 33.0     CMP:   Recent Labs   Lab 02/28/24  0459   CALCIUM 8.3*   ALBUMIN 3.2*      K 3.5   CO2 19*   BUN 15.8   CREATININE 1.86*   ALKPHOS 40   ALT 15   AST 37*   BILITOT 1.0         Imaging:   CXR: X-Ray Chest AP Portable    Result Date: 2/28/2024  No significant change as compared with the previous exam Electronically signed by: Ananth Coker Date:    02/28/2024 Time:    06:39    X-Ray Chest AP Portable    Result Date: 2/27/2024  Good position of lines and tubes, definite acute infiltrate is not seen Electronically signed by: Natanael Howard MD Date:    02/27/2024 Time:    21:57         ASSESSMENT/PLAN:    Afib RVR 130s BP 70s - now on IV cardizem and amio- may need cardioversion   Was hemodyn stable until converted to afib  Elevated creat 1.86 UO satis   Wean vent as tolerated    Case and plan of care discussed with MD Vasile Carter PA-C

## 2024-02-28 NOTE — TRANSFER OF CARE
"Anesthesia Transfer of Care Note    Patient: Brain Snyder    Procedure(s) Performed: Procedure(s) (LRB):  CORONARY ARTERY BYPASS GRAFT (CABG) (N/A)  Replacement-valve-aortic (N/A)  SURGICAL PROCUREMENT, VEIN, ENDOSCOPIC (N/A)  Impella, Removal (Right)    Patient location: ICU    Anesthesia Type: general    Transport from OR: Transported from OR intubated on 100% O2 by AMBU with assisted ventilation. Upon arrival to PACU/ICU, patient attached to ventilator and auscultated to confirm bilateral breath sounds and adequate TV. Continuous ECG monitoring in transport. Continuous SpO2 monitoring in transport. Continuos invasive BP monitoring in transport    Post pain: adequate analgesia    Post assessment: no apparent anesthetic complications and tolerated procedure well    Post vital signs: stable    Level of consciousness: sedated    Nausea/Vomiting: no nausea/vomiting    Complications: none    Transfer of care protocol was followed      Last vitals: Visit Vitals  BP (!) 142/71   Pulse 78   Temp 36 °C (96.8 °F)   Resp 15   Ht 5' 5" (1.651 m)   Wt 78.2 kg (172 lb 6.4 oz)   SpO2 99%   BMI 28.69 kg/m²     "

## 2024-02-28 NOTE — PROCEDURES
Arterial Catheter Insertion Procedure Note     Procedure: Insertion of Arterial Catheter     Indications: Hemodynamic Monitoring     Procedure Details   I personally performed the procedure with the assistance of my colleague, Dr. Bass.     Maximum sterile technique was used including antiseptics, cap, sterile gloves, sterile gown, hand hygiene, mask and sterile maximum barrier drape.     Under sterile conditions the skin above the L radial  artery was prepped with Chloroprep and covered with a sterile drape. Local anesthesia was applied to the skin and subcutaneous tissues. Ultrasound guidance was used to identify the artery. A 20 -gauge catheter over a needle was then inserted into the artery. A guide wire was then passed easily through the needle. The needle was then withdrawn. A arterial catheter was then inserted into the vessel over the guide wire. The needle was then withdrawn and was connected to the monitor to ensure a proper waveform. The catheter was sutured into place and a sterile dressing was applied.     Ultrasound/Sonosite was used during the procedure.      Estimated blood loss: 2 ml    Patient tolerated procedure well.      Fransico Schrader DO  2/27/2024

## 2024-02-28 NOTE — OP NOTE
Date of procedure: 2/27/2024    Surgeon: Nita Contreras MD    Co-surgeon: Real Templeton MD    Assistant: HENRY Callahan, Ahsan Sal, PGY 4    Preop diagnosis:  Severe coronary artery disease, severe aortic stenosis, atrial fibrillation    Postop diagnosis: Same    Procedure:  Coronary artery bypass grafting X 3, LIMA to LAD, reversed SVG to OM1, Reversed Saphenous venous graft to RCA, bioprosthetic aortic valve replacement (Epic max 23mm), Endoscopic venous harvesting of left greater saphenous vein, Left atrial appendage ligation, explant of right femoral impella device, right femoral artery repair    Anesthesia:  General    Under informed consent the  patient was taken the OR, put in a supine position and  general anesthesia was induced and therefore maintained for remainder of the procedure.All the pressure points were padded equally. The skin over the chest abdomen and legs was prepped and draped in the usual sterile fashion. IV antibiotics were administered. Anesthesia has placed the appropriate lines.      Endoscopic venous harvesting was performed left lower extremity with good quality vein.  A Midline incision was made followed by taking down the subcutaneous tissue and fascia exposing the sternum that was penetrated in the midline. The left internal mammary artery was harvested on a pedicle, the patient was heparinized, the mammary was then divided distally. A midline retractor was placed, the pericardium was opened and pericardial stay sutures were placed. The impella device was then placed on P1 and pulled back into the descending aorta. Ascending aortic cannulation was performed, the distal pedicle was ligated and clipped and proximal pedicle was controlled with a bulldog.The mammary had good flow in it.  Dual stage venous cannula was placed in the right atrium and when the ACT was therapeutic the patient went on full cardiopulmonary bypass with good emptying.  Cross-clamp was placed followed by  antegrade dose of cardioplegia, 1L of del nido was given.  We had good either isoelectricity throughout the whole case.  The patient's temperature was allowed to drift to 34° C. The root vent was turned on following administration of cardioplegia.     The left atrial appendage was examined, a 50mm atriclip flex v was used to ligate the base of the appendage.    An oblique aortotomy was made 1cm above the RCA, and extended towards the non-coronary sinus. The aortic valve was visualized and noted to be very calcified. The valve leaflets were excised and debrided, interrupted 2-0 tevdek sutures were placed in the annulus. The annulus was sized and found to be about 23mm, the bioprosthetic epic max 23mm valve was opened and brought to the field. The sutures were passed through the sewing ring of the valve, and the valve was lowered into the annulus. Cor-knots were used to tie down the sutures, the valve was below the level of the left and right main coronary arteries. The aortotomy was then closed with running 4-0 prolene sutures. An additional 1L dose of del nido cardioplegia was given.     Examination over the lateral aspect of the heart revealed the 1st OM vessel.  This was opened with a blade and this was anastomosed to a reverse segment of saphenous vein in a running fashion with 7-0 prolene with good flow down the graft.  The vein was cut to the appropriate length.  Next the right coronary artery was examined this was opened at the level of distal RCA.  This was anastomosed to reverse segment of saphenous vein with good flow down the graft, the vein was cut to the appropriate lengths.  A slit was made in the pericardium, the LAD was opened in the mid epicardium.  This was anastomosed to the mammary in running fashion with 7-0 prolene, the mammary pedicle was tacked to the epicardium.  When the mammary bulldog was released there was brisk blood flow down the LAD indicating a very patent anastomosis.  The patient  was being rewarmed, the cross-clamp was removed and a  partial occlusion clamp was placed.  Two arteriotomies were performed and a 4.8 mm punch was used, the proximal anastomoses were made with running 6-0 Prolene.  The partial occluding clamp was released.  The left chest was evacuated of blood.  The proximal and distal anastomosis were checked for hemostasis.  When the patient was warm, he came off pump with no problem.  Heparin was reversed with protamine. RAUL showed appropriate functioning bioprosthetic aortic valve, with no PVL The mediastinum and left chest were drained. The patient was decannulated.  The sternum was wired and the wounds were closed in layers absorbable sutures.      We then turned our attention to the right groin where the impella device was located. We cut down on the impella device till the right femoral artery was located. The impella device was carefully removed from the artery, a partial occluding clamp was placed on the artery, running 4-0 prolene suture was used to repair the arteriotomy of the femoral artery. We had good proximal and distal pulse after the repair. The incision was then closed in layers, with running absorbable sutures.     The patient tolerated the procedure well. He was transferred to the ICU in acceptable condition.    CPB time: 206 mins    Cross clamp time: 147 mins    Dispostion: ICU

## 2024-02-28 NOTE — BRIEF OP NOTE
[facility]  Cardiothoracic Surgery  Operative Note    SUMMARY     Date of Procedure: 2/27/2024     Procedure: Procedure(s) (LRB):  CORONARY ARTERY BYPASS GRAFT (CABG) (N/A)  Replacement-valve-aortic (N/A)  SURGICAL PROCUREMENT, VEIN, ENDOSCOPIC (N/A)  Impella, Removal (Right)    Surgeon(s) and Role:     * Nita Contreras MD - Primary     * Real Templeton MD - Assisting    Assistant: Vasile Carter PA-C    Pre-Operative Diagnosis: Atherosclerosis of native coronary artery of native heart without angina pectoris [I25.10]  Aortic valve disease [I35.9]    Post-Operative Diagnosis: Post-Op Diagnosis Codes:     * Atherosclerosis of native coronary artery of native heart without angina pectoris [I25.10]     * Aortic valve disease [I35.9]    Anesthesia: General    Operative Findings (including complications, if any):            Specimens:   Specimen (24h ago, onward)       Start     Ordered    02/27/24 1653  Specimen to Pathology  RELEASE UPON ORDERING        References:    Click here for ordering Quick Tip   Question:  Release to patient  Answer:  Immediate    02/27/24 5657                            Condition: Stable    Disposition: ICU - intubated and hemodynamically stable.

## 2024-02-29 LAB
ABO + RH BLD: NORMAL
ABS NEUT (OLG): NORMAL
ALBUMIN SERPL-MCNC: 2.9 G/DL (ref 3.4–4.8)
ALBUMIN/GLOB SERPL: 1.9 RATIO (ref 1.1–2)
ALLENS TEST BLOOD GAS (OHS): ABNORMAL
ALP SERPL-CCNC: 39 UNIT/L (ref 40–150)
ALT SERPL-CCNC: 12 UNIT/L (ref 0–55)
AST SERPL-CCNC: 34 UNIT/L (ref 5–34)
BASE EXCESS BLD CALC-SCNC: -1.7 MMOL/L (ref -2–2)
BILIRUB SERPL-MCNC: 0.7 MG/DL
BLD PROD TYP BPU: NORMAL
BLOOD GAS SAMPLE TYPE (OHS): ABNORMAL
BLOOD UNIT EXPIRATION DATE: NORMAL
BLOOD UNIT TYPE CODE: 5100
BUN SERPL-MCNC: 17.2 MG/DL (ref 8.4–25.7)
CA-I BLD-SCNC: 1.1 MMOL/L (ref 1.12–1.23)
CALCIUM SERPL-MCNC: 7.6 MG/DL (ref 8.8–10)
CHLORIDE SERPL-SCNC: 109 MMOL/L (ref 98–107)
CO2 BLDA-SCNC: 24.2 MMOL/L
CO2 SERPL-SCNC: 21 MMOL/L (ref 23–31)
CREAT SERPL-MCNC: 1.99 MG/DL (ref 0.73–1.18)
CROSSMATCH INTERPRETATION: NORMAL
DISPENSE STATUS: NORMAL
DRAWN BY BLOOD GAS (OHS): ABNORMAL
ERYTHROCYTE [DISTWIDTH] IN BLOOD BY AUTOMATED COUNT: 15.4 % (ref 11.5–17)
GFR SERPLBLD CREATININE-BSD FMLA CKD-EPI: 34 MLS/MIN/1.73/M2
GLOBULIN SER-MCNC: 1.5 GM/DL (ref 2.4–3.5)
GLUCOSE SERPL-MCNC: 161 MG/DL (ref 82–115)
HCO3 BLDA-SCNC: 23 MMOL/L (ref 22–26)
HCT VFR BLD AUTO: 26.1 % (ref 42–52)
HGB BLD-MCNC: 8.8 G/DL (ref 14–18)
INHALED O2 CONCENTRATION: 30 %
LYMPHOCYTES NFR BLD MANUAL: 12 %
MAGNESIUM SERPL-MCNC: 2.1 MG/DL (ref 1.6–2.6)
MCH RBC QN AUTO: 28.9 PG (ref 27–31)
MCHC RBC AUTO-ENTMCNC: 33.7 G/DL (ref 33–36)
MCV RBC AUTO: 85.9 FL (ref 80–94)
MECH RR (OHS): 20 B/MIN
MODE (OHS): AC
MONOCYTES NFR BLD MANUAL: 11 %
NEUTROPHILS NFR BLD MANUAL: 77 %
NRBC BLD AUTO-RTO: 0 %
PCO2 BLDA: 38 MMHG (ref 35–45)
PEEP RESPIRATORY: 5 CMH2O
PH BLDA: 7.39 [PH] (ref 7.35–7.45)
PHOSPHATE SERPL-MCNC: 3.6 MG/DL (ref 2.3–4.7)
PLATELET # BLD AUTO: 106 X10(3)/MCL (ref 130–400)
PLATELETS.RETICULATED NFR BLD AUTO: 6.9 % (ref 0.9–11.2)
PMV BLD AUTO: 10.5 FL (ref 7.4–10.4)
PO2 BLDA: 71 MMHG (ref 80–100)
POCT GLUCOSE: 145 MG/DL (ref 70–110)
POCT GLUCOSE: 149 MG/DL (ref 70–110)
POCT GLUCOSE: 149 MG/DL (ref 70–110)
POCT GLUCOSE: 156 MG/DL (ref 70–110)
POTASSIUM BLOOD GAS (OHS): 3.9 MMOL/L (ref 3.5–5)
POTASSIUM SERPL-SCNC: 4.3 MMOL/L (ref 3.5–5.1)
PROT SERPL-MCNC: 4.4 GM/DL (ref 5.8–7.6)
PSYCHE PATHOLOGY RESULT: NORMAL
RBC # BLD AUTO: 3.04 X10(6)/MCL (ref 4.7–6.1)
SAMPLE SITE BLOOD GAS (OHS): ABNORMAL
SAO2 % BLDA: 94 %
SODIUM BLOOD GAS (OHS): 133 MMOL/L (ref 137–145)
SODIUM SERPL-SCNC: 136 MMOL/L (ref 136–145)
SPONT+MECH VT ON VENT: 470 ML
UNIT NUMBER: NORMAL
WBC # SPEC AUTO: 14.85 X10(3)/MCL (ref 4.5–11.5)

## 2024-02-29 PROCEDURE — 99900035 HC TECH TIME PER 15 MIN (STAT)

## 2024-02-29 PROCEDURE — 37799 UNLISTED PX VASCULAR SURGERY: CPT

## 2024-02-29 PROCEDURE — 94761 N-INVAS EAR/PLS OXIMETRY MLT: CPT | Mod: XB

## 2024-02-29 PROCEDURE — 25000003 PHARM REV CODE 250: Performed by: PHYSICIAN ASSISTANT

## 2024-02-29 PROCEDURE — 20000000 HC ICU ROOM

## 2024-02-29 PROCEDURE — P9016 RBC LEUKOCYTES REDUCED: HCPCS | Performed by: INTERNAL MEDICINE

## 2024-02-29 PROCEDURE — 25000003 PHARM REV CODE 250

## 2024-02-29 PROCEDURE — 99024 POSTOP FOLLOW-UP VISIT: CPT | Mod: ,,,

## 2024-02-29 PROCEDURE — 80053 COMPREHEN METABOLIC PANEL: CPT | Performed by: INTERNAL MEDICINE

## 2024-02-29 PROCEDURE — 94003 VENT MGMT INPAT SUBQ DAY: CPT

## 2024-02-29 PROCEDURE — S5010 5% DEXTROSE AND 0.45% SALINE: HCPCS | Performed by: PHYSICIAN ASSISTANT

## 2024-02-29 PROCEDURE — 63600175 PHARM REV CODE 636 W HCPCS: Performed by: INTERNAL MEDICINE

## 2024-02-29 PROCEDURE — 84100 ASSAY OF PHOSPHORUS: CPT | Performed by: PHYSICIAN ASSISTANT

## 2024-02-29 PROCEDURE — 27000221 HC OXYGEN, UP TO 24 HOURS

## 2024-02-29 PROCEDURE — 63600175 PHARM REV CODE 636 W HCPCS: Mod: JZ,JG | Performed by: PHYSICIAN ASSISTANT

## 2024-02-29 PROCEDURE — 63600175 PHARM REV CODE 636 W HCPCS: Performed by: PHYSICIAN ASSISTANT

## 2024-02-29 PROCEDURE — 83735 ASSAY OF MAGNESIUM: CPT | Performed by: PHYSICIAN ASSISTANT

## 2024-02-29 PROCEDURE — 25000003 PHARM REV CODE 250: Performed by: INTERNAL MEDICINE

## 2024-02-29 PROCEDURE — 85027 COMPLETE CBC AUTOMATED: CPT | Performed by: INTERNAL MEDICINE

## 2024-02-29 PROCEDURE — 63600175 PHARM REV CODE 636 W HCPCS

## 2024-02-29 PROCEDURE — 82803 BLOOD GASES ANY COMBINATION: CPT

## 2024-02-29 RX ORDER — FUROSEMIDE 10 MG/ML
40 INJECTION INTRAMUSCULAR; INTRAVENOUS ONCE
Status: COMPLETED | OUTPATIENT
Start: 2024-02-29 | End: 2024-02-29

## 2024-02-29 RX ORDER — HYDROCODONE BITARTRATE AND ACETAMINOPHEN 500; 5 MG/1; MG/1
TABLET ORAL
Status: DISCONTINUED | OUTPATIENT
Start: 2024-02-29 | End: 2024-02-29

## 2024-02-29 RX ADMIN — MORPHINE SULFATE 4 MG: 4 INJECTION, SOLUTION INTRAMUSCULAR; INTRAVENOUS at 03:02

## 2024-02-29 RX ADMIN — SUCRALFATE 1 G: 1 TABLET ORAL at 08:02

## 2024-02-29 RX ADMIN — ASPIRIN 81 MG: 81 TABLET, COATED ORAL at 09:02

## 2024-02-29 RX ADMIN — HYDROMORPHONE HYDROCHLORIDE 0.5 MG: 2 INJECTION INTRAMUSCULAR; INTRAVENOUS; SUBCUTANEOUS at 09:02

## 2024-02-29 RX ADMIN — DOCUSATE SODIUM 100 MG: 100 CAPSULE, LIQUID FILLED ORAL at 09:02

## 2024-02-29 RX ADMIN — FOLIC ACID 1 MG: 1 TABLET ORAL at 09:02

## 2024-02-29 RX ADMIN — DEXTROSE AND SODIUM CHLORIDE: 5; 450 INJECTION, SOLUTION INTRAVENOUS at 12:02

## 2024-02-29 RX ADMIN — METOPROLOL TARTRATE 12.5 MG: 25 TABLET, FILM COATED ORAL at 09:02

## 2024-02-29 RX ADMIN — CALCIUM GLUCONATE 1 G: 20 INJECTION, SOLUTION INTRAVENOUS at 07:02

## 2024-02-29 RX ADMIN — MORPHINE SULFATE 4 MG: 4 INJECTION, SOLUTION INTRAMUSCULAR; INTRAVENOUS at 12:02

## 2024-02-29 RX ADMIN — HYDROCODONE BITARTRATE AND ACETAMINOPHEN 1 TABLET: 5; 325 TABLET ORAL at 10:02

## 2024-02-29 RX ADMIN — PANTOPRAZOLE SODIUM 40 MG: 40 TABLET, DELAYED RELEASE ORAL at 09:02

## 2024-02-29 RX ADMIN — ATORVASTATIN CALCIUM 40 MG: 40 TABLET, FILM COATED ORAL at 08:02

## 2024-02-29 RX ADMIN — METOPROLOL TARTRATE 12.5 MG: 25 TABLET, FILM COATED ORAL at 08:02

## 2024-02-29 RX ADMIN — ALLOPURINOL 50 MG: 300 TABLET ORAL at 09:02

## 2024-02-29 RX ADMIN — MUPIROCIN: 20 OINTMENT TOPICAL at 08:02

## 2024-02-29 RX ADMIN — FUROSEMIDE 40 MG: 10 INJECTION, SOLUTION INTRAMUSCULAR; INTRAVENOUS at 11:02

## 2024-02-29 RX ADMIN — FAMOTIDINE 20 MG: 10 INJECTION, SOLUTION INTRAVENOUS at 09:02

## 2024-02-29 RX ADMIN — MUPIROCIN: 20 OINTMENT TOPICAL at 09:02

## 2024-02-29 RX ADMIN — DOCUSATE SODIUM 100 MG: 100 CAPSULE, LIQUID FILLED ORAL at 08:02

## 2024-02-29 RX ADMIN — POLYETHYLENE GLYCOL 3350 17 G: 17 POWDER, FOR SOLUTION ORAL at 09:02

## 2024-02-29 RX ADMIN — AMIODARONE HYDROCHLORIDE 0.5 MG/MIN: 50 INJECTION, SOLUTION INTRAVENOUS at 03:02

## 2024-02-29 RX ADMIN — FERROUS SULFATE TAB 325 MG (65 MG ELEMENTAL FE) 1 EACH: 325 (65 FE) TAB at 09:02

## 2024-02-29 RX ADMIN — OXYCODONE HYDROCHLORIDE 10 MG: 10 TABLET ORAL at 02:02

## 2024-02-29 NOTE — PROGRESS NOTES
Pulmonary & Critical Care Medicine   Progress Note      Presenting History/HPI:    The patient is a 78-year-old Slovak-speaking male with a history of aortic insufficiency/stenosis, coronary artery disease, chronic kidney disease stage 3, hypertension, atrial fibrillation on Eliquis, who presented to the ICU status post going to the cath lab for PCI.  Patient was transferred from [facility] after being admitted there on 2/15 with chest pain found to have NSTEMI with left heart catheterization initially on 02/20 demonstrating severe multivessel stenosis and was subsequently transferred here for CABG evaluation.  Patient was taken to the cath lab today and subsequently had an Impella placed and was transferred to the ICU with Impella currently set at P7.  Patient is awake alert moves all extremities follows commands with a Slovak language barrier.  He has no complaints at this time.     Significant Events:  -patient admitted to ICU on 02/23 status post Impella placement  -3v CABG with AVR, left atrial appendage ligation, impella explant, right femoral artery repair 2/27/24    Interval History:    Patient self extubated early this morning.  He is on 2 L NC saturating 100%.  He is not on any vasopressor support at this time, and is also off milrinone.  He remains on amiodarone infusion, with heart rate in the 70s, sinus rhythm. H/H did drop this morning to 8.8 / 26.1. 1700 cc chest tube output last 24 hours. Renal function slowly improving, though UOP about 750 cc last 24 hours.     Scheduled Medications:    allopurinoL  50 mg Oral Daily    aspirin  81 mg Oral Daily    atorvastatin  40 mg Oral QHS    docusate sodium  100 mg Oral BID    famotidine (PF)  20 mg Intravenous Daily    ferrous sulfate  1 tablet Oral Every other day    folic acid  1 mg Oral Daily    lactated ringers  500 mL Intravenous Once    LIDOcaine HCl 2%   Mucous Membrane Once    metoprolol tartrate  12.5 mg Oral BID    mupirocin   Nasal  BID    pantoprazole  40 mg Oral Daily    polyethylene glycol  17 g Oral Daily    sucralfate  1 g Oral QID (AC & HS)       PRN Medications:   0.9%  NaCl infusion (for blood administration), 0.9%  NaCl infusion (for blood administration), 0.9%  NaCl infusion (for blood administration), 0.9%  NaCl infusion (for blood administration), acetaminophen, acetaminophen, acetaminophen, albumin human 5%, calcium gluconate IVPB, calcium gluconate IVPB, calcium gluconate IVPB, dextrose 10%, dextrose 10%, dextrose 10%, dextrose 10%, electrolyte-A, glucagon (human recombinant), heparin, porcine (PF), heparin, porcine (PF), HYDROcodone-acetaminophen, HYDROcodone-acetaminophen, HYDROmorphone, insulin aspart U-100, lactulose 10 gram/15 ml, loperamide, magnesium sulfate IVPB, magnesium sulfate IVPB, melatonin, metoclopramide, morphine, nitroGLYCERIN, ondansetron, ondansetron, ondansetron, oxyCODONE, potassium chloride in water, potassium chloride in water, potassium chloride in water, simethicone, sodium chloride 0.9%, sodium chloride 0.9%, sodium phosphate 15 mmol in dextrose 5 % (D5W) 250 mL IVPB, sodium phosphate 20.01 mmol in dextrose 5 % (D5W) 250 mL IVPB, sodium phosphate 30 mmol in dextrose 5 % (D5W) 250 mL IVPB      Infusions:     amiodarone in dextrose 5% 0.5 mg/min (02/29/24 0700)    clevidipine Stopped (02/28/24 0115)    dexmedeTOMIDine (Precedex) infusion (titrating) Stopped (02/28/24 0229)    dextrose 5 % and 0.45 % NaCl 100 mL/hr at 02/29/24 0700    dilTIAZem Stopped (02/28/24 0808)    EPINEPHrine Stopped (02/28/24 0621)    insulin regular 1 units/mL infusion orderable (CTS POST-OP) Stopped (02/28/24 1638)    loperamide      milrinone Stopped (02/29/24 0005)    NORepinephrine bitartrate-D5W Stopped (02/28/24 1908)    phenylephrine Stopped (02/28/24 2103)         Fluid Balance:     Intake/Output Summary (Last 24 hours) at 2/29/2024 0722  Last data filed at 2/29/2024 0700  Gross per 24 hour   Intake 5752.96 ml   Output 2443  ml   Net 3309.96 ml           Vital Signs:   Vitals:    02/29/24 0700   BP: 115/68   Pulse: 75   Resp: 18   Temp:          Physical Exam  Vitals and nursing note reviewed.   Constitutional:       General: He is not in acute distress.     Appearance: Normal appearance. He is ill-appearing.   HENT:      Head: Normocephalic and atraumatic.      Right Ear: External ear normal.      Left Ear: External ear normal.      Nose: Nose normal.      Mouth/Throat:      Mouth: Mucous membranes are moist.   Eyes:      Conjunctiva/sclera: Conjunctivae normal.      Pupils: Pupils are equal, round, and reactive to light.   Cardiovascular:      Rate and Rhythm: Normal rate and regular rhythm.      Heart sounds: No murmur heard.     No friction rub. No gallop.   Pulmonary:      Effort: Tachypnea present.      Breath sounds: No wheezing, rhonchi or rales.      Comments: Coarse bilaterally  Abdominal:      General: Abdomen is flat.      Palpations: Abdomen is soft.      Tenderness: There is no abdominal tenderness.   Musculoskeletal:         General: No swelling or deformity. Normal range of motion.      Cervical back: Neck supple. No rigidity.   Skin:     General: Skin is warm and dry.      Capillary Refill: Capillary refill takes less than 2 seconds.   Neurological:      General: No focal deficit present.      Mental Status: He is alert. Mental status is at baseline.           Ventilator Settings  Vent Mode: A/C (02/29/24 0508)  Set Rate: 22 BPM (02/29/24 0508)  Vt Set: 470 mL (02/29/24 0508)  PEEP/CPAP: 5 cmH20 (02/29/24 0508)  Oxygen Concentration (%): 30 (02/29/24 0600)  Peak Airway Pressure: 20 cmH20 (02/29/24 0508)  Total Ve: 9.9 L/m (02/29/24 0508)  F/VT Ratio<105 (RSBI): (!) 51.92 (02/29/24 0305)      Laboratory Studies:   Recent Labs   Lab 02/29/24 0512   PH 7.390   PCO2 38.0   PO2 71.0*   HCO3 23.0       Recent Labs   Lab 02/29/24 0221   WBC 14.85*   RBC 3.04*   HGB 8.8*   HCT 26.1*   *   MCV 85.9   MCH 28.9   MCHC  33.7       Recent Labs   Lab 02/29/24  0221   GLUCOSE 161*      K 4.3   CO2 21*   BUN 17.2   CREATININE 1.99*   CALCIUM 7.6*   MG 2.10           Microbiology Data:   Microbiology Results (last 7 days)       Procedure Component Value Units Date/Time    MRSA Screen by PCR [[id number]]     Order Status: Canceled Specimen: Nasopharyngeal Swab from Nasal               Imaging:   X-Ray Chest AP Portable  Narrative: EXAMINATION:  XR CHEST AP PORTABLE    CLINICAL HISTORY:  Post-op;    TECHNIQUE:  Frontal view(s) of the chest.    COMPARISON:  Radiography 02/28/2024    FINDINGS:  New mild hazy opacity in the lower right lung, likely small pleural effusion.  No significant change in left lung opacity and cardiomegaly.  No definitive pneumothorax.  Impression: New small right pleural effusion.  Similar left lung opacity.    Electronically signed by: Roly Aaron  Date:    02/29/2024  Time:    06:16          Assessment and Plan    Assessment:  Multivessel coronary artery disease s/p CABGx3 2/27/24  Moderate Aortic insufficiency, Severe aortic stenosis s/p AVR  Atrial fibrillation w/ RVR, on amiodarone infusion, now in sinus rhythm  Severe pulmonary hypertension, PASP of 69 mmHg  Acute on chronic systolic and diastolic CHF, left ventricular ejection fraction 40% with grade 2 diastolic dysfunction   Mild mitral regurgitation   Mild-to-moderate tricuspid regurgitation   Chronic kidney disease stage 3   Anemia requiring blood transfusion  Hypertension  Hemoptysis and hematuria - resolved      Plan:  -Continue post-operative management per CV surgery post op protocol  -Now off vasopressors and milrinone  -Continue amiodarone infusion, currently in sinus rhythm HR in 70s  -Monitor surgical drain output for signs of bleeding. Transfuse as needed - receiving 1u PRBC this morning  -Multimodal pain control  -Continue aspirin, statin  -Continue to monitor renal function and urine output  -May benefit from diuresis, has pleural  effusions pulmonary vascular congestion  -Resume anticoagulation when okay per CTS  -Continue all ongoing ICU care    DVT ppx/tx with SCD  GI ppx with pepcid    Flynn Bass MD  2/29/2024  Pulmonology/Critical Care

## 2024-02-29 NOTE — PROGRESS NOTES
CT SURGERY PROGRESS NOTE  Brain Snyder  78 y.o.  1945    Patients Procedure: Procedure(s) (LRB):  CORONARY ARTERY BYPASS GRAFT (CABG) (N/A)  Replacement-valve-aortic (N/A)  SURGICAL PROCUREMENT, VEIN, ENDOSCOPIC (N/A)  Impella, Removal (Right)    Subjective  Interval History: Patient is postoperative day 2 from CABG x 3, ligation of left atrial appendage, and explant of right femoral impella device.  Self extubated earlier this morning.  Resting comfortably in bed on 2 L nasal cannula.   Off all vasopressor support.  Milrinone also off.  Currently in NSR on tele.      Review of Systems   Constitutional:  Negative for chills, fever and malaise/fatigue.   Respiratory:  Negative for cough, shortness of breath and wheezing.    Cardiovascular:  Positive for chest pain (incisional). Negative for palpitations.   Gastrointestinal:  Negative for nausea and vomiting.       Medication List  Infusions   amiodarone in dextrose 5% 0.5 mg/min (02/29/24 0700)    clevidipine Stopped (02/28/24 0115)    dexmedeTOMIDine (Precedex) infusion (titrating) Stopped (02/28/24 0229)    dextrose 5 % and 0.45 % NaCl 100 mL/hr at 02/29/24 0700    dilTIAZem Stopped (02/28/24 0808)    EPINEPHrine Stopped (02/28/24 0621)    insulin regular 1 units/mL infusion orderable (CTS POST-OP) Stopped (02/28/24 1638)    loperamide      milrinone Stopped (02/29/24 0005)    NORepinephrine bitartrate-D5W Stopped (02/28/24 1908)    phenylephrine Stopped (02/28/24 2103)     Scheduled   allopurinoL  50 mg Oral Daily    aspirin  81 mg Oral Daily    atorvastatin  40 mg Oral QHS    docusate sodium  100 mg Oral BID    famotidine (PF)  20 mg Intravenous Daily    ferrous sulfate  1 tablet Oral Every other day    folic acid  1 mg Oral Daily    lactated ringers  500 mL Intravenous Once    LIDOcaine HCl 2%   Mucous Membrane Once    metoprolol tartrate  12.5 mg Oral BID    mupirocin   Nasal BID    pantoprazole  40 mg Oral Daily    polyethylene glycol  17 g Oral Daily     sucralfate  1 g Oral QID (AC & HS)       Objective:  Recent Vitals:  Temp:  [97.6 °F (36.4 °C)-99 °F (37.2 °C)] 98.2 °F (36.8 °C)  Pulse:  [] 68  Resp:  [16-28] 22  SpO2:  [95 %-100 %] 96 %  BP: ()/(52-91) 112/62  Arterial Line BP: ()/(48-82) 112/53    Physical Exam  Constitutional:       General: He is not in acute distress.     Interventions: Nasal cannula in place.   HENT:      Head: Normocephalic and atraumatic.   Cardiovascular:      Rate and Rhythm: Normal rate and regular rhythm.      Pulses: Normal pulses.      Heart sounds: Normal heart sounds. No murmur heard.     No friction rub. No gallop.   Pulmonary:      Effort: Pulmonary effort is normal. No respiratory distress.      Breath sounds: Decreased breath sounds and rhonchi present. No wheezing or rales.      Comments: Chest tubes in place  Abdominal:      General: There is no distension.      Palpations: Abdomen is soft.   Skin:     General: Skin is warm and dry.      Comments: Median sternotomy incision c/d/I  Bilateral groin incisions c/d/I.  No evidence of hematoma   Neurological:      Mental Status: He is alert and oriented to person, place, and time.   Psychiatric:         Mood and Affect: Mood normal.         Behavior: Behavior normal.          I/O last 24 hrs:  Intake/Output - Last 3 Shifts         02/27 0700  02/28 0659 02/28 0700 02/29 0659 02/29 0700  03/01 0659    I.V. (mL/kg) 2423.8 (31) 5767.9 (73.8) 116.8 (1.5)    Blood 4301 863 298    NG/GT  60     IV Piggyback 1993.1 650     Total Intake(mL/kg) 8717.9 (111.5) 7341 (93.9) 414.8 (5.3)    Urine (mL/kg/hr) 3110 (1.7) 756 (0.4) 105 (0.3)    Drains 0      Chest Tube 1300 1872 33    Total Output 4410 2628 138    Net +4307.9 +4713 +276.8                   Labs  CBC:   Recent Labs   Lab 02/29/24 0221   WBC 14.85*   RBC 3.04*   HGB 8.8*   HCT 26.1*   *   MCV 85.9   MCH 28.9   MCHC 33.7     CMP:   Recent Labs   Lab 02/29/24 0221   CALCIUM 7.6*   ALBUMIN 2.9*       K 4.3   CO2 21*   BUN 17.2   CREATININE 1.99*   ALKPHOS 39*   ALT 12   AST 34   BILITOT 0.7     LFTs:   Recent Labs   Lab 02/29/24  0221   ALT 12   AST 34   ALKPHOS 39*   BILITOT 0.7   ALBUMIN 2.9*     All pertinent labs from the last 24 hours have been reviewed.      Imaging:   CXR: X-Ray Chest AP Portable    Result Date: 2/29/2024  New small right pleural effusion.  Similar left lung opacity. Electronically signed by: Roly Aaron Date:    02/29/2024 Time:    06:16   I have reviewed all pertinent imaging results/findings within the past 24 hours.        ASSESSMENT/PLAN:    Multivessel coronary artery disease  -s/p CABG  Ischemic cardiomyopathy  Pulmonary HTN  VHD  JEAN-CLAUDE / CKD    -Continue chest tubes to suction.  Output over the last 24 hrs 1700 cc between both chest tubes.  Serosanguinous.   -CXR new small pleural effusion on the right.  Left lung opacity again noted.    -H&H noted to be 8.8 / 26.1 - transfuse 2 units PRBC  -Renal indices improving slightly  - Cr. 1.99 this morning.   cc / 24 hours.    -Afib management per cardiology.  Appreciate their assistance  -Up to chair later today if able to tolerate  -Frequent IS use  -Continue ICU care     Case and plan of care discussed with BLANQUITA Srivastava

## 2024-02-29 NOTE — NURSING
Nurses Note -- 4 Eyes      2/29/2024   5:55 AM      Skin assessed during: Daily Assessment      [x] No Altered Skin Integrity Present    [x]Prevention Measures Documented      [] Yes- Altered Skin Integrity Present or Discovered   [] LDA Added if Not in Epic (Describe Wound)   [] New Altered Skin Integrity was Present on Admit and Documented in LDA   [] Wound Image Taken    Wound Care Consulted? No    Attending Nurse:  Wendy Cast RN/Staff Member:   Imani LOPEZ

## 2024-02-29 NOTE — PLAN OF CARE
24H events:    Patient is a 78y old Male who presents with a chief complaint of   Primary diagnosis of CHF exacerbation    Today is hospital day 2d. This morning patient was seen and examined at bedside, resting comfortably in bed. He mentions slight improvement in his SOB.    No acute or major events overnight.    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      SOCIAL HISTORY:  Social History:      ALLERGIES:  contrast media (iodine-based) (Other)    MEDICATIONS:  STANDING MEDICATIONS  atorvastatin 40 milliGRAM(s) Oral at bedtime  buMETAnide Injectable 2 milliGRAM(s) IV Push two times a day  chlorhexidine 2% Cloths 1 Application(s) Topical daily  dapagliflozin 10 milliGRAM(s) Oral every 24 hours  fenofibrate Tablet 145 milliGRAM(s) Oral daily  glucagon  Injectable 1 milliGRAM(s) IntraMuscular once  insulin glargine Injectable (LANTUS) 12 Unit(s) SubCutaneous at bedtime  insulin lispro (ADMELOG) corrective regimen sliding scale   SubCutaneous three times a day before meals  levothyroxine 175 MICROGram(s) Oral daily  mycophenolate mofetil Suspension 500 milliGRAM(s) Oral two times a day  pantoprazole    Tablet 40 milliGRAM(s) Oral before breakfast  ranolazine 500 milliGRAM(s) Oral two times a day  sacubitril 49 mG/valsartan 51 mG 1 Tablet(s) Oral two times a day  spironolactone 25 milliGRAM(s) Oral daily  tamsulosin 0.4 milliGRAM(s) Oral at bedtime  Vericiguat 2.5 milliGRAM(s) 2.5 milliGRAM(s) Oral daily    PRN MEDICATIONS    VITALS:   T(F): 97.7  HR: 79  BP: 137/71  RR: 19  SpO2: 100%    PHYSICAL EXAM:  GENERAL:   ( x ) NAD, lying in bed comfortably     (  ) obtunded     (  ) lethargic     (  ) somnolent    HEAD:   ( x ) Atraumatic     (  ) hematoma     (  ) laceration (specify location:       )     NECK:  ( x ) Supple     (  ) neck stiffness     (  ) nuchal rigidity     (  )  no JVD     ( x ) JVD present (  cm)    HEART:  Rate -->     ( x ) normal rate     (  ) bradycardic     (  ) tachycardic  Rhythm -->     ( x ) regular     (  ) regularly irregular     (  ) irregularly irregular  Murmurs -->     ( x ) normal s1s2     (  ) systolic murmur     (  ) diastolic murmur     (  ) continuous murmur      (  ) S3 present     (  ) S4 present    LUNGS:   ( x )Unlabored respirations     (  ) tachypnea  (  ) B/L air entry     ( x ) decreased breath sounds in:  (location RLL    )    (  ) no adventitious sound     ( x ) crackles     (  ) wheezing      (  ) rhonchi      (specify location:       )  (  ) chest wall tenderness (specify location:       )    ABDOMEN:   ( x ) Soft     (  ) tense   |   ( x ) nondistended     (  ) distended   |   ( x ) +BS     (  ) hypoactive bowel sounds     (  ) hyperactive bowel sounds  ( x ) nontender     (  ) RUQ tenderness     (  ) RLQ tenderness     (  ) LLQ tenderness     (  ) epigastric tenderness     (  ) diffuse tenderness  (  ) Splenomegaly      (  ) Hepatomegaly      (  ) Jaundice     (  ) ecchymosis     EXTREMITIES:  (  ) Normal     (  ) Rash     (  ) ecchymosis     (  ) varicose veins      ( x ) pitting edema     (  ) non-pitting edema   (  ) ulceration     (  ) gangrene:     (location:     )    NERVOUS SYSTEM:    ( x ) A&Ox3     (  ) confused     (  ) lethargic  CN II-XII:     ( x ) Intact     (  ) deficits found     (Specify:     )   Upper extremities:     ( x ) no sensorimotor deficits     (  ) weakness     (  ) loss of proprioception/vibration     (  ) loss of touch/temperature (specify:    )  Lower extremities:     ( x ) no sensorimotor deficits     (  ) weakness     (  ) loss of proprioception/vibration     (  ) loss of touch/temperature (specify:    )    SKIN:   ( x ) No rashes or lesions     (  ) maculopapular rash     (  ) pustules     (  ) vesicles     (  ) ulcer     (  ) ecchymosis     (specify location:     )      (  ) Indwelling Orta Catheter:   Date insterted:    Reason (  ) Critical illness     (  ) urinary retention    (  ) Accurate Ins/Outs Monitoring     (  ) CMO patient    (  ) Central Line:   Date inserted:  Location: (  ) Right IJ     (  ) Left IJ     (  ) Right Fem     (  ) Left Fem    (  ) SPC        (  ) pigtail       (  ) PEG tube       (  ) colostomy       (  ) jejunostomy  (  ) U-Dall    LABS:                        11.2   6.39  )-----------( 219      ( 19 Oct 2023 05:39 )             35.5     10-19    140  |  104  |  35<H>  ----------------------------<  124<H>  3.5   |  28  |  1.6<H>    Ca    9.0      19 Oct 2023 05:39  Mg     1.7     10-19    TPro  5.0<L>  /  Alb  3.0<L>  /  TBili  0.5  /  DBili  x   /  AST  18  /  ALT  9   /  AlkPhos  50  10-19      Urinalysis Basic - ( 19 Oct 2023 05:39 )    Color: x / Appearance: x / SG: x / pH: x  Gluc: 124 mg/dL / Ketone: x  / Bili: x / Urobili: x   Blood: x / Protein: x / Nitrite: x   Leuk Esterase: x / RBC: x / WBC x   Sq Epi: x / Non Sq Epi: x / Bacteria: x            CARDIAC MARKERS ( 18 Oct 2023 05:45 )  x     / 0.02 ng/mL / x     / x     / x          RADIOLOGY:           Updated daughter on phone       Normal for race

## 2024-02-29 NOTE — PROGRESS NOTES
"  [facility]    Cardiology  Progress Note    Patient Name: Brain Snyder  MRN: [id number]  Admission Date: 2/21/2024  Hospital Length of Stay: 8 days  Code Status: Full Code   Attending Physician: Pablo Yepez MD   Primary Care Physician: Nidia Shepherd NP  Expected Discharge Date:   Principal Problem:CAD (coronary artery disease)    Subjective:   Reason for Consult:  CAD     HPI: 78-year-old female that is unknown to [facility] with a PMHx of PAF on Eliquis Aortic insufficiency, MV CAD, HTN. He is Uzbek speaking and an  was used for interview. He was orginally admitted to [facility] on 2.15.24 with CP & NSTEMI and underwent a LHC on 2.20.24 taht showed MV CAD (reported noted below) He was transferred to [facility] on 2.21.24 for a CABG/AVR evaluation. On arrive he was hemodynamically stable on a heparin infusion. He denied chest pain, SOB, and nausea on current exam, [facility] was consulted for CAD    Hospital Course:   2.23.24: Patient seen resting in bed. He denies SOB or CP. He remains on heparin infusion. Family at bedside   2.24.24: NAD. S/p Impella Placement/Kopperl-Chelo Catheter. "I am ok." + Blood Clots from Nose/Mouth, Hematuria 2/2 traumatic Indwelling Catheter Placement. Heparin Drip per Protocol. Impella P5  2.25.24: NAD. "I'm ok." Denies CP, SOB and Palps. PA Pressure Reading is not Accurate. CVP 5. Impella at P5. R Groin Impella P7. Remains Off Heparin Drip per Protocol. Now in SR.   2.26.24: NAD Noted. SR on Tele. Right Groin Impella in place, bruising with no hematoma noted. Distal pulses DP intact. Legs warm. NC 2 L/Min. Denies CP/SOB. Consent obtained from his daughter Brii Snyder. NPO for MV PCI Today.  2.27.24: NAD Noted. Impella remains in place at P7 Support. Good UA Noted, some pink tinged urine noted. SR on Tele. Pressors off. Denies CP/SOB. NPO for surgery today. Heparin on hold for surgery.  2.28.24: Patient is critically ill. AF RVR on Tele, twice shocked this AM with no " "success. On Amiodarone/Milrinone- Cardizem Infusion now off given hypotension and ICMO. Also no José Miguel/Levophed. Concern for bleeding noted, transfusion noted. Patient is intubated/Mechanically Vented. Hemodynamics: CO/CI 4.3/2.3 CVP 20.  2.29.24: Patient self extubated this AM. He is stable on NC Oxygen. Denies CP/SOB. SR on Tele. On Amiodarone Infusion and receiving blood products. BP Stable. Clinically much improved from yesterday.     PMH: PAF (on Eliquis), Aortic insufficiency, MV CAD, HTN  PSH: [facility]  Family History: None reported  Social History: former smoker, denies ETOH or illicit drug us    Previous Diagnostics:  CABG/Bio AVR/MOISE Ligation (2.27.24):  Coronary artery bypass grafting X 3, LIMA to LAD, reversed SVG to OM1, Reversed Saphenous venous graft to RCA  Bioprosthetic aortic valve replacement (Epic max 23mm), Endoscopic venous harvesting of left greater saphenous vein, Left atrial appendage ligation, explant of right femoral impella device, right femoral artery repair.    RHC/Impella Placement (2.23.24):  Right heart catheterization performed showed the following:  PA= 61/24 (27) mm Hg  PCWP=  31/26 (27) mm Hg  AO saturation= 93 % RA  PA saturation= 60% with Impella (49% yesterday)    (02/22/24) -  0.5  Following that we elected to advance the Impella via right common femoral artery approach:  - Abiomed stiff wire  - 14 Sami peel-away sheath  - Heparin 10,000 unit bolus given peripherally  - Dilator removed  - 0.035" J-wire  - 6 Sami pigtail catheter positioned in the left ventricle  - Abiomed 0.025" wire  - Impella CP positioned in left ventricle  - Pulsatile motor current (appropriate positioning)  - Placed the marker just below the aortic valve  - Cardiac output was 2.8 L/min provided by the device.  Impella was at 84cm  Access Closure :    Sheath sutured to the groin  Secured.  Attestation:I was present for and supervised all key portions of the procedure  Impression/plan:   Successful Impella " insertion and swan-Chelo in the setting of Acute HF/low cardiac output/precardiogenic shock/Critcal-severe AS/MVCAD - LM distal    RHC (2.22.24):  Right heart catheterization demonstrating severe pulmonary hypertension 84/34 and PCWP 24 mmHg  Reduced cardiac output/cardiac index at 3.43/1.81 L/min/m2 with pulmonary artery saturations 49.3%  For applied to the right femoral vein following removal of the 7 Malian sheath  The estimated blood loss was none.    Carotid US (2.22.24):  The bilateral internal carotid artery demonstrated less than 50% stenosis.   The bilateral vertebral arteries were patent with antegrade flow    LHC (2.20.24):   Prox LAD lesion was 70% stenosed.  Ost Cx to Prox Cx lesion was 80% stenosed.  1st Mrg lesion was 80% stenosed.  2nd Mrg-1 lesion was 70% stenosed.  2nd Mrg-2 lesion was 50% stenosed.  Mid LAD lesion was 50% stenosed.  Prox RCA lesion was 60% stenosed.  estimated blood loss was <50 mL.  There was three vessel coronary artery disease.  LVEDP: 30mmHg  Recommendations:   Refer for CABG evaluation and AVR   Preformed by Dr. Flannery at [facility]     ECHO (2.15.24):  Left Ventricle: The left ventricle is normal in size. Mildly increased ventricular mass. Mildly increased wall thickness. There is mild eccentric hypertrophy. Moderate global hypokinesis present. Septal motion is consistent with bundle branch block. There is moderately reduced systolic function. Biplane (2D) method of discs ejection fraction is 40%. Grade II diastolic dysfunction.  Right Ventricle: Right ventricular enlargement. Systolic function is normal.  Left Atrium: Left atrium is severely dilated.  Right Atrium: Right atrium is moderately dilated.  Aortic Valve: There is moderate aortic valve sclerosis. Severely restricted motion. There is severe stenosis. Aortic valve area by VTI is 0.66 cm². Aortic valve peak velocity is 4.45 m/s. Mean gradient is 50 mmHg. The dimensionless index is 0.17. There is mild to moderate aortic  regurgitation.  Mitral Valve: Mildly calcified leaflets. There is no stenosis. There is mild regurgitation.  Tricuspid Valve: The tricuspid valve is structurally normal. There is mild to moderate regurgitation.  Pulmonic Valve: There is no significant regurgitation.  Aorta: Aortic root is upper limit of normal measuring 3.6 cm.  Pulmonary Artery: There is severe pulmonary hypertension. The estimated pulmonary artery systolic pressure is 69 mmHg.  IVC/SVC: Intermediate venous pressure at 8 mmHg.  Pericardium: There is no pericardial effusion.     Review of Systems   Cardiovascular:  Negative for chest pain.   Respiratory:  Negative for shortness of breath.      Objective:     Vital Signs (Most Recent):  Temp: 98 °F (36.7 °C) (02/29/24 0815)  Pulse: 82 (02/29/24 0815)  Resp: (!) 23 (02/29/24 0815)  BP: 111/66 (02/29/24 0800)  SpO2: 96 % (02/29/24 0815) Vital Signs (24h Range):  Temp:  [97.6 °F (36.4 °C)-100.4 °F (38 °C)] 98 °F (36.7 °C)  Pulse:  [] 82  Resp:  [18-28] 23  SpO2:  [95 %-100 %] 96 %  BP: ()/(52-91) 111/66  Arterial Line BP: ()/(49-82) 127/63   Weight: 78.2 kg (172 lb 6.4 oz)  Body mass index is 28.69 kg/m².  SpO2: 96 %       Intake/Output Summary (Last 24 hours) at 2/29/2024 0943  Last data filed at 2/29/2024 0700  Gross per 24 hour   Intake 5222.75 ml   Output 2043 ml   Net 3179.75 ml       Lines/Drains/Airways       Central Venous Catheter Line  Duration              Introducer with Double Lumen 02/27/24 2100 Internal Jugular Right 1 day    Pulmonary Artery Catheter Assessment  02/27/24 1336 1 day              Drain  Duration                  Chest Tube 02/27/24 1717 Tube - 1 Anterior Mediastinal 24 Fr. 1 day         Chest Tube 02/27/24 1720 Tube - 1 Left Fourth intercostal space 28 Fr. 1 day         Urethral Catheter 02/28/24 1445 Coude 20 Fr. <1 day              Arterial Line  Duration             Arterial Line 02/27/24 2350 Left Radial 1 day              Line  Duration                   Pacer Wires 02/27/24 2115 1 day              Peripheral Intravenous Line  Duration                  Peripheral IV - Double Lumen 02/20/24 0031 20 G Anterior;Right Forearm 9 days                  Significant Labs:   Recent Results (from the past 72 hour(s))   Prepare RBC 4 Units; a    Collection Time: 02/26/24  1:20 PM   Result Value Ref Range    UNIT NUMBER [id number]     UNIT ABO/RH O POS     DISPENSE STATUS Transfused     Unit Expiration [id number]     Product Code [id number]     Unit Blood Type Code 5100     CROSSMATCH INTERPRETATION Compatible     UNIT NUMBER [id number]     UNIT ABO/RH O POS     DISPENSE STATUS Transfused     Unit Expiration [id number]     Product Code R0323K16     Unit Blood Type Code 5100     CROSSMATCH INTERPRETATION Compatible     UNIT NUMBER [id number]     UNIT ABO/RH O POS     DISPENSE STATUS Transfused     Unit Expiration [id number]     Product Code V3942F54     Unit Blood Type Code 5100     CROSSMATCH INTERPRETATION Compatible     UNIT NUMBER [id number]     UNIT ABO/RH O POS     DISPENSE STATUS Transfused     Unit Expiration [id number]     Product Code J8430S95     Unit Blood Type Code 5100     CROSSMATCH INTERPRETATION Compatible    Type & Screen    Collection Time: 02/26/24  1:20 PM   Result Value Ref Range    Group & Rh O POS     Indirect Ugo GEL NEG     Specimen Outdate 02/29/2024 23:59    Prepare Platelets 1 Dose    Collection Time: 02/26/24  1:20 PM   Result Value Ref Range    UNIT NUMBER [id number]     UNIT ABO/RH AB POS     DISPENSE STATUS Transfused     Unit Expiration [id number]     Product Code V6855P98     Unit Blood Type Code 8400     CROSSMATCH INTERPRETATION Not required    Prepare Platelets 1 Dose    Collection Time: 02/26/24  1:20 PM   Result Value Ref Range    UNIT NUMBER [id number]     UNIT ABO/RH B POS     DISPENSE STATUS Transfused     Unit Expiration 256951108508     Product Code [id number]     Unit Blood Type Code 7300     CROSSMATCH  INTERPRETATION Not required    Prepare RBC 4 Units; SURGERY    Collection Time: 02/26/24  1:20 PM   Result Value Ref Range    UNIT NUMBER [id number]     UNIT ABO/RH O POS     DISPENSE STATUS Transfused     Unit Expiration 202404032359     Product Code D0270G79     Unit Blood Type Code 5100     CROSSMATCH INTERPRETATION Compatible     UNIT NUMBER [id number]     UNIT ABO/RH O POS     DISPENSE STATUS Transfused     Unit Expiration 202404022359     Product Code E8477M59     Unit Blood Type Code 5100     CROSSMATCH INTERPRETATION Compatible     UNIT NUMBER [id number]     UNIT ABO/RH O POS     DISPENSE STATUS Selected     Unit Expiration 202404022359     Product Code I3865N97     Unit Blood Type Code 5100     CROSSMATCH INTERPRETATION Compatible     UNIT NUMBER [id number]     UNIT ABO/RH O POS     DISPENSE STATUS Issued     Unit Expiration 202404022359     Product Code K5929V06     Unit Blood Type Code 5100     CROSSMATCH INTERPRETATION Compatible    Prepare RBC 4 Units; SURGERY    Collection Time: 02/26/24  1:20 PM   Result Value Ref Range    UNIT NUMBER [id number]     UNIT ABO/RH O POS     DISPENSE STATUS Transfused     Unit Expiration 202404032359     Product Code W3171G03     Unit Blood Type Code 5100     CROSSMATCH INTERPRETATION Compatible     UNIT NUMBER [id number]     UNIT ABO/RH O POS     DISPENSE STATUS Transfused     Unit Expiration 202404032359     Product Code Q1759A66     Unit Blood Type Code 5100     CROSSMATCH INTERPRETATION Compatible     UNIT NUMBER [id number]     UNIT ABO/RH O POS     DISPENSE STATUS Transfused     Unit Expiration 202404032359     Product Code N1807F18     Unit Blood Type Code 5100     CROSSMATCH INTERPRETATION Compatible     UNIT NUMBER [id number]     UNIT ABO/RH O POS     DISPENSE STATUS Transfused     Unit Expiration 202404032359     Product Code D5177F07     Unit Blood Type Code 5100     CROSSMATCH INTERPRETATION Compatible    Prepare Fresh Frozen Plasma 2  Units    Collection Time: 02/26/24  1:20 PM   Result Value Ref Range    UNIT NUMBER [id number]     UNIT ABO/RH O POS     DISPENSE STATUS Transfused     Unit Expiration 226676829935     Product Code E8184H82     Unit Blood Type Code 5100     CROSSMATCH INTERPRETATION Not required     UNIT NUMBER [id number]     UNIT ABO/RH O POS     DISPENSE STATUS Transfused     Unit Expiration 846974031887     Product Code N7490G73     Unit Blood Type Code 5100     CROSSMATCH INTERPRETATION Not required    Prepare Platelets 1 Dose    Collection Time: 02/26/24  1:20 PM   Result Value Ref Range    UNIT NUMBER [id number]     UNIT ABO/RH O POS     DISPENSE STATUS Transfused     Unit Expiration 592462277027     Product Code S5803E81     Unit Blood Type Code 5100     CROSSMATCH INTERPRETATION Not required    Prepare Platelets 1 Dose    Collection Time: 02/26/24  1:20 PM   Result Value Ref Range    UNIT NUMBER [id number]     UNIT ABO/RH B POS     DISPENSE STATUS Transfused     Unit Expiration 466334966726     Product Code Z7923J62     Unit Blood Type Code 7300     CROSSMATCH INTERPRETATION Not required    Prepare Fresh Frozen Plasma 1 Unit    Collection Time: 02/26/24  1:20 PM   Result Value Ref Range    UNIT NUMBER [id number]     UNIT ABO/RH O POS     DISPENSE STATUS Transfused     Unit Expiration 980239985597     Product Code C0215O32     Unit Blood Type Code 5100     CROSSMATCH INTERPRETATION Not required    APTT    Collection Time: 02/27/24  1:54 AM   Result Value Ref Range    PTT 66.2 (H) 23.2 - 33.7 seconds   Comprehensive metabolic panel    Collection Time: 02/27/24  1:54 AM   Result Value Ref Range    Sodium Level 136 136 - 145 mmol/L    Potassium Level 4.4 3.5 - 5.1 mmol/L    Chloride 110 (H) 98 - 107 mmol/L    Carbon Dioxide 19 (L) 23 - 31 mmol/L    Glucose Level 104 82 - 115 mg/dL    Blood Urea Nitrogen 14.9 8.4 - 25.7 mg/dL    Creatinine 1.67 (H) 0.73 - 1.18 mg/dL    Calcium Level Total 8.3 (L) 8.8 - 10.0 mg/dL     Protein Total 6.3 5.8 - 7.6 gm/dL    Albumin Level 3.0 (L) 3.4 - 4.8 g/dL    Globulin 3.3 2.4 - 3.5 gm/dL    Albumin/Globulin Ratio 0.9 (L) 1.1 - 2.0 ratio    Bilirubin Total 1.1 <=1.5 mg/dL    Alkaline Phosphatase 75 40 - 150 unit/L    Alanine Aminotransferase 23 0 - 55 unit/L    Aspartate Aminotransferase 50 (H) 5 - 34 unit/L    eGFR 42 mls/min/1.73/m2   CBC with Differential    Collection Time: 02/27/24  1:54 AM   Result Value Ref Range    WBC 11.83 (H) 4.50 - 11.50 x10(3)/mcL    RBC 3.93 (L) 4.70 - 6.10 x10(6)/mcL    Hgb 10.6 (L) 14.0 - 18.0 g/dL    Hct 34.7 (L) 42.0 - 52.0 %    MCV 88.3 80.0 - 94.0 fL    MCH 27.0 27.0 - 31.0 pg    MCHC 30.5 (L) 33.0 - 36.0 g/dL    RDW 15.7 11.5 - 17.0 %    Platelet 146 130 - 400 x10(3)/mcL    MPV 12.0 (H) 7.4 - 10.4 fL    Neut % 76.1 %    Lymph % 9.2 %    Mono % 12.0 %    Eos % 1.6 %    Basophil % 0.4 %    Lymph # 1.09 0.6 - 4.6 x10(3)/mcL    Neut # 9.00 2.1 - 9.2 x10(3)/mcL    Mono # 1.42 (H) 0.1 - 1.3 x10(3)/mcL    Eos # 0.19 0 - 0.9 x10(3)/mcL    Baso # 0.05 <=0.2 x10(3)/mcL    IG# 0.08 (H) 0 - 0.04 x10(3)/mcL    IG% 0.7 %    NRBC% 0.0 %   ISTAT PROCEDURE    Collection Time: 02/27/24  1:52 PM   Result Value Ref Range    POC PH 7.324 (L) 7.35 - 7.45    POC PCO2 40.5 35 - 45 mmHg    POC PO2 44 40 - 60 mmHg    POC HCO3 21.1 (L) 24 - 28 mmol/L    POC BE -5 (L) -2 to 2 mmol/L    POC SATURATED O2 76 95 - 100 %    POC pH Temp 7.324     POC pCO2 Temp 40.5 mmHg    POC pO2 Temp 44 mmHg    POC Glucose 99 70 - 110 mg/dL    POC Sodium 138 136 - 145 mmol/L    POC Potassium 4.2 3.5 - 5.1 mmol/L    POC TCO2 22 (L) 24 - 29 mmol/L    POC Ionized Calcium 1.22 1.06 - 1.42 mmol/L    POC Hematocrit 32 (L) 36 - 54 %PCV    POC HEMOGLOBIN 11 g/dL    Sample VENOUS     FiO2 100     POC Temp 37.0 C    Basic Metabolic Panel    Collection Time: 02/27/24  2:30 PM   Result Value Ref Range    Sodium Level 145 136 - 145 mmol/L    Potassium Level 3.5 3.5 - 5.1 mmol/L    Chloride 113 (H) 98 - 107 mmol/L     Carbon Dioxide 22 (L) 23 - 31 mmol/L    Glucose Level 111 82 - 115 mg/dL    Blood Urea Nitrogen 14.8 8.4 - 25.7 mg/dL    Creatinine 1.52 (H) 0.73 - 1.18 mg/dL    BUN/Creatinine Ratio 10     Calcium Level Total 8.2 (L) 8.8 - 10.0 mg/dL    Anion Gap 10.0 mEq/L    eGFR 47 mls/min/1.73/m2   Protime-INR    Collection Time: 02/27/24  2:30 PM   Result Value Ref Range    PT 22.7 (H) 12.5 - 14.5 seconds    INR 2.0 (H) <=1.3   APTT    Collection Time: 02/27/24  2:30 PM   Result Value Ref Range    PTT 47.1 (H) 23.2 - 33.7 seconds   CBC with Differential    Collection Time: 02/27/24  2:30 PM   Result Value Ref Range    WBC 15.02 (H) 4.50 - 11.50 x10(3)/mcL    RBC 2.24 (L) 4.70 - 6.10 x10(6)/mcL    Hgb 6.3 (L) 14.0 - 18.0 g/dL    Hct 19.9 (LL) 42.0 - 52.0 %    MCV 88.8 80.0 - 94.0 fL    MCH 28.1 27.0 - 31.0 pg    MCHC 31.7 (L) 33.0 - 36.0 g/dL    RDW 15.6 11.5 - 17.0 %    Platelet 108 (L) 130 - 400 x10(3)/mcL    MPV 9.9 7.4 - 10.4 fL    Neut % 78.6 %    Lymph % 11.1 %    Mono % 7.9 %    Eos % 0.7 %    Basophil % 0.3 %    Lymph # 1.67 0.6 - 4.6 x10(3)/mcL    Neut # 11.79 (H) 2.1 - 9.2 x10(3)/mcL    Mono # 1.19 0.1 - 1.3 x10(3)/mcL    Eos # 0.11 0 - 0.9 x10(3)/mcL    Baso # 0.05 <=0.2 x10(3)/mcL    IG# 0.21 (H) 0 - 0.04 x10(3)/mcL    IG% 1.4 %    NRBC% 0.0 %    IPF 4.2 0.9 - 11.2 %   ISTAT PROCEDURE    Collection Time: 02/27/24  3:19 PM   Result Value Ref Range    POC PH 7.272 (LL) 7.35 - 7.45    POC PCO2 43.4 35 - 45 mmHg    POC PO2 47 (LL) 80 - 100 mmHg    POC HCO3 20.0 (L) 24 - 28 mmol/L    POC BE -7 (L) -2 to 2 mmol/L    POC SATURATED O2 77 95 - 100 %    POC Glucose 99 70 - 110 mg/dL    POC Sodium 138 136 - 145 mmol/L    POC Potassium 4.1 3.5 - 5.1 mmol/L    POC TCO2 21 (L) 23 - 27 mmol/L    POC Ionized Calcium 1.19 1.06 - 1.42 mmol/L    POC Hematocrit 29 (L) 36 - 54 %PCV    POC HEMOGLOBIN 10 g/dL    Sample ARTERIAL    ISTAT PROCEDURE    Collection Time: 02/27/24  3:57 PM   Result Value Ref Range    POC PH 7.325 (L) 7.35 -  7.45    POC PCO2 38.2 35 - 45 mmHg    POC PO2 277 (H) 80 - 100 mmHg    POC HCO3 19.9 (L) 24 - 28 mmol/L    POC BE -6 (L) -2 to 2 mmol/L    POC SATURATED O2 100 95 - 100 %    POC Glucose 110 70 - 110 mg/dL    POC Sodium 139 136 - 145 mmol/L    POC Potassium 3.8 3.5 - 5.1 mmol/L    POC TCO2 21 (L) 23 - 27 mmol/L    POC Ionized Calcium 1.00 (L) 1.06 - 1.42 mmol/L    POC Hematocrit 23 (L) 36 - 54 %PCV    POC HEMOGLOBIN 8 g/dL    Sample ARTERIAL     FiO2 60    ISTAT PROCEDURE    Collection Time: 02/27/24  4:04 PM   Result Value Ref Range    POC PH 7.331 (L) 7.35 - 7.45    POC PCO2 49.1 (H) 35 - 45 mmHg    POC PO2 36 (LL) 80 - 100 mmHg    POC HCO3 26.0 24 - 28 mmol/L    POC BE 0 -2 to 2 mmol/L    POC SATURATED O2 65 95 - 100 %    POC Glucose 110 70 - 110 mg/dL    POC Sodium 142 136 - 145 mmol/L    POC Potassium 3.7 3.5 - 5.1 mmol/L    POC TCO2 27 23 - 27 mmol/L    POC Ionized Calcium 1.02 (L) 1.06 - 1.42 mmol/L    POC Hematocrit 20 (LL) 36 - 54 %PCV    POC HEMOGLOBIN 7 g/dL    Sample ARTERIAL    ISTAT PROCEDURE    Collection Time: 02/27/24  4:51 PM   Result Value Ref Range    POC PH 7.317 (L) 7.35 - 7.45    POC PCO2 49.4 (H) 35 - 45 mmHg    POC PO2 357 (H) 80 - 100 mmHg    POC HCO3 25.3 24 - 28 mmol/L    POC BE -1 -2 to 2 mmol/L    POC SATURATED O2 100 95 - 100 %    POC Glucose 127 (H) 70 - 110 mg/dL    POC Sodium 141 136 - 145 mmol/L    POC Potassium 4.1 3.5 - 5.1 mmol/L    POC TCO2 27 23 - 27 mmol/L    POC Ionized Calcium 1.02 (L) 1.06 - 1.42 mmol/L    POC Hematocrit 22 (L) 36 - 54 %PCV    POC HEMOGLOBIN 8 g/dL    Sample ARTERIAL     FiO2 65    ISTAT PROCEDURE    Collection Time: 02/27/24  5:39 PM   Result Value Ref Range    POC PH 7.384 7.35 - 7.45    POC PCO2 39.7 35 - 45 mmHg    POC PO2 300 (H) 80 - 100 mmHg    POC HCO3 23.7 (L) 24 - 28 mmol/L    POC BE -1 -2 to 2 mmol/L    POC SATURATED O2 100 95 - 100 %    POC Glucose 126 (H) 70 - 110 mg/dL    POC Sodium 140 136 - 145 mmol/L    POC Potassium 4.3 3.5 - 5.1 mmol/L     POC TCO2 25 23 - 27 mmol/L    POC Ionized Calcium 1.00 (L) 1.06 - 1.42 mmol/L    POC Hematocrit 21 (L) 36 - 54 %PCV    POC HEMOGLOBIN 7 g/dL    Sample ARTERIAL     FiO2 60    ISTAT PROCEDURE    Collection Time: 02/27/24  6:18 PM   Result Value Ref Range    POC PH 7.359 7.35 - 7.45    POC PCO2 43.0 35 - 45 mmHg    POC PO2 266 (H) 80 - 100 mmHg    POC HCO3 24.2 24 - 28 mmol/L    POC BE -1 -2 to 2 mmol/L    POC SATURATED O2 100 95 - 100 %    POC Glucose 131 (H) 70 - 110 mg/dL    POC Sodium 140 136 - 145 mmol/L    POC Potassium 4.6 3.5 - 5.1 mmol/L    POC TCO2 26 23 - 27 mmol/L    POC Ionized Calcium 1.00 (L) 1.06 - 1.42 mmol/L    POC Hematocrit 26 (L) 36 - 54 %PCV    POC HEMOGLOBIN 9 g/dL    Sample ARTERIAL     FiO2 70    ISTAT PROCEDURE    Collection Time: 02/27/24  7:01 PM   Result Value Ref Range    POC PH 7.376 7.35 - 7.45    POC PCO2 44.9 35 - 45 mmHg    POC PO2 272 (H) 80 - 100 mmHg    POC HCO3 26.3 24 - 28 mmol/L    POC BE 1 -2 to 2 mmol/L    POC SATURATED O2 100 95 - 100 %    POC Glucose 129 (H) 70 - 110 mg/dL    POC Sodium 142 136 - 145 mmol/L    POC Potassium 4.1 3.5 - 5.1 mmol/L    POC TCO2 28 (H) 23 - 27 mmol/L    POC Ionized Calcium 1.59 (H) 1.06 - 1.42 mmol/L    POC Hematocrit 21 (L) 36 - 54 %PCV    POC HEMOGLOBIN 7 g/dL    Sample ARTERIAL     FiO2 75    ISTAT PROCEDURE    Collection Time: 02/27/24  7:47 PM   Result Value Ref Range    POC PH 7.360 7.35 - 7.45    POC PCO2 38.6 35 - 45 mmHg    POC PO2 91 80 - 100 mmHg    POC HCO3 21.8 (L) 24 - 28 mmol/L    POC BE -4 (L) -2 to 2 mmol/L    POC SATURATED O2 97 95 - 100 %    POC Glucose 111 (H) 70 - 110 mg/dL    POC Sodium 142 136 - 145 mmol/L    POC Potassium 3.5 3.5 - 5.1 mmol/L    POC TCO2 23 23 - 27 mmol/L    POC Ionized Calcium 1.29 1.06 - 1.42 mmol/L    POC Hematocrit 27 (L) 36 - 54 %PCV    POC HEMOGLOBIN 9 g/dL    Sample ARTERIAL     FiO2 100    ISTAT PROCEDURE    Collection Time: 02/27/24  8:41 PM   Result Value Ref Range    POC PH 7.404 7.35 - 7.45     POC PCO2 36.3 35 - 45 mmHg    POC PO2 117 (H) 80 - 100 mmHg    POC HCO3 22.7 (L) 24 - 28 mmol/L    POC BE -2 -2 to 2 mmol/L    POC SATURATED O2 99 95 - 100 %    POC pH Temp 7.418     POC pCO2 Temp 34.8 mmHg    POC pO2 Temp 111 mmHg    POC Glucose 113 (H) 70 - 110 mg/dL    POC Sodium 143 136 - 145 mmol/L    POC Potassium 3.4 (L) 3.5 - 5.1 mmol/L    POC TCO2 24 23 - 27 mmol/L    POC Ionized Calcium 1.11 1.06 - 1.42 mmol/L    POC Hematocrit 20 (LL) 36 - 54 %PCV    POC HEMOGLOBIN 7 g/dL    Sample ARTERIAL     FiO2 96     POC Temp 36.0 C    Magnesium    Collection Time: 02/27/24  9:26 PM   Result Value Ref Range    Magnesium Level 2.60 1.60 - 2.60 mg/dL   Phosphorus    Collection Time: 02/27/24  9:26 PM   Result Value Ref Range    Phosphorus Level 4.0 2.3 - 4.7 mg/dL   Comprehensive Metabolic Panel    Collection Time: 02/27/24  9:26 PM   Result Value Ref Range    Sodium Level 143 136 - 145 mmol/L    Potassium Level 3.8 3.5 - 5.1 mmol/L    Chloride 112 (H) 98 - 107 mmol/L    Carbon Dioxide 21 (L) 23 - 31 mmol/L    Glucose Level 125 (H) 82 - 115 mg/dL    Blood Urea Nitrogen 14.7 8.4 - 25.7 mg/dL    Creatinine 1.47 (H) 0.73 - 1.18 mg/dL    Calcium Level Total 9.0 8.8 - 10.0 mg/dL    Protein Total 4.8 (L) 5.8 - 7.6 gm/dL    Albumin Level 3.6 3.4 - 4.8 g/dL    Globulin 1.2 (L) 2.4 - 3.5 gm/dL    Albumin/Globulin Ratio 3.0 (H) 1.1 - 2.0 ratio    Bilirubin Total 1.7 (H) <=1.5 mg/dL    Alkaline Phosphatase 34 (L) 40 - 150 unit/L    Alanine Aminotransferase 15 0 - 55 unit/L    Aspartate Aminotransferase 37 (H) 5 - 34 unit/L    eGFR 49 mls/min/1.73/m2   Protime-INR    Collection Time: 02/27/24  9:26 PM   Result Value Ref Range    PT 21.4 (H) 12.5 - 14.5 seconds    INR 1.9 (H) <=1.3   APTT    Collection Time: 02/27/24  9:26 PM   Result Value Ref Range    PTT 43.1 (H) 23.2 - 33.7 seconds   POCT glucose    Collection Time: 02/27/24  9:30 PM   Result Value Ref Range    POCT Glucose 120 (H) 70 - 110 mg/dL   POCT glucose    Collection  Time: 02/27/24 10:03 PM   Result Value Ref Range    POCT Glucose 135 (H) 70 - 110 mg/dL   CBC Without Differential    Collection Time: 02/27/24 10:27 PM   Result Value Ref Range    WBC 10.87 4.50 - 11.50 x10(3)/mcL    RBC 2.17 (L) 4.70 - 6.10 x10(6)/mcL    Hgb 6.2 (L) 14.0 - 18.0 g/dL    Hct 19.1 (LL) 42.0 - 52.0 %    MCV 88.0 80.0 - 94.0 fL    MCH 28.6 27.0 - 31.0 pg    MCHC 32.5 (L) 33.0 - 36.0 g/dL    RDW 15.4 11.5 - 17.0 %    Platelet 70 (L) 130 - 400 x10(3)/mcL    MPV 10.6 (H) 7.4 - 10.4 fL    NRBC% 0.0 %   POCT glucose    Collection Time: 02/27/24 11:07 PM   Result Value Ref Range    POCT Glucose 153 (H) 70 - 110 mg/dL   RT Blood Gas    Collection Time: 02/28/24 12:00 AM   Result Value Ref Range    Sample Type Arterial Blood     Sample site Arterial Line     Drawn by ht rrt     pH, Blood gas 7.430 7.350 - 7.450    pCO2, Blood gas 32.0 (L) 35.0 - 45.0 mmHg    pO2, Blood gas 101.0 (H) 80.0 - 100.0 mmHg    Sodium, Blood Gas 135 (L) 137 - 145 mmol/L    Potassium, Blood Gas 3.6 3.5 - 5.0 mmol/L    Calcium Level Ionized 1.10 (L) 1.12 - 1.23 mmol/L    TOC2, Blood gas 22.2 mmol/L    Base Excess, Blood gas -2.60 (L) -2.00 - 2.00 mmol/L    sO2, Blood gas 98.0 %    HCO3, Blood gas 21.2 (L) 22.0 - 26.0 mmol/L    THb, Blood gas 9.5 (L) 12 - 16 g/dL    O2 Hb, Blood Gas 96.9 94.0 - 97.0 %    CO Hgb 2.3 (H) 0.5 - 1.5 %    Met Hgb 0.6 0.4 - 1.5 %    Allens Test N/A     MODE SIMV     Oxygen Device, Blood gas Ventilator     FIO2, Blood gas 60 %    Mech Vt 620 ml    Mech RR 20 b/min    PEEP 5.0 cmH2O    PS 10.0 cmH2O   POCT glucose    Collection Time: 02/28/24 12:04 AM   Result Value Ref Range    POCT Glucose 193 (H) 70 - 110 mg/dL   POCT glucose    Collection Time: 02/28/24 12:59 AM   Result Value Ref Range    POCT Glucose 183 (H) 70 - 110 mg/dL   Hemoglobin and Hematocrit    Collection Time: 02/28/24  1:44 AM   Result Value Ref Range    Hgb 9.4 (L) 14.0 - 18.0 g/dL    Hct 28.9 (L) 42.0 - 52.0 %   POCT glucose    Collection  Time: 02/28/24  2:09 AM   Result Value Ref Range    POCT Glucose 185 (H) 70 - 110 mg/dL   POCT glucose    Collection Time: 02/28/24  3:11 AM   Result Value Ref Range    POCT Glucose 185 (H) 70 - 110 mg/dL   POCT glucose    Collection Time: 02/28/24  3:57 AM   Result Value Ref Range    POCT Glucose 189 (H) 70 - 110 mg/dL   Phosphorus    Collection Time: 02/28/24  4:59 AM   Result Value Ref Range    Phosphorus Level 2.2 (L) 2.3 - 4.7 mg/dL   Magnesium    Collection Time: 02/28/24  4:59 AM   Result Value Ref Range    Magnesium Level 2.20 1.60 - 2.60 mg/dL   Comprehensive metabolic panel    Collection Time: 02/28/24  4:59 AM   Result Value Ref Range    Sodium Level 141 136 - 145 mmol/L    Potassium Level 3.5 3.5 - 5.1 mmol/L    Chloride 110 (H) 98 - 107 mmol/L    Carbon Dioxide 19 (L) 23 - 31 mmol/L    Glucose Level 211 (H) 82 - 115 mg/dL    Blood Urea Nitrogen 15.8 8.4 - 25.7 mg/dL    Creatinine 1.86 (H) 0.73 - 1.18 mg/dL    Calcium Level Total 8.3 (L) 8.8 - 10.0 mg/dL    Protein Total 4.6 (L) 5.8 - 7.6 gm/dL    Albumin Level 3.2 (L) 3.4 - 4.8 g/dL    Globulin 1.4 (L) 2.4 - 3.5 gm/dL    Albumin/Globulin Ratio 2.3 (H) 1.1 - 2.0 ratio    Bilirubin Total 1.0 <=1.5 mg/dL    Alkaline Phosphatase 40 40 - 150 unit/L    Alanine Aminotransferase 15 0 - 55 unit/L    Aspartate Aminotransferase 37 (H) 5 - 34 unit/L    eGFR 37 mls/min/1.73/m2   CBC with Differential    Collection Time: 02/28/24  4:59 AM   Result Value Ref Range    WBC 12.78 (H) 4.50 - 11.50 x10(3)/mcL    RBC 3.12 (L) 4.70 - 6.10 x10(6)/mcL    Hgb 9.0 (L) 14.0 - 18.0 g/dL    Hct 27.3 (L) 42.0 - 52.0 %    MCV 87.5 80.0 - 94.0 fL    MCH 28.8 27.0 - 31.0 pg    MCHC 33.0 33.0 - 36.0 g/dL    RDW 14.6 11.5 - 17.0 %    Platelet 180 130 - 400 x10(3)/mcL    MPV 10.3 7.4 - 10.4 fL    Neut % 83.0 %    Lymph % 3.5 %    Mono % 12.2 %    Eos % 0.1 %    Basophil % 0.5 %    Lymph # 0.45 (L) 0.6 - 4.6 x10(3)/mcL    Neut # 10.61 (H) 2.1 - 9.2 x10(3)/mcL    Mono # 1.56 (H) 0.1 - 1.3  x10(3)/mcL    Eos # 0.01 0 - 0.9 x10(3)/mcL    Baso # 0.06 <=0.2 x10(3)/mcL    IG# 0.09 (H) 0 - 0.04 x10(3)/mcL    IG% 0.7 %    NRBC% 0.0 %   EKG 12-LEAD    Collection Time: 02/28/24  5:07 AM   Result Value Ref Range    QRS Duration 180 ms    OHS QTC Calculation 572 ms   POCT glucose    Collection Time: 02/28/24  5:25 AM   Result Value Ref Range    POCT Glucose 224 (H) 70 - 110 mg/dL   RT Blood Gas    Collection Time: 02/28/24  5:55 AM   Result Value Ref Range    Sample Type Arterial Blood     Sample site Arterial Line     Drawn by rcl crt     pH, Blood gas 7.340 (L) 7.350 - 7.450    pCO2, Blood gas 37.0 35.0 - 45.0 mmHg    pO2, Blood gas 63.0 (L) 80.0 - 100.0 mmHg    Sodium, Blood Gas 139 137 - 145 mmol/L    Potassium, Blood Gas 3.2 (L) 3.5 - 5.0 mmol/L    Calcium Level Ionized 1.19 1.12 - 1.23 mmol/L    TOC2, Blood gas 21.1 mmol/L    Base Excess, Blood gas -5.20 mmol/L    sO2, Blood gas 90.0 %    HCO3, Blood gas 20.0 (L) 22.0 - 26.0 mmol/L    Allens Test Yes     MODE SIMV     FIO2, Blood gas 35 %    Mech Vt 620 ml    Mech RR 10 b/min    PEEP 5.0 cmH2O   POCT glucose    Collection Time: 02/28/24  5:55 AM   Result Value Ref Range    POCT Glucose 258 (H) 70 - 110 mg/dL   POCT glucose    Collection Time: 02/28/24  7:13 AM   Result Value Ref Range    POCT Glucose 249 (H) 70 - 110 mg/dL   POCT glucose    Collection Time: 02/28/24  8:06 AM   Result Value Ref Range    POCT Glucose 240 (H) 70 - 110 mg/dL   Protime-INR    Collection Time: 02/28/24  8:20 AM   Result Value Ref Range    PT 21.8 (H) 12.5 - 14.5 seconds    INR 1.9 (H) <=1.3   Fibrinogen    Collection Time: 02/28/24  8:20 AM   Result Value Ref Range    Fibrinogen 256 210 - 463 mg/dL   Hemoglobin and Hematocrit    Collection Time: 02/28/24  8:30 AM   Result Value Ref Range    Hgb 7.9 (L) 14.0 - 18.0 g/dL    Hct 23.3 (L) 42.0 - 52.0 %   Comprehensive Metabolic Panel    Collection Time: 02/28/24  8:30 AM   Result Value Ref Range    Sodium Level 138 136 - 145 mmol/L     Potassium Level 4.3 3.5 - 5.1 mmol/L    Chloride 109 (H) 98 - 107 mmol/L    Carbon Dioxide 19 (L) 23 - 31 mmol/L    Glucose Level 254 (H) 82 - 115 mg/dL    Blood Urea Nitrogen 16.0 8.4 - 25.7 mg/dL    Creatinine 2.07 (H) 0.73 - 1.18 mg/dL    Calcium Level Total 7.9 (L) 8.8 - 10.0 mg/dL    Protein Total 4.4 (L) 5.8 - 7.6 gm/dL    Albumin Level 3.1 (L) 3.4 - 4.8 g/dL    Globulin 1.3 (L) 2.4 - 3.5 gm/dL    Albumin/Globulin Ratio 2.4 (H) 1.1 - 2.0 ratio    Bilirubin Total 0.8 <=1.5 mg/dL    Alkaline Phosphatase 34 (L) 40 - 150 unit/L    Alanine Aminotransferase 13 0 - 55 unit/L    Aspartate Aminotransferase 35 (H) 5 - 34 unit/L    eGFR 32 mls/min/1.73/m2   RT Blood Gas    Collection Time: 02/28/24  8:38 AM   Result Value Ref Range    Sample Type Arterial Blood     Sample site Arterial Line     Drawn by sd rrt     pH, Blood gas 7.380 7.350 - 7.450    pCO2, Blood gas 35.0 35.0 - 45.0 mmHg    pO2, Blood gas 75.0 (L) 80.0 - 100.0 mmHg    Sodium, Blood Gas 133 (L) 137 - 145 mmol/L    Potassium, Blood Gas 4.2 3.5 - 5.0 mmol/L    Calcium Level Ionized 1.15 1.12 - 1.23 mmol/L    TOC2, Blood gas 21.8 mmol/L    Base Excess, Blood gas -4.00 (L) -2.00 - 2.00 mmol/L    sO2, Blood gas 94.6 %    HCO3, Blood gas 20.7 (L) 22.0 - 26.0 mmol/L    THb, Blood gas 8.6 (L) 12 - 16 g/dL    O2 Hb, Blood Gas 95.0 94.0 - 97.0 %    CO Hgb 1.7 (H) 0.5 - 1.5 %    Met Hgb 1.3 0.4 - 1.5 %    Allens Test N/A     MODE AC     Oxygen Device, Blood gas Ventilator     FIO2, Blood gas 50 %    Mech Vt 470 ml    Mech RR 22 b/min    PEEP 5.0 cmH2O   POCT glucose    Collection Time: 02/28/24  8:50 AM   Result Value Ref Range    POCT Glucose 243 (H) 70 - 110 mg/dL   POCT glucose    Collection Time: 02/28/24 10:01 AM   Result Value Ref Range    POCT Glucose 204 (H) 70 - 110 mg/dL   POCT glucose    Collection Time: 02/28/24 11:18 AM   Result Value Ref Range    POCT Glucose 184 (H) 70 - 110 mg/dL   POCT glucose    Collection Time: 02/28/24 11:59 AM   Result Value  Ref Range    POCT Glucose 171 (H) 70 - 110 mg/dL   POCT glucose    Collection Time: 02/28/24  1:13 PM   Result Value Ref Range    POCT Glucose 151 (H) 70 - 110 mg/dL   CBC with Differential    Collection Time: 02/28/24  1:49 PM   Result Value Ref Range    WBC 14.98 (H) 4.50 - 11.50 x10(3)/mcL    RBC 3.50 (L) 4.70 - 6.10 x10(6)/mcL    Hgb 10.1 (L) 14.0 - 18.0 g/dL    Hct 30.3 (L) 42.0 - 52.0 %    MCV 86.6 80.0 - 94.0 fL    MCH 28.9 27.0 - 31.0 pg    MCHC 33.3 33.0 - 36.0 g/dL    RDW 15.1 11.5 - 17.0 %    Platelet 122 (L) 130 - 400 x10(3)/mcL    MPV 11.0 (H) 7.4 - 10.4 fL    NRBC% 0.0 %    IPF 6.4 0.9 - 11.2 %   Manual Differential    Collection Time: 02/28/24  1:49 PM   Result Value Ref Range    WBC 14.98 x10(3)/mcL    Neutrophils % 73 %    Lymphs % 12 %    Monocytes % 11 %    Basophils % 1 %    Metamyelocytes % 3 (H) <=0 %    Neutrophils Abs 10.9354 (H) 2.1 - 9.2 x10(3)/mcL    Lymphs Abs 1.7976 0.6 - 4.6 x10(3)/mcL    Monocytes Abs 1.6478 (H) 0.1 - 1.3 x10(3)/mcL    Basophils Abs 0.1498 0 - 0.2 x10(3)/mcL    Platelets Decreased (A) Normal, Adequate    RBC Morph Normal Normal   POCT glucose    Collection Time: 02/28/24  3:09 PM   Result Value Ref Range    POCT Glucose 113 (H) 70 - 110 mg/dL   Urinalysis, Reflex to Urine Culture    Collection Time: 02/28/24  3:27 PM    Specimen: Urine, Catheterized   Result Value Ref Range    Color, UA Yellow Yellow, Light-Yellow, Colorless, Straw, Dark-Yellow    Appearance, UA Turbid (A) Clear    Specific Gravity, UA 1.030 1.005 - 1.030    pH, UA 5.5 5.0 - 8.5    Protein, UA 2+ (A) Negative    Glucose, UA Normal Negative, Normal    Ketones, UA Negative Negative    Blood, UA 3+ (A) Negative    Bilirubin, UA Negative Negative    Urobilinogen, UA Normal 0.2, 1.0, Normal    Nitrites, UA Negative Negative    Leukocyte Esterase, UA Negative Negative    WBC, UA 0-5 None Seen, 0-2, 3-5, 0-5 /HPF    Bacteria, UA Trace None Seen, Trace /HPF    Squamous Epithelial Cells, UA Trace None Seen /HPF     Hyaline Casts, UA 6-10 (A) None Seen /lpf    Granular Casts >20 (A) None Seen /lpf    RBC, UA >100 (A) None Seen, 0-2, 3-5, 0-5 /HPF   POCT glucose    Collection Time: 02/28/24  3:59 PM   Result Value Ref Range    POCT Glucose 98 70 - 110 mg/dL   POCT glucose    Collection Time: 02/28/24  5:05 PM   Result Value Ref Range    POCT Glucose 93 70 - 110 mg/dL   POCT glucose    Collection Time: 02/28/24  6:02 PM   Result Value Ref Range    POCT Glucose 101 70 - 110 mg/dL   POCT glucose    Collection Time: 02/28/24  6:59 PM   Result Value Ref Range    POCT Glucose 99 70 - 110 mg/dL   POCT glucose    Collection Time: 02/28/24  8:14 PM   Result Value Ref Range    POCT Glucose 120 (H) 70 - 110 mg/dL   POCT glucose    Collection Time: 02/28/24  9:17 PM   Result Value Ref Range    POCT Glucose 135 (H) 70 - 110 mg/dL   POCT glucose    Collection Time: 02/29/24 12:20 AM   Result Value Ref Range    POCT Glucose 149 (H) 70 - 110 mg/dL   Comprehensive metabolic panel    Collection Time: 02/29/24  2:21 AM   Result Value Ref Range    Sodium Level 136 136 - 145 mmol/L    Potassium Level 4.3 3.5 - 5.1 mmol/L    Chloride 109 (H) 98 - 107 mmol/L    Carbon Dioxide 21 (L) 23 - 31 mmol/L    Glucose Level 161 (H) 82 - 115 mg/dL    Blood Urea Nitrogen 17.2 8.4 - 25.7 mg/dL    Creatinine 1.99 (H) 0.73 - 1.18 mg/dL    Calcium Level Total 7.6 (L) 8.8 - 10.0 mg/dL    Protein Total 4.4 (L) 5.8 - 7.6 gm/dL    Albumin Level 2.9 (L) 3.4 - 4.8 g/dL    Globulin 1.5 (L) 2.4 - 3.5 gm/dL    Albumin/Globulin Ratio 1.9 1.1 - 2.0 ratio    Bilirubin Total 0.7 <=1.5 mg/dL    Alkaline Phosphatase 39 (L) 40 - 150 unit/L    Alanine Aminotransferase 12 0 - 55 unit/L    Aspartate Aminotransferase 34 5 - 34 unit/L    eGFR 34 mls/min/1.73/m2   CBC with Differential    Collection Time: 02/29/24  2:21 AM   Result Value Ref Range    WBC 14.85 (H) 4.50 - 11.50 x10(3)/mcL    RBC 3.04 (L) 4.70 - 6.10 x10(6)/mcL    Hgb 8.8 (L) 14.0 - 18.0 g/dL    Hct 26.1 (L) 42.0 - 52.0  %    MCV 85.9 80.0 - 94.0 fL    MCH 28.9 27.0 - 31.0 pg    MCHC 33.7 33.0 - 36.0 g/dL    RDW 15.4 11.5 - 17.0 %    Platelet 106 (L) 130 - 400 x10(3)/mcL    MPV 10.5 (H) 7.4 - 10.4 fL    NRBC% 0.0 %    IPF 6.9 0.9 - 11.2 %   Manual Differential    Collection Time: 02/29/24  2:21 AM   Result Value Ref Range    Neutrophils % 77 %    Lymphs % 12 %    Monocytes % 11 %    Neutrophils Abs     Magnesium    Collection Time: 02/29/24  2:21 AM   Result Value Ref Range    Magnesium Level 2.10 1.60 - 2.60 mg/dL   Phosphorus    Collection Time: 02/29/24  2:21 AM   Result Value Ref Range    Phosphorus Level 3.6 2.3 - 4.7 mg/dL   POCT glucose    Collection Time: 02/29/24  4:55 AM   Result Value Ref Range    POCT Glucose 145 (H) 70 - 110 mg/dL   RT Blood Gas    Collection Time: 02/29/24  5:12 AM   Result Value Ref Range    Sample Type Arterial Blood     Sample site Arterial Line     Drawn by rcl crt     pH, Blood gas 7.390 7.350 - 7.450    pCO2, Blood gas 38.0 35.0 - 45.0 mmHg    pO2, Blood gas 71.0 (L) 80.0 - 100.0 mmHg    Sodium, Blood Gas 133 (L) 137 - 145 mmol/L    Potassium, Blood Gas 3.9 3.5 - 5.0 mmol/L    Calcium Level Ionized 1.10 (L) 1.12 - 1.23 mmol/L    TOC2, Blood gas 24.2 mmol/L    Base Excess, Blood gas -1.70 -2.00 - 2.00 mmol/L    sO2, Blood gas 94.0 %    HCO3, Blood gas 23.0 22.0 - 26.0 mmol/L    Allens Test N/A     MODE AC     FIO2, Blood gas 30 %    Mech Vt 470 ml    Mech RR 20 b/min    PEEP 5.0 cmH2O     Telemetry: Sinus Rhythm     Physical Exam  Vitals and nursing note reviewed.   Constitutional:       General: He is not in acute distress.     Appearance: Normal appearance.   HENT:      Mouth/Throat:      Mouth: Mucous membranes are moist.      Pharynx: Oropharynx is clear.   Neck:      Comments: Right IJ Venous Cath   Cardiovascular:      Rate and Rhythm: Normal rate and regular rhythm.      Comments: Sinus Rhythm   Pulmonary:      Effort: Pulmonary effort is normal. No respiratory distress.      Breath sounds:  No wheezing or rales.      Comments: NC 3 L/Min  Abdominal:      Palpations: Abdomen is soft.   Genitourinary:     Comments: Urinary Catheter - with Green Tinged Urine.  Musculoskeletal:         General: Normal range of motion.      Comments: Chest Tubes in Place.    Skin:     General: Skin is warm and dry.      Comments: Mid Sternal Incision with Dressing in Place. Bilateral Groins are soft with no evidence of active bleed noted.    Neurological:      General: No focal deficit present.      Mental Status: He is alert. Mental status is at baseline.      Comments: Awake Alert Oriented.       Current Inpatient Medications:    Current Facility-Administered Medications:     0.9%  NaCl infusion (for blood administration), , Intravenous, Q24H PRN, Kaleb Rdz ANP    0.9%  NaCl infusion (for blood administration), , Intravenous, Q24H PRN, Vasile Carter PA-C    0.9%  NaCl infusion (for blood administration), , Intravenous, Q24H PRN, Pablo Yepez MD    0.9%  NaCl infusion (for blood administration), , Intravenous, Q24H PRN, Pablo Yepez MD    0.9%  NaCl infusion (for blood administration), , Intravenous, Q24H PRN, Nita Contreras MD    0.9%  NaCl infusion (for blood administration), , Intravenous, Q24H PRN, Chantelle Calle FNP    acetaminophen oral solution 650 mg, 650 mg, Per OG tube, Q6H PRN, Vasile Carter PA-C    acetaminophen tablet 650 mg, 650 mg, Oral, Q6H PRN, Tanner Rdz MD    acetaminophen tablet 650 mg, 650 mg, Oral, Q4H PRN, Tanner Rdz MD, 650 mg at 02/24/24 2021    albumin human 5% bottle 12.5 g, 12.5 g, Intravenous, PRN, Vasile Carter PA-C, Stopped at 02/28/24 1442    allopurinol split tablet 50 mg, 50 mg, Oral, Daily, Tanner Rdz MD, 50 mg at 02/29/24 0909    amiodarone 450 mg/250 mL in D5W IV infusion - STANDARD, 0.5 mg/min, Intravenous, Continuous, Vasile Carter PA-C, Last Rate: 16.67 mL/hr at 02/29/24 0700, 0.5 mg/min at 02/29/24 0700     aspirin EC tablet 81 mg, 81 mg, Oral, Daily, Vasile Carter PA-C, 81 mg at 02/29/24 0909    atorvastatin tablet 40 mg, 40 mg, Oral, QHS, Tanner Rdz MD, 40 mg at 02/28/24 2107    calcium gluconate 1 g in NS IVPB (premixed), 1 g, Intravenous, PRN, Vasile Carter PA-C, Stopped at 02/29/24 0810    calcium gluconate 1 g in NS IVPB (premixed), 2 g, Intravenous, PRN, Vasile Carter PA-C    calcium gluconate 1 g in NS IVPB (premixed), 3 g, Intravenous, PRN, Vasile Carter PA-C    clevidipine (CLEVIPREX) 25 mg/50 mL infusion, 0-32 mg/hr, Intravenous, Continuous, Vasile Carter PA-C, Stopped at 02/28/24 0115    dexmedetomidine (PRECEDEX) 400mcg/100mL 0.9% NaCL infusion, 0-1.4 mcg/kg/hr, Intravenous, Continuous, Vasile Carter PA-C, Stopped at 02/28/24 0229    dextrose 10% bolus 125 mL 125 mL, 12.5 g, Intravenous, PRN, Vasile Carter PA-C    dextrose 10% bolus 125 mL 125 mL, 12.5 g, Intravenous, PRN, Pablo Yepez MD    dextrose 10% bolus 250 mL 250 mL, 25 g, Intravenous, PRN, Vasile Carter PA-AUSTIN    dextrose 10% bolus 250 mL 250 mL, 25 g, Intravenous, PRLucho OREILLY Michael B, MD    dextrose 5 % and 0.45 % NaCl infusion, , Intravenous, Continuous, Vasile Carter PA-C, Last Rate: 100 mL/hr at 02/29/24 0700, Rate Verify at 02/29/24 0700    diltiaZEM 125 mg in dextrose 5 % 125 mL IVPB (ready to mix) (non-titrating), 10 mg/hr, Intravenous, Continuous, Vasile Carter PA-C, Stopped at 02/28/24 0808    docusate sodium capsule 100 mg, 100 mg, Oral, BID, Vasile Carter PA-C, 100 mg at 02/29/24 0909    electrolyte-A infusion, , Intravenous, PRN, Pablo Yepez MD, Stopped at 02/28/24 0700    EPINEPHrine (ADRENALIN) 5 mg in dextrose 5 % (D5W) 250 mL infusion, 0-2 mcg/kg/min, Intravenous, Continuous, Vasile Carter PA-C, Stopped at 02/28/24 0621    famotidine (PF) injection 20 mg, 20 mg, Intravenous, Daily, Vasile Carter PA-C, 20 mg at 02/29/24 0909    ferrous sulfate tablet 1  each, 1 tablet, Oral, Every other day, Tanner Rdz MD, 1 each at 02/29/24 0909    folic acid tablet 1 mg, 1 mg, Oral, Daily, Vasile Carter PA-C, 1 mg at 02/29/24 0909    furosemide injection 40 mg, 40 mg, Intravenous, Once, Chantelle Calle, BLANQUITA    glucagon (human recombinant) injection 1 mg, 1 mg, Intramuscular, PRN, Pablo Yepez MD    heparin, porcine (PF) 100 unit/mL injection flush 500 Units, 5 mL, Intravenous, On Call Procedure, Pablo Yepez MD    heparin, porcine (PF) 100 unit/mL injection flush 500 Units, 5 mL, Intravenous, On Call Procedure, Nita Contreras MD    HYDROcodone-acetaminophen 5-325 mg per tablet 1 tablet, 1 tablet, Oral, Q4H PRN, Bart Herbert DO, 1 tablet at 02/27/24 0038    HYDROcodone-acetaminophen 5-325 mg per tablet 1 tablet, 1 tablet, Oral, Q4H PRN, Vasile Carter PA-C    HYDROmorphone (PF) injection 0.5 mg, 0.5 mg, Intravenous, Q4H PRN, Pablo Yepez MD, 0.5 mg at 02/28/24 0450    insulin aspart U-100 injection 0-5 Units, 0-5 Units, Subcutaneous, Q6H PRN, Pablo Yepez MD    insulin regular in 0.9 % NaCl 100 unit/100 mL (1 unit/mL) infusion, 0-52 Units/hr, Intravenous, Continuous, Vasile Carter PA-C, Stopped at 02/28/24 1638    lactated ringers bolus 500 mL, 500 mL, Intravenous, Once, Fransico Schrader, DO    lactulose 10 gram/15 ml solution 20 g, 20 g, Oral, Q6H PRN, Vasile Carter PA-C    LIDOcaine HCl 2% urojet, , Mucous Membrane, Once, Nicol Diaz, AGACNP-BC    loperamide capsule 2 mg, 2 mg, Oral, Continuous PRN, Vasile Carter PA-C    magnesium sulfate 2g in water 50mL IVPB (premix), 2 g, Intravenous, PRN, Vasile Carter PA-C    magnesium sulfate 2g in water 50mL IVPB (premix), 4 g, Intravenous, PRN, Vasile Carter PA-C    melatonin tablet 6 mg, 6 mg, Oral, Nightly PRN, Tanner Rdz MD, 6 mg at 02/26/24 2031    metoclopramide injection 5 mg, 5 mg, Intravenous, Q6H PRN, Vasile Carter PA-C    metoprolol  tartrate (LOPRESSOR) split tablet 12.5 mg, 12.5 mg, Oral, BID, Vasile Carter PA-C, 12.5 mg at 02/29/24 0909    milrinone 20mg in D5W 100 mL infusion, 0.1876 mcg/kg/min, Intravenous, Continuous, Vasile Carter PA-C, Stopped at 02/29/24 0005    morphine injection 4 mg, 4 mg, Intravenous, Q4H PRN, Vasile Carter PA-C, 4 mg at 02/29/24 0354    mupirocin 2 % ointment, , Nasal, BID, Vasile Carter PA-C, Given at 02/29/24 0909    nitroGLYCERIN SL tablet 0.4 mg, 0.4 mg, Sublingual, Q5 Min PRN, Tanner Rdz MD    NORepinephrine 32 mg in dextrose 5 % (D5W) 250 mL infusion, 0-3 mcg/kg/min, Intravenous, Continuous, Pablo Yepez MD, Stopped at 02/28/24 1908    ondansetron disintegrating tablet 8 mg, 8 mg, Oral, Q8H PRN, Tanner Rdz MD, 8 mg at 02/23/24 1302    ondansetron injection 4 mg, 4 mg, Intravenous, Q4H PRN, Vasile Carter PA-C    ondansetron injection 8 mg, 8 mg, Intravenous, Q6H PRN, Pablo Yepez MD, 8 mg at 02/26/24 1616    oxyCODONE immediate release tablet Tab 10 mg, 10 mg, Oral, Q4H PRN, Vasile Carter PA-C    pantoprazole EC tablet 40 mg, 40 mg, Oral, Daily, Tanner Rdz MD, 40 mg at 02/29/24 0909    PHENYLephrine 20 mg in sodium chloride 0.9% 250 mL infusion, 0-5 mcg/kg/min, Intravenous, Continuous, Pablo Yepez MD, Stopped at 02/28/24 2103    polyethylene glycol packet 17 g, 17 g, Oral, Daily, Tanner Rdz MD, 17 g at 02/29/24 0909    potassium chloride 20 mEq in 100 mL IVPB (FOR CENTRAL LINE ADMINISTRATION ONLY), 20 mEq, Intravenous, PRN, Vasile Carter PA-C, Last Rate: 50 mL/hr at 02/28/24 2000, Rate Verify at 02/28/24 2000    potassium chloride 20 mEq in 100 mL IVPB (FOR CENTRAL LINE ADMINISTRATION ONLY), 40 mEq, Intravenous, PRN, Vasile Carter PA-C    potassium chloride 20 mEq in 100 mL IVPB (FOR CENTRAL LINE ADMINISTRATION ONLY), 60 mEq, Intravenous, PRN, Vasile Carter PA-C    simethicone chewable tablet 80 mg, 1 tablet,  Oral, TID PRN, Tanner Rdz MD, 80 mg at 02/27/24 0918    sodium chloride 0.9% flush 10 mL, 10 mL, Intravenous, PRN, Tanner Rdz MD    sodium chloride 0.9% flush 10 mL, 10 mL, Intravenous, PRN, Millie Jimenez NP    sodium phosphate 15 mmol in dextrose 5 % (D5W) 250 mL IVPB, 15 mmol, Intravenous, PRN, Vasile Carter PA-C    sodium phosphate 20.01 mmol in dextrose 5 % (D5W) 250 mL IVPB, 20.01 mmol, Intravenous, PRN, Vasile Carter PA-C, Stopped at 02/28/24 1353    sodium phosphate 30 mmol in dextrose 5 % (D5W) 250 mL IVPB, 30 mmol, Intravenous, PRN, Vasile Carter PA-C    sucralfate tablet 1 g, 1 g, Oral, QID (AC & HS), Vasile Carter PA-C, 1 g at 02/28/24 2107  VTE Risk Mitigation (From admission, onward)           Ordered     heparin, porcine (PF) 100 unit/mL injection flush 500 Units  On Call Procedure         02/27/24 0718     heparin, porcine (PF) 100 unit/mL injection flush 500 Units  On Call Procedure         02/27/24 0257     Place SUSIE hose  Until discontinued         02/22/24 1458     Place sequential compression device  Until discontinued         02/21/24 2057                  Assessment:   CAD (Multivessel)    - Status Post CABG (3V) LIMA to LAD, SVG to OM1, SVG to RCA (2.27.24)    - RHC/Impella Placement and Graham Catheter (2.23.24)- Impella Removal/Femoral Artery Repair in Surgery on 2.27.24    - [facility] (2.19.24):pLAD lesion 70%, oLCx 80%, OM1 80%, OM2 1 lesion 70% 2nd lesion 50%, mLAD 50%, pRCA 60%  VHD/AS     - Status Post Bio AVR (Epic max 23mm) (2.27.24)    - ECHO (2.16.24): Aortic Valve: There is moderate aortic valve sclerosis. Severely restricted motion. There is severe stenosis. Aortic valve area by VTI is 0.66 cm². Aortic valve peak velocity is 4.45 m/s. Mean gradient is 50 mmHg. The dimensionless index is 0.17.   Cardiogenic Shock (Post Cardiotomy)- Vasopressor Support Off (BP Currently Stable)    - History of Hypertension   Ischemic Cardiomyopathy    - EF  40%  Pulmonary HTN    - ECHO PASP 69mmHg     - RHC (2.22.24): PA 84/34, PCWP 24 mmHg  Hematuria 2/2 Traumatic/Difficult Indwelling Catheter Placement (Resolved)  PAF (Now SR on Amiodarone Infusion)    - Status Post Bedside Cardioversion x 2 on 2.27.24 (Both Unsuccessful)    - Status Post MOISE Ligation (2.27.24)    - CHADsVASc - 5 Points - 7.2% Stroke Risk per Year   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% (Compensated)    - ECHO (2.16.24): EF 40%, Grade II DD    - Small Pleural Effusions on CT Imaging (2.22.24)  Left Bundle Branch Block   VHD    - ECHO (2.16.24): Severe AS, mild MR, mild to moderate TR  JEAN-CLAUDE/CKD Stage II     - Baseline Cr 1.6  Anemia- Suspect Blood Loss    - Status Post Transfusion on 2.28.24 & 2.29.24  Thrombocytopenia  Leukocytosis (Resolved)  No Hx of GI Bleed    Plan:   Continue Amiodarone Infusion Per Protocol Re: AF  Vasopressor Support has been weaned off  Add guideline directed medical therapy for ICMO when BP Permits, will hold off for now.  Out of bed to Chair today if able  Blood Transfusion in Progress  Continue Post Op Supportive Care  Deferring Anticoagulation (Re: AF) due to anemia requiring transfusion & status post MOISE Ligation.    BLANQUITA Marsh  Cardiology  [facility]   02/29/2024    I agree with the findings of the complexity of problems addressed and take responsibility for the plan's risks and complications. I approved the plan documented by Harjinder Whittington NP.  Continue amio

## 2024-03-01 LAB
ABS NEUT (OLG): 16.16 X10(3)/MCL (ref 2.1–9.2)
ALBUMIN SERPL-MCNC: 2.9 G/DL (ref 3.4–4.8)
ALBUMIN/GLOB SERPL: 1.4 RATIO (ref 1.1–2)
ALLENS TEST BLOOD GAS (OHS): ABNORMAL
ALP SERPL-CCNC: 117 UNIT/L (ref 40–150)
ALT SERPL-CCNC: 66 UNIT/L (ref 0–55)
AST SERPL-CCNC: 124 UNIT/L (ref 5–34)
BASE EXCESS BLD CALC-SCNC: -2.6 MMOL/L (ref -2–2)
BASOPHILS NFR BLD MANUAL: 0.19 X10(3)/MCL (ref 0–0.2)
BASOPHILS NFR BLD MANUAL: 1 %
BILIRUB SERPL-MCNC: 1 MG/DL
BLOOD GAS SAMPLE TYPE (OHS): ABNORMAL
BUN SERPL-MCNC: 22 MG/DL (ref 8.4–25.7)
CA-I BLD-SCNC: 1.1 MMOL/L (ref 1.12–1.23)
CALCIUM SERPL-MCNC: 7.7 MG/DL (ref 8.8–10)
CHLORIDE SERPL-SCNC: 106 MMOL/L (ref 98–107)
CO2 BLDA-SCNC: 25.6 MMOL/L
CO2 SERPL-SCNC: 20 MMOL/L (ref 23–31)
COHGB MFR BLDA: 2.3 % (ref 0.5–1.5)
CORRECTED TEMPERATURE (PCO2): 55 MMHG (ref 19–50)
CORRECTED TEMPERATURE (PH): 7.26 (ref 7.29–7.6)
CORRECTED TEMPERATURE (PO2): 43 MMHG
CREAT SERPL-MCNC: 2.03 MG/DL (ref 0.73–1.18)
DRAWN BY BLOOD GAS (OHS): ABNORMAL
EOSINOPHIL NFR BLD MANUAL: 0.19 X10(3)/MCL (ref 0–0.9)
EOSINOPHIL NFR BLD MANUAL: 1 %
ERYTHROCYTE [DISTWIDTH] IN BLOOD BY AUTOMATED COUNT: 15.9 % (ref 11.5–17)
FFP: NORMAL
GFR SERPLBLD CREATININE-BSD FMLA CKD-EPI: 33 MLS/MIN/1.73/M2
GLOBULIN SER-MCNC: 2.1 GM/DL (ref 2.4–3.5)
GLUCOSE SERPL-MCNC: 144 MG/DL (ref 82–115)
HCO3 BLDA-SCNC: 24.1 MMOL/L (ref 22–26)
HCO3 UR-SCNC: 24.7 MMOL/L
HCT VFR BLD AUTO: 34.3 % (ref 42–52)
HGB BLD-MCNC: 10.9 G/DL
HGB BLD-MCNC: 11.1 G/DL (ref 14–18)
INSTRUMENT WBC (OLG): 18.79 X10(3)/MCL
LPM (OHS): 6
LYMPHOCYTES NFR BLD MANUAL: 1.13 X10(3)/MCL
LYMPHOCYTES NFR BLD MANUAL: 6 %
MCH RBC QN AUTO: 28 PG (ref 27–31)
MCHC RBC AUTO-ENTMCNC: 32.4 G/DL (ref 33–36)
MCV RBC AUTO: 86.6 FL (ref 80–94)
METHGB MFR BLDA: 0.9 % (ref 0.4–1.5)
MONOCYTES NFR BLD MANUAL: 1.13 X10(3)/MCL (ref 0.1–1.3)
MONOCYTES NFR BLD MANUAL: 6 %
NEUTROPHILS NFR BLD MANUAL: 86 %
NRBC BLD AUTO-RTO: 0.3 %
NRBC BLD MANUAL-RTO: 3 %
O2 HB BLOOD GAS (OHS): 94.2 % (ref 94–97)
OXYGEN DEVICE BLOOD GAS (OHS): ABNORMAL
OXYHGB MFR BLDA: 11.1 G/DL (ref 12–16)
PCO2 BLDA: 49 MMHG (ref 35–45)
PCO2 BLDA: 55 MMHG (ref 19–50)
PH BLDA: 7.3 [PH] (ref 7.35–7.45)
PH SMN: 7.26 [PH] (ref 7.29–7.6)
PLATELET # BLD AUTO: 106 X10(3)/MCL (ref 130–400)
PLATELET # BLD EST: ADEQUATE 10*3/UL
PLATELETS.RETICULATED NFR BLD AUTO: 8.5 % (ref 0.9–11.2)
PMV BLD AUTO: 11.7 FL (ref 7.4–10.4)
PO2 BLDA: 43 MMHG
PO2 BLDA: 78 MMHG (ref 80–100)
POC BASE DEFICIT: -2.8 MMOL/L
POC COHB: 2.1 %
POC IONIZED CALCIUM: 1.08 MMOL/L (ref 1.12–1.23)
POC METHB: 0.8 %
POC O2HB: 73.7 %
POC SATURATED O2: 70.3 %
POC TEMPERATURE: 37 °C
POCT GLUCOSE: 122 MG/DL (ref 70–110)
POCT GLUCOSE: 123 MG/DL (ref 70–110)
POCT GLUCOSE: 168 MG/DL (ref 70–110)
POTASSIUM BLD-SCNC: 4.2 MMOL/L (ref 3.5–5)
POTASSIUM BLOOD GAS (OHS): 4.3 MMOL/L (ref 3.5–5)
POTASSIUM SERPL-SCNC: 4.9 MMOL/L (ref 3.5–5.1)
PROT SERPL-MCNC: 5 GM/DL (ref 5.8–7.6)
RBC # BLD AUTO: 3.96 X10(6)/MCL (ref 4.7–6.1)
RBC MORPH BLD: NORMAL
RBCS: NORMAL
SAMPLE SITE BLOOD GAS (OHS): ABNORMAL
SAO2 % BLDA: 93.9 %
SODIUM BLD-SCNC: 128 MMOL/L (ref 137–145)
SODIUM BLOOD GAS (OHS): 128 MMOL/L (ref 137–145)
SODIUM SERPL-SCNC: 134 MMOL/L (ref 136–145)
SPECIMEN SOURCE: ABNORMAL
WBC # SPEC AUTO: 18.79 X10(3)/MCL (ref 4.5–11.5)

## 2024-03-01 PROCEDURE — 97163 PT EVAL HIGH COMPLEX 45 MIN: CPT

## 2024-03-01 PROCEDURE — 80053 COMPREHEN METABOLIC PANEL: CPT | Performed by: INTERNAL MEDICINE

## 2024-03-01 PROCEDURE — 63600175 PHARM REV CODE 636 W HCPCS: Performed by: PHYSICIAN ASSISTANT

## 2024-03-01 PROCEDURE — 99900035 HC TECH TIME PER 15 MIN (STAT)

## 2024-03-01 PROCEDURE — 97167 OT EVAL HIGH COMPLEX 60 MIN: CPT

## 2024-03-01 PROCEDURE — 25000003 PHARM REV CODE 250: Performed by: INTERNAL MEDICINE

## 2024-03-01 PROCEDURE — 25000003 PHARM REV CODE 250: Performed by: PHYSICIAN ASSISTANT

## 2024-03-01 PROCEDURE — 99024 POSTOP FOLLOW-UP VISIT: CPT | Mod: ,,,

## 2024-03-01 PROCEDURE — 63600175 PHARM REV CODE 636 W HCPCS: Performed by: INTERNAL MEDICINE

## 2024-03-01 PROCEDURE — 94761 N-INVAS EAR/PLS OXIMETRY MLT: CPT | Mod: XB

## 2024-03-01 PROCEDURE — 27000221 HC OXYGEN, UP TO 24 HOURS

## 2024-03-01 PROCEDURE — 20000000 HC ICU ROOM

## 2024-03-01 PROCEDURE — 82803 BLOOD GASES ANY COMBINATION: CPT

## 2024-03-01 PROCEDURE — 25000003 PHARM REV CODE 250

## 2024-03-01 PROCEDURE — 63600175 PHARM REV CODE 636 W HCPCS

## 2024-03-01 PROCEDURE — 85027 COMPLETE CBC AUTOMATED: CPT | Performed by: INTERNAL MEDICINE

## 2024-03-01 PROCEDURE — 99900031 HC PATIENT EDUCATION (STAT)

## 2024-03-01 PROCEDURE — 36600 WITHDRAWAL OF ARTERIAL BLOOD: CPT

## 2024-03-01 RX ORDER — FUROSEMIDE 10 MG/ML
40 INJECTION INTRAMUSCULAR; INTRAVENOUS ONCE
Status: COMPLETED | OUTPATIENT
Start: 2024-03-01 | End: 2024-03-01

## 2024-03-01 RX ORDER — ACETAMINOPHEN 325 MG/1
650 TABLET ORAL EVERY 6 HOURS PRN
Status: DISCONTINUED | OUTPATIENT
Start: 2024-03-01 | End: 2024-03-26 | Stop reason: HOSPADM

## 2024-03-01 RX ORDER — ACETAMINOPHEN AND CODEINE PHOSPHATE 300; 30 MG/1; MG/1
1 TABLET ORAL EVERY 4 HOURS PRN
Status: DISCONTINUED | OUTPATIENT
Start: 2024-03-01 | End: 2024-03-08

## 2024-03-01 RX ADMIN — FUROSEMIDE 40 MG: 10 INJECTION, SOLUTION INTRAMUSCULAR; INTRAVENOUS at 03:03

## 2024-03-01 RX ADMIN — ACETAMINOPHEN 650 MG: 325 TABLET, FILM COATED ORAL at 03:03

## 2024-03-01 RX ADMIN — AMIODARONE HYDROCHLORIDE 0.5 MG/MIN: 50 INJECTION, SOLUTION INTRAVENOUS at 10:03

## 2024-03-01 RX ADMIN — FOLIC ACID 1 MG: 1 TABLET ORAL at 08:03

## 2024-03-01 RX ADMIN — ALLOPURINOL 50 MG: 300 TABLET ORAL at 08:03

## 2024-03-01 RX ADMIN — FAMOTIDINE 20 MG: 10 INJECTION, SOLUTION INTRAVENOUS at 08:03

## 2024-03-01 RX ADMIN — CALCIUM GLUCONATE 1 G: 20 INJECTION, SOLUTION INTRAVENOUS at 03:03

## 2024-03-01 RX ADMIN — HYDROMORPHONE HYDROCHLORIDE 0.5 MG: 2 INJECTION INTRAMUSCULAR; INTRAVENOUS; SUBCUTANEOUS at 02:03

## 2024-03-01 RX ADMIN — ASPIRIN 81 MG: 81 TABLET, COATED ORAL at 08:03

## 2024-03-01 RX ADMIN — METOPROLOL TARTRATE 12.5 MG: 25 TABLET, FILM COATED ORAL at 08:03

## 2024-03-01 RX ADMIN — DOCUSATE SODIUM 100 MG: 100 CAPSULE, LIQUID FILLED ORAL at 08:03

## 2024-03-01 RX ADMIN — FUROSEMIDE 40 MG: 10 INJECTION, SOLUTION INTRAMUSCULAR; INTRAVENOUS at 08:03

## 2024-03-01 RX ADMIN — HYDROMORPHONE HYDROCHLORIDE 0.5 MG: 2 INJECTION INTRAMUSCULAR; INTRAVENOUS; SUBCUTANEOUS at 05:03

## 2024-03-01 RX ADMIN — POLYETHYLENE GLYCOL 3350 17 G: 17 POWDER, FOR SOLUTION ORAL at 08:03

## 2024-03-01 RX ADMIN — OXYCODONE HYDROCHLORIDE 10 MG: 10 TABLET ORAL at 08:03

## 2024-03-01 RX ADMIN — ACETAMINOPHEN AND CODEINE PHOSPHATE 1 TABLET: 300; 30 TABLET ORAL at 06:03

## 2024-03-01 RX ADMIN — PANTOPRAZOLE SODIUM 40 MG: 40 TABLET, DELAYED RELEASE ORAL at 08:03

## 2024-03-01 NOTE — ANESTHESIA POSTPROCEDURE EVALUATION
Anesthesia Post Evaluation    Patient: Brain Snyder    Procedure(s) Performed: Procedure(s) (LRB):  CORONARY ARTERY BYPASS GRAFT (CABG) (N/A)  Replacement-valve-aortic (N/A)  SURGICAL PROCUREMENT, VEIN, ENDOSCOPIC (N/A)  Impella, Removal (Right)    Final Anesthesia Type: general      Patient location during evaluation: ICU  Patient participation: Yes- Able to Participate  Level of consciousness: awake and alert  Post-procedure vital signs: reviewed and stable  Pain management: adequate  Airway patency: patent      Anesthetic complications: no      Cardiovascular status: hemodynamically stable  Respiratory status: unassisted  Hydration status: euvolemic  Follow-up not needed.              Vitals Value Taken Time   /106 03/01/24 1040   Temp 37 °C (98.6 °F) 03/01/24 0400   Pulse 113 03/01/24 1043   Resp 43 03/01/24 1043   SpO2 91 % 03/01/24 1043   Vitals shown include unvalidated device data.      No case tracking events are documented in the log.      Pain/Diane Score: Pain Rating Prior to Med Admin: 8 (3/1/2024  8:39 AM)  Pain Rating Post Med Admin: 2 (2/29/2024  3:24 PM)

## 2024-03-01 NOTE — PROGRESS NOTES
Inpatient Nutrition Assessment    Admit Date: 2/21/2024   Total duration of encounter: 9 days   Patient Age: 78 y.o.    Nutrition Recommendation/Prescription     Advance diet as tolerated to cardiac diet when appropriate.  Add  Boost TID to provide 240 kcal and 10 g protein per serving if diet restarted. Thicken to SLP recs for consistency if needed.   If not able to advance diet, will need to consider NG placement.  If placed, consider:  Impact Peptide 1.5 goal rate 60 ml/hr to provide  1800 kcal/d (97% est needs)  113 g protein/d (100% est needs)  924 ml free water/d (47% est needs)  (calculations based on estimated 20 hr/d run time)     Communication of Recommendations: reviewed with nurse    Nutrition Assessment     Malnutrition Assessment/Nutrition-Focused Physical Exam    Unable to perform NFPE at this time    Chart Review    Reason Seen: length of stay and follow-up    Malnutrition Screening Tool Results   Have you recently lost weight without trying?: No  Have you been eating poorly because of a decreased appetite?: No   MST Score: 0   Diagnosis:  Ischemic Cardiomyopathy  Pulmonary HTN  Hematuria 2/2 Traumatic/Difficult Indwelling Catheter Placement   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% - (Compensated)   CKD    Relevant Medical History: PAF on Eliquis, Aortic insufficiency, MVCAD, HTN     Scheduled Medications:  allopurinoL, 50 mg, Daily  aspirin, 81 mg, Daily  atorvastatin, 40 mg, QHS  docusate sodium, 100 mg, BID  famotidine (PF), 20 mg, Daily  ferrous sulfate, 1 tablet, Every other day  folic acid, 1 mg, Daily  furosemide (LASIX) injection, 40 mg, Once  lactated ringers, 500 mL, Once  LIDOcaine HCl 2%, , Once  pantoprazole, 40 mg, Daily  polyethylene glycol, 17 g, Daily  sucralfate, 1 g, QID (AC & HS)    Continuous Infusions:  amiodarone in dextrose 5%, Last Rate: 0.5 mg/min (03/01/24 1029)  clevidipine, Last Rate: Stopped (02/28/24 0115)  dexmedeTOMIDine (Precedex) infusion  (titrating), Last Rate: Stopped (02/28/24 0229)  dextrose 5 % and 0.45 % NaCl, Last Rate: Stopped (03/01/24 0816)  dilTIAZem, Last Rate: Stopped (02/28/24 0808)  EPINEPHrine, Last Rate: Stopped (02/28/24 0621)  insulin regular 1 units/mL infusion orderable (CTS POST-OP), Last Rate: Stopped (02/28/24 1638)  loperamide  milrinone, Last Rate: 0.1876 mcg/kg/min (02/29/24 1745)  NORepinephrine bitartrate-D5W, Last Rate: Stopped (02/28/24 1908)  phenylephrine, Last Rate: Stopped (02/29/24 1126)    PRN Medications: acetaminophen, acetaminophen, albumin human 5%, calcium gluconate IVPB, calcium gluconate IVPB, calcium gluconate IVPB, dextrose 10%, dextrose 10%, electrolyte-A, glucagon (human recombinant), heparin, porcine (PF), heparin, porcine (PF), insulin aspart U-100, lactulose 10 gram/15 ml, loperamide, magnesium sulfate IVPB, magnesium sulfate IVPB, melatonin, metoclopramide, nitroGLYCERIN, ondansetron, ondansetron, potassium chloride in water, potassium chloride in water, potassium chloride in water, simethicone, sodium chloride 0.9%, sodium chloride 0.9%, sodium phosphate 15 mmol in dextrose 5 % (D5W) 250 mL IVPB, sodium phosphate 20.01 mmol in dextrose 5 % (D5W) 250 mL IVPB, sodium phosphate 30 mmol in dextrose 5 % (D5W) 250 mL IVPB    Calorie Containing IV Medications: no significant kcals from medications at this time    Recent Labs   Lab 02/25/24  0232 02/25/24  1228 02/26/24  0256 02/27/24  0154 02/27/24  1352 02/27/24  1430 02/27/24  1519 02/27/24  2126 02/27/24  2227 02/28/24  0144 02/28/24  0459 02/28/24  0830 02/28/24  1349 02/29/24  0221 03/01/24  0202   *  --  137 136  --  145  --  143  --   --  141 138  --  136 134*   K 4.2  --  4.7 4.4  --  3.5  --  3.8  --   --  3.5 4.3  --  4.3 4.9   CALCIUM 8.3*  --  8.0* 8.3*  --  8.2*  --  9.0  --   --  8.3* 7.9*  --  7.6* 7.7*   PHOS  --   --   --   --   --   --   --  4.0  --   --  2.2*  --   --  3.6  --    MG 2.10  --  2.20  --   --   --   --  2.60  --   --   2.20  --   --  2.10  --    CHLORIDE 103  --  110* 110*  --  113*  --  112*  --   --  110* 109*  --  109* 106   CO2 25  --  22* 19*  --  22*  --  21*  --   --  19* 19*  --  21* 20*   BUN 20.0  --  13.8 14.9  --  14.8  --  14.7  --   --  15.8 16.0  --  17.2 22.0   CREATININE 1.69*  --  1.49* 1.67*  --  1.52*  --  1.47*  --   --  1.86* 2.07*  --  1.99* 2.03*   EGFRNORACEVR 41  --  48 42  --  47  --  49  --   --  37 32  --  34 33   GLUCOSE 103  --  84 104  --  111  --  125*  --   --  211* 254*  --  161* 144*   BILITOT 1.3  --  1.1 1.1  --   --   --  1.7*  --   --  1.0 0.8  --  0.7 1.0   ALKPHOS 69  --  61 75  --   --   --  34*  --   --  40 34*  --  39* 117   ALT 18  --  18 23  --   --   --  15  --   --  15 13  --  12 66*   AST 66*  --  53* 50*  --   --   --  37*  --   --  37* 35*  --  34 124*   ALBUMIN 3.2*  --  3.0* 3.0*  --   --   --  3.6  --   --  3.2* 3.1*  --  2.9* 2.9*   WBC 11.23   < > 10.34 11.83*  --  15.02*  --   --  10.87  --  12.78*  --  14.98  14.98* 14.85* 18.79  18.79*   HGB 10.1*   < > 10.6* 10.6*  --  6.3*  --   --  6.2* 9.4* 9.0* 7.9* 10.1* 8.8* 11.1*   HCT 31.7*   < > 33.1* 34.7*   < > 19.9*   < >  --  19.1* 28.9* 27.3* 23.3* 30.3* 26.1* 34.3*    < > = values in this interval not displayed.     Nutrition Orders:  Diet NPO Except for: Sips with Medication      Appetite/Oral Intake: NPO/NPO  Factors Affecting Nutritional Intake: NPO  Food/Mormon/Cultural Preferences: unable to obtain  Food Allergies: no known food allergies  Last Bowel Movement: 02/25/24  Wound(s):      Comments    2/27: Pt NPO this AM, in OR for CABG/AVR at time of rounds. Pt with poor intake since admission per EMR. Last BM 2/25, meds noted. Per MST, no reports of decreased appetite or unintentional weight loss prior admission. Weight fluctuations noted past week, likely related to fluid. Will trend weights.    3/1/24: Pt previously on liquid diet. Diet changed earlier due pt pocketing meds. SLP following pt. Will follow for  "need for TF to start if NG placed. Pt confused, not able to answer questions.     Anthropometrics    Height: 5' 5" (165.1 cm),    Last Weight: 78.2 kg (172 lb 6.4 oz) (02/23/24 0629), Weight Method: Bed Scale  BMI (Calculated): 28.7  BMI Classification: overweight (BMI 25-29.9)        Ideal Body Weight (IBW), Male: 136 lb                                Usual Weight Provided By: unable to obtain usual weight    Wt Readings from Last 5 Encounters:   02/23/24 78.2 kg (172 lb 6.4 oz)   02/19/24 81.1 kg (178 lb 12.8 oz)     Weight Change(s) Since Admission:   Wt Readings from Last 1 Encounters:   02/23/24 0629 78.2 kg (172 lb 6.4 oz)   02/21/24 2116 82.2 kg (181 lb 3.5 oz)   Admit Weight: 82.2 kg (181 lb 3.5 oz) (02/21/24 2116), Weight Method: Bed Scale    Estimated Needs    Weight Used For Calorie Calculations: 78.2 kg (172 lb 6.4 oz)  Energy Calorie Requirements (kcal): 1863kcal (1.3 stress factor)  Energy Need Method: [place]-St Jeor  Weight Used For Protein Calculations: 78.2 kg (172 lb 6.4 oz)  Protein Requirements: 102-118gm (1.3-1.5g/kg)  Fluid Requirements (mL): 1955ml (25ml/kg)    Enteral Nutrition     Patient not receiving enteral nutrition at this time.    Parenteral Nutrition     Patient not receiving parenteral nutrition support at this time.    Evaluation of Received Nutrient Intake    Calories: not meeting estimated needs  Protein: not meeting estimated needs    Patient Education     Not applicable.    Nutrition Diagnosis     PES: Inadequate oral intake related to acute illness as evidenced by <50% intake for > 5 days. (active)     Nutrition Interventions     Intervention(s): general/healthful diet, commercial beverage, prescription medication, and collaboration with other providers    Goal: Meet greater than 80% of nutritional needs by follow-up. (goal progressing)  Goal: Consume % of oral supplements by follow-up. (goal progressing)    Nutrition Goals & Monitoring     Dietitian will monitor: food " and beverage intake, weight, electrolyte/renal panel, glucose/endocrine profile, and gastrointestinal profile    Nutrition Risk/Follow-Up: moderate (follow-up in 3-5 days)   Please consult if re-assessment needed sooner.

## 2024-03-01 NOTE — PROGRESS NOTES
Pulmonary & Critical Care Medicine   Progress Note      Presenting History/HPI:    The patient is a 78-year-old [place]-speaking male with a history of aortic insufficiency/stenosis, coronary artery disease, chronic kidney disease stage 3, hypertension, atrial fibrillation on Eliquis, who presented to the ICU status post going to the cath lab for PCI.  Patient was transferred from [facility] after being admitted there on 2/15 with chest pain found to have NSTEMI with left heart catheterization initially on 02/20 demonstrating severe multivessel stenosis and was subsequently transferred here for CABG evaluation.  Patient was taken to the cath lab today and subsequently had an Impella placed and was transferred to the ICU with Impella currently set at P7.  Patient is awake alert moves all extremities follows commands with a [place] language barrier.  He has no complaints at this time.     Significant Events:  -patient admitted to ICU on 02/23 status post Impella placement  -3v CABG with AVR, left atrial appendage ligation, impella explant, right femoral artery repair 2/27/24    Interval History:    No acute events overnight. Was put back on milrinone yesterday 0.1875 mcg/kg/min. Remains off of phenylephrine/norepinephrine. H/H 11/1/34.3, received 2u PRBC yesterday. 350 cc serosanguinous chest tube output last 24 hrs. About 1L UOP. On 6L NC this morning. CXR reveals slightly improved aeration of left lung, though increased work of breathing. On amiodarone infusion, in a-fib again this morning HR low 100s      Scheduled Medications:    allopurinoL  50 mg Oral Daily    aspirin  81 mg Oral Daily    atorvastatin  40 mg Oral QHS    docusate sodium  100 mg Oral BID    famotidine (PF)  20 mg Intravenous Daily    ferrous sulfate  1 tablet Oral Every other day    folic acid  1 mg Oral Daily    lactated ringers  500 mL Intravenous Once    LIDOcaine HCl 2%   Mucous Membrane Once    metoprolol tartrate  12.5 mg Oral  BID    pantoprazole  40 mg Oral Daily    polyethylene glycol  17 g Oral Daily    sucralfate  1 g Oral QID (AC & HS)       PRN Medications:   acetaminophen, acetaminophen, acetaminophen, albumin human 5%, calcium gluconate IVPB, calcium gluconate IVPB, calcium gluconate IVPB, dextrose 10%, dextrose 10%, dextrose 10%, dextrose 10%, electrolyte-A, glucagon (human recombinant), heparin, porcine (PF), heparin, porcine (PF), HYDROcodone-acetaminophen, HYDROcodone-acetaminophen, HYDROmorphone, insulin aspart U-100, lactulose 10 gram/15 ml, loperamide, magnesium sulfate IVPB, magnesium sulfate IVPB, melatonin, metoclopramide, nitroGLYCERIN, ondansetron, ondansetron, ondansetron, oxyCODONE, potassium chloride in water, potassium chloride in water, potassium chloride in water, simethicone, sodium chloride 0.9%, sodium chloride 0.9%, sodium phosphate 15 mmol in dextrose 5 % (D5W) 250 mL IVPB, sodium phosphate 20.01 mmol in dextrose 5 % (D5W) 250 mL IVPB, sodium phosphate 30 mmol in dextrose 5 % (D5W) 250 mL IVPB      Infusions:     amiodarone in dextrose 5% 0.5 mg/min (02/29/24 1818)    clevidipine Stopped (02/28/24 0115)    dexmedeTOMIDine (Precedex) infusion (titrating) Stopped (02/28/24 0229)    dextrose 5 % and 0.45 % NaCl 100 mL/hr at 02/29/24 1818    dilTIAZem Stopped (02/28/24 0808)    EPINEPHrine Stopped (02/28/24 0621)    insulin regular 1 units/mL infusion orderable (CTS POST-OP) Stopped (02/28/24 1638)    loperamide      milrinone 0.1876 mcg/kg/min (02/29/24 1745)    NORepinephrine bitartrate-D5W Stopped (02/28/24 1908)    phenylephrine Stopped (02/29/24 1126)         Fluid Balance:     Intake/Output Summary (Last 24 hours) at 3/1/2024 0439  Last data filed at 3/1/2024 0400  Gross per 24 hour   Intake 2383.19 ml   Output 1507 ml   Net 876.19 ml           Vital Signs:   Vitals:    03/01/24 0415   BP:    Pulse: 70   Resp: 16   Temp:          Physical Exam  Vitals and nursing note reviewed.   Constitutional:        General: He is not in acute distress.     Appearance: Normal appearance. He is ill-appearing.   HENT:      Head: Normocephalic and atraumatic.      Right Ear: External ear normal.      Left Ear: External ear normal.      Nose: Nose normal.      Mouth/Throat:      Mouth: Mucous membranes are moist.   Eyes:      Conjunctiva/sclera: Conjunctivae normal.      Pupils: Pupils are equal, round, and reactive to light.   Cardiovascular:      Rate and Rhythm: Tachycardia present. Rhythm irregularly irregular.      Heart sounds: No murmur heard.     No friction rub. No gallop.   Pulmonary:      Effort: Tachypnea present.      Breath sounds: No wheezing, rhonchi or rales.      Comments: Coarse bilaterally  Chest:      Comments: Chest tubes in place with serosanguinous drainage  Abdominal:      General: Abdomen is flat.      Palpations: Abdomen is soft.      Tenderness: There is no abdominal tenderness.   Musculoskeletal:         General: No swelling or deformity. Normal range of motion.      Cervical back: Neck supple. No rigidity.   Skin:     General: Skin is warm and dry.      Capillary Refill: Capillary refill takes less than 2 seconds.   Neurological:      General: No focal deficit present.      Mental Status: He is alert. Mental status is at baseline.           Ventilator Settings  Vent Mode: A/C (02/29/24 0508)  Set Rate: 22 BPM (02/29/24 0508)  Vt Set: 470 mL (02/29/24 0508)  PEEP/CPAP: 5 cmH20 (02/29/24 0508)  Oxygen Concentration (%): 30 (02/29/24 0600)  Peak Airway Pressure: 20 cmH20 (02/29/24 0508)  Total Ve: 9.9 L/m (02/29/24 0508)  F/VT Ratio<105 (RSBI): (!) 51.92 (02/29/24 0305)      Laboratory Studies:   Recent Labs   Lab 02/29/24 0512   PH 7.390   PCO2 38.0   PO2 71.0*   HCO3 23.0       Recent Labs   Lab 03/01/24 0202   WBC 18.79*   RBC 3.96*   HGB 11.1*   HCT 34.3*   *   MCV 86.6   MCH 28.0   MCHC 32.4*       Recent Labs   Lab 03/01/24 0202   GLUCOSE 144*   *   K 4.9   CO2 20*   BUN 22.0    CREATININE 2.03*   CALCIUM 7.7*           Microbiology Data:   Microbiology Results (last 7 days)       ** No results found for the last 168 hours. **              Imaging:   X-Ray Chest AP Portable  Narrative: EXAMINATION:  XR CHEST AP PORTABLE    CLINICAL HISTORY:  Post-op;    TECHNIQUE:  Frontal view(s) of the chest.    COMPARISON:  Radiography 02/28/2024    FINDINGS:  New mild hazy opacity in the lower right lung, likely small pleural effusion.  No significant change in left lung opacity and cardiomegaly.  No definitive pneumothorax.  Impression: New small right pleural effusion.  Similar left lung opacity.    Electronically signed by: Roly Aaron  Date:    02/29/2024  Time:    06:16          Assessment and Plan    Assessment:  Multivessel coronary artery disease s/p CABGx3 2/27/24  Moderate Aortic insufficiency, Severe aortic stenosis s/p AVR  Atrial fibrillation w/ RVR, on amiodarone infusion  Severe pulmonary hypertension, PASP of 69 mmHg  Acute on chronic systolic and diastolic CHF, left ventricular ejection fraction 40% with grade 2 diastolic dysfunction   Mild mitral regurgitation   Mild-to-moderate tricuspid regurgitation   Chronic kidney disease stage 3   Anemia requiring blood transfusion  Hypertension  Hemoptysis and hematuria - resolved      Plan:  -Continue post-operative management per CV surgery post op protocol  -Continue amiodarone infusion, in A-fib again this morning, rate low 100s. Cardiology following, appreciate their assistance  -Back on milrinone though with reported tentative plan per CV surgery to turn it off later today and remove swan catheter  -Monitor surgical drain output for signs of bleeding. Transfuse as needed  -Multimodal pain control  -Continue aspirin, statin  -Continue to monitor renal function and urine output  -Continue diuresis to euvolemia, has pleural effusions, pulmonary vascular congestion  -Worsening leukocytosis, increased work of breathing. Will obtain  respiratory culture  -Resume anticoagulation when okay per CTS  -Continue all ongoing ICU care    DVT ppx/tx with SCD  GI ppx with pepcid    Flynn Bass MD  3/1/2024  Pulmonology/Critical Care

## 2024-03-01 NOTE — PT/OT/SLP PROGRESS
11:40--Orders received, chart reviewed, POC discussed with nursing.  Pt s/p CABG x3 on 2/27/24, intubated since procedure and self extubation yesterday 2/29/24.  Pt passed [place] swallow screen yesterday however nursing reports pt pocketing PO medications this AM.  Pt seen this AM with son and wife at bedside.   [phone] as pt speaks [place] sor[place].  Nursing and family report increased confusion this AM, pt currently reporting he is at home.  SLP attempting to complete bedside swallow evaluation.  Pt requiring repetition for following basic commands, requiring increased cues to attend to R side.  Improvement in alertness when switched to L side.  Pt unaware of spoon, spoon touched to lips and inside mouth, SLP giving MAX verbal cues to close mouth around spoon however pt would not close mouth.  R upper extremity tremors noted.  Son reports pt noted to have tremors in R arm earlier this morning.  Nursing notified who was coming to assess patient.  Rec: NPO.  SLP to complete evaluation as appropriate.    13:45--SLP reattempting to see pt for bedside swallow evaluation however nursing reports pt stating he cannot feel one side of his body.  SLP to hold eval, will complete as appropriate.

## 2024-03-01 NOTE — PT/OT/SLP EVAL
"Occupational Therapy  Evaluation    Name: Brain Snyder  MRN: [id number]  Admitting Diagnosis: NSTEMI  Recent Surgery: Procedure(s) (LRB):  CORONARY ARTERY BYPASS GRAFT (CABG) (N/A)  Replacement-valve-aortic (N/A)  SURGICAL PROCUREMENT, VEIN, ENDOSCOPIC (N/A)  Impella, Removal (Right) 3 Days Post-Op    Recommendations:     Discharge therapy intensity: High Intensity Therapy   Discharge Equipment Recommendations:  to be determined by next level of care    Assessment:     Brain Snyder is a 78 y.o. male with a medical diagnosis of multivessel CAD s/p CABGx3, moderate aortic insufficiency, severe aortic stenosis s/p AVR, afib RVR on amiodarone, severe pulmonary htn, CKD, htn, hemoptysis, hematuria.  Pt with significant confusion, weakness.   utilized.  Able to sit EOB, unable to progress towards standing due to inability to follow commands.  Difficulty with locating speaker and .  Overall confusion present.  Expect continued therapy to be needed, high vs mod pending progress and overall tolerance.  He presents with the following performance deficits affecting function: weakness, impaired self care skills, impaired functional mobility, gait instability, impaired balance, impaired cognition, decreased upper extremity function, decreased lower extremity function, decreased safety awareness.     Rehab Prognosis: Good; patient would benefit from acute skilled OT services to address these deficits and reach maximum level of function.       Plan:     Patient to be seen 5 x/week to address the above listed problems via self-care/home management  Plan of Care Expires: 04/01/24  Plan of Care Reviewed with: patient    Subjective     Chief Complaint: "where am I"  Patient/Family Comments/goals: TBD    Occupational Profile:  Living Environment: per chart review, pt lives with his daughter Allegra  Previous level of function: unknown  Roles and Routines: unknown  Equipment Used at Home:  (unknown)  Assistance " "upon Discharge: TBD    Pain/Comfort:  Pain Rating 1: 0/10    Patients cultural, spiritual, Nondenominational conflicts given the current situation:      Objective:     OT communicated with RN prior to session.      Patient was found HOB elevated with  (chest tube, gustafson, arterial line, IJ) upon OT entry to room.    General Precautions: Standard, sternal  Orthopedic Precautions: N/A  Braces: N/A    Vital Signs: 130/70,  95% 5L NC    Bed Mobility:    Patient completed Supine to Sit with max A  Patient completed Sit to Supine with max A  Mod A static sitting balance    Functional Mobility/Transfers:  Functional Mobility: unable to stand today, unable to follow commands safely    Activities of Daily Living:  Lower Body Dressing: maximal assistance    Toileting: total assistance gustafson    AMPA 6 Click ADL:  AMPAC Total Score: 6    Functional Cognition:  Pt significantly confused, "who am I?" "Where am I?"    Visual Perceptual Skills:  Pt did not make eye contact, did not attend to speaker    Upper Extremity Function:  Right Upper Extremity:   Able to squeeze hand x1 occasion to command, did not move proximal or elbows    Left Upper Extremity:  Able to squeeze hand x1 occasion to command, did not move proximal or elbows    Balance:   Max A static sitting balance  Therapeutic Positioning  Risk for acquired pressure injuries is increased due to relative decrease in mobility d/t hospitalization , impaired mobility, and inability to communicate toileting needs.    OT interventions performed during the course of today's session:   Education was provided on benefits of and recommendations for therapeutic positioning  Therapeutic positioning was provided at the conclusion of session to offload all bony prominences for the prevention and/or reduction of pressure injuries    Skin assessment: all bony prominences were assessed    Findings: no redness or breakdown noted    OT recommendations for therapeutic positioning throughout " hospitalization:   Follow [facility] Pressure Injury Prevention Protocol  Geomat recommended for sacral protection while [facility]  Geomat ordered    Patient Education:  Patient provided with verbal education education regarding OT role/goals/POC, post op precautions, fall prevention, safety awareness, and Discharge/DME recommendations.  Additional teaching is warranted.     Patient left right sidelying with all lines intact and RN present    GOALS:   Multidisciplinary Problems       Occupational Therapy Goals          Problem: Occupational Therapy    Goal Priority Disciplines Outcome Interventions   Occupational Therapy Goal     OT, PT/OT Ongoing, Progressing    Description: Goals to be met by: 4/1/24     Patient will increase functional independence with ADLs by performing:    UE Dressing with min A   Grooming while standing at sink with Minimal Assistance.  Toileting from toilet with Minimal Assistance for hygiene and clothing management.   Sitting at edge of bed x30 minutes with Minimal Assistance.  Toilet transfer to bedside commode with Minimal Assistance.                         History:     Past Medical History:   Diagnosis Date    A-fib     Aortic insufficiency     CAD (coronary artery disease)     Hypertension          Past Surgical History:   Procedure Laterality Date    AORTIC VALVE REPLACEMENT N/A 2/27/2024    Procedure: Replacement-valve-aortic;  Surgeon: Nita Contreras MD;  Location: [facility];  Service: Cardiothoracic;  Laterality: N/A;    CORONARY ANGIOGRAPHY N/A 2/20/2024    Procedure: ANGIOGRAM, CORONARY ARTERY;  Surgeon: Vasile Callahan MD;  Location: [facility] CATH LAB;  Service: Cardiology;  Laterality: N/A;    CORONARY ARTERY BYPASS GRAFT (CABG) N/A 2/27/2024    Procedure: CORONARY ARTERY BYPASS GRAFT (CABG);  Surgeon: Nita Contreras MD;  Location: [facility] OR;  Service: Cardiothoracic;  Laterality: N/A;    ENDOSCOPIC HARVEST OF VEIN N/A 2/27/2024    Procedure: SURGICAL PROCUREMENT, VEIN,  ENDOSCOPIC;  Surgeon: Nita Contreras MD;  Location: [facility] OR;  Service: Cardiothoracic;  Laterality: N/A;    IMPELLA, REMOVAL Right 2/27/2024    Procedure: Impella, Removal;  Surgeon: Nita Contreras MD;  Location: [facility] OR;  Service: Cardiothoracic;  Laterality: Right;    INSERTION OF INTRAVASCULAR MICROAXIAL BLOOD PUMP N/A 2/23/2024    Procedure: INSERTION, IMPELLA;  Surgeon: All Montoya MD;  Location: [facility] CATH LAB;  Service: Cardiology;  Laterality: N/A;  with swanz    RIGHT HEART CATHETERIZATION N/A 2/22/2024    Procedure: INSERTION, CATHETER, RIGHT HEART;  Surgeon: Shreya Lomax MD;  Location: [facility] CATH LAB;  Service: Cardiology;  Laterality: N/A;       Time Tracking:     OT Date of Treatment:    OT Start Time: 0951  OT Stop Time: 1020  OT Total Time (min): 29 min    Billable Minutes:Evaluation HIGH    3/1/2024

## 2024-03-01 NOTE — PLAN OF CARE
Problem: Occupational Therapy  Goal: Occupational Therapy Goal  Description: Goals to be met by: 4/1/24     Patient will increase functional independence with ADLs by performing:    UE Dressing with min A   Grooming while standing at sink with Minimal Assistance.  Toileting from toilet with Minimal Assistance for hygiene and clothing management.   Sitting at edge of bed x30 minutes with Minimal Assistance.  Toilet transfer to bedside commode with Minimal Assistance.    Outcome: Ongoing, Progressing

## 2024-03-01 NOTE — PT/OT/SLP EVAL
Physical Therapy Evaluation    Patient Name:  Brain Snyder   MRN:  [id number]    Recommendations:     Discharge therapy intensity: High Intensity Therapy (will update rec pending progress)   Discharge Equipment Recommendations: to be determined by next level of care   Barriers to discharge: Impaired mobility and Ongoing medical needs    Assessment:     Brain Snyder is a 78 y.o. male admitted with a medical diagnosis of NSTEMI, CAD, HF, CAD, afib, s/p impella, s/p CABG x3, s/p AVR.  He presents with the following impairments/functional limitations: weakness, impaired endurance, impaired self care skills, impaired functional mobility, impaired balance, impaired cognition, decreased safety awareness. Pt on 5L/minO2. Pt very confused and repeatedly asked where he was, why was he here, and what we were here to do.  used [employer] #[id number], and continued to answer these questions. Pt was AxO x1. Pt also demonstrated generalized weakness, inability to follow most commands, and poor eye contact/engagement.     Rehab Prognosis: Fair; patient would benefit from acute skilled PT services to address these deficits and reach maximum level of function.    Recent Surgery: Procedure(s) (LRB):  CORONARY ARTERY BYPASS GRAFT (CABG) (N/A)  Replacement-valve-aortic (N/A)  SURGICAL PROCUREMENT, VEIN, ENDOSCOPIC (N/A)  Impella, Removal (Right) 3 Days Post-Op    Plan:     During this hospitalization, patient to be seen 6 x/week to address the identified rehab impairments via gait training, therapeutic activities, therapeutic exercises and progress toward the following goals:    Plan of Care Expires:  04/01/24    Subjective     Chief Complaint: pt not sure why he is here  Patient/Family Comments/goals: SYLVAIN  Pain/Comfort:  Pain Rating 1: 0/10    Patients cultural, spiritual, Scientology conflicts given the current situation: no    Living Environment:  Per chart review,  Patient lives with their family in a single level home, with  no steps, with tub-shower combo.   Prior to admission, patients level of function was independent.  Equipment used at home: none.    Upon discharge, patient will have assistance from family.    Objective:     Communicated with nurse prior to session.  Patient found supine with chest tube, arterial line, gustafson catheter, peripheral IV (swan-nicole cath)  upon PT entry to room.    General Precautions: Standard, sternal, other (see comments) (Uzbek speaking)  Orthopedic Precautions:N/A   Braces: N/A  Respiratory Status: Nasal cannula, flow 5 L/min  Blood Pressure: 130/70, 97 HR, 96%      Exams:  Cognitive Exam:  Patient is oriented to none  RLE ROM: WNL  RLE Strength: Deficits: pt did not follow command to MMT/AROM  LLE ROM: WNL  LLE Strength: Deficits: 2/5 knee ext  Skin integrity: Visible skin intact      Functional Mobility:  Bed Mobility:     Scooting: maximal assistance and of 2 persons  Supine to Sit: maximal assistance and of 2 persons  Sit to Supine: maximal assistance and of 2 persons  Balance: Mod A for sitting balance due to R lean.      AM-PAC 6 CLICK MOBILITY  Total Score:8       Treatment & Education:    Patient provided with verbal education education regarding PT role/goals/POC.  Additional teaching is warranted.     Patient left with bed in chair position with all lines intact, call button in reach, and nurse present.    GOALS:   Multidisciplinary Problems       Physical Therapy Goals          Problem: Physical Therapy    Goal Priority Disciplines Outcome Goal Variances Interventions   Physical Therapy Goal     PT, PT/OT Ongoing, Progressing     Description: Goals to be met by: 2024     Patient will increase functional independence with mobility by performin. Supine to sit with MInimal Assistance  2. Sit to stand transfer with Minimal Assistance  3. Gait  x 150 feet with Minimal Assistance using Rolling Walker.                          History:     Past Medical History:   Diagnosis Date     A-fib     Aortic insufficiency     CAD (coronary artery disease)     Hypertension        Past Surgical History:   Procedure Laterality Date    AORTIC VALVE REPLACEMENT N/A 2/27/2024    Procedure: Replacement-valve-aortic;  Surgeon: Nita Contreras MD;  Location: [facility] OR;  Service: Cardiothoracic;  Laterality: N/A;    CORONARY ANGIOGRAPHY N/A 2/20/2024    Procedure: ANGIOGRAM, CORONARY ARTERY;  Surgeon: Vasile Callahan MD;  Location: [facility] CATH LAB;  Service: Cardiology;  Laterality: N/A;    CORONARY ARTERY BYPASS GRAFT (CABG) N/A 2/27/2024    Procedure: CORONARY ARTERY BYPASS GRAFT (CABG);  Surgeon: Nita Contreras MD;  Location: [facility] OR;  Service: Cardiothoracic;  Laterality: N/A;    ENDOSCOPIC HARVEST OF VEIN N/A 2/27/2024    Procedure: SURGICAL PROCUREMENT, VEIN, ENDOSCOPIC;  Surgeon: Nita Contreras MD;  Location: [facility] OR;  Service: Cardiothoracic;  Laterality: N/A;    IMPELLA, REMOVAL Right 2/27/2024    Procedure: Impella, Removal;  Surgeon: Nita Contreras MD;  Location: [facility] OR;  Service: Cardiothoracic;  Laterality: Right;    INSERTION OF INTRAVASCULAR MICROAXIAL BLOOD PUMP N/A 2/23/2024    Procedure: INSERTION, IMPELLA;  Surgeon: All Montoya MD;  Location: [facility] CATH LAB;  Service: Cardiology;  Laterality: N/A;  with swanz    RIGHT HEART CATHETERIZATION N/A 2/22/2024    Procedure: INSERTION, CATHETER, RIGHT HEART;  Surgeon: Shreya Lomax MD;  Location: [facility] CATH LAB;  Service: Cardiology;  Laterality: N/A;       Time Tracking:     PT Received On: 03/01/24  PT Start Time: 0952     PT Stop Time: 1026  PT Total Time (min): 34 min     Billable Minutes: Evaluation 34      03/01/2024

## 2024-03-01 NOTE — PROGRESS NOTES
"  [facility]    Cardiology  Progress Note    Patient Name: Brain Snyder  MRN: [id number]  Admission Date: 2/21/2024  Hospital Length of Stay: 9 days  Code Status: Full Code   Attending Physician: Pablo Yepez MD   Primary Care Physician: Nidia Shepherd NP  Expected Discharge Date:   Principal Problem:CAD (coronary artery disease)    Subjective:   Reason for Consult:  CAD     HPI: 78-year-old female that is unknown to [facility] with a PMHx of PAF on Eliquis Aortic insufficiency, MV CAD, HTN. He is [place] speaking and an  was used for interview. He was orginally admitted to [facility] on 2.15.24 with CP & NSTEMI and underwent a LHC on 2.20.24 taht showed MV CAD (reported noted below) He was transferred to [facility] on 2.21.24 for a CABG/AVR evaluation. On arrive he was hemodynamically stable on a heparin infusion. He denied chest pain, SOB, and nausea on current exam, [facility] was consulted for CAD    Hospital Course:   2.23.24: Patient seen resting in bed. He denies SOB or CP. He remains on heparin infusion. Family at bedside   2.24.24: NAD. S/p Impella Placement/[place]-Chelo Catheter. "I am ok." + Blood Clots from Nose/Mouth, Hematuria 2/2 traumatic Indwelling Catheter Placement. Heparin Drip per Protocol. Impella P5  2.25.24: NAD. "I'm ok." Denies CP, SOB and Palps. PA Pressure Reading is not Accurate. CVP 5. Impella at P5. R Groin Impella P7. Remains Off Heparin Drip per Protocol. Now in SR.   2.26.24: NAD Noted. SR on Tele. Right Groin Impella in place, bruising with no hematoma noted. Distal pulses DP intact. Legs warm. NC 2 L/Min. Denies CP/SOB. Consent obtained from his daughter Brii Snyder. NPO for MV PCI Today.  2.27.24: NAD Noted. Impella remains in place at P7 Support. Good UA Noted, some pink tinged urine noted. SR on Tele. Pressors off. Denies CP/SOB. NPO for surgery today. Heparin on hold for surgery.  2.28.24: Patient is critically ill. AF RVR on Tele, twice shocked this AM with no " "success. On Amiodarone/Milrinone- Cardizem Infusion now off given hypotension and ICMO. Also no José Miguel/Levophed. Concern for bleeding noted, transfusion noted. Patient is intubated/Mechanically Vented. Hemodynamics: CO/CI 4.3/2.3 CVP 20.  2.29.24: Patient self extubated this AM. He is stable on NC Oxygen. Denies CP/SOB. SR on Tele. On Amiodarone Infusion and receiving blood products. BP Stable. Clinically much improved from yesterday.   3.1.24: NAD. Afib mild RVR on tele. On Primacor @ 0.187 mcg/kg/min. Amiodarone infusing per protocol. LFT's worsening. WBC worsening. + Incisional CP. On 5 L NC.     PMH: PAF (on Eliquis), Aortic insufficiency, MV CAD, HTN  PSH: [facility]  Family History: None reported  Social History: former smoker, denies ETOH or illicit drug us    Previous Diagnostics:  CABG/Bio AVR/MOISE Ligation (2.27.24):  Coronary artery bypass grafting X 3, LIMA to LAD, reversed SVG to OM1, Reversed Saphenous venous graft to RCA  Bioprosthetic aortic valve replacement (Epic max 23mm), Endoscopic venous harvesting of left greater saphenous vein, Left atrial appendage ligation, explant of right femoral impella device, right femoral artery repair.    RHC/Impella Placement (2.23.24):  Right heart catheterization performed showed the following:  PA= 61/24 (27) mm Hg  PCWP=  31/26 (27) mm Hg  AO saturation= 93 % RA  PA saturation= 60% with Impella (49% yesterday)    (02/22/24) -  0.5  Following that we elected to advance the Impella via right common femoral artery approach:  - Abiomed stiff wire  - 14 Khmer peel-away sheath  - Heparin 10,000 unit bolus given peripherally  - Dilator removed  - 0.035" J-wire  - 6 Khmer pigtail catheter positioned in the left ventricle  - Abiomed 0.025" wire  - Impella CP positioned in left ventricle  - Pulsatile motor current (appropriate positioning)  - Placed the marker just below the aortic valve  - Cardiac output was 2.8 L/min provided by the device.  Impella was at 84cm  Access " Closure :    Sheath sutured to the groin  Secured.  Attestation:I was present for and supervised all key portions of the procedure  Impression/plan:   Successful Impella insertion and swan-Chelo in the setting of Acute HF/low cardiac output/precardiogenic shock/Critcal-severe AS/MVCAD - LM distal    RHC (2.22.24):  Right heart catheterization demonstrating severe pulmonary hypertension 84/34 and PCWP 24 mmHg  Reduced cardiac output/cardiac index at 3.43/1.81 L/min/m2 with pulmonary artery saturations 49.3%  For applied to the right femoral vein following removal of the 7 Armenian sheath  The estimated blood loss was none.    Carotid US (2.22.24):  The bilateral internal carotid artery demonstrated less than 50% stenosis.   The bilateral vertebral arteries were patent with antegrade flow    LHC (2.20.24):   Prox LAD lesion was 70% stenosed.  Ost Cx to Prox Cx lesion was 80% stenosed.  1st Mrg lesion was 80% stenosed.  2nd Mrg-1 lesion was 70% stenosed.  2nd Mrg-2 lesion was 50% stenosed.  Mid LAD lesion was 50% stenosed.  Prox RCA lesion was 60% stenosed.  estimated blood loss was <50 mL.  There was three vessel coronary artery disease.  LVEDP: 30mmHg  Recommendations:   Refer for CABG evaluation and AVR   Preformed by Dr. Flannery at [facility]     ECHO (2.15.24):  Left Ventricle: The left ventricle is normal in size. Mildly increased ventricular mass. Mildly increased wall thickness. There is mild eccentric hypertrophy. Moderate global hypokinesis present. Septal motion is consistent with bundle branch block. There is moderately reduced systolic function. Biplane (2D) method of discs ejection fraction is 40%. Grade II diastolic dysfunction.  Right Ventricle: Right ventricular enlargement. Systolic function is normal.  Left Atrium: Left atrium is severely dilated.  Right Atrium: Right atrium is moderately dilated.  Aortic Valve: There is moderate aortic valve sclerosis. Severely restricted motion. There is severe stenosis. Aortic  valve area by VTI is 0.66 cm². Aortic valve peak velocity is 4.45 m/s. Mean gradient is 50 mmHg. The dimensionless index is 0.17. There is mild to moderate aortic regurgitation.  Mitral Valve: Mildly calcified leaflets. There is no stenosis. There is mild regurgitation.  Tricuspid Valve: The tricuspid valve is structurally normal. There is mild to moderate regurgitation.  Pulmonic Valve: There is no significant regurgitation.  Aorta: Aortic root is upper limit of normal measuring 3.6 cm.  Pulmonary Artery: There is severe pulmonary hypertension. The estimated pulmonary artery systolic pressure is 69 mmHg.  IVC/SVC: Intermediate venous pressure at 8 mmHg.  Pericardium: There is no pericardial effusion.     Review of Systems   Cardiovascular:  Positive for chest pain (incisional). Negative for palpitations.   Respiratory:  Positive for shortness of breath.      Objective:     Vital Signs (Most Recent):  Temp: 98.6 °F (37 °C) (03/01/24 0400)  Pulse: 93 (03/01/24 0900)  Resp: 20 (03/01/24 0900)  BP: 121/76 (03/01/24 0900)  SpO2: (!) 94 % (03/01/24 0900) Vital Signs (24h Range):  Temp:  [98.2 °F (36.8 °C)-99 °F (37.2 °C)] 98.6 °F (37 °C)  Pulse:  [] 93  Resp:  [8-30] 20  SpO2:  [92 %-99 %] 94 %  BP: (103-166)/() 121/76  Arterial Line BP: (104-193)/(51-85) 154/80   Weight: 78.2 kg (172 lb 6.4 oz)  Body mass index is 28.69 kg/m².  SpO2: (!) 94 %       Intake/Output Summary (Last 24 hours) at 3/1/2024 1023  Last data filed at 3/1/2024 0800  Gross per 24 hour   Intake 3446.9 ml   Output 1677 ml   Net 1769.9 ml       Lines/Drains/Airways       Central Venous Catheter Line  Duration              Introducer with Double Lumen 02/27/24 2100 Internal Jugular Right 2 days    Pulmonary Artery Catheter Assessment  02/27/24 1336 2 days              Drain  Duration                  Chest Tube 02/27/24 1717 Tube - 1 Anterior Mediastinal 24 Fr. 2 days         Chest Tube 02/27/24 1720 Tube - 1 Left Fourth intercostal space 28  Fr. 2 days         Urethral Catheter 02/28/24 1445 Coude 20 Fr. 1 day              Arterial Line  Duration             Arterial Line 02/27/24 2350 Left Radial 2 days              Line  Duration                  Pacer Wires 02/27/24 2115 2 days              Peripheral Intravenous Line  Duration                  Peripheral IV - Double Lumen 02/20/24 0031 20 G Anterior;Right Forearm 10 days                  Significant Labs:   Recent Results (from the past 72 hour(s))   ISTAT PROCEDURE    Collection Time: 02/27/24  1:52 PM   Result Value Ref Range    POC PH 7.324 (L) 7.35 - 7.45    POC PCO2 40.5 35 - 45 mmHg    POC PO2 44 40 - 60 mmHg    POC HCO3 21.1 (L) 24 - 28 mmol/L    POC BE -5 (L) -2 to 2 mmol/L    POC SATURATED O2 76 95 - 100 %    POC pH Temp 7.324     POC pCO2 Temp 40.5 mmHg    POC pO2 Temp 44 mmHg    POC Glucose 99 70 - 110 mg/dL    POC Sodium 138 136 - 145 mmol/L    POC Potassium 4.2 3.5 - 5.1 mmol/L    POC TCO2 22 (L) 24 - 29 mmol/L    POC Ionized Calcium 1.22 1.06 - 1.42 mmol/L    POC Hematocrit 32 (L) 36 - 54 %PCV    POC HEMOGLOBIN 11 g/dL    Sample VENOUS     FiO2 100     POC Temp 37.0 C    Basic Metabolic Panel    Collection Time: 02/27/24  2:30 PM   Result Value Ref Range    Sodium Level 145 136 - 145 mmol/L    Potassium Level 3.5 3.5 - 5.1 mmol/L    Chloride 113 (H) 98 - 107 mmol/L    Carbon Dioxide 22 (L) 23 - 31 mmol/L    Glucose Level 111 82 - 115 mg/dL    Blood Urea Nitrogen 14.8 8.4 - 25.7 mg/dL    Creatinine 1.52 (H) 0.73 - 1.18 mg/dL    BUN/Creatinine Ratio 10     Calcium Level Total 8.2 (L) 8.8 - 10.0 mg/dL    Anion Gap 10.0 mEq/L    eGFR 47 mls/min/1.73/m2   Protime-INR    Collection Time: 02/27/24  2:30 PM   Result Value Ref Range    PT 22.7 (H) 12.5 - 14.5 seconds    INR 2.0 (H) <=1.3   APTT    Collection Time: 02/27/24  2:30 PM   Result Value Ref Range    PTT 47.1 (H) 23.2 - 33.7 seconds   CBC with Differential    Collection Time: 02/27/24  2:30 PM   Result Value Ref Range    WBC 15.02 (H)  4.50 - 11.50 x10(3)/mcL    RBC 2.24 (L) 4.70 - 6.10 x10(6)/mcL    Hgb 6.3 (L) 14.0 - 18.0 g/dL    Hct 19.9 (LL) 42.0 - 52.0 %    MCV 88.8 80.0 - 94.0 fL    MCH 28.1 27.0 - 31.0 pg    MCHC 31.7 (L) 33.0 - 36.0 g/dL    RDW 15.6 11.5 - 17.0 %    Platelet 108 (L) 130 - 400 x10(3)/mcL    MPV 9.9 7.4 - 10.4 fL    Neut % 78.6 %    Lymph % 11.1 %    Mono % 7.9 %    Eos % 0.7 %    Basophil % 0.3 %    Lymph # 1.67 0.6 - 4.6 x10(3)/mcL    Neut # 11.79 (H) 2.1 - 9.2 x10(3)/mcL    Mono # 1.19 0.1 - 1.3 x10(3)/mcL    Eos # 0.11 0 - 0.9 x10(3)/mcL    Baso # 0.05 <=0.2 x10(3)/mcL    IG# 0.21 (H) 0 - 0.04 x10(3)/mcL    IG% 1.4 %    NRBC% 0.0 %    IPF 4.2 0.9 - 11.2 %   ISTAT PROCEDURE    Collection Time: 02/27/24  3:19 PM   Result Value Ref Range    POC PH 7.272 (LL) 7.35 - 7.45    POC PCO2 43.4 35 - 45 mmHg    POC PO2 47 (LL) 80 - 100 mmHg    POC HCO3 20.0 (L) 24 - 28 mmol/L    POC BE -7 (L) -2 to 2 mmol/L    POC SATURATED O2 77 95 - 100 %    POC Glucose 99 70 - 110 mg/dL    POC Sodium 138 136 - 145 mmol/L    POC Potassium 4.1 3.5 - 5.1 mmol/L    POC TCO2 21 (L) 23 - 27 mmol/L    POC Ionized Calcium 1.19 1.06 - 1.42 mmol/L    POC Hematocrit 29 (L) 36 - 54 %PCV    POC HEMOGLOBIN 10 g/dL    Sample ARTERIAL    ISTAT PROCEDURE    Collection Time: 02/27/24  3:57 PM   Result Value Ref Range    POC PH 7.325 (L) 7.35 - 7.45    POC PCO2 38.2 35 - 45 mmHg    POC PO2 277 (H) 80 - 100 mmHg    POC HCO3 19.9 (L) 24 - 28 mmol/L    POC BE -6 (L) -2 to 2 mmol/L    POC SATURATED O2 100 95 - 100 %    POC Glucose 110 70 - 110 mg/dL    POC Sodium 139 136 - 145 mmol/L    POC Potassium 3.8 3.5 - 5.1 mmol/L    POC TCO2 21 (L) 23 - 27 mmol/L    POC Ionized Calcium 1.00 (L) 1.06 - 1.42 mmol/L    POC Hematocrit 23 (L) 36 - 54 %PCV    POC HEMOGLOBIN 8 g/dL    Sample ARTERIAL     FiO2 60    ISTAT PROCEDURE    Collection Time: 02/27/24  4:04 PM   Result Value Ref Range    POC PH 7.331 (L) 7.35 - 7.45    POC PCO2 49.1 (H) 35 - 45 mmHg    POC PO2 36 (LL) 80 - 100  mmHg    POC HCO3 26.0 24 - 28 mmol/L    POC BE 0 -2 to 2 mmol/L    POC SATURATED O2 65 95 - 100 %    POC Glucose 110 70 - 110 mg/dL    POC Sodium 142 136 - 145 mmol/L    POC Potassium 3.7 3.5 - 5.1 mmol/L    POC TCO2 27 23 - 27 mmol/L    POC Ionized Calcium 1.02 (L) 1.06 - 1.42 mmol/L    POC Hematocrit 20 (LL) 36 - 54 %PCV    POC HEMOGLOBIN 7 g/dL    Sample ARTERIAL    ISTAT PROCEDURE    Collection Time: 02/27/24  4:51 PM   Result Value Ref Range    POC PH 7.317 (L) 7.35 - 7.45    POC PCO2 49.4 (H) 35 - 45 mmHg    POC PO2 357 (H) 80 - 100 mmHg    POC HCO3 25.3 24 - 28 mmol/L    POC BE -1 -2 to 2 mmol/L    POC SATURATED O2 100 95 - 100 %    POC Glucose 127 (H) 70 - 110 mg/dL    POC Sodium 141 136 - 145 mmol/L    POC Potassium 4.1 3.5 - 5.1 mmol/L    POC TCO2 27 23 - 27 mmol/L    POC Ionized Calcium 1.02 (L) 1.06 - 1.42 mmol/L    POC Hematocrit 22 (L) 36 - 54 %PCV    POC HEMOGLOBIN 8 g/dL    Sample ARTERIAL     FiO2 65    Specimen to Pathology    Collection Time: 02/27/24  4:52 PM   Result Value Ref Range    Pathology Result     ISTAT PROCEDURE    Collection Time: 02/27/24  5:39 PM   Result Value Ref Range    POC PH 7.384 7.35 - 7.45    POC PCO2 39.7 35 - 45 mmHg    POC PO2 300 (H) 80 - 100 mmHg    POC HCO3 23.7 (L) 24 - 28 mmol/L    POC BE -1 -2 to 2 mmol/L    POC SATURATED O2 100 95 - 100 %    POC Glucose 126 (H) 70 - 110 mg/dL    POC Sodium 140 136 - 145 mmol/L    POC Potassium 4.3 3.5 - 5.1 mmol/L    POC TCO2 25 23 - 27 mmol/L    POC Ionized Calcium 1.00 (L) 1.06 - 1.42 mmol/L    POC Hematocrit 21 (L) 36 - 54 %PCV    POC HEMOGLOBIN 7 g/dL    Sample ARTERIAL     FiO2 60    ISTAT PROCEDURE    Collection Time: 02/27/24  6:18 PM   Result Value Ref Range    POC PH 7.359 7.35 - 7.45    POC PCO2 43.0 35 - 45 mmHg    POC PO2 266 (H) 80 - 100 mmHg    POC HCO3 24.2 24 - 28 mmol/L    POC BE -1 -2 to 2 mmol/L    POC SATURATED O2 100 95 - 100 %    POC Glucose 131 (H) 70 - 110 mg/dL    POC Sodium 140 136 - 145 mmol/L    POC  Potassium 4.6 3.5 - 5.1 mmol/L    POC TCO2 26 23 - 27 mmol/L    POC Ionized Calcium 1.00 (L) 1.06 - 1.42 mmol/L    POC Hematocrit 26 (L) 36 - 54 %PCV    POC HEMOGLOBIN 9 g/dL    Sample ARTERIAL     FiO2 70    ISTAT PROCEDURE    Collection Time: 02/27/24  7:01 PM   Result Value Ref Range    POC PH 7.376 7.35 - 7.45    POC PCO2 44.9 35 - 45 mmHg    POC PO2 272 (H) 80 - 100 mmHg    POC HCO3 26.3 24 - 28 mmol/L    POC BE 1 -2 to 2 mmol/L    POC SATURATED O2 100 95 - 100 %    POC Glucose 129 (H) 70 - 110 mg/dL    POC Sodium 142 136 - 145 mmol/L    POC Potassium 4.1 3.5 - 5.1 mmol/L    POC TCO2 28 (H) 23 - 27 mmol/L    POC Ionized Calcium 1.59 (H) 1.06 - 1.42 mmol/L    POC Hematocrit 21 (L) 36 - 54 %PCV    POC HEMOGLOBIN 7 g/dL    Sample ARTERIAL     FiO2 75    ISTAT PROCEDURE    Collection Time: 02/27/24  7:47 PM   Result Value Ref Range    POC PH 7.360 7.35 - 7.45    POC PCO2 38.6 35 - 45 mmHg    POC PO2 91 80 - 100 mmHg    POC HCO3 21.8 (L) 24 - 28 mmol/L    POC BE -4 (L) -2 to 2 mmol/L    POC SATURATED O2 97 95 - 100 %    POC Glucose 111 (H) 70 - 110 mg/dL    POC Sodium 142 136 - 145 mmol/L    POC Potassium 3.5 3.5 - 5.1 mmol/L    POC TCO2 23 23 - 27 mmol/L    POC Ionized Calcium 1.29 1.06 - 1.42 mmol/L    POC Hematocrit 27 (L) 36 - 54 %PCV    POC HEMOGLOBIN 9 g/dL    Sample ARTERIAL     FiO2 100    ISTAT PROCEDURE    Collection Time: 02/27/24  8:41 PM   Result Value Ref Range    POC PH 7.404 7.35 - 7.45    POC PCO2 36.3 35 - 45 mmHg    POC PO2 117 (H) 80 - 100 mmHg    POC HCO3 22.7 (L) 24 - 28 mmol/L    POC BE -2 -2 to 2 mmol/L    POC SATURATED O2 99 95 - 100 %    POC pH Temp 7.418     POC pCO2 Temp 34.8 mmHg    POC pO2 Temp 111 mmHg    POC Glucose 113 (H) 70 - 110 mg/dL    POC Sodium 143 136 - 145 mmol/L    POC Potassium 3.4 (L) 3.5 - 5.1 mmol/L    POC TCO2 24 23 - 27 mmol/L    POC Ionized Calcium 1.11 1.06 - 1.42 mmol/L    POC Hematocrit 20 (LL) 36 - 54 %PCV    POC HEMOGLOBIN 7 g/dL    Sample ARTERIAL     FiO2 96      POC Temp 36.0 C    Magnesium    Collection Time: 02/27/24  9:26 PM   Result Value Ref Range    Magnesium Level 2.60 1.60 - 2.60 mg/dL   Phosphorus    Collection Time: 02/27/24  9:26 PM   Result Value Ref Range    Phosphorus Level 4.0 2.3 - 4.7 mg/dL   Comprehensive Metabolic Panel    Collection Time: 02/27/24  9:26 PM   Result Value Ref Range    Sodium Level 143 136 - 145 mmol/L    Potassium Level 3.8 3.5 - 5.1 mmol/L    Chloride 112 (H) 98 - 107 mmol/L    Carbon Dioxide 21 (L) 23 - 31 mmol/L    Glucose Level 125 (H) 82 - 115 mg/dL    Blood Urea Nitrogen 14.7 8.4 - 25.7 mg/dL    Creatinine 1.47 (H) 0.73 - 1.18 mg/dL    Calcium Level Total 9.0 8.8 - 10.0 mg/dL    Protein Total 4.8 (L) 5.8 - 7.6 gm/dL    Albumin Level 3.6 3.4 - 4.8 g/dL    Globulin 1.2 (L) 2.4 - 3.5 gm/dL    Albumin/Globulin Ratio 3.0 (H) 1.1 - 2.0 ratio    Bilirubin Total 1.7 (H) <=1.5 mg/dL    Alkaline Phosphatase 34 (L) 40 - 150 unit/L    Alanine Aminotransferase 15 0 - 55 unit/L    Aspartate Aminotransferase 37 (H) 5 - 34 unit/L    eGFR 49 mls/min/1.73/m2   Protime-INR    Collection Time: 02/27/24  9:26 PM   Result Value Ref Range    PT 21.4 (H) 12.5 - 14.5 seconds    INR 1.9 (H) <=1.3   APTT    Collection Time: 02/27/24  9:26 PM   Result Value Ref Range    PTT 43.1 (H) 23.2 - 33.7 seconds   POCT glucose    Collection Time: 02/27/24  9:30 PM   Result Value Ref Range    POCT Glucose 120 (H) 70 - 110 mg/dL   POCT glucose    Collection Time: 02/27/24 10:03 PM   Result Value Ref Range    POCT Glucose 135 (H) 70 - 110 mg/dL   CBC Without Differential    Collection Time: 02/27/24 10:27 PM   Result Value Ref Range    WBC 10.87 4.50 - 11.50 x10(3)/mcL    RBC 2.17 (L) 4.70 - 6.10 x10(6)/mcL    Hgb 6.2 (L) 14.0 - 18.0 g/dL    Hct 19.1 (LL) 42.0 - 52.0 %    MCV 88.0 80.0 - 94.0 fL    MCH 28.6 27.0 - 31.0 pg    MCHC 32.5 (L) 33.0 - 36.0 g/dL    RDW 15.4 11.5 - 17.0 %    Platelet 70 (L) 130 - 400 x10(3)/mcL    MPV 10.6 (H) 7.4 - 10.4 fL    NRBC% 0.0 %    POCT glucose    Collection Time: 02/27/24 11:07 PM   Result Value Ref Range    POCT Glucose 153 (H) 70 - 110 mg/dL   RT Blood Gas    Collection Time: 02/28/24 12:00 AM   Result Value Ref Range    Sample Type Arterial Blood     Sample site Arterial Line     Drawn by ht rrt     pH, Blood gas 7.430 7.350 - 7.450    pCO2, Blood gas 32.0 (L) 35.0 - 45.0 mmHg    pO2, Blood gas 101.0 (H) 80.0 - 100.0 mmHg    Sodium, Blood Gas 135 (L) 137 - 145 mmol/L    Potassium, Blood Gas 3.6 3.5 - 5.0 mmol/L    Calcium Level Ionized 1.10 (L) 1.12 - 1.23 mmol/L    TOC2, Blood gas 22.2 mmol/L    Base Excess, Blood gas -2.60 (L) -2.00 - 2.00 mmol/L    sO2, Blood gas 98.0 %    HCO3, Blood gas 21.2 (L) 22.0 - 26.0 mmol/L    THb, Blood gas 9.5 (L) 12 - 16 g/dL    O2 Hb, Blood Gas 96.9 94.0 - 97.0 %    CO Hgb 2.3 (H) 0.5 - 1.5 %    Met Hgb 0.6 0.4 - 1.5 %    Allens Test N/A     MODE SIMV     Oxygen Device, Blood gas Ventilator     FIO2, Blood gas 60 %    Mech Vt 620 ml    Mech RR 20 b/min    PEEP 5.0 cmH2O    PS 10.0 cmH2O   POCT glucose    Collection Time: 02/28/24 12:04 AM   Result Value Ref Range    POCT Glucose 193 (H) 70 - 110 mg/dL   POCT glucose    Collection Time: 02/28/24 12:59 AM   Result Value Ref Range    POCT Glucose 183 (H) 70 - 110 mg/dL   Hemoglobin and Hematocrit    Collection Time: 02/28/24  1:44 AM   Result Value Ref Range    Hgb 9.4 (L) 14.0 - 18.0 g/dL    Hct 28.9 (L) 42.0 - 52.0 %   POCT glucose    Collection Time: 02/28/24  2:09 AM   Result Value Ref Range    POCT Glucose 185 (H) 70 - 110 mg/dL   POCT glucose    Collection Time: 02/28/24  3:11 AM   Result Value Ref Range    POCT Glucose 185 (H) 70 - 110 mg/dL   POCT glucose    Collection Time: 02/28/24  3:57 AM   Result Value Ref Range    POCT Glucose 189 (H) 70 - 110 mg/dL   Phosphorus    Collection Time: 02/28/24  4:59 AM   Result Value Ref Range    Phosphorus Level 2.2 (L) 2.3 - 4.7 mg/dL   Magnesium    Collection Time: 02/28/24  4:59 AM   Result Value Ref Range     Magnesium Level 2.20 1.60 - 2.60 mg/dL   Comprehensive metabolic panel    Collection Time: 02/28/24  4:59 AM   Result Value Ref Range    Sodium Level 141 136 - 145 mmol/L    Potassium Level 3.5 3.5 - 5.1 mmol/L    Chloride 110 (H) 98 - 107 mmol/L    Carbon Dioxide 19 (L) 23 - 31 mmol/L    Glucose Level 211 (H) 82 - 115 mg/dL    Blood Urea Nitrogen 15.8 8.4 - 25.7 mg/dL    Creatinine 1.86 (H) 0.73 - 1.18 mg/dL    Calcium Level Total 8.3 (L) 8.8 - 10.0 mg/dL    Protein Total 4.6 (L) 5.8 - 7.6 gm/dL    Albumin Level 3.2 (L) 3.4 - 4.8 g/dL    Globulin 1.4 (L) 2.4 - 3.5 gm/dL    Albumin/Globulin Ratio 2.3 (H) 1.1 - 2.0 ratio    Bilirubin Total 1.0 <=1.5 mg/dL    Alkaline Phosphatase 40 40 - 150 unit/L    Alanine Aminotransferase 15 0 - 55 unit/L    Aspartate Aminotransferase 37 (H) 5 - 34 unit/L    eGFR 37 mls/min/1.73/m2   CBC with Differential    Collection Time: 02/28/24  4:59 AM   Result Value Ref Range    WBC 12.78 (H) 4.50 - 11.50 x10(3)/mcL    RBC 3.12 (L) 4.70 - 6.10 x10(6)/mcL    Hgb 9.0 (L) 14.0 - 18.0 g/dL    Hct 27.3 (L) 42.0 - 52.0 %    MCV 87.5 80.0 - 94.0 fL    MCH 28.8 27.0 - 31.0 pg    MCHC 33.0 33.0 - 36.0 g/dL    RDW 14.6 11.5 - 17.0 %    Platelet 180 130 - 400 x10(3)/mcL    MPV 10.3 7.4 - 10.4 fL    Neut % 83.0 %    Lymph % 3.5 %    Mono % 12.2 %    Eos % 0.1 %    Basophil % 0.5 %    Lymph # 0.45 (L) 0.6 - 4.6 x10(3)/mcL    Neut # 10.61 (H) 2.1 - 9.2 x10(3)/mcL    Mono # 1.56 (H) 0.1 - 1.3 x10(3)/mcL    Eos # 0.01 0 - 0.9 x10(3)/mcL    Baso # 0.06 <=0.2 x10(3)/mcL    IG# 0.09 (H) 0 - 0.04 x10(3)/mcL    IG% 0.7 %    NRBC% 0.0 %   EKG 12-LEAD    Collection Time: 02/28/24  5:07 AM   Result Value Ref Range    QRS Duration 180 ms    OHS QTC Calculation 572 ms   POCT glucose    Collection Time: 02/28/24  5:25 AM   Result Value Ref Range    POCT Glucose 224 (H) 70 - 110 mg/dL   RT Blood Gas    Collection Time: 02/28/24  5:55 AM   Result Value Ref Range    Sample Type Arterial Blood     Sample site Arterial  Line     Drawn by rcl crt     pH, Blood gas 7.340 (L) 7.350 - 7.450    pCO2, Blood gas 37.0 35.0 - 45.0 mmHg    pO2, Blood gas 63.0 (L) 80.0 - 100.0 mmHg    Sodium, Blood Gas 139 137 - 145 mmol/L    Potassium, Blood Gas 3.2 (L) 3.5 - 5.0 mmol/L    Calcium Level Ionized 1.19 1.12 - 1.23 mmol/L    TOC2, Blood gas 21.1 mmol/L    Base Excess, Blood gas -5.20 mmol/L    sO2, Blood gas 90.0 %    HCO3, Blood gas 20.0 (L) 22.0 - 26.0 mmol/L    Allens Test Yes     MODE SIMV     FIO2, Blood gas 35 %    Mech Vt 620 ml    Mech RR 10 b/min    PEEP 5.0 cmH2O   POCT glucose    Collection Time: 02/28/24  5:55 AM   Result Value Ref Range    POCT Glucose 258 (H) 70 - 110 mg/dL   POCT glucose    Collection Time: 02/28/24  7:13 AM   Result Value Ref Range    POCT Glucose 249 (H) 70 - 110 mg/dL   POCT glucose    Collection Time: 02/28/24  8:06 AM   Result Value Ref Range    POCT Glucose 240 (H) 70 - 110 mg/dL   Protime-INR    Collection Time: 02/28/24  8:20 AM   Result Value Ref Range    PT 21.8 (H) 12.5 - 14.5 seconds    INR 1.9 (H) <=1.3   Fibrinogen    Collection Time: 02/28/24  8:20 AM   Result Value Ref Range    Fibrinogen 256 210 - 463 mg/dL   Hemoglobin and Hematocrit    Collection Time: 02/28/24  8:30 AM   Result Value Ref Range    Hgb 7.9 (L) 14.0 - 18.0 g/dL    Hct 23.3 (L) 42.0 - 52.0 %   Comprehensive Metabolic Panel    Collection Time: 02/28/24  8:30 AM   Result Value Ref Range    Sodium Level 138 136 - 145 mmol/L    Potassium Level 4.3 3.5 - 5.1 mmol/L    Chloride 109 (H) 98 - 107 mmol/L    Carbon Dioxide 19 (L) 23 - 31 mmol/L    Glucose Level 254 (H) 82 - 115 mg/dL    Blood Urea Nitrogen 16.0 8.4 - 25.7 mg/dL    Creatinine 2.07 (H) 0.73 - 1.18 mg/dL    Calcium Level Total 7.9 (L) 8.8 - 10.0 mg/dL    Protein Total 4.4 (L) 5.8 - 7.6 gm/dL    Albumin Level 3.1 (L) 3.4 - 4.8 g/dL    Globulin 1.3 (L) 2.4 - 3.5 gm/dL    Albumin/Globulin Ratio 2.4 (H) 1.1 - 2.0 ratio    Bilirubin Total 0.8 <=1.5 mg/dL    Alkaline Phosphatase 34  (L) 40 - 150 unit/L    Alanine Aminotransferase 13 0 - 55 unit/L    Aspartate Aminotransferase 35 (H) 5 - 34 unit/L    eGFR 32 mls/min/1.73/m2   RT Blood Gas    Collection Time: 02/28/24  8:38 AM   Result Value Ref Range    Sample Type Arterial Blood     Sample site Arterial Line     Drawn by sd rrt     pH, Blood gas 7.380 7.350 - 7.450    pCO2, Blood gas 35.0 35.0 - 45.0 mmHg    pO2, Blood gas 75.0 (L) 80.0 - 100.0 mmHg    Sodium, Blood Gas 133 (L) 137 - 145 mmol/L    Potassium, Blood Gas 4.2 3.5 - 5.0 mmol/L    Calcium Level Ionized 1.15 1.12 - 1.23 mmol/L    TOC2, Blood gas 21.8 mmol/L    Base Excess, Blood gas -4.00 (L) -2.00 - 2.00 mmol/L    sO2, Blood gas 94.6 %    HCO3, Blood gas 20.7 (L) 22.0 - 26.0 mmol/L    THb, Blood gas 8.6 (L) 12 - 16 g/dL    O2 Hb, Blood Gas 95.0 94.0 - 97.0 %    CO Hgb 1.7 (H) 0.5 - 1.5 %    Met Hgb 1.3 0.4 - 1.5 %    Allens Test N/A     MODE AC     Oxygen Device, Blood gas Ventilator     FIO2, Blood gas 50 %    Mech Vt 470 ml    Mech RR 22 b/min    PEEP 5.0 cmH2O   POCT glucose    Collection Time: 02/28/24  8:50 AM   Result Value Ref Range    POCT Glucose 243 (H) 70 - 110 mg/dL   POCT glucose    Collection Time: 02/28/24 10:01 AM   Result Value Ref Range    POCT Glucose 204 (H) 70 - 110 mg/dL   POCT glucose    Collection Time: 02/28/24 11:18 AM   Result Value Ref Range    POCT Glucose 184 (H) 70 - 110 mg/dL   POCT glucose    Collection Time: 02/28/24 11:59 AM   Result Value Ref Range    POCT Glucose 171 (H) 70 - 110 mg/dL   POCT glucose    Collection Time: 02/28/24  1:13 PM   Result Value Ref Range    POCT Glucose 151 (H) 70 - 110 mg/dL   CBC with Differential    Collection Time: 02/28/24  1:49 PM   Result Value Ref Range    WBC 14.98 (H) 4.50 - 11.50 x10(3)/mcL    RBC 3.50 (L) 4.70 - 6.10 x10(6)/mcL    Hgb 10.1 (L) 14.0 - 18.0 g/dL    Hct 30.3 (L) 42.0 - 52.0 %    MCV 86.6 80.0 - 94.0 fL    MCH 28.9 27.0 - 31.0 pg    MCHC 33.3 33.0 - 36.0 g/dL    RDW 15.1 11.5 - 17.0 %    Platelet  122 (L) 130 - 400 x10(3)/mcL    MPV 11.0 (H) 7.4 - 10.4 fL    NRBC% 0.0 %    IPF 6.4 0.9 - 11.2 %   Manual Differential    Collection Time: 02/28/24  1:49 PM   Result Value Ref Range    WBC 14.98 x10(3)/mcL    Neutrophils % 73 %    Lymphs % 12 %    Monocytes % 11 %    Basophils % 1 %    Metamyelocytes % 3 (H) <=0 %    Neutrophils Abs 10.9354 (H) 2.1 - 9.2 x10(3)/mcL    Lymphs Abs 1.7976 0.6 - 4.6 x10(3)/mcL    Monocytes Abs 1.6478 (H) 0.1 - 1.3 x10(3)/mcL    Basophils Abs 0.1498 0 - 0.2 x10(3)/mcL    Platelets Decreased (A) Normal, Adequate    RBC Morph Normal Normal   POCT glucose    Collection Time: 02/28/24  3:09 PM   Result Value Ref Range    POCT Glucose 113 (H) 70 - 110 mg/dL   Urinalysis, Reflex to Urine Culture    Collection Time: 02/28/24  3:27 PM    Specimen: Urine, Catheterized   Result Value Ref Range    Color, UA Yellow Yellow, Light-Yellow, Colorless, Straw, Dark-Yellow    Appearance, UA Turbid (A) Clear    Specific Gravity, UA 1.030 1.005 - 1.030    pH, UA 5.5 5.0 - 8.5    Protein, UA 2+ (A) Negative    Glucose, UA Normal Negative, Normal    Ketones, UA Negative Negative    Blood, UA 3+ (A) Negative    Bilirubin, UA Negative Negative    Urobilinogen, UA Normal 0.2, 1.0, Normal    Nitrites, UA Negative Negative    Leukocyte Esterase, UA Negative Negative    WBC, UA 0-5 None Seen, 0-2, 3-5, 0-5 /HPF    Bacteria, UA Trace None Seen, Trace /HPF    Squamous Epithelial Cells, UA Trace None Seen /HPF    Hyaline Casts, UA 6-10 (A) None Seen /lpf    Granular Casts >20 (A) None Seen /lpf    RBC, UA >100 (A) None Seen, 0-2, 3-5, 0-5 /HPF   POCT glucose    Collection Time: 02/28/24  3:59 PM   Result Value Ref Range    POCT Glucose 98 70 - 110 mg/dL   POCT glucose    Collection Time: 02/28/24  5:05 PM   Result Value Ref Range    POCT Glucose 93 70 - 110 mg/dL   POCT glucose    Collection Time: 02/28/24  6:02 PM   Result Value Ref Range    POCT Glucose 101 70 - 110 mg/dL   POCT glucose    Collection Time: 02/28/24   6:59 PM   Result Value Ref Range    POCT Glucose 99 70 - 110 mg/dL   POCT glucose    Collection Time: 02/28/24  8:14 PM   Result Value Ref Range    POCT Glucose 120 (H) 70 - 110 mg/dL   POCT glucose    Collection Time: 02/28/24  9:17 PM   Result Value Ref Range    POCT Glucose 135 (H) 70 - 110 mg/dL   POCT glucose    Collection Time: 02/29/24 12:20 AM   Result Value Ref Range    POCT Glucose 149 (H) 70 - 110 mg/dL   Comprehensive metabolic panel    Collection Time: 02/29/24  2:21 AM   Result Value Ref Range    Sodium Level 136 136 - 145 mmol/L    Potassium Level 4.3 3.5 - 5.1 mmol/L    Chloride 109 (H) 98 - 107 mmol/L    Carbon Dioxide 21 (L) 23 - 31 mmol/L    Glucose Level 161 (H) 82 - 115 mg/dL    Blood Urea Nitrogen 17.2 8.4 - 25.7 mg/dL    Creatinine 1.99 (H) 0.73 - 1.18 mg/dL    Calcium Level Total 7.6 (L) 8.8 - 10.0 mg/dL    Protein Total 4.4 (L) 5.8 - 7.6 gm/dL    Albumin Level 2.9 (L) 3.4 - 4.8 g/dL    Globulin 1.5 (L) 2.4 - 3.5 gm/dL    Albumin/Globulin Ratio 1.9 1.1 - 2.0 ratio    Bilirubin Total 0.7 <=1.5 mg/dL    Alkaline Phosphatase 39 (L) 40 - 150 unit/L    Alanine Aminotransferase 12 0 - 55 unit/L    Aspartate Aminotransferase 34 5 - 34 unit/L    eGFR 34 mls/min/1.73/m2   CBC with Differential    Collection Time: 02/29/24  2:21 AM   Result Value Ref Range    WBC 14.85 (H) 4.50 - 11.50 x10(3)/mcL    RBC 3.04 (L) 4.70 - 6.10 x10(6)/mcL    Hgb 8.8 (L) 14.0 - 18.0 g/dL    Hct 26.1 (L) 42.0 - 52.0 %    MCV 85.9 80.0 - 94.0 fL    MCH 28.9 27.0 - 31.0 pg    MCHC 33.7 33.0 - 36.0 g/dL    RDW 15.4 11.5 - 17.0 %    Platelet 106 (L) 130 - 400 x10(3)/mcL    MPV 10.5 (H) 7.4 - 10.4 fL    NRBC% 0.0 %    IPF 6.9 0.9 - 11.2 %   Manual Differential    Collection Time: 02/29/24  2:21 AM   Result Value Ref Range    Neutrophils % 77 %    Lymphs % 12 %    Monocytes % 11 %    Neutrophils Abs     Magnesium    Collection Time: 02/29/24  2:21 AM   Result Value Ref Range    Magnesium Level 2.10 1.60 - 2.60 mg/dL   Phosphorus     Collection Time: 02/29/24  2:21 AM   Result Value Ref Range    Phosphorus Level 3.6 2.3 - 4.7 mg/dL   POCT glucose    Collection Time: 02/29/24  4:55 AM   Result Value Ref Range    POCT Glucose 145 (H) 70 - 110 mg/dL   RT Blood Gas    Collection Time: 02/29/24  5:12 AM   Result Value Ref Range    Sample Type Arterial Blood     Sample site Arterial Line     Drawn by rcl crt     pH, Blood gas 7.390 7.350 - 7.450    pCO2, Blood gas 38.0 35.0 - 45.0 mmHg    pO2, Blood gas 71.0 (L) 80.0 - 100.0 mmHg    Sodium, Blood Gas 133 (L) 137 - 145 mmol/L    Potassium, Blood Gas 3.9 3.5 - 5.0 mmol/L    Calcium Level Ionized 1.10 (L) 1.12 - 1.23 mmol/L    TOC2, Blood gas 24.2 mmol/L    Base Excess, Blood gas -1.70 -2.00 - 2.00 mmol/L    sO2, Blood gas 94.0 %    HCO3, Blood gas 23.0 22.0 - 26.0 mmol/L    Allens Test N/A     MODE AC     FIO2, Blood gas 30 %    Mech Vt 470 ml    Mech RR 20 b/min    PEEP 5.0 cmH2O   POCT glucose    Collection Time: 02/29/24  8:12 AM   Result Value Ref Range    POCT Glucose 156 (H) 70 - 110 mg/dL   POCT glucose    Collection Time: 02/29/24  3:43 PM   Result Value Ref Range    POCT Glucose 149 (H) 70 - 110 mg/dL   POCT glucose    Collection Time: 02/29/24  9:36 PM   Result Value Ref Range    POCT Glucose 168 (H) 70 - 110 mg/dL   Comprehensive metabolic panel    Collection Time: 03/01/24  2:02 AM   Result Value Ref Range    Sodium Level 134 (L) 136 - 145 mmol/L    Potassium Level 4.9 3.5 - 5.1 mmol/L    Chloride 106 98 - 107 mmol/L    Carbon Dioxide 20 (L) 23 - 31 mmol/L    Glucose Level 144 (H) 82 - 115 mg/dL    Blood Urea Nitrogen 22.0 8.4 - 25.7 mg/dL    Creatinine 2.03 (H) 0.73 - 1.18 mg/dL    Calcium Level Total 7.7 (L) 8.8 - 10.0 mg/dL    Protein Total 5.0 (L) 5.8 - 7.6 gm/dL    Albumin Level 2.9 (L) 3.4 - 4.8 g/dL    Globulin 2.1 (L) 2.4 - 3.5 gm/dL    Albumin/Globulin Ratio 1.4 1.1 - 2.0 ratio    Bilirubin Total 1.0 <=1.5 mg/dL    Alkaline Phosphatase 117 40 - 150 unit/L    Alanine  Aminotransferase 66 (H) 0 - 55 unit/L    Aspartate Aminotransferase 124 (H) 5 - 34 unit/L    eGFR 33 mls/min/1.73/m2   CBC with Differential    Collection Time: 03/01/24  2:02 AM   Result Value Ref Range    WBC 18.79 (H) 4.50 - 11.50 x10(3)/mcL    RBC 3.96 (L) 4.70 - 6.10 x10(6)/mcL    Hgb 11.1 (L) 14.0 - 18.0 g/dL    Hct 34.3 (L) 42.0 - 52.0 %    MCV 86.6 80.0 - 94.0 fL    MCH 28.0 27.0 - 31.0 pg    MCHC 32.4 (L) 33.0 - 36.0 g/dL    RDW 15.9 11.5 - 17.0 %    Platelet 106 (L) 130 - 400 x10(3)/mcL    MPV 11.7 (H) 7.4 - 10.4 fL    NRBC% 0.3 %    IPF 8.5 0.9 - 11.2 %   Manual Differential    Collection Time: 03/01/24  2:02 AM   Result Value Ref Range    WBC 18.79 x10(3)/mcL    Neutrophils % 86 %    Lymphs % 6 %    Monocytes % 6 %    Eosinophils % 1 %    Basophils % 1 %    nRBC % 3 %    Neutrophils Abs 16.1594 (H) 2.1 - 9.2 x10(3)/mcL    Lymphs Abs 1.1274 0.6 - 4.6 x10(3)/mcL    Monocytes Abs 1.1274 0.1 - 1.3 x10(3)/mcL    Eosinophils Abs 0.1879 0 - 0.9 x10(3)/mcL    Basophils Abs 0.1879 0 - 0.2 x10(3)/mcL    Platelets Adequate Normal, Adequate    RBC Morph Normal Normal   POCT Capillary Blood Gas-Resp    Collection Time: 03/01/24  9:17 AM   Result Value Ref Range    POC PH 7.26 (AA) 7.29 - 7.60    POC PCO2 55 (AA) 19 - 50 mmHg    POC PO2 43 mmHg    POC HEMOGLOBIN 10.9 g/dL    POC SATURATED O2 70.3 %    POC O2Hb 73.7 %    POC COHb 2.1 %    POC MetHb 0.80 %    POC Potassium 4.2 3.5 - 5.0 mmol/l    POC Sodium 128 (A) 137 - 145 mmol/l    POC Ionized Calcium 1.08 (A) 1.12 - 1.23 mmol/l    Correct Temperature (PH) 7.26 (AA) 7.29 - 7.60    Corrected Temperature (pCO2) 55 (AA) 19 - 50 mmHg    Corrected Temperature (pO2) 43 mmHg    POC HCO3 24.7 mmol/l    Base Deficit -2.8 mmol/l    POC Temp 37.0 °C    Specimen source Venous sample    RT Blood Gas    Collection Time: 03/01/24  9:41 AM   Result Value Ref Range    Sample Type Arterial Blood     Sample site Arterial Line     Drawn by SD RRT     pH, Blood gas 7.300 (L) 7.350 -  7.450    pCO2, Blood gas 49.0 (H) 35.0 - 45.0 mmHg    pO2, Blood gas 78.0 (L) 80.0 - 100.0 mmHg    Sodium, Blood Gas 128 (L) 137 - 145 mmol/L    Potassium, Blood Gas 4.3 3.5 - 5.0 mmol/L    Calcium Level Ionized 1.10 (L) 1.12 - 1.23 mmol/L    TOC2, Blood gas 25.6 mmol/L    Base Excess, Blood gas -2.60 (L) -2.00 - 2.00 mmol/L    sO2, Blood gas 93.9 %    HCO3, Blood gas 24.1 22.0 - 26.0 mmol/L    THb, Blood gas 11.1 (L) 12 - 16 g/dL    O2 Hb, Blood Gas 94.2 94.0 - 97.0 %    CO Hgb 2.3 (H) 0.5 - 1.5 %    Met Hgb 0.9 0.4 - 1.5 %    Allens Test N/A     Oxygen Device, Blood gas Cannula     LPM 6      Telemetry: Sinus Rhythm     Physical Exam  Vitals and nursing note reviewed.   Constitutional:       General: He is not in acute distress.     Appearance: Normal appearance.   HENT:      Head: Normocephalic.      Mouth/Throat:      Mouth: Mucous membranes are moist.      Pharynx: Oropharynx is clear.   Eyes:      Extraocular Movements: Extraocular movements intact.   Cardiovascular:      Rate and Rhythm: Tachycardia present. Rhythm irregular.   Pulmonary:      Effort: Pulmonary effort is normal. No respiratory distress.      Breath sounds: Rales present. No wheezing.      Comments: NC 5 L/Min  Abdominal:      Palpations: Abdomen is soft.   Genitourinary:     Comments: Urinary Catheter   Musculoskeletal:         General: Normal range of motion.      Comments: Chest Tubes in Place.    Skin:     General: Skin is warm and dry.      Comments: Mid Sternal Incision with Dressing in Place. Bilateral Groins are soft with no evidence of active bleed noted.    Neurological:      General: No focal deficit present.      Mental Status: He is alert. Mental status is at baseline.      Comments: Awake Alert Oriented.       Current Inpatient Medications:    Current Facility-Administered Medications:     acetaminophen oral solution 650 mg, 650 mg, Per OG tube, Q6H PRN, Vasile Carter PA-C    acetaminophen tablet 650 mg, 650 mg, Oral, Q6H  JAD, Pablo Yepez MD    albumin human 5% bottle 12.5 g, 12.5 g, Intravenous, PRN, Vasile Carter PA-C, Stopped at 02/28/24 1442    allopurinol split tablet 50 mg, 50 mg, Oral, Daily, Tanner Rdz MD, 50 mg at 03/01/24 0839    amiodarone 450 mg/250 mL in D5W IV infusion - STANDARD, 0.5 mg/min, Intravenous, Continuous, Vasile Carter PA-C, Last Rate: 16.67 mL/hr at 03/01/24 0628, 0.5 mg/min at 03/01/24 0628    aspirin EC tablet 81 mg, 81 mg, Oral, Daily, Vasile Carter PA-C, 81 mg at 03/01/24 0841    atorvastatin tablet 40 mg, 40 mg, Oral, QHS, Tanner Rdz MD, 40 mg at 02/29/24 2044    calcium gluconate 1 g in NS IVPB (premixed), 1 g, Intravenous, PRN, Vasile Carter PA-C, Stopped at 02/29/24 0810    calcium gluconate 1 g in NS IVPB (premixed), 2 g, Intravenous, PRN, Vasile Carter PA-C    calcium gluconate 1 g in NS IVPB (premixed), 3 g, Intravenous, PRN, Vasile Carter PA-C    clevidipine (CLEVIPREX) 25 mg/50 mL infusion, 0-32 mg/hr, Intravenous, Continuous, Vasile Carter PA-C, Stopped at 02/28/24 0115    dexmedetomidine (PRECEDEX) 400mcg/100mL 0.9% NaCL infusion, 0-1.4 mcg/kg/hr, Intravenous, Continuous, Vasile Carter PA-C, Stopped at 02/28/24 0229    dextrose 10% bolus 125 mL 125 mL, 12.5 g, Intravenous, [address] Michael B, MD    dextrose 10% bolus 250 mL 250 mL, 25 g, Intravenous, [address] Michael B, MD    dextrose 5 % and 0.45 % NaCl infusion, , Intravenous, Continuous, Vasile Carter PA-C, Last Rate: 100 mL/hr at 03/01/24 0628, Rate Verify at 03/01/24 0628    diltiaZEM 125 mg in dextrose 5 % 125 mL IVPB (ready to mix) (non-titrating), 10 mg/hr, Intravenous, Continuous, Vasile Carter PA-C, Stopped at 02/28/24 0808    docusate sodium capsule 100 mg, 100 mg, Oral, BID, Vasile Carter PA-C, 100 mg at 03/01/24 0839    electrolyte-A infusion, , Intravenous, PRN, Pablo Yepez MD, Stopped at 02/28/24 0700    EPINEPHrine (ADRENALIN) 5 mg in  dextrose 5 % (D5W) 250 mL infusion, 0-2 mcg/kg/min, Intravenous, Continuous, Vasile Carter PA-C, Stopped at 02/28/24 0621    famotidine (PF) injection 20 mg, 20 mg, Intravenous, Daily, Vasile Carter PA-C, 20 mg at 03/01/24 0839    ferrous sulfate tablet 1 each, 1 tablet, Oral, Every other day, Tanner Rdz MD, 1 each at 02/29/24 0909    folic acid tablet 1 mg, 1 mg, Oral, Daily, Vasile Carter PA-C, 1 mg at 03/01/24 0839    glucagon (human recombinant) injection 1 mg, 1 mg, Intramuscular, PRN, Pablo Yepez MD    heparin, porcine (PF) 100 unit/mL injection flush 500 Units, 5 mL, Intravenous, On Call Procedure, Pablo Yepez MD    heparin, porcine (PF) 100 unit/mL injection flush 500 Units, 5 mL, Intravenous, On Call Procedure, Nita Contreras MD    HYDROcodone-acetaminophen 5-325 mg per tablet 1 tablet, 1 tablet, Oral, Q4H PRN, Bart Herbert DO, 1 tablet at 02/29/24 1010    HYDROmorphone (PF) injection 0.5 mg, 0.5 mg, Intravenous, Q4H PRN, Pablo Yepez MD, 0.5 mg at 03/01/24 0543    insulin aspart U-100 injection 0-5 Units, 0-5 Units, Subcutaneous, Q6H PRN, Pablo Yepez MD    insulin regular in 0.9 % NaCl 100 unit/100 mL (1 unit/mL) infusion, 0-52 Units/hr, Intravenous, Continuous, Vasile Carter PA-C, Stopped at 02/28/24 1638    lactated ringers bolus 500 mL, 500 mL, Intravenous, Once, Fransico Schrader DO    lactulose 10 gram/15 ml solution 20 g, 20 g, Oral, Q6H PRN, Vasile Carter PA-C    LIDOcaine HCl 2% urojet, , Mucous Membrane, Once, Nicol Diaz R, AGACNP-BC    loperamide capsule 2 mg, 2 mg, Oral, Continuous PRN, Vasile Carter PA-C    magnesium sulfate 2g in water 50mL IVPB (premix), 2 g, Intravenous, PRN, Vasile Carter PA-C    magnesium sulfate 2g in water 50mL IVPB (premix), 4 g, Intravenous, PRN, Vasile Carter PA-C    melatonin tablet 6 mg, 6 mg, Oral, Nightly PRN, Tanner Rdz MD, 6 mg at 02/26/24 2031    metoclopramide  injection 5 mg, 5 mg, Intravenous, Q6H PRN, Vasile Carter PA-C    metoprolol tartrate (LOPRESSOR) split tablet 12.5 mg, 12.5 mg, Oral, BID, Vasile Carter PA-C, 12.5 mg at 03/01/24 0839    milrinone 20mg in D5W 100 mL infusion, 0.1876 mcg/kg/min, Intravenous, Continuous, Vasile Carter PA-C, Last Rate: 4.4 mL/hr at 02/29/24 1745, 0.1876 mcg/kg/min at 02/29/24 1745    nitroGLYCERIN SL tablet 0.4 mg, 0.4 mg, Sublingual, Q5 Min PRN, Tanner Rdz MD    NORepinephrine 32 mg in dextrose 5 % (D5W) 250 mL infusion, 0-3 mcg/kg/min, Intravenous, Continuous, Pablo Yepez MD, Stopped at 02/28/24 1908    ondansetron disintegrating tablet 8 mg, 8 mg, Oral, Q8H PRN, Tanner Rdz MD, 8 mg at 02/23/24 1302    ondansetron injection 8 mg, 8 mg, Intravenous, Q6H PRN, Pablo Yepez MD, 8 mg at 02/26/24 1616    oxyCODONE immediate release tablet Tab 10 mg, 10 mg, Oral, Q4H PRN, Vasile Carter PA-C, 10 mg at 03/01/24 0839    pantoprazole EC tablet 40 mg, 40 mg, Oral, Daily, Tanner Rdz MD, 40 mg at 03/01/24 0839    PHENYLephrine 20 mg in sodium chloride 0.9% 250 mL infusion, 0-5 mcg/kg/min, Intravenous, Continuous, Pablo Yepez MD, Stopped at 02/29/24 1126    polyethylene glycol packet 17 g, 17 g, Oral, Daily, Tanner Rdz MD, 17 g at 03/01/24 0839    potassium chloride 20 mEq in 100 mL IVPB (FOR CENTRAL LINE ADMINISTRATION ONLY), 20 mEq, Intravenous, PRN, Vasile Carter PA-C, Last Rate: 50 mL/hr at 02/29/24 1551, Rate Verify at 02/29/24 1551    potassium chloride 20 mEq in 100 mL IVPB (FOR CENTRAL LINE ADMINISTRATION ONLY), 40 mEq, Intravenous, PRN, Vasile Carter PA-C    potassium chloride 20 mEq in 100 mL IVPB (FOR CENTRAL LINE ADMINISTRATION ONLY), 60 mEq, Intravenous, PRN, Vasile Carter PA-C    simethicone chewable tablet 80 mg, 1 tablet, Oral, TID PRN, Tanner Rdz MD, 80 mg at 02/27/24 0918    sodium chloride 0.9% flush 10 mL, 10 mL,  Intravenous, PRN, Tanner Rdz MD    sodium chloride 0.9% flush 10 mL, 10 mL, Intravenous, PRN, Millie Jimenez NP    sodium phosphate 15 mmol in dextrose 5 % (D5W) 250 mL IVPB, 15 mmol, Intravenous, PRN, Vasile Carter PA-C    sodium phosphate 20.01 mmol in dextrose 5 % (D5W) 250 mL IVPB, 20.01 mmol, Intravenous, PRN, Vasile Carter PA-C, Stopped at 02/28/24 1353    sodium phosphate 30 mmol in dextrose 5 % (D5W) 250 mL IVPB, 30 mmol, Intravenous, PRN, Vasile Carter PA-C    sucralfate tablet 1 g, 1 g, Oral, QID (AC & HS), Vasile Carter PA-C, 1 g at 02/29/24 2044  VTE Risk Mitigation (From admission, onward)           Ordered     heparin, porcine (PF) 100 unit/mL injection flush 500 Units  On Call Procedure         02/27/24 0718     heparin, porcine (PF) 100 unit/mL injection flush 500 Units  On Call Procedure         02/27/24 0257     Place SUSIE hose  Until discontinued         02/22/24 1458     Place sequential compression device  Until discontinued         02/21/24 2057                  Assessment:   CAD (Multivessel)    - Status Post CABG (3V) LIMA to LAD, SVG to OM1, SVG to RCA (2.27.24)    - RHC/Impella Placement and Equality Catheter (2.23.24)- Impella Removal/Femoral Artery Repair in Surgery on 2.27.24    - [facility] (2.19.24): pLAD lesion 70%, oLCx 80%, OM1 80%, OM2 1 lesion 70% 2nd lesion 50%, mLAD 50%, pRCA 60%  VHD/AS     - Status Post Bio AVR (Epic max 23mm) (2.27.24)    - ECHO (2.16.24): Aortic Valve: There is moderate aortic valve sclerosis. Severely restricted motion. There is severe stenosis. Aortic valve area by VTI is 0.66 cm². Aortic valve peak velocity is 4.45 m/s. Mean gradient is 50 mmHg. The dimensionless index is 0.17.   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 Wean as able.   Discontinue metoprolol tartrate s/t inotropic usage.   Add guideline directed medical therapy for ICMO when BP Permits, will hold off for now.   Continue Post Op Supportive Care  Deferring Anticoagulation (Re: AF) due to anemia requiring transfusion & status post MOISE Ligation.    BLANQUITA Torres  Cardiology  [facility]   03/01/2024    I agree with the findings of the complexity of problems addressed and take responsibility for the plan's risks and complications. I approved the plan documented by Claudine Munson NP.    As above

## 2024-03-01 NOTE — PROGRESS NOTES
CT SURGERY PROGRESS NOTE  Brain Snyder  78 y.o.  1945    Patients Procedure: Procedure(s) (LRB):  CORONARY ARTERY BYPASS GRAFT (CABG) (N/A)  Replacement-valve-aortic (N/A)  SURGICAL PROCUREMENT, VEIN, ENDOSCOPIC (N/A)  Impella, Removal (Right)    Subjective  Interval History: Patient is postoperative day 3 from CABG x 3, ligation of left atrial appendage, and explant of right femoral impella device.  No acute events overnight.  Primacor restarted yesterday afternoon - currently at 0.187 mcg / kg / min.  Amiodarone also infusing - currently afib on tele - HR approximately 100 bpm.  Increased work of breathing noted again this morning with increase in oxygen requirements - 6 L NC.    Received 2 units PRBC yesterday    Review of Systems   Unable to perform ROS: Acuity of condition       Medication List  Infusions   amiodarone in dextrose 5% 0.5 mg/min (03/01/24 0628)    clevidipine Stopped (02/28/24 0115)    dexmedeTOMIDine (Precedex) infusion (titrating) Stopped (02/28/24 0229)    dextrose 5 % and 0.45 % NaCl 100 mL/hr at 03/01/24 0628    dilTIAZem Stopped (02/28/24 0808)    EPINEPHrine Stopped (02/28/24 0621)    insulin regular 1 units/mL infusion orderable (CTS POST-OP) Stopped (02/28/24 1638)    loperamide      milrinone 0.1876 mcg/kg/min (02/29/24 1745)    NORepinephrine bitartrate-D5W Stopped (02/28/24 1908)    phenylephrine Stopped (02/29/24 1126)     Scheduled   allopurinoL  50 mg Oral Daily    aspirin  81 mg Oral Daily    atorvastatin  40 mg Oral QHS    docusate sodium  100 mg Oral BID    famotidine (PF)  20 mg Intravenous Daily    ferrous sulfate  1 tablet Oral Every other day    folic acid  1 mg Oral Daily    lactated ringers  500 mL Intravenous Once    LIDOcaine HCl 2%   Mucous Membrane Once    metoprolol tartrate  12.5 mg Oral BID    pantoprazole  40 mg Oral Daily    polyethylene glycol  17 g Oral Daily    sucralfate  1 g Oral QID (AC & HS)       Objective:  Recent Vitals:  Temp:  [98.2 °F (36.8  °C)-99 °F (37.2 °C)] 98.6 °F (37 °C)  Pulse:  [] 100  Resp:  [8-30] 20  SpO2:  [92 %-99 %] 94 %  BP: (101-166)/() 117/94  Arterial Line BP: (104-193)/(48-85) 154/80    Physical Exam  Constitutional:       General: He is sleeping. He is not in acute distress.     Appearance: He is ill-appearing.   HENT:      Head: Normocephalic and atraumatic.   Cardiovascular:      Rate and Rhythm: Normal rate. Rhythm irregular.      Pulses: Normal pulses.      Heart sounds: Normal heart sounds. No murmur heard.     No friction rub. No gallop.   Pulmonary:      Effort: Tachypnea present. No respiratory distress.      Breath sounds: No stridor. Decreased breath sounds and rhonchi present. No wheezing or rales.   Abdominal:      General: There is no distension.      Palpations: Abdomen is soft.   Skin:     General: Skin is warm and dry.      Comments: Median sternotomy incision dressed -c/d/i          I/O last 24 hrs:  Intake/Output - Last 3 Shifts         02/28 0700  02/29 0659 02/29 0700  03/01 0659 03/01 0700  03/02 0659    I.V. (mL/kg) 5767.9 (73.8) 2729.7 (34.9)     Blood 863 684.3     NG/GT 60      IV Piggyback 650 149.7     Total Intake(mL/kg) 7341 (93.9) 3563.7 (45.6)     Urine (mL/kg/hr) 756 (0.4) 1090 (0.6)     Drains       Chest Tube 1872 590     Total Output 2628 1680     Net +4713 +1883.7                    Labs  CBC:   Recent Labs   Lab 03/01/24  0202   WBC 18.79  18.79*   RBC 3.96*   HGB 11.1*   HCT 34.3*   *   MCV 86.6   MCH 28.0   MCHC 32.4*     CMP:   Recent Labs   Lab 03/01/24  0202   CALCIUM 7.7*   ALBUMIN 2.9*   *   K 4.9   CO2 20*   BUN 22.0   CREATININE 2.03*   ALKPHOS 117   ALT 66*   *   BILITOT 1.0     LFTs:   Recent Labs   Lab 03/01/24  0202   ALT 66*   *   ALKPHOS 117   BILITOT 1.0   ALBUMIN 2.9*     All pertinent labs from the last 24 hours have been reviewed.      Imaging:   CXR: X-Ray Chest AP Portable    Result Date: 3/1/2024  As above. Electronically signed  by: Mann Hernandez Date:    03/01/2024 Time:    06:58   I have reviewed all pertinent imaging results/findings within the past 24 hours.        ASSESSMENT/PLAN:    Multivessel coronary artery disease  -s/p CABG  Ischemic cardiomyopathy  Pulmonary HTN  VHD  JEAN-CLAUDE / CKD    -Mediastinal ct output: 230 cc / 24 hrs.  Serosanguinous.  L ct output: 180 cc / 24 hrs.  Serosanguinous.  Water seeal  -CXR stable - small bilateral effusions noted  -H&H stable: 11.1 / 34.3  -Cr: 2.03 this morning  -Liver enzymes elevated.  Will discuss with Dr. Contreras   -Nabila today  -Wean oxygen as tolerated  -Continue ICU care     Case and plan of care discussed with BLANQUITA Srivastava

## 2024-03-01 NOTE — PLAN OF CARE
Problem: Physical Therapy  Goal: Physical Therapy Goal  Description: Goals to be met by: 2024     Patient will increase functional independence with mobility by performin. Supine to sit with MInimal Assistance  2. Sit to stand transfer with Minimal Assistance  3. Gait  x 150 feet with Minimal Assistance using Rolling Walker.     Outcome: Ongoing, Progressing

## 2024-03-02 LAB
ALBUMIN SERPL-MCNC: 2.8 G/DL (ref 3.4–4.8)
ALBUMIN/GLOB SERPL: 1.2 RATIO (ref 1.1–2)
ALP SERPL-CCNC: 187 UNIT/L (ref 40–150)
ALT SERPL-CCNC: 55 UNIT/L (ref 0–55)
AST SERPL-CCNC: 68 UNIT/L (ref 5–34)
BASOPHILS # BLD AUTO: 0.05 X10(3)/MCL
BASOPHILS NFR BLD AUTO: 0.3 %
BILIRUB SERPL-MCNC: 1.5 MG/DL
BUN SERPL-MCNC: 27.1 MG/DL (ref 8.4–25.7)
CALCIUM SERPL-MCNC: 7.7 MG/DL (ref 8.8–10)
CHLORIDE SERPL-SCNC: 103 MMOL/L (ref 98–107)
CO2 SERPL-SCNC: 23 MMOL/L (ref 23–31)
CREAT SERPL-MCNC: 1.83 MG/DL (ref 0.73–1.18)
EOSINOPHIL # BLD AUTO: 0.12 X10(3)/MCL (ref 0–0.9)
EOSINOPHIL NFR BLD AUTO: 0.7 %
ERYTHROCYTE [DISTWIDTH] IN BLOOD BY AUTOMATED COUNT: 15.7 % (ref 11.5–17)
GFR SERPLBLD CREATININE-BSD FMLA CKD-EPI: 38 MLS/MIN/1.73/M2
GLOBULIN SER-MCNC: 2.3 GM/DL (ref 2.4–3.5)
GLUCOSE SERPL-MCNC: 97 MG/DL (ref 82–115)
HCT VFR BLD AUTO: 32.2 % (ref 42–52)
HGB BLD-MCNC: 10.3 G/DL (ref 14–18)
IMM GRANULOCYTES # BLD AUTO: 0.23 X10(3)/MCL (ref 0–0.04)
IMM GRANULOCYTES NFR BLD AUTO: 1.4 %
LYMPHOCYTES # BLD AUTO: 0.97 X10(3)/MCL (ref 0.6–4.6)
LYMPHOCYTES NFR BLD AUTO: 6 %
MCH RBC QN AUTO: 27.7 PG (ref 27–31)
MCHC RBC AUTO-ENTMCNC: 32 G/DL (ref 33–36)
MCV RBC AUTO: 86.6 FL (ref 80–94)
MONOCYTES # BLD AUTO: 1.72 X10(3)/MCL (ref 0.1–1.3)
MONOCYTES NFR BLD AUTO: 10.6 %
NEUTROPHILS # BLD AUTO: 13.17 X10(3)/MCL (ref 2.1–9.2)
NEUTROPHILS NFR BLD AUTO: 81 %
NRBC BLD AUTO-RTO: 0.6 %
PLATELET # BLD AUTO: 103 X10(3)/MCL (ref 130–400)
PLATELETS.RETICULATED NFR BLD AUTO: 8.4 % (ref 0.9–11.2)
PMV BLD AUTO: 11.3 FL (ref 7.4–10.4)
POTASSIUM SERPL-SCNC: 4 MMOL/L (ref 3.5–5.1)
PROT SERPL-MCNC: 5.1 GM/DL (ref 5.8–7.6)
RBC # BLD AUTO: 3.72 X10(6)/MCL (ref 4.7–6.1)
SODIUM SERPL-SCNC: 135 MMOL/L (ref 136–145)
WBC # SPEC AUTO: 16.26 X10(3)/MCL (ref 4.5–11.5)

## 2024-03-02 PROCEDURE — 25000003 PHARM REV CODE 250: Performed by: INTERNAL MEDICINE

## 2024-03-02 PROCEDURE — 80053 COMPREHEN METABOLIC PANEL: CPT | Performed by: INTERNAL MEDICINE

## 2024-03-02 PROCEDURE — 25000003 PHARM REV CODE 250

## 2024-03-02 PROCEDURE — 97530 THERAPEUTIC ACTIVITIES: CPT | Mod: CQ

## 2024-03-02 PROCEDURE — 25000003 PHARM REV CODE 250: Performed by: PHYSICIAN ASSISTANT

## 2024-03-02 PROCEDURE — 27000221 HC OXYGEN, UP TO 24 HOURS

## 2024-03-02 PROCEDURE — 63600175 PHARM REV CODE 636 W HCPCS

## 2024-03-02 PROCEDURE — 99024 POSTOP FOLLOW-UP VISIT: CPT | Mod: ,,, | Performed by: PHYSICIAN ASSISTANT

## 2024-03-02 PROCEDURE — 21400001 HC TELEMETRY ROOM

## 2024-03-02 PROCEDURE — 92610 EVALUATE SWALLOWING FUNCTION: CPT

## 2024-03-02 PROCEDURE — 63600175 PHARM REV CODE 636 W HCPCS: Performed by: PHYSICIAN ASSISTANT

## 2024-03-02 PROCEDURE — 85025 COMPLETE CBC W/AUTO DIFF WBC: CPT | Performed by: INTERNAL MEDICINE

## 2024-03-02 RX ORDER — AMIODARONE HYDROCHLORIDE 200 MG/1
200 TABLET ORAL DAILY
Status: DISCONTINUED | OUTPATIENT
Start: 2024-03-02 | End: 2024-03-02

## 2024-03-02 RX ORDER — ASPIRIN 81 MG/1
81 TABLET ORAL DAILY
Status: CANCELLED | OUTPATIENT
Start: 2024-03-02

## 2024-03-02 RX ORDER — DOCUSATE SODIUM 100 MG/1
200 CAPSULE, LIQUID FILLED ORAL 2 TIMES DAILY
Status: CANCELLED | OUTPATIENT
Start: 2024-03-02

## 2024-03-02 RX ORDER — METOPROLOL TARTRATE 25 MG/1
12.5 TABLET ORAL 2 TIMES DAILY
Status: DISCONTINUED | OUTPATIENT
Start: 2024-03-02 | End: 2024-03-02

## 2024-03-02 RX ORDER — POLYETHYLENE GLYCOL 3350 17 G/17G
17 POWDER, FOR SOLUTION ORAL DAILY PRN
Status: CANCELLED | OUTPATIENT
Start: 2024-03-02

## 2024-03-02 RX ORDER — AMIODARONE HYDROCHLORIDE 200 MG/1
400 TABLET ORAL 2 TIMES DAILY
Status: COMPLETED | OUTPATIENT
Start: 2024-03-02 | End: 2024-03-04

## 2024-03-02 RX ORDER — BISACODYL 10 MG/1
10 SUPPOSITORY RECTAL DAILY PRN
Status: CANCELLED | OUTPATIENT
Start: 2024-03-02

## 2024-03-02 RX ORDER — AMIODARONE HYDROCHLORIDE 200 MG/1
200 TABLET ORAL 2 TIMES DAILY
Status: DISCONTINUED | OUTPATIENT
Start: 2024-03-08 | End: 2024-03-02

## 2024-03-02 RX ORDER — AMIODARONE HYDROCHLORIDE 200 MG/1
200 TABLET ORAL 2 TIMES DAILY
Status: COMPLETED | OUTPATIENT
Start: 2024-03-05 | End: 2024-03-07

## 2024-03-02 RX ORDER — FUROSEMIDE 10 MG/ML
20 INJECTION INTRAMUSCULAR; INTRAVENOUS EVERY 12 HOURS
Status: DISCONTINUED | OUTPATIENT
Start: 2024-03-02 | End: 2024-03-04

## 2024-03-02 RX ORDER — CARVEDILOL 3.12 MG/1
3.12 TABLET ORAL 2 TIMES DAILY
Status: DISCONTINUED | OUTPATIENT
Start: 2024-03-02 | End: 2024-03-02

## 2024-03-02 RX ORDER — ENOXAPARIN SODIUM 100 MG/ML
40 INJECTION SUBCUTANEOUS EVERY 24 HOURS
Status: DISCONTINUED | OUTPATIENT
Start: 2024-03-02 | End: 2024-03-07

## 2024-03-02 RX ORDER — METOPROLOL TARTRATE 25 MG/1
12.5 TABLET ORAL 2 TIMES DAILY
Status: DISCONTINUED | OUTPATIENT
Start: 2024-03-02 | End: 2024-03-08

## 2024-03-02 RX ORDER — AMIODARONE HYDROCHLORIDE 200 MG/1
200 TABLET ORAL DAILY
Status: DISCONTINUED | OUTPATIENT
Start: 2024-03-08 | End: 2024-03-13

## 2024-03-02 RX ORDER — MUPIROCIN 20 MG/G
OINTMENT TOPICAL 2 TIMES DAILY
Status: CANCELLED | OUTPATIENT
Start: 2024-03-02 | End: 2024-03-04

## 2024-03-02 RX ORDER — AMIODARONE HYDROCHLORIDE 200 MG/1
400 TABLET ORAL 2 TIMES DAILY
Status: DISCONTINUED | OUTPATIENT
Start: 2024-03-02 | End: 2024-03-02

## 2024-03-02 RX ADMIN — ACETAMINOPHEN AND CODEINE PHOSPHATE 1 TABLET: 300; 30 TABLET ORAL at 12:03

## 2024-03-02 RX ADMIN — ENOXAPARIN SODIUM 40 MG: 40 INJECTION SUBCUTANEOUS at 04:03

## 2024-03-02 RX ADMIN — ALLOPURINOL 50 MG: 300 TABLET ORAL at 10:03

## 2024-03-02 RX ADMIN — METOPROLOL TARTRATE 12.5 MG: 25 TABLET, FILM COATED ORAL at 08:03

## 2024-03-02 RX ADMIN — SUCRALFATE 1 G: 1 TABLET ORAL at 10:03

## 2024-03-02 RX ADMIN — DOCUSATE SODIUM 100 MG: 100 CAPSULE, LIQUID FILLED ORAL at 10:03

## 2024-03-02 RX ADMIN — FOLIC ACID 1 MG: 1 TABLET ORAL at 10:03

## 2024-03-02 RX ADMIN — ATORVASTATIN CALCIUM 40 MG: 40 TABLET, FILM COATED ORAL at 08:03

## 2024-03-02 RX ADMIN — AMIODARONE HYDROCHLORIDE 400 MG: 200 TABLET ORAL at 08:03

## 2024-03-02 RX ADMIN — AMIODARONE HYDROCHLORIDE 400 MG: 200 TABLET ORAL at 10:03

## 2024-03-02 RX ADMIN — ASPIRIN 81 MG: 81 TABLET, COATED ORAL at 10:03

## 2024-03-02 RX ADMIN — SUCRALFATE 1 G: 1 TABLET ORAL at 04:03

## 2024-03-02 RX ADMIN — LOPERAMIDE HYDROCHLORIDE 2 MG: 2 CAPSULE ORAL at 12:03

## 2024-03-02 RX ADMIN — FERROUS SULFATE TAB 325 MG (65 MG ELEMENTAL FE) 1 EACH: 325 (65 FE) TAB at 10:03

## 2024-03-02 RX ADMIN — POLYETHYLENE GLYCOL 3350 17 G: 17 POWDER, FOR SOLUTION ORAL at 10:03

## 2024-03-02 RX ADMIN — SUCRALFATE 1 G: 1 TABLET ORAL at 08:03

## 2024-03-02 RX ADMIN — FUROSEMIDE 20 MG: 10 INJECTION, SOLUTION INTRAMUSCULAR; INTRAVENOUS at 10:03

## 2024-03-02 RX ADMIN — PANTOPRAZOLE SODIUM 40 MG: 40 TABLET, DELAYED RELEASE ORAL at 10:03

## 2024-03-02 RX ADMIN — CARVEDILOL 3.12 MG: 3.12 TABLET, FILM COATED ORAL at 10:03

## 2024-03-02 NOTE — PROGRESS NOTES
Pulmonary & Critical Care Medicine   Progress Note      Presenting History/HPI:    The patient is a 78-year-old Croatian-speaking male with a history of aortic insufficiency/stenosis, coronary artery disease, chronic kidney disease stage 3, hypertension, atrial fibrillation on Eliquis, who presented to the ICU status post going to the cath lab for PCI.  Patient was transferred from [facility] after being admitted there on 2/15 with chest pain found to have NSTEMI with left heart catheterization initially on 02/20 demonstrating severe multivessel stenosis and was subsequently transferred here for CABG evaluation.  Patient was taken to the cath lab today and subsequently had an Impella placed and was transferred to the ICU with Impella currently set at P7.  Patient is awake alert moves all extremities follows commands with a Croatian language barrier.  He has no complaints at this time.     Significant Events:  -patient admitted to ICU on 02/23 status post Impella placement  -3v CABG with AVR, left atrial appendage ligation, impella explant, right femoral artery repair 2/27/24    Interval History:    No acute events overnight. Appears a little more comfortable this morning. Patient had CT head yesterday that was unremarkable.  He remains on 6L NC.  He has been taken off his milrinone. Diuresed yesterday, 2680cc UOP last 24 hours. Renal indices improved slightly. 170 cc chest tube output. HH/ 10.3/32.2 this morning slight drop from yesterday. Remains on amiodarone inufions, still in atrial fibrillation but rate in 80s this morning. States only in a little pain. Denies shortness of breath.    Scheduled Medications:    allopurinoL  50 mg Oral Daily    aspirin  81 mg Oral Daily    atorvastatin  40 mg Oral QHS    docusate sodium  100 mg Oral BID    famotidine (PF)  20 mg Intravenous Daily    ferrous sulfate  1 tablet Oral Every other day    folic acid  1 mg Oral Daily    lactated ringers  500 mL Intravenous  Once    LIDOcaine HCl 2%   Mucous Membrane Once    pantoprazole  40 mg Oral Daily    polyethylene glycol  17 g Oral Daily    sucralfate  1 g Oral QID (AC & HS)       PRN Medications:   acetaminophen, acetaminophen, acetaminophen-codeine 300-30mg, albumin human 5%, calcium gluconate IVPB, calcium gluconate IVPB, calcium gluconate IVPB, dextrose 10%, dextrose 10%, electrolyte-A, glucagon (human recombinant), heparin, porcine (PF), heparin, porcine (PF), insulin aspart U-100, lactulose 10 gram/15 ml, loperamide, magnesium sulfate IVPB, magnesium sulfate IVPB, melatonin, metoclopramide, nitroGLYCERIN, ondansetron, ondansetron, potassium chloride in water, potassium chloride in water, potassium chloride in water, simethicone, sodium chloride 0.9%, sodium chloride 0.9%, sodium phosphate 15 mmol in dextrose 5 % (D5W) 250 mL IVPB, sodium phosphate 20.01 mmol in dextrose 5 % (D5W) 250 mL IVPB, sodium phosphate 30 mmol in dextrose 5 % (D5W) 250 mL IVPB      Infusions:     amiodarone in dextrose 5% 0.5 mg/min (03/01/24 1742)    clevidipine Stopped (02/28/24 0115)    dexmedeTOMIDine (Precedex) infusion (titrating) Stopped (02/28/24 0229)    dextrose 5 % and 0.45 % NaCl Stopped (03/01/24 0816)    dilTIAZem Stopped (02/28/24 0808)    EPINEPHrine Stopped (02/28/24 0621)    insulin regular 1 units/mL infusion orderable (CTS POST-OP) Stopped (02/28/24 1638)    loperamide      milrinone Stopped (03/01/24 1130)    NORepinephrine bitartrate-D5W Stopped (02/28/24 1908)    phenylephrine Stopped (02/29/24 1126)         Fluid Balance:     Intake/Output Summary (Last 24 hours) at 3/2/2024 0317  Last data filed at 3/2/2024 0000  Gross per 24 hour   Intake 1850.22 ml   Output 3120 ml   Net -1269.78 ml           Vital Signs:   Vitals:    03/02/24 0000   BP: 130/73   Pulse: 85   Resp: (!) 21   Temp: 98.1 °F (36.7 °C)         Physical Exam  Vitals and nursing note reviewed.   Constitutional:       General: He is not in acute distress.      Appearance: Normal appearance. He is ill-appearing.   HENT:      Head: Normocephalic and atraumatic.      Right Ear: External ear normal.      Left Ear: External ear normal.      Nose: Nose normal.      Mouth/Throat:      Mouth: Mucous membranes are moist.   Eyes:      Conjunctiva/sclera: Conjunctivae normal.      Pupils: Pupils are equal, round, and reactive to light.   Cardiovascular:      Rate and Rhythm: Normal rate. Rhythm irregular.      Heart sounds: No murmur heard.     No friction rub. No gallop.   Pulmonary:      Effort: Tachypnea present.      Breath sounds: No wheezing, rhonchi or rales.      Comments: Coarse bilaterally  Chest:      Comments: Chest tubes in place with serosanguinous drainage  Abdominal:      General: Abdomen is flat.      Palpations: Abdomen is soft.      Tenderness: There is no abdominal tenderness.   Musculoskeletal:         General: No swelling or deformity. Normal range of motion.      Cervical back: Neck supple. No rigidity.      Right lower leg: Edema present.      Left lower leg: Edema present.   Skin:     General: Skin is warm and dry.      Capillary Refill: Capillary refill takes less than 2 seconds.   Neurological:      General: No focal deficit present.      Mental Status: He is alert. Mental status is at baseline.           Ventilator Settings  Vent Mode: A/C (02/29/24 0508)  Set Rate: 22 BPM (02/29/24 0508)  Vt Set: 470 mL (02/29/24 0508)  PEEP/CPAP: 5 cmH20 (02/29/24 0508)  Oxygen Concentration (%): 30 (03/02/24 0000)  Peak Airway Pressure: 20 cmH20 (02/29/24 0508)  Total Ve: 9.9 L/m (02/29/24 0508)  F/VT Ratio<105 (RSBI): (!) 51.92 (02/29/24 0305)      Laboratory Studies:   Recent Labs   Lab 03/01/24  0917 03/01/24  0941   PH 7.26* 7.300*   PCO2 55* 49.0*   PO2 43 78.0*   HCO3 24.7 24.1   POCSATURATED 70.3  --        Recent Labs   Lab 03/02/24  0111   WBC 16.26*   RBC 3.72*   HGB 10.3*   HCT 32.2*   *   MCV 86.6   MCH 27.7   MCHC 32.0*       Recent Labs   Lab  03/02/24  0111   GLUCOSE 97   *   K 4.0   CO2 23   BUN 27.1*   CREATININE 1.83*   CALCIUM 7.7*           Microbiology Data:   Microbiology Results (last 7 days)       Procedure Component Value Units Date/Time    Respiratory Culture [[id number]]     Order Status: Sent Specimen: Sputum               Imaging:   CT Head Without Contrast  Narrative: EXAMINATION:  CT HEAD WITHOUT CONTRAST    CLINICAL HISTORY:  Mental status change, unknown cause;    TECHNIQUE:  Axial scans were obtained from skull base to the vertex.    Coronal and sagittal reconstructions obtained from the axial data.    Automatic exposure control was utilized to limit radiation dose.    Contrast: None    Radiation Dose:    Total DLP: 928 mGy*cm    COMPARISON:  None    FINDINGS:  There is no acute intracranial hemorrhage or edema. The gray-white matter differentiation is preserved.  Patchy hypodensities in the subcortical and periventricular white matter likely represent chronic microvascular ischemic changes.    There is no mass effect or midline shift.  There is diffuse parenchymal volume loss.  The basal cisterns are patent. There is no abnormal extra-axial fluid collection.  Carotid artery calcifications are noted.    The calvarium and skull base are intact.  There is mild scattered paranasal sinus mucosal thickening.  Impression: 1. No acute intracranial abnormality.  2. Chronic microvascular ischemic changes.    Electronically signed by: Malika Matt  Date:    03/01/2024  Time:    15:58  X-Ray Chest AP Portable  Narrative: EXAMINATION:  XR CHEST AP PORTABLE    CLINICAL HISTORY:  Post-op;    TECHNIQUE:  Single view of the chest    COMPARISON:  02/29/2024    FINDINGS:  Interval extubation.  Postsurgical changes of median sternotomy remain.  Gambrills-Chelo catheter remains in place.  Mediastinal drains remain with small bilateral pleural effusions.  Recommend continued follow-up.  Impression: As above.    Electronically signed by: Mann  Mary  Date:    03/01/2024  Time:    06:58          Assessment and Plan    Assessment:  Multivessel coronary artery disease s/p CABGx3 2/27/24  Moderate Aortic insufficiency, Severe aortic stenosis s/p AVR  Atrial fibrillation w/ RVR, on amiodarone infusion  Severe pulmonary hypertension, PASP of 69 mmHg  Acute on chronic systolic and diastolic CHF, left ventricular ejection fraction 40% with grade 2 diastolic dysfunction   Mild mitral regurgitation   Mild-to-moderate tricuspid regurgitation   Chronic kidney disease stage 3   Anemia requiring blood transfusion  Hypertension  Hemoptysis and hematuria - resolved      Plan:  -Continue post-operative management per CV surgery post op protocol  -Continue amiodarone infusion, remains in A-fib but rate in 80s Cardiology following, appreciate their assistance  -Monitor surgical drain output for signs of bleeding. Transfuse as needed  -Multimodal pain control  -Would benefit from continued diuresis today  -Mobilize, PT/OT, OOB as tolerated  -Continue all ongoing ICU care    DVT ppx/tx with SCD  GI ppx with pepcid    Flynn Bass MD  3/2/2024  Pulmonology/Critical Care

## 2024-03-02 NOTE — NURSING
Patient's son updated on transfer to [facility]. All questions and concerns addressed.

## 2024-03-02 NOTE — NURSING
Nurses Note -- 4 Eyes      3/2/2024   9:31 AM      Skin assessed during: Daily Assessment      [x] No Altered Skin Integrity Present    [x]Prevention Measures Documented      [] Yes- Altered Skin Integrity Present or Discovered   [] LDA Added if Not in Epic (Describe Wound)   [] New Altered Skin Integrity was Present on Admit and Documented in LDA   [] Wound Image Taken    Wound Care Consulted? No    Attending Nurse:  Beba Cast RN/Staff Member:  JESSICA Goel

## 2024-03-02 NOTE — PT/OT/SLP EVAL
[facility]  Speech Language Pathology Department  Clinical Swallow Evaluation    Patient Name:  Brain Snyder   MRN:  [id number]    Recommendations     General recommendations:  SLP intervention not indicated  Diet texture/consistency recommendations:  Soft & Bite-sized solids (IDDSI 6) and thin liquids (IDDSI 0)  Medications: per patient preference  Swallow strategies/precautions: small bites/sips and slow rate  Precautions: Standard,      History     Past Medical History:   Diagnosis Date    A-fib     Aortic insufficiency     CAD (coronary artery disease)     Hypertension      Past Surgical History:   Procedure Laterality Date    AORTIC VALVE REPLACEMENT N/A 2/27/2024    Procedure: Replacement-valve-aortic;  Surgeon: Nita Contreras MD;  Location: [facility] OR;  Service: Cardiothoracic;  Laterality: N/A;    CORONARY ANGIOGRAPHY N/A 2/20/2024    Procedure: ANGIOGRAM, CORONARY ARTERY;  Surgeon: Vasile Callahan MD;  Location: [facility] CATH LAB;  Service: Cardiology;  Laterality: N/A;    CORONARY ARTERY BYPASS GRAFT (CABG) N/A 2/27/2024    Procedure: CORONARY ARTERY BYPASS GRAFT (CABG);  Surgeon: Nita Contreras MD;  Location: [facility] OR;  Service: Cardiothoracic;  Laterality: N/A;    ENDOSCOPIC HARVEST OF VEIN N/A 2/27/2024    Procedure: SURGICAL PROCUREMENT, VEIN, ENDOSCOPIC;  Surgeon: Nita Contreras MD;  Location: [facility] OR;  Service: Cardiothoracic;  Laterality: N/A;    IMPELLA, REMOVAL Right 2/27/2024    Procedure: Impella, Removal;  Surgeon: Nita Contreras MD;  Location: [facility] OR;  Service: Cardiothoracic;  Laterality: Right;    INSERTION OF INTRAVASCULAR MICROAXIAL BLOOD PUMP N/A 2/23/2024    Procedure: INSERTION, IMPELLA;  Surgeon: All Montoya MD;  Location: [facility] CATH LAB;  Service: Cardiology;  Laterality: N/A;  with swanz    RIGHT HEART CATHETERIZATION N/A 2/22/2024    Procedure: INSERTION, CATHETER, RIGHT HEART;  Surgeon: Shreya Lomax MD;  Location:  [facility] CATH LAB;  Service: Cardiology;  Laterality: N/A;     Home diet texture/consistency: Soft & Bite-sized and thin liquids  Current method of nutrition: NPO      Imaging   Results for orders placed during the hospital encounter of 02/21/24    X-Ray Chest 1 View    Narrative  EXAMINATION:  XR CHEST 1 VIEW    CLINICAL HISTORY:  swan nicole catheter placement verification;    TECHNIQUE:  Single view of the chest    COMPARISON:  02/23/2024    FINDINGS:  No focal opacification.  No pleural effusion or pneumothorax.  Cardiomegaly is unchanged.  Cardiac catheter is unchanged.    Impression  No acute cardiopulmonary process.      Electronically signed by: Mann Hernandez  Date:    02/25/2024  Time:    12:13    No results found for this or any previous visit.    No results found for this or any previous visit.    Subjective     Patient awake, alert, and cooperative.    Pain/Comfort: Pain Rating 1: 0/10    Objective     ORAL MUSCULATURE  Dentition: missing teeth  Secretion Management: adequate  Facial Movement: WFL  Buccal Strength & Mobility: WFL  Mandibular Strength & Mobility: WFL  Oral Labial Strength & Mobility: WFL  Lingual Strength & Mobility: WFL  Vocal Quality: adequate    Consistency Fed By Oral Symptoms Pharyngeal Symptoms   Thin liquid by straw SLP None None   Puree SLP None None   Chewable solid SLP Prolonged bolus formation/mastication  Oral residue None     Assessment     No signs/symptoms of aspiration. Initiate PO diet. ST to sign off.    Goals     Multidisciplinary Problems       SLP Goals       Not on file                  Education     Patient and family were provided with verbal education regarding ST POC.  Understanding was verbalized.    Plan     SLP Follow-Up:  No   Plan of Care reviewed with:  patient, family     Time Tracking     SLP Treatment Date:   03/02/24  Speech Start Time:  0930  Speech Stop Time:  0950     Speech Total Time (min):  20 min    Billable minutes:  Swallow and Oral Function  Evaluation, 20 minutes     03/02/2024

## 2024-03-02 NOTE — PT/OT/SLP PROGRESS
Physical Therapy Treatment    Patient Name:  rBain Snyder   MRN:  [id number]    Recommendations:     Discharge therapy intensity: High Intensity Therapy   Discharge Equipment Recommendations: to be determined by next level of care  Barriers to discharge: Impaired mobility and Ongoing medical needs    Assessment:     Brain Snyder is a 78 y.o. male admitted with a medical diagnosis of NSTEMI, CAD, HF, CAD, afib, s/p impella, s/p CABG x3, s/p AVR.  He presents with the following impairments/functional limitations: weakness, impaired endurance, impaired balance, gait instability, edema, impaired functional mobility, impaired self care skills.    Utilized , [id number] [place]. Pt able to respond well to , scanning in b/l directions and asked appropriate questions.     Rehab Prognosis: Good; patient would benefit from acute skilled PT services to address these deficits and reach maximum level of function.    Recent Surgery: Procedure(s) (LRB):  CORONARY ARTERY BYPASS GRAFT (CABG) (N/A)  Replacement-valve-aortic (N/A)  SURGICAL PROCUREMENT, VEIN, ENDOSCOPIC (N/A)  Impella, Removal (Right) 4 Days Post-Op    Plan:     During this hospitalization, patient to be seen 6 x/week to address the identified rehab impairments via gait training, therapeutic activities, therapeutic exercises and progress toward the following goals:    Plan of Care Expires:  04/01/24    Subjective     Chief Complaint: abdominal pain; noted grimacing with Knee flexion as well  Patient/Family Comments/goals: did not state  Pain/Comfort:  Pain Rating 1: other (see comments) (abdominal pain (CT recently removed))      Objective:     Communicated with JESSICA Weir prior to session.  Patient found HOB elevated with blood pressure cuff, arterial line, gustafson catheter, pulse ox (continuous), telemetry, SCD, pressure relief boots, oxygen upon PT entry to room.     General Precautions: Standard, sternal  Orthopedic Precautions: N/A  Braces:  N/A  Respiratory Status: Nasal cannula, flow 6 L/min  Blood Pressure: 108/71, HR 95, Spo2 96%  Skin Integrity:  scrotal edema noted      Functional Mobility:  Bed Mobility:  Soiled upon arrival  Rolling to change chucks with total assist in B/L directions  Supine to sit with max assist x 2  Sit to supine with max assist x 2  Transfers:    Sit<->stand with max assist and knees blocked 2/2 mild buckling   Balance:   Min assist for sitting balance at EOB    Therapeutic Activities/Exercises:  RN called 2/2 seeping blood from CT site soaking gown. Changed gown post tx session.     Education:  Patient provided with verbal education education regarding PT role/goals/POC and post-op precautions.  Understanding was verbalized.     Patient left HOB elevated with all lines intact and call button in reach..    GOALS:   Multidisciplinary Problems       Physical Therapy Goals          Problem: Physical Therapy    Goal Priority Disciplines Outcome Goal Variances Interventions   Physical Therapy Goal     PT, PT/OT Ongoing, Progressing     Description: Goals to be met by: 2024     Patient will increase functional independence with mobility by performin. Supine to sit with MInimal Assistance  2. Sit to stand transfer with Minimal Assistance  3. Gait  x 150 feet with Minimal Assistance using Rolling Walker.                          Time Tracking:     PT Received On: 24  PT Start Time: 853     PT Stop Time: 928  PT Total Time (min): 35 min     Billable Minutes: Therapeutic Activity 2 units    Treatment Type: Treatment  PT/PTA: PTA     Number of PTA visits since last PT visit: 2024

## 2024-03-02 NOTE — PLAN OF CARE
Problem: Adult Inpatient Plan of Care  Goal: Plan of Care Review  Outcome: Ongoing, Progressing  Goal: Patient-Specific Goal (Individualized)  Outcome: Ongoing, Progressing  Goal: Absence of Hospital-Acquired Illness or Injury  Outcome: Ongoing, Progressing  Goal: Optimal Comfort and Wellbeing  Outcome: Ongoing, Progressing  Goal: Readiness for Transition of Care  Outcome: Ongoing, Progressing     Problem: Fall Injury Risk  Goal: Absence of Fall and Fall-Related Injury  Outcome: Ongoing, Progressing     Problem: Infection  Goal: Absence of Infection Signs and Symptoms  Outcome: Ongoing, Progressing     Problem: Skin Injury Risk Increased  Goal: Skin Health and Integrity  Outcome: Ongoing, Progressing     Problem: Activity Intolerance (Cardiovascular Surgery)  Goal: Improved Activity Tolerance  Outcome: Ongoing, Progressing

## 2024-03-02 NOTE — PROGRESS NOTES
CT SURGERY PROGRESS NOTE  Brain Snyder  78 y.o.  1945    Patients Procedure: Procedure(s) (LRB):  CORONARY ARTERY BYPASS GRAFT (CABG) (N/A)  Replacement-valve-aortic (N/A)  SURGICAL PROCUREMENT, VEIN, ENDOSCOPIC (N/A)  Impella, Removal (Right)    Subjective  Interval History: Patient is postoperative day 4 from CABG x 3, ligation of left atrial appendage, and explant of right femoral impella device.      No acute events Review of Systems   Unable to perform ROS: Acuity of condition       Medication List  Infusions   amiodarone in dextrose 5% 0.5 mg/min (03/02/24 0604)    clevidipine Stopped (02/28/24 0115)    dexmedeTOMIDine (Precedex) infusion (titrating) Stopped (02/28/24 0229)    dextrose 5 % and 0.45 % NaCl Stopped (03/01/24 0816)    dilTIAZem Stopped (02/28/24 0808)    EPINEPHrine Stopped (02/28/24 0621)    insulin regular 1 units/mL infusion orderable (CTS POST-OP) Stopped (02/28/24 1638)    loperamide      milrinone Stopped (03/01/24 1130)    NORepinephrine bitartrate-D5W Stopped (02/28/24 1908)    phenylephrine Stopped (02/29/24 1126)     Scheduled   allopurinoL  50 mg Oral Daily    aspirin  81 mg Oral Daily    atorvastatin  40 mg Oral QHS    docusate sodium  100 mg Oral BID    famotidine (PF)  20 mg Intravenous Daily    ferrous sulfate  1 tablet Oral Every other day    folic acid  1 mg Oral Daily    lactated ringers  500 mL Intravenous Once    LIDOcaine HCl 2%   Mucous Membrane Once    pantoprazole  40 mg Oral Daily    polyethylene glycol  17 g Oral Daily    sucralfate  1 g Oral QID (AC & HS)       Objective:  Recent Vitals:  Temp:  [98.1 °F (36.7 °C)-98.9 °F (37.2 °C)] 98.1 °F (36.7 °C)  Pulse:  [] 92  Resp:  [14-30] 25  SpO2:  [91 %-99 %] 97 %  BP: ()/(59-97) 120/74  Arterial Line BP: (112-170)/(54-80) 145/66    Physical Exam  Constitutional:       General: He is sleeping. He is not in acute distress.     Appearance: He is ill-appearing.   HENT:      Head: Normocephalic and  atraumatic.   Cardiovascular:      Rate and Rhythm: Normal rate. Rhythm irregular.      Pulses: Normal pulses.      Heart sounds: Normal heart sounds. No murmur heard.     No friction rub. No gallop.   Pulmonary:      Effort: Tachypnea present. No respiratory distress.      Breath sounds: No stridor. Decreased breath sounds and rhonchi present. No wheezing or rales.   Abdominal:      General: There is no distension.      Palpations: Abdomen is soft.   Skin:     General: Skin is warm and dry.      Comments: Median sternotomy incision dressed -c/d/i          I/O last 24 hrs:  Intake/Output - Last 3 Shifts         02/29 0700  03/01 0659 03/01 0700  03/02 0659 03/02 0700 03/03 0659    I.V. (mL/kg) 2729.7 (34.9) 642.4 (8.2)     Blood 684.3      NG/GT       IV Piggyback 149.7      Total Intake(mL/kg) 3563.7 (45.6) 642.4 (8.2)     Urine (mL/kg/hr) 1090 (0.6) 3210 (1.7)     Chest Tube 590 170     Total Output 1680 3380     Net +1883.7 -2737.6                    Labs  CBC:   Recent Labs   Lab 03/02/24  0111   WBC 16.26*   RBC 3.72*   HGB 10.3*   HCT 32.2*   *   MCV 86.6   MCH 27.7   MCHC 32.0*       CMP:   Recent Labs   Lab 03/02/24  0111   CALCIUM 7.7*   ALBUMIN 2.8*   *   K 4.0   CO2 23   BUN 27.1*   CREATININE 1.83*   ALKPHOS 187*   ALT 55   AST 68*   BILITOT 1.5       LFTs:   Recent Labs   Lab 03/02/24  0111   ALT 55   AST 68*   ALKPHOS 187*   BILITOT 1.5   ALBUMIN 2.8*       All pertinent labs from the last 24 hours have been reviewed.      Imaging:   CXR: X-Ray Chest AP Portable    Result Date: 3/1/2024  As above. Electronically signed by: Mann Hernandez Date:    03/01/2024 Time:    06:58   I have reviewed all pertinent imaging results/findings within the past 24 hours.        ASSESSMENT/PLAN:    Multivessel coronary artery disease  -s/p CABG  Ischemic cardiomyopathy  Pulmonary HTN  VHD  JEAN-CLAUDE / CKD    VSS  -Afib / SR - on IV amio - will transition to PO   Cts in place - will DC   -CXR stable - small  bilateral effusions noted  -Creat and LFTs improved this morning  -Diuresing   -Wean oxygen as tolerated  -telem today     Case and plan of care discussed with MD Vasile Carter PAC

## 2024-03-03 LAB
ALBUMIN SERPL-MCNC: 2.6 G/DL (ref 3.4–4.8)
ALBUMIN/GLOB SERPL: 1 RATIO (ref 1.1–2)
ALP SERPL-CCNC: 184 UNIT/L (ref 40–150)
ALT SERPL-CCNC: 61 UNIT/L (ref 0–55)
AST SERPL-CCNC: 79 UNIT/L (ref 5–34)
BASOPHILS # BLD AUTO: 0.08 X10(3)/MCL
BASOPHILS NFR BLD AUTO: 0.6 %
BILIRUB SERPL-MCNC: 1.9 MG/DL
BUN SERPL-MCNC: 28.7 MG/DL (ref 8.4–25.7)
CALCIUM SERPL-MCNC: 7.9 MG/DL (ref 8.8–10)
CHLORIDE SERPL-SCNC: 102 MMOL/L (ref 98–107)
CO2 SERPL-SCNC: 18 MMOL/L (ref 23–31)
CREAT SERPL-MCNC: 1.61 MG/DL (ref 0.73–1.18)
EOSINOPHIL # BLD AUTO: 0.21 X10(3)/MCL (ref 0–0.9)
EOSINOPHIL NFR BLD AUTO: 1.5 %
ERYTHROCYTE [DISTWIDTH] IN BLOOD BY AUTOMATED COUNT: 15.9 % (ref 11.5–17)
GFR SERPLBLD CREATININE-BSD FMLA CKD-EPI: 44 MLS/MIN/1.73/M2
GLOBULIN SER-MCNC: 2.6 GM/DL (ref 2.4–3.5)
GLUCOSE SERPL-MCNC: 54 MG/DL (ref 82–115)
HCT VFR BLD AUTO: 36.8 % (ref 42–52)
HGB BLD-MCNC: 11.2 G/DL (ref 14–18)
IMM GRANULOCYTES # BLD AUTO: 0.24 X10(3)/MCL (ref 0–0.04)
IMM GRANULOCYTES NFR BLD AUTO: 1.7 %
LYMPHOCYTES # BLD AUTO: 1.02 X10(3)/MCL (ref 0.6–4.6)
LYMPHOCYTES NFR BLD AUTO: 7.1 %
MCH RBC QN AUTO: 27.6 PG (ref 27–31)
MCHC RBC AUTO-ENTMCNC: 30.4 G/DL (ref 33–36)
MCV RBC AUTO: 90.6 FL (ref 80–94)
MONOCYTES # BLD AUTO: 1.75 X10(3)/MCL (ref 0.1–1.3)
MONOCYTES NFR BLD AUTO: 12.1 %
NEUTROPHILS # BLD AUTO: 11.11 X10(3)/MCL (ref 2.1–9.2)
NEUTROPHILS NFR BLD AUTO: 77 %
NRBC BLD AUTO-RTO: 0.6 %
PLATELET # BLD AUTO: 138 X10(3)/MCL (ref 130–400)
PMV BLD AUTO: 11.7 FL (ref 7.4–10.4)
POTASSIUM SERPL-SCNC: 3.9 MMOL/L (ref 3.5–5.1)
PROT SERPL-MCNC: 5.2 GM/DL (ref 5.8–7.6)
RBC # BLD AUTO: 4.06 X10(6)/MCL (ref 4.7–6.1)
SODIUM SERPL-SCNC: 134 MMOL/L (ref 136–145)
WBC # SPEC AUTO: 14.41 X10(3)/MCL (ref 4.5–11.5)

## 2024-03-03 PROCEDURE — 25000003 PHARM REV CODE 250: Performed by: PHYSICIAN ASSISTANT

## 2024-03-03 PROCEDURE — 63600175 PHARM REV CODE 636 W HCPCS

## 2024-03-03 PROCEDURE — 25000003 PHARM REV CODE 250

## 2024-03-03 PROCEDURE — 27000221 HC OXYGEN, UP TO 24 HOURS

## 2024-03-03 PROCEDURE — 85025 COMPLETE CBC W/AUTO DIFF WBC: CPT | Performed by: INTERNAL MEDICINE

## 2024-03-03 PROCEDURE — 80053 COMPREHEN METABOLIC PANEL: CPT | Performed by: INTERNAL MEDICINE

## 2024-03-03 PROCEDURE — 25000003 PHARM REV CODE 250: Performed by: INTERNAL MEDICINE

## 2024-03-03 PROCEDURE — 63600175 PHARM REV CODE 636 W HCPCS: Performed by: PHYSICIAN ASSISTANT

## 2024-03-03 PROCEDURE — 87070 CULTURE OTHR SPECIMN AEROBIC: CPT | Performed by: PHYSICIAN ASSISTANT

## 2024-03-03 PROCEDURE — 99024 POSTOP FOLLOW-UP VISIT: CPT | Mod: ,,, | Performed by: PHYSICIAN ASSISTANT

## 2024-03-03 PROCEDURE — 21400001 HC TELEMETRY ROOM

## 2024-03-03 RX ORDER — LEVOFLOXACIN 500 MG/1
500 TABLET, FILM COATED ORAL DAILY
Status: DISCONTINUED | OUTPATIENT
Start: 2024-03-04 | End: 2024-03-06

## 2024-03-03 RX ADMIN — FOLIC ACID 1 MG: 1 TABLET ORAL at 09:03

## 2024-03-03 RX ADMIN — PANTOPRAZOLE SODIUM 40 MG: 40 TABLET, DELAYED RELEASE ORAL at 09:03

## 2024-03-03 RX ADMIN — SUCRALFATE 1 G: 1 TABLET ORAL at 04:03

## 2024-03-03 RX ADMIN — ACETAMINOPHEN AND CODEINE PHOSPHATE 1 TABLET: 300; 30 TABLET ORAL at 06:03

## 2024-03-03 RX ADMIN — POLYETHYLENE GLYCOL 3350 17 G: 17 POWDER, FOR SOLUTION ORAL at 09:03

## 2024-03-03 RX ADMIN — METOPROLOL TARTRATE 12.5 MG: 25 TABLET, FILM COATED ORAL at 08:03

## 2024-03-03 RX ADMIN — SUCRALFATE 1 G: 1 TABLET ORAL at 08:03

## 2024-03-03 RX ADMIN — ASPIRIN 81 MG: 81 TABLET, COATED ORAL at 09:03

## 2024-03-03 RX ADMIN — SUCRALFATE 1 G: 1 TABLET ORAL at 10:03

## 2024-03-03 RX ADMIN — FUROSEMIDE 20 MG: 10 INJECTION, SOLUTION INTRAMUSCULAR; INTRAVENOUS at 09:03

## 2024-03-03 RX ADMIN — ALLOPURINOL 50 MG: 300 TABLET ORAL at 10:03

## 2024-03-03 RX ADMIN — AMIODARONE HYDROCHLORIDE 400 MG: 200 TABLET ORAL at 08:03

## 2024-03-03 RX ADMIN — AMIODARONE HYDROCHLORIDE 400 MG: 200 TABLET ORAL at 09:03

## 2024-03-03 RX ADMIN — ENOXAPARIN SODIUM 40 MG: 40 INJECTION SUBCUTANEOUS at 04:03

## 2024-03-03 RX ADMIN — METOPROLOL TARTRATE 12.5 MG: 25 TABLET, FILM COATED ORAL at 10:03

## 2024-03-03 RX ADMIN — ATORVASTATIN CALCIUM 40 MG: 40 TABLET, FILM COATED ORAL at 08:03

## 2024-03-03 RX ADMIN — DOCUSATE SODIUM 100 MG: 100 CAPSULE, LIQUID FILLED ORAL at 09:03

## 2024-03-03 RX ADMIN — ACETAMINOPHEN AND CODEINE PHOSPHATE 1 TABLET: 300; 30 TABLET ORAL at 10:03

## 2024-03-03 NOTE — PROGRESS NOTES
"  [facility]    Cardiology  Progress Note    Patient Name: Brain Snyder  MRN: [id number]  Admission Date: 2/21/2024  Hospital Length of Stay: 11 days  Code Status: Full Code   Attending Physician: Pablo Yepez MD   Primary Care Physician: Nidia Shepherd NP  Expected Discharge Date:   Principal Problem:CAD (coronary artery disease)    Subjective:   Reason for Consult:  CAD     HPI: 78-year-old female that is unknown to [facility] with a PMHx of PAF on Eliquis Aortic insufficiency, MV CAD, HTN. He is Armenian speaking and an  was used for interview. He was orginally admitted to [facility] on 2.15.24 with CP & NSTEMI and underwent a LHC on 2.20.24 taht showed MV CAD (reported noted below) He was transferred to [facility] on 2.21.24 for a CABG/AVR evaluation. On arrive he was hemodynamically stable on a heparin infusion. He denied chest pain, SOB, and nausea on current exam, [facility] was consulted for CAD    Hospital Course:   2.23.24: Patient seen resting in bed. He denies SOB or CP. He remains on heparin infusion. Family at bedside   2.24.24: NAD. S/p Impella Placement/Mont[place]-Chelo Catheter. "I am ok." + Blood Clots from Nose/Mouth, Hematuria 2/2 traumatic Indwelling Catheter Placement. Heparin Drip per Protocol. Impella P5  2.25.24: NAD. "I'm ok." Denies CP, SOB and Palps. PA Pressure Reading is not Accurate. CVP 5. Impella at P5. R Groin Impella P7. Remains Off Heparin Drip per Protocol. Now in SR.   2.26.24: NAD Noted. SR on Tele. Right Groin Impella in place, bruising with no hematoma noted. Distal pulses DP intact. Legs warm. NC 2 L/Min. Denies CP/SOB. Consent obtained from his daughter Brii Snyder. NPO for MV PCI Today.  2.27.24: NAD Noted. Impella remains in place at P7 Support. Good UA Noted, some pink tinged urine noted. SR on Tele. Pressors off. Denies CP/SOB. NPO for surgery today. Heparin on hold for surgery.  2.28.24: Patient is critically ill. AF RVR on Tele, twice shocked this AM with no " "success. On Amiodarone/Milrinone- Cardizem Infusion now off given hypotension and ICMO. Also no José Miguel/Levophed. Concern for bleeding noted, transfusion noted. Patient is intubated/Mechanically Vented. Hemodynamics: CO/CI 4.3/2.3 CVP 20.  2.29.24: Patient self extubated this AM. He is stable on NC Oxygen. Denies CP/SOB. SR on Tele. On Amiodarone Infusion and receiving blood products. BP Stable. Clinically much improved from yesterday.   3.1.24: NAD. Afib mild RVR on tele. On Primacor @ 0.187 mcg/kg/min. Amiodarone infusing per protocol. LFT's worsening. WBC worsening. + Incisional CP. On 5 L NC.   3.2.24: NAD. Net Negative 2700 urine output.   3.3.24: NAD. VSS. No complaints of CP/Palps. Reports SOB is improving. Creat 1.61 (Improved)    PMH: PAF (on Eliquis), Aortic insufficiency, MV CAD, HTN  PSH: [facility]  Family History: None reported  Social History: former smoker, denies ETOH or illicit drug us    Previous Diagnostics:  CABG/Bio AVR/MOISE Ligation (2.27.24):  Coronary artery bypass grafting X 3, LIMA to LAD, reversed SVG to OM1, Reversed Saphenous venous graft to RCA  Bioprosthetic aortic valve replacement (Epic max 23mm), Endoscopic venous harvesting of left greater saphenous vein, Left atrial appendage ligation, explant of right femoral impella device, right femoral artery repair.    RHC/Impella Placement (2.23.24):  Right heart catheterization performed showed the following:  PA= 61/24 (27) mm Hg  PCWP=  31/26 (27) mm Hg  AO saturation= 93 % RA  PA saturation= 60% with Impella (49% yesterday)    (02/22/24) -  0.5  Following that we elected to advance the Impella via right common femoral artery approach:  - Abiomed stiff wire  - 14 Salvadorean peel-away sheath  - Heparin 10,000 unit bolus given peripherally  - Dilator removed  - 0.035" J-wire  - 6 Salvadorean pigtail catheter positioned in the left ventricle  - Abiomed 0.025" wire  - Impella CP positioned in left ventricle  - Pulsatile motor current (appropriate " positioning)  - Placed the marker just below the aortic valve  - Cardiac output was 2.8 L/min provided by the device.  Impella was at 84cm  Access Closure :    Sheath sutured to the groin  Secured.  Attestation:I was present for and supervised all key portions of the procedure  Impression/plan:   Successful Impella insertion and swan-Chelo in the setting of Acute HF/low cardiac output/precardiogenic shock/Critcal-severe AS/MVCAD - LM distal    RHC (2.22.24):  Right heart catheterization demonstrating severe pulmonary hypertension 84/34 and PCWP 24 mmHg  Reduced cardiac output/cardiac index at 3.43/1.81 L/min/m2 with pulmonary artery saturations 49.3%  For applied to the right femoral vein following removal of the 7 Somali sheath  The estimated blood loss was none.    Carotid US (2.22.24):  The bilateral internal carotid artery demonstrated less than 50% stenosis.   The bilateral vertebral arteries were patent with antegrade flow    LHC (2.20.24):   Prox LAD lesion was 70% stenosed.  Ost Cx to Prox Cx lesion was 80% stenosed.  1st Mrg lesion was 80% stenosed.  2nd Mrg-1 lesion was 70% stenosed.  2nd Mrg-2 lesion was 50% stenosed.  Mid LAD lesion was 50% stenosed.  Prox RCA lesion was 60% stenosed.  estimated blood loss was <50 mL.  There was three vessel coronary artery disease.  LVEDP: 30mmHg  Recommendations:   Refer for CABG evaluation and AVR   Preformed by Dr. Flannery at [facility]     ECHO (2.15.24):  Left Ventricle: The left ventricle is normal in size. Mildly increased ventricular mass. Mildly increased wall thickness. There is mild eccentric hypertrophy. Moderate global hypokinesis present. Septal motion is consistent with bundle branch block. There is moderately reduced systolic function. Biplane (2D) method of discs ejection fraction is 40%. Grade II diastolic dysfunction.  Right Ventricle: Right ventricular enlargement. Systolic function is normal.  Left Atrium: Left atrium is severely dilated.  Right Atrium: Right  atrium is moderately dilated.  Aortic Valve: There is moderate aortic valve sclerosis. Severely restricted motion. There is severe stenosis. Aortic valve area by VTI is 0.66 cm². Aortic valve peak velocity is 4.45 m/s. Mean gradient is 50 mmHg. The dimensionless index is 0.17. There is mild to moderate aortic regurgitation.  Mitral Valve: Mildly calcified leaflets. There is no stenosis. There is mild regurgitation.  Tricuspid Valve: The tricuspid valve is structurally normal. There is mild to moderate regurgitation.  Pulmonic Valve: There is no significant regurgitation.  Aorta: Aortic root is upper limit of normal measuring 3.6 cm.  Pulmonary Artery: There is severe pulmonary hypertension. The estimated pulmonary artery systolic pressure is 69 mmHg.  IVC/SVC: Intermediate venous pressure at 8 mmHg.  Pericardium: There is no pericardial effusion.     Review of Systems   Unable to perform ROS: Acuity of condition     Objective:     Vital Signs (Most Recent):  Temp: 99.3 °F (37.4 °C) (03/03/24 1137)  Pulse: 81 (03/03/24 1137)  Resp: 18 (03/03/24 0420)  BP: 128/81 (03/03/24 1137)  SpO2: 97 % (03/03/24 1232) Vital Signs (24h Range):  Temp:  [97.7 °F (36.5 °C)-99.3 °F (37.4 °C)] 99.3 °F (37.4 °C)  Pulse:  [65-81] 81  Resp:  [18-20] 18  SpO2:  [97 %-100 %] 97 %  BP: (101-128)/(60-81) 128/81   Weight: 90.7 kg (200 lb)  Body mass index is 33.28 kg/m².  SpO2: 97 %       Intake/Output Summary (Last 24 hours) at 3/3/2024 1310  Last data filed at 3/3/2024 0606  Gross per 24 hour   Intake 0 ml   Output 1500 ml   Net -1500 ml       Lines/Drains/Airways       Drain  Duration                  Urethral Catheter 02/28/24 1445 Coude 20 Fr. 3 days              Peripheral Intravenous Line  Duration                  Peripheral IV - Single Lumen 03/02/24 1053 20 G Anterior;Right Upper Arm 1 day                  Significant Labs:   Recent Results (from the past 72 hour(s))   POCT glucose    Collection Time: 02/29/24  3:43 PM   Result Value  Ref Range    POCT Glucose 149 (H) 70 - 110 mg/dL   POCT glucose    Collection Time: 02/29/24  9:36 PM   Result Value Ref Range    POCT Glucose 168 (H) 70 - 110 mg/dL   Comprehensive metabolic panel    Collection Time: 03/01/24  2:02 AM   Result Value Ref Range    Sodium Level 134 (L) 136 - 145 mmol/L    Potassium Level 4.9 3.5 - 5.1 mmol/L    Chloride 106 98 - 107 mmol/L    Carbon Dioxide 20 (L) 23 - 31 mmol/L    Glucose Level 144 (H) 82 - 115 mg/dL    Blood Urea Nitrogen 22.0 8.4 - 25.7 mg/dL    Creatinine 2.03 (H) 0.73 - 1.18 mg/dL    Calcium Level Total 7.7 (L) 8.8 - 10.0 mg/dL    Protein Total 5.0 (L) 5.8 - 7.6 gm/dL    Albumin Level 2.9 (L) 3.4 - 4.8 g/dL    Globulin 2.1 (L) 2.4 - 3.5 gm/dL    Albumin/Globulin Ratio 1.4 1.1 - 2.0 ratio    Bilirubin Total 1.0 <=1.5 mg/dL    Alkaline Phosphatase 117 40 - 150 unit/L    Alanine Aminotransferase 66 (H) 0 - 55 unit/L    Aspartate Aminotransferase 124 (H) 5 - 34 unit/L    eGFR 33 mls/min/1.73/m2   CBC with Differential    Collection Time: 03/01/24  2:02 AM   Result Value Ref Range    WBC 18.79 (H) 4.50 - 11.50 x10(3)/mcL    RBC 3.96 (L) 4.70 - 6.10 x10(6)/mcL    Hgb 11.1 (L) 14.0 - 18.0 g/dL    Hct 34.3 (L) 42.0 - 52.0 %    MCV 86.6 80.0 - 94.0 fL    MCH 28.0 27.0 - 31.0 pg    MCHC 32.4 (L) 33.0 - 36.0 g/dL    RDW 15.9 11.5 - 17.0 %    Platelet 106 (L) 130 - 400 x10(3)/mcL    MPV 11.7 (H) 7.4 - 10.4 fL    NRBC% 0.3 %    IPF 8.5 0.9 - 11.2 %   Manual Differential    Collection Time: 03/01/24  2:02 AM   Result Value Ref Range    WBC 18.79 x10(3)/mcL    Neutrophils % 86 %    Lymphs % 6 %    Monocytes % 6 %    Eosinophils % 1 %    Basophils % 1 %    nRBC % 3 %    Neutrophils Abs 16.1594 (H) 2.1 - 9.2 x10(3)/mcL    Lymphs Abs 1.1274 0.6 - 4.6 x10(3)/mcL    Monocytes Abs 1.1274 0.1 - 1.3 x10(3)/mcL    Eosinophils Abs 0.1879 0 - 0.9 x10(3)/mcL    Basophils Abs 0.1879 0 - 0.2 x10(3)/mcL    Platelets Adequate Normal, Adequate    RBC Morph Normal Normal   POCT Capillary Blood  Gas-Resp    Collection Time: 03/01/24  9:17 AM   Result Value Ref Range    POC PH 7.26 (AA) 7.29 - 7.60    POC PCO2 55 (AA) 19 - 50 mmHg    POC PO2 43 mmHg    POC HEMOGLOBIN 10.9 g/dL    POC SATURATED O2 70.3 %    POC O2Hb 73.7 %    POC COHb 2.1 %    POC MetHb 0.80 %    POC Potassium 4.2 3.5 - 5.0 mmol/l    POC Sodium 128 (A) 137 - 145 mmol/l    POC Ionized Calcium 1.08 (A) 1.12 - 1.23 mmol/l    Correct Temperature (PH) 7.26 (AA) 7.29 - 7.60    Corrected Temperature (pCO2) 55 (AA) 19 - 50 mmHg    Corrected Temperature (pO2) 43 mmHg    POC HCO3 24.7 mmol/l    Base Deficit -2.8 mmol/l    POC Temp 37.0 °C    Specimen source Venous sample    RT Blood Gas    Collection Time: 03/01/24  9:41 AM   Result Value Ref Range    Sample Type Arterial Blood     Sample site Arterial Line     Drawn by SD RRT     pH, Blood gas 7.300 (L) 7.350 - 7.450    pCO2, Blood gas 49.0 (H) 35.0 - 45.0 mmHg    pO2, Blood gas 78.0 (L) 80.0 - 100.0 mmHg    Sodium, Blood Gas 128 (L) 137 - 145 mmol/L    Potassium, Blood Gas 4.3 3.5 - 5.0 mmol/L    Calcium Level Ionized 1.10 (L) 1.12 - 1.23 mmol/L    TOC2, Blood gas 25.6 mmol/L    Base Excess, Blood gas -2.60 (L) -2.00 - 2.00 mmol/L    sO2, Blood gas 93.9 %    HCO3, Blood gas 24.1 22.0 - 26.0 mmol/L    THb, Blood gas 11.1 (L) 12 - 16 g/dL    O2 Hb, Blood Gas 94.2 94.0 - 97.0 %    CO Hgb 2.3 (H) 0.5 - 1.5 %    Met Hgb 0.9 0.4 - 1.5 %    Allens Test N/A     Oxygen Device, Blood gas Cannula     LPM 6    POCT glucose    Collection Time: 03/01/24  4:11 PM   Result Value Ref Range    POCT Glucose 122 (H) 70 - 110 mg/dL   POCT glucose    Collection Time: 03/01/24  8:18 PM   Result Value Ref Range    POCT Glucose 123 (H) 70 - 110 mg/dL   Comprehensive metabolic panel    Collection Time: 03/02/24  1:11 AM   Result Value Ref Range    Sodium Level 135 (L) 136 - 145 mmol/L    Potassium Level 4.0 3.5 - 5.1 mmol/L    Chloride 103 98 - 107 mmol/L    Carbon Dioxide 23 23 - 31 mmol/L    Glucose Level 97 82 - 115 mg/dL     Blood Urea Nitrogen 27.1 (H) 8.4 - 25.7 mg/dL    Creatinine 1.83 (H) 0.73 - 1.18 mg/dL    Calcium Level Total 7.7 (L) 8.8 - 10.0 mg/dL    Protein Total 5.1 (L) 5.8 - 7.6 gm/dL    Albumin Level 2.8 (L) 3.4 - 4.8 g/dL    Globulin 2.3 (L) 2.4 - 3.5 gm/dL    Albumin/Globulin Ratio 1.2 1.1 - 2.0 ratio    Bilirubin Total 1.5 <=1.5 mg/dL    Alkaline Phosphatase 187 (H) 40 - 150 unit/L    Alanine Aminotransferase 55 0 - 55 unit/L    Aspartate Aminotransferase 68 (H) 5 - 34 unit/L    eGFR 38 mls/min/1.73/m2   CBC with Differential    Collection Time: 03/02/24  1:11 AM   Result Value Ref Range    WBC 16.26 (H) 4.50 - 11.50 x10(3)/mcL    RBC 3.72 (L) 4.70 - 6.10 x10(6)/mcL    Hgb 10.3 (L) 14.0 - 18.0 g/dL    Hct 32.2 (L) 42.0 - 52.0 %    MCV 86.6 80.0 - 94.0 fL    MCH 27.7 27.0 - 31.0 pg    MCHC 32.0 (L) 33.0 - 36.0 g/dL    RDW 15.7 11.5 - 17.0 %    Platelet 103 (L) 130 - 400 x10(3)/mcL    MPV 11.3 (H) 7.4 - 10.4 fL    Neut % 81.0 %    Lymph % 6.0 %    Mono % 10.6 %    Eos % 0.7 %    Basophil % 0.3 %    Lymph # 0.97 0.6 - 4.6 x10(3)/mcL    Neut # 13.17 (H) 2.1 - 9.2 x10(3)/mcL    Mono # 1.72 (H) 0.1 - 1.3 x10(3)/mcL    Eos # 0.12 0 - 0.9 x10(3)/mcL    Baso # 0.05 <=0.2 x10(3)/mcL    IG# 0.23 (H) 0 - 0.04 x10(3)/mcL    IG% 1.4 %    NRBC% 0.6 %    IPF 8.4 0.9 - 11.2 %   Comprehensive metabolic panel    Collection Time: 03/03/24  4:43 AM   Result Value Ref Range    Sodium Level 134 (L) 136 - 145 mmol/L    Potassium Level 3.9 3.5 - 5.1 mmol/L    Chloride 102 98 - 107 mmol/L    Carbon Dioxide 18 (L) 23 - 31 mmol/L    Glucose Level 54 (L) 82 - 115 mg/dL    Blood Urea Nitrogen 28.7 (H) 8.4 - 25.7 mg/dL    Creatinine 1.61 (H) 0.73 - 1.18 mg/dL    Calcium Level Total 7.9 (L) 8.8 - 10.0 mg/dL    Protein Total 5.2 (L) 5.8 - 7.6 gm/dL    Albumin Level 2.6 (L) 3.4 - 4.8 g/dL    Globulin 2.6 2.4 - 3.5 gm/dL    Albumin/Globulin Ratio 1.0 (L) 1.1 - 2.0 ratio    Bilirubin Total 1.9 (H) <=1.5 mg/dL    Alkaline Phosphatase 184 (H) 40 - 150  unit/L    Alanine Aminotransferase 61 (H) 0 - 55 unit/L    Aspartate Aminotransferase 79 (H) 5 - 34 unit/L    eGFR 44 mls/min/1.73/m2   CBC with Differential    Collection Time: 03/03/24  4:43 AM   Result Value Ref Range    WBC 14.41 (H) 4.50 - 11.50 x10(3)/mcL    RBC 4.06 (L) 4.70 - 6.10 x10(6)/mcL    Hgb 11.2 (L) 14.0 - 18.0 g/dL    Hct 36.8 (L) 42.0 - 52.0 %    MCV 90.6 80.0 - 94.0 fL    MCH 27.6 27.0 - 31.0 pg    MCHC 30.4 (L) 33.0 - 36.0 g/dL    RDW 15.9 11.5 - 17.0 %    Platelet 138 130 - 400 x10(3)/mcL    MPV 11.7 (H) 7.4 - 10.4 fL    Neut % 77.0 %    Lymph % 7.1 %    Mono % 12.1 %    Eos % 1.5 %    Basophil % 0.6 %    Lymph # 1.02 0.6 - 4.6 x10(3)/mcL    Neut # 11.11 (H) 2.1 - 9.2 x10(3)/mcL    Mono # 1.75 (H) 0.1 - 1.3 x10(3)/mcL    Eos # 0.21 0 - 0.9 x10(3)/mcL    Baso # 0.08 <=0.2 x10(3)/mcL    IG# 0.24 (H) 0 - 0.04 x10(3)/mcL    IG% 1.7 %    NRBC% 0.6 %     Telemetry:AFIB CVR    Physical Exam  Vitals and nursing note reviewed.   Constitutional:       General: He is not in acute distress.     Appearance: He is ill-appearing.   HENT:      Head: Normocephalic.      Mouth/Throat:      Mouth: Mucous membranes are moist.      Pharynx: Oropharynx is clear.   Eyes:      Extraocular Movements: Extraocular movements intact.   Cardiovascular:      Rate and Rhythm: Normal rate. Rhythm irregular.   Pulmonary:      Effort: Pulmonary effort is normal. No respiratory distress.      Breath sounds: Rales present. No wheezing.      Comments: NC 4 L/Min  Abdominal:      Palpations: Abdomen is soft.   Genitourinary:     Comments: Urinary Catheter   Musculoskeletal:         General: Normal range of motion.   Skin:     General: Skin is warm and dry.      Comments: Mid Sternal Incision with Dressing in Place. Bilateral Groins are soft with no evidence of active bleed noted.    Neurological:      General: No focal deficit present.      Mental Status: He is alert.       Current Inpatient Medications:  Current  Facility-Administered Medications:     acetaminophen oral solution 650 mg, 650 mg, Per OG tube, Q6H PRN, Vasile Carter PA-C    acetaminophen tablet 650 mg, 650 mg, Oral, Q6H PRN, Pablo Yepez MD, 650 mg at 03/01/24 1530    acetaminophen-codeine 300-30mg per tablet 1 tablet, 1 tablet, Oral, Q4H PRN, Chnatelle Calle, FNP, 1 tablet at 03/02/24 1253    albumin human 5% bottle 12.5 g, 12.5 g, Intravenous, PRN, Vasile Carter PA-C, Stopped at 02/28/24 1442    allopurinol split tablet 50 mg, 50 mg, Oral, Daily, Tanner Rdz MD, 50 mg at 03/03/24 1050    [START ON 3/5/2024] amiodarone tablet 200 mg, 200 mg, Oral, BID, Lexy Palomares FNP    [START ON 3/8/2024] amiodarone tablet 200 mg, 200 mg, Oral, Daily, MarielleHaresh batemanrra, FNP    amiodarone tablet 400 mg, 400 mg, Oral, BID, Lexy Palomares FNP, 400 mg at 03/03/24 0939    aspirin EC tablet 81 mg, 81 mg, Oral, Daily, Vasile Carter PA-C, 81 mg at 03/03/24 0939    atorvastatin tablet 40 mg, 40 mg, Oral, QHS, Tanner Rdz MD, 40 mg at 03/02/24 2044    dextrose 10% bolus 125 mL 125 mL, 12.5 g, Intravenous, PRN, Pablo Yepez MD    dextrose 10% bolus 250 mL 250 mL, 25 g, Intravenous, PRN, Pablo Yepez MD    docusate sodium capsule 100 mg, 100 mg, Oral, BID, Vasile Carter PA-C, 100 mg at 03/03/24 0939    electrolyte-A infusion, , Intravenous, PRN, Pablo Yepez MD, Stopped at 02/28/24 0700    enoxaparin injection 40 mg, 40 mg, Subcutaneous, Daily, Vasile Carter PA-C, 40 mg at 03/02/24 1609    ferrous sulfate tablet 1 each, 1 tablet, Oral, Every other day, Tanner Rdz MD, 1 each at 03/02/24 1002    folic acid tablet 1 mg, 1 mg, Oral, Daily, Vasile Carter PA-C, 1 mg at 03/03/24 0939    furosemide injection 20 mg, 20 mg, Intravenous, Q12H, Lexy Palomares FNP, 20 mg at 03/03/24 0938    heparin, porcine (PF) 100 unit/mL injection flush 500 Units, 5 mL, Intravenous, On Call Procedure, Pablo Yepez MD    heparin,  porcine (PF) 100 unit/mL injection flush 500 Units, 5 mL, Intravenous, On Call Procedure, Nita Contreras MD    lactated ringers bolus 500 mL, 500 mL, Intravenous, Once, Fransico Schrader DO    lactulose 10 gram/15 ml solution 20 g, 20 g, Oral, Q6H PRN, Vasile Carter PA-C    LIDOcaine HCl 2% urojet, , Mucous Membrane, Once, Nicol Diaz R, AGACNP-BC    loperamide capsule 2 mg, 2 mg, Oral, Continuous PRN, Vasile Carter PA-C, 2 mg at 03/02/24 1253    melatonin tablet 6 mg, 6 mg, Oral, Nightly PRN, Tanner Rdz MD, 6 mg at 02/26/24 2031    metoclopramide injection 5 mg, 5 mg, Intravenous, Q6H PRN, Vasile Carter PA-C    metoprolol tartrate (LOPRESSOR) split tablet 12.5 mg, 12.5 mg, Oral, BID, Lexy Palomares, FNP, 12.5 mg at 03/03/24 1050    nitroGLYCERIN SL tablet 0.4 mg, 0.4 mg, Sublingual, Q5 Min PRN, Tanner Rdz MD    ondansetron disintegrating tablet 8 mg, 8 mg, Oral, Q8H PRN, Tanner Rdz MD, 8 mg at 02/23/24 1302    ondansetron injection 8 mg, 8 mg, Intravenous, Q6H PRN, Pablo Yepez MD, 8 mg at 02/26/24 1616    pantoprazole EC tablet 40 mg, 40 mg, Oral, Daily, Tanner Rdz MD, 40 mg at 03/03/24 0939    polyethylene glycol packet 17 g, 17 g, Oral, Daily, Tanner Rdz MD, 17 g at 03/03/24 0939    simethicone chewable tablet 80 mg, 1 tablet, Oral, TID PRN, Tanner Rdz MD, 80 mg at 02/27/24 0918    sodium chloride 0.9% flush 10 mL, 10 mL, Intravenous, PRN, Tanner Rdz MD    sodium chloride 0.9% flush 10 mL, 10 mL, Intravenous, PRN, Millie Jimenez, GRANT    sucralfate tablet 1 g, 1 g, Oral, QID (AC & HS), Vasile Carter PA-C, 1 g at 03/03/24 1050  VTE Risk Mitigation (From admission, onward)           Ordered     enoxaparin injection 40 mg  Daily         03/02/24 0759     IP VTE HIGH RISK PATIENT  Once         03/02/24 0759     heparin, porcine (PF) 100 unit/mL injection flush 500 Units  On Call Procedure         02/27/24  0718     heparin, porcine (PF) 100 unit/mL injection flush 500 Units  On Call Procedure         02/27/24 0257     Place SUSIE hose  Until discontinued         02/22/24 1458     Place sequential compression device  Until discontinued         02/21/24 2057                  Assessment:   CAD (Multivessel)    - Status Post CABG (3V) LIMA to LAD, SVG to OM1, SVG to RCA (2.27.24)    - RHC/Impella Placement and [place] Catheter (2.23.24)- Impella Removal/Femoral Artery Repair in Surgery on 2.27.24    - LHC (2.19.24): pLAD lesion 70%, oLCx 80%, OM1 80%, OM2 1 lesion 70% 2nd lesion 50%, mLAD 50%, pRCA 60%  VHD/AS     - Status Post Bio AVR (Epic max 23mm) (2.27.24)    - ECHO (2.16.24): Aortic Valve: There is moderate aortic valve sclerosis. Severely restricted motion. There is severe stenosis. Aortic valve area by VTI is 0.66 cm². Aortic valve peak velocity is 4.45 m/s. Mean gradient is 50 mmHg. The dimensionless index is 0.17.   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% - Decompensated    - ECHO (2.16.24): EF 40%, Grade II DD    - Small Pleural Effusions on CT Imaging (2.22.24)  Left Bundle Branch Block   VHD    - ECHO (2.16.24): Severe AS, mild MR, mild to moderate TR  JEAN-CLAUDE/CKD Stage II     - Baseline Cr 1.6  Anemia- Suspect Blood Loss    - Status Post Transfusion on 2.28.24 & 2.29.24  Thrombocytopenia - Stable  Leukocytosis (Worsening)  No Hx of GI Bleed    Plan:   Continue Lasix 20 mg IV BID   Continue Oral Amiodarone  Taper; Amiodarone 400 mg oral BID for 3 days, then 200 mg oral BID for 3 days, then 200 mg oral daily thereafter   Continue Metoprolol 12.5 oral BID  Will Start Low Dose Entresto when Diurese is Complete   Continue Post Op Supportive Care  Deferring Anticoagulation (Re: AF) due to anemia requiring transfusion & status post MOISE Ligation  Keep Mag > 2 and Potassium > 4  Labs in AM: CBC, CMP, and Mag       BLANQUITA Henderson  Cardiology  [facility]   03/03/2024    I agree with the findings of the complexity of problems addressed and take responsibility for the plan's risks and complications. I approved the plan documented by Lexy Palomares NP.  Meds reviewed  Pt improving  Needs PT

## 2024-03-03 NOTE — PROGRESS NOTES
CT SURGERY PROGRESS NOTE  Brain Snyder  78 y.o.  1945    Patients Procedure: Procedure(s) (LRB):  CORONARY ARTERY BYPASS GRAFT (CABG) (N/A)  Replacement-valve-aortic (N/A)  SURGICAL PROCUREMENT, VEIN, ENDOSCOPIC (N/A)  Impella, Removal (Right)    Subjective  Interval History: Patient is postoperative day 5 from CABG x 3, ligation of left atrial appendage, and explant of right femoral impella device.      No acute events Review of Systems   Unable to perform ROS: Acuity of condition       Medication List  Infusions   loperamide       Scheduled   allopurinoL  50 mg Oral Daily    [START ON 3/5/2024] amiodarone  200 mg Oral BID    [START ON 3/8/2024] amiodarone  200 mg Oral Daily    amiodarone  400 mg Oral BID    aspirin  81 mg Oral Daily    atorvastatin  40 mg Oral QHS    docusate sodium  100 mg Oral BID    enoxparin  40 mg Subcutaneous Daily    ferrous sulfate  1 tablet Oral Every other day    folic acid  1 mg Oral Daily    furosemide (LASIX) injection  20 mg Intravenous Q12H    lactated ringers  500 mL Intravenous Once    LIDOcaine HCl 2%   Mucous Membrane Once    metoprolol tartrate  12.5 mg Oral BID    pantoprazole  40 mg Oral Daily    polyethylene glycol  17 g Oral Daily    sucralfate  1 g Oral QID (AC & HS)       Objective:  Recent Vitals:  Temp:  [97.7 °F (36.5 °C)-98.4 °F (36.9 °C)] 98.1 °F (36.7 °C)  Pulse:  [65-95] 72  Resp:  [18-23] 18  SpO2:  [96 %-100 %] 99 %  BP: (101-140)/(60-95) 128/74  Arterial Line BP: (145)/(68) 145/68    Physical Exam  Constitutional:       General: He is sleeping. He is not in acute distress.     Appearance: He is ill-appearing.   HENT:      Head: Normocephalic and atraumatic.   Cardiovascular:      Rate and Rhythm: Normal rate. Rhythm irregular.      Pulses: Normal pulses.      Heart sounds: Normal heart sounds. No murmur heard.     No friction rub. No gallop.   Pulmonary:      Effort: Tachypnea present. No respiratory distress.      Breath sounds: No stridor. Decreased  breath sounds and rhonchi present. No wheezing or rales.   Abdominal:      General: There is no distension.      Palpations: Abdomen is soft.   Skin:     General: Skin is warm and dry.      Comments: Median sternotomy incision dressed -c/d/i          I/O last 24 hrs:  Intake/Output - Last 3 Shifts         03/01 0700  03/02 0659 03/02 0700  03/03 0659 03/03 0700  03/04 0659    P.O.  0     I.V. (mL/kg) 642.4 (8.2) 71.8 (0.8)     Blood       IV Piggyback       Total Intake(mL/kg) 642.4 (8.2) 71.8 (0.8)     Urine (mL/kg/hr) 3210 (1.7) 1800 (0.8)     Stool  0     Chest Tube 170 0     Total Output 3380 1800     Net -2737.6 -1728.3            Stool Occurrence  4 x             Labs  CBC:   Recent Labs   Lab 03/03/24  0443   WBC 14.41*   RBC 4.06*   HGB 11.2*   HCT 36.8*      MCV 90.6   MCH 27.6   MCHC 30.4*       CMP:   Recent Labs   Lab 03/03/24  0443   CALCIUM 7.9*   ALBUMIN 2.6*   *   K 3.9   CO2 18*   BUN 28.7*   CREATININE 1.61*   ALKPHOS 184*   ALT 61*   AST 79*   BILITOT 1.9*       LFTs:   Recent Labs   Lab 03/03/24  0443   ALT 61*   AST 79*   ALKPHOS 184*   BILITOT 1.9*   ALBUMIN 2.6*       All pertinent labs from the last 24 hours have been reviewed.      Imaging:   CXR: X-Ray Chest AP Portable    Result Date: 3/1/2024  As above. Electronically signed by: Mann Hernandez Date:    03/01/2024 Time:    06:58   I have reviewed all pertinent imaging results/findings within the past 24 hours.        ASSESSMENT/PLAN:    Multivessel coronary artery disease  -s/p CABG  Ischemic cardiomyopathy  Pulmonary HTN  VHD      VSS  -Creat and LFTs improving daily  -Diuresing   -Wean oxygen as tolerated  -telem yesterday  -DC planning     Case and plan of care discussed with MD Vasile Carter PAC

## 2024-03-04 LAB
ALBUMIN SERPL-MCNC: 2.3 G/DL (ref 3.4–4.8)
ALBUMIN/GLOB SERPL: 0.7 RATIO (ref 1.1–2)
ALP SERPL-CCNC: 188 UNIT/L (ref 40–150)
ALT SERPL-CCNC: 78 UNIT/L (ref 0–55)
AST SERPL-CCNC: 120 UNIT/L (ref 5–34)
BASOPHILS # BLD AUTO: 0.07 X10(3)/MCL
BASOPHILS NFR BLD AUTO: 0.6 %
BILIRUB SERPL-MCNC: 2 MG/DL
BUN SERPL-MCNC: 26.6 MG/DL (ref 8.4–25.7)
CALCIUM SERPL-MCNC: 7.5 MG/DL (ref 8.8–10)
CHLORIDE SERPL-SCNC: 102 MMOL/L (ref 98–107)
CO2 SERPL-SCNC: 22 MMOL/L (ref 23–31)
CREAT SERPL-MCNC: 1.37 MG/DL (ref 0.73–1.18)
EOSINOPHIL # BLD AUTO: 0.26 X10(3)/MCL (ref 0–0.9)
EOSINOPHIL NFR BLD AUTO: 2.1 %
ERYTHROCYTE [DISTWIDTH] IN BLOOD BY AUTOMATED COUNT: 15.6 % (ref 11.5–17)
GFR SERPLBLD CREATININE-BSD FMLA CKD-EPI: 53 MLS/MIN/1.73/M2
GLOBULIN SER-MCNC: 3.2 GM/DL (ref 2.4–3.5)
GLUCOSE SERPL-MCNC: 82 MG/DL (ref 82–115)
HCT VFR BLD AUTO: 36.2 % (ref 42–52)
HGB BLD-MCNC: 11.3 G/DL (ref 14–18)
IMM GRANULOCYTES # BLD AUTO: 0.53 X10(3)/MCL (ref 0–0.04)
IMM GRANULOCYTES NFR BLD AUTO: 4.4 %
LYMPHOCYTES # BLD AUTO: 0.88 X10(3)/MCL (ref 0.6–4.6)
LYMPHOCYTES NFR BLD AUTO: 7.2 %
MAGNESIUM SERPL-MCNC: 2.2 MG/DL (ref 1.6–2.6)
MCH RBC QN AUTO: 27.6 PG (ref 27–31)
MCHC RBC AUTO-ENTMCNC: 31.2 G/DL (ref 33–36)
MCV RBC AUTO: 88.5 FL (ref 80–94)
MONOCYTES # BLD AUTO: 1.77 X10(3)/MCL (ref 0.1–1.3)
MONOCYTES NFR BLD AUTO: 14.6 %
NEUTROPHILS # BLD AUTO: 8.65 X10(3)/MCL (ref 2.1–9.2)
NEUTROPHILS NFR BLD AUTO: 71.1 %
NRBC BLD AUTO-RTO: 0.4 %
PLATELET # BLD AUTO: 177 X10(3)/MCL (ref 130–400)
PMV BLD AUTO: 11.3 FL (ref 7.4–10.4)
POTASSIUM SERPL-SCNC: 3.9 MMOL/L (ref 3.5–5.1)
PROT SERPL-MCNC: 5.5 GM/DL (ref 5.8–7.6)
RBC # BLD AUTO: 4.09 X10(6)/MCL (ref 4.7–6.1)
SODIUM SERPL-SCNC: 135 MMOL/L (ref 136–145)
WBC # SPEC AUTO: 12.16 X10(3)/MCL (ref 4.5–11.5)

## 2024-03-04 PROCEDURE — 25000003 PHARM REV CODE 250

## 2024-03-04 PROCEDURE — 25000003 PHARM REV CODE 250: Performed by: INTERNAL MEDICINE

## 2024-03-04 PROCEDURE — 25000003 PHARM REV CODE 250: Performed by: PHYSICIAN ASSISTANT

## 2024-03-04 PROCEDURE — 80053 COMPREHEN METABOLIC PANEL: CPT | Performed by: INTERNAL MEDICINE

## 2024-03-04 PROCEDURE — 63600175 PHARM REV CODE 636 W HCPCS: Performed by: PHYSICIAN ASSISTANT

## 2024-03-04 PROCEDURE — 63600175 PHARM REV CODE 636 W HCPCS: Performed by: INTERNAL MEDICINE

## 2024-03-04 PROCEDURE — 85025 COMPLETE CBC W/AUTO DIFF WBC: CPT | Performed by: INTERNAL MEDICINE

## 2024-03-04 PROCEDURE — 83735 ASSAY OF MAGNESIUM: CPT

## 2024-03-04 PROCEDURE — 97530 THERAPEUTIC ACTIVITIES: CPT | Mod: CQ

## 2024-03-04 PROCEDURE — 99024 POSTOP FOLLOW-UP VISIT: CPT | Mod: ,,, | Performed by: PHYSICIAN ASSISTANT

## 2024-03-04 PROCEDURE — 21400001 HC TELEMETRY ROOM

## 2024-03-04 RX ORDER — FUROSEMIDE 20 MG/1
20 TABLET ORAL 2 TIMES DAILY
Status: DISCONTINUED | OUTPATIENT
Start: 2024-03-04 | End: 2024-03-12

## 2024-03-04 RX ORDER — LISINOPRIL 2.5 MG/1
2.5 TABLET ORAL DAILY
Status: DISCONTINUED | OUTPATIENT
Start: 2024-03-05 | End: 2024-03-07

## 2024-03-04 RX ADMIN — SUCRALFATE 1 G: 1 TABLET ORAL at 11:03

## 2024-03-04 RX ADMIN — ENOXAPARIN SODIUM 40 MG: 40 INJECTION SUBCUTANEOUS at 05:03

## 2024-03-04 RX ADMIN — AMIODARONE HYDROCHLORIDE 400 MG: 200 TABLET ORAL at 11:03

## 2024-03-04 RX ADMIN — FERROUS SULFATE TAB 325 MG (65 MG ELEMENTAL FE) 1 EACH: 325 (65 FE) TAB at 11:03

## 2024-03-04 RX ADMIN — FOLIC ACID 1 MG: 1 TABLET ORAL at 11:03

## 2024-03-04 RX ADMIN — VANCOMYCIN HYDROCHLORIDE 2000 MG: 500 INJECTION, POWDER, LYOPHILIZED, FOR SOLUTION INTRAVENOUS at 11:03

## 2024-03-04 RX ADMIN — SUCRALFATE 1 G: 1 TABLET ORAL at 09:03

## 2024-03-04 RX ADMIN — LEVOFLOXACIN 500 MG: 500 TABLET, FILM COATED ORAL at 11:03

## 2024-03-04 RX ADMIN — AMIODARONE HYDROCHLORIDE 400 MG: 200 TABLET ORAL at 09:03

## 2024-03-04 RX ADMIN — ACETAMINOPHEN AND CODEINE PHOSPHATE 1 TABLET: 300; 30 TABLET ORAL at 05:03

## 2024-03-04 RX ADMIN — METOPROLOL TARTRATE 12.5 MG: 25 TABLET, FILM COATED ORAL at 11:03

## 2024-03-04 RX ADMIN — ASPIRIN 81 MG: 81 TABLET, COATED ORAL at 11:03

## 2024-03-04 RX ADMIN — ACETAMINOPHEN AND CODEINE PHOSPHATE 1 TABLET: 300; 30 TABLET ORAL at 09:03

## 2024-03-04 RX ADMIN — ALLOPURINOL 50 MG: 300 TABLET ORAL at 11:03

## 2024-03-04 RX ADMIN — METOPROLOL TARTRATE 12.5 MG: 25 TABLET, FILM COATED ORAL at 09:03

## 2024-03-04 RX ADMIN — DOCUSATE SODIUM 100 MG: 100 CAPSULE, LIQUID FILLED ORAL at 09:03

## 2024-03-04 RX ADMIN — ATORVASTATIN CALCIUM 40 MG: 40 TABLET, FILM COATED ORAL at 09:03

## 2024-03-04 RX ADMIN — FUROSEMIDE 20 MG: 20 TABLET ORAL at 05:03

## 2024-03-04 RX ADMIN — PANTOPRAZOLE SODIUM 40 MG: 40 TABLET, DELAYED RELEASE ORAL at 11:03

## 2024-03-04 RX ADMIN — SUCRALFATE 1 G: 1 TABLET ORAL at 05:03

## 2024-03-04 NOTE — PROGRESS NOTES
.UROLOGY  PROGRESS  NOTE    Brain Claudio 1945  [id number]  3/4/2024    Called back to evaluate drainage near meatus    Excellent urine output  VSS, afebrile  Renal function stable, BUN/creat 26.6 & 1.37      Exam:    NAD  Card RRR  Resp unlabored   catheter with thick dried drainage and some drainage near meatus      Recent Results (from the past 24 hour(s))   Wound Culture    Collection Time: 03/03/24  7:20 PM    Specimen: Groin; Wound   Result Value Ref Range    Wound Culture Moderate Gram-negative Rods (A)    Comprehensive metabolic panel    Collection Time: 03/04/24  4:53 AM   Result Value Ref Range    Sodium Level 135 (L) 136 - 145 mmol/L    Potassium Level 3.9 3.5 - 5.1 mmol/L    Chloride 102 98 - 107 mmol/L    Carbon Dioxide 22 (L) 23 - 31 mmol/L    Glucose Level 82 82 - 115 mg/dL    Blood Urea Nitrogen 26.6 (H) 8.4 - 25.7 mg/dL    Creatinine 1.37 (H) 0.73 - 1.18 mg/dL    Calcium Level Total 7.5 (L) 8.8 - 10.0 mg/dL    Protein Total 5.5 (L) 5.8 - 7.6 gm/dL    Albumin Level 2.3 (L) 3.4 - 4.8 g/dL    Globulin 3.2 2.4 - 3.5 gm/dL    Albumin/Globulin Ratio 0.7 (L) 1.1 - 2.0 ratio    Bilirubin Total 2.0 (H) <=1.5 mg/dL    Alkaline Phosphatase 188 (H) 40 - 150 unit/L    Alanine Aminotransferase 78 (H) 0 - 55 unit/L    Aspartate Aminotransferase 120 (H) 5 - 34 unit/L    eGFR 53 mls/min/1.73/m2   Magnesium    Collection Time: 03/04/24  4:53 AM   Result Value Ref Range    Magnesium Level 2.20 1.60 - 2.60 mg/dL   CBC with Differential    Collection Time: 03/04/24  4:53 AM   Result Value Ref Range    WBC 12.16 (H) 4.50 - 11.50 x10(3)/mcL    RBC 4.09 (L) 4.70 - 6.10 x10(6)/mcL    Hgb 11.3 (L) 14.0 - 18.0 g/dL    Hct 36.2 (L) 42.0 - 52.0 %    MCV 88.5 80.0 - 94.0 fL    MCH 27.6 27.0 - 31.0 pg    MCHC 31.2 (L) 33.0 - 36.0 g/dL    RDW 15.6 11.5 - 17.0 %    Platelet 177 130 - 400 x10(3)/mcL    MPV 11.3 (H) 7.4 - 10.4 fL    Neut % 71.1 %    Lymph % 7.2 %    Mono % 14.6 %    Eos % 2.1 %    Basophil % 0.6 %    Lymph #  0.88 0.6 - 4.6 x10(3)/mcL    Neut # 8.65 2.1 - 9.2 x10(3)/mcL    Mono # 1.77 (H) 0.1 - 1.3 x10(3)/mcL    Eos # 0.26 0 - 0.9 x10(3)/mcL    Baso # 0.07 <=0.2 x10(3)/mcL    IG# 0.53 (H) 0 - 0.04 x10(3)/mcL    IG% 4.4 %    NRBC% 0.4 %         Assessment:  Urethral stricture s/p dilatation and Nguyen placement over a wire on 2/23/24. We were called back to evaluate the patient for purulent drainage at urethral meatus. He is afebrile. Dried drainage on Nguyen there due to lack of adequate Nguyen care.      Plan:  Recommend adequate Nguyen care BLANQUITA Leblanc

## 2024-03-04 NOTE — NURSING
Purulent drainage noted around urinary meatus- thick, yellow-brown. Catheter care performed. Pt with some discomfort. Urine also yellow/green-brown color.

## 2024-03-04 NOTE — PROGRESS NOTES
Pharmacokinetic Initial Assessment: IV Vancomycin    Assessment/Plan:    Initiate intravenous vancomycin with loading dose of 2000 mg once followed by a maintenance dose of vancomycin 1250mg IV every 24 hours  Desired empiric serum trough concentration is 10 to 20 mcg/mL  Draw vancomycin trough level 60 min prior to third dose on 03/06 at approximately 0930.  Pharmacy will continue to follow and monitor vancomycin.      Please contact pharmacy at extension [phone] with any questions regarding this assessment.     Thank you for the consult,   Demario Hernandez       Patient brief summary:  Brain Snyder is a 78 y.o. male initiated on antimicrobial therapy with IV Vancomycin for treatment of suspected skin & soft tissue infection    Drug Allergies:   Review of patient's allergies indicates:  No Known Allergies    Actual Body Weight:   90.3kg    Renal Function:   Estimated Creatinine Clearance: 46.6 mL/min (A) (based on SCr of 1.37 mg/dL (H)).,     Dialysis Method (if applicable):  N/A    CBC (last 72 hours):  Recent Labs   Lab Result Units 03/02/24 0111 03/03/24 0443 03/04/24  0453   WBC x10(3)/mcL 16.26* 14.41* 12.16*   Hgb g/dL 10.3* 11.2* 11.3*   Hct % 32.2* 36.8* 36.2*   Platelet x10(3)/mcL 103* 138 177   Mono % % 10.6 12.1 14.6   Eos % % 0.7 1.5 2.1   Basophil % % 0.3 0.6 0.6       Metabolic Panel (last 72 hours):  Recent Labs   Lab Result Units 03/02/24 0111 03/03/24 0443 03/04/24  0453   Sodium Level mmol/L 135* 134* 135*   Potassium Level mmol/L 4.0 3.9 3.9   Chloride mmol/L 103 102 102   Carbon Dioxide mmol/L 23 18* 22*   Glucose Level mg/dL 97 54* 82   Blood Urea Nitrogen mg/dL 27.1* 28.7* 26.6*   Creatinine mg/dL 1.83* 1.61* 1.37*   Albumin Level g/dL 2.8* 2.6* 2.3*   Bilirubin Total mg/dL 1.5 1.9* 2.0*   Alkaline Phosphatase unit/L 187* 184* 188*   Aspartate Aminotransferase unit/L 68* 79* 120*   Alanine Aminotransferase unit/L 55 61* 78*   Magnesium Level mg/dL  --   --  2.20       Drug levels (last 3  "results):  No results for input(s): "VANCOMYCINRA", "VANCORANDOM", "VANCOMYCINPE", "VANCOPEAK", "VANCOMYCINTR", "VANCOTROUGH" in the last 72 hours.    Microbiologic Results:  Microbiology Results (last 7 days)       Procedure Component Value Units Date/Time    Wound Culture [[id number]]  (Abnormal) Collected: 03/03/24 1920    Order Status: Completed Specimen: Wound from Groin Updated: 03/04/24 0718     Wound Culture Moderate Gram-negative Rods    Respiratory Culture [[id number]]     Order Status: Sent Specimen: Sputum             "

## 2024-03-04 NOTE — PROGRESS NOTES
"  [facility]    Cardiology  Progress Note    Patient Name: Brain Snyder  MRN: [id number]  Admission Date: 2/21/2024  Hospital Length of Stay: 12 days  Code Status: Full Code   Attending Physician: Pablo Yepez MD   Primary Care Physician: Nidia Shepherd NP  Expected Discharge Date:   Principal Problem:CAD (coronary artery disease)    Subjective:   Reason for Consult:  CAD     HPI: 78-year-old female that is unknown to [facility] with a PMHx of PAF on Eliquis Aortic insufficiency, MV CAD, HTN. He is [place]an speaking and an  was used for interview. He was orginally admitted to [facility] on 2.15.24 with CP & NSTEMI and underwent a LHC on 2.20.24 taht showed MV CAD (reported noted below) He was transferred to [facility] on 2.21.24 for a CABG/AVR evaluation. On arrive he was hemodynamically stable on a heparin infusion. He denied chest pain, SOB, and nausea on current exam, [facility] was consulted for CAD    Hospital Course:   2.23.24: Patient seen resting in bed. He denies SOB or CP. He remains on heparin infusion. Family at bedside   2.24.24: NAD. S/p Impella Placement/Jackson-Chelo Catheter. "I am ok." + Blood Clots from Nose/Mouth, Hematuria 2/2 traumatic Indwelling Catheter Placement. Heparin Drip per Protocol. Impella P5  2.25.24: NAD. "I'm ok." Denies CP, SOB and Palps. PA Pressure Reading is not Accurate. CVP 5. Impella at P5. R Groin Impella P7. Remains Off Heparin Drip per Protocol. Now in SR.   2.26.24: NAD Noted. SR on Tele. Right Groin Impella in place, bruising with no hematoma noted. Distal pulses DP intact. Legs warm. NC 2 L/Min. Denies CP/SOB. Consent obtained from his daughter Brii Snyder. NPO for MV PCI Today.  2.27.24: NAD Noted. Impella remains in place at P7 Support. Good UA Noted, some pink tinged urine noted. SR on Tele. Pressors off. Denies CP/SOB. NPO for surgery today. Heparin on hold for surgery.  2.28.24: Patient is critically ill. AF RVR on Tele, twice shocked this AM with no " "success. On Amiodarone/Milrinone- Cardizem Infusion now off given hypotension and ICMO. Also no José Miguel/Levophed. Concern for bleeding noted, transfusion noted. Patient is intubated/Mechanically Vented. Hemodynamics: CO/CI 4.3/2.3 CVP 20.  2.29.24: Patient self extubated this AM. He is stable on NC Oxygen. Denies CP/SOB. SR on Tele. On Amiodarone Infusion and receiving blood products. BP Stable. Clinically much improved from yesterday.   3.1.24: NAD. Afib mild RVR on tele. On Primacor @ 0.187 mcg/kg/min. Amiodarone infusing per protocol. LFT's worsening. WBC worsening. + Incisional CP. On 5 L NC.   3.2.24: NAD. Net Negative 2700 urine output.   3.3.24: NAD. VSS. No complaints of CP/Palps. Reports SOB is improving. Creat 1.61 (Improved)  3.4.24: NAD. VSS. No complaints. On 2 L. Net Negative Fluid 3540 over 24 hours. Creat 1.37. LFTs slighting worse today    PMH: PAF (on Eliquis), Aortic insufficiency, MV CAD, HTN  PSH: [facility]  Family History: None reported  Social History: former smoker, denies ETOH or illicit drug us    Previous Diagnostics:  CABG/Bio AVR/MOISE Ligation (2.27.24):  Coronary artery bypass grafting X 3, LIMA to LAD, reversed SVG to OM1, Reversed Saphenous venous graft to RCA  Bioprosthetic aortic valve replacement (Epic max 23mm), Endoscopic venous harvesting of left greater saphenous vein, Left atrial appendage ligation, explant of right femoral impella device, right femoral artery repair.    RHC/Impella Placement (2.23.24):  Right heart catheterization performed showed the following:  PA= 61/24 (27) mm Hg  PCWP=  31/26 (27) mm Hg  AO saturation= 93 % RA  PA saturation= 60% with Impella (49% yesterday)    (02/22/24) -  0.5  Following that we elected to advance the Impella via right common femoral artery approach:  - Abiomed stiff wire  - 14 Lao peel-away sheath  - Heparin 10,000 unit bolus given peripherally  - Dilator removed  - 0.035" J-wire  - 6 Lao pigtail catheter positioned in the left " "ventricle  - Abiomed 0.025" wire  - Impella CP positioned in left ventricle  - Pulsatile motor current (appropriate positioning)  - Placed the marker just below the aortic valve  - Cardiac output was 2.8 L/min provided by the device.  Impella was at 84cm  Access Closure :    Sheath sutured to the groin  Secured.  Attestation:I was present for and supervised all key portions of the procedure  Impression/plan:   Successful Impella insertion and swan-Chelo in the setting of Acute HF/low cardiac output/precardiogenic shock/Critcal-severe AS/MVCAD - LM distal    RHC (2.22.24):  Right heart catheterization demonstrating severe pulmonary hypertension 84/34 and PCWP 24 mmHg  Reduced cardiac output/cardiac index at 3.43/1.81 L/min/m2 with pulmonary artery saturations 49.3%  For applied to the right femoral vein following removal of the 7 Sami sheath  The estimated blood loss was none.    Carotid US (2.22.24):  The bilateral internal carotid artery demonstrated less than 50% stenosis.   The bilateral vertebral arteries were patent with antegrade flow    LHC (2.20.24):   Prox LAD lesion was 70% stenosed.  Ost Cx to Prox Cx lesion was 80% stenosed.  1st Mrg lesion was 80% stenosed.  2nd Mrg-1 lesion was 70% stenosed.  2nd Mrg-2 lesion was 50% stenosed.  Mid LAD lesion was 50% stenosed.  Prox RCA lesion was 60% stenosed.  estimated blood loss was <50 mL.  There was three vessel coronary artery disease.  LVEDP: 30mmHg  Recommendations:   Refer for CABG evaluation and AVR   Preformed by Dr. Flannery at [facility]     ECHO (2.15.24):  Left Ventricle: The left ventricle is normal in size. Mildly increased ventricular mass. Mildly increased wall thickness. There is mild eccentric hypertrophy. Moderate global hypokinesis present. Septal motion is consistent with bundle branch block. There is moderately reduced systolic function. Biplane (2D) method of discs ejection fraction is 40%. Grade II diastolic dysfunction.  Right Ventricle: Right " ventricular enlargement. Systolic function is normal.  Left Atrium: Left atrium is severely dilated.  Right Atrium: Right atrium is moderately dilated.  Aortic Valve: There is moderate aortic valve sclerosis. Severely restricted motion. There is severe stenosis. Aortic valve area by VTI is 0.66 cm². Aortic valve peak velocity is 4.45 m/s. Mean gradient is 50 mmHg. The dimensionless index is 0.17. There is mild to moderate aortic regurgitation.  Mitral Valve: Mildly calcified leaflets. There is no stenosis. There is mild regurgitation.  Tricuspid Valve: The tricuspid valve is structurally normal. There is mild to moderate regurgitation.  Pulmonic Valve: There is no significant regurgitation.  Aorta: Aortic root is upper limit of normal measuring 3.6 cm.  Pulmonary Artery: There is severe pulmonary hypertension. The estimated pulmonary artery systolic pressure is 69 mmHg.  IVC/SVC: Intermediate venous pressure at 8 mmHg.  Pericardium: There is no pericardial effusion.     Review of Systems   Cardiovascular:  Negative for chest pain, dyspnea on exertion, leg swelling and orthopnea.   Respiratory:  Negative for shortness of breath.    All other systems reviewed and are negative.    Objective:     Vital Signs (Most Recent):  Temp: 98.1 °F (36.7 °C) (03/04/24 0809)  Pulse: 66 (03/04/24 0809)  Resp: 20 (03/04/24 0326)  BP: 103/68 (03/04/24 0809)  SpO2: 99 % (03/04/24 0809) Vital Signs (24h Range):  Temp:  [97.3 °F (36.3 °C)-99.3 °F (37.4 °C)] 98.1 °F (36.7 °C)  Pulse:  [63-87] 66  Resp:  [16-20] 20  SpO2:  [97 %-99 %] 99 %  BP: ()/(64-81) 103/68   Weight: 90.3 kg (199 lb 1.6 oz)  Body mass index is 33.13 kg/m².  SpO2: 99 %       Intake/Output Summary (Last 24 hours) at 3/4/2024 1114  Last data filed at 3/4/2024 0530  Gross per 24 hour   Intake 960 ml   Output 4500 ml   Net -3540 ml       Lines/Drains/Airways       Drain  Duration                  Urethral Catheter 02/28/24 1445 Coude 20 Fr. 4 days               Peripheral Intravenous Line  Duration                  Peripheral IV - Single Lumen 03/02/24 1053 20 G Anterior;Right Upper Arm 2 days                  Significant Labs:   Recent Results (from the past 72 hour(s))   POCT glucose    Collection Time: 03/01/24  4:11 PM   Result Value Ref Range    POCT Glucose 122 (H) 70 - 110 mg/dL   POCT glucose    Collection Time: 03/01/24  8:18 PM   Result Value Ref Range    POCT Glucose 123 (H) 70 - 110 mg/dL   Comprehensive metabolic panel    Collection Time: 03/02/24  1:11 AM   Result Value Ref Range    Sodium Level 135 (L) 136 - 145 mmol/L    Potassium Level 4.0 3.5 - 5.1 mmol/L    Chloride 103 98 - 107 mmol/L    Carbon Dioxide 23 23 - 31 mmol/L    Glucose Level 97 82 - 115 mg/dL    Blood Urea Nitrogen 27.1 (H) 8.4 - 25.7 mg/dL    Creatinine 1.83 (H) 0.73 - 1.18 mg/dL    Calcium Level Total 7.7 (L) 8.8 - 10.0 mg/dL    Protein Total 5.1 (L) 5.8 - 7.6 gm/dL    Albumin Level 2.8 (L) 3.4 - 4.8 g/dL    Globulin 2.3 (L) 2.4 - 3.5 gm/dL    Albumin/Globulin Ratio 1.2 1.1 - 2.0 ratio    Bilirubin Total 1.5 <=1.5 mg/dL    Alkaline Phosphatase 187 (H) 40 - 150 unit/L    Alanine Aminotransferase 55 0 - 55 unit/L    Aspartate Aminotransferase 68 (H) 5 - 34 unit/L    eGFR 38 mls/min/1.73/m2   CBC with Differential    Collection Time: 03/02/24  1:11 AM   Result Value Ref Range    WBC 16.26 (H) 4.50 - 11.50 x10(3)/mcL    RBC 3.72 (L) 4.70 - 6.10 x10(6)/mcL    Hgb 10.3 (L) 14.0 - 18.0 g/dL    Hct 32.2 (L) 42.0 - 52.0 %    MCV 86.6 80.0 - 94.0 fL    MCH 27.7 27.0 - 31.0 pg    MCHC 32.0 (L) 33.0 - 36.0 g/dL    RDW 15.7 11.5 - 17.0 %    Platelet 103 (L) 130 - 400 x10(3)/mcL    MPV 11.3 (H) 7.4 - 10.4 fL    Neut % 81.0 %    Lymph % 6.0 %    Mono % 10.6 %    Eos % 0.7 %    Basophil % 0.3 %    Lymph # 0.97 0.6 - 4.6 x10(3)/mcL    Neut # 13.17 (H) 2.1 - 9.2 x10(3)/mcL    Mono # 1.72 (H) 0.1 - 1.3 x10(3)/mcL    Eos # 0.12 0 - 0.9 x10(3)/mcL    Baso # 0.05 <=0.2 x10(3)/mcL    IG# 0.23 (H) 0 - 0.04  x10(3)/mcL    IG% 1.4 %    NRBC% 0.6 %    IPF 8.4 0.9 - 11.2 %   Comprehensive metabolic panel    Collection Time: 03/03/24  4:43 AM   Result Value Ref Range    Sodium Level 134 (L) 136 - 145 mmol/L    Potassium Level 3.9 3.5 - 5.1 mmol/L    Chloride 102 98 - 107 mmol/L    Carbon Dioxide 18 (L) 23 - 31 mmol/L    Glucose Level 54 (L) 82 - 115 mg/dL    Blood Urea Nitrogen 28.7 (H) 8.4 - 25.7 mg/dL    Creatinine 1.61 (H) 0.73 - 1.18 mg/dL    Calcium Level Total 7.9 (L) 8.8 - 10.0 mg/dL    Protein Total 5.2 (L) 5.8 - 7.6 gm/dL    Albumin Level 2.6 (L) 3.4 - 4.8 g/dL    Globulin 2.6 2.4 - 3.5 gm/dL    Albumin/Globulin Ratio 1.0 (L) 1.1 - 2.0 ratio    Bilirubin Total 1.9 (H) <=1.5 mg/dL    Alkaline Phosphatase 184 (H) 40 - 150 unit/L    Alanine Aminotransferase 61 (H) 0 - 55 unit/L    Aspartate Aminotransferase 79 (H) 5 - 34 unit/L    eGFR 44 mls/min/1.73/m2   CBC with Differential    Collection Time: 03/03/24  4:43 AM   Result Value Ref Range    WBC 14.41 (H) 4.50 - 11.50 x10(3)/mcL    RBC 4.06 (L) 4.70 - 6.10 x10(6)/mcL    Hgb 11.2 (L) 14.0 - 18.0 g/dL    Hct 36.8 (L) 42.0 - 52.0 %    MCV 90.6 80.0 - 94.0 fL    MCH 27.6 27.0 - 31.0 pg    MCHC 30.4 (L) 33.0 - 36.0 g/dL    RDW 15.9 11.5 - 17.0 %    Platelet 138 130 - 400 x10(3)/mcL    MPV 11.7 (H) 7.4 - 10.4 fL    Neut % 77.0 %    Lymph % 7.1 %    Mono % 12.1 %    Eos % 1.5 %    Basophil % 0.6 %    Lymph # 1.02 0.6 - 4.6 x10(3)/mcL    Neut # 11.11 (H) 2.1 - 9.2 x10(3)/mcL    Mono # 1.75 (H) 0.1 - 1.3 x10(3)/mcL    Eos # 0.21 0 - 0.9 x10(3)/mcL    Baso # 0.08 <=0.2 x10(3)/mcL    IG# 0.24 (H) 0 - 0.04 x10(3)/mcL    IG% 1.7 %    NRBC% 0.6 %   Wound Culture    Collection Time: 03/03/24  7:20 PM    Specimen: Groin; Wound   Result Value Ref Range    Wound Culture Moderate Gram-negative Rods (A)    Comprehensive metabolic panel    Collection Time: 03/04/24  4:53 AM   Result Value Ref Range    Sodium Level 135 (L) 136 - 145 mmol/L    Potassium Level 3.9 3.5 - 5.1 mmol/L     Chloride 102 98 - 107 mmol/L    Carbon Dioxide 22 (L) 23 - 31 mmol/L    Glucose Level 82 82 - 115 mg/dL    Blood Urea Nitrogen 26.6 (H) 8.4 - 25.7 mg/dL    Creatinine 1.37 (H) 0.73 - 1.18 mg/dL    Calcium Level Total 7.5 (L) 8.8 - 10.0 mg/dL    Protein Total 5.5 (L) 5.8 - 7.6 gm/dL    Albumin Level 2.3 (L) 3.4 - 4.8 g/dL    Globulin 3.2 2.4 - 3.5 gm/dL    Albumin/Globulin Ratio 0.7 (L) 1.1 - 2.0 ratio    Bilirubin Total 2.0 (H) <=1.5 mg/dL    Alkaline Phosphatase 188 (H) 40 - 150 unit/L    Alanine Aminotransferase 78 (H) 0 - 55 unit/L    Aspartate Aminotransferase 120 (H) 5 - 34 unit/L    eGFR 53 mls/min/1.73/m2   Magnesium    Collection Time: 03/04/24  4:53 AM   Result Value Ref Range    Magnesium Level 2.20 1.60 - 2.60 mg/dL   CBC with Differential    Collection Time: 03/04/24  4:53 AM   Result Value Ref Range    WBC 12.16 (H) 4.50 - 11.50 x10(3)/mcL    RBC 4.09 (L) 4.70 - 6.10 x10(6)/mcL    Hgb 11.3 (L) 14.0 - 18.0 g/dL    Hct 36.2 (L) 42.0 - 52.0 %    MCV 88.5 80.0 - 94.0 fL    MCH 27.6 27.0 - 31.0 pg    MCHC 31.2 (L) 33.0 - 36.0 g/dL    RDW 15.6 11.5 - 17.0 %    Platelet 177 130 - 400 x10(3)/mcL    MPV 11.3 (H) 7.4 - 10.4 fL    Neut % 71.1 %    Lymph % 7.2 %    Mono % 14.6 %    Eos % 2.1 %    Basophil % 0.6 %    Lymph # 0.88 0.6 - 4.6 x10(3)/mcL    Neut # 8.65 2.1 - 9.2 x10(3)/mcL    Mono # 1.77 (H) 0.1 - 1.3 x10(3)/mcL    Eos # 0.26 0 - 0.9 x10(3)/mcL    Baso # 0.07 <=0.2 x10(3)/mcL    IG# 0.53 (H) 0 - 0.04 x10(3)/mcL    IG% 4.4 %    NRBC% 0.4 %     Telemetry:AFIB CVR    Physical Exam  Vitals and nursing note reviewed.   Constitutional:       General: He is not in acute distress.     Appearance: He is ill-appearing.   HENT:      Head: Normocephalic.      Mouth/Throat:      Mouth: Mucous membranes are moist.      Pharynx: Oropharynx is clear.   Eyes:      Extraocular Movements: Extraocular movements intact.   Cardiovascular:      Rate and Rhythm: Normal rate. Rhythm irregular.      Pulses: Normal pulses.    Pulmonary:      Effort: Pulmonary effort is normal. No respiratory distress.      Breath sounds: No wheezing or rales.      Comments: NC 2 L/Min  Abdominal:      Palpations: Abdomen is soft.   Genitourinary:     Comments: Urinary Catheter   Musculoskeletal:         General: Normal range of motion.   Skin:     General: Skin is warm and dry.      Comments: Mid Sternal Incision with Dressing in Place. Bilateral Groins are soft with no evidence of active bleed noted.    Neurological:      General: No focal deficit present.      Mental Status: He is alert. Mental status is at baseline.   Psychiatric:         Mood and Affect: Mood normal.       Current Inpatient Medications:  Current Facility-Administered Medications:     acetaminophen oral solution 650 mg, 650 mg, Per OG tube, Q6H PRN, Vasile Carter PA-C    acetaminophen tablet 650 mg, 650 mg, Oral, Q6H PRN, Pablo Yepez MD, 650 mg at 03/01/24 1530    acetaminophen-codeine 300-30mg per tablet 1 tablet, 1 tablet, Oral, Q4H PRN, Chantelle Calle FNP, 1 tablet at 03/03/24 2212    albumin human 5% bottle 12.5 g, 12.5 g, Intravenous, PRN, Vasile Carter PA-C, Stopped at 02/28/24 1442    allopurinol split tablet 50 mg, 50 mg, Oral, Daily, Tanner Rdz MD, 50 mg at 03/03/24 1050    [START ON 3/5/2024] amiodarone tablet 200 mg, 200 mg, Oral, BID, Marielle Lexy, FNP    [START ON 3/8/2024] amiodarone tablet 200 mg, 200 mg, Oral, Daily, Marielle, Lexy, FNP    amiodarone tablet 400 mg, 400 mg, Oral, BID, Marielle, Lexy, FNP, 400 mg at 03/03/24 2026    aspirin EC tablet 81 mg, 81 mg, Oral, Daily, Vasile Carter PA-C, 81 mg at 03/03/24 0939    atorvastatin tablet 40 mg, 40 mg, Oral, QHS, Tanner Rdz MD, 40 mg at 03/03/24 2026    dextrose 10% bolus 125 mL 125 mL, 12.5 g, Intravenous, PRN, Pablo Yepez MD    dextrose 10% bolus 250 mL 250 mL, 25 g, Intravenous, PRN, Pablo Yepez MD    docusate sodium capsule 100 mg, 100 mg, Oral, BID,  Vasile Carter PA-C, 100 mg at 03/03/24 0939    electrolyte-A infusion, , Intravenous, PRN, Pablo Yepez MD, Stopped at 02/28/24 0700    enoxaparin injection 40 mg, 40 mg, Subcutaneous, Daily, Vasile Carter PA-C, 40 mg at 03/03/24 1632    ferrous sulfate tablet 1 each, 1 tablet, Oral, Every other day, Tanner Rdz MD, 1 each at 03/02/24 1002    folic acid tablet 1 mg, 1 mg, Oral, Daily, Vasile Carter PA-C, 1 mg at 03/03/24 0939    furosemide injection 20 mg, 20 mg, Intravenous, Q12H, Lexy Palomares FNP, 20 mg at 03/03/24 2100    heparin, porcine (PF) 100 unit/mL injection flush 500 Units, 5 mL, Intravenous, On Call Procedure, Pablo Yepez MD    heparin, porcine (PF) 100 unit/mL injection flush 500 Units, 5 mL, Intravenous, On Call Procedure, Nita Contreras MD    lactated ringers bolus 500 mL, 500 mL, Intravenous, Once, Fransico Schrader DO    lactulose 10 gram/15 ml solution 20 g, 20 g, Oral, Q6H PRN, Vasile Carter PA-C    levoFLOXacin tablet 500 mg, 500 mg, Oral, Daily, Vasile Carter PA-C    LIDOcaine HCl 2% urojet, , Mucous Membrane, Once, Nicol Diaz R, AGACNP-BC    loperamide capsule 2 mg, 2 mg, Oral, Continuous PRN, Vasile Carter PA-C, 2 mg at 03/02/24 1253    melatonin tablet 6 mg, 6 mg, Oral, Nightly PRN, Tanner Rdz MD, 6 mg at 02/26/24 2031    metoclopramide injection 5 mg, 5 mg, Intravenous, Q6H PRN, Vasile Carter PA-C    metoprolol tartrate (LOPRESSOR) split tablet 12.5 mg, 12.5 mg, Oral, BID, Lexy Palomares FNP, 12.5 mg at 03/03/24 2026    nitroGLYCERIN SL tablet 0.4 mg, 0.4 mg, Sublingual, Q5 Min PRN, Tanner Rdz MD    ondansetron disintegrating tablet 8 mg, 8 mg, Oral, Q8H PRN, Tanner Rdz MD, 8 mg at 02/23/24 1302    ondansetron injection 8 mg, 8 mg, Intravenous, Q6H PRN, Pablo Yepez MD, 8 mg at 02/26/24 1616    pantoprazole EC tablet 40 mg, 40 mg, Oral, Daily, Tanner Rdz MD, 40 mg at 03/03/24  0939    polyethylene glycol packet 17 g, 17 g, Oral, Daily, Tanner Rdz MD, 17 g at 03/03/24 0939    simethicone chewable tablet 80 mg, 1 tablet, Oral, TID PRN, Tanner Rdz MD, 80 mg at 02/27/24 0918    sodium chloride 0.9% flush 10 mL, 10 mL, Intravenous, PRN, Tanner Rdz MD    sodium chloride 0.9% flush 10 mL, 10 mL, Intravenous, PRN, Millie Jimenez, GRANT    sucralfate tablet 1 g, 1 g, Oral, QID (AC & HS), Vasile Carter PA-C, 1 g at 03/03/24 2026    Pharmacy to dose Vancomycin consult, , , Once **AND** vancomycin - pharmacy to dose, , Intravenous, pharmacy to manage frequency, Tin Chow PA    [START ON 3/5/2024] vancomycin 1.25 g in dextrose 5% 250 mL IVPB (ready to mix), 1,250 mg, Intravenous, Q24H, Pablo Yepez MD    vancomycin 2 g in dextrose 5 % 500 mL IVPB, 2,000 mg, Intravenous, Once, Pablo Yepez MD  VTE Risk Mitigation (From admission, onward)           Ordered     enoxaparin injection 40 mg  Daily         03/02/24 0759     IP VTE HIGH RISK PATIENT  Once         03/02/24 0759     heparin, porcine (PF) 100 unit/mL injection flush 500 Units  On Call Procedure         02/27/24 0718     heparin, porcine (PF) 100 unit/mL injection flush 500 Units  On Call Procedure         02/27/24 0257     Place SUSIE hose  Until discontinued         02/22/24 1458     Place sequential compression device  Until discontinued         02/21/24 2057                  Assessment:   CAD (Multivessel)    - Status Post CABG (3V) LIMA to LAD, SVG to OM1, SVG to RCA (2.27.24)    - RHC/Impella Placement and [place] Catheter (2.23.24)- Impella Removal/Femoral Artery Repair in Surgery on 2.27.24    - C (2.19.24): pLAD lesion 70%, oLCx 80%, OM1 80%, OM2 1 lesion 70% 2nd lesion 50%, mLAD 50%, pRCA 60%  VHD/AS     - Status Post Bio AVR (Epic max 23mm) (2.27.24)    - ECHO (2.16.24): Aortic Valve: There is moderate aortic valve sclerosis. Severely restricted motion. There is severe stenosis.  Aortic valve area by VTI is 0.66 cm². Aortic valve peak velocity is 4.45 m/s. Mean gradient is 50 mmHg. The dimensionless index is 0.17.   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% - Decompensated    - ECHO (2.16.24): EF 40%, Grade II DD    - Small Pleural Effusions on CT Imaging (2.22.24)  Left Bundle Branch Block   VHD    - ECHO (2.16.24): Severe AS, mild MR, mild to moderate TR  JEAN-CLAUDE/CKD Stage II     - Baseline Cr 1.6  Anemia- Suspect Blood Loss    - Status Post Transfusion on 2.28.24 & 2.29.24  Thrombocytopenia - Stable  Leukocytosis (Worsening)  No Hx of GI Bleed    Plan:   Start Lasix 20 mg oral BID  Start Lisinopril 2.5 mg oral daily   Continue Oral Amiodarone Taper; Amiodarone 400 mg oral BID for 3 days, then 200 mg oral BID for 3 days, then 200 mg oral daily thereafter   LFTs slighting worse today; Will monitor Closely given patient is on Amiodarone and Atorvastatin   Continue Metoprolol 12.5 oral BID  Continue Post Op Supportive Care  Deferring Anticoagulation (Re: AF) due to anemia requiring transfusion & status post MOISE Ligation  Aggressive IS Usage and Mobilization   Wean oxygen as tolerated   Keep Mag > 2 and Potassium > 4  Labs in AM: CBC, CMP, and Mag       BLANQUITA Henderson  Cardiology  [facility]   03/04/2024    I agree with the findings of the complexity of problems addressed and take responsibility for the plan's risks and complications. I approved the plan documented by  Lexy Palomares NP.  Transition to po meds

## 2024-03-05 LAB
ALBUMIN SERPL-MCNC: 2.3 G/DL (ref 3.4–4.8)
ALBUMIN/GLOB SERPL: 0.9 RATIO (ref 1.1–2)
ALP SERPL-CCNC: 171 UNIT/L (ref 40–150)
ALT SERPL-CCNC: 71 UNIT/L (ref 0–55)
AST SERPL-CCNC: 69 UNIT/L (ref 5–34)
BASOPHILS # BLD AUTO: 0.05 X10(3)/MCL
BASOPHILS NFR BLD AUTO: 0.4 %
BILIRUB SERPL-MCNC: 2.1 MG/DL
BUN SERPL-MCNC: 23.5 MG/DL (ref 8.4–25.7)
CALCIUM SERPL-MCNC: 7.5 MG/DL (ref 8.8–10)
CHLORIDE SERPL-SCNC: 99 MMOL/L (ref 98–107)
CO2 SERPL-SCNC: 27 MMOL/L (ref 23–31)
CREAT SERPL-MCNC: 1.16 MG/DL (ref 0.73–1.18)
EOSINOPHIL # BLD AUTO: 0.3 X10(3)/MCL (ref 0–0.9)
EOSINOPHIL NFR BLD AUTO: 2.4 %
ERYTHROCYTE [DISTWIDTH] IN BLOOD BY AUTOMATED COUNT: 15.5 % (ref 11.5–17)
GFR SERPLBLD CREATININE-BSD FMLA CKD-EPI: >60 MLS/MIN/1.73/M2
GLOBULIN SER-MCNC: 2.6 GM/DL (ref 2.4–3.5)
GLUCOSE SERPL-MCNC: 87 MG/DL (ref 82–115)
HCT VFR BLD AUTO: 34.7 % (ref 42–52)
HGB BLD-MCNC: 11.4 G/DL (ref 14–18)
IMM GRANULOCYTES # BLD AUTO: 0.63 X10(3)/MCL (ref 0–0.04)
IMM GRANULOCYTES NFR BLD AUTO: 5 %
LYMPHOCYTES # BLD AUTO: 1.04 X10(3)/MCL (ref 0.6–4.6)
LYMPHOCYTES NFR BLD AUTO: 8.3 %
MAGNESIUM SERPL-MCNC: 1.9 MG/DL (ref 1.6–2.6)
MCH RBC QN AUTO: 27.9 PG (ref 27–31)
MCHC RBC AUTO-ENTMCNC: 32.9 G/DL (ref 33–36)
MCV RBC AUTO: 85 FL (ref 80–94)
MONOCYTES # BLD AUTO: 1.95 X10(3)/MCL (ref 0.1–1.3)
MONOCYTES NFR BLD AUTO: 15.6 %
NEUTROPHILS # BLD AUTO: 8.51 X10(3)/MCL (ref 2.1–9.2)
NEUTROPHILS NFR BLD AUTO: 68.3 %
NRBC BLD AUTO-RTO: 0.2 %
PLATELET # BLD AUTO: 240 X10(3)/MCL (ref 130–400)
PMV BLD AUTO: 10.7 FL (ref 7.4–10.4)
POTASSIUM SERPL-SCNC: 3.1 MMOL/L (ref 3.5–5.1)
PROT SERPL-MCNC: 4.9 GM/DL (ref 5.8–7.6)
RBC # BLD AUTO: 4.08 X10(6)/MCL (ref 4.7–6.1)
SODIUM SERPL-SCNC: 134 MMOL/L (ref 136–145)
WBC # SPEC AUTO: 12.48 X10(3)/MCL (ref 4.5–11.5)

## 2024-03-05 PROCEDURE — 25000003 PHARM REV CODE 250: Performed by: INTERNAL MEDICINE

## 2024-03-05 PROCEDURE — 97530 THERAPEUTIC ACTIVITIES: CPT | Mod: CQ

## 2024-03-05 PROCEDURE — 99024 POSTOP FOLLOW-UP VISIT: CPT | Mod: ,,, | Performed by: PHYSICIAN ASSISTANT

## 2024-03-05 PROCEDURE — 63600175 PHARM REV CODE 636 W HCPCS: Performed by: PHYSICIAN ASSISTANT

## 2024-03-05 PROCEDURE — 83735 ASSAY OF MAGNESIUM: CPT

## 2024-03-05 PROCEDURE — 25000003 PHARM REV CODE 250

## 2024-03-05 PROCEDURE — 25000003 PHARM REV CODE 250: Performed by: PHYSICIAN ASSISTANT

## 2024-03-05 PROCEDURE — 97535 SELF CARE MNGMENT TRAINING: CPT | Mod: CO

## 2024-03-05 PROCEDURE — 80053 COMPREHEN METABOLIC PANEL: CPT | Performed by: INTERNAL MEDICINE

## 2024-03-05 PROCEDURE — 21400001 HC TELEMETRY ROOM

## 2024-03-05 PROCEDURE — 85025 COMPLETE CBC W/AUTO DIFF WBC: CPT | Performed by: INTERNAL MEDICINE

## 2024-03-05 PROCEDURE — 63600175 PHARM REV CODE 636 W HCPCS: Performed by: INTERNAL MEDICINE

## 2024-03-05 RX ADMIN — METOPROLOL TARTRATE 12.5 MG: 25 TABLET, FILM COATED ORAL at 08:03

## 2024-03-05 RX ADMIN — FUROSEMIDE 20 MG: 20 TABLET ORAL at 08:03

## 2024-03-05 RX ADMIN — ASPIRIN 81 MG: 81 TABLET, COATED ORAL at 08:03

## 2024-03-05 RX ADMIN — ACETAMINOPHEN AND CODEINE PHOSPHATE 1 TABLET: 300; 30 TABLET ORAL at 08:03

## 2024-03-05 RX ADMIN — ATORVASTATIN CALCIUM 40 MG: 40 TABLET, FILM COATED ORAL at 08:03

## 2024-03-05 RX ADMIN — SUCRALFATE 1 G: 1 TABLET ORAL at 05:03

## 2024-03-05 RX ADMIN — LEVOFLOXACIN 500 MG: 500 TABLET, FILM COATED ORAL at 08:03

## 2024-03-05 RX ADMIN — SUCRALFATE 1 G: 1 TABLET ORAL at 10:03

## 2024-03-05 RX ADMIN — DOCUSATE SODIUM 100 MG: 100 CAPSULE, LIQUID FILLED ORAL at 08:03

## 2024-03-05 RX ADMIN — ACETAMINOPHEN AND CODEINE PHOSPHATE 1 TABLET: 300; 30 TABLET ORAL at 05:03

## 2024-03-05 RX ADMIN — AMIODARONE HYDROCHLORIDE 200 MG: 200 TABLET ORAL at 08:03

## 2024-03-05 RX ADMIN — ALLOPURINOL 50 MG: 300 TABLET ORAL at 10:03

## 2024-03-05 RX ADMIN — SUCRALFATE 1 G: 1 TABLET ORAL at 08:03

## 2024-03-05 RX ADMIN — VANCOMYCIN HYDROCHLORIDE 1250 MG: 1.25 INJECTION, POWDER, LYOPHILIZED, FOR SOLUTION INTRAVENOUS at 10:03

## 2024-03-05 RX ADMIN — ENOXAPARIN SODIUM 40 MG: 40 INJECTION SUBCUTANEOUS at 05:03

## 2024-03-05 RX ADMIN — ACETAMINOPHEN 650 MG: 325 TABLET, FILM COATED ORAL at 03:03

## 2024-03-05 RX ADMIN — Medication 6 MG: at 03:03

## 2024-03-05 RX ADMIN — FUROSEMIDE 20 MG: 20 TABLET ORAL at 05:03

## 2024-03-05 RX ADMIN — PANTOPRAZOLE SODIUM 40 MG: 40 TABLET, DELAYED RELEASE ORAL at 08:03

## 2024-03-05 RX ADMIN — ONDANSETRON 8 MG: 4 TABLET, ORALLY DISINTEGRATING ORAL at 06:03

## 2024-03-05 RX ADMIN — FOLIC ACID 1 MG: 1 TABLET ORAL at 08:03

## 2024-03-05 RX ADMIN — LISINOPRIL 2.5 MG: 2.5 TABLET ORAL at 08:03

## 2024-03-05 NOTE — PT/OT/SLP PROGRESS
Occupational Therapy   Treatment    Name: Brain Snyder  MRN: [id number]  Admitting Diagnosis:  CAD (coronary artery disease)  7 Days Post-Op    Recommendations:     Recommended therapy intensity at discharge: High Intensity Therapy   Discharge Equipment Recommendations:  to be determined by next level of care  Barriers to discharge:       Assessment:     Brain Snyder is a 78 y.o. male with a medical diagnosis of CAD (coronary artery disease).  He presents with incisional pain upon entry, however RN aware. Agreeable to OT session, Lebanese speaking CRAMRE for appropriate communication during session. Pt. Requiring overall increased assistance during supine to sit due to pain and sternal pxns. Pt. Progressing well overall recommending high intensity therapy.  Performance deficits affecting function are weakness, impaired self care skills, impaired functional mobility, gait instability, impaired balance, impaired cognition, decreased upper extremity function, decreased lower extremity function, decreased safety awareness.     Rehab Prognosis:  Good; patient would benefit from acute skilled OT services to address these deficits and reach maximum level of function.       Plan:     Patient to be seen 5 x/week to address the above listed problems via self-care/home management  Plan of Care Expires: 04/01/24  Plan of Care Reviewed with: patient    Subjective     Pain/Comfort:  Pain-Sternal incision   Oriented x 4    Objective:     Communicated with: RN prior to session.  Patient found HOB elevated with   upon OT entry to room.    General Precautions: Standard, sternal    Orthopedic Precautions:N/A  Braces: N/A  Respiratory Status: Nasal cannula, flow 2 L/min  Vital Signs: HR: 96  Sp02: 99     Occupational Performance:   (Bed Mobility- Max A)   Pt. Requiring SBA for sitting balance EOB, good adherence to sternal pxns. Pt. Preforming grooming task (brushing teeth) with appropriate preparation and setup using R UE for  excursion.   Encouraging sit to stand however pt. Politely declining due to pain and fatigue, pt. Laid back down and repositioned in bed appropriately with all needs within reach.       Therapeutic Positioning    OT interventions performed during the course of today's session in an effort to prevent and/or reduce acquired pressure injuries:   Therapeutic positioning was provided at the conclusion of session to offload all bony prominences for the prevention and/or reduction of pressure injuries        [facility] 6 Click ADL:      Patient Education:  Patient and spouse were provided with verbal education education regarding fall prevention and pressure ulcer prevention.  Additional teaching is warranted.      Patient left HOB elevated with all lines intact and call button in reach    GOALS:   Multidisciplinary Problems       Occupational Therapy Goals          Problem: Occupational Therapy    Goal Priority Disciplines Outcome Interventions   Occupational Therapy Goal     OT, PT/OT Ongoing, Progressing    Description: Goals to be met by: 4/1/24     Patient will increase functional independence with ADLs by performing:    UE Dressing with min A   Grooming while standing at sink with Minimal Assistance.  Toileting from toilet with Minimal Assistance for hygiene and clothing management.   Sitting at edge of bed x30 minutes with Minimal Assistance.  Toilet transfer to bedside commode with Minimal Assistance.                         Time Tracking:     OT Date of Treatment: 03/05/24  OT Start Time: 1340  OT Stop Time: 1403  OT Total Time (min): 23 min    Billable Minutes:Self Care/Home Management 2    OT/YVETTE: YVETTE     Number of YVETTE visits since last OT visit: 1    3/5/2024

## 2024-03-05 NOTE — PT/OT/SLP PROGRESS
Physical Therapy Treatment    Patient Name:  Brain Snyder   MRN:  [id number]    Recommendations:     Discharge therapy intensity: High Intensity Therapy   Discharge Equipment Recommendations: to be determined by next level of care  Barriers to discharge: Impaired mobility    Assessment:     Brain Snyder is a 78 y.o. male admitted with a medical diagnosis of NSTEMI, CAD, HF, CAD, afib, s/p impella, s/p CABG x3, s/p AVR .  He presents with the following impairments/functional limitations: weakness, impaired endurance, impaired self care skills, impaired functional mobility, gait instability, impaired balance, decreased upper extremity function, decreased lower extremity function, pain, impaired cardiopulmonary response to activity .    Rehab Prognosis: Good; patient would benefit from acute skilled PT services to address these deficits and reach maximum level of function.    Recent Surgery: Procedure(s) (LRB):  CORONARY ARTERY BYPASS GRAFT (CABG) (N/A)  Replacement-valve-aortic (N/A)  SURGICAL PROCUREMENT, VEIN, ENDOSCOPIC (N/A)  Impella, Removal (Right) 7 Days Post-Op    Plan:     During this hospitalization, patient to be seen 6 x/week to address the identified rehab impairments via gait training, therapeutic activities, therapeutic exercises and progress toward the following goals:    Plan of Care Expires:  04/01/24    Subjective     Chief Complaint: pain in feet  Patient/Family Comments/goals: daughter would like pt to get stronger to return home  Pain/Comfort:  Pain Rating 1:  (Pt c/o pain in feet, unable to rate)  Location - Side 1: Left  Location 1: foot  Pain Addressed 1: Cessation of Activity      Objective:     Communicated with pts nurse prior to session.  Patient found HOB elevated with arterial line, blood pressure cuff, gustafson catheter, oxygen, pressure relief boots, pulse ox (continuous), telemetry upon PT entry to room.     General Precautions: Standard, fall, sternal  Orthopedic Precautions:  N/A  Braces: N/A  Respiratory Status: Nasal cannula, flow 2 L/min  Blood Pressure: 105/67  Skin Integrity: Visible skin intact      Functional Mobility:  Bed Mobility:     Rolling Right: moderate assistance and of 2 persons  Scooting: maximal assistance and of 2 persons  Supine to Sit: maximal assistance and of 2 persons  Sit to Supine: maximal assistance and of 2 persons  Transfers:     Sit to Stand:  moderate assistance and of 2 persons with rolling walker and not using UE's to come to stand, maintaining sternal pxns  Bed to Chair: moderate assistance and of 2 persons with  rolling walker  using  Step Transfer and assisting w/ wt shift and turning walker  Balance: sit EOB, Mod A to obtain balance SB/CGA to maintain,  Min/ Mod A for balance during t/f/s    Therapeutic Activities/Exercises:  Pt more responsive to verbal cues/instructions w/ assistance from pts daughter translating.  Pt was able to ambulate a few steps, but is fearful and fatigued quickly.  O2 following 93%    Education:  Patient and daughter/s were provided with verbal education education regarding PT role/goals/POC and progression of pts treatment as he tolerates .  Understanding was verbalized, however additional teaching warranted.     Patient left up in chair with all lines intact, call button in reach, daughter present, and returned one hour later to return to bed. .    GOALS:   Multidisciplinary Problems       Physical Therapy Goals          Problem: Physical Therapy    Goal Priority Disciplines Outcome Goal Variances Interventions   Physical Therapy Goal     PT, PT/OT Ongoing, Progressing     Description: Goals to be met by: 2024     Patient will increase functional independence with mobility by performin. Supine to sit with MInimal Assistance  2. Sit to stand transfer with Minimal Assistance  3. Gait  x 150 feet with Minimal Assistance using Rolling Walker.                          Time Tracking:     PT Received On: 24  PT  Start Time: 1042   1202   PT Stop Time: 1058   1218  PT Total Time (min): 16 min + 16 min total 32 min    Billable Minutes: Therapeutic Activity 32    Treatment Type: Treatment  PT/PTA: PTA     Number of PTA visits since last PT visit: 3     03/05/2024

## 2024-03-05 NOTE — PROGRESS NOTES
"  [facility]    Cardiology  Progress Note    Patient Name: Brain Snyder  MRN: [id number]  Admission Date: 2/21/2024  Hospital Length of Stay: 13 days  Code Status: Full Code   Attending Physician: Pablo Yepez MD   Primary Care Physician: Nidia Shepherd NP  Expected Discharge Date:   Principal Problem:CAD (coronary artery disease)    Subjective:   Reason for Consult:  CAD     HPI: 78-year-old female that is unknown to [facility] with a PMHx of PAF on Eliquis Aortic insufficiency, MV CAD, HTN. He is [place] speaking and an  was used for interview. He was orginally admitted to [facility] on 2.15.24 with CP & NSTEMI and underwent a LHC on 2.20.24 taht showed MV CAD (reported noted below) He was transferred to [facility] on 2.21.24 for a CABG/AVR evaluation. On arrive he was hemodynamically stable on a heparin infusion. He denied chest pain, SOB, and nausea on current exam, [facility] was consulted for CAD    Hospital Course:   2.23.24: Patient seen resting in bed. He denies SOB or CP. He remains on heparin infusion. Family at bedside   2.24.24: NAD. S/p Impella Placement/Breese-Chelo Catheter. "I am ok." + Blood Clots from Nose/Mouth, Hematuria 2/2 traumatic Indwelling Catheter Placement. Heparin Drip per Protocol. Impella P5  2.25.24: NAD. "I'm ok." Denies CP, SOB and Palps. PA Pressure Reading is not Accurate. CVP 5. Impella at P5. R Groin Impella P7. Remains Off Heparin Drip per Protocol. Now in SR.   2.26.24: NAD Noted. SR on Tele. Right Groin Impella in place, bruising with no hematoma noted. Distal pulses DP intact. Legs warm. NC 2 L/Min. Denies CP/SOB. Consent obtained from his daughter Brii Snyder. NPO for MV PCI Today.  2.27.24: NAD Noted. Impella remains in place at P7 Support. Good UA Noted, some pink tinged urine noted. SR on Tele. Pressors off. Denies CP/SOB. NPO for surgery today. Heparin on hold for surgery.  2.28.24: Patient is critically ill. AF RVR on Tele, twice shocked this AM with no " success. On Amiodarone/Milrinone- Cardizem Infusion now off given hypotension and ICMO. Also no José Miguel/Levophed. Concern for bleeding noted, transfusion noted. Patient is intubated/Mechanically Vented. Hemodynamics: CO/CI 4.3/2.3 CVP 20.  2.29.24: Patient self extubated this AM. He is stable on NC Oxygen. Denies CP/SOB. SR on Tele. On Amiodarone Infusion and receiving blood products. BP Stable. Clinically much improved from yesterday.   3.1.24: NAD. Afib mild RVR on tele. On Primacor @ 0.187 mcg/kg/min. Amiodarone infusing per protocol. LFT's worsening. WBC worsening. + Incisional CP. On 5 L NC.   3.2.24: NAD. Net Negative 2700 urine output.   3.3.24: NAD. VSS. No complaints of CP/Palps. Reports SOB is improving. Creat 1.61 (Improved)  3.4.24: NAD. VSS. No complaints. On 2 L. Net Negative Fluid 3540 over 24 hours. Creat 1.37. LFTs slighting worse today  3.5.24: NAD. VSS. Denies CP, SOB and Palps. Family at Bedside. Fluid Balance Net Negative 4360mL. BUN/Crea 23.5/1.16, AST/ALT 69/71    PMH: PAF (on Eliquis), Aortic insufficiency, MV CAD, HTN  PSH: [facility]  Family History: None reported  Social History: former smoker, denies ETOH or illicit drug us    Previous Diagnostics:  CABG/Bio AVR/MOISE Ligation (2.27.24):  Coronary artery bypass grafting X 3, LIMA to LAD, reversed SVG to OM1, Reversed Saphenous venous graft to RCA  Bioprosthetic aortic valve replacement (Epic max 23mm), Endoscopic venous harvesting of left greater saphenous vein, Left atrial appendage ligation, explant of right femoral impella device, right femoral artery repair.    RHC/Impella Placement (2.23.24):  Right heart catheterization performed showed the following:  PA= 61/24 (27) mm Hg  PCWP=  31/26 (27) mm Hg  AO saturation= 93 % RA  PA saturation= 60% with Impella (49% yesterday)    (02/22/24) -  0.5  Following that we elected to advance the Impella via right common femoral artery approach:  - Abiomed stiff wire  - 14 Khmer peel-away sheath  - Heparin  "10,000 unit bolus given peripherally  - Dilator removed  - 0.035" J-wire  - 6 Bulgarian pigtail catheter positioned in the left ventricle  - Abiomed 0.025" wire  - Impella CP positioned in left ventricle  - Pulsatile motor current (appropriate positioning)  - Placed the marker just below the aortic valve  - Cardiac output was 2.8 L/min provided by the device.  Impella was at 84cm  Access Closure :    Sheath sutured to the groin  Secured.  Attestation:I was present for and supervised all key portions of the procedure  Impression/plan:   Successful Impella insertion and swan-Chelo in the setting of Acute HF/low cardiac output/precardiogenic shock/Critcal-severe AS/MVCAD - LM distal    RHC (2.22.24):  Right heart catheterization demonstrating severe pulmonary hypertension 84/34 and PCWP 24 mmHg  Reduced cardiac output/cardiac index at 3.43/1.81 L/min/m2 with pulmonary artery saturations 49.3%  For applied to the right femoral vein following removal of the 7 Bulgarian sheath  The estimated blood loss was none.    Carotid US (2.22.24):  The bilateral internal carotid artery demonstrated less than 50% stenosis.   The bilateral vertebral arteries were patent with antegrade flow    LHC (2.20.24):   Prox LAD lesion was 70% stenosed.  Ost Cx to Prox Cx lesion was 80% stenosed.  1st Mrg lesion was 80% stenosed.  2nd Mrg-1 lesion was 70% stenosed.  2nd Mrg-2 lesion was 50% stenosed.  Mid LAD lesion was 50% stenosed.  Prox RCA lesion was 60% stenosed.  estimated blood loss was <50 mL.  There was three vessel coronary artery disease.  LVEDP: 30mmHg  Recommendations:   Refer for CABG evaluation and AVR   Preformed by Dr. Flannery at [facility]     ECHO (2.15.24):  Left Ventricle: The left ventricle is normal in size. Mildly increased ventricular mass. Mildly increased wall thickness. There is mild eccentric hypertrophy. Moderate global hypokinesis present. Septal motion is consistent with bundle branch block. There is moderately reduced systolic " function. Biplane (2D) method of discs ejection fraction is 40%. Grade II diastolic dysfunction.  Right Ventricle: Right ventricular enlargement. Systolic function is normal.  Left Atrium: Left atrium is severely dilated.  Right Atrium: Right atrium is moderately dilated.  Aortic Valve: There is moderate aortic valve sclerosis. Severely restricted motion. There is severe stenosis. Aortic valve area by VTI is 0.66 cm². Aortic valve peak velocity is 4.45 m/s. Mean gradient is 50 mmHg. The dimensionless index is 0.17. There is mild to moderate aortic regurgitation.  Mitral Valve: Mildly calcified leaflets. There is no stenosis. There is mild regurgitation.  Tricuspid Valve: The tricuspid valve is structurally normal. There is mild to moderate regurgitation.  Pulmonic Valve: There is no significant regurgitation.  Aorta: Aortic root is upper limit of normal measuring 3.6 cm.  Pulmonary Artery: There is severe pulmonary hypertension. The estimated pulmonary artery systolic pressure is 69 mmHg.  IVC/SVC: Intermediate venous pressure at 8 mmHg.  Pericardium: There is no pericardial effusion.     Review of Systems   Constitutional: Positive for malaise/fatigue.   Cardiovascular:  Negative for chest pain, dyspnea on exertion and leg swelling.   Respiratory:  Negative for shortness of breath.    All other systems reviewed and are negative.    Objective:     Vital Signs (Most Recent):  Temp: 98.1 °F (36.7 °C) (03/05/24 1131)  Pulse: 69 (03/05/24 1131)  Resp: 18 (03/05/24 1131)  BP: 107/68 (03/05/24 1131)  SpO2: 98 % (03/05/24 1131) Vital Signs (24h Range):  Temp:  [97.9 °F (36.6 °C)-98.9 °F (37.2 °C)] 98.1 °F (36.7 °C)  Pulse:  [67-82] 69  Resp:  [14-20] 18  SpO2:  [98 %-99 %] 98 %  BP: ()/(59-82) 107/68   Weight: 91.6 kg (202 lb)  Body mass index is 33.61 kg/m².  SpO2: 98 %       Intake/Output Summary (Last 24 hours) at 3/5/2024 1338  Last data filed at 3/5/2024 1122  Gross per 24 hour   Intake 1440 ml   Output 6800 ml    Net -5360 ml       Lines/Drains/Airways       Drain  Duration                  Urethral Catheter 02/28/24 1445 Coude 20 Fr. 5 days              Peripheral Intravenous Line  Duration                  Peripheral IV - Single Lumen 03/02/24 1053 20 G Anterior;Right Upper Arm 3 days                  Significant Labs:   Recent Results (from the past 72 hour(s))   Comprehensive metabolic panel    Collection Time: 03/03/24  4:43 AM   Result Value Ref Range    Sodium Level 134 (L) 136 - 145 mmol/L    Potassium Level 3.9 3.5 - 5.1 mmol/L    Chloride 102 98 - 107 mmol/L    Carbon Dioxide 18 (L) 23 - 31 mmol/L    Glucose Level 54 (L) 82 - 115 mg/dL    Blood Urea Nitrogen 28.7 (H) 8.4 - 25.7 mg/dL    Creatinine 1.61 (H) 0.73 - 1.18 mg/dL    Calcium Level Total 7.9 (L) 8.8 - 10.0 mg/dL    Protein Total 5.2 (L) 5.8 - 7.6 gm/dL    Albumin Level 2.6 (L) 3.4 - 4.8 g/dL    Globulin 2.6 2.4 - 3.5 gm/dL    Albumin/Globulin Ratio 1.0 (L) 1.1 - 2.0 ratio    Bilirubin Total 1.9 (H) <=1.5 mg/dL    Alkaline Phosphatase 184 (H) 40 - 150 unit/L    Alanine Aminotransferase 61 (H) 0 - 55 unit/L    Aspartate Aminotransferase 79 (H) 5 - 34 unit/L    eGFR 44 mls/min/1.73/m2   CBC with Differential    Collection Time: 03/03/24  4:43 AM   Result Value Ref Range    WBC 14.41 (H) 4.50 - 11.50 x10(3)/mcL    RBC 4.06 (L) 4.70 - 6.10 x10(6)/mcL    Hgb 11.2 (L) 14.0 - 18.0 g/dL    Hct 36.8 (L) 42.0 - 52.0 %    MCV 90.6 80.0 - 94.0 fL    MCH 27.6 27.0 - 31.0 pg    MCHC 30.4 (L) 33.0 - 36.0 g/dL    RDW 15.9 11.5 - 17.0 %    Platelet 138 130 - 400 x10(3)/mcL    MPV 11.7 (H) 7.4 - 10.4 fL    Neut % 77.0 %    Lymph % 7.1 %    Mono % 12.1 %    Eos % 1.5 %    Basophil % 0.6 %    Lymph # 1.02 0.6 - 4.6 x10(3)/mcL    Neut # 11.11 (H) 2.1 - 9.2 x10(3)/mcL    Mono # 1.75 (H) 0.1 - 1.3 x10(3)/mcL    Eos # 0.21 0 - 0.9 x10(3)/mcL    Baso # 0.08 <=0.2 x10(3)/mcL    IG# 0.24 (H) 0 - 0.04 x10(3)/mcL    IG% 1.7 %    NRBC% 0.6 %   Wound Culture    Collection Time:  03/03/24  7:20 PM    Specimen: Groin; Wound   Result Value Ref Range    Wound Culture Many Gram-negative Rods (A)     Wound Culture Many Presumptive Pseudomonas species (A)    Comprehensive metabolic panel    Collection Time: 03/04/24  4:53 AM   Result Value Ref Range    Sodium Level 135 (L) 136 - 145 mmol/L    Potassium Level 3.9 3.5 - 5.1 mmol/L    Chloride 102 98 - 107 mmol/L    Carbon Dioxide 22 (L) 23 - 31 mmol/L    Glucose Level 82 82 - 115 mg/dL    Blood Urea Nitrogen 26.6 (H) 8.4 - 25.7 mg/dL    Creatinine 1.37 (H) 0.73 - 1.18 mg/dL    Calcium Level Total 7.5 (L) 8.8 - 10.0 mg/dL    Protein Total 5.5 (L) 5.8 - 7.6 gm/dL    Albumin Level 2.3 (L) 3.4 - 4.8 g/dL    Globulin 3.2 2.4 - 3.5 gm/dL    Albumin/Globulin Ratio 0.7 (L) 1.1 - 2.0 ratio    Bilirubin Total 2.0 (H) <=1.5 mg/dL    Alkaline Phosphatase 188 (H) 40 - 150 unit/L    Alanine Aminotransferase 78 (H) 0 - 55 unit/L    Aspartate Aminotransferase 120 (H) 5 - 34 unit/L    eGFR 53 mls/min/1.73/m2   Magnesium    Collection Time: 03/04/24  4:53 AM   Result Value Ref Range    Magnesium Level 2.20 1.60 - 2.60 mg/dL   CBC with Differential    Collection Time: 03/04/24  4:53 AM   Result Value Ref Range    WBC 12.16 (H) 4.50 - 11.50 x10(3)/mcL    RBC 4.09 (L) 4.70 - 6.10 x10(6)/mcL    Hgb 11.3 (L) 14.0 - 18.0 g/dL    Hct 36.2 (L) 42.0 - 52.0 %    MCV 88.5 80.0 - 94.0 fL    MCH 27.6 27.0 - 31.0 pg    MCHC 31.2 (L) 33.0 - 36.0 g/dL    RDW 15.6 11.5 - 17.0 %    Platelet 177 130 - 400 x10(3)/mcL    MPV 11.3 (H) 7.4 - 10.4 fL    Neut % 71.1 %    Lymph % 7.2 %    Mono % 14.6 %    Eos % 2.1 %    Basophil % 0.6 %    Lymph # 0.88 0.6 - 4.6 x10(3)/mcL    Neut # 8.65 2.1 - 9.2 x10(3)/mcL    Mono # 1.77 (H) 0.1 - 1.3 x10(3)/mcL    Eos # 0.26 0 - 0.9 x10(3)/mcL    Baso # 0.07 <=0.2 x10(3)/mcL    IG# 0.53 (H) 0 - 0.04 x10(3)/mcL    IG% 4.4 %    NRBC% 0.4 %   Comprehensive metabolic panel    Collection Time: 03/05/24  4:15 AM   Result Value Ref Range    Sodium Level 134 (L)  136 - 145 mmol/L    Potassium Level 3.1 (L) 3.5 - 5.1 mmol/L    Chloride 99 98 - 107 mmol/L    Carbon Dioxide 27 23 - 31 mmol/L    Glucose Level 87 82 - 115 mg/dL    Blood Urea Nitrogen 23.5 8.4 - 25.7 mg/dL    Creatinine 1.16 0.73 - 1.18 mg/dL    Calcium Level Total 7.5 (L) 8.8 - 10.0 mg/dL    Protein Total 4.9 (L) 5.8 - 7.6 gm/dL    Albumin Level 2.3 (L) 3.4 - 4.8 g/dL    Globulin 2.6 2.4 - 3.5 gm/dL    Albumin/Globulin Ratio 0.9 (L) 1.1 - 2.0 ratio    Bilirubin Total 2.1 (H) <=1.5 mg/dL    Alkaline Phosphatase 171 (H) 40 - 150 unit/L    Alanine Aminotransferase 71 (H) 0 - 55 unit/L    Aspartate Aminotransferase 69 (H) 5 - 34 unit/L    eGFR >60 mls/min/1.73/m2   Magnesium    Collection Time: 03/05/24  4:15 AM   Result Value Ref Range    Magnesium Level 1.90 1.60 - 2.60 mg/dL   CBC with Differential    Collection Time: 03/05/24  4:15 AM   Result Value Ref Range    WBC 12.48 (H) 4.50 - 11.50 x10(3)/mcL    RBC 4.08 (L) 4.70 - 6.10 x10(6)/mcL    Hgb 11.4 (L) 14.0 - 18.0 g/dL    Hct 34.7 (L) 42.0 - 52.0 %    MCV 85.0 80.0 - 94.0 fL    MCH 27.9 27.0 - 31.0 pg    MCHC 32.9 (L) 33.0 - 36.0 g/dL    RDW 15.5 11.5 - 17.0 %    Platelet 240 130 - 400 x10(3)/mcL    MPV 10.7 (H) 7.4 - 10.4 fL    Neut % 68.3 %    Lymph % 8.3 %    Mono % 15.6 %    Eos % 2.4 %    Basophil % 0.4 %    Lymph # 1.04 0.6 - 4.6 x10(3)/mcL    Neut # 8.51 2.1 - 9.2 x10(3)/mcL    Mono # 1.95 (H) 0.1 - 1.3 x10(3)/mcL    Eos # 0.30 0 - 0.9 x10(3)/mcL    Baso # 0.05 <=0.2 x10(3)/mcL    IG# 0.63 (H) 0 - 0.04 x10(3)/mcL    IG% 5.0 %    NRBC% 0.2 %     Telemetry: PAF/CVR     Physical Exam  Vitals and nursing note reviewed.   Constitutional:       General: He is not in acute distress.     Appearance: Normal appearance.   HENT:      Head: Normocephalic.      Mouth/Throat:      Mouth: Mucous membranes are moist.   Eyes:      Extraocular Movements: Extraocular movements intact.      Conjunctiva/sclera: Conjunctivae normal.   Cardiovascular:      Rate and Rhythm:  Normal rate. Rhythm irregular.      Pulses: Normal pulses.      Heart sounds: No murmur heard.  Pulmonary:      Effort: Pulmonary effort is normal. No respiratory distress.      Breath sounds: Normal breath sounds.      Comments: NC O2  Abdominal:      Palpations: Abdomen is soft.   Genitourinary:     Comments: Urinary Catheter   Musculoskeletal:         General: Normal range of motion.   Skin:     General: Skin is warm.      Comments: Midline Sternotomy YVETTE with No Sign of Bleed/Infection   Neurological:      General: No focal deficit present.      Mental Status: He is alert. Mental status is at baseline.   Psychiatric:         Mood and Affect: Mood normal.       Current Inpatient Medications:  Current Facility-Administered Medications:     acetaminophen oral solution 650 mg, 650 mg, Per OG tube, Q6H PRN, Vasile Carter PA-C    acetaminophen tablet 650 mg, 650 mg, Oral, Q6H PRN, Pablo Yepez MD, 650 mg at 03/05/24 0318    acetaminophen-codeine 300-30mg per tablet 1 tablet, 1 tablet, Oral, Q4H PRN, Chantelle Calle FNP, 1 tablet at 03/05/24 0858    albumin human 5% bottle 12.5 g, 12.5 g, Intravenous, PRN, Vasile Carter PA-C, Stopped at 02/28/24 1442    allopurinol split tablet 50 mg, 50 mg, Oral, Daily, Tanner Rdz MD, 50 mg at 03/05/24 1042    amiodarone tablet 200 mg, 200 mg, Oral, BID, Marielle Lexy, FNP, 200 mg at 03/05/24 0857    [START ON 3/8/2024] amiodarone tablet 200 mg, 200 mg, Oral, Daily, Marielle Lexy, FNP    aspirin EC tablet 81 mg, 81 mg, Oral, Daily, Vasile Carter PA-C, 81 mg at 03/05/24 0858    atorvastatin tablet 40 mg, 40 mg, Oral, QHS, Tanner Rdz MD, 40 mg at 03/04/24 2105    dextrose 10% bolus 125 mL 125 mL, 12.5 g, Intravenous, PRN, Pablo Yepez MD    dextrose 10% bolus 250 mL 250 mL, 25 g, Intravenous, PRN, Pablo Yepez MD    docusate sodium capsule 100 mg, 100 mg, Oral, BID, Vasile Carter PA-C, 100 mg at 03/05/24 5847    electrolyte-A  infusion, , Intravenous, PRN, Pablo Yepez MD, Stopped at 02/28/24 0700    enoxaparin injection 40 mg, 40 mg, Subcutaneous, Daily, Vasile Carter PA-C, 40 mg at 03/04/24 1714    ferrous sulfate tablet 1 each, 1 tablet, Oral, Every other day, Tanner Rdz MD, 1 each at 03/04/24 1130    folic acid tablet 1 mg, 1 mg, Oral, Daily, Vasile Carter PA-C, 1 mg at 03/05/24 0857    furosemide tablet 20 mg, 20 mg, Oral, BID, Lexy Palomares FNP, 20 mg at 03/05/24 0857    heparin, porcine (PF) 100 unit/mL injection flush 500 Units, 5 mL, Intravenous, On Call Procedure, Pablo Yepez MD    heparin, porcine (PF) 100 unit/mL injection flush 500 Units, 5 mL, Intravenous, On Call Procedure, Nita Contreras MD    lactated ringers bolus 500 mL, 500 mL, Intravenous, Once, Fransico Schrader DO    lactulose 10 gram/15 ml solution 20 g, 20 g, Oral, Q6H PRN, Vasile Carter PA-C    levoFLOXacin tablet 500 mg, 500 mg, Oral, Daily, Vasile Carter PA-C, 500 mg at 03/05/24 0857    LIDOcaine HCl 2% urojet, , Mucous Membrane, Once, Nicol Diaz, AGACNP-BC    lisinopriL tablet 2.5 mg, 2.5 mg, Oral, Daily, Lexy Palomares FNP, 2.5 mg at 03/05/24 0857    loperamide capsule 2 mg, 2 mg, Oral, Continuous PRN, Vasile Carter PA-C, 2 mg at 03/02/24 1253    melatonin tablet 6 mg, 6 mg, Oral, Nightly PRN, Tanner Rdz MD, 6 mg at 03/05/24 0320    metoclopramide injection 5 mg, 5 mg, Intravenous, Q6H PRN, Vasile Carter PA-C    metoprolol tartrate (LOPRESSOR) split tablet 12.5 mg, 12.5 mg, Oral, BID, Lexy Palomares, BLANQUITA, 12.5 mg at 03/05/24 0857    nitroGLYCERIN SL tablet 0.4 mg, 0.4 mg, Sublingual, Q5 Min PRN, Tanner Rdz MD    ondansetron disintegrating tablet 8 mg, 8 mg, Oral, Q8H PRN, Tanner Rdz MD, 8 mg at 02/23/24 1302    ondansetron injection 8 mg, 8 mg, Intravenous, Q6H PRN, Pablo Yepez MD, 8 mg at 02/26/24 1616    pantoprazole EC tablet 40 mg, 40 mg, Oral, Daily,  Tanner Rdz MD, 40 mg at 03/05/24 0857    polyethylene glycol packet 17 g, 17 g, Oral, Daily, Tanner Rdz MD, 17 g at 03/03/24 0939    simethicone chewable tablet 80 mg, 1 tablet, Oral, TID PRN, Tanner Rdz MD, 80 mg at 02/27/24 0918    sodium chloride 0.9% flush 10 mL, 10 mL, Intravenous, PRN, Tanner Rdz MD    sodium chloride 0.9% flush 10 mL, 10 mL, Intravenous, PRN, Millie Jimenez, NP    sucralfate tablet 1 g, 1 g, Oral, QID (AC & HS), Vasile Carter PA-C, 1 g at 03/05/24 1042    Pharmacy to dose Vancomycin consult, , , Once **AND** vancomycin - pharmacy to dose, , Intravenous, pharmacy to manage frequency, Tin Chow PA    vancomycin 1.25 g in dextrose 5% 250 mL IVPB (ready to mix), 1,250 mg, Intravenous, Q24H, Pablo Yepez MD, Stopped at 03/05/24 1225  VTE Risk Mitigation (From admission, onward)           Ordered     enoxaparin injection 40 mg  Daily         03/02/24 0759     IP VTE HIGH RISK PATIENT  Once         03/02/24 0759     heparin, porcine (PF) 100 unit/mL injection flush 500 Units  On Call Procedure         02/27/24 0718     heparin, porcine (PF) 100 unit/mL injection flush 500 Units  On Call Procedure         02/27/24 0257     Place SUSIE hose  Until discontinued         02/22/24 1458     Place sequential compression device  Until discontinued         02/21/24 2057                  Assessment:   CAD (Multivessel)    - Status Post CABG (3V) LIMA to LAD, SVG to OM1, SVG to RCA (2.27.24)    - RHC/Impella Placement and Henrietta Catheter (2.23.24)- Impella Removal/Femoral Artery Repair in Surgery on 2.27.24    - [facility] (2.19.24): pLAD lesion 70%, oLCx 80%, OM1 80%, OM2 1 lesion 70% 2nd lesion 50%, mLAD 50%, pRCA 60%  VHD/AS     - Status Post Bio AVR (Epic max 23mm) (2.27.24)    - ECHO (2.16.24): Aortic Valve: There is moderate aortic valve sclerosis. Severely restricted motion. There is severe stenosis. Aortic valve area by VTI is 0.66 cm². Aortic  valve peak velocity is 4.45 m/s. Mean gradient is 50 mmHg. The dimensionless index is 0.17.   Cardiogenic Shock (Post Cardiotomy) - Off Vasopressor Support - Resolved     - History of Hypertension   Ischemic Cardiomyopathy/EF 40%  Elevated LFTs - Improving   Pulmonary HTN    - ECHO PASP 69mmHg     - RHC (2.22.24): PA 84/34, PCWP 24 mmHg  Hematuria 2/2 Traumatic/Difficult Indwelling Catheter Placement (Resolved)  PAF - Currently CVR    - Status Post Bedside Cardioversion x 2 on 2.27.24 (Both Unsuccessful)    - Status Post MOISE Ligation (2.27.24)    - CHADsVASc - 5 Points - 7.2% Stroke Risk per Year   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% - Decompensated    - ECHO (2.16.24): EF 40%, Grade II DD    - Small Pleural Effusions on CT Imaging (2.22.24)  Left Bundle Branch Block   VHD    - ECHO (2.16.24): Severe AS, mild MR, mild to moderate TR  JEAN-CLAUDE/CKD Stage II - Improved     - Baseline Cr 1.6  Anemia- Suspect Blood Loss    - Status Post Transfusion on 2.28.24 & 2.29.24  Thrombocytopenia - Resolved   Leukocytosis - Improving   No Hx of GI Bleed    Plan:   Continue IV Diuresis with IV Lasix 20mg BID   Accurate I&Os and Daily Weights   Continue ACEi, Oral Amiodarone Load, Statin, ASA, BB,   Defer Anticoagulation (Re: AF) 2/2 Anemia requiring Transfusion and s/p MOISE Ligation  Aggressive IS Usage and Mobilization (Deconditioned/Working with Therapy)  Keep K > 4.0 and Mg > 2.0   Labs in AM: CBC, CMP, and Mag     Kaleb Rdz, ANP  Cardiology  [facility]   03/05/2024    Physician addendum:  I have seen and examined this patient as a split-shared visit with the ARLEY d/t complicated medical management of above problems written in assessment and high acuity requiring physician expertise in medical decision-making. I performed the substantive portion of the history and exam. Above medical decision-making is also formulated by me.    Cardiovascular exam:  S1, S2  Lungs:  fine crackles at  bases.  Extremities:  1+ edema bilaterally    Plan:  Continue IV diuresis as above.  Continue amiodarone, statin, aspirin, beta-blocker, ACE inhibitor.  Patient is also has ligation closure.  Defer anticoagulation.       All George MD  Cardiologist

## 2024-03-05 NOTE — PT/OT/SLP PROGRESS
Physical Therapy Treatment    Patient Name:  Brain Snyder   MRN:  [id number]    Recommendations:     Discharge therapy intensity: High Intensity Therapy   Discharge Equipment Recommendations: to be determined by next level of care  Barriers to discharge: Impaired mobility and Ongoing medical needs    Assessment:     Brain Snyder is a 78 y.o. male admitted with a medical diagnosis of NSTEMI, CAD, HF, CAD, afib, s/p impella, s/p CABG x3, s/p AVR .  He presents with the following impairments/functional limitations: weakness, impaired endurance, impaired balance, gait instability, impaired functional mobility, pain, impaired cardiopulmonary response to activity .    Rehab Prognosis: Good and Fair; patient would benefit from acute skilled PT services to address these deficits and reach maximum level of function.    Recent Surgery: Procedure(s) (LRB):  CORONARY ARTERY BYPASS GRAFT (CABG) (N/A)  Replacement-valve-aortic (N/A)  SURGICAL PROCUREMENT, VEIN, ENDOSCOPIC (N/A)  Impella, Removal (Right) 7 Days Post-Op    Plan:     During this hospitalization, patient to be seen 6 x/week to address the identified rehab impairments via gait training, therapeutic activities, therapeutic exercises and progress toward the following goals:    Plan of Care Expires:  04/01/24    Subjective     Chief Complaint:   Patient/Family Comments/goals:   Pain/Comfort:Pt has pain moving, difficult to assess due to language barrier.  Pt does not respond very much to questions.         Objective:     Communicated with pts nurse prior to session.  Patient found supine with arterial line, blood pressure cuff, gustafson catheter, CPM, oxygen, pressure relief boots, pulse ox (continuous), telemetry upon PT entry to room.     General Precautions: Standard, sternal, fall  Orthopedic Precautions: N/A  Braces: N/A  Respiratory Status: Nasal cannula, flow 2 L/min  Blood Pressure: 136/79  Skin Integrity: surgical incision on sternum and upper abdomen w/ some  seepage.  Nurse came to place bandages and will recheck following therapy.       Functional Mobility:  Bed Mobility:     Scooting: maximal assistance and of 2 persons  Supine to Sit: maximal assistance and of 2 persons  Sit to Supine: maximal assistance and of 2 persons  Transfers:     Sit to Stand:  maximal assistance and of 2 persons with rolling walker  Balance: Pt could only tolerate standing for brief periods, requiring Mod A for balance w/ cues for posture and bringing COM over MYKE    Education:  Patient and spouse were provided with verbal education education regarding PT role/goals/POC w/ .  Additional teaching is warranted.     Patient left HOB elevated with all lines intact, call button in reach, and spouse and nurse present..    GOALS:   Multidisciplinary Problems       Physical Therapy Goals          Problem: Physical Therapy    Goal Priority Disciplines Outcome Goal Variances Interventions   Physical Therapy Goal     PT, PT/OT Ongoing, Progressing     Description: Goals to be met by: 2024     Patient will increase functional independence with mobility by performin. Supine to sit with MInimal Assistance  2. Sit to stand transfer with Minimal Assistance  3. Gait  x 150 feet with Minimal Assistance using Rolling Walker.                          Time Tracking:     PT Received On: 24  PT Start Time: 1414     PT Stop Time: 1440  PT Total Time (min): 26 min     Billable Minutes: Therapeutic Activity 26    Treatment Type: Treatment  PT/PTA: PTA     Number of PTA visits since last PT visit: 2     2024

## 2024-03-06 LAB
ABS NEUT (OLG): 10.12 X10(3)/MCL (ref 2.1–9.2)
ALBUMIN SERPL-MCNC: 2.5 G/DL (ref 3.4–4.8)
ALBUMIN/GLOB SERPL: 0.8 RATIO (ref 1.1–2)
ALP SERPL-CCNC: 152 UNIT/L (ref 40–150)
ALT SERPL-CCNC: 58 UNIT/L (ref 0–55)
ANISOCYTOSIS BLD QL SMEAR: ABNORMAL
AST SERPL-CCNC: 49 UNIT/L (ref 5–34)
BILIRUB SERPL-MCNC: 2.1 MG/DL
BUN SERPL-MCNC: 22.2 MG/DL (ref 8.4–25.7)
CALCIUM SERPL-MCNC: 7.6 MG/DL (ref 8.8–10)
CHLORIDE SERPL-SCNC: 97 MMOL/L (ref 98–107)
CO2 SERPL-SCNC: 27 MMOL/L (ref 23–31)
CREAT SERPL-MCNC: 1.1 MG/DL (ref 0.73–1.18)
ERYTHROCYTE [DISTWIDTH] IN BLOOD BY AUTOMATED COUNT: 15.6 % (ref 11.5–17)
GFR SERPLBLD CREATININE-BSD FMLA CKD-EPI: >60 MLS/MIN/1.73/M2
GGT SERPL-CCNC: 199 U/L (ref 12–64)
GLOBULIN SER-MCNC: 3 GM/DL (ref 2.4–3.5)
GLUCOSE SERPL-MCNC: 91 MG/DL (ref 82–115)
HCT VFR BLD AUTO: 35.1 % (ref 42–52)
HGB BLD-MCNC: 11.4 G/DL (ref 14–18)
INSTRUMENT WBC (OLG): 13.5 X10(3)/MCL
LIPASE SERPL-CCNC: 94 U/L
LYMPHOCYTES NFR BLD MANUAL: 0.68 X10(3)/MCL
LYMPHOCYTES NFR BLD MANUAL: 5 %
MACROCYTES BLD QL SMEAR: ABNORMAL
MAGNESIUM SERPL-MCNC: 2 MG/DL (ref 1.6–2.6)
MCH RBC QN AUTO: 28 PG (ref 27–31)
MCHC RBC AUTO-ENTMCNC: 32.5 G/DL (ref 33–36)
MCV RBC AUTO: 86.2 FL (ref 80–94)
MONOCYTES NFR BLD MANUAL: 16 %
MONOCYTES NFR BLD MANUAL: 2.16 X10(3)/MCL (ref 0.1–1.3)
MYELOCYTES NFR BLD MANUAL: 3 %
NEUTROPHILS NFR BLD MANUAL: 75 %
NRBC BLD AUTO-RTO: 0 %
OHS QRS DURATION: 182 MS
OHS QTC CALCULATION: 661 MS
PLATELET # BLD AUTO: 336 X10(3)/MCL (ref 130–400)
PLATELET # BLD EST: NORMAL 10*3/UL
PMV BLD AUTO: 11.1 FL (ref 7.4–10.4)
POLYCHROMASIA BLD QL SMEAR: ABNORMAL
POTASSIUM SERPL-SCNC: 3.1 MMOL/L (ref 3.5–5.1)
PROT SERPL-MCNC: 5.5 GM/DL (ref 5.8–7.6)
RBC # BLD AUTO: 4.07 X10(6)/MCL (ref 4.7–6.1)
RBC MORPH BLD: ABNORMAL
SODIUM SERPL-SCNC: 134 MMOL/L (ref 136–145)
WBC # SPEC AUTO: 13.48 X10(3)/MCL (ref 4.5–11.5)

## 2024-03-06 PROCEDURE — 27000221 HC OXYGEN, UP TO 24 HOURS

## 2024-03-06 PROCEDURE — 97535 SELF CARE MNGMENT TRAINING: CPT | Mod: CO

## 2024-03-06 PROCEDURE — 99024 POSTOP FOLLOW-UP VISIT: CPT | Mod: ,,, | Performed by: PHYSICIAN ASSISTANT

## 2024-03-06 PROCEDURE — 25000003 PHARM REV CODE 250: Performed by: INTERNAL MEDICINE

## 2024-03-06 PROCEDURE — 82977 ASSAY OF GGT: CPT | Performed by: STUDENT IN AN ORGANIZED HEALTH CARE EDUCATION/TRAINING PROGRAM

## 2024-03-06 PROCEDURE — 83735 ASSAY OF MAGNESIUM: CPT | Performed by: NURSE PRACTITIONER

## 2024-03-06 PROCEDURE — 63600175 PHARM REV CODE 636 W HCPCS: Performed by: PHYSICIAN ASSISTANT

## 2024-03-06 PROCEDURE — 83690 ASSAY OF LIPASE: CPT | Performed by: STUDENT IN AN ORGANIZED HEALTH CARE EDUCATION/TRAINING PROGRAM

## 2024-03-06 PROCEDURE — 63600175 PHARM REV CODE 636 W HCPCS: Performed by: STUDENT IN AN ORGANIZED HEALTH CARE EDUCATION/TRAINING PROGRAM

## 2024-03-06 PROCEDURE — 85027 COMPLETE CBC AUTOMATED: CPT | Performed by: INTERNAL MEDICINE

## 2024-03-06 PROCEDURE — 94761 N-INVAS EAR/PLS OXIMETRY MLT: CPT

## 2024-03-06 PROCEDURE — 25000003 PHARM REV CODE 250: Performed by: STUDENT IN AN ORGANIZED HEALTH CARE EDUCATION/TRAINING PROGRAM

## 2024-03-06 PROCEDURE — 25000003 PHARM REV CODE 250

## 2024-03-06 PROCEDURE — 25000003 PHARM REV CODE 250: Performed by: PHYSICIAN ASSISTANT

## 2024-03-06 PROCEDURE — 80053 COMPREHEN METABOLIC PANEL: CPT | Performed by: INTERNAL MEDICINE

## 2024-03-06 PROCEDURE — 93010 ELECTROCARDIOGRAM REPORT: CPT | Mod: ,,, | Performed by: INTERNAL MEDICINE

## 2024-03-06 PROCEDURE — 93005 ELECTROCARDIOGRAM TRACING: CPT

## 2024-03-06 PROCEDURE — 97116 GAIT TRAINING THERAPY: CPT | Mod: CQ

## 2024-03-06 PROCEDURE — 63600175 PHARM REV CODE 636 W HCPCS: Performed by: NURSE PRACTITIONER

## 2024-03-06 PROCEDURE — 97530 THERAPEUTIC ACTIVITIES: CPT | Mod: CQ

## 2024-03-06 PROCEDURE — 21400001 HC TELEMETRY ROOM

## 2024-03-06 RX ORDER — POTASSIUM CHLORIDE 14.9 MG/ML
20 INJECTION INTRAVENOUS
Status: COMPLETED | OUTPATIENT
Start: 2024-03-06 | End: 2024-03-06

## 2024-03-06 RX ADMIN — VANCOMYCIN HYDROCHLORIDE 1250 MG: 1.25 INJECTION, POWDER, LYOPHILIZED, FOR SOLUTION INTRAVENOUS at 04:03

## 2024-03-06 RX ADMIN — FERROUS SULFATE TAB 325 MG (65 MG ELEMENTAL FE) 1 EACH: 325 (65 FE) TAB at 08:03

## 2024-03-06 RX ADMIN — METOPROLOL TARTRATE 12.5 MG: 25 TABLET, FILM COATED ORAL at 08:03

## 2024-03-06 RX ADMIN — POTASSIUM CHLORIDE 20 MEQ: 14.9 INJECTION, SOLUTION INTRAVENOUS at 02:03

## 2024-03-06 RX ADMIN — POTASSIUM CHLORIDE 20 MEQ: 14.9 INJECTION, SOLUTION INTRAVENOUS at 04:03

## 2024-03-06 RX ADMIN — FOLIC ACID 1 MG: 1 TABLET ORAL at 08:03

## 2024-03-06 RX ADMIN — AMIODARONE HYDROCHLORIDE 200 MG: 200 TABLET ORAL at 08:03

## 2024-03-06 RX ADMIN — ACETAMINOPHEN AND CODEINE PHOSPHATE 1 TABLET: 300; 30 TABLET ORAL at 05:03

## 2024-03-06 RX ADMIN — LEVOFLOXACIN 500 MG: 500 TABLET, FILM COATED ORAL at 08:03

## 2024-03-06 RX ADMIN — FUROSEMIDE 20 MG: 20 TABLET ORAL at 08:03

## 2024-03-06 RX ADMIN — ENOXAPARIN SODIUM 40 MG: 40 INJECTION SUBCUTANEOUS at 04:03

## 2024-03-06 RX ADMIN — FUROSEMIDE 20 MG: 20 TABLET ORAL at 05:03

## 2024-03-06 RX ADMIN — ONDANSETRON 8 MG: 4 TABLET, ORALLY DISINTEGRATING ORAL at 08:03

## 2024-03-06 RX ADMIN — SUCRALFATE 1 G: 1 TABLET ORAL at 09:03

## 2024-03-06 RX ADMIN — DOCUSATE SODIUM 100 MG: 100 CAPSULE, LIQUID FILLED ORAL at 09:03

## 2024-03-06 RX ADMIN — ATORVASTATIN CALCIUM 40 MG: 40 TABLET, FILM COATED ORAL at 09:03

## 2024-03-06 RX ADMIN — ASPIRIN 81 MG: 81 TABLET, COATED ORAL at 08:03

## 2024-03-06 RX ADMIN — LISINOPRIL 2.5 MG: 2.5 TABLET ORAL at 08:03

## 2024-03-06 RX ADMIN — PIPERACILLIN SODIUM AND TAZOBACTAM SODIUM 4.5 G: 4; .5 INJECTION, POWDER, LYOPHILIZED, FOR SOLUTION INTRAVENOUS at 02:03

## 2024-03-06 RX ADMIN — AMIODARONE HYDROCHLORIDE 200 MG: 200 TABLET ORAL at 09:03

## 2024-03-06 RX ADMIN — DOCUSATE SODIUM 100 MG: 100 CAPSULE, LIQUID FILLED ORAL at 08:03

## 2024-03-06 RX ADMIN — ALLOPURINOL 50 MG: 300 TABLET ORAL at 08:03

## 2024-03-06 RX ADMIN — METOPROLOL TARTRATE 12.5 MG: 25 TABLET, FILM COATED ORAL at 09:03

## 2024-03-06 RX ADMIN — PANTOPRAZOLE SODIUM 40 MG: 40 TABLET, DELAYED RELEASE ORAL at 08:03

## 2024-03-06 NOTE — PT/OT/SLP PROGRESS
Occupational Therapy   Treatment    Name: Brain Snyder  MRN: [id number]  Admitting Diagnosis:  CAD (coronary artery disease)  8 Days Post-Op    Recommendations:     Recommended therapy intensity at discharge: High Intensity Therapy   Discharge Equipment Recommendations:  to be determined by next level of care  Barriers to discharge:       Assessment:     Brain Snyder is a 78 y.o. male with a medical diagnosis of CAD (coronary artery disease).  Patient presents with requiring max VC to following sternal pxns however patient breaking them. Patient understanding limited 2/2 language barrier. Performance deficits affecting function are weakness, impaired self care skills, impaired functional mobility, gait instability, impaired balance, impaired cognition, decreased upper extremity function, decreased lower extremity function, decreased safety awareness.     Rehab Prognosis:  Fair; patient would benefit from acute skilled OT services to address these deficits and reach maximum level of function.       Plan:     Patient to be seen 5 x/week to address the above listed problems via self-care/home management  Plan of Care Expires: 04/01/24  Plan of Care Reviewed with: patient    Subjective     Pain/Comfort:       Objective:     Communicated with: RN prior to session.  Patient found up in chair with   upon OT entry to room.    General Precautions: Standard, sternal    Orthopedic Precautions:N/A  Braces: N/A     Occupational Performance:     Functional Mobility/Transfers:  Patient completed Sit <> Stand Transfer with maximal assistance  with  rolling walker   Functional Mobility: patient mobilizing from chair to in room sink for grooming task requiring 1 sitting rest break using RW    Activities of Daily Living:  Grooming: stand by assistance standing at sink washing hands    Patient Education:  Patient provided with verbal education education regarding OT role/goals/POC.  Understanding was verbalized.      Patient left  up in chair with all lines intact and call button in reach    GOALS:   Multidisciplinary Problems       Occupational Therapy Goals          Problem: Occupational Therapy    Goal Priority Disciplines Outcome Interventions   Occupational Therapy Goal     OT, PT/OT Ongoing, Progressing    Description: Goals to be met by: 4/1/24     Patient will increase functional independence with ADLs by performing:    UE Dressing with min A   Grooming while standing at sink with Minimal Assistance.  Toileting from toilet with Minimal Assistance for hygiene and clothing management.   Sitting at edge of bed x30 minutes with Minimal Assistance.  Toilet transfer to bedside commode with Minimal Assistance.                         Time Tracking:     OT Date of Treatment: 03/06/24  OT Start Time: 1037  OT Stop Time: 1111  OT Total Time (min): 34 min    Billable Minutes:Self Care/Home Management 2    OT/YVETTE: YVETTE     Number of YVETTE visits since last OT visit: 1    3/6/2024

## 2024-03-06 NOTE — PROGRESS NOTES
Inpatient Nutrition Assessment    Admit Date: 2/21/2024   Total duration of encounter: 14 days   Patient Age: 78 y.o.    Nutrition Recommendation/Prescription     Oral diet, Diet Soft & Bite Sized (IDDSI Level 6) as tolerated. Encouraged small and frequent meals.   Change oral supplement to Boost Breeze (provides 250 kcal, 9 g protein per serving) and monitor tolerated.   Antiemetics as feasible.   Replace electrolytes.   May need to consider PPN for supplemental nutrition support if GI symptoms continue and unable to meet adequate nutrition through oral intake. Consult RD for recommendations.    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% - (Compensated)   CKD    Relevant Medical History: PAF on Eliquis, Aortic insufficiency, MVCAD, HTN     Scheduled Medications:  allopurinoL, 50 mg, Daily  amiodarone, 200 mg, BID  [START ON 3/8/2024] amiodarone, 200 mg, Daily  aspirin, 81 mg, Daily  atorvastatin, 40 mg, QHS  docusate sodium, 100 mg, BID  enoxparin, 40 mg, Daily  ferrous sulfate, 1 tablet, Every other day  folic acid, 1 mg, Daily  furosemide, 20 mg, BID  lactated ringers, 500 mL, Once  levoFLOXacin, 500 mg, Daily  LIDOcaine HCl 2%, , Once  lisinopriL, 2.5 mg, Daily  metoprolol tartrate, 12.5 mg, BID  pantoprazole, 40 mg, Daily  polyethylene glycol, 17 g, Daily  sucralfate, 1 g, QID (AC & HS)    Continuous Infusions:  loperamide    PRN Medications: acetaminophen, acetaminophen,  acetaminophen-codeine 300-30mg, albumin human 5%, dextrose 10%, dextrose 10%, electrolyte-A, heparin, porcine (PF), heparin, porcine (PF), lactulose 10 gram/15 ml, loperamide, melatonin, metoclopramide, nitroGLYCERIN, ondansetron, ondansetron, simethicone, sodium chloride 0.9%, sodium chloride 0.9%    Calorie Containing IV Medications: no significant kcals from medications at this time    Recent Labs   Lab 02/29/24  0221 03/01/24  0202 03/02/24  0111 03/03/24  0443 03/04/24  0453 03/05/24  0415 03/06/24  0419    134* 135* 134* 135* 134* 134*   K 4.3 4.9 4.0 3.9 3.9 3.1* 3.1*   CALCIUM 7.6* 7.7* 7.7* 7.9* 7.5* 7.5* 7.6*   PHOS 3.6  --   --   --   --   --   --    MG 2.10  --   --   --  2.20 1.90 2.00   CHLORIDE 109* 106 103 102 102 99 97*   CO2 21* 20* 23 18* 22* 27 27   BUN 17.2 22.0 27.1* 28.7* 26.6* 23.5 22.2   CREATININE 1.99* 2.03* 1.83* 1.61* 1.37* 1.16 1.10   EGFRNORACEVR 34 33 38 44 53 >60 >60   GLUCOSE 161* 144* 97 54* 82 87 91   BILITOT 0.7 1.0 1.5 1.9* 2.0* 2.1* 2.1*   ALKPHOS 39* 117 187* 184* 188* 171* 152*   ALT 12 66* 55 61* 78* 71* 58*   AST 34 124* 68* 79* 120* 69* 49*   ALBUMIN 2.9* 2.9* 2.8* 2.6* 2.3* 2.3* 2.5*   WBC 14.85* 18.79  18.79* 16.26* 14.41* 12.16* 12.48* 13.5  13.48*   HGB 8.8* 11.1* 10.3* 11.2* 11.3* 11.4* 11.4*   HCT 26.1* 34.3* 32.2* 36.8* 36.2* 34.7* 35.1*       Nutrition Orders:  Diet Soft & Bite Sized (IDDSI Level 6)  Dietary nutrition supplements Boost Breeze Wild Berry; TID    Appetite/Oral Intake: poor/25-50% of meals  Factors Affecting Nutritional Intake: decreased appetite, nausea, and vomiting  Food/Episcopal/Cultural Preferences: unable to obtain  Food Allergies: no known food allergies  Last Bowel Movement: 03/03/24  Wound(s):      Comments    2/27: Pt NPO this AM, in OR for CABG/AVR at time of rounds. Pt with poor intake since admission per EMR. Last BM 2/25, meds noted. Per MST, no reports of decreased appetite or unintentional weight loss prior admission. Weight  "fluctuations noted past week, likely related to fluid. Will trend weights.    3/1/24: Pt previously on liquid diet. Diet changed earlier due pt pocketing meds. SLP following pt. Will follow for need for TF to start if NG placed. Pt confused, not able to answer questions.     3/6/24: Spoke to family member at bedside, pt eating minimally. Reports intermittent nausea and vomiting. Having Bms, last documented on 3/3. Drinking some of Boost VHC but states "too sweet" and worsens nausea. Will trial Boost Breeze until GI symptoms improve. Receiving zofran prn.     Anthropometrics    Height: 5' 5" (165.1 cm),    Last Weight: 70.6 kg (155 lb 10.3 oz) (03/06/24 0545), Weight Method: Bed Scale  BMI (Calculated): 25.9  BMI Classification: overweight (BMI 25-29.9)        Ideal Body Weight (IBW), Male: 136 lb                                Usual Weight Provided By: unable to obtain usual weight    Wt Readings from Last 5 Encounters:   03/06/24 70.6 kg (155 lb 10.3 oz)   02/19/24 81.1 kg (178 lb 12.8 oz)     Weight Change(s) Since Admission: -12kg since admit, some likely fluid related but also with inadequate po intake  Wt Readings from Last 1 Encounters:   03/06/24 0545 70.6 kg (155 lb 10.3 oz)   03/05/24 0500 91.6 kg (202 lb)   03/04/24 0442 90.3 kg (199 lb 1.6 oz)   03/03/24 0606 90.7 kg (200 lb)   02/23/24 0629 78.2 kg (172 lb 6.4 oz)   02/21/24 2116 82.2 kg (181 lb 3.5 oz)   Admit Weight: 82.2 kg (181 lb 3.5 oz) (02/21/24 2116), Weight Method: Bed Scale    Estimated Needs    Weight Used For Calorie Calculations: 78.2 kg (172 lb 6.4 oz)  Energy Calorie Requirements (kcal): 1863kcal (1.3 stress factor)  Energy Need Method: [place]-St Jeor  Weight Used For Protein Calculations: 78.2 kg (172 lb 6.4 oz)  Protein Requirements: 102-118gm (1.3-1.5g/kg)  Fluid Requirements (mL): 1955ml (25ml/kg)    Enteral Nutrition     Patient not receiving enteral nutrition at this time.    Parenteral Nutrition     Patient not receiving parenteral " nutrition support at this time.    Evaluation of Received Nutrient Intake    Calories: not meeting estimated needs  Protein: not meeting estimated needs    Patient Education     Not applicable.    Nutrition Diagnosis     PES: Inadequate oral intake related to acute illness as evidenced by <50% intake for > 5 days. (active)     Nutrition Interventions     Intervention(s): general/healthful diet, commercial beverage, prescription medication, and collaboration with other providers    Goal: Meet greater than 80% of nutritional needs by follow-up. (goal not met)  Goal: Consume % of oral supplements by follow-up. (goal not met)    Nutrition Goals & Monitoring     Dietitian will monitor: food and beverage intake, weight, electrolyte/renal panel, glucose/endocrine profile, and gastrointestinal profile    Nutrition Risk/Follow-Up: moderate (follow-up in 3-5 days)   Please consult if re-assessment needed sooner.

## 2024-03-06 NOTE — PROGRESS NOTES
[facility] Hospital Medicine Progress Note        Chief Complaint: Inpatient Follow-up for     HPI per admitting team:   78-year-old male with a history of aortic insufficiency/stenosis, CAD, CKD IIIb, HTN AFib on Eliquis admitted to [facility] 02/15/2024 with chest pain, found to have NSTEMI (peak troponin 0.17) and underwent C 02/20/2024 showing severe multivessel stenosis and therefore was transferred to [facility] for CABG evaluation. Cardiology was consulted Patient had Impella placed on 02/23 and was admitted to the ICU.  Was started on aggressive diuresis with IV Lasix.  CV surgery was consulted.  Urology was consulted for hematuria; Nguyen catheter placed over guidewire with dilation secondary to urethral strictures. IV Heparin infusion was initiated per CIS but held due to hematuria/hemoptysis on 02/24.  He was also noted to have an JEAN-CLAUDE. Received 2 units PRBCs on 02/26 and was planned for high-risk PCI on 02/26 which was later canceled.  Underwent CABG x 3 2/27 (LIMA to LAD, RSVG to OM 1, R SVG to RCA, bioprosthetic AVR).  Remained on mechanical ventilation thereafter.  Patient noted to have tachycardia with atrial fibrillation/RVR requiring IV amiodarone along with pressors (norepinephrine/Milrinone) for hypotension. Patient underwent cardioversion x 2 with minimal improvement in HR/BP.  Patient self-extubated overnight on 02/29 and was noted to have adequate saturations on 2 L O2 via NC thereafter.  PT/OT consulted.  Renal function noted to be improving. Continued on IV diuresis as he appeared to be significantly volume overloaded postoperatively along with elevated pulmonary artery wedge pressures.  Started on p.o. amiodarone taper on 03/03.  Patient noted to have drainage from right groin wound and started on vancomycin on 03/04.  Wound culture grew Pseudomonas.  Chest tubes discontinued on 03/02.  Downgraded from ICU on 03/02.  CIS and CV surgery continued following patient.  CV  surgery signed off on 03/06 and discontinued vancomycin.  Patient was placed on oral Levaquin.  Hospital medicine consulted on 03/06 for assistance with medical management and DC planning.    Interval Hx:   Today, patient was seen at bedside with help from .  He reported having abdominal pain which started today along with nausea but denied having any episodes of vomiting.  Also reported some right groin pain with continued drainage which looked seropurulent.  Oral Levaquin discontinued and IV Vancomycin/Zosyn started to cover for possible intra-abdominal infection as well as right groin infection.  CT abdomen/pelvis ordered and pending.  Bilirubin noted to be elevated at 2.1; GGT, lipase, U/S abdomen RUQ also ordered to evaluate for possible biliary obstruction/infection.  K being replaced.  AST/ALT noted to be improving at 49/58.  PT/OT recommending high-intensity therapy; CM on board for placement.    Case was discussed with patient's nurse on the floor.    Objective/physical exam:  General: alert male lying comfortably in bed, in no acute distress  HENT:  NC in place; oral and oropharyngeal mucosa moist, pink, with no erythema or exudates, no ear pain or discharge  Neck: normal neck movement, no lymph nodes or swellings, no JVD or Carotid bruit  Respiratory: clear breathing sounds bilaterally, no crackles, rales, ronchi or wheezes  Cardiovascular: clear S1 and S2, no murmurs, rubs or gallops  Peripheral Vascular: no lesions, ulcers or erosions, normal peripheral pulses; 2+ B/L pedal edema  Gastrointestinal: soft, non-distended abdomen with TTP in epigastric, hypogastric, RUQ/LUQ regions; no guarding, rigidity or rebound tenderness, normal bowel sounds; right groin incision wound noted to be erythematous with some serum bilirubin drainage with overlying dressing  Integumentary: normal skin color, no rashes or lesions  Neuro: AAO x 3; motor strength 5/5 in B/L UEs & LEs; sensation intact to gross and  fine touch B/L; CN II-XII grossly intact    VITAL SIGNS: 24 HRS MIN & MAX LAST   Temp  Min: 97.7 °F (36.5 °C)  Max: 99.1 °F (37.3 °C) 97.9 °F (36.6 °C)   BP  Min: 93/54  Max: 131/70 97/63   Pulse  Min: 78  Max: 91  87   Resp  Min: 16  Max: 23 18   SpO2  Min: 94 %  Max: 98 % 97 %     I have reviewed the following labs:  Recent Labs   Lab 03/04/24 0453 03/05/24 0415 03/06/24 0419   WBC 12.16* 12.48* 13.5  13.48*   RBC 4.09* 4.08* 4.07*   HGB 11.3* 11.4* 11.4*   HCT 36.2* 34.7* 35.1*   MCV 88.5 85.0 86.2   MCH 27.6 27.9 28.0   MCHC 31.2* 32.9* 32.5*   RDW 15.6 15.5 15.6    240 336   MPV 11.3* 10.7* 11.1*     Recent Labs   Lab 02/29/24  0512 03/01/24  0202 03/01/24  0917 03/01/24  0941 03/02/24  0111 03/04/24 0453 03/05/24 0415 03/06/24 0419   NA  --    < >  --   --    < > 135* 134* 134*   K  --    < >  --   --    < > 3.9 3.1* 3.1*   CO2  --    < >  --   --    < > 22* 27 27   BUN  --    < >  --   --    < > 26.6* 23.5 22.2   CREATININE  --    < >  --   --    < > 1.37* 1.16 1.10   CALCIUM  --    < >  --   --    < > 7.5* 7.5* 7.6*   PH 7.390  --  7.26* 7.300*  --   --   --   --    MG  --   --   --   --   --  2.20 1.90 2.00   ALBUMIN  --    < >  --   --    < > 2.3* 2.3* 2.5*   ALKPHOS  --    < >  --   --    < > 188* 171* 152*   ALT  --    < >  --   --    < > 78* 71* 58*   AST  --    < >  --   --    < > 120* 69* 49*   BILITOT  --    < >  --   --    < > 2.0* 2.1* 2.1*    < > = values in this interval not displayed.     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 p.o.   Continued on p.o. amiodarone taper  Continued on allopurinol 50 mg daily, aspirin 81 mg, atorvastatin 40 mg, docusate 100 mg b.i.d., FeSO4 every other day, folic acid 1 mg, lisinopril 2.5 mg daily, metoprolol tartrate 12.5 mg b.i.d., Protonix 40 mg daily, MiraLax 17 g daily, sucralfate 1 g q.i.d.  Continue monitoring patient's symptoms   PT/OT recommending high-intensity therapy   Cm on board for placement    VTE prophylaxis:  Lovenox    Patient condition:  Stable    Anticipated discharge and Disposition:     Pending    All diagnosis and differential diagnosis have been reviewed; assessment and plan has been documented; I have personally reviewed the labs and test results that are presently available; I have reviewed the patients medication list; I have reviewed the consulting providers response and recommendations. I have reviewed or attempted to review medical records based upon their availability    All of the patient's questions have been  addressed and answered. Patient's is agreeable to the above stated plan. I will continue to monitor closely and make adjustments to medical management as needed.  _____________________________________________________________________    Radiology:  I have personally reviewed the following imaging and agree with the radiologist.     US Abdomen Limited  Narrative: EXAMINATION:  US ABDOMEN LIMITED    CLINICAL HISTORY:  RUQ FOR BILIRUBINEMIA;    TECHNIQUE:  Gray-scale and color Doppler ultrasound images of the  abdomen.    COMPARISON:  Retroperitoneal ultrasound 02/25/2024    CT abdomen/pelvis 03/06/2024    FINDINGS:  Poor visualization of the IVC and pancreas.  Enlarged liver.  Main portal vein patent with normal flow direction.  Possible small volume gallbladder sludge.  No shadowing gallstone or gallbladder wall thickening.  No ascites.    No hydronephrosis or defined calcification in the right kidney.    Measurements:    - Liver: 19.1 cm    - CBD diameter: 2 mm    - Right kidney: 8.2 cm in length  Impression: Mild to moderate hepatomegaly.  No biliary dilatation.    Electronically signed by: Roly Aaron  Date:    03/06/2024  Time:    15:21  CT Abdomen Pelvis  Without Contrast  Narrative: EXAMINATION:  CT ABDOMEN PELVIS WITHOUT CONTRAST    CLINICAL HISTORY:  abdominal distention/N/V;    TECHNIQUE:  Low dose axial images, sagittal and coronal reformations were obtained from the lung bases to the pubic symphysis.  No contrast was administered.  Automatic exposure control is utilized to reduce patient radiation exposure.    COMPARISON:  None    FINDINGS:  There is bibasilar atelectasis and bilateral pleural effusions.  The left-sided pleural effusion is large the right-sided pleural effusion is small.  These findings are worse when compared to the prior examination from 02/22/2024 CT scan of the chest    The heart is enlarged in size.  There appears to be a pericardial effusion on the right heart border.  Maximum dimension is 2.4 cm.  The..    The liver appears normal.  No obvious liver mass or lesion is seen.    Gallbladder appears normal.  No gallstones are seen.    The pancreas appears normal.  No inflammatory changes are seen in the pancreatic region.    The spleen appears normal and adrenal glands appear normal.  No adrenal nodule is seen.    No nephrolithiasis is seen.  No hydronephrosis is seen.  No hydroureter is seen.  No ureteral stone is seen.    No colitis is seen.  No diverticulitis is seen.  No appendicitis  is seen.    No free air seen.  No free fluid is seen.  The urinary bladder is decompressed due to Nguyen catheter.  Some inflammatory changes seen in the pelvis.  This could be related to cystitis.  Correlation urinalysis is recommended.    The patient has undergone recent surgical intervention in the right groin.  There is a small hematoma seen in the right groin extending into the supra inguinal region on the right side.  Some stranding is also seen in the retroperitoneum at the level of the aortic bifurcation.  Follow-up of this area is recommended..    Bones show no acute abnormality.  Impression: Interval worsening of the bilateral pleural effusions and atelectasis.  The left-sided pleural effusion is large    Interval development of a pericardial effusion    Cardiomegaly    Urinary bladder is decompressed there is some inflammatory changes seen in the pelvis.  This could be related to cystitis.  Correlation with urinalysis is recommended.    Findings consistent with recent procedure in the right groin presumed to be a catheterization.  There is a hematoma seen in the right inguinal and suprarenal region.  There is a small to moderate in size.  Some stranding is seen extending towards the aortic bifurcation.  Follow-up of this area is recommended to exclude propagation of the  hematoma.    Electronically signed by: Pb Thompson  Date:    03/06/2024  Time:    14:51      Ajit Metcalf MD  Department of Hospital Medicine   [facility]   03/06/2024

## 2024-03-06 NOTE — PT/OT/SLP PROGRESS
Physical Therapy Treatment    Patient Name:  Brain Snyder   MRN:  [id number]    Recommendations:     Discharge therapy intensity: High Intensity Therapy   Discharge Equipment Recommendations: to be determined by next level of care  Barriers to discharge: Impaired mobility    Assessment:     Brain Snyder is a 78 y.o. male admitted with a medical diagnosis of NSTEMI, CAD, HF, CAD, afib, s/p impella, s/p CABG x3, s/p AVR.  He presents with the following impairments/functional limitations: weakness, impaired endurance, impaired self care skills, impaired functional mobility, gait instability, impaired balance, decreased upper extremity function, decreased lower extremity function, pain, impaired cardiopulmonary response to activity.    Rehab Prognosis: Good; patient would benefit from acute skilled PT services to address these deficits and reach maximum level of function.    Recent Surgery: Procedure(s) (LRB):  CORONARY ARTERY BYPASS GRAFT (CABG) (N/A)  Replacement-valve-aortic (N/A)  SURGICAL PROCUREMENT, VEIN, ENDOSCOPIC (N/A)  Impella, Removal (Right) 8 Days Post-Op    Plan:     During this hospitalization, patient to be seen 6 x/week to address the identified rehab impairments via gait training, therapeutic activities, therapeutic exercises and progress toward the following goals:    Plan of Care Expires:  04/01/24    Subjective     Chief Complaint:   Patient/Family Comments/goals:   Pain/Comfort:         Objective:     Communicated with nursing prior to session.  Patient found up in chair with   upon PT entry to room.     General Precautions: Standard, fall, sternal  Orthopedic Precautions: N/A  Braces: N/A  Respiratory Status: Nasal cannula, flow 2 L/min  Blood Pressure:   Skin Integrity:     Functional Mobility:  Transfers:     Sit to Stand:  moderate assistance and of 2 persons with no AD  X2 trials  Gait: 6'/4' w/RW, CGA  Slow michelle, slight kyphotic posture    Pt. Needed VC/TC for sternal precautions,  possibly 2/2 language barrier.    Patient left up in chair with all lines intact, call button in reach, nurse notified, and spouse & daughter present    GOALS:   Multidisciplinary Problems       Physical Therapy Goals          Problem: Physical Therapy    Goal Priority Disciplines Outcome Goal Variances Interventions   Physical Therapy Goal     PT, PT/OT Ongoing, Progressing     Description: Goals to be met by: 2024     Patient will increase functional independence with mobility by performin. Supine to sit with MInimal Assistance  2. Sit to stand transfer with Minimal Assistance  3. Gait  x 150 feet with Minimal Assistance using Rolling Walker.                          Time Tracking:     PT Received On: 24  PT Start Time: 1037     PT Stop Time: 1111  PT Total Time (min): 34 min     Billable Minutes: Gait Training 14 and Therapeutic Activity 20    Treatment Type: Treatment  PT/PTA: PTA     Number of PTA visits since last PT visit: 2024

## 2024-03-06 NOTE — PROGRESS NOTES
"Pharmacokinetic Initial Assessment: IV Vancomycin    Assessment/Plan:    Vancomycin was discontinued this morning and is now restarted---1250 mg q24h.  Check trough on 3/8/24.    Patient brief summary:  Brain Snyder is a 78 y.o. male initiated on antimicrobial therapy with IV Vancomycin for treatment of suspected skin & soft tissue infection    Drug Allergies:   Review of patient's allergies indicates:  No Known Allergies    Actual Body Weight:   70.6 kg    Renal Function:   Estimated Creatinine Clearance: 48.9 mL/min (based on SCr of 1.1 mg/dL).,     Dialysis Method (if applicable):  N/A    CBC (last 72 hours):  Recent Labs   Lab Result Units 03/04/24 0453 03/05/24 0415 03/06/24 0419   WBC x10(3)/mcL 12.16* 12.48* 13.5  13.48*   Hgb g/dL 11.3* 11.4* 11.4*   Hct % 36.2* 34.7* 35.1*   Platelet x10(3)/mcL 177 240 336   Mono % % 14.6 15.6  --    Monocytes % %  --   --  16   Eos % % 2.1 2.4  --    Basophil % % 0.6 0.4  --        Metabolic Panel (last 72 hours):  Recent Labs   Lab Result Units 03/04/24 0453 03/05/24 0415 03/06/24  0419   Sodium Level mmol/L 135* 134* 134*   Potassium Level mmol/L 3.9 3.1* 3.1*   Chloride mmol/L 102 99 97*   Carbon Dioxide mmol/L 22* 27 27   Glucose Level mg/dL 82 87 91   Blood Urea Nitrogen mg/dL 26.6* 23.5 22.2   Creatinine mg/dL 1.37* 1.16 1.10   Albumin Level g/dL 2.3* 2.3* 2.5*   Bilirubin Total mg/dL 2.0* 2.1* 2.1*   Alkaline Phosphatase unit/L 188* 171* 152*   Aspartate Aminotransferase unit/L 120* 69* 49*   Alanine Aminotransferase unit/L 78* 71* 58*   Magnesium Level mg/dL 2.20 1.90 2.00       Drug levels (last 3 results):  No results for input(s): "VANCOMYCINRA", "VANCORANDOM", "VANCOMYCINPE", "VANCOPEAK", "VANCOMYCINTR", "VANCOTROUGH" in the last 72 hours.    Microbiologic Results:  Microbiology Results (last 7 days)       Procedure Component Value Units Date/Time    Wound Culture [[id number]]  (Abnormal) Collected: 03/03/24 1920    Order Status: Completed Specimen: " Wound from Groin Updated: 03/06/24 0958     Wound Culture Many Gram-negative Rods      Many Presumptive Pseudomonas species    Respiratory Culture [[id number]]     Order Status: Canceled Specimen: Sputum

## 2024-03-06 NOTE — PLAN OF CARE
Spoke to Brii - pt sister and asked if they had made a rehab choice. She states she will call her other sister to discuss.

## 2024-03-06 NOTE — PROGRESS NOTES
"  [facility]    Cardiology  Progress Note    Patient Name: Brain Snyder  MRN: [id number]  Admission Date: 2/21/2024  Hospital Length of Stay: 14 days  Code Status: Full Code   Attending Physician: Pablo Yepez MD   Primary Care Physician: Nidia Shepherd NP  Expected Discharge Date:   Principal Problem:CAD (coronary artery disease)    Subjective:   Reason for Consult:  CAD     HPI: 78-year-old female that is unknown to [facility] with a PMHx of PAF on Eliquis Aortic insufficiency, MV CAD, HTN. He is [place] speaking and an  was used for interview. He was orginally admitted to [facility] on 2.15.24 with CP & NSTEMI and underwent a LHC on 2.20.24 taht showed MV CAD (reported noted below) He was transferred to [facility] on 2.21.24 for a CABG/AVR evaluation. On arrive he was hemodynamically stable on a heparin infusion. He denied chest pain, SOB, and nausea on current exam, [facility] was consulted for CAD    Hospital Course:   2.23.24: Patient seen resting in bed. He denies SOB or CP. He remains on heparin infusion. Family at bedside   2.24.24: NAD. S/p Impella Placement/Ringsted-Chelo Catheter. "I am ok." + Blood Clots from Nose/Mouth, Hematuria 2/2 traumatic Indwelling Catheter Placement. Heparin Drip per Protocol. Impella P5  2.25.24: NAD. "I'm ok." Denies CP, SOB and Palps. PA Pressure Reading is not Accurate. CVP 5. Impella at P5. R Groin Impella P7. Remains Off Heparin Drip per Protocol. Now in SR.   2.26.24: NAD Noted. SR on Tele. Right Groin Impella in place, bruising with no hematoma noted. Distal pulses DP intact. Legs warm. NC 2 L/Min. Denies CP/SOB. Consent obtained from his daughter Brii Snyder. NPO for MV PCI Today.  2.27.24: NAD Noted. Impella remains in place at P7 Support. Good UA Noted, some pink tinged urine noted. SR on Tele. Pressors off. Denies CP/SOB. NPO for surgery today. Heparin on hold for surgery.  2.28.24: Patient is critically ill. AF RVR on Tele, twice shocked this AM with no " success. On Amiodarone/Milrinone- Cardizem Infusion now off given hypotension and ICMO. Also no José Miguel/Levophed. Concern for bleeding noted, transfusion noted. Patient is intubated/Mechanically Vented. Hemodynamics: CO/CI 4.3/2.3 CVP 20.  2.29.24: Patient self extubated this AM. He is stable on NC Oxygen. Denies CP/SOB. SR on Tele. On Amiodarone Infusion and receiving blood products. BP Stable. Clinically much improved from yesterday.   3.1.24: NAD. Afib mild RVR on tele. On Primacor @ 0.187 mcg/kg/min. Amiodarone infusing per protocol. LFT's worsening. WBC worsening. + Incisional CP. On 5 L NC.   3.2.24: NAD. Net Negative 2700 urine output.   3.3.24: NAD. VSS. No complaints of CP/Palps. Reports SOB is improving. Creat 1.61 (Improved)  3.4.24: NAD. VSS. No complaints. On 2 L. Net Negative Fluid 3540 over 24 hours. Creat 1.37. LFTs slighting worse today  3.5.24: NAD. VSS. Denies CP, SOB and Palps. Family at Bedside. Fluid Balance Net Negative 4360mL. BUN/Crea 23.5/1.16, AST/ALT 69/71  3.6.24: Patient sitting up in bedside chair. Appears SOB. C/o abdominal bloating and gaseous. Reports + BM since surgery. Abdomen distended. Patient nausea and spit up bile colored fluid. + peripheral edema. O2 via NC. Excellent diuresis. Persistent leukocytosis. K+ 3.1, mild transaminitis. Groin Wound cx -- GNR and pseudomonas. CV surgery has signed off.       PMH: PAF (on Eliquis), Aortic insufficiency, MV CAD, HTN  PSH: [facility]  Family History: None reported  Social History: former smoker, denies ETOH or illicit drug us    Previous Diagnostics:  CABG/Bio AVR/MOISE Ligation (2.27.24):  Coronary artery bypass grafting X 3, LIMA to LAD, reversed SVG to OM1, Reversed Saphenous venous graft to RCA  Bioprosthetic aortic valve replacement (Epic max 23mm), Endoscopic venous harvesting of left greater saphenous vein, Left atrial appendage ligation, explant of right femoral impella device, right femoral artery repair.    RHC/Impella Placement  "(2.23.24):  Right heart catheterization performed showed the following:  PA= 61/24 (27) mm Hg  PCWP=  31/26 (27) mm Hg  AO saturation= 93 % RA  PA saturation= 60% with Impella (49% yesterday)    (02/22/24) -  0.5  Following that we elected to advance the Impella via right common femoral artery approach:  - Abiomed stiff wire  - 14 Uzbek peel-away sheath  - Heparin 10,000 unit bolus given peripherally  - Dilator removed  - 0.035" J-wire  - 6 Uzbek pigtail catheter positioned in the left ventricle  - Abiomed 0.025" wire  - Impella CP positioned in left ventricle  - Pulsatile motor current (appropriate positioning)  - Placed the marker just below the aortic valve  - Cardiac output was 2.8 L/min provided by the device.  Impella was at 84cm  Access Closure :    Sheath sutured to the groin  Secured.  Attestation:I was present for and supervised all key portions of the procedure  Impression/plan:   Successful Impella insertion and swan-Chelo in the setting of Acute HF/low cardiac output/precardiogenic shock/Critcal-severe AS/MVCAD - LM distal    RHC (2.22.24):  Right heart catheterization demonstrating severe pulmonary hypertension 84/34 and PCWP 24 mmHg  Reduced cardiac output/cardiac index at 3.43/1.81 L/min/m2 with pulmonary artery saturations 49.3%  For applied to the right femoral vein following removal of the 7 Uzbek sheath  The estimated blood loss was none.    Carotid US (2.22.24):  The bilateral internal carotid artery demonstrated less than 50% stenosis.   The bilateral vertebral arteries were patent with antegrade flow    LHC (2.20.24):   Prox LAD lesion was 70% stenosed.  Ost Cx to Prox Cx lesion was 80% stenosed.  1st Mrg lesion was 80% stenosed.  2nd Mrg-1 lesion was 70% stenosed.  2nd Mrg-2 lesion was 50% stenosed.  Mid LAD lesion was 50% stenosed.  Prox RCA lesion was 60% stenosed.  estimated blood loss was <50 mL.  There was three vessel coronary artery disease.  LVEDP: 30mmHg  Recommendations: "   Refer for CABG evaluation and AVR   Preformed by Dr. Flannery at [facility]     ECHO (2.15.24):  Left Ventricle: The left ventricle is normal in size. Mildly increased ventricular mass. Mildly increased wall thickness. There is mild eccentric hypertrophy. Moderate global hypokinesis present. Septal motion is consistent with bundle branch block. There is moderately reduced systolic function. Biplane (2D) method of discs ejection fraction is 40%. Grade II diastolic dysfunction.  Right Ventricle: Right ventricular enlargement. Systolic function is normal.  Left Atrium: Left atrium is severely dilated.  Right Atrium: Right atrium is moderately dilated.  Aortic Valve: There is moderate aortic valve sclerosis. Severely restricted motion. There is severe stenosis. Aortic valve area by VTI is 0.66 cm². Aortic valve peak velocity is 4.45 m/s. Mean gradient is 50 mmHg. The dimensionless index is 0.17. There is mild to moderate aortic regurgitation.  Mitral Valve: Mildly calcified leaflets. There is no stenosis. There is mild regurgitation.  Tricuspid Valve: The tricuspid valve is structurally normal. There is mild to moderate regurgitation.  Pulmonic Valve: There is no significant regurgitation.  Aorta: Aortic root is upper limit of normal measuring 3.6 cm.  Pulmonary Artery: There is severe pulmonary hypertension. The estimated pulmonary artery systolic pressure is 69 mmHg.  IVC/SVC: Intermediate venous pressure at 8 mmHg.  Pericardium: There is no pericardial effusion.     Review of Systems   Constitutional: Positive for malaise/fatigue.   Cardiovascular:  Negative for chest pain, dyspnea on exertion and leg swelling.   Respiratory:  Negative for shortness of breath.    All other systems reviewed and are negative.    Objective:     Vital Signs (Most Recent):  Temp: 97.7 °F (36.5 °C) (03/06/24 0843)  Pulse: 86 (03/06/24 0734)  Resp: 18 (03/06/24 0734)  BP: 103/62 (03/06/24 0734)  SpO2: 97 % (03/06/24 0734) Vital Signs (24h  Range):  Temp:  [97.7 °F (36.5 °C)-99.1 °F (37.3 °C)] 97.7 °F (36.5 °C)  Pulse:  [75-91] 86  Resp:  [16-23] 18  SpO2:  [94 %-100 %] 97 %  BP: ()/(54-73) 103/62   Weight: 70.6 kg (155 lb 10.3 oz)  Body mass index is 25.9 kg/m².  SpO2: 97 %       Intake/Output Summary (Last 24 hours) at 3/6/2024 1133  Last data filed at 3/6/2024 1048  Gross per 24 hour   Intake 240 ml   Output 7800 ml   Net -7560 ml       Lines/Drains/Airways       Drain  Duration                  Urethral Catheter 02/28/24 1445 Coude 20 Fr. 6 days              Peripheral Intravenous Line  Duration                  Peripheral IV - Single Lumen 03/02/24 1053 20 G Anterior;Right Upper Arm 4 days                  Significant Labs:   Recent Results (from the past 72 hour(s))   Wound Culture    Collection Time: 03/03/24  7:20 PM    Specimen: Groin; Wound   Result Value Ref Range    Wound Culture Many Gram-negative Rods (A)     Wound Culture Many Presumptive Pseudomonas species (A)    Comprehensive metabolic panel    Collection Time: 03/04/24  4:53 AM   Result Value Ref Range    Sodium Level 135 (L) 136 - 145 mmol/L    Potassium Level 3.9 3.5 - 5.1 mmol/L    Chloride 102 98 - 107 mmol/L    Carbon Dioxide 22 (L) 23 - 31 mmol/L    Glucose Level 82 82 - 115 mg/dL    Blood Urea Nitrogen 26.6 (H) 8.4 - 25.7 mg/dL    Creatinine 1.37 (H) 0.73 - 1.18 mg/dL    Calcium Level Total 7.5 (L) 8.8 - 10.0 mg/dL    Protein Total 5.5 (L) 5.8 - 7.6 gm/dL    Albumin Level 2.3 (L) 3.4 - 4.8 g/dL    Globulin 3.2 2.4 - 3.5 gm/dL    Albumin/Globulin Ratio 0.7 (L) 1.1 - 2.0 ratio    Bilirubin Total 2.0 (H) <=1.5 mg/dL    Alkaline Phosphatase 188 (H) 40 - 150 unit/L    Alanine Aminotransferase 78 (H) 0 - 55 unit/L    Aspartate Aminotransferase 120 (H) 5 - 34 unit/L    eGFR 53 mls/min/1.73/m2   Magnesium    Collection Time: 03/04/24  4:53 AM   Result Value Ref Range    Magnesium Level 2.20 1.60 - 2.60 mg/dL   CBC with Differential    Collection Time: 03/04/24  4:53 AM   Result  Value Ref Range    WBC 12.16 (H) 4.50 - 11.50 x10(3)/mcL    RBC 4.09 (L) 4.70 - 6.10 x10(6)/mcL    Hgb 11.3 (L) 14.0 - 18.0 g/dL    Hct 36.2 (L) 42.0 - 52.0 %    MCV 88.5 80.0 - 94.0 fL    MCH 27.6 27.0 - 31.0 pg    MCHC 31.2 (L) 33.0 - 36.0 g/dL    RDW 15.6 11.5 - 17.0 %    Platelet 177 130 - 400 x10(3)/mcL    MPV 11.3 (H) 7.4 - 10.4 fL    Neut % 71.1 %    Lymph % 7.2 %    Mono % 14.6 %    Eos % 2.1 %    Basophil % 0.6 %    Lymph # 0.88 0.6 - 4.6 x10(3)/mcL    Neut # 8.65 2.1 - 9.2 x10(3)/mcL    Mono # 1.77 (H) 0.1 - 1.3 x10(3)/mcL    Eos # 0.26 0 - 0.9 x10(3)/mcL    Baso # 0.07 <=0.2 x10(3)/mcL    IG# 0.53 (H) 0 - 0.04 x10(3)/mcL    IG% 4.4 %    NRBC% 0.4 %   Comprehensive metabolic panel    Collection Time: 03/05/24  4:15 AM   Result Value Ref Range    Sodium Level 134 (L) 136 - 145 mmol/L    Potassium Level 3.1 (L) 3.5 - 5.1 mmol/L    Chloride 99 98 - 107 mmol/L    Carbon Dioxide 27 23 - 31 mmol/L    Glucose Level 87 82 - 115 mg/dL    Blood Urea Nitrogen 23.5 8.4 - 25.7 mg/dL    Creatinine 1.16 0.73 - 1.18 mg/dL    Calcium Level Total 7.5 (L) 8.8 - 10.0 mg/dL    Protein Total 4.9 (L) 5.8 - 7.6 gm/dL    Albumin Level 2.3 (L) 3.4 - 4.8 g/dL    Globulin 2.6 2.4 - 3.5 gm/dL    Albumin/Globulin Ratio 0.9 (L) 1.1 - 2.0 ratio    Bilirubin Total 2.1 (H) <=1.5 mg/dL    Alkaline Phosphatase 171 (H) 40 - 150 unit/L    Alanine Aminotransferase 71 (H) 0 - 55 unit/L    Aspartate Aminotransferase 69 (H) 5 - 34 unit/L    eGFR >60 mls/min/1.73/m2   Magnesium    Collection Time: 03/05/24  4:15 AM   Result Value Ref Range    Magnesium Level 1.90 1.60 - 2.60 mg/dL   CBC with Differential    Collection Time: 03/05/24  4:15 AM   Result Value Ref Range    WBC 12.48 (H) 4.50 - 11.50 x10(3)/mcL    RBC 4.08 (L) 4.70 - 6.10 x10(6)/mcL    Hgb 11.4 (L) 14.0 - 18.0 g/dL    Hct 34.7 (L) 42.0 - 52.0 %    MCV 85.0 80.0 - 94.0 fL    MCH 27.9 27.0 - 31.0 pg    MCHC 32.9 (L) 33.0 - 36.0 g/dL    RDW 15.5 11.5 - 17.0 %    Platelet 240 130 - 400  x10(3)/mcL    MPV 10.7 (H) 7.4 - 10.4 fL    Neut % 68.3 %    Lymph % 8.3 %    Mono % 15.6 %    Eos % 2.4 %    Basophil % 0.4 %    Lymph # 1.04 0.6 - 4.6 x10(3)/mcL    Neut # 8.51 2.1 - 9.2 x10(3)/mcL    Mono # 1.95 (H) 0.1 - 1.3 x10(3)/mcL    Eos # 0.30 0 - 0.9 x10(3)/mcL    Baso # 0.05 <=0.2 x10(3)/mcL    IG# 0.63 (H) 0 - 0.04 x10(3)/mcL    IG% 5.0 %    NRBC% 0.2 %   Comprehensive metabolic panel    Collection Time: 03/06/24  4:19 AM   Result Value Ref Range    Sodium Level 134 (L) 136 - 145 mmol/L    Potassium Level 3.1 (L) 3.5 - 5.1 mmol/L    Chloride 97 (L) 98 - 107 mmol/L    Carbon Dioxide 27 23 - 31 mmol/L    Glucose Level 91 82 - 115 mg/dL    Blood Urea Nitrogen 22.2 8.4 - 25.7 mg/dL    Creatinine 1.10 0.73 - 1.18 mg/dL    Calcium Level Total 7.6 (L) 8.8 - 10.0 mg/dL    Protein Total 5.5 (L) 5.8 - 7.6 gm/dL    Albumin Level 2.5 (L) 3.4 - 4.8 g/dL    Globulin 3.0 2.4 - 3.5 gm/dL    Albumin/Globulin Ratio 0.8 (L) 1.1 - 2.0 ratio    Bilirubin Total 2.1 (H) <=1.5 mg/dL    Alkaline Phosphatase 152 (H) 40 - 150 unit/L    Alanine Aminotransferase 58 (H) 0 - 55 unit/L    Aspartate Aminotransferase 49 (H) 5 - 34 unit/L    eGFR >60 mls/min/1.73/m2   Magnesium    Collection Time: 03/06/24  4:19 AM   Result Value Ref Range    Magnesium Level 2.00 1.60 - 2.60 mg/dL   CBC with Differential    Collection Time: 03/06/24  4:19 AM   Result Value Ref Range    WBC 13.48 (H) 4.50 - 11.50 x10(3)/mcL    RBC 4.07 (L) 4.70 - 6.10 x10(6)/mcL    Hgb 11.4 (L) 14.0 - 18.0 g/dL    Hct 35.1 (L) 42.0 - 52.0 %    MCV 86.2 80.0 - 94.0 fL    MCH 28.0 27.0 - 31.0 pg    MCHC 32.5 (L) 33.0 - 36.0 g/dL    RDW 15.6 11.5 - 17.0 %    Platelet 336 130 - 400 x10(3)/mcL    MPV 11.1 (H) 7.4 - 10.4 fL    NRBC% 0.0 %   Manual Differential    Collection Time: 03/06/24  4:19 AM   Result Value Ref Range    WBC 13.5 x10(3)/mcL    Neutrophils % 75 %    Lymphs % 5 %    Monocytes % 16 %    Myelocytes % 3 (H) <=0 %    Neutrophils Abs 10.125 (H) 2.1 - 9.2  x10(3)/mcL    Lymphs Abs 0.675 0.6 - 4.6 x10(3)/mcL    Monocytes Abs 2.16 (H) 0.1 - 1.3 x10(3)/mcL    Platelets Normal Normal, Adequate    RBC Morph Abnormal (A) Normal    Polychromasia 1+ (A) (none)    Anisocytosis 1+ (A) (none)    Macrocytosis 1+ (A) (none)   EKG 12-lead    Collection Time: 03/06/24  5:41 AM   Result Value Ref Range    QRS Duration 182 ms    OHS QTC Calculation 661 ms     Telemetry: Afib    Physical Exam  Vitals and nursing note reviewed.   Constitutional:       General: He is not in acute distress.     Appearance: Normal appearance.   HENT:      Head: Normocephalic.      Mouth/Throat:      Mouth: Mucous membranes are moist.   Eyes:      Extraocular Movements: Extraocular movements intact.      Conjunctiva/sclera: Conjunctivae normal.   Cardiovascular:      Rate and Rhythm: Normal rate. Rhythm irregular.      Pulses: Normal pulses.      Heart sounds: No murmur heard.  Pulmonary:      Effort: Pulmonary effort is normal. No respiratory distress.      Breath sounds: Normal breath sounds.      Comments: NC O2  Abdominal:      Palpations: Abdomen is soft.   Genitourinary:     Comments: Urinary Catheter   Musculoskeletal:         General: Normal range of motion.   Skin:     General: Skin is warm.      Comments: Midline Sternotomy YVETTE with No Sign of Bleed/Infection   Neurological:      General: No focal deficit present.      Mental Status: He is alert. Mental status is at baseline.   Psychiatric:         Mood and Affect: Mood normal.       Current Inpatient Medications:  Current Facility-Administered Medications:     acetaminophen oral solution 650 mg, 650 mg, Per OG tube, Q6H PRN, Vasile Carter PA-C    acetaminophen tablet 650 mg, 650 mg, Oral, Q6H PRN, Pablo Yepez MD, 650 mg at 03/05/24 0318    acetaminophen-codeine 300-30mg per tablet 1 tablet, 1 tablet, Oral, Q4H PRN, Chantelle Calle FNP, 1 tablet at 03/06/24 0542    albumin human 5% bottle 12.5 g, 12.5 g, Intravenous, PRN, Vasile Carter  JENN AVILA, Stopped at 02/28/24 1442    allopurinol split tablet 50 mg, 50 mg, Oral, Daily, Tanner Rdz MD, 50 mg at 03/06/24 0843    amiodarone tablet 200 mg, 200 mg, Oral, BID, Lexy Palomares FNP, 200 mg at 03/06/24 0843    [START ON 3/8/2024] amiodarone tablet 200 mg, 200 mg, Oral, Daily, Lexy Palomares FNP    aspirin EC tablet 81 mg, 81 mg, Oral, Daily, Vasile Carter PA-C, 81 mg at 03/06/24 0843    atorvastatin tablet 40 mg, 40 mg, Oral, QHS, Tanner Rdz MD, 40 mg at 03/05/24 2050    dextrose 10% bolus 125 mL 125 mL, 12.5 g, Intravenous, PRN, Pablo Yepez MD    dextrose 10% bolus 250 mL 250 mL, 25 g, Intravenous, PRN, Pablo Yepez MD    docusate sodium capsule 100 mg, 100 mg, Oral, BID, Vasile Carter PA-C, 100 mg at 03/06/24 0844    electrolyte-A infusion, , Intravenous, PRN, Pablo Yepez MD, Stopped at 02/28/24 0700    enoxaparin injection 40 mg, 40 mg, Subcutaneous, Daily, Vasile Carter PA-C, 40 mg at 03/05/24 1726    ferrous sulfate tablet 1 each, 1 tablet, Oral, Every other day, Tanner Rdz MD, 1 each at 03/06/24 0844    folic acid tablet 1 mg, 1 mg, Oral, Daily, Vasile Carter PA-C, 1 mg at 03/06/24 0844    furosemide tablet 20 mg, 20 mg, Oral, BID, Lexy Palomares FNP, 20 mg at 03/06/24 0844    heparin, porcine (PF) 100 unit/mL injection flush 500 Units, 5 mL, Intravenous, On Call Procedure, Pablo Yepez MD    heparin, porcine (PF) 100 unit/mL injection flush 500 Units, 5 mL, Intravenous, On Call Procedure, Nita Contreras MD    lactated ringers bolus 500 mL, 500 mL, Intravenous, Once, Fransico Schrader DO    lactulose 10 gram/15 ml solution 20 g, 20 g, Oral, Q6H PRN, Vasile Carter PA-C    levoFLOXacin tablet 500 mg, 500 mg, Oral, Daily, Vasile Carter PA-C, 500 mg at 03/06/24 0844    LIDOcaine HCl 2% urojet, , Mucous Membrane, Once, Nicol Diaz, AGAP-BC    lisinopriL tablet 2.5 mg, 2.5 mg, Oral, Daily, Lexy Palomares,  FNP, 2.5 mg at 03/06/24 0844    loperamide capsule 2 mg, 2 mg, Oral, Continuous PRN, Vasile Carter PA-C, 2 mg at 03/02/24 1253    melatonin tablet 6 mg, 6 mg, Oral, Nightly PRN, Tanner Rdz MD, 6 mg at 03/05/24 0320    metoclopramide injection 5 mg, 5 mg, Intravenous, Q6H PRN, Vasile Carter PA-C    metoprolol tartrate (LOPRESSOR) split tablet 12.5 mg, 12.5 mg, Oral, BID, Lexy Palomares, FNP, 12.5 mg at 03/06/24 0844    nitroGLYCERIN SL tablet 0.4 mg, 0.4 mg, Sublingual, Q5 Min PRN, Tanner Rdz MD    ondansetron disintegrating tablet 8 mg, 8 mg, Oral, Q8H PRN, Tanner Rdz MD, 8 mg at 03/06/24 0822    ondansetron injection 8 mg, 8 mg, Intravenous, Q6H PRN, Pablo Yepez MD, 8 mg at 02/26/24 1616    pantoprazole EC tablet 40 mg, 40 mg, Oral, Daily, Tanner Rdz MD, 40 mg at 03/06/24 0844    polyethylene glycol packet 17 g, 17 g, Oral, Daily, Tanner Rdz MD, 17 g at 03/03/24 0939    simethicone chewable tablet 80 mg, 1 tablet, Oral, TID PRN, Tanner Rdz MD, 80 mg at 02/27/24 0918    sodium chloride 0.9% flush 10 mL, 10 mL, Intravenous, PRN, Tanenr Rdz MD    sodium chloride 0.9% flush 10 mL, 10 mL, Intravenous, PRN, Millie Jimenez NP    sucralfate tablet 1 g, 1 g, Oral, QID (AC & HS), Vasile Carter PA-C, 1 g at 03/05/24 2050  VTE Risk Mitigation (From admission, onward)           Ordered     enoxaparin injection 40 mg  Daily         03/02/24 0759     IP VTE HIGH RISK PATIENT  Once         03/02/24 0759     heparin, porcine (PF) 100 unit/mL injection flush 500 Units  On Call Procedure         02/27/24 0718     heparin, porcine (PF) 100 unit/mL injection flush 500 Units  On Call Procedure         02/27/24 0257     Place SUSIE hose  Until discontinued         02/22/24 1458     Place sequential compression device  Until discontinued         02/21/24 2057                  Assessment:   CAD (Multivessel)    - Status Post CABG (3V) LIMA  to LAD, SVG to OM1, SVG to RCA (2.27.24)    - RHC/Impella Placement and [place] Catheter (2.23.24)- Impella Removal/Femoral Artery Repair in Surgery on 2.27.24    - LHC (2.19.24): pLAD lesion 70%, oLCx 80%, OM1 80%, OM2 1 lesion 70% 2nd lesion 50%, mLAD 50%, pRCA 60%  VHD/AS     - Status Post Bio AVR (Epic max 23mm) (2.27.24)    - ECHO (2.16.24): Aortic Valve: There is moderate aortic valve sclerosis. Severely restricted motion. There is severe stenosis. Aortic valve area by VTI is 0.66 cm². Aortic valve peak velocity is 4.45 m/s. Mean gradient is 50 mmHg. The dimensionless index is 0.17.   Cardiogenic Shock (Post Cardiotomy) - Off Vasopressor Support - Resolved     - History of Hypertension   Ischemic Cardiomyopathy/EF 40%  Elevated LFTs - Improving   Pulmonary HTN    - ECHO PASP 69mmHg     - RHC (2.22.24): PA 84/34, PCWP 24 mmHg  Hematuria 2/2 Traumatic/Difficult Indwelling Catheter Placement (Resolved)  Urethral stricture s/p dilatation 02.23.24  PAF - Currently CVR    - Status Post Bedside Cardioversion x 2 on 2.27.24 (Both Unsuccessful)    - Status Post MOISE Ligation (2.27.24)    - CHADsVASc - 5 Points - 7.2% Stroke Risk per Year   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% - Decompensated    - ECHO (2.16.24): EF 40%, Grade II DD    - Small Pleural Effusions on CT Imaging (2.22.24)  Left Bundle Branch Block   VHD    - ECHO (2.16.24): Severe AS, mild MR, mild to moderate TR  JEAN-CLAUDE/CKD Stage II - Improved     - Baseline Cr 1.6  Anemia- Suspect Blood Loss    - Status Post Transfusion on 2.28.24 & 2.29.24  Thrombocytopenia - Resolved   Leukocytosis - persistent  --groin wound culture + GNR  Hypokalemia  --K 3.1  Plan:   Still volume overloaded. Continue IV Diuresis with IV Lasix 20mg BID   Obtain CT abdomen/pelvis due to N/V/abdominal distension  Consult hospital medicine for medical management  Persistent leukocytosis. Recommend repeat pan culture.   Accurate I&Os and Daily Weights   Continue amiodarone and  BB. Defer Anticoagulation (Re: AF) 2/2 Anemia requiring Transfusion and s/p MOISE Ligation  Aggressive IS Usage and Mobilization (Deconditioned/Working with Therapy)  Replete K+  Keep K > 4.0 and Mg > 2.0   Labs in AM: CBC, CMP, and Mag           Cherry Suarez, CORBINP  Cardiology  [facility]   03/06/2024

## 2024-03-07 LAB
ABS NEUT (OLG): 9.21 X10(3)/MCL (ref 2.1–9.2)
ALBUMIN SERPL-MCNC: 2.2 G/DL (ref 3.4–4.8)
ALBUMIN/GLOB SERPL: 0.8 RATIO (ref 1.1–2)
ALP SERPL-CCNC: 123 UNIT/L (ref 40–150)
ALT SERPL-CCNC: 52 UNIT/L (ref 0–55)
AST SERPL-CCNC: 58 UNIT/L (ref 5–34)
BACTERIA WND CULT: ABNORMAL
BACTERIA WND CULT: ABNORMAL
BASOPHILS NFR BLD MANUAL: 0.11 X10(3)/MCL (ref 0–0.2)
BASOPHILS NFR BLD MANUAL: 1 %
BILIRUB SERPL-MCNC: 1.9 MG/DL
BUN SERPL-MCNC: 20.2 MG/DL (ref 8.4–25.7)
CALCIUM SERPL-MCNC: 7.4 MG/DL (ref 8.8–10)
CHLORIDE SERPL-SCNC: 100 MMOL/L (ref 98–107)
CO2 SERPL-SCNC: 27 MMOL/L (ref 23–31)
CREAT SERPL-MCNC: 1.15 MG/DL (ref 0.73–1.18)
EOSINOPHIL NFR BLD MANUAL: 0.33 X10(3)/MCL (ref 0–0.9)
EOSINOPHIL NFR BLD MANUAL: 3 %
ERYTHROCYTE [DISTWIDTH] IN BLOOD BY AUTOMATED COUNT: 15.9 % (ref 11.5–17)
GFR SERPLBLD CREATININE-BSD FMLA CKD-EPI: >60 MLS/MIN/1.73/M2
GLOBULIN SER-MCNC: 2.7 GM/DL (ref 2.4–3.5)
GLUCOSE SERPL-MCNC: 94 MG/DL (ref 82–115)
HCT VFR BLD AUTO: 33.5 % (ref 42–52)
HGB BLD-MCNC: 10.7 G/DL (ref 14–18)
INSTRUMENT WBC (OLG): 10.84 X10(3)/MCL
LEFT VENTRICLE DIASTOLIC VOLUME INDEX: 30.2 ML/M2
LEFT VENTRICLE DIASTOLIC VOLUME: 60.1 ML
LEFT VENTRICLE SYSTOLIC VOLUME INDEX: 16.4 ML/M2
LEFT VENTRICLE SYSTOLIC VOLUME: 32.6 ML
LYMPHOCYTES NFR BLD MANUAL: 0.43 X10(3)/MCL
LYMPHOCYTES NFR BLD MANUAL: 4 %
MCH RBC QN AUTO: 27.5 PG (ref 27–31)
MCHC RBC AUTO-ENTMCNC: 31.9 G/DL (ref 33–36)
MCV RBC AUTO: 86.1 FL (ref 80–94)
MONOCYTES NFR BLD MANUAL: 0.65 X10(3)/MCL (ref 0.1–1.3)
MONOCYTES NFR BLD MANUAL: 6 %
MYELOCYTES NFR BLD MANUAL: 1 %
NEUTROPHILS NFR BLD MANUAL: 85 %
NRBC BLD AUTO-RTO: 0.2 %
OHS LV EJECTION FRACTION SIMPSONS BIPLANE MOD: 46 %
PLATELET # BLD AUTO: 371 X10(3)/MCL (ref 130–400)
PLATELET # BLD EST: NORMAL 10*3/UL
PMV BLD AUTO: 10.8 FL (ref 7.4–10.4)
POTASSIUM SERPL-SCNC: 3.5 MMOL/L (ref 3.5–5.1)
PROT SERPL-MCNC: 4.9 GM/DL (ref 5.8–7.6)
RBC # BLD AUTO: 3.89 X10(6)/MCL (ref 4.7–6.1)
RBC MORPH BLD: NORMAL
SODIUM SERPL-SCNC: 135 MMOL/L (ref 136–145)
WBC # SPEC AUTO: 10.84 X10(3)/MCL (ref 4.5–11.5)

## 2024-03-07 PROCEDURE — 63600175 PHARM REV CODE 636 W HCPCS: Performed by: INTERNAL MEDICINE

## 2024-03-07 PROCEDURE — 97535 SELF CARE MNGMENT TRAINING: CPT

## 2024-03-07 PROCEDURE — 97530 THERAPEUTIC ACTIVITIES: CPT | Mod: CQ

## 2024-03-07 PROCEDURE — 25000003 PHARM REV CODE 250: Performed by: INTERNAL MEDICINE

## 2024-03-07 PROCEDURE — 94761 N-INVAS EAR/PLS OXIMETRY MLT: CPT

## 2024-03-07 PROCEDURE — 25000003 PHARM REV CODE 250: Performed by: STUDENT IN AN ORGANIZED HEALTH CARE EDUCATION/TRAINING PROGRAM

## 2024-03-07 PROCEDURE — 97530 THERAPEUTIC ACTIVITIES: CPT

## 2024-03-07 PROCEDURE — 85027 COMPLETE CBC AUTOMATED: CPT | Performed by: INTERNAL MEDICINE

## 2024-03-07 PROCEDURE — 25000003 PHARM REV CODE 250: Performed by: PHYSICIAN ASSISTANT

## 2024-03-07 PROCEDURE — 80053 COMPREHEN METABOLIC PANEL: CPT | Performed by: INTERNAL MEDICINE

## 2024-03-07 PROCEDURE — 97116 GAIT TRAINING THERAPY: CPT | Mod: CQ

## 2024-03-07 PROCEDURE — 25000003 PHARM REV CODE 250

## 2024-03-07 PROCEDURE — 27000221 HC OXYGEN, UP TO 24 HOURS

## 2024-03-07 PROCEDURE — 63600175 PHARM REV CODE 636 W HCPCS: Performed by: STUDENT IN AN ORGANIZED HEALTH CARE EDUCATION/TRAINING PROGRAM

## 2024-03-07 PROCEDURE — 21400001 HC TELEMETRY ROOM

## 2024-03-07 PROCEDURE — 25000003 PHARM REV CODE 250: Performed by: NURSE PRACTITIONER

## 2024-03-07 RX ORDER — ENOXAPARIN SODIUM 100 MG/ML
40 INJECTION SUBCUTANEOUS EVERY 24 HOURS
Status: DISCONTINUED | OUTPATIENT
Start: 2024-03-08 | End: 2024-03-26 | Stop reason: HOSPADM

## 2024-03-07 RX ORDER — CIPROFLOXACIN 2 MG/ML
400 INJECTION, SOLUTION INTRAVENOUS
Status: DISCONTINUED | OUTPATIENT
Start: 2024-03-07 | End: 2024-03-11

## 2024-03-07 RX ORDER — METOLAZONE 5 MG/1
5 TABLET ORAL DAILY
Status: DISCONTINUED | OUTPATIENT
Start: 2024-03-07 | End: 2024-03-08

## 2024-03-07 RX ADMIN — PIPERACILLIN SODIUM AND TAZOBACTAM SODIUM 4.5 G: 4; .5 INJECTION, POWDER, LYOPHILIZED, FOR SOLUTION INTRAVENOUS at 05:03

## 2024-03-07 RX ADMIN — CIPROFLOXACIN 400 MG: 2 INJECTION, SOLUTION INTRAVENOUS at 01:03

## 2024-03-07 RX ADMIN — FUROSEMIDE 20 MG: 20 TABLET ORAL at 05:03

## 2024-03-07 RX ADMIN — SUCRALFATE 1 G: 1 TABLET ORAL at 05:03

## 2024-03-07 RX ADMIN — DOCUSATE SODIUM 100 MG: 100 CAPSULE, LIQUID FILLED ORAL at 09:03

## 2024-03-07 RX ADMIN — ALLOPURINOL 50 MG: 300 TABLET ORAL at 01:03

## 2024-03-07 RX ADMIN — ASPIRIN 81 MG: 81 TABLET, COATED ORAL at 09:03

## 2024-03-07 RX ADMIN — AMIODARONE HYDROCHLORIDE 200 MG: 200 TABLET ORAL at 08:03

## 2024-03-07 RX ADMIN — PIPERACILLIN SODIUM AND TAZOBACTAM SODIUM 4.5 G: 4; .5 INJECTION, POWDER, LYOPHILIZED, FOR SOLUTION INTRAVENOUS at 01:03

## 2024-03-07 RX ADMIN — METOPROLOL TARTRATE 12.5 MG: 25 TABLET, FILM COATED ORAL at 09:03

## 2024-03-07 RX ADMIN — POLYETHYLENE GLYCOL 3350 17 G: 17 POWDER, FOR SOLUTION ORAL at 09:03

## 2024-03-07 RX ADMIN — FOLIC ACID 1 MG: 1 TABLET ORAL at 09:03

## 2024-03-07 RX ADMIN — ATORVASTATIN CALCIUM 40 MG: 40 TABLET, FILM COATED ORAL at 08:03

## 2024-03-07 RX ADMIN — SUCRALFATE 1 G: 1 TABLET ORAL at 08:03

## 2024-03-07 RX ADMIN — FUROSEMIDE 20 MG: 20 TABLET ORAL at 09:03

## 2024-03-07 RX ADMIN — METOLAZONE 5 MG: 5 TABLET ORAL at 01:03

## 2024-03-07 RX ADMIN — PANTOPRAZOLE SODIUM 40 MG: 40 TABLET, DELAYED RELEASE ORAL at 09:03

## 2024-03-07 RX ADMIN — ONDANSETRON 8 MG: 2 INJECTION INTRAMUSCULAR; INTRAVENOUS at 09:03

## 2024-03-07 RX ADMIN — AMIODARONE HYDROCHLORIDE 200 MG: 200 TABLET ORAL at 09:03

## 2024-03-07 RX ADMIN — ACETAMINOPHEN 650 MG: 325 TABLET, FILM COATED ORAL at 09:03

## 2024-03-07 RX ADMIN — SIMETHICONE 80 MG: 80 TABLET, CHEWABLE ORAL at 09:03

## 2024-03-07 NOTE — PT/OT/SLP PROGRESS
Occupational Therapy   Treatment    Name: Brain Snyder  MRN: [id number]  Admitting Diagnosis:  CAD (coronary artery disease)  9 Days Post-Op    Recommendations:     Recommended therapy intensity at discharge: High Intensity Therapy   Discharge Equipment Recommendations:  to be determined by next level of care  Barriers to discharge: ongoing medical needs       Assessment:     Brain Snyder is a 78 y.o. male with a medical diagnosis of CAD (coronary artery disease).  Pt agreeable to working with therapy and tolerated session fairly well. Therapist reviewed sternal precautions with pt in Yoruba, pt reported understanding and demo'd good return. Pt was overall Max A for all bed mobility and sit<>stand with RW. Pt was Dependent for bed level sponge bath and jewel cares (gustafson catheter also in place). Performance deficits affecting function are weakness, impaired self care skills, impaired functional mobility, gait instability, impaired balance, impaired cognition, decreased upper extremity function, decreased lower extremity function, decreased safety awareness.     Rehab Prognosis:  Good; patient would benefit from acute skilled OT services to address these deficits and reach maximum level of function.       Plan:     Patient to be seen 5 x/week to address the above listed problems via self-care/home management, therapeutic activities, therapeutic exercises  Plan of Care Expires: 04/01/24  Plan of Care Reviewed with: patient, daughter    Subjective     Pain/Comfort:  Pain Rating 1:  (Rating not provided, pain noted with mobility of LE's)  Location - Side 1: Bilateral  Location 1: leg    Objective:     Communicated with: Nurse prior to session.  Patient found HOB elevated with gustafson catheter, pulse ox (continuous), telemetry upon OT entry to room.    General Precautions: Standard, fall, sternal    Orthopedic Precautions:N/A  Braces: N/A  Respiratory Status: Room air     Occupational Performance:     Bed Mobility:     Patient completed Rolling/Turning to Left with  maximal assistance  Patient completed Rolling/Turning to Right with maximal assistance  Patient completed Supine to Sit with maximal assistance     Functional Mobility/Transfers:  Patient completed Sit <> Stand Transfer with maximal assistance  with  rolling walker   Functional Mobility: Pt required cues for erect posture in stand as pt flexed forward during initial stand. Pt required increased time, effort, and cues for taking side steps to recliner with RW, demo'ing good return.    Activities of Daily Living:  Bathing: total assistance sponge bathing in bed, max a for rolling in bed  Upper Body Dressing: maximal assistance change of gown while in supine  Toileting: dependence jewel cares after BM in supine and gustafson catheter in place    Therapeutic Activities:  Cues for maintaining sternal precautions during bed mobility and functional transfer with RW. Pt required assist for RW management, demo'ing difficulty moving RW with functional mobility.      Therapeutic Positioning    OT interventions performed during the course of today's session in an effort to prevent and/or reduce acquired pressure injuries:   Education was provided on benefits of and recommendations for therapeutic positioning     Findings: visible skin intact    Patient Education:  Patient and daughter/s were provided with verbal education and demonstrations education regarding OT role/goals/POC, fall prevention, safety awareness, and Discharge/DME recommendations.  Understanding was verbalized, however additional teaching warranted.      Patient left up in chair with all lines intact, call button in reach, and daughter present.    GOALS:   Multidisciplinary Problems       Occupational Therapy Goals          Problem: Occupational Therapy    Goal Priority Disciplines Outcome Interventions   Occupational Therapy Goal     OT, PT/OT Ongoing, Progressing    Description: Goals to be met by: 4/1/24      Patient will increase functional independence with ADLs by performing:    UE Dressing with min A   Grooming while standing at sink with Minimal Assistance.  Toileting from toilet with Minimal Assistance for hygiene and clothing management.   Sitting at edge of bed x30 minutes with Minimal Assistance.  Toilet transfer to bedside commode with Minimal Assistance.                         Time Tracking:     OT Date of Treatment: 03/07/24  OT Start Time: 1005  OT Stop Time: 1040  OT Total Time (min): 35 min    Billable Minutes:Self Care/Home Management 20 minutes  Therapeutic Activity 15 minutes    OT/YVETTE: OT     Number of YVETTE visits since last OT visit: 2    3/7/2024

## 2024-03-07 NOTE — PROGRESS NOTES
[facility] Hospital Medicine Progress Note        Chief Complaint: Inpatient Follow-up for      HPI per admitting team:   78-year-old male with a history of aortic insufficiency/stenosis, CAD, CKD IIIb, HTN AFib on Eliquis admitted to [facility] 02/15/2024 with chest pain, found to have NSTEMI (peak troponin 0.17) and underwent C 02/20/2024 showing severe multivessel stenosis and therefore was transferred to [facility] for CABG evaluation. Cardiology was consulted Patient had Impella placed on 02/23 and was admitted to the ICU.  Was started on aggressive diuresis with IV Lasix.  CV surgery was consulted.  Urology was consulted for hematuria; Nguyen catheter placed over guidewire with dilation secondary to urethral strictures. IV Heparin infusion was initiated per CIS but held due to hematuria/hemoptysis on 02/24.  He was also noted to have an JEAN-CLAUDE. Received 2 units PRBCs on 02/26 and was planned for high-risk PCI on 02/26 which was later canceled.  Underwent CABG x 3 2/27 (LIMA to LAD, RSVG to OM 1, R SVG to RCA, bioprosthetic AVR).  Remained on mechanical ventilation thereafter.  Patient noted to have tachycardia with atrial fibrillation/RVR requiring IV amiodarone along with pressors (norepinephrine/Milrinone) for hypotension. Patient underwent cardioversion x 2 with minimal improvement in HR/BP.  Patient self-extubated overnight on 02/29 and was noted to have adequate saturations on 2 L O2 via NC thereafter.  PT/OT consulted.  Renal function noted to be improving. Continued on IV diuresis as he appeared to be significantly volume overloaded postoperatively along with elevated pulmonary artery wedge pressures.  Started on p.o. amiodarone taper on 03/03.  Patient noted to have drainage from right groin wound and started on vancomycin on 03/04.  Wound culture grew Pseudomonas.  Chest tubes discontinued on 03/02.  Downgraded from ICU on 03/02.  CIS and CV surgery continued following patient.  CV  surgery signed off on 03/06 and discontinued vancomycin.  Patient was placed on oral Levaquin.  Hospital medicine consulted on 03/06 for assistance with medical management and DC planning.     Interval Hx:   patient was seen at bedside, family member at bedside   Patient still nauseous and intermittent vomiting   Wound cx with 2 bugs sensitive to Ciprofloxacin    Case was discussed with patient's nurse and  on the floor.     Objective/physical exam:  General: alert male lying comfortably in bed, in no acute distress  HENT:  NC in place; oral and oropharyngeal mucosa moist, pink, with no erythema or exudates, no ear pain or discharge  Neck: normal neck movement, no lymph nodes or swellings, no JVD or Carotid bruit  Respiratory: clear breathing sounds bilaterally, no crackles, rales, ronchi or wheezes  Cardiovascular: clear S1 and S2, no murmurs, rubs or gallops  Peripheral Vascular: no lesions, ulcers or erosions, normal peripheral pulses; 2+ B/L pedal edema  Gastrointestinal: soft, non-distended abdomen with TTP in epigastric, hypogastric, RUQ/LUQ regions; no guarding, rigidity or rebound tenderness, normal bowel sounds; right groin incision wound noted to be erythematous with some serum bilirubin drainage with overlying dressing  Integumentary: normal skin color, no rashes or lesions  Neuro: AAO x 3; motor strength 5/5 in B/L UEs & LEs; sensation intact to gross and fine touch B/L; CN II-XII grossly intact      Assessment/Plan:  Abdominal Pain, Nausea 2/2 possible Biliary Obstruction vs Cholecystitis/Cholangitis  R Groin Purulent Cellulitis  Bilirubinemia 2/2 possible Obstruction vs Congestion  Transaminitis 2/2 possible Congestion  NSTEMI, CAD sp CABG x 3  S/p AVR  Atrial Fibrillation  Normocytic anemia  Leukocytosis 2/2 possible infectious process  Hypokalemia  Fluid overload 2/2 HFrEF exacerbation  HFrEF/HFpEF  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 p.o.   Continued on p.o. amiodarone taper  Continued on allopurinol 50 mg daily, aspirin 81 mg, atorvastatin 40 mg, docusate 100 mg b.i.d., FeSO4 every other day, folic acid 1 mg, lisinopril 2.5 mg daily, metoprolol tartrate 12.5 mg b.i.d., Protonix 40 mg daily, MiraLax 17 g daily, sucralfate 1 g q.i.d.  Continue monitoring patient's symptoms   PT/OT recommending high-intensity therapy   Cm on board for placement     VTE prophylaxis:  Lovenox     Patient condition:  Stable     Anticipated discharge and Disposition:     Pending     All diagnosis and differential diagnosis have been reviewed; assessment and plan has been documented; I have personally reviewed the labs and test results that are presently available; I have reviewed the patients medication list; I have reviewed the consulting providers response and recommendations. I have reviewed or attempted to review medical records based upon their availability     All of the patient's questions have been  addressed and answered. Patient's is agreeable to the above stated plan. I will continue to monitor closely and make adjustments to medical management as needed.      VITAL SIGNS: 24 HRS MIN & MAX LAST   Temp  Min: 97.4 °F (36.3 °C)  Max: 99.1 °F (37.3 °C) 97.4 °F (36.3 °C)   BP  Min: 93/60  Max: 102/69 99/64   Pulse  Min: 74  Max: 91  74   Resp  Min: 18  Max: 18 18   SpO2  Min: 95 %  Max: 99 % 95 %     I have reviewed the following labs:  Recent Labs   Lab 03/05/24  0415 03/06/24  0419 03/07/24  0407   WBC 12.48* 13.5  13.48* 10.84  10.84   RBC 4.08* 4.07* 3.89*   HGB 11.4* 11.4* 10.7*   HCT 34.7* 35.1* 33.5*   MCV 85.0 86.2 86.1   MCH 27.9 28.0 27.5   MCHC 32.9* 32.5* 31.9*   RDW 15.5 15.6 15.9    336 371   MPV 10.7* 11.1* 10.8*      Recent Labs   Lab 03/01/24  0917 03/01/24  0941 03/02/24  0111 03/04/24  0453 03/05/24  0415 03/06/24  0419 03/07/24  0406   NA  --   --    < > 135* 134* 134* 135*   K  --   --    < > 3.9 3.1* 3.1* 3.5   CO2  --   --    < > 22* 27 27 27   BUN  --   --    < > 26.6* 23.5 22.2 20.2   CREATININE  --   --    < > 1.37* 1.16 1.10 1.15   CALCIUM  --   --    < > 7.5* 7.5* 7.6* 7.4*   PH 7.26* 7.300*  --   --   --   --   --    MG  --   --   --  2.20 1.90 2.00  --    ALBUMIN  --   --    < > 2.3* 2.3* 2.5* 2.2*   ALKPHOS  --   --    < > 188* 171* 152* 123   ALT  --   --    < > 78* 71* 58* 52   AST  --   --    < > 120* 69* 49* 58*   BILITOT  --   --    < > 2.0* 2.1* 2.1* 1.9*    < > = values in this interval not displayed.     Microbiology Results (last 7 days)       Procedure Component Value Units Date/Time    Wound Culture [[id number]]  (Abnormal)  (Susceptibility) Collected: 03/03/24 1920    Order Status: Completed Specimen: Wound from Groin Updated: 03/07/24 1053     Wound Culture Many Serratia marcescens      Many Pseudomonas aeruginosa    Respiratory Culture [[id number]]     Order Status: Canceled Specimen: Sputum              See below for Radiology    Scheduled Med:   allopurinoL  50 mg Oral Daily    amiodarone  200 mg Oral BID    [START ON 3/8/2024] amiodarone  200 mg Oral Daily    aspirin  81 mg Oral Daily    atorvastatin  40 mg Oral QHS    ciprofloxacin  400 mg Intravenous Q12H    docusate sodium  100 mg Oral BID    [START ON 3/8/2024] enoxparin  40 mg Subcutaneous Daily    ferrous sulfate  1 tablet Oral Every other day    folic acid  1 mg Oral Daily    furosemide  20 mg Oral BID    metOLazone  5 mg Oral Daily    metoprolol tartrate  12.5 mg Oral BID    pantoprazole  40 mg Oral Daily    polyethylene glycol  17 g Oral Daily    sucralfate  1 g Oral QID (AC & HS)      Continuous Infusions:   loperamide        PRN Meds:  acetaminophen, acetaminophen, acetaminophen-codeine 300-30mg, albumin human 5%, dextrose 10%,  dextrose 10%, electrolyte-A, heparin, porcine (PF), heparin, porcine (PF), lactulose 10 gram/15 ml, loperamide, melatonin, metoclopramide, nitroGLYCERIN, ondansetron, ondansetron, simethicone, sodium chloride 0.9%, sodium chloride 0.9%     Assessment/Plan:      VTE prophylaxis:     Patient condition:  Stable/Fair/Guarded/ Serious/ Critical    Anticipated discharge and Disposition:         All diagnosis and differential diagnosis have been reviewed; assessment and plan has been documented; I have personally reviewed the labs and test results that are presently available; I have reviewed the patients medication list; I have reviewed the consulting providers response and recommendations. I have reviewed or attempted to review medical records based upon their availability    All of the patient's questions have been  addressed and answered. Patient's is agreeable to the above stated plan. I will continue to monitor closely and make adjustments to medical management as needed.  _____________________________________________________________________    Nutrition Status:    Radiology:  I have personally reviewed the following imaging and agree with the radiologist.     X-Ray Chest PA And Lateral  Narrative: EXAMINATION:  XR CHEST PA AND LATERAL    CLINICAL HISTORY:  po;, .    COMPARISON:  March 3, 2024    FINDINGS:  Examination reveals cardiomediastinal silhouette and pleuroparenchymal changes to be essentially unchanged as compared with the previous examination when accounting for difference in technique.  Impression: No significant change    Electronically signed by: Ananth Coker  Date:    03/07/2024  Time:    07:58      Stan Lazar MD  Department of Hospital Medicine   [facility]   03/07/2024

## 2024-03-07 NOTE — PLAN OF CARE
I messaged Maribel KEMP , [facility]. She reports that his Medicaid is still pending. I have emailed Lana with Medicaid dept to find out when his medicaid will be effective. Awaiting response    Updates sent to [facility]

## 2024-03-07 NOTE — PT/OT/SLP PROGRESS
Physical Therapy Treatment    Patient Name:  Brain Snyder   MRN:  [id number]    Recommendations:     Discharge therapy intensity: High Intensity Therapy   Discharge Equipment Recommendations: to be determined by next level of care  Barriers to discharge: Decreased caregiver support, Impaired mobility, and Ongoing medical needs    Assessment:     Brain Snyder is a 78 y.o. male admitted with a medical diagnosis of NSTEMI, CAD, HF, CAD, afib, s/p impella, s/p CABG x3, s/p AVR.  He presents with the following impairments/functional limitations: weakness, impaired endurance, impaired self care skills, impaired functional mobility, gait instability, impaired balance, decreased upper extremity function, decreased lower extremity function, pain, impaired cardiopulmonary response to activity.     used during treatment session. Patient able to follow verbal commands to maintain sternal precautions while utilizing verbal cues via . Patient fatigued at end of session. Nurse notified that patient left in chair with call bell in reach.    Rehab Prognosis: Good; patient would benefit from acute skilled PT services to address these deficits and reach maximum level of function.    Recent Surgery: Procedure(s) (LRB):  CORONARY ARTERY BYPASS GRAFT (CABG) (N/A)  Replacement-valve-aortic (N/A)  SURGICAL PROCUREMENT, VEIN, ENDOSCOPIC (N/A)  Impella, Removal (Right) 9 Days Post-Op    Plan:     During this hospitalization, patient to be seen 6 x/week to address the identified rehab impairments via gait training, therapeutic activities, therapeutic exercises and progress toward the following goals:    Plan of Care Expires:  04/01/24    Subjective     Chief Complaint: none stated  Patient/Family Comments/goals: none stated  Pain/Comfort:  Pain Rating 1: 0/10      Objective:     Communicated with patient, nurse prior to session.  Patient found up in chair with blood pressure cuff, gustafson catheter, oxygen, pulse ox  (continuous) upon PT entry to room.     General Precautions: Standard, fall, sternal  Orthopedic Precautions: N/A  Braces: N/A  Respiratory Status: Room air    Functional Mobility:  Transfers:     Sit to Stand:  minimum assistance with rolling walker  Gait: Patient ambulated 2x10ft with rolling walker with step through gait pattern. Patient required verbal cues to maintain sternal precautions.     Education:  Patient provided with verbal education education regarding fall prevention and safety awareness.  Understanding was verbalized, however additional teaching warranted due to language barrier.     Patient left up in chair with all lines intact, call button in reach, bernadette pad in place, and nurse notified    GOALS:   Multidisciplinary Problems       Physical Therapy Goals          Problem: Physical Therapy    Goal Priority Disciplines Outcome Goal Variances Interventions   Physical Therapy Goal     PT, PT/OT Ongoing, Progressing     Description: Goals to be met by: 2024     Patient will increase functional independence with mobility by performin. Supine to sit with MInimal Assistance  2. Sit to stand transfer with Minimal Assistance  3. Gait  x 150 feet with Minimal Assistance using Rolling Walker.                          Time Tracking:     PT Received On: 24  PT Start Time: 1037     PT Stop Time: 1100  PT Total Time (min): 23 min     Billable Minutes: Gait Training 15 and Therapeutic Activity 8    Treatment Type: Treatment  PT/PTA: PTA     Number of PTA visits since last PT visit: 5     2024

## 2024-03-07 NOTE — PROGRESS NOTES
"  [facility]    Cardiology  Progress Note    Patient Name: Brain Snyder  MRN: [id number]  Admission Date: 2/21/2024  Hospital Length of Stay: 15 days  Code Status: Full Code   Attending Physician: Ajit Metcalf MD   Primary Care Physician: Nidia Shepherd NP  Expected Discharge Date:   Principal Problem:CAD (coronary artery disease)    Subjective:   Reason for Consult:  CAD     HPI: 78-year-old female that is unknown to [facility] with a PMHx of PAF on Eliquis Aortic insufficiency, MV CAD, HTN. He is Italian speaking and an  was used for interview. He was orginally admitted to [facility] on 2.15.24 with CP & NSTEMI and underwent a LHC on 2.20.24 taht showed MV CAD (reported noted below) He was transferred to [facility] on 2.21.24 for a CABG/AVR evaluation. On arrive he was hemodynamically stable on a heparin infusion. He denied chest pain, SOB, and nausea on current exam, [facility] was consulted for CAD    Hospital Course:   2.23.24: Patient seen resting in bed. He denies SOB or CP. He remains on heparin infusion. Family at bedside   2.24.24: NAD. S/p Impella Placement/[place]wood-Chelo Catheter. "I am ok." + Blood Clots from Nose/Mouth, Hematuria 2/2 traumatic Indwelling Catheter Placement. Heparin Drip per Protocol. Impella P5  2.25.24: NAD. "I'm ok." Denies CP, SOB and Palps. PA Pressure Reading is not Accurate. CVP 5. Impella at P5. R Groin Impella P7. Remains Off Heparin Drip per Protocol. Now in SR.   2.26.24: NAD Noted. SR on Tele. Right Groin Impella in place, bruising with no hematoma noted. Distal pulses DP intact. Legs warm. NC 2 L/Min. Denies CP/SOB. Consent obtained from his daughter Brii Snyder. NPO for MV PCI Today.  2.27.24: NAD Noted. Impella remains in place at P7 Support. Good UA Noted, some pink tinged urine noted. SR on Tele. Pressors off. Denies CP/SOB. NPO for surgery today. Heparin on hold for surgery.  2.28.24: Patient is critically ill. AF RVR on Tele, twice shocked this AM with no " success. On Amiodarone/Milrinone- Cardizem Infusion now off given hypotension and ICMO. Also no José Miguel/Levophed. Concern for bleeding noted, transfusion noted. Patient is intubated/Mechanically Vented. Hemodynamics: CO/CI 4.3/2.3 CVP 20.  2.29.24: Patient self extubated this AM. He is stable on NC Oxygen. Denies CP/SOB. SR on Tele. On Amiodarone Infusion and receiving blood products. BP Stable. Clinically much improved from yesterday.   3.1.24: NAD. Afib mild RVR on tele. On Primacor @ 0.187 mcg/kg/min. Amiodarone infusing per protocol. LFT's worsening. WBC worsening. + Incisional CP. On 5 L NC.   3.2.24: NAD. Net Negative 2700 urine output.   3.3.24: NAD. VSS. No complaints of CP/Palps. Reports SOB is improving. Creat 1.61 (Improved)  3.4.24: NAD. VSS. No complaints. On 2 L. Net Negative Fluid 3540 over 24 hours. Creat 1.37. LFTs slighting worse today  3.5.24: NAD. VSS. Denies CP, SOB and Palps. Family at Bedside. Fluid Balance Net Negative 4360mL. BUN/Crea 23.5/1.16, AST/ALT 69/71  3.6.24: Patient sitting up in bedside chair. Appears SOB. C/o abdominal bloating and gaseous. Reports + BM since surgery. Abdomen distended. Patient nausea and spit up bile colored fluid. + peripheral edema. O2 via NC. Excellent diuresis. Persistent leukocytosis. K+ 3.1, mild transaminitis. Groin Wound cx -- GNR and pseudomonas. CV surgery has signed off.   3.7.24: Patient sitting up in bed. NAD. Denies any pain. Noted SS drainage from impella insertion site. Leukocytosis has resolved. BP marginal at times. Afib, CVR. Good UOP; however weight is increasing.       PMH: PAF (on Eliquis), Aortic insufficiency, MV CAD, HTN  PSH: [facility]  Family History: None reported  Social History: former smoker, denies ETOH or illicit drug us    Previous Diagnostics:  CABG/Bio AVR/MOISE Ligation (2.27.24):  Coronary artery bypass grafting X 3, LIMA to LAD, reversed SVG to OM1, Reversed Saphenous venous graft to RCA  Bioprosthetic aortic valve replacement (Epic  "max 23mm), Endoscopic venous harvesting of left greater saphenous vein, Left atrial appendage ligation, explant of right femoral impella device, right femoral artery repair.    RHC/Impella Placement (2.23.24):  Right heart catheterization performed showed the following:  PA= 61/24 (27) mm Hg  PCWP=  31/26 (27) mm Hg  AO saturation= 93 % RA  PA saturation= 60% with Impella (49% yesterday)    (02/22/24) -  0.5  Following that we elected to advance the Impella via right common femoral artery approach:  - Abiomed stiff wire  - 14 Romanian peel-away sheath  - Heparin 10,000 unit bolus given peripherally  - Dilator removed  - 0.035" J-wire  - 6 Romanian pigtail catheter positioned in the left ventricle  - Abiomed 0.025" wire  - Impella CP positioned in left ventricle  - Pulsatile motor current (appropriate positioning)  - Placed the marker just below the aortic valve  - Cardiac output was 2.8 L/min provided by the device.  Impella was at 84cm  Access Closure :    Sheath sutured to the groin  Secured.  Attestation:I was present for and supervised all key portions of the procedure  Impression/plan:   Successful Impella insertion and swan-Chelo in the setting of Acute HF/low cardiac output/precardiogenic shock/Critcal-severe AS/MVCAD - LM distal    RHC (2.22.24):  Right heart catheterization demonstrating severe pulmonary hypertension 84/34 and PCWP 24 mmHg  Reduced cardiac output/cardiac index at 3.43/1.81 L/min/m2 with pulmonary artery saturations 49.3%  For applied to the right femoral vein following removal of the 7 Romanian sheath  The estimated blood loss was none.    Carotid US (2.22.24):  The bilateral internal carotid artery demonstrated less than 50% stenosis.   The bilateral vertebral arteries were patent with antegrade flow    LHC (2.20.24):   Prox LAD lesion was 70% stenosed.  Ost Cx to Prox Cx lesion was 80% stenosed.  1st Mrg lesion was 80% stenosed.  2nd Mrg-1 lesion was 70% stenosed.  2nd Mrg-2 lesion was 50% " stenosed.  Mid LAD lesion was 50% stenosed.  Prox RCA lesion was 60% stenosed.  estimated blood loss was <50 mL.  There was three vessel coronary artery disease.  LVEDP: 30mmHg  Recommendations:   Refer for CABG evaluation and AVR   Preformed by Dr. Flannery at [facility]     ECHO (2.15.24):  Left Ventricle: The left ventricle is normal in size. Mildly increased ventricular mass. Mildly increased wall thickness. There is mild eccentric hypertrophy. Moderate global hypokinesis present. Septal motion is consistent with bundle branch block. There is moderately reduced systolic function. Biplane (2D) method of discs ejection fraction is 40%. Grade II diastolic dysfunction.  Right Ventricle: Right ventricular enlargement. Systolic function is normal.  Left Atrium: Left atrium is severely dilated.  Right Atrium: Right atrium is moderately dilated.  Aortic Valve: There is moderate aortic valve sclerosis. Severely restricted motion. There is severe stenosis. Aortic valve area by VTI is 0.66 cm². Aortic valve peak velocity is 4.45 m/s. Mean gradient is 50 mmHg. The dimensionless index is 0.17. There is mild to moderate aortic regurgitation.  Mitral Valve: Mildly calcified leaflets. There is no stenosis. There is mild regurgitation.  Tricuspid Valve: The tricuspid valve is structurally normal. There is mild to moderate regurgitation.  Pulmonic Valve: There is no significant regurgitation.  Aorta: Aortic root is upper limit of normal measuring 3.6 cm.  Pulmonary Artery: There is severe pulmonary hypertension. The estimated pulmonary artery systolic pressure is 69 mmHg.  IVC/SVC: Intermediate venous pressure at 8 mmHg.  Pericardium: There is no pericardial effusion.     Review of Systems   Constitutional: Positive for malaise/fatigue.   Cardiovascular:  Negative for chest pain, dyspnea on exertion and leg swelling.   Respiratory:  Negative for shortness of breath.    All other systems reviewed and are negative.    Objective:     Vital Signs  (Most Recent):  Temp: 99.1 °F (37.3 °C) (03/07/24 0700)  Pulse: 84 (03/07/24 0700)  Resp: 18 (03/07/24 0700)  BP: 93/60 (03/07/24 0700)  SpO2: 95 % (03/07/24 1008) Vital Signs (24h Range):  Temp:  [97.6 °F (36.4 °C)-99.1 °F (37.3 °C)] 99.1 °F (37.3 °C)  Pulse:  [78-91] 84  Resp:  [18] 18  SpO2:  [95 %-99 %] 95 %  BP: ()/(51-69) 93/60   Weight: 92 kg (202 lb 14.4 oz)  Body mass index is 33.76 kg/m².  SpO2: 95 %       Intake/Output Summary (Last 24 hours) at 3/7/2024 1052  Last data filed at 3/7/2024 0205  Gross per 24 hour   Intake --   Output 1600 ml   Net -1600 ml       Lines/Drains/Airways       Drain  Duration                  Urethral Catheter 02/28/24 1445 Coude 20 Fr. 7 days              Peripheral Intravenous Line  Duration                  Peripheral IV - Single Lumen 03/02/24 1053 20 G Anterior;Right Upper Arm 4 days                  Significant Labs:   Recent Results (from the past 72 hour(s))   Comprehensive metabolic panel    Collection Time: 03/05/24  4:15 AM   Result Value Ref Range    Sodium Level 134 (L) 136 - 145 mmol/L    Potassium Level 3.1 (L) 3.5 - 5.1 mmol/L    Chloride 99 98 - 107 mmol/L    Carbon Dioxide 27 23 - 31 mmol/L    Glucose Level 87 82 - 115 mg/dL    Blood Urea Nitrogen 23.5 8.4 - 25.7 mg/dL    Creatinine 1.16 0.73 - 1.18 mg/dL    Calcium Level Total 7.5 (L) 8.8 - 10.0 mg/dL    Protein Total 4.9 (L) 5.8 - 7.6 gm/dL    Albumin Level 2.3 (L) 3.4 - 4.8 g/dL    Globulin 2.6 2.4 - 3.5 gm/dL    Albumin/Globulin Ratio 0.9 (L) 1.1 - 2.0 ratio    Bilirubin Total 2.1 (H) <=1.5 mg/dL    Alkaline Phosphatase 171 (H) 40 - 150 unit/L    Alanine Aminotransferase 71 (H) 0 - 55 unit/L    Aspartate Aminotransferase 69 (H) 5 - 34 unit/L    eGFR >60 mls/min/1.73/m2   Magnesium    Collection Time: 03/05/24  4:15 AM   Result Value Ref Range    Magnesium Level 1.90 1.60 - 2.60 mg/dL   CBC with Differential    Collection Time: 03/05/24  4:15 AM   Result Value Ref Range    WBC 12.48 (H) 4.50 - 11.50  x10(3)/mcL    RBC 4.08 (L) 4.70 - 6.10 x10(6)/mcL    Hgb 11.4 (L) 14.0 - 18.0 g/dL    Hct 34.7 (L) 42.0 - 52.0 %    MCV 85.0 80.0 - 94.0 fL    MCH 27.9 27.0 - 31.0 pg    MCHC 32.9 (L) 33.0 - 36.0 g/dL    RDW 15.5 11.5 - 17.0 %    Platelet 240 130 - 400 x10(3)/mcL    MPV 10.7 (H) 7.4 - 10.4 fL    Neut % 68.3 %    Lymph % 8.3 %    Mono % 15.6 %    Eos % 2.4 %    Basophil % 0.4 %    Lymph # 1.04 0.6 - 4.6 x10(3)/mcL    Neut # 8.51 2.1 - 9.2 x10(3)/mcL    Mono # 1.95 (H) 0.1 - 1.3 x10(3)/mcL    Eos # 0.30 0 - 0.9 x10(3)/mcL    Baso # 0.05 <=0.2 x10(3)/mcL    IG# 0.63 (H) 0 - 0.04 x10(3)/mcL    IG% 5.0 %    NRBC% 0.2 %   Comprehensive metabolic panel    Collection Time: 03/06/24  4:19 AM   Result Value Ref Range    Sodium Level 134 (L) 136 - 145 mmol/L    Potassium Level 3.1 (L) 3.5 - 5.1 mmol/L    Chloride 97 (L) 98 - 107 mmol/L    Carbon Dioxide 27 23 - 31 mmol/L    Glucose Level 91 82 - 115 mg/dL    Blood Urea Nitrogen 22.2 8.4 - 25.7 mg/dL    Creatinine 1.10 0.73 - 1.18 mg/dL    Calcium Level Total 7.6 (L) 8.8 - 10.0 mg/dL    Protein Total 5.5 (L) 5.8 - 7.6 gm/dL    Albumin Level 2.5 (L) 3.4 - 4.8 g/dL    Globulin 3.0 2.4 - 3.5 gm/dL    Albumin/Globulin Ratio 0.8 (L) 1.1 - 2.0 ratio    Bilirubin Total 2.1 (H) <=1.5 mg/dL    Alkaline Phosphatase 152 (H) 40 - 150 unit/L    Alanine Aminotransferase 58 (H) 0 - 55 unit/L    Aspartate Aminotransferase 49 (H) 5 - 34 unit/L    eGFR >60 mls/min/1.73/m2   Magnesium    Collection Time: 03/06/24  4:19 AM   Result Value Ref Range    Magnesium Level 2.00 1.60 - 2.60 mg/dL   CBC with Differential    Collection Time: 03/06/24  4:19 AM   Result Value Ref Range    WBC 13.48 (H) 4.50 - 11.50 x10(3)/mcL    RBC 4.07 (L) 4.70 - 6.10 x10(6)/mcL    Hgb 11.4 (L) 14.0 - 18.0 g/dL    Hct 35.1 (L) 42.0 - 52.0 %    MCV 86.2 80.0 - 94.0 fL    MCH 28.0 27.0 - 31.0 pg    MCHC 32.5 (L) 33.0 - 36.0 g/dL    RDW 15.6 11.5 - 17.0 %    Platelet 336 130 - 400 x10(3)/mcL    MPV 11.1 (H) 7.4 - 10.4 fL     NRBC% 0.0 %   Manual Differential    Collection Time: 03/06/24  4:19 AM   Result Value Ref Range    WBC 13.5 x10(3)/mcL    Neutrophils % 75 %    Lymphs % 5 %    Monocytes % 16 %    Myelocytes % 3 (H) <=0 %    Neutrophils Abs 10.125 (H) 2.1 - 9.2 x10(3)/mcL    Lymphs Abs 0.675 0.6 - 4.6 x10(3)/mcL    Monocytes Abs 2.16 (H) 0.1 - 1.3 x10(3)/mcL    Platelets Normal Normal, Adequate    RBC Morph Abnormal (A) Normal    Polychromasia 1+ (A) (none)    Anisocytosis 1+ (A) (none)    Macrocytosis 1+ (A) (none)   Gamma GT    Collection Time: 03/06/24  4:19 AM   Result Value Ref Range    Gamma Glutamyl Transferase 199 (H) 12 - 64 U/L   Lipase    Collection Time: 03/06/24  4:19 AM   Result Value Ref Range    Lipase Level 94 (H) <=60 U/L   EKG 12-lead    Collection Time: 03/06/24  5:41 AM   Result Value Ref Range    QRS Duration 182 ms    [facility]HS QTC Calculation 661 ms   Comprehensive metabolic panel    Collection Time: 03/07/24  4:06 AM   Result Value Ref Range    Sodium Level 135 (L) 136 - 145 mmol/L    Potassium Level 3.5 3.5 - 5.1 mmol/L    Chloride 100 98 - 107 mmol/L    Carbon Dioxide 27 23 - 31 mmol/L    Glucose Level 94 82 - 115 mg/dL    Blood Urea Nitrogen 20.2 8.4 - 25.7 mg/dL    Creatinine 1.15 0.73 - 1.18 mg/dL    Calcium Level Total 7.4 (L) 8.8 - 10.0 mg/dL    Protein Total 4.9 (L) 5.8 - 7.6 gm/dL    Albumin Level 2.2 (L) 3.4 - 4.8 g/dL    Globulin 2.7 2.4 - 3.5 gm/dL    Albumin/Globulin Ratio 0.8 (L) 1.1 - 2.0 ratio    Bilirubin Total 1.9 (H) <=1.5 mg/dL    Alkaline Phosphatase 123 40 - 150 unit/L    Alanine Aminotransferase 52 0 - 55 unit/L    Aspartate Aminotransferase 58 (H) 5 - 34 unit/L    eGFR >60 mls/min/1.73/m2   CBC with Differential    Collection Time: 03/07/24  4:07 AM   Result Value Ref Range    WBC 10.84 4.50 - 11.50 x10(3)/mcL    RBC 3.89 (L) 4.70 - 6.10 x10(6)/mcL    Hgb 10.7 (L) 14.0 - 18.0 g/dL    Hct 33.5 (L) 42.0 - 52.0 %    MCV 86.1 80.0 - 94.0 fL    MCH 27.5 27.0 - 31.0 pg    MCHC 31.9 (L) 33.0 -  36.0 g/dL    RDW 15.9 11.5 - 17.0 %    Platelet 371 130 - 400 x10(3)/mcL    MPV 10.8 (H) 7.4 - 10.4 fL    NRBC% 0.2 %   Manual Differential    Collection Time: 03/07/24  4:07 AM   Result Value Ref Range    WBC 10.84 x10(3)/mcL    Neutrophils % 85 %    Lymphs % 4 %    Monocytes % 6 %    Eosinophils % 3 %    Basophils % 1 %    Myelocytes % 1 (H) <=0 %    Neutrophils Abs 9.214 (H) 2.1 - 9.2 x10(3)/mcL    Lymphs Abs 0.4336 (L) 0.6 - 4.6 x10(3)/mcL    Monocytes Abs 0.6504 0.1 - 1.3 x10(3)/mcL    Eosinophils Abs 0.3252 0 - 0.9 x10(3)/mcL    Basophils Abs 0.1084 0 - 0.2 x10(3)/mcL    Platelets Normal Normal, Adequate    RBC Morph Normal Normal     Telemetry: Afib    Physical Exam  Vitals and nursing note reviewed.   Constitutional:       General: He is not in acute distress.     Appearance: Normal appearance.   HENT:      Head: Normocephalic.      Mouth/Throat:      Mouth: Mucous membranes are moist.   Eyes:      Extraocular Movements: Extraocular movements intact.      Conjunctiva/sclera: Conjunctivae normal.   Cardiovascular:      Rate and Rhythm: Normal rate. Rhythm irregular.      Pulses: Normal pulses.      Heart sounds: No murmur heard.  Pulmonary:      Effort: Pulmonary effort is normal. No respiratory distress.      Breath sounds: Normal breath sounds.      Comments: NC O2  Abdominal:      Palpations: Abdomen is soft.   Genitourinary:     Comments: Urinary Catheter   Musculoskeletal:         General: Normal range of motion.   Skin:     General: Skin is warm.      Comments: Midline Sternotomy YVETET with No Sign of Bleed/Infection  Right lower abdominal site oozing ss fluid. Dressing intact   Neurological:      General: No focal deficit present.      Mental Status: He is alert. Mental status is at baseline.   Psychiatric:         Mood and Affect: Mood normal.       Current Inpatient Medications:  Current Facility-Administered Medications:     acetaminophen oral solution 650 mg, 650 mg, Per OG tube, Q6H PRN, Raul  Vasile AVILA PA-C    acetaminophen tablet 650 mg, 650 mg, Oral, Q6H PRN, Pablo Yepez MD, 650 mg at 03/07/24 0907    acetaminophen-codeine 300-30mg per tablet 1 tablet, 1 tablet, Oral, Q4H PRN, Chantelle Calle FNP, 1 tablet at 03/06/24 0542    albumin human 5% bottle 12.5 g, 12.5 g, Intravenous, PRN, Vasile Carter PA-C, Stopped at 02/28/24 1442    allopurinol split tablet 50 mg, 50 mg, Oral, Daily, Tanner Rdz MD, 50 mg at 03/06/24 0843    amiodarone tablet 200 mg, 200 mg, Oral, BID, Lexy Palomares FNP, 200 mg at 03/07/24 0907    [START ON 3/8/2024] amiodarone tablet 200 mg, 200 mg, Oral, Daily, Lexy Palomares FNP    aspirin EC tablet 81 mg, 81 mg, Oral, Daily, Vasile Carter PA-C, 81 mg at 03/07/24 0907    atorvastatin tablet 40 mg, 40 mg, Oral, QHS, Tanner Rdz MD, 40 mg at 03/06/24 2134    dextrose 10% bolus 125 mL 125 mL, 12.5 g, Intravenous, PRN, Pablo Yepez MD    dextrose 10% bolus 250 mL 250 mL, 25 g, Intravenous, PRN, Pablo Yepez MD    docusate sodium capsule 100 mg, 100 mg, Oral, BID, Vasile Carter PA-C, 100 mg at 03/07/24 0907    electrolyte-A infusion, , Intravenous, PRN, Pablo Yepez MD, Stopped at 02/28/24 0700    [START ON 3/8/2024] enoxaparin injection 40 mg, 40 mg, Subcutaneous, Daily, Cherry Suarez FNP    ferrous sulfate tablet 1 each, 1 tablet, Oral, Every other day, Tanner Rdz MD, 1 each at 03/06/24 0844    folic acid tablet 1 mg, 1 mg, Oral, Daily, Vasile Carter PA-C, 1 mg at 03/07/24 0907    furosemide tablet 20 mg, 20 mg, Oral, BID, Lexy Palomares, FNP, 20 mg at 03/07/24 0908    heparin, porcine (PF) 100 unit/mL injection flush 500 Units, 5 mL, Intravenous, On Call Procedure, Pablo Yepez MD    heparin, porcine (PF) 100 unit/mL injection flush 500 Units, 5 mL, Intravenous, On Call Procedure, Nita Contreras MD    lactulose 10 gram/15 ml solution 20 g, 20 g, Oral, Q6H PRN, Vasile Carter, PAKhurramC    loperamide  capsule 2 mg, 2 mg, Oral, Continuous PRN, Vasile Carter PA-C, 2 mg at 03/02/24 1253    melatonin tablet 6 mg, 6 mg, Oral, Nightly PRN, Tanner Rdz MD, 6 mg at 03/05/24 0320    metoclopramide injection 5 mg, 5 mg, Intravenous, Q6H PRN, Vasile Carter PA-C    metoprolol tartrate (LOPRESSOR) split tablet 12.5 mg, 12.5 mg, Oral, BID, Lexy Palomares, FNP, 12.5 mg at 03/07/24 0908    nitroGLYCERIN SL tablet 0.4 mg, 0.4 mg, Sublingual, Q5 Min PRN, Tanner Rdz MD    ondansetron disintegrating tablet 8 mg, 8 mg, Oral, Q8H PRN, Tanner Rdz MD, 8 mg at 03/06/24 0822    ondansetron injection 8 mg, 8 mg, Intravenous, Q6H PRN, Pablo Yepez MD, 8 mg at 03/07/24 0908    pantoprazole EC tablet 40 mg, 40 mg, Oral, Daily, Tanner Rdz MD, 40 mg at 03/07/24 0908    piperacillin-tazobactam (ZOSYN) 4.5 g in dextrose 5 % in water (D5W) 100 mL IVPB (MB+), 4.5 g, Intravenous, Q8H, Ajit Metcalf MD, Stopped at 03/07/24 0644    polyethylene glycol packet 17 g, 17 g, Oral, Daily, Tanner Rdz MD, 17 g at 03/07/24 0908    simethicone chewable tablet 80 mg, 1 tablet, Oral, TID PRN, Tanner Rdz MD, 80 mg at 03/07/24 0908    sodium chloride 0.9% flush 10 mL, 10 mL, Intravenous, PRN, Tanner Rdz MD    sodium chloride 0.9% flush 10 mL, 10 mL, Intravenous, PRN, Millie Jimenez, GRANT    sucralfate tablet 1 g, 1 g, Oral, QID (AC & HS), Vasile Carter PA-C, 1 g at 03/07/24 0523  VTE Risk Mitigation (From admission, onward)           Ordered     enoxaparin injection 40 mg  Daily         03/07/24 0559     IP VTE HIGH RISK PATIENT  Once         03/02/24 0759     heparin, porcine (PF) 100 unit/mL injection flush 500 Units  On Call Procedure         02/27/24 0718     heparin, porcine (PF) 100 unit/mL injection flush 500 Units  On Call Procedure         02/27/24 0257     Place SUSIE hose  Until discontinued         02/22/24 1458     Place sequential compression device  Until  discontinued         02/21/24 2057                  Assessment:   CAD (Multivessel)    - Status Post CABG (3V) LIMA to LAD, SVG to OM1, SVG to RCA (2.27.24)    - RHC/Impella Placement and [place] Catheter (2.23.24)- Impella Removal/Femoral Artery Repair in Surgery on 2.27.24    - C (2.19.24): pLAD lesion 70%, oLCx 80%, OM1 80%, OM2 1 lesion 70% 2nd lesion 50%, mLAD 50%, pRCA 60%  VHD/AS     - Status Post Bio AVR (Epic max 23mm) (2.27.24)    - ECHO (2.16.24): Aortic Valve: There is moderate aortic valve sclerosis. Severely restricted motion. There is severe stenosis. Aortic valve area by VTI is 0.66 cm². Aortic valve peak velocity is 4.45 m/s. Mean gradient is 50 mmHg. The dimensionless index is 0.17.   Cardiogenic Shock (Post Cardiotomy) - Off Vasopressor Support - Resolved     - History of Hypertension   Ischemic Cardiomyopathy/EF 40%  Elevated LFTs - Improving   Pulmonary HTN    - ECHO PASP 69mmHg     - RHC (2.22.24): PA 84/34, PCWP 24 mmHg  Hematuria 2/2 Traumatic/Difficult Indwelling Catheter Placement (Resolved)  Urethral stricture s/p dilatation 02.23.24  PAF - Currently CVR    - Status Post Bedside Cardioversion x 2 on 2.27.24 (Both Unsuccessful)    - Status Post MOISE Ligation (2.27.24)    - CHADsVASc - 5 Points - 7.2% Stroke Risk per Year   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% - Decompensated    - ECHO (2.16.24): EF 40%, Grade II DD    - Small Pleural Effusions on CT Imaging (2.22.24)  Left Bundle Branch Block   VHD    - ECHO (2.16.24): Severe AS, mild MR, mild to moderate TR  JEAN-CLAUDE/CKD Stage II - Improved     - Baseline Cr 1.6  Anemia- Suspect Blood Loss    - Status Post Transfusion on 2.28.24 & 2.29.24  Thrombocytopenia - Resolved   Leukocytosis - persistent  --groin wound culture + GNR  Hypokalemia  --K 3.1  Plan:   Still volume overloaded. Continue IV Diuresis with IV Lasix 20mg BID. Give metolazone 5 mg po x 1 dose  Hospital medicine managing ABX  Accurate I&Os and Daily Weights   Continue  amiodarone and BB. Defer Anticoagulation (Re: AF) 2/2 Anemia requiring Transfusion and s/p MOISE Ligation  Aggressive IS Usage and Mobilization (Deconditioned/Working with Therapy)  Replete K+  Keep K > 4.0 and Mg > 2.0   Labs in AM: CBC, CMP, and Mag           Cherry Suarez, CORBINP  Cardiology  [facility]   03/07/2024    Physician addendum:  I have seen and examined this patient as a split-shared visit with the ARLEY d/t complicated medical management of above problems written in assessment and high acuity requiring physician expertise in medical decision-making. I performed the substantive portion of the history and exam. Above medical decision-making is also formulated by me.    Cardiovascular exam:  S1, S2  Lungs:  fine crackles at bases.  Extremities:  1+ edema bilaterally    Plan:  Continue IV diuresis.  Continue amiodarone.  Other medications as above.  Continue supportive therapy.      All George MD  Cardiologist

## 2024-03-08 LAB
ALBUMIN SERPL-MCNC: 2.4 G/DL (ref 3.4–4.8)
ALBUMIN/GLOB SERPL: 0.8 RATIO (ref 1.1–2)
ALP SERPL-CCNC: 121 UNIT/L (ref 40–150)
ALT SERPL-CCNC: 59 UNIT/L (ref 0–55)
AST SERPL-CCNC: 75 UNIT/L (ref 5–34)
BASOPHILS # BLD AUTO: 0.05 X10(3)/MCL
BASOPHILS NFR BLD AUTO: 0.4 %
BILIRUB SERPL-MCNC: 1.8 MG/DL
BUN SERPL-MCNC: 20.6 MG/DL (ref 8.4–25.7)
CALCIUM SERPL-MCNC: 8 MG/DL (ref 8.8–10)
CHLORIDE SERPL-SCNC: 95 MMOL/L (ref 98–107)
CO2 SERPL-SCNC: 27 MMOL/L (ref 23–31)
CREAT SERPL-MCNC: 1.21 MG/DL (ref 0.73–1.18)
EOSINOPHIL # BLD AUTO: 0.27 X10(3)/MCL (ref 0–0.9)
EOSINOPHIL NFR BLD AUTO: 2.2 %
ERYTHROCYTE [DISTWIDTH] IN BLOOD BY AUTOMATED COUNT: 15.8 % (ref 11.5–17)
GFR SERPLBLD CREATININE-BSD FMLA CKD-EPI: >60 MLS/MIN/1.73/M2
GLOBULIN SER-MCNC: 2.9 GM/DL (ref 2.4–3.5)
GLUCOSE SERPL-MCNC: 93 MG/DL (ref 82–115)
HCT VFR BLD AUTO: 35.6 % (ref 42–52)
HGB BLD-MCNC: 11.2 G/DL (ref 14–18)
IMM GRANULOCYTES # BLD AUTO: 0.48 X10(3)/MCL (ref 0–0.04)
IMM GRANULOCYTES NFR BLD AUTO: 4 %
LYMPHOCYTES # BLD AUTO: 1.14 X10(3)/MCL (ref 0.6–4.6)
LYMPHOCYTES NFR BLD AUTO: 9.4 %
MCH RBC QN AUTO: 27.3 PG (ref 27–31)
MCHC RBC AUTO-ENTMCNC: 31.5 G/DL (ref 33–36)
MCV RBC AUTO: 86.8 FL (ref 80–94)
MONOCYTES # BLD AUTO: 1.56 X10(3)/MCL (ref 0.1–1.3)
MONOCYTES NFR BLD AUTO: 12.9 %
NEUTROPHILS # BLD AUTO: 8.59 X10(3)/MCL (ref 2.1–9.2)
NEUTROPHILS NFR BLD AUTO: 71.1 %
NRBC BLD AUTO-RTO: 0 %
PLATELET # BLD AUTO: 466 X10(3)/MCL (ref 130–400)
PMV BLD AUTO: 10.8 FL (ref 7.4–10.4)
POTASSIUM SERPL-SCNC: 3.3 MMOL/L (ref 3.5–5.1)
PROT SERPL-MCNC: 5.3 GM/DL (ref 5.8–7.6)
RBC # BLD AUTO: 4.1 X10(6)/MCL (ref 4.7–6.1)
SODIUM SERPL-SCNC: 132 MMOL/L (ref 136–145)
URATE SERPL-MCNC: 2.4 MG/DL (ref 3.5–7.2)
WBC # SPEC AUTO: 12.09 X10(3)/MCL (ref 4.5–11.5)

## 2024-03-08 PROCEDURE — 25000003 PHARM REV CODE 250: Performed by: PHYSICIAN ASSISTANT

## 2024-03-08 PROCEDURE — 21400001 HC TELEMETRY ROOM

## 2024-03-08 PROCEDURE — 25000003 PHARM REV CODE 250: Performed by: INTERNAL MEDICINE

## 2024-03-08 PROCEDURE — 97530 THERAPEUTIC ACTIVITIES: CPT

## 2024-03-08 PROCEDURE — 63600175 PHARM REV CODE 636 W HCPCS: Performed by: INTERNAL MEDICINE

## 2024-03-08 PROCEDURE — 25000003 PHARM REV CODE 250: Performed by: NURSE PRACTITIONER

## 2024-03-08 PROCEDURE — 80053 COMPREHEN METABOLIC PANEL: CPT | Performed by: INTERNAL MEDICINE

## 2024-03-08 PROCEDURE — 85025 COMPLETE CBC W/AUTO DIFF WBC: CPT | Performed by: INTERNAL MEDICINE

## 2024-03-08 PROCEDURE — 63600175 PHARM REV CODE 636 W HCPCS: Performed by: NURSE PRACTITIONER

## 2024-03-08 PROCEDURE — 25000003 PHARM REV CODE 250

## 2024-03-08 PROCEDURE — 84550 ASSAY OF BLOOD/URIC ACID: CPT | Performed by: INTERNAL MEDICINE

## 2024-03-08 RX ORDER — POTASSIUM CHLORIDE 14.9 MG/ML
20 INJECTION INTRAVENOUS ONCE
Status: COMPLETED | OUTPATIENT
Start: 2024-03-08 | End: 2024-03-08

## 2024-03-08 RX ORDER — POTASSIUM CHLORIDE 20 MEQ/1
40 TABLET, EXTENDED RELEASE ORAL ONCE
Status: COMPLETED | OUTPATIENT
Start: 2024-03-08 | End: 2024-03-08

## 2024-03-08 RX ORDER — LISINOPRIL 2.5 MG/1
2.5 TABLET ORAL DAILY
Status: DISCONTINUED | OUTPATIENT
Start: 2024-03-08 | End: 2024-03-10

## 2024-03-08 RX ORDER — HYDROCODONE BITARTRATE AND ACETAMINOPHEN 5; 325 MG/1; MG/1
1 TABLET ORAL EVERY 6 HOURS PRN
Status: DISCONTINUED | OUTPATIENT
Start: 2024-03-08 | End: 2024-03-26 | Stop reason: HOSPADM

## 2024-03-08 RX ADMIN — ENOXAPARIN SODIUM 40 MG: 40 INJECTION SUBCUTANEOUS at 05:03

## 2024-03-08 RX ADMIN — CIPROFLOXACIN 400 MG: 2 INJECTION, SOLUTION INTRAVENOUS at 01:03

## 2024-03-08 RX ADMIN — LISINOPRIL 2.5 MG: 2.5 TABLET ORAL at 10:03

## 2024-03-08 RX ADMIN — SUCRALFATE 1 G: 1 TABLET ORAL at 05:03

## 2024-03-08 RX ADMIN — ASPIRIN 81 MG: 81 TABLET, COATED ORAL at 10:03

## 2024-03-08 RX ADMIN — SPIRONOLACTONE 12.5 MG: 25 TABLET ORAL at 10:03

## 2024-03-08 RX ADMIN — ALLOPURINOL 50 MG: 300 TABLET ORAL at 10:03

## 2024-03-08 RX ADMIN — METOPROLOL SUCCINATE 12.5 MG: 25 TABLET, EXTENDED RELEASE ORAL at 10:03

## 2024-03-08 RX ADMIN — CIPROFLOXACIN 400 MG: 2 INJECTION, SOLUTION INTRAVENOUS at 02:03

## 2024-03-08 RX ADMIN — HYDROCODONE BITARTRATE AND ACETAMINOPHEN 1 TABLET: 5; 325 TABLET ORAL at 02:03

## 2024-03-08 RX ADMIN — FERROUS SULFATE TAB 325 MG (65 MG ELEMENTAL FE) 1 EACH: 325 (65 FE) TAB at 10:03

## 2024-03-08 RX ADMIN — POTASSIUM CHLORIDE 40 MEQ: 1500 TABLET, EXTENDED RELEASE ORAL at 10:03

## 2024-03-08 RX ADMIN — Medication 6 MG: at 09:03

## 2024-03-08 RX ADMIN — PANTOPRAZOLE SODIUM 40 MG: 40 TABLET, DELAYED RELEASE ORAL at 10:03

## 2024-03-08 RX ADMIN — DOCUSATE SODIUM 100 MG: 100 CAPSULE, LIQUID FILLED ORAL at 09:03

## 2024-03-08 RX ADMIN — SUCRALFATE 1 G: 1 TABLET ORAL at 09:03

## 2024-03-08 RX ADMIN — POTASSIUM CHLORIDE 20 MEQ: 14.9 INJECTION, SOLUTION INTRAVENOUS at 10:03

## 2024-03-08 RX ADMIN — ATORVASTATIN CALCIUM 40 MG: 40 TABLET, FILM COATED ORAL at 09:03

## 2024-03-08 RX ADMIN — AMIODARONE HYDROCHLORIDE 200 MG: 200 TABLET ORAL at 10:03

## 2024-03-08 RX ADMIN — FUROSEMIDE 20 MG: 20 TABLET ORAL at 05:03

## 2024-03-08 RX ADMIN — FOLIC ACID 1 MG: 1 TABLET ORAL at 10:03

## 2024-03-08 RX ADMIN — FUROSEMIDE 20 MG: 20 TABLET ORAL at 10:03

## 2024-03-08 RX ADMIN — SUCRALFATE 1 G: 1 TABLET ORAL at 10:03

## 2024-03-08 NOTE — PROGRESS NOTES
[facility] [address]  [facility]    Patient Name:  Brain Snyder   MRN:  [id number]  Date: 3/8/2024  Diagnosis: CAD (coronary artery disease)    History:     Past Medical History:   Diagnosis Date    A-fib     Aortic insufficiency     CAD (coronary artery disease)     Hypertension        Social History     Socioeconomic History    Marital status:    Tobacco Use    Smoking status: Former     Types: Cigarettes    Smokeless tobacco: Never   Substance and Sexual Activity    Alcohol use: Not Currently    Drug use: Not Currently     Social Determinants of Health     Financial Resource Strain: Low Risk  (2/16/2024)    Overall Financial Resource Strain (CARDIA)     Difficulty of Paying Living Expenses: Not hard at all   Food Insecurity: No Food Insecurity (2/16/2024)    Hunger Vital Sign     Worried About Running Out of Food in the Last Year: Never true     Ran Out of Food in the Last Year: Never true   Transportation Needs: No Transportation Needs (2/16/2024)    PRAPARE - Transportation     Lack of Transportation (Medical): No     Lack of Transportation (Non-Medical): No   Physical Activity: Inactive (2/16/2024)    Exercise Vital Sign     Days of Exercise per Week: 0 days     Minutes of Exercise per Session: 0 min   Stress: Stress Concern Present (2/16/2024)    [employer] [place] [employer] - Occupational Stress Questionnaire     Feeling of Stress : To some extent   Social Connections: Socially Integrated (2/22/2024)    Social Connection and Isolation Panel [NHANES]     Frequency of Communication with Friends and Family: Twice a week     Frequency of Social Gatherings with Friends and Family: Twice a week     Attends [employer] Services: 1 to 4 times per year     Active Member of Clubs or Organizations: Yes     Attends Club or Organization Meetings: 1 to 4 times per year     Marital Status:    Housing Stability: Low Risk  (2/16/2024)    Housing Stability Vital Sign      Unable to Pay for Housing in the Last Year: No     Number of Places Lived in the Last Year: 1     Unstable Housing in the Last Year: No       Precautions:     Allergies as of 02/21/2024    (No Known Allergies)       [facility] Assessment Details/Treatment      03/08/24 1041   WO Assessment   Visit Date 03/08/24   Visit Time 1041   Consult Type New   [facility] Speciality Wound   Wound surgical   Intervention chart review;assessed;applied;orders   Teaching on-going        Incision/Site 02/27/24 2115 Right Groin anterior vertical;midline   Date First Assessed/Time First Assessed: 02/27/24 2115   Present Prior to Hospital Arrival?: No  Side: Right  Location: Groin  Orientation: anterior  Incision Type: vertical;midline  Additional Comments: From Impella Removal.   Wound Image    Dressing Appearance Intact;Moist drainage   Drainage Amount Moderate   Drainage Characteristics/Odor Serosanguineous   Appearance Pink;Red;Yellow;Moist   Black (%), Wound Tissue Color 0 %   Red (%), Wound Tissue Color 50 %   Yellow (%), Wound Tissue Color 50 %   Periwound Area Intact;Dry   Wound Length (cm) 4 cm   Wound Width (cm) 0.6 cm   Wound Depth (cm) 0.3 cm   Wound Volume (cm^3) 0.72 cm^3   Wound Surface Area (cm^2) 2.4 cm^2   Care Cleansed with:;Antimicrobial agent;Wound cleanser;Other (see comments)   Dressing Applied;Gauze;Absorptive Pad;Other (comment)  (Secured with medipore tape)     WO consulted for right groin. Discussed plan of care with nurse Aguila at bedside. Daughter and  at bedside. Treatment recommendations put in place. Right groin: Cleanse with vashe, dry well. Apply thin layer of dry gauze then abd pad and secure with medipore tape BID and PRN with soilage. Educated the patient on the importance of maintaining good hygiene regimen, he verbalized understanding. Nursing team to continue with treatment recommendations. Nurse verbalized patients ability to mobilize self with assistance. Other preventative measures put  in place. Family has no further questions at this time. Will follow up.  03/08/2024

## 2024-03-08 NOTE — PLAN OF CARE
Medicaid dept has notified me that medicaid is still pending, pt has failed to submit necessary legal documents and they will talk to him today about this.    Lana, Medicaid dept has explained to pt and family regarding how important it is for him to submit his passport asap

## 2024-03-08 NOTE — PROGRESS NOTES
[facility] Hospital Medicine Progress Note        Chief Complaint: Inpatient Follow-up for      HPI per admitting team:   78-year-old male with a history of aortic insufficiency/stenosis, CAD, CKD IIIb, HTN AFib on Eliquis admitted to [facility] 02/15/2024 with chest pain, found to have NSTEMI (peak troponin 0.17) and underwent C 02/20/2024 showing severe multivessel stenosis and therefore was transferred to [facility] for CABG evaluation. Cardiology was consulted Patient had Impella placed on 02/23 and was admitted to the ICU.  Was started on aggressive diuresis with IV Lasix.  CV surgery was consulted.  Urology was consulted for hematuria; Nguyen catheter placed over guidewire with dilation secondary to urethral strictures. IV Heparin infusion was initiated per CIS but held due to hematuria/hemoptysis on 02/24.  He was also noted to have an JEAN-CLAUDE. Received 2 units PRBCs on 02/26 and was planned for high-risk PCI on 02/26 which was later canceled.  Underwent CABG x 3 2/27 (LIMA to LAD, RSVG to OM 1, R SVG to RCA, bioprosthetic AVR).  Remained on mechanical ventilation thereafter.  Patient noted to have tachycardia with atrial fibrillation/RVR requiring IV amiodarone along with pressors (norepinephrine/Milrinone) for hypotension. Patient underwent cardioversion x 2 with minimal improvement in HR/BP.  Patient self-extubated overnight on 02/29 and was noted to have adequate saturations on 2 L O2 via NC thereafter.  PT/OT consulted.  Renal function noted to be improving. Continued on IV diuresis as he appeared to be significantly volume overloaded postoperatively along with elevated pulmonary artery wedge pressures.  Started on p.o. amiodarone taper on 03/03.  Patient noted to have drainage from right groin wound and started on vancomycin on 03/04.  Wound culture grew Pseudomonas.  Chest tubes discontinued on 03/02.  Downgraded from ICU on 03/02.  CIS and CV surgery continued following patient.  CV  surgery signed off on 03/06 and discontinued vancomycin.  Patient was placed on oral Levaquin.  Hospital medicine consulted on 03/06 for assistance with medical management and DC planning.     Interval Hx:   patient was seen at bedside, family member at bedside    Patient complaining of severe bilateral pain,  pulses attempted at bedside very weak, left leg worse than right, both legs are warm to touch, looks slightly erythematous   Wound culture grew Pseudomonas and Serratia sensitive to ciprofloxacin  Right groin wound looks improved continue with wound care dressing     Case was discussed with patient's nurse and  on the floor.     Objective/physical exam:  General: alert male lying comfortably in bed, in no acute distress  HENT:  NC in place; oral and oropharyngeal mucosa moist, pink, with no erythema or exudates, no ear pain or discharge  Neck: normal neck movement, no lymph nodes or swellings, no JVD or Carotid bruit  Respiratory: clear breathing sounds bilaterally, no crackles, rales, ronchi or wheezes  Cardiovascular: clear S1 and S2, no murmurs, rubs or gallops  Peripheral Vascular: no lesions, ulcers or erosions, normal peripheral pulses; 2+ B/L pedal edema  Gastrointestinal: soft, non-distended abdomen with TTP in epigastric, hypogastric, RUQ/LUQ regions; no guarding, rigidity or rebound tenderness, normal bowel sounds; right groin incision wound noted to be erythematous with some serum bilirubin drainage with overlying dressing  Integumentary: normal skin color, no rashes or lesions  Neuro: AAO x 3; motor strength 5/5 in B/L UEs & LEs; sensation intact to gross and fine touch B/L; CN II-XII grossly intact        Assessment/Plan:   Bilateral lower extremity pain ? 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 Norco 5 mg q.6 p.r.n. for pain  Wound culture grew Pseudomonas and Serratia sensitive to ciprofloxacin   Continue IV ciprofloxacin b.I.d.  CIS on board; follow recommendations   Patient saturating well on 2 L O2 via NC   Continued on Lasix 20 mg b.i.d. p.o.   Continued on p.o. amiodarone taper  Continued on allopurinol 50 mg daily, aspirin 81 mg, atorvastatin 40 mg, docusate 100 mg b.i.d., FeSO4 every other day, folic acid 1 mg, lisinopril 2.5 mg daily, metoprolol tartrate 12.5 mg b.i.d., Protonix 40 mg daily, MiraLax 17 g daily, sucralfate 1 g q.i.d.  Continue monitoring patient's symptoms   PT/OT recommending high-intensity therapy   Cm on board for placement     VTE prophylaxis:  Lovenox     Patient condition:  Stable     Anticipated discharge and Disposition:     Pending     All diagnosis and differential diagnosis have been reviewed; assessment and plan has been documented; I have personally reviewed the labs and test results that are presently available; I have reviewed the patients medication list; I have reviewed the consulting providers response and recommendations. I have reviewed or attempted to review medical records based upon their availability     All of the patient's questions have been  addressed and answered. Patient's is agreeable to the above stated plan. I will continue to monitor closely and make adjustments to medical management as needed.       VITAL SIGNS: 24 HRS MIN & MAX LAST   Temp  Min: 96.2 °F (35.7 °C)  Max: 98.9 °F (37.2 °C) 97.6 °F (36.4 °C)   BP  Min: 88/56  Max: 122/69 122/69   Pulse  Min: 76  Max: 93  87   Resp  Min: 18  Max: 20  18   SpO2  Min: 94 %  Max: 98 % 97 %     I have reviewed the following labs:  Recent Labs   Lab 03/06/24 0419 03/07/24 0407 03/08/24 0428   WBC 13.5  13.48* 10.84  10.84 12.09*   RBC 4.07* 3.89* 4.10*   HGB 11.4* 10.7* 11.2*   HCT 35.1* 33.5* 35.6*   MCV 86.2 86.1 86.8   MCH 28.0 27.5 27.3   MCHC 32.5* 31.9* 31.5*   RDW 15.6 15.9 15.8    371 466*   MPV 11.1* 10.8* 10.8*     Recent Labs   Lab 03/04/24 0453 03/05/24 0415 03/06/24 0419 03/07/24 0406 03/08/24 0428   * 134* 134* 135* 132*   K 3.9 3.1* 3.1* 3.5 3.3*   CO2 22* 27 27 27 27   BUN 26.6* 23.5 22.2 20.2 20.6   CREATININE 1.37* 1.16 1.10 1.15 1.21*   CALCIUM 7.5* 7.5* 7.6* 7.4* 8.0*   MG 2.20 1.90 2.00  --   --    ALBUMIN 2.3* 2.3* 2.5* 2.2* 2.4*   ALKPHOS 188* 171* 152* 123 121   ALT 78* 71* 58* 52 59*   * 69* 49* 58* 75*   BILITOT 2.0* 2.1* 2.1* 1.9* 1.8*     Microbiology Results (last 7 days)       Procedure Component Value Units Date/Time    Wound Culture [[id number]]  (Abnormal)  (Susceptibility) Collected: 03/03/24 1920    Order Status: Completed Specimen: Wound from Groin Updated: 03/07/24 1053     Wound Culture Many Serratia marcescens      Many Pseudomonas aeruginosa             See below for Radiology    Scheduled Med:   allopurinoL  50 mg Oral Daily    amiodarone  200 mg Oral Daily    aspirin  81 mg Oral Daily    atorvastatin  40 mg Oral QHS    ciprofloxacin  400 mg Intravenous Q12H    docusate sodium  100 mg Oral BID    enoxparin  40 mg Subcutaneous Daily    ferrous sulfate  1 tablet Oral Every other day    folic acid  1 mg Oral Daily    furosemide  20 mg Oral BID    lisinopriL  2.5 mg Oral Daily    metoprolol succinate  12.5 mg Oral Daily    pantoprazole  40 mg Oral Daily    polyethylene glycol  17 g Oral Daily    spironolactone  12.5 mg Oral Daily    sucralfate  1 g Oral QID (AC & HS)      Continuous Infusions:   loperamide        PRN Meds:  acetaminophen, acetaminophen, albumin human 5%, dextrose 10%, dextrose 10%,  electrolyte-A, heparin, porcine (PF), heparin, porcine (PF), HYDROcodone-acetaminophen, lactulose 10 gram/15 ml, loperamide, melatonin, metoclopramide, nitroGLYCERIN, ondansetron, ondansetron, simethicone, sodium chloride 0.9%, sodium chloride 0.9%     Assessment/Plan:      VTE prophylaxis:     Patient condition:  Stable/Fair/Guarded/ Serious/ Critical    Anticipated discharge and Disposition:         All diagnosis and differential diagnosis have been reviewed; assessment and plan has been documented; I have personally reviewed the labs and test results that are presently available; I have reviewed the patients medication list; I have reviewed the consulting providers response and recommendations. I have reviewed or attempted to review medical records based upon their availability    All of the patient's questions have been  addressed and answered. Patient's is agreeable to the above stated plan. I will continue to monitor closely and make adjustments to medical management as needed.  _____________________________________________________________________    Nutrition Status:    Radiology:  I have personally reviewed the following imaging and agree with the radiologist.     Echo Saline Bubble? No    Left Ventricle: The left ventricle is normal in size. Mildly increased   wall thickness. Severe global hypokinesis present. Septal motion is   consistent with post-operative status. There is severely reduced systolic   function with a visually estimated ejection fraction of less than 30%.   There is normal diastolic function.    Right Ventricle: Normal right ventricular cavity size. Systolic   function is borderline low.    Left Atrium: Left atrium is mildly dilated.    Right Atrium: Right atrium is mildly dilated.    Aortic Valve: There is a bioprosthetic valve in the aortic position.    Mitral Valve: There is mild regurgitation.    Pericardium: There is a small effusion. Left pleural effusion.  X-Ray Chest PA And  Lateral  Narrative: EXAMINATION:  XR CHEST PA AND LATERAL    CLINICAL HISTORY:  po;, .    COMPARISON:  March 3, 2024    FINDINGS:  Examination reveals cardiomediastinal silhouette and pleuroparenchymal changes to be essentially unchanged as compared with the previous examination when accounting for difference in technique.  Impression: No significant change    Electronically signed by: Ananth Coker  Date:    03/07/2024  Time:    07:58      Stan Lazar MD  Department of Hospital Medicine   [facility]   03/08/2024

## 2024-03-08 NOTE — PROGRESS NOTES
"  [facility]    Cardiology  Progress Note    Patient Name: Brain Snyder  MRN: [id number]  Admission Date: 2/21/2024  Hospital Length of Stay: 16 days  Code Status: Full Code   Attending Physician: Ajit Metcalf MD   Primary Care Physician: Nidia Shepherd NP  Expected Discharge Date:   Principal Problem:CAD (coronary artery disease)    Subjective:   Reason for Consult:  CAD     HPI: 78-year-old female that is unknown to [facility] with a PMHx of PAF on Eliquis Aortic insufficiency, MV CAD, HTN. He is [place]ish speaking and an  was used for interview. He was orginally admitted to [facility] on 2.15.24 with CP & NSTEMI and underwent a LHC on 2.20.24 taht showed MV CAD (reported noted below) He was transferred to [facility] on 2.21.24 for a CABG/AVR evaluation. On arrive he was hemodynamically stable on a heparin infusion. He denied chest pain, SOB, and nausea on current exam, [facility] was consulted for CAD    Hospital Course:   2.23.24: Patient seen resting in bed. He denies SOB or CP. He remains on heparin infusion. Family at bedside   2.24.24: NAD. S/p Impella Placement/Minto-Chelo Catheter. "I am ok." + Blood Clots from Nose/Mouth, Hematuria 2/2 traumatic Indwelling Catheter Placement. Heparin Drip per Protocol. Impella P5  2.25.24: NAD. "I'm ok." Denies CP, SOB and Palps. PA Pressure Reading is not Accurate. CVP 5. Impella at P5. R Groin Impella P7. Remains Off Heparin Drip per Protocol. Now in SR.   2.26.24: NAD Noted. SR on Tele. Right Groin Impella in place, bruising with no hematoma noted. Distal pulses DP intact. Legs warm. NC 2 L/Min. Denies CP/SOB. Consent obtained from his daughter Brii Snyder. NPO for MV PCI Today.  2.27.24: NAD Noted. Impella remains in place at P7 Support. Good UA Noted, some pink tinged urine noted. SR on Tele. Pressors off. Denies CP/SOB. NPO for surgery today. Heparin on hold for surgery.  2.28.24: Patient is critically ill. AF RVR on Tele, twice shocked this AM with no " success. On Amiodarone/Milrinone- Cardizem Infusion now off given hypotension and ICMO. Also no José Miguel/Levophed. Concern for bleeding noted, transfusion noted. Patient is intubated/Mechanically Vented. Hemodynamics: CO/CI 4.3/2.3 CVP 20.  2.29.24: Patient self extubated this AM. He is stable on NC Oxygen. Denies CP/SOB. SR on Tele. On Amiodarone Infusion and receiving blood products. BP Stable. Clinically much improved from yesterday.   3.1.24: NAD. Afib mild RVR on tele. On Primacor @ 0.187 mcg/kg/min. Amiodarone infusing per protocol. LFT's worsening. WBC worsening. + Incisional CP. On 5 L NC.   3.2.24: NAD. Net Negative 2700 urine output.   3.3.24: NAD. VSS. No complaints of CP/Palps. Reports SOB is improving. Creat 1.61 (Improved)  3.4.24: NAD. VSS. No complaints. On 2 L. Net Negative Fluid 3540 over 24 hours. Creat 1.37. LFTs slighting worse today  3.5.24: NAD. VSS. Denies CP, SOB and Palps. Family at Bedside. Fluid Balance Net Negative 4360mL. BUN/Crea 23.5/1.16, AST/ALT 69/71  3.6.24: Patient sitting up in bedside chair. Appears SOB. C/o abdominal bloating and gaseous. Reports + BM since surgery. Abdomen distended. Patient nausea and spit up bile colored fluid. + peripheral edema. O2 via NC. Excellent diuresis. Persistent leukocytosis. K+ 3.1, mild transaminitis. Groin Wound cx -- GNR and pseudomonas. CV surgery has signed off.   3.7.24: Patient sitting up in bed. NAD. Denies any pain. Noted SS drainage from impella insertion site. Leukocytosis has resolved. BP marginal at times. Afib, CVR. Good UOP; however weight is increasing.   3.8.24: Patient awake in bed. He has been weaned off. O2. Good diuresis overnight. Mild bump in creatinine-- 1.21 from 1.15 yesterday. Liver enzymes uptrending. VSS.       PMH: PAF (on Eliquis), Aortic insufficiency, MV CAD, HTN  PSH: [facility]  Family History: None reported  Social History: former smoker, denies ETOH or illicit drug us    Previous Diagnostics:  CABG/Bio AVR/MOISE Ligation  "(2.27.24):  Coronary artery bypass grafting X 3, LIMA to LAD, reversed SVG to OM1, Reversed Saphenous venous graft to RCA  Bioprosthetic aortic valve replacement (Epic max 23mm), Endoscopic venous harvesting of left greater saphenous vein, Left atrial appendage ligation, explant of right femoral impella device, right femoral artery repair.    RHC/Impella Placement (2.23.24):  Right heart catheterization performed showed the following:  PA= 61/24 (27) mm Hg  PCWP=  31/26 (27) mm Hg  AO saturation= 93 % RA  PA saturation= 60% with Impella (49% yesterday)    (02/22/24) -  0.5  Following that we elected to advance the Impella via right common femoral artery approach:  - Abiomed stiff wire  - 14 Citizen of [place] peel-away sheath  - Heparin 10,000 unit bolus given peripherally  - Dilator removed  - 0.035" J-wire  - 6 Citizen of [place] pigtail catheter positioned in the left ventricle  - Abiomed 0.025" wire  - Impella CP positioned in left ventricle  - Pulsatile motor current (appropriate positioning)  - Placed the marker just below the aortic valve  - Cardiac output was 2.8 L/min provided by the device.  Impella was at 84cm  Access Closure :    Sheath sutured to the groin  Secured.  Attestation:I was present for and supervised all key portions of the procedure  Impression/plan:   Successful Impella insertion and swan-Chelo in the setting of Acute HF/low cardiac output/precardiogenic shock/Critcal-severe AS/MVCAD - LM distal    RHC (2.22.24):  Right heart catheterization demonstrating severe pulmonary hypertension 84/34 and PCWP 24 mmHg  Reduced cardiac output/cardiac index at 3.43/1.81 L/min/m2 with pulmonary artery saturations 49.3%  For applied to the right femoral vein following removal of the 7 Citizen of [place] sheath  The estimated blood loss was none.    Carotid US (2.22.24):  The bilateral internal carotid artery demonstrated less than 50% stenosis.   The bilateral vertebral arteries were patent with antegrade flow    LHC (2.20.24):   Prox LAD " lesion was 70% stenosed.  Ost Cx to Prox Cx lesion was 80% stenosed.  1st Mrg lesion was 80% stenosed.  2nd Mrg-1 lesion was 70% stenosed.  2nd Mrg-2 lesion was 50% stenosed.  Mid LAD lesion was 50% stenosed.  Prox RCA lesion was 60% stenosed.  estimated blood loss was <50 mL.  There was three vessel coronary artery disease.  LVEDP: 30mmHg  Recommendations:   Refer for CABG evaluation and AVR   Preformed by Dr. Flannery at [facility]     ECHO (2.15.24):  Left Ventricle: The left ventricle is normal in size. Mildly increased ventricular mass. Mildly increased wall thickness. There is mild eccentric hypertrophy. Moderate global hypokinesis present. Septal motion is consistent with bundle branch block. There is moderately reduced systolic function. Biplane (2D) method of discs ejection fraction is 40%. Grade II diastolic dysfunction.  Right Ventricle: Right ventricular enlargement. Systolic function is normal.  Left Atrium: Left atrium is severely dilated.  Right Atrium: Right atrium is moderately dilated.  Aortic Valve: There is moderate aortic valve sclerosis. Severely restricted motion. There is severe stenosis. Aortic valve area by VTI is 0.66 cm². Aortic valve peak velocity is 4.45 m/s. Mean gradient is 50 mmHg. The dimensionless index is 0.17. There is mild to moderate aortic regurgitation.  Mitral Valve: Mildly calcified leaflets. There is no stenosis. There is mild regurgitation.  Tricuspid Valve: The tricuspid valve is structurally normal. There is mild to moderate regurgitation.  Pulmonic Valve: There is no significant regurgitation.  Aorta: Aortic root is upper limit of normal measuring 3.6 cm.  Pulmonary Artery: There is severe pulmonary hypertension. The estimated pulmonary artery systolic pressure is 69 mmHg.  IVC/SVC: Intermediate venous pressure at 8 mmHg.  Pericardium: There is no pericardial effusion.     Review of Systems   Constitutional: Positive for malaise/fatigue.   Cardiovascular:  Negative for chest pain,  dyspnea on exertion and leg swelling.   Respiratory:  Negative for shortness of breath.    All other systems reviewed and are negative.    Objective:     Vital Signs (Most Recent):  Temp: 98.9 °F (37.2 °C) (03/08/24 0738)  Pulse: 93 (03/08/24 0738)  Resp: 18 (03/08/24 0738)  BP: 108/67 (03/08/24 0738)  SpO2: 95 % (03/08/24 0738) Vital Signs (24h Range):  Temp:  [96.2 °F (35.7 °C)-98.9 °F (37.2 °C)] 98.9 °F (37.2 °C)  Pulse:  [74-93] 93  Resp:  [18-20] 18  SpO2:  [94 %-98 %] 95 %  BP: ()/(56-72) 108/67   Weight: 86.5 kg (190 lb 12.8 oz)  Body mass index is 31.75 kg/m².  SpO2: 95 %       Intake/Output Summary (Last 24 hours) at 3/8/2024 0840  Last data filed at 3/8/2024 0505  Gross per 24 hour   Intake 780 ml   Output 2375 ml   Net -1595 ml       Lines/Drains/Airways       Drain  Duration                  Urethral Catheter 02/28/24 1445 Coude 20 Fr. 8 days              Peripheral Intravenous Line  Duration                  Peripheral IV - Single Lumen 03/02/24 1053 20 G Anterior;Right Upper Arm 5 days                  Significant Labs:   Recent Results (from the past 72 hour(s))   Comprehensive metabolic panel    Collection Time: 03/06/24  4:19 AM   Result Value Ref Range    Sodium Level 134 (L) 136 - 145 mmol/L    Potassium Level 3.1 (L) 3.5 - 5.1 mmol/L    Chloride 97 (L) 98 - 107 mmol/L    Carbon Dioxide 27 23 - 31 mmol/L    Glucose Level 91 82 - 115 mg/dL    Blood Urea Nitrogen 22.2 8.4 - 25.7 mg/dL    Creatinine 1.10 0.73 - 1.18 mg/dL    Calcium Level Total 7.6 (L) 8.8 - 10.0 mg/dL    Protein Total 5.5 (L) 5.8 - 7.6 gm/dL    Albumin Level 2.5 (L) 3.4 - 4.8 g/dL    Globulin 3.0 2.4 - 3.5 gm/dL    Albumin/Globulin Ratio 0.8 (L) 1.1 - 2.0 ratio    Bilirubin Total 2.1 (H) <=1.5 mg/dL    Alkaline Phosphatase 152 (H) 40 - 150 unit/L    Alanine Aminotransferase 58 (H) 0 - 55 unit/L    Aspartate Aminotransferase 49 (H) 5 - 34 unit/L    eGFR >60 mls/min/1.73/m2   Magnesium    Collection Time: 03/06/24  4:19 AM    Result Value Ref Range    Magnesium Level 2.00 1.60 - 2.60 mg/dL   CBC with Differential    Collection Time: 03/06/24  4:19 AM   Result Value Ref Range    WBC 13.48 (H) 4.50 - 11.50 x10(3)/mcL    RBC 4.07 (L) 4.70 - 6.10 x10(6)/mcL    Hgb 11.4 (L) 14.0 - 18.0 g/dL    Hct 35.1 (L) 42.0 - 52.0 %    MCV 86.2 80.0 - 94.0 fL    MCH 28.0 27.0 - 31.0 pg    MCHC 32.5 (L) 33.0 - 36.0 g/dL    RDW 15.6 11.5 - 17.0 %    Platelet 336 130 - 400 x10(3)/mcL    MPV 11.1 (H) 7.4 - 10.4 fL    NRBC% 0.0 %   Manual Differential    Collection Time: 03/06/24  4:19 AM   Result Value Ref Range    WBC 13.5 x10(3)/mcL    Neutrophils % 75 %    Lymphs % 5 %    Monocytes % 16 %    Myelocytes % 3 (H) <=0 %    Neutrophils Abs 10.125 (H) 2.1 - 9.2 x10(3)/mcL    Lymphs Abs 0.675 0.6 - 4.6 x10(3)/mcL    Monocytes Abs 2.16 (H) 0.1 - 1.3 x10(3)/mcL    Platelets Normal Normal, Adequate    RBC Morph Abnormal (A) Normal    Polychromasia 1+ (A) (none)    Anisocytosis 1+ (A) (none)    Macrocytosis 1+ (A) (none)   Gamma GT    Collection Time: 03/06/24  4:19 AM   Result Value Ref Range    Gamma Glutamyl Transferase 199 (H) 12 - 64 U/L   Lipase    Collection Time: 03/06/24  4:19 AM   Result Value Ref Range    Lipase Level 94 (H) <=60 U/L   EKG 12-lead    Collection Time: 03/06/24  5:41 AM   Result Value Ref Range    QRS Duration 182 ms    [facility] QTC Calculation 661 ms   Comprehensive metabolic panel    Collection Time: 03/07/24  4:06 AM   Result Value Ref Range    Sodium Level 135 (L) 136 - 145 mmol/L    Potassium Level 3.5 3.5 - 5.1 mmol/L    Chloride 100 98 - 107 mmol/L    Carbon Dioxide 27 23 - 31 mmol/L    Glucose Level 94 82 - 115 mg/dL    Blood Urea Nitrogen 20.2 8.4 - 25.7 mg/dL    Creatinine 1.15 0.73 - 1.18 mg/dL    Calcium Level Total 7.4 (L) 8.8 - 10.0 mg/dL    Protein Total 4.9 (L) 5.8 - 7.6 gm/dL    Albumin Level 2.2 (L) 3.4 - 4.8 g/dL    Globulin 2.7 2.4 - 3.5 gm/dL    Albumin/Globulin Ratio 0.8 (L) 1.1 - 2.0 ratio    Bilirubin Total 1.9 (H) <=1.5  mg/dL    Alkaline Phosphatase 123 40 - 150 unit/L    Alanine Aminotransferase 52 0 - 55 unit/L    Aspartate Aminotransferase 58 (H) 5 - 34 unit/L    eGFR >60 mls/min/1.73/m2   CBC with Differential    Collection Time: 03/07/24  4:07 AM   Result Value Ref Range    WBC 10.84 4.50 - 11.50 x10(3)/mcL    RBC 3.89 (L) 4.70 - 6.10 x10(6)/mcL    Hgb 10.7 (L) 14.0 - 18.0 g/dL    Hct 33.5 (L) 42.0 - 52.0 %    MCV 86.1 80.0 - 94.0 fL    MCH 27.5 27.0 - 31.0 pg    MCHC 31.9 (L) 33.0 - 36.0 g/dL    RDW 15.9 11.5 - 17.0 %    Platelet 371 130 - 400 x10(3)/mcL    MPV 10.8 (H) 7.4 - 10.4 fL    NRBC% 0.2 %   Manual Differential    Collection Time: 03/07/24  4:07 AM   Result Value Ref Range    WBC 10.84 x10(3)/mcL    Neutrophils % 85 %    Lymphs % 4 %    Monocytes % 6 %    Eosinophils % 3 %    Basophils % 1 %    Myelocytes % 1 (H) <=0 %    Neutrophils Abs 9.214 (H) 2.1 - 9.2 x10(3)/mcL    Lymphs Abs 0.4336 (L) 0.6 - 4.6 x10(3)/mcL    Monocytes Abs 0.6504 0.1 - 1.3 x10(3)/mcL    Eosinophils Abs 0.3252 0 - 0.9 x10(3)/mcL    Basophils Abs 0.1084 0 - 0.2 x10(3)/mcL    Platelets Normal Normal, Adequate    RBC Morph Normal Normal   Echo Saline Bubble? No    Collection Time: 03/07/24  2:09 PM   Result Value Ref Range    Celis's Biplane MOD Ejection Fraction 46 %    LV Systolic Volume 32.60 mL    LV Systolic Volume Index 16.4 mL/m2    LV Diastolic Volume 60.10 mL    LV Diastolic Volume Index 30.20 mL/m2   Comprehensive metabolic panel    Collection Time: 03/08/24  4:28 AM   Result Value Ref Range    Sodium Level 132 (L) 136 - 145 mmol/L    Potassium Level 3.3 (L) 3.5 - 5.1 mmol/L    Chloride 95 (L) 98 - 107 mmol/L    Carbon Dioxide 27 23 - 31 mmol/L    Glucose Level 93 82 - 115 mg/dL    Blood Urea Nitrogen 20.6 8.4 - 25.7 mg/dL    Creatinine 1.21 (H) 0.73 - 1.18 mg/dL    Calcium Level Total 8.0 (L) 8.8 - 10.0 mg/dL    Protein Total 5.3 (L) 5.8 - 7.6 gm/dL    Albumin Level 2.4 (L) 3.4 - 4.8 g/dL    Globulin 2.9 2.4 - 3.5 gm/dL     Albumin/Globulin Ratio 0.8 (L) 1.1 - 2.0 ratio    Bilirubin Total 1.8 (H) <=1.5 mg/dL    Alkaline Phosphatase 121 40 - 150 unit/L    Alanine Aminotransferase 59 (H) 0 - 55 unit/L    Aspartate Aminotransferase 75 (H) 5 - 34 unit/L    eGFR >60 mls/min/1.73/m2   CBC with Differential    Collection Time: 03/08/24  4:28 AM   Result Value Ref Range    WBC 12.09 (H) 4.50 - 11.50 x10(3)/mcL    RBC 4.10 (L) 4.70 - 6.10 x10(6)/mcL    Hgb 11.2 (L) 14.0 - 18.0 g/dL    Hct 35.6 (L) 42.0 - 52.0 %    MCV 86.8 80.0 - 94.0 fL    MCH 27.3 27.0 - 31.0 pg    MCHC 31.5 (L) 33.0 - 36.0 g/dL    RDW 15.8 11.5 - 17.0 %    Platelet 466 (H) 130 - 400 x10(3)/mcL    MPV 10.8 (H) 7.4 - 10.4 fL    Neut % 71.1 %    Lymph % 9.4 %    Mono % 12.9 %    Eos % 2.2 %    Basophil % 0.4 %    Lymph # 1.14 0.6 - 4.6 x10(3)/mcL    Neut # 8.59 2.1 - 9.2 x10(3)/mcL    Mono # 1.56 (H) 0.1 - 1.3 x10(3)/mcL    Eos # 0.27 0 - 0.9 x10(3)/mcL    Baso # 0.05 <=0.2 x10(3)/mcL    IG# 0.48 (H) 0 - 0.04 x10(3)/mcL    IG% 4.0 %    NRBC% 0.0 %     Telemetry: Afib    Physical Exam  Vitals and nursing note reviewed.   Constitutional:       General: He is not in acute distress.     Appearance: Normal appearance.   HENT:      Head: Normocephalic.      Mouth/Throat:      Mouth: Mucous membranes are moist.   Eyes:      Extraocular Movements: Extraocular movements intact.      Conjunctiva/sclera: Conjunctivae normal.   Cardiovascular:      Rate and Rhythm: Normal rate. Rhythm irregular.      Pulses: Normal pulses.      Heart sounds: No murmur heard.  Pulmonary:      Effort: Pulmonary effort is normal. No respiratory distress.      Breath sounds: Normal breath sounds.      Comments: NC O2  Abdominal:      Palpations: Abdomen is soft.   Genitourinary:     Comments: Urinary Catheter   Musculoskeletal:         General: Normal range of motion.   Skin:     General: Skin is warm.      Comments: Midline Sternotomy YVETTE with No Sign of Bleed/Infection  Right lower abdominal site oozing ss  fluid. Dressing intact   Neurological:      General: No focal deficit present.      Mental Status: He is alert. Mental status is at baseline.   Psychiatric:         Mood and Affect: Mood normal.       Current Inpatient Medications:  Current Facility-Administered Medications:     acetaminophen oral solution 650 mg, 650 mg, Per OG tube, Q6H PRN, Vasile Carter PA-C    acetaminophen tablet 650 mg, 650 mg, Oral, Q6H PRN, Pablo Yepez MD, 650 mg at 03/07/24 0907    acetaminophen-codeine 300-30mg per tablet 1 tablet, 1 tablet, Oral, Q4H PRN, Chantelle Calle FNP, 1 tablet at 03/06/24 0542    albumin human 5% bottle 12.5 g, 12.5 g, Intravenous, PRN, Vasile Carter PA-C, Stopped at 02/28/24 1442    allopurinol split tablet 50 mg, 50 mg, Oral, Daily, Tanner Rdz MD, 50 mg at 03/07/24 1356    amiodarone tablet 200 mg, 200 mg, Oral, Daily, Lexy Palomares FNP    aspirin EC tablet 81 mg, 81 mg, Oral, Daily, Vasile Carter PA-C, 81 mg at 03/07/24 0907    atorvastatin tablet 40 mg, 40 mg, Oral, QHS, Tanner Rdz MD, 40 mg at 03/07/24 2028    ciprofloxacin (CIPRO)400mg/200ml D5W IVPB 400 mg, 400 mg, Intravenous, Q12H, Stan Lazar MD, Stopped at 03/08/24 0340    dextrose 10% bolus 125 mL 125 mL, 12.5 g, Intravenous, PRN, Pablo Yepez MD    dextrose 10% bolus 250 mL 250 mL, 25 g, Intravenous, PRN, Pablo Yepez MD    docusate sodium capsule 100 mg, 100 mg, Oral, BID, Vasile Carter PA-C, 100 mg at 03/07/24 0907    electrolyte-A infusion, , Intravenous, PRN, Pablo Yepez MD, Stopped at 02/28/24 0700    enoxaparin injection 40 mg, 40 mg, Subcutaneous, Daily, Daniela, Cherry E, FNP    ferrous sulfate tablet 1 each, 1 tablet, Oral, Every other day, Tanner Rdz MD, 1 each at 03/06/24 0844    folic acid tablet 1 mg, 1 mg, Oral, Daily, Vasile Carter PA-C, 1 mg at 03/07/24 0907    furosemide tablet 20 mg, 20 mg, Oral, BID, Lexy Palomares FNP, 20 mg at 03/07/24 1711     heparin, porcine (PF) 100 unit/mL injection flush 500 Units, 5 mL, Intravenous, On Call Procedure, Pablo Yepez MD    heparin, porcine (PF) 100 unit/mL injection flush 500 Units, 5 mL, Intravenous, On Call Procedure, Nita Contreras MD    lactulose 10 gram/15 ml solution 20 g, 20 g, Oral, Q6H PRN, Vasile Carter PA-C    lisinopriL tablet 2.5 mg, 2.5 mg, Oral, Daily, Cherry Suarez, FNP    loperamide capsule 2 mg, 2 mg, Oral, Continuous PRN, Vasile Carter PA-C, 2 mg at 03/02/24 1253    melatonin tablet 6 mg, 6 mg, Oral, Nightly PRN, Tanner Rdz MD, 6 mg at 03/05/24 0320    metoclopramide injection 5 mg, 5 mg, Intravenous, Q6H PRN, Vasile Carter PA-C    metoprolol tartrate (LOPRESSOR) split tablet 12.5 mg, 12.5 mg, Oral, BID, Lexy Palomares FNP, 12.5 mg at 03/07/24 0908    nitroGLYCERIN SL tablet 0.4 mg, 0.4 mg, Sublingual, Q5 Min PRN, Tanner Rdz MD    ondansetron disintegrating tablet 8 mg, 8 mg, Oral, Q8H PRN, Tanner Rdz MD, 8 mg at 03/06/24 0822    ondansetron injection 8 mg, 8 mg, Intravenous, Q6H PRN, Pablo Yepez MD, 8 mg at 03/07/24 0908    pantoprazole EC tablet 40 mg, 40 mg, Oral, Daily, Tanner Rdz MD, 40 mg at 03/07/24 0908    polyethylene glycol packet 17 g, 17 g, Oral, Daily, Tanner Rdz MD, 17 g at 03/07/24 0908    simethicone chewable tablet 80 mg, 1 tablet, Oral, TID PRN, Tanner Rdz MD, 80 mg at 03/07/24 0908    sodium chloride 0.9% flush 10 mL, 10 mL, Intravenous, PRN, Tanner Rdz MD    sodium chloride 0.9% flush 10 mL, 10 mL, Intravenous, PRN, Millie Jimenez, GRANT    spironolactone split tablet 12.5 mg, 12.5 mg, Oral, Daily, Cherry Suarez FNP    sucralfate tablet 1 g, 1 g, Oral, QID (AC & HS), Vasile Carter, JENN, 1 g at 03/07/24 2027  VTE Risk Mitigation (From admission, onward)           Ordered     enoxaparin injection 40 mg  Daily         03/07/24 0559     IP VTE HIGH RISK  PATIENT  Once         03/02/24 0759     heparin, porcine (PF) 100 unit/mL injection flush 500 Units  On Call Procedure         02/27/24 0718     heparin, porcine (PF) 100 unit/mL injection flush 500 Units  On Call Procedure         02/27/24 0257     Place SUSIE hose  Until discontinued         02/22/24 1458     Place sequential compression device  Until discontinued         02/21/24 2057                  Assessment:   CAD (Multivessel)    - Status Post CABG (3V) LIMA to LAD, SVG to OM1, SVG to RCA (2.27.24)    - RHC/Impella Placement and Normangee Catheter (2.23.24)- Impella Removal/Femoral Artery Repair in Surgery on 2.27.24    - C (2.19.24): pLAD lesion 70%, oLCx 80%, OM1 80%, OM2 1 lesion 70% 2nd lesion 50%, mLAD 50%, pRCA 60%  VHD/AS     - Status Post Bio AVR (Epic max 23mm) (2.27.24)    - ECHO (2.16.24): Aortic Valve: There is moderate aortic valve sclerosis. Severely restricted motion. There is severe stenosis. Aortic valve area by VTI is 0.66 cm². Aortic valve peak velocity is 4.45 m/s. Mean gradient is 50 mmHg. The dimensionless index is 0.17.   Cardiogenic Shock (Post Cardiotomy) - Off Vasopressor Support - Resolved     - History of Hypertension   Ischemic Cardiomyopathy/EF 40%  Elevated LFTs - Improving   Pulmonary HTN    - ECHO PASP 69mmHg     - RHC (2.22.24): PA 84/34, PCWP 24 mmHg  Hematuria 2/2 Traumatic/Difficult Indwelling Catheter Placement (Resolved)  Urethral stricture s/p dilatation 02.23.24  PAF - Currently CVR    - Status Post Bedside Cardioversion x 2 on 2.27.24 (Both Unsuccessful)    - Status Post MOISE Ligation (2.27.24)    - CHADsVASc - 5 Points - 7.2% Stroke Risk per Year   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% - symptomatically improving    - ECHO (2.16.24): EF 40%, Grade II DD    - Small Pleural Effusions on CT Imaging (2.22.24)  Left Bundle Branch Block   VHD    - ECHO (2.16.24): Severe AS, mild MR, mild to moderate TR  JEAN-CLAUDE/CKD Stage II - Improved     - Baseline Cr  1.6  Anemia- stable    - Status Post Transfusion on 2.28.24 & 2.29.24  Thrombocytopenia - Resolved   Leukocytosis - persistent  --groin wound culture + GNR  Hypokalemia    Plan:   Volume status improving. Continue Lasix 20mg BID.   Optimize HF GDMT-- Change Metoprolol tartrate to succinate 12.5 mg daily. Start Lisinopril 2.5 mg daily and spironolactone 12.5 mg daily. Optimize as tolerated  Hospital medicine managing ABX. Consider wound care consult  Accurate I&Os and Daily Weights   Continue amiodarone and Metoprolol succinate. Defer Anticoagulation (Re: AF) secondary to MOISE Ligation  Monitor liver enzymes, if continue to rise, will need to DC amiodarone  Aggressive IS Usage and Mobilization (Deconditioned/Working with Therapy)  Replete K+  Keep K > 4.0 and Mg > 2.0   Labs in AM: CBC, CMP, and Mag     Will follow along      BLANQUITA Perry  Cardiology  [facility]   03/08/2024  Physician addendum:  I have seen and examined this patient as a split-shared visit with the ARLEY d/t complicated medical management of above problems written in assessment and high acuity requiring physician expertise in medical decision-making. I performed the substantive portion of the history and exam. Above medical decision-making is also formulated by me.    Cardiovascular exam:  S1, S2  Lungs:  fine crackles at bases.  Extremities:  1+ edema bilaterally    Plan:  Medications as above.  Continue GDM T.      All George MD  Cardiologist

## 2024-03-08 NOTE — PT/OT/SLP PROGRESS
Occupational Therapy   Treatment    Name: Brain Snyder  MRN: [id number]  Admitting Diagnosis:  CAD (coronary artery disease)  10 Days Post-Op    Recommendations:     Recommended therapy intensity at discharge: High Intensity Therapy   Discharge Equipment Recommendations:  to be determined by next level of care  Barriers to discharge:   (ongoing medical needs)    Assessment:     Brain Snyder is a 78 y.o. male with a medical diagnosis of CAD (coronary artery disease) s/p CABGx3. Pt agreeable to working with therapy and tolerated session fair. Pt reporting intense pain with light touch to BLE's (RN notified). Performance deficits affecting function are weakness, impaired self care skills, impaired functional mobility, gait instability, impaired balance, impaired cognition, decreased upper extremity function, decreased lower extremity function, decreased safety awareness.     Rehab Prognosis:  Fair; patient would benefit from acute skilled OT services to address these deficits and reach maximum level of function.       Plan:     Patient to be seen 5 x/week to address the above listed problems via self-care/home management, therapeutic activities, therapeutic exercises  Plan of Care Expires: 04/01/24  Plan of Care Reviewed with: patient    Subjective     Pain/Comfort:  Pain Rating 1:  (Rating not porvided, pt reporting intense pain to touch of BLE's)  Location - Side 1: Bilateral  Location 1: leg  Pain Addressed 1: Reposition, Distraction, Nurse notified    Objective:     Communicated with: Nurse prior to session.  Patient found HOB elevated with telemetry, pulse ox (continuous), gustafson catheter upon OT entry to room.    General Precautions: Standard, fall, sternal    Orthopedic Precautions:N/A  Braces: N/A  Respiratory Status: Room air     Occupational Performance:     Bed Mobility:    Patient completed Supine to Sit with maximal assistance and 2 persons     Functional Mobility/Transfers:  Patient completed Sit <>  Stand Transfer with maximal assistance  with  rolling walker   Functional Mobility: Pt required cues for safety with hand placement while maintaining sternal precautions and RW management during functional transfers and mobility.    Activities of Daily Living:  Lower Body Dressing: dependence donning socks while in supine  Toileting: dependence gustafson catheter in place    Therapeutic Activities:  Pt was overall max A x2 for bed mobility and Max A for sit>stand with RW. Pt was Mod A for bed>chair transfer with RW requiring cues for safety with aligning hips with seat before sitting.      Therapeutic Positioning    OT interventions performed during the course of today's session in an effort to prevent and/or reduce acquired pressure injuries:   Education was provided on benefits of and recommendations for therapeutic positioning     Findings:  Visible skin intact    Patient Education:  Patient provided with verbal education and demonstrations education regarding OT role/goals/POC, post op precautions, fall prevention, and safety awareness.  Understanding was verbalized, however additional teaching warranted.      Patient left up in chair with all lines intact, call button in reach, and nurse notified.    GOALS:   Multidisciplinary Problems       Occupational Therapy Goals          Problem: Occupational Therapy    Goal Priority Disciplines Outcome Interventions   Occupational Therapy Goal     OT, PT/OT Ongoing, Progressing    Description: Goals to be met by: 4/1/24     Patient will increase functional independence with ADLs by performing:    UE Dressing with min A   Grooming while standing at sink with Minimal Assistance.  Toileting from toilet with Minimal Assistance for hygiene and clothing management.   Sitting at edge of bed x30 minutes with Minimal Assistance.  Toilet transfer to bedside commode with Minimal Assistance.                         Time Tracking:     OT Date of Treatment: 03/08/24  OT Start Time:  1300  OT Stop Time: 1327  OT Total Time (min): 27 min    Billable Minutes:Therapeutic Activity 27 minutes    OT/YVETTE: OT     Number of YVETTE visits since last OT visit: 3    3/8/2024

## 2024-03-09 LAB
ALBUMIN SERPL-MCNC: 2.4 G/DL (ref 3.4–4.8)
ALBUMIN/GLOB SERPL: 0.7 RATIO (ref 1.1–2)
ALP SERPL-CCNC: 114 UNIT/L (ref 40–150)
ALT SERPL-CCNC: 78 UNIT/L (ref 0–55)
AST SERPL-CCNC: 109 UNIT/L (ref 5–34)
BASOPHILS # BLD AUTO: 0.07 X10(3)/MCL
BASOPHILS NFR BLD AUTO: 0.5 %
BILIRUB SERPL-MCNC: 1.9 MG/DL
BUN SERPL-MCNC: 20.3 MG/DL (ref 8.4–25.7)
CALCIUM SERPL-MCNC: 8 MG/DL (ref 8.8–10)
CHLORIDE SERPL-SCNC: 96 MMOL/L (ref 98–107)
CO2 SERPL-SCNC: 28 MMOL/L (ref 23–31)
CREAT SERPL-MCNC: 1.3 MG/DL (ref 0.73–1.18)
EOSINOPHIL # BLD AUTO: 0.31 X10(3)/MCL (ref 0–0.9)
EOSINOPHIL NFR BLD AUTO: 2.3 %
ERYTHROCYTE [DISTWIDTH] IN BLOOD BY AUTOMATED COUNT: 15.6 % (ref 11.5–17)
GFR SERPLBLD CREATININE-BSD FMLA CKD-EPI: 57 MLS/MIN/1.73/M2
GLOBULIN SER-MCNC: 3.3 GM/DL (ref 2.4–3.5)
GLUCOSE SERPL-MCNC: 96 MG/DL (ref 82–115)
HCT VFR BLD AUTO: 37.7 % (ref 42–52)
HGB BLD-MCNC: 12 G/DL (ref 14–18)
IMM GRANULOCYTES # BLD AUTO: 0.37 X10(3)/MCL (ref 0–0.04)
IMM GRANULOCYTES NFR BLD AUTO: 2.8 %
LYMPHOCYTES # BLD AUTO: 1.35 X10(3)/MCL (ref 0.6–4.6)
LYMPHOCYTES NFR BLD AUTO: 10.2 %
MAGNESIUM SERPL-MCNC: 2 MG/DL (ref 1.6–2.6)
MCH RBC QN AUTO: 27.1 PG (ref 27–31)
MCHC RBC AUTO-ENTMCNC: 31.8 G/DL (ref 33–36)
MCV RBC AUTO: 85.1 FL (ref 80–94)
MONOCYTES # BLD AUTO: 1.64 X10(3)/MCL (ref 0.1–1.3)
MONOCYTES NFR BLD AUTO: 12.4 %
NEUTROPHILS # BLD AUTO: 9.46 X10(3)/MCL (ref 2.1–9.2)
NEUTROPHILS NFR BLD AUTO: 71.8 %
NRBC BLD AUTO-RTO: 0 %
PLATELET # BLD AUTO: 502 X10(3)/MCL (ref 130–400)
PMV BLD AUTO: 10.4 FL (ref 7.4–10.4)
POTASSIUM SERPL-SCNC: 3.7 MMOL/L (ref 3.5–5.1)
PROT SERPL-MCNC: 5.7 GM/DL (ref 5.8–7.6)
RBC # BLD AUTO: 4.43 X10(6)/MCL (ref 4.7–6.1)
SODIUM SERPL-SCNC: 132 MMOL/L (ref 136–145)
WBC # SPEC AUTO: 13.2 X10(3)/MCL (ref 4.5–11.5)

## 2024-03-09 PROCEDURE — 25000003 PHARM REV CODE 250: Performed by: INTERNAL MEDICINE

## 2024-03-09 PROCEDURE — 25000003 PHARM REV CODE 250: Performed by: NURSE PRACTITIONER

## 2024-03-09 PROCEDURE — 25000003 PHARM REV CODE 250

## 2024-03-09 PROCEDURE — 63600175 PHARM REV CODE 636 W HCPCS: Performed by: INTERNAL MEDICINE

## 2024-03-09 PROCEDURE — 25000003 PHARM REV CODE 250: Performed by: PHYSICIAN ASSISTANT

## 2024-03-09 PROCEDURE — 63600175 PHARM REV CODE 636 W HCPCS: Performed by: NURSE PRACTITIONER

## 2024-03-09 PROCEDURE — 21400001 HC TELEMETRY ROOM

## 2024-03-09 PROCEDURE — 80053 COMPREHEN METABOLIC PANEL: CPT | Performed by: INTERNAL MEDICINE

## 2024-03-09 PROCEDURE — 85025 COMPLETE CBC W/AUTO DIFF WBC: CPT | Performed by: INTERNAL MEDICINE

## 2024-03-09 PROCEDURE — 83735 ASSAY OF MAGNESIUM: CPT | Performed by: INTERNAL MEDICINE

## 2024-03-09 PROCEDURE — 27000221 HC OXYGEN, UP TO 24 HOURS

## 2024-03-09 RX ADMIN — CIPROFLOXACIN 400 MG: 2 INJECTION, SOLUTION INTRAVENOUS at 01:03

## 2024-03-09 RX ADMIN — PANTOPRAZOLE SODIUM 40 MG: 40 TABLET, DELAYED RELEASE ORAL at 08:03

## 2024-03-09 RX ADMIN — FOLIC ACID 1 MG: 1 TABLET ORAL at 08:03

## 2024-03-09 RX ADMIN — AMIODARONE HYDROCHLORIDE 200 MG: 200 TABLET ORAL at 08:03

## 2024-03-09 RX ADMIN — SUCRALFATE 1 G: 1 TABLET ORAL at 05:03

## 2024-03-09 RX ADMIN — LISINOPRIL 2.5 MG: 2.5 TABLET ORAL at 08:03

## 2024-03-09 RX ADMIN — ASPIRIN 81 MG: 81 TABLET, COATED ORAL at 08:03

## 2024-03-09 RX ADMIN — ATORVASTATIN CALCIUM 40 MG: 40 TABLET, FILM COATED ORAL at 09:03

## 2024-03-09 RX ADMIN — ALLOPURINOL 50 MG: 300 TABLET ORAL at 08:03

## 2024-03-09 RX ADMIN — HYDROCODONE BITARTRATE AND ACETAMINOPHEN 1 TABLET: 5; 325 TABLET ORAL at 09:03

## 2024-03-09 RX ADMIN — FUROSEMIDE 20 MG: 20 TABLET ORAL at 08:03

## 2024-03-09 RX ADMIN — HYDROCODONE BITARTRATE AND ACETAMINOPHEN 1 TABLET: 5; 325 TABLET ORAL at 03:03

## 2024-03-09 RX ADMIN — ENOXAPARIN SODIUM 40 MG: 40 INJECTION SUBCUTANEOUS at 05:03

## 2024-03-09 RX ADMIN — Medication 6 MG: at 09:03

## 2024-03-09 RX ADMIN — SPIRONOLACTONE 12.5 MG: 25 TABLET ORAL at 08:03

## 2024-03-09 RX ADMIN — HYDROCODONE BITARTRATE AND ACETAMINOPHEN 1 TABLET: 5; 325 TABLET ORAL at 05:03

## 2024-03-09 RX ADMIN — SUCRALFATE 1 G: 1 TABLET ORAL at 09:03

## 2024-03-09 NOTE — PROGRESS NOTES
[facility] Hospital Medicine Progress Note        Chief Complaint: Inpatient Follow-up for      HPI per admitting team:   78-year-old male with a history of aortic insufficiency/stenosis, CAD, CKD IIIb, HTN AFib on Eliquis admitted to [facility] 02/15/2024 with chest pain, found to have NSTEMI (peak troponin 0.17) and underwent C 02/20/2024 showing severe multivessel stenosis and therefore was transferred to [facility] for CABG evaluation. Cardiology was consulted Patient had Impella placed on 02/23 and was admitted to the ICU.  Was started on aggressive diuresis with IV Lasix.  CV surgery was consulted.  Urology was consulted for hematuria; Nguyen catheter placed over guidewire with dilation secondary to urethral strictures. IV Heparin infusion was initiated per CIS but held due to hematuria/hemoptysis on 02/24.  He was also noted to have an JEAN-CLAUDE. Received 2 units PRBCs on 02/26 and was planned for high-risk PCI on 02/26 which was later canceled.  Underwent CABG x 3 2/27 (LIMA to LAD, RSVG to OM 1, R SVG to RCA, bioprosthetic AVR).  Remained on mechanical ventilation thereafter.  Patient noted to have tachycardia with atrial fibrillation/RVR requiring IV amiodarone along with pressors (norepinephrine/Milrinone) for hypotension. Patient underwent cardioversion x 2 with minimal improvement in HR/BP.  Patient self-extubated overnight on 02/29 and was noted to have adequate saturations on 2 L O2 via NC thereafter.  PT/OT consulted.  Renal function noted to be improving. Continued on IV diuresis as he appeared to be significantly volume overloaded postoperatively along with elevated pulmonary artery wedge pressures.  Started on p.o. amiodarone taper on 03/03.  Patient noted to have drainage from right groin wound and started on vancomycin on 03/04.  Wound culture grew Pseudomonas.  Chest tubes discontinued on 03/02.  Downgraded from ICU on 03/02.  CIS and CV surgery continued following patient.  CV  surgery signed off on 03/06 and discontinued vancomycin.  Patient was placed on oral Levaquin.  Hospital medicine consulted on 03/06 for assistance with medical management and DC planning.     Interval Hx:   patient was seen at bedside, family member at bedside   No overnight events reported   Patient complaining of right-sided flank pain today   Bilateral lower extremity pain is improved today  Arterial ultrasound of the bilateral lower extremity was normal with good flow, uric acid 2.4, within good limits   Vitals with low normal blood pressure, heart rates in the 70s, saturating on 2 L of oxygen   Labs significant for white cell count of 13.2, remains persistently mildly elevated, hemoglobin levels of 12, platelets 5 Will to also creeping upwards   Creatinine levels of 1.3, stable, sodium 132   AST ALT of 109/78, creeping upwards   Prior abdominal ultrasound was normal  CT abdomen of 3/6 shows normal gallbladder, normal pancreas, right groin infection with stranding extending to the abdominal aorta, hematoma in the right groin and recommended a follow-up to evaluate for progression.       Case was discussed with patient's nurse and  on the floor.     Objective/physical exam:  General: alert male lying comfortably in bed, in no acute distress  HENT:  NC in place; oral and oropharyngeal mucosa moist, pink, with no erythema or exudates, no ear pain or discharge  Neck: normal neck movement, no lymph nodes or swellings, no JVD or Carotid bruit  Respiratory: clear breathing sounds bilaterally, no crackles, rales, ronchi or wheezes  Cardiovascular: clear S1 and S2, no murmurs, rubs or gallops  Peripheral Vascular: no lesions, ulcers or erosions, normal peripheral pulses; 2+ B/L pedal edema  Gastrointestinal: soft, non-distended abdomen with TTP in epigastric, hypogastric, RUQ/LUQ regions; no guarding, rigidity or rebound tenderness, normal bowel sounds; right groin incision wound noted to be erythematous  with some serum bilirubin drainage with overlying dressing  Integumentary: normal skin color, no rashes or lesions  Neuro: AAO x 3; motor strength 5/5 in B/L UEs & LEs; sensation intact to gross and fine touch B/L; CN II-XII grossly intact        Assessment/Plan:  Right flank pain- due to right-sided lower abdomen hematoma-   R Groin Purulent Cellulitis  Transaminitis, trending upwards ?  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  Trend CBC if continues to be elevated will consult ID for further antibiotic recommendations/guidance   Trend liver enzymes- likely due to congestive hepatopathy   Patient currently on p.o. Lasix 20 mg b.i.d., blood pressure is soft no room for increment in Lasix doses  Ultrasound abdomen is normal  Continue atorvastatin for now,  CIS on board; follow recommendations   Patient saturating well on 2 L O2 via NC   Continued on Lasix 20 mg b.i.d. p.o.   Continued on p.o. amiodarone taper  Continued on allopurinol 50 mg daily, aspirin 81 mg, atorvastatin 40 mg., FeSO4 every other day, folic acid 1 mg, lisinopril 2.5 mg daily, metoprolol tartrate 12.5 mg b.i.d., Protonix 40 mg daily, , sucralfate 1 g q.i.d.  Discontinue laxatives patient complaining of diarrhea  P.o. Norco 5 mg q.6 p.r.n. for pain  PT/OT recommending high-intensity therapy   Cm on board for  placement      VTE prophylaxis:  Lovenox     Patient condition:  guarded     Anticipated discharge and Disposition:     Pending     All diagnosis and differential diagnosis have been reviewed; assessment and plan has been documented; I have personally reviewed the labs and test results that are presently available; I have reviewed the patients medication list; I have reviewed the consulting providers response and recommendations. I have reviewed or attempted to review medical records based upon their availability     All of the patient's questions have been  addressed and answered. Patient's is agreeable to the above stated plan. I will continue to monitor closely and make adjustments to medical management as needed.         VITAL SIGNS: 24 HRS MIN & MAX LAST   Temp  Min: 97.3 °F (36.3 °C)  Max: 98.5 °F (36.9 °C) 97.3 °F (36.3 °C)   BP  Min: 83/58  Max: 122/69 95/63   Pulse  Min: 74  Max: 93  74   Resp  Min: 18  Max: 20 18   SpO2  Min: 94 %  Max: 100 % 99 %     I have reviewed the following labs:  Recent Labs   Lab 03/07/24 0407 03/08/24 0428 03/09/24  0704   WBC 10.84  10.84 12.09* 13.20*   RBC 3.89* 4.10* 4.43*   HGB 10.7* 11.2* 12.0*   HCT 33.5* 35.6* 37.7*   MCV 86.1 86.8 85.1   MCH 27.5 27.3 27.1   MCHC 31.9* 31.5* 31.8*   RDW 15.9 15.8 15.6    466* 502*   MPV 10.8* 10.8* 10.4     Recent Labs   Lab 03/05/24  0415 03/06/24 0419 03/07/24 0406 03/08/24 0428 03/09/24  0704   * 134* 135* 132* 132*   K 3.1* 3.1* 3.5 3.3* 3.7   CO2 27 27 27 27 28   BUN 23.5 22.2 20.2 20.6 20.3   CREATININE 1.16 1.10 1.15 1.21* 1.30*   CALCIUM 7.5* 7.6* 7.4* 8.0* 8.0*   MG 1.90 2.00  --   --  2.00   ALBUMIN 2.3* 2.5* 2.2* 2.4* 2.4*   ALKPHOS 171* 152* 123 121 114   ALT 71* 58* 52 59* 78*   AST 69* 49* 58* 75* 109*   BILITOT 2.1* 2.1* 1.9* 1.8* 1.9*     Microbiology Results (last 7 days)       Procedure Component Value Units Date/Time    Wound Culture [[id number]]  (Abnormal)  (Susceptibility) Collected: 03/03/24 1920     Order Status: Completed Specimen: Wound from Groin Updated: 03/07/24 1053     Wound Culture Many Serratia marcescens      Many Pseudomonas aeruginosa             See below for Radiology    Scheduled Med:   allopurinoL  50 mg Oral Daily    amiodarone  200 mg Oral Daily    aspirin  81 mg Oral Daily    atorvastatin  40 mg Oral QHS    ciprofloxacin  400 mg Intravenous Q12H    enoxparin  40 mg Subcutaneous Daily    ferrous sulfate  1 tablet Oral Every other day    folic acid  1 mg Oral Daily    furosemide  20 mg Oral BID    lisinopriL  2.5 mg Oral Daily    metoprolol succinate  12.5 mg Oral Daily    pantoprazole  40 mg Oral Daily    spironolactone  12.5 mg Oral Daily    sucralfate  1 g Oral QID (AC & HS)      Continuous Infusions:   loperamide        PRN Meds:  acetaminophen, acetaminophen, albumin human 5%, dextrose 10%, dextrose 10%, electrolyte-A, heparin, porcine (PF), heparin, porcine (PF), HYDROcodone-acetaminophen, lactulose 10 gram/15 ml, loperamide, melatonin, metoclopramide, nitroGLYCERIN, ondansetron, ondansetron, simethicone, sodium chloride 0.9%, sodium chloride 0.9%     Assessment/Plan:      VTE prophylaxis:     Patient condition:  Stable/Fair/Guarded/ Serious/ Critical    Anticipated discharge and Disposition:         All diagnosis and differential diagnosis have been reviewed; assessment and plan has been documented; I have personally reviewed the labs and test results that are presently available; I have reviewed the patients medication list; I have reviewed the consulting providers response and recommendations. I have reviewed or attempted to review medical records based upon their availability    All of the patient's questions have been  addressed and answered. Patient's is agreeable to the above stated plan. I will continue to monitor closely and make adjustments to medical management as needed.  _____________________________________________________________________    Nutrition  Status:    Radiology:  I have personally reviewed the following imaging and agree with the radiologist.     Echo Saline Bubble? No    Left Ventricle: The left ventricle is normal in size. Mildly increased   wall thickness. Severe global hypokinesis present. Septal motion is   consistent with post-operative status. There is severely reduced systolic   function with a visually estimated ejection fraction of less than 30%.   There is normal diastolic function.    Right Ventricle: Normal right ventricular cavity size. Systolic   function is borderline low.    Left Atrium: Left atrium is mildly dilated.    Right Atrium: Right atrium is mildly dilated.    Aortic Valve: There is a bioprosthetic valve in the aortic position.    Mitral Valve: There is mild regurgitation.    Pericardium: There is a small effusion. Left pleural effusion.  X-Ray Chest PA And Lateral  Narrative: EXAMINATION:  XR CHEST PA AND LATERAL    CLINICAL HISTORY:  po;, .    COMPARISON:  March 3, 2024    FINDINGS:  Examination reveals cardiomediastinal silhouette and pleuroparenchymal changes to be essentially unchanged as compared with the previous examination when accounting for difference in technique.  Impression: No significant change    Electronically signed by: Ananth Coker  Date:    03/07/2024  Time:    07:58      Stan Lazar MD  Department of Hospital Medicine   [facility]   03/09/2024

## 2024-03-09 NOTE — PROGRESS NOTES
"  [facility]    Cardiology  Progress Note    Patient Name: Brain Snyder  MRN: [id number]  Admission Date: 2/21/2024  Hospital Length of Stay: 17 days  Code Status: Full Code   Attending Physician: Ajit Metcalf MD   Primary Care Physician: Nidia Shepherd NP  Expected Discharge Date:   Principal Problem:CAD (coronary artery disease)    Subjective:   Reason for Consult:  CAD     HPI: 78-year-old female that is unknown to [facility] with a PMHx of PAF on Eliquis Aortic insufficiency, MV CAD, HTN. He is Moh[place] speaking and an  was used for interview. He was orginally admitted to [facility] on 2.15.24 with CP & NSTEMI and underwent a LHC on 2.20.24 taht showed MV CAD (reported noted below) He was transferred to [facility] on 2.21.24 for a CABG/AVR evaluation. On arrive he was hemodynamically stable on a heparin infusion. He denied chest pain, SOB, and nausea on current exam, [facility] was consulted for CAD    Hospital Course:   2.23.24: Patient seen resting in bed. He denies SOB or CP. He remains on heparin infusion. Family at bedside   2.24.24: NAD. S/p Impella Placement/[place]-Chelo Catheter. "I am ok." + Blood Clots from Nose/Mouth, Hematuria 2/2 traumatic Indwelling Catheter Placement. Heparin Drip per Protocol. Impella P5  2.25.24: NAD. "I'm ok." Denies CP, SOB and Palps. PA Pressure Reading is not Accurate. CVP 5. Impella at P5. R Groin Impella P7. Remains Off Heparin Drip per Protocol. Now in SR.   2.26.24: NAD Noted. SR on Tele. Right Groin Impella in place, bruising with no hematoma noted. Distal pulses DP intact. Legs warm. NC 2 L/Min. Denies CP/SOB. Consent obtained from his daughter Brii Snyder. NPO for MV PCI Today.  2.27.24: NAD Noted. Impella remains in place at P7 Support. Good UA Noted, some pink tinged urine noted. SR on Tele. Pressors off. Denies CP/SOB. NPO for surgery today. Heparin on hold for surgery.  2.28.24: Patient is critically ill. AF RVR on Tele, twice shocked this AM with no " success. On Amiodarone/Milrinone- Cardizem Infusion now off given hypotension and ICMO. Also no José Miguel/Levophed. Concern for bleeding noted, transfusion noted. Patient is intubated/Mechanically Vented. Hemodynamics: CO/CI 4.3/2.3 CVP 20.  2.29.24: Patient self extubated this AM. He is stable on NC Oxygen. Denies CP/SOB. SR on Tele. On Amiodarone Infusion and receiving blood products. BP Stable. Clinically much improved from yesterday.   3.1.24: NAD. Afib mild RVR on tele. On Primacor @ 0.187 mcg/kg/min. Amiodarone infusing per protocol. LFT's worsening. WBC worsening. + Incisional CP. On 5 L NC.   3.2.24: NAD. Net Negative 2700 urine output.   3.3.24: NAD. VSS. No complaints of CP/Palps. Reports SOB is improving. Creat 1.61 (Improved)  3.4.24: NAD. VSS. No complaints. On 2 L. Net Negative Fluid 3540 over 24 hours. Creat 1.37. LFTs slighting worse today  3.5.24: NAD. VSS. Denies CP, SOB and Palps. Family at Bedside. Fluid Balance Net Negative 4360mL. BUN/Crea 23.5/1.16, AST/ALT 69/71  3.6.24: Patient sitting up in bedside chair. Appears SOB. C/o abdominal bloating and gaseous. Reports + BM since surgery. Abdomen distended. Patient nausea and spit up bile colored fluid. + peripheral edema. O2 via NC. Excellent diuresis. Persistent leukocytosis. K+ 3.1, mild transaminitis. Groin Wound cx -- GNR and pseudomonas. CV surgery has signed off.   3.7.24: Patient sitting up in bed. NAD. Denies any pain. Noted SS drainage from impella insertion site. Leukocytosis has resolved. BP marginal at times. Afib, CVR. Good UOP; however weight is increasing.   3.8.24: Patient awake in bed. He has been weaned off. O2. Good diuresis overnight. Mild bump in creatinine-- 1.21 from 1.15 yesterday. Liver enzymes uptrending. VSS.   3.9.24: NAD. Language Barrier/Daughter at Bedside for Translation. Denies CP, SOB and Palps. Deconditioned. Fluid Balance Net Negative 5600mL.     PMH: PAF (on Eliquis), Aortic insufficiency, MV CAD, HTN  PSH:  "[facility]  Family History: None Reported  Social History: Former Smoker, Denies ETOH or Illicit Drug Use    Previous Diagnostics:  CABG/Bio AVR/MOISE Ligation (2.27.24):  Coronary artery bypass grafting X 3, LIMA to LAD, reversed SVG to OM1, Reversed Saphenous venous graft to RCA  Bioprosthetic aortic valve replacement (Epic max 23mm), Endoscopic venous harvesting of left greater saphenous vein, Left atrial appendage ligation, explant of right femoral impella device, right femoral artery repair.    RHC/Impella Placement (2.23.24):  Right heart catheterization performed showed the following:  PA= 61/24 (27) mm Hg  PCWP=  31/26 (27) mm Hg  AO saturation= 93 % RA  PA saturation= 60% with Impella (49% yesterday)    (02/22/24) -  0.5  Following that we elected to advance the Impella via right common femoral artery approach:  - Abiomed stiff wire  - 14 Bengali peel-away sheath  - Heparin 10,000 unit bolus given peripherally  - Dilator removed  - 0.035" J-wire  - 6 Bengali pigtail catheter positioned in the left ventricle  - Abiomed 0.025" wire  - Impella CP positioned in left ventricle  - Pulsatile motor current (appropriate positioning)  - Placed the marker just below the aortic valve  - Cardiac output was 2.8 L/min provided by the device.  Impella was at 84cm  Access Closure :    Sheath sutured to the groin  Secured.  Attestation:I was present for and supervised all key portions of the procedure  Impression/plan:   Successful Impella insertion and swan-Chelo in the setting of Acute HF/low cardiac output/precardiogenic shock/Critcal-severe AS/MVCAD - LM distal    RHC (2.22.24):  Right heart catheterization demonstrating severe pulmonary hypertension 84/34 and PCWP 24 mmHg  Reduced cardiac output/cardiac index at 3.43/1.81 L/min/m2 with pulmonary artery saturations 49.3%  For applied to the right femoral vein following removal of the 7 Bengali sheath  The estimated blood loss was none.    Carotid US (2.22.24):  The bilateral " internal carotid artery demonstrated less than 50% stenosis.   The bilateral vertebral arteries were patent with antegrade flow    LHC (2.20.24):   Prox LAD lesion was 70% stenosed.  Ost Cx to Prox Cx lesion was 80% stenosed.  1st Mrg lesion was 80% stenosed.  2nd Mrg-1 lesion was 70% stenosed.  2nd Mrg-2 lesion was 50% stenosed.  Mid LAD lesion was 50% stenosed.  Prox RCA lesion was 60% stenosed.  estimated blood loss was <50 mL.  There was three vessel coronary artery disease.  LVEDP: 30mmHg  Recommendations:   Refer for CABG evaluation and AVR   Preformed by Dr. Flannery at [facility]     ECHO (2.15.24):  Left Ventricle: The left ventricle is normal in size. Mildly increased ventricular mass. Mildly increased wall thickness. There is mild eccentric hypertrophy. Moderate global hypokinesis present. Septal motion is consistent with bundle branch block. There is moderately reduced systolic function. Biplane (2D) method of discs ejection fraction is 40%. Grade II diastolic dysfunction.  Right Ventricle: Right ventricular enlargement. Systolic function is normal.  Left Atrium: Left atrium is severely dilated.  Right Atrium: Right atrium is moderately dilated.  Aortic Valve: There is moderate aortic valve sclerosis. Severely restricted motion. There is severe stenosis. Aortic valve area by VTI is 0.66 cm². Aortic valve peak velocity is 4.45 m/s. Mean gradient is 50 mmHg. The dimensionless index is 0.17. There is mild to moderate aortic regurgitation.  Mitral Valve: Mildly calcified leaflets. There is no stenosis. There is mild regurgitation.  Tricuspid Valve: The tricuspid valve is structurally normal. There is mild to moderate regurgitation.  Pulmonic Valve: There is no significant regurgitation.  Aorta: Aortic root is upper limit of normal measuring 3.6 cm.  Pulmonary Artery: There is severe pulmonary hypertension. The estimated pulmonary artery systolic pressure is 69 mmHg.  IVC/SVC: Intermediate venous pressure at 8  mmHg.  Pericardium: There is no pericardial effusion.     Review of Systems   Constitutional: Positive for malaise/fatigue.   Cardiovascular:  Negative for chest pain and leg swelling.   Respiratory:  Negative for shortness of breath.    All other systems reviewed and are negative.    Objective:     Vital Signs (Most Recent):  Temp: 97.3 °F (36.3 °C) (03/09/24 0351)  Pulse: 74 (03/09/24 0734)  Resp: 18 (03/09/24 0934)  BP: 95/63 (03/09/24 0822)  SpO2: 99 % (03/09/24 0734) Vital Signs (24h Range):  Temp:  [97.3 °F (36.3 °C)-98.5 °F (36.9 °C)] 97.3 °F (36.3 °C)  Pulse:  [74-90] 74  Resp:  [18-20] 18  SpO2:  [94 %-100 %] 99 %  BP: ()/(58-67) 95/63   Weight: 82.6 kg (182 lb)  Body mass index is 30.29 kg/m².  SpO2: 99 %       Intake/Output Summary (Last 24 hours) at 3/9/2024 1347  Last data filed at 3/9/2024 0559  Gross per 24 hour   Intake 600 ml   Output 4600 ml   Net -4000 ml       Lines/Drains/Airways       Drain  Duration                  Urethral Catheter 02/28/24 1445 Coude 20 Fr. 9 days              Peripheral Intravenous Line  Duration                  Peripheral IV - Single Lumen 03/02/24 1053 20 G Anterior;Right Upper Arm 7 days                  Significant Labs:   Recent Results (from the past 72 hour(s))   Comprehensive metabolic panel    Collection Time: 03/07/24  4:06 AM   Result Value Ref Range    Sodium Level 135 (L) 136 - 145 mmol/L    Potassium Level 3.5 3.5 - 5.1 mmol/L    Chloride 100 98 - 107 mmol/L    Carbon Dioxide 27 23 - 31 mmol/L    Glucose Level 94 82 - 115 mg/dL    Blood Urea Nitrogen 20.2 8.4 - 25.7 mg/dL    Creatinine 1.15 0.73 - 1.18 mg/dL    Calcium Level Total 7.4 (L) 8.8 - 10.0 mg/dL    Protein Total 4.9 (L) 5.8 - 7.6 gm/dL    Albumin Level 2.2 (L) 3.4 - 4.8 g/dL    Globulin 2.7 2.4 - 3.5 gm/dL    Albumin/Globulin Ratio 0.8 (L) 1.1 - 2.0 ratio    Bilirubin Total 1.9 (H) <=1.5 mg/dL    Alkaline Phosphatase 123 40 - 150 unit/L    Alanine Aminotransferase 52 0 - 55 unit/L     Aspartate Aminotransferase 58 (H) 5 - 34 unit/L    eGFR >60 mls/min/1.73/m2   CBC with Differential    Collection Time: 03/07/24  4:07 AM   Result Value Ref Range    WBC 10.84 4.50 - 11.50 x10(3)/mcL    RBC 3.89 (L) 4.70 - 6.10 x10(6)/mcL    Hgb 10.7 (L) 14.0 - 18.0 g/dL    Hct 33.5 (L) 42.0 - 52.0 %    MCV 86.1 80.0 - 94.0 fL    MCH 27.5 27.0 - 31.0 pg    MCHC 31.9 (L) 33.0 - 36.0 g/dL    RDW 15.9 11.5 - 17.0 %    Platelet 371 130 - 400 x10(3)/mcL    MPV 10.8 (H) 7.4 - 10.4 fL    NRBC% 0.2 %   Manual Differential    Collection Time: 03/07/24  4:07 AM   Result Value Ref Range    WBC 10.84 x10(3)/mcL    Neutrophils % 85 %    Lymphs % 4 %    Monocytes % 6 %    Eosinophils % 3 %    Basophils % 1 %    Myelocytes % 1 (H) <=0 %    Neutrophils Abs 9.214 (H) 2.1 - 9.2 x10(3)/mcL    Lymphs Abs 0.4336 (L) 0.6 - 4.6 x10(3)/mcL    Monocytes Abs 0.6504 0.1 - 1.3 x10(3)/mcL    Eosinophils Abs 0.3252 0 - 0.9 x10(3)/mcL    Basophils Abs 0.1084 0 - 0.2 x10(3)/mcL    Platelets Normal Normal, Adequate    RBC Morph Normal Normal   Echo Saline Bubble? No    Collection Time: 03/07/24  2:09 PM   Result Value Ref Range    Celis's Biplane MOD Ejection Fraction 46 %    LV Systolic Volume 32.60 mL    LV Systolic Volume Index 16.4 mL/m2    LV Diastolic Volume 60.10 mL    LV Diastolic Volume Index 30.20 mL/m2   Comprehensive metabolic panel    Collection Time: 03/08/24  4:28 AM   Result Value Ref Range    Sodium Level 132 (L) 136 - 145 mmol/L    Potassium Level 3.3 (L) 3.5 - 5.1 mmol/L    Chloride 95 (L) 98 - 107 mmol/L    Carbon Dioxide 27 23 - 31 mmol/L    Glucose Level 93 82 - 115 mg/dL    Blood Urea Nitrogen 20.6 8.4 - 25.7 mg/dL    Creatinine 1.21 (H) 0.73 - 1.18 mg/dL    Calcium Level Total 8.0 (L) 8.8 - 10.0 mg/dL    Protein Total 5.3 (L) 5.8 - 7.6 gm/dL    Albumin Level 2.4 (L) 3.4 - 4.8 g/dL    Globulin 2.9 2.4 - 3.5 gm/dL    Albumin/Globulin Ratio 0.8 (L) 1.1 - 2.0 ratio    Bilirubin Total 1.8 (H) <=1.5 mg/dL    Alkaline Phosphatase  121 40 - 150 unit/L    Alanine Aminotransferase 59 (H) 0 - 55 unit/L    Aspartate Aminotransferase 75 (H) 5 - 34 unit/L    eGFR >60 mls/min/1.73/m2   CBC with Differential    Collection Time: 03/08/24  4:28 AM   Result Value Ref Range    WBC 12.09 (H) 4.50 - 11.50 x10(3)/mcL    RBC 4.10 (L) 4.70 - 6.10 x10(6)/mcL    Hgb 11.2 (L) 14.0 - 18.0 g/dL    Hct 35.6 (L) 42.0 - 52.0 %    MCV 86.8 80.0 - 94.0 fL    MCH 27.3 27.0 - 31.0 pg    MCHC 31.5 (L) 33.0 - 36.0 g/dL    RDW 15.8 11.5 - 17.0 %    Platelet 466 (H) 130 - 400 x10(3)/mcL    MPV 10.8 (H) 7.4 - 10.4 fL    Neut % 71.1 %    Lymph % 9.4 %    Mono % 12.9 %    Eos % 2.2 %    Basophil % 0.4 %    Lymph # 1.14 0.6 - 4.6 x10(3)/mcL    Neut # 8.59 2.1 - 9.2 x10(3)/mcL    Mono # 1.56 (H) 0.1 - 1.3 x10(3)/mcL    Eos # 0.27 0 - 0.9 x10(3)/mcL    Baso # 0.05 <=0.2 x10(3)/mcL    IG# 0.48 (H) 0 - 0.04 x10(3)/mcL    IG% 4.0 %    NRBC% 0.0 %   Uric Acid    Collection Time: 03/08/24  4:28 AM   Result Value Ref Range    Uric Acid 2.4 (L) 3.5 - 7.2 mg/dL   Comprehensive metabolic panel    Collection Time: 03/09/24  7:04 AM   Result Value Ref Range    Sodium Level 132 (L) 136 - 145 mmol/L    Potassium Level 3.7 3.5 - 5.1 mmol/L    Chloride 96 (L) 98 - 107 mmol/L    Carbon Dioxide 28 23 - 31 mmol/L    Glucose Level 96 82 - 115 mg/dL    Blood Urea Nitrogen 20.3 8.4 - 25.7 mg/dL    Creatinine 1.30 (H) 0.73 - 1.18 mg/dL    Calcium Level Total 8.0 (L) 8.8 - 10.0 mg/dL    Protein Total 5.7 (L) 5.8 - 7.6 gm/dL    Albumin Level 2.4 (L) 3.4 - 4.8 g/dL    Globulin 3.3 2.4 - 3.5 gm/dL    Albumin/Globulin Ratio 0.7 (L) 1.1 - 2.0 ratio    Bilirubin Total 1.9 (H) <=1.5 mg/dL    Alkaline Phosphatase 114 40 - 150 unit/L    Alanine Aminotransferase 78 (H) 0 - 55 unit/L    Aspartate Aminotransferase 109 (H) 5 - 34 unit/L    eGFR 57 mls/min/1.73/m2   Magnesium    Collection Time: 03/09/24  7:04 AM   Result Value Ref Range    Magnesium Level 2.00 1.60 - 2.60 mg/dL   CBC with Differential    Collection  Time: 03/09/24  7:04 AM   Result Value Ref Range    WBC 13.20 (H) 4.50 - 11.50 x10(3)/mcL    RBC 4.43 (L) 4.70 - 6.10 x10(6)/mcL    Hgb 12.0 (L) 14.0 - 18.0 g/dL    Hct 37.7 (L) 42.0 - 52.0 %    MCV 85.1 80.0 - 94.0 fL    MCH 27.1 27.0 - 31.0 pg    MCHC 31.8 (L) 33.0 - 36.0 g/dL    RDW 15.6 11.5 - 17.0 %    Platelet 502 (H) 130 - 400 x10(3)/mcL    MPV 10.4 7.4 - 10.4 fL    Neut % 71.8 %    Lymph % 10.2 %    Mono % 12.4 %    Eos % 2.3 %    Basophil % 0.5 %    Lymph # 1.35 0.6 - 4.6 x10(3)/mcL    Neut # 9.46 (H) 2.1 - 9.2 x10(3)/mcL    Mono # 1.64 (H) 0.1 - 1.3 x10(3)/mcL    Eos # 0.31 0 - 0.9 x10(3)/mcL    Baso # 0.07 <=0.2 x10(3)/mcL    IG# 0.37 (H) 0 - 0.04 x10(3)/mcL    IG% 2.8 %    NRBC% 0.0 %     Telemetry: PAF    Physical Exam  Vitals and nursing note reviewed.   Constitutional:       General: He is not in acute distress.     Appearance: Normal appearance.   HENT:      Head: Normocephalic.      Mouth/Throat:      Mouth: Mucous membranes are moist.   Eyes:      Conjunctiva/sclera: Conjunctivae normal.   Cardiovascular:      Rate and Rhythm: Normal rate. Rhythm irregular.      Pulses: Normal pulses.      Heart sounds: No murmur heard.  Pulmonary:      Effort: Pulmonary effort is normal. No respiratory distress.      Breath sounds: Normal breath sounds.      Comments: NC O2  Abdominal:      Palpations: Abdomen is soft.   Genitourinary:     Comments: Urinary Catheter   Musculoskeletal:         General: Normal range of motion.   Skin:     General: Skin is warm.      Comments: Midline Sternotomy YVETTE with No Sign of Bleed/Infection. Right Lower Abdominal Site Oozing Serosanguinous Fluid. Dressing Intact.   Neurological:      General: No focal deficit present.      Mental Status: He is alert.   Psychiatric:         Mood and Affect: Mood normal.       Current Inpatient Medications:  Current Facility-Administered Medications:     acetaminophen oral solution 650 mg, 650 mg, Per OG tube, Q6H PRN, Vasile Carter PA-C     acetaminophen tablet 650 mg, 650 mg, Oral, Q6H PRN, Pablo Yepez MD, 650 mg at 03/07/24 0907    albumin human 5% bottle 12.5 g, 12.5 g, Intravenous, PRN, Vasile Carter PA-C, Stopped at 02/28/24 1442    allopurinol split tablet 50 mg, 50 mg, Oral, Daily, Tanner Rdz MD, 50 mg at 03/09/24 0822    amiodarone tablet 200 mg, 200 mg, Oral, Daily, Lexy Palomares FNP, 200 mg at 03/09/24 0822    aspirin EC tablet 81 mg, 81 mg, Oral, Daily, Vasile Carter PA-C, 81 mg at 03/09/24 0822    atorvastatin tablet 40 mg, 40 mg, Oral, QHS, Tanner Rdz MD, 40 mg at 03/08/24 2117    ciprofloxacin (CIPRO)400mg/200ml D5W IVPB 400 mg, 400 mg, Intravenous, Q12H, Stan Lazar MD, Last Rate: 200 mL/hr at 03/09/24 1323, 400 mg at 03/09/24 1323    dextrose 10% bolus 125 mL 125 mL, 12.5 g, Intravenous, PRN, Pablo Yepez MD    dextrose 10% bolus 250 mL 250 mL, 25 g, Intravenous, PRN, Pablo Yepez MD    electrolyte-A infusion, , Intravenous, PRN, Pablo Yepez MD, Stopped at 02/28/24 0700    enoxaparin injection 40 mg, 40 mg, Subcutaneous, Daily, Cherry Suarez FNP, 40 mg at 03/08/24 1727    ferrous sulfate tablet 1 each, 1 tablet, Oral, Every other day, Tanner Rdz MD, 1 each at 03/08/24 1043    folic acid tablet 1 mg, 1 mg, Oral, Daily, Vasile Carter PA-C, 1 mg at 03/09/24 0822    furosemide tablet 20 mg, 20 mg, Oral, BID, Lexy Palomares FNP, 20 mg at 03/09/24 0822    heparin, porcine (PF) 100 unit/mL injection flush 500 Units, 5 mL, Intravenous, On Call Procedure, Pablo Yepez MD    heparin, porcine (PF) 100 unit/mL injection flush 500 Units, 5 mL, Intravenous, On Call Procedure, Nita Contreras MD    HYDROcodone-acetaminophen 5-325 mg per tablet 1 tablet, 1 tablet, Oral, Q6H PRN, Stan Lazar MD, 1 tablet at 03/09/24 0934    lactulose 10 gram/15 ml solution 20 g, 20 g, Oral, Q6H PRN, Vasile Carter, PA-C    lisinopriL tablet 2.5 mg, 2.5 mg, Oral,  Daily, Cherry Suarez FNP, 2.5 mg at 03/09/24 0822    loperamide capsule 2 mg, 2 mg, Oral, Continuous PRN, Vasile Carter PA-C, 2 mg at 03/02/24 1253    melatonin tablet 6 mg, 6 mg, Oral, Nightly PRN, Tanner Rdz MD, 6 mg at 03/08/24 2117    metoclopramide injection 5 mg, 5 mg, Intravenous, Q6H PRN, Vasile Carter PA-C    metoprolol succinate (TOPROL-XL) 24 hr split tablet 12.5 mg, 12.5 mg, Oral, Daily, Cherry Suarez FNP, 12.5 mg at 03/08/24 1042    nitroGLYCERIN SL tablet 0.4 mg, 0.4 mg, Sublingual, Q5 Min PRN, Tanner Rdz MD    ondansetron disintegrating tablet 8 mg, 8 mg, Oral, Q8H PRN, Tanner Rdz MD, 8 mg at 03/06/24 0822    ondansetron injection 8 mg, 8 mg, Intravenous, Q6H PRN, Pablo Yepez MD, 8 mg at 03/07/24 0908    pantoprazole EC tablet 40 mg, 40 mg, Oral, Daily, Tanner Rdz MD, 40 mg at 03/09/24 0822    simethicone chewable tablet 80 mg, 1 tablet, Oral, TID PRN, Tanner Rdz MD, 80 mg at 03/07/24 0908    sodium chloride 0.9% flush 10 mL, 10 mL, Intravenous, PRN, Tanner Rdz MD    sodium chloride 0.9% flush 10 mL, 10 mL, Intravenous, PRN, Millie Jimenez NP    spironolactone split tablet 12.5 mg, 12.5 mg, Oral, Daily, Cherry Suarez FNP, 12.5 mg at 03/09/24 0822    sucralfate tablet 1 g, 1 g, Oral, QID (AC & HS), Vasile Carter PA-C, 1 g at 03/08/24 2117  VTE Risk Mitigation (From admission, onward)           Ordered     enoxaparin injection 40 mg  Daily         03/07/24 0559     IP VTE HIGH RISK PATIENT  Once         03/02/24 0759     heparin, porcine (PF) 100 unit/mL injection flush 500 Units  On Call Procedure         02/27/24 0718     heparin, porcine (PF) 100 unit/mL injection flush 500 Units  On Call Procedure         02/27/24 0257     Place SUSIE hose  Until discontinued         02/22/24 1458     Place sequential compression device  Until discontinued         02/21/24 2057                  Assessment:    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 Severely restricted motion. There is severe stenosis. Aortic valve area by VTI is 0.66 cm². Aortic valve peak velocity is 4.45 m/s. Mean gradient is 50 mmHg. The dimensionless index is 0.17.   Cardiogenic Shock (Post Cardiotomy) - Off Vasopressor Support - Resolved     - History of Hypertension   Ischemic Cardiomyopathy/EF 40%  Elevated LFTs   Pulmonary HTN    - ECHO PASP 69mmHg     - RHC (2.22.24): PA 84/34, PCWP 24 mmHg  Hematuria 2/2 Traumatic/Difficult Indwelling Catheter Placement (Resolved)  Urethral Stricture s/p Dilatation 2.23.24  PAF - Currently CVR    - Status Post Bedside Cardioversion x 2 on 2.27.24 (Both Unsuccessful)    - Status Post MOISE Ligation (2.27.24)    - CHADsVASc - 5 Points - 7.2% Stroke Risk per Year   Left Bundle Branch Block   VHD    - ECHO (2.16.24): Severe AS, mild MR, mild to moderate TR  JEAN-CLAUDE/CKD Stage II - Improved     - Baseline Cr 1.6  Anemia- stable    - Status Post Transfusion on 2.28.24 & 2.29.24  Thrombocytopenia - Resolved   Leukocytosis - Persistent    - Groin Wound Culture: Serratia Marcescens and Pseudomonas Aeruginosa   Hypokalemia    Plan:   Continue IV Abx per Primary Team   Continue ASA, Amiodarone, Statin, ACEi, BB, Aldactone   Continue IV Lasix 20mg IVP BID   Accurate I&Os and Daily Weights   Keep K > 4.0 and Mg  > 2.0  Aggressive Mobilization of PT  and Q1HR IS (PT/OT Eval and Treat)  Labs in AM: CBC, CMP and Mg    Kaleb Rdz, KWAN  Cardiology  [facility]   03/09/2024    Physician addendum:  I have seen and examined this patient as a split-shared visit with the ARLEY d/t complicated medical management of above problems written in assessment and high acuity requiring physician expertise in medical decision-making. I performed the substantive portion of the history and exam. Above medical decision-making is also formulated by me.    Cardiovascular exam:  S1, S2  Lungs:  fine crackles at bases.  Extremities:  1+ edema bilaterally    Plan:  Overall stable.  Medication as above.  Continue IV Lasix.  Follow up.      All George MD  Cardiologist

## 2024-03-10 LAB
ALBUMIN SERPL-MCNC: 2.5 G/DL (ref 3.4–4.8)
ALBUMIN/GLOB SERPL: 0.8 RATIO (ref 1.1–2)
ALP SERPL-CCNC: 114 UNIT/L (ref 40–150)
ALT SERPL-CCNC: 73 UNIT/L (ref 0–55)
AST SERPL-CCNC: 88 UNIT/L (ref 5–34)
BASOPHILS # BLD AUTO: 0.07 X10(3)/MCL
BASOPHILS NFR BLD AUTO: 0.5 %
BILIRUB SERPL-MCNC: 1.8 MG/DL
BUN SERPL-MCNC: 20.3 MG/DL (ref 8.4–25.7)
CALCIUM SERPL-MCNC: 8.2 MG/DL (ref 8.8–10)
CHLORIDE SERPL-SCNC: 91 MMOL/L (ref 98–107)
CO2 SERPL-SCNC: 30 MMOL/L (ref 23–31)
CREAT SERPL-MCNC: 1.22 MG/DL (ref 0.73–1.18)
EOSINOPHIL # BLD AUTO: 0.31 X10(3)/MCL (ref 0–0.9)
EOSINOPHIL NFR BLD AUTO: 2.1 %
ERYTHROCYTE [DISTWIDTH] IN BLOOD BY AUTOMATED COUNT: 15.5 % (ref 11.5–17)
GFR SERPLBLD CREATININE-BSD FMLA CKD-EPI: >60 MLS/MIN/1.73/M2
GLOBULIN SER-MCNC: 3.1 GM/DL (ref 2.4–3.5)
GLUCOSE SERPL-MCNC: 89 MG/DL (ref 82–115)
HCT VFR BLD AUTO: 37.4 % (ref 42–52)
HGB BLD-MCNC: 11.9 G/DL (ref 14–18)
IMM GRANULOCYTES # BLD AUTO: 0.32 X10(3)/MCL (ref 0–0.04)
IMM GRANULOCYTES NFR BLD AUTO: 2.1 %
LYMPHOCYTES # BLD AUTO: 1.56 X10(3)/MCL (ref 0.6–4.6)
LYMPHOCYTES NFR BLD AUTO: 10.4 %
MAGNESIUM SERPL-MCNC: 1.9 MG/DL (ref 1.6–2.6)
MCH RBC QN AUTO: 27 PG (ref 27–31)
MCHC RBC AUTO-ENTMCNC: 31.8 G/DL (ref 33–36)
MCV RBC AUTO: 84.8 FL (ref 80–94)
MONOCYTES # BLD AUTO: 1.75 X10(3)/MCL (ref 0.1–1.3)
MONOCYTES NFR BLD AUTO: 11.7 %
NEUTROPHILS # BLD AUTO: 10.92 X10(3)/MCL (ref 2.1–9.2)
NEUTROPHILS NFR BLD AUTO: 73.2 %
NRBC BLD AUTO-RTO: 0 %
PLATELET # BLD AUTO: 552 X10(3)/MCL (ref 130–400)
PMV BLD AUTO: 11.1 FL (ref 7.4–10.4)
POTASSIUM SERPL-SCNC: 3.9 MMOL/L (ref 3.5–5.1)
PROT SERPL-MCNC: 5.6 GM/DL (ref 5.8–7.6)
RBC # BLD AUTO: 4.41 X10(6)/MCL (ref 4.7–6.1)
SODIUM SERPL-SCNC: 130 MMOL/L (ref 136–145)
WBC # SPEC AUTO: 14.93 X10(3)/MCL (ref 4.5–11.5)

## 2024-03-10 PROCEDURE — 63600175 PHARM REV CODE 636 W HCPCS: Performed by: NURSE PRACTITIONER

## 2024-03-10 PROCEDURE — 25000003 PHARM REV CODE 250: Performed by: NURSE PRACTITIONER

## 2024-03-10 PROCEDURE — 25000003 PHARM REV CODE 250: Performed by: INTERNAL MEDICINE

## 2024-03-10 PROCEDURE — 63600175 PHARM REV CODE 636 W HCPCS: Performed by: INTERNAL MEDICINE

## 2024-03-10 PROCEDURE — 85025 COMPLETE CBC W/AUTO DIFF WBC: CPT | Performed by: INTERNAL MEDICINE

## 2024-03-10 PROCEDURE — 83735 ASSAY OF MAGNESIUM: CPT | Performed by: NURSE PRACTITIONER

## 2024-03-10 PROCEDURE — 80053 COMPREHEN METABOLIC PANEL: CPT | Performed by: INTERNAL MEDICINE

## 2024-03-10 PROCEDURE — 25000003 PHARM REV CODE 250: Performed by: PHYSICIAN ASSISTANT

## 2024-03-10 PROCEDURE — 21400001 HC TELEMETRY ROOM

## 2024-03-10 PROCEDURE — 25000003 PHARM REV CODE 250

## 2024-03-10 RX ADMIN — SUCRALFATE 1 G: 1 TABLET ORAL at 04:03

## 2024-03-10 RX ADMIN — HYDROCODONE BITARTRATE AND ACETAMINOPHEN 1 TABLET: 5; 325 TABLET ORAL at 04:03

## 2024-03-10 RX ADMIN — SUCRALFATE 1 G: 1 TABLET ORAL at 08:03

## 2024-03-10 RX ADMIN — HYDROCODONE BITARTRATE AND ACETAMINOPHEN 1 TABLET: 5; 325 TABLET ORAL at 01:03

## 2024-03-10 RX ADMIN — ASPIRIN 81 MG: 81 TABLET, COATED ORAL at 08:03

## 2024-03-10 RX ADMIN — ATORVASTATIN CALCIUM 40 MG: 40 TABLET, FILM COATED ORAL at 09:03

## 2024-03-10 RX ADMIN — CIPROFLOXACIN 400 MG: 2 INJECTION, SOLUTION INTRAVENOUS at 01:03

## 2024-03-10 RX ADMIN — ALLOPURINOL 50 MG: 300 TABLET ORAL at 08:03

## 2024-03-10 RX ADMIN — HYDROCODONE BITARTRATE AND ACETAMINOPHEN 1 TABLET: 5; 325 TABLET ORAL at 08:03

## 2024-03-10 RX ADMIN — PANTOPRAZOLE SODIUM 40 MG: 40 TABLET, DELAYED RELEASE ORAL at 08:03

## 2024-03-10 RX ADMIN — METOPROLOL SUCCINATE 12.5 MG: 25 TABLET, EXTENDED RELEASE ORAL at 08:03

## 2024-03-10 RX ADMIN — FERROUS SULFATE TAB 325 MG (65 MG ELEMENTAL FE) 1 EACH: 325 (65 FE) TAB at 08:03

## 2024-03-10 RX ADMIN — AMIODARONE HYDROCHLORIDE 200 MG: 200 TABLET ORAL at 08:03

## 2024-03-10 RX ADMIN — ONDANSETRON 8 MG: 2 INJECTION INTRAMUSCULAR; INTRAVENOUS at 01:03

## 2024-03-10 RX ADMIN — SUCRALFATE 1 G: 1 TABLET ORAL at 09:03

## 2024-03-10 RX ADMIN — FOLIC ACID 1 MG: 1 TABLET ORAL at 08:03

## 2024-03-10 RX ADMIN — ENOXAPARIN SODIUM 40 MG: 40 INJECTION SUBCUTANEOUS at 04:03

## 2024-03-10 NOTE — AI DETERIORATION ALERT
Artificial Intelligence Notification  [facility]  [address]  [place] [address]  Phone: [phone]    This documentation was triggered by an Artificial Intelligence Notification:    Admit Date: 2024   LOS: 18  Code Status: Full Code  : 1945  Age: 78 y.o.  Weight:   Wt Readings from Last 1 Encounters:   24 82.6 kg (182 lb)        Sex: male  Bed: 980/980 A  MRN: [id number]  Attending Physician: Stan Lazar MD     Date of Alert: 03/10/2024  Time AI Alert Received: 1516            Vitals:    03/10/24 1540   BP: (!) 89/61   Pulse: 83   Resp: 19   Temp: 97.6 °F (36.4 °C)     SpO2: 95 %      Artificial Intelligence alert discussed with Provider:     Name:    Date/Time of Provider Notification:       Patient Condition: Chart reviewed. Pt admit for NSTEMI on 2/15/24.  CABG x3 on 27, hx of Afib RVR.  CIS, hospital medicine, urology, and pulmonary following.  ICU available if needed.

## 2024-03-10 NOTE — PROGRESS NOTES
"  [facility]    Cardiology  Progress Note    Patient Name: Brain Snyder  MRN: [id number]  Admission Date: 2/21/2024  Hospital Length of Stay: 18 days  Code Status: Full Code   Attending Physician: Stan Lazar MD   Primary Care Physician: Nidia Shepherd NP  Expected Discharge Date:   Principal Problem:CAD (coronary artery disease)    Subjective:   Reason for Consult:  CAD     HPI: 78-year-old female that is unknown to [facility] with a PMHx of PAF on Eliquis Aortic insufficiency, MV CAD, HTN. He is [place]ian speaking and an  was used for interview. He was orginally admitted to [facility] on 2.15.24 with CP & NSTEMI and underwent a LHC on 2.20.24 taht showed MV CAD (reported noted below) He was transferred to [facility] on 2.21.24 for a CABG/AVR evaluation. On arrive he was hemodynamically stable on a heparin infusion. He denied chest pain, SOB, and nausea on current exam, [facility] was consulted for CAD    Hospital Course:   2.23.24: Patient seen resting in bed. He denies SOB or CP. He remains on heparin infusion. Family at bedside   2.24.24: NAD. S/p Impella Placement/[place]-Chelo Catheter. "I am ok." + Blood Clots from Nose/Mouth, Hematuria 2/2 traumatic Indwelling Catheter Placement. Heparin Drip per Protocol. Impella P5  2.25.24: NAD. "I'm ok." Denies CP, SOB and Palps. PA Pressure Reading is not Accurate. CVP 5. Impella at P5. R Groin Impella P7. Remains Off Heparin Drip per Protocol. Now in SR.   2.26.24: NAD Noted. SR on Tele. Right Groin Impella in place, bruising with no hematoma noted. Distal pulses DP intact. Legs warm. NC 2 L/Min. Denies CP/SOB. Consent obtained from his daughter Brii Snyder. NPO for MV PCI Today.  2.27.24: NAD Noted. Impella remains in place at P7 Support. Good UA Noted, some pink tinged urine noted. SR on Tele. Pressors off. Denies CP/SOB. NPO for surgery today. Heparin on hold for surgery.  2.28.24: Patient is critically ill. AF RVR on Tele, twice shocked this AM with no " "success. On Amiodarone/Milrinone- Cardizem Infusion now off given hypotension and ICMO. Also no José Miguel/Levophed. Concern for bleeding noted, transfusion noted. Patient is intubated/Mechanically Vented. Hemodynamics: CO/CI 4.3/2.3 CVP 20.  2.29.24: Patient self extubated this AM. He is stable on NC Oxygen. Denies CP/SOB. SR on Tele. On Amiodarone Infusion and receiving blood products. BP Stable. Clinically much improved from yesterday.   3.1.24: NAD. Afib mild RVR on tele. On Primacor @ 0.187 mcg/kg/min. Amiodarone infusing per protocol. LFT's worsening. WBC worsening. + Incisional CP. On 5 L NC.   3.2.24: NAD. Net Negative 2700 urine output.   3.3.24: NAD. VSS. No complaints of CP/Palps. Reports SOB is improving. Creat 1.61 (Improved)  3.4.24: NAD. VSS. No complaints. On 2 L. Net Negative Fluid 3540 over 24 hours. Creat 1.37. LFTs slighting worse today  3.5.24: NAD. VSS. Denies CP, SOB and Palps. Family at Bedside. Fluid Balance Net Negative 4360mL. BUN/Crea 23.5/1.16, AST/ALT 69/71  3.6.24: Patient sitting up in bedside chair. Appears SOB. C/o abdominal bloating and gaseous. Reports + BM since surgery. Abdomen distended. Patient nausea and spit up bile colored fluid. + peripheral edema. O2 via NC. Excellent diuresis. Persistent leukocytosis. K+ 3.1, mild transaminitis. Groin Wound cx -- GNR and pseudomonas. CV surgery has signed off.   3.7.24: Patient sitting up in bed. NAD. Denies any pain. Noted SS drainage from impella insertion site. Leukocytosis has resolved. BP marginal at times. Afib, CVR. Good UOP; however weight is increasing.   3.8.24: Patient awake in bed. He has been weaned off. O2. Good diuresis overnight. Mild bump in creatinine-- 1.21 from 1.15 yesterday. Liver enzymes uptrending. VSS.   3.9.24: NAD. Language Barrier/Daughter at Bedside for Translation. Denies CP, SOB and Palps. Deconditioned. Fluid Balance Net Negative 5600mL.   3.10.24: NAD. "[place]." Denies CP, SOB and Palps. Daughter at " "Bedside/Translating. Denies CP, SOB and Palps. Remains Deconditions. Fluid Balance Net Negative 2405mL. Na 130, BUN/Crea 20.3/1.22, AST/ALT 88/73    PMH: PAF (on Eliquis), Aortic insufficiency, MV CAD, HTN  PSH: [facility]  Family History: None Reported  Social History: Former Smoker, Denies ETOH or Illicit Drug Use    Previous Diagnostics:  CABG/Bio AVR/MOISE Ligation (2.27.24):  Coronary artery bypass grafting X 3, LIMA to LAD, reversed SVG to OM1, Reversed Saphenous venous graft to RCA  Bioprosthetic aortic valve replacement (Epic max 23mm), Endoscopic venous harvesting of left greater saphenous vein, Left atrial appendage ligation, explant of right femoral impella device, right femoral artery repair.    RHC/Impella Placement (2.23.24):  Right heart catheterization performed showed the following:  PA= 61/24 (27) mm Hg  PCWP=  31/26 (27) mm Hg  AO saturation= 93 % RA  PA saturation= 60% with Impella (49% yesterday)    (02/22/24) -  0.5  Following that we elected to advance the Impella via right common femoral artery approach:  - Abiomed stiff wire  - 14 [place] peel-away sheath  - Heparin 10,000 unit bolus given peripherally  - Dilator removed  - 0.035" J-wire  - 6 [place] pigtail catheter positioned in the left ventricle  - Abiomed 0.025" wire  - Impella CP positioned in left ventricle  - Pulsatile motor current (appropriate positioning)  - Placed the marker just below the aortic valve  - Cardiac output was 2.8 L/min provided by the device.  Impella was at 84cm  Access Closure :    Sheath sutured to the groin  Secured.  Attestation:I was present for and supervised all key portions of the procedure  Impression/plan:   Successful Impella insertion and swan-Chelo in the setting of Acute HF/low cardiac output/precardiogenic shock/Critcal-severe AS/MVCAD - LM distal    RHC (2.22.24):  Right heart catheterization demonstrating severe pulmonary hypertension 84/34 and PCWP 24 mmHg  Reduced cardiac output/cardiac index at " 3.43/1.81 L/min/m2 with pulmonary artery saturations 49.3%  For applied to the right femoral vein following removal of the 7 Persian sheath  The estimated blood loss was none.    Carotid US (2.22.24):  The bilateral internal carotid artery demonstrated less than 50% stenosis.   The bilateral vertebral arteries were patent with antegrade flow    LHC (2.20.24):   Prox LAD lesion was 70% stenosed.  Ost Cx to Prox Cx lesion was 80% stenosed.  1st Mrg lesion was 80% stenosed.  2nd Mrg-1 lesion was 70% stenosed.  2nd Mrg-2 lesion was 50% stenosed.  Mid LAD lesion was 50% stenosed.  Prox RCA lesion was 60% stenosed.  estimated blood loss was <50 mL.  There was three vessel coronary artery disease.  LVEDP: 30mmHg  Recommendations:   Refer for CABG evaluation and AVR   Preformed by Dr. Flannery at [facility]     ECHO (2.15.24):  Left Ventricle: The left ventricle is normal in size. Mildly increased ventricular mass. Mildly increased wall thickness. There is mild eccentric hypertrophy. Moderate global hypokinesis present. Septal motion is consistent with bundle branch block. There is moderately reduced systolic function. Biplane (2D) method of discs ejection fraction is 40%. Grade II diastolic dysfunction.  Right Ventricle: Right ventricular enlargement. Systolic function is normal.  Left Atrium: Left atrium is severely dilated.  Right Atrium: Right atrium is moderately dilated.  Aortic Valve: There is moderate aortic valve sclerosis. Severely restricted motion. There is severe stenosis. Aortic valve area by VTI is 0.66 cm². Aortic valve peak velocity is 4.45 m/s. Mean gradient is 50 mmHg. The dimensionless index is 0.17. There is mild to moderate aortic regurgitation.  Mitral Valve: Mildly calcified leaflets. There is no stenosis. There is mild regurgitation.  Tricuspid Valve: The tricuspid valve is structurally normal. There is mild to moderate regurgitation.  Pulmonic Valve: There is no significant regurgitation.  Aorta: Aortic root is  upper limit of normal measuring 3.6 cm.  Pulmonary Artery: There is severe pulmonary hypertension. The estimated pulmonary artery systolic pressure is 69 mmHg.  IVC/SVC: Intermediate venous pressure at 8 mmHg.  Pericardium: There is no pericardial effusion.     Review of Systems   Constitutional: Positive for malaise/fatigue.   Cardiovascular:  Negative for chest pain, dyspnea on exertion and leg swelling.   Respiratory:  Negative for shortness of breath.    All other systems reviewed and are negative.    Objective:     Vital Signs (Most Recent):  Temp: 97.5 °F (36.4 °C) (03/10/24 1130)  Pulse: 85 (03/10/24 1130)  Resp: 18 (03/10/24 1130)  BP: 99/74 (03/10/24 0829)  SpO2: 100 % (03/10/24 1130) Vital Signs (24h Range):  Temp:  [97.4 °F (36.3 °C)-98.5 °F (36.9 °C)] 97.5 °F (36.4 °C)  Pulse:  [52-91] 85  Resp:  [18-19] 18  SpO2:  [94 %-100 %] 100 %  BP: ()/(46-74) 99/74   Weight: 82.6 kg (182 lb)  Body mass index is 30.29 kg/m².  SpO2: 100 %       Intake/Output Summary (Last 24 hours) at 3/10/2024 1443  Last data filed at 3/10/2024 1419  Gross per 24 hour   Intake 120 ml   Output 3525 ml   Net -3405 ml       Lines/Drains/Airways       Drain  Duration                  Urethral Catheter 02/28/24 1445 Coude 20 Fr. 10 days              Peripheral Intravenous Line  Duration                  Peripheral IV - Single Lumen 03/02/24 1053 20 G Anterior;Right Upper Arm 8 days                  Significant Labs:   Recent Results (from the past 72 hour(s))   Echo Saline Bubble? No    Collection Time: 03/07/24  2:09 PM   Result Value Ref Range    Celis's Biplane MOD Ejection Fraction 46 %    LV Systolic Volume 32.60 mL    LV Systolic Volume Index 16.4 mL/m2    LV Diastolic Volume 60.10 mL    LV Diastolic Volume Index 30.20 mL/m2   Comprehensive metabolic panel    Collection Time: 03/08/24  4:28 AM   Result Value Ref Range    Sodium Level 132 (L) 136 - 145 mmol/L    Potassium Level 3.3 (L) 3.5 - 5.1 mmol/L    Chloride 95 (L) 98  - 107 mmol/L    Carbon Dioxide 27 23 - 31 mmol/L    Glucose Level 93 82 - 115 mg/dL    Blood Urea Nitrogen 20.6 8.4 - 25.7 mg/dL    Creatinine 1.21 (H) 0.73 - 1.18 mg/dL    Calcium Level Total 8.0 (L) 8.8 - 10.0 mg/dL    Protein Total 5.3 (L) 5.8 - 7.6 gm/dL    Albumin Level 2.4 (L) 3.4 - 4.8 g/dL    Globulin 2.9 2.4 - 3.5 gm/dL    Albumin/Globulin Ratio 0.8 (L) 1.1 - 2.0 ratio    Bilirubin Total 1.8 (H) <=1.5 mg/dL    Alkaline Phosphatase 121 40 - 150 unit/L    Alanine Aminotransferase 59 (H) 0 - 55 unit/L    Aspartate Aminotransferase 75 (H) 5 - 34 unit/L    eGFR >60 mls/min/1.73/m2   CBC with Differential    Collection Time: 03/08/24  4:28 AM   Result Value Ref Range    WBC 12.09 (H) 4.50 - 11.50 x10(3)/mcL    RBC 4.10 (L) 4.70 - 6.10 x10(6)/mcL    Hgb 11.2 (L) 14.0 - 18.0 g/dL    Hct 35.6 (L) 42.0 - 52.0 %    MCV 86.8 80.0 - 94.0 fL    MCH 27.3 27.0 - 31.0 pg    MCHC 31.5 (L) 33.0 - 36.0 g/dL    RDW 15.8 11.5 - 17.0 %    Platelet 466 (H) 130 - 400 x10(3)/mcL    MPV 10.8 (H) 7.4 - 10.4 fL    Neut % 71.1 %    Lymph % 9.4 %    Mono % 12.9 %    Eos % 2.2 %    Basophil % 0.4 %    Lymph # 1.14 0.6 - 4.6 x10(3)/mcL    Neut # 8.59 2.1 - 9.2 x10(3)/mcL    Mono # 1.56 (H) 0.1 - 1.3 x10(3)/mcL    Eos # 0.27 0 - 0.9 x10(3)/mcL    Baso # 0.05 <=0.2 x10(3)/mcL    IG# 0.48 (H) 0 - 0.04 x10(3)/mcL    IG% 4.0 %    NRBC% 0.0 %   Uric Acid    Collection Time: 03/08/24  4:28 AM   Result Value Ref Range    Uric Acid 2.4 (L) 3.5 - 7.2 mg/dL   Comprehensive metabolic panel    Collection Time: 03/09/24  7:04 AM   Result Value Ref Range    Sodium Level 132 (L) 136 - 145 mmol/L    Potassium Level 3.7 3.5 - 5.1 mmol/L    Chloride 96 (L) 98 - 107 mmol/L    Carbon Dioxide 28 23 - 31 mmol/L    Glucose Level 96 82 - 115 mg/dL    Blood Urea Nitrogen 20.3 8.4 - 25.7 mg/dL    Creatinine 1.30 (H) 0.73 - 1.18 mg/dL    Calcium Level Total 8.0 (L) 8.8 - 10.0 mg/dL    Protein Total 5.7 (L) 5.8 - 7.6 gm/dL    Albumin Level 2.4 (L) 3.4 - 4.8 g/dL     Globulin 3.3 2.4 - 3.5 gm/dL    Albumin/Globulin Ratio 0.7 (L) 1.1 - 2.0 ratio    Bilirubin Total 1.9 (H) <=1.5 mg/dL    Alkaline Phosphatase 114 40 - 150 unit/L    Alanine Aminotransferase 78 (H) 0 - 55 unit/L    Aspartate Aminotransferase 109 (H) 5 - 34 unit/L    eGFR 57 mls/min/1.73/m2   Magnesium    Collection Time: 03/09/24  7:04 AM   Result Value Ref Range    Magnesium Level 2.00 1.60 - 2.60 mg/dL   CBC with Differential    Collection Time: 03/09/24  7:04 AM   Result Value Ref Range    WBC 13.20 (H) 4.50 - 11.50 x10(3)/mcL    RBC 4.43 (L) 4.70 - 6.10 x10(6)/mcL    Hgb 12.0 (L) 14.0 - 18.0 g/dL    Hct 37.7 (L) 42.0 - 52.0 %    MCV 85.1 80.0 - 94.0 fL    MCH 27.1 27.0 - 31.0 pg    MCHC 31.8 (L) 33.0 - 36.0 g/dL    RDW 15.6 11.5 - 17.0 %    Platelet 502 (H) 130 - 400 x10(3)/mcL    MPV 10.4 7.4 - 10.4 fL    Neut % 71.8 %    Lymph % 10.2 %    Mono % 12.4 %    Eos % 2.3 %    Basophil % 0.5 %    Lymph # 1.35 0.6 - 4.6 x10(3)/mcL    Neut # 9.46 (H) 2.1 - 9.2 x10(3)/mcL    Mono # 1.64 (H) 0.1 - 1.3 x10(3)/mcL    Eos # 0.31 0 - 0.9 x10(3)/mcL    Baso # 0.07 <=0.2 x10(3)/mcL    IG# 0.37 (H) 0 - 0.04 x10(3)/mcL    IG% 2.8 %    NRBC% 0.0 %   Comprehensive metabolic panel    Collection Time: 03/10/24  6:11 AM   Result Value Ref Range    Sodium Level 130 (L) 136 - 145 mmol/L    Potassium Level 3.9 3.5 - 5.1 mmol/L    Chloride 91 (L) 98 - 107 mmol/L    Carbon Dioxide 30 23 - 31 mmol/L    Glucose Level 89 82 - 115 mg/dL    Blood Urea Nitrogen 20.3 8.4 - 25.7 mg/dL    Creatinine 1.22 (H) 0.73 - 1.18 mg/dL    Calcium Level Total 8.2 (L) 8.8 - 10.0 mg/dL    Protein Total 5.6 (L) 5.8 - 7.6 gm/dL    Albumin Level 2.5 (L) 3.4 - 4.8 g/dL    Globulin 3.1 2.4 - 3.5 gm/dL    Albumin/Globulin Ratio 0.8 (L) 1.1 - 2.0 ratio    Bilirubin Total 1.8 (H) <=1.5 mg/dL    Alkaline Phosphatase 114 40 - 150 unit/L    Alanine Aminotransferase 73 (H) 0 - 55 unit/L    Aspartate Aminotransferase 88 (H) 5 - 34 unit/L    eGFR >60 mls/min/1.73/m2    Magnesium    Collection Time: 03/10/24  6:11 AM   Result Value Ref Range    Magnesium Level 1.90 1.60 - 2.60 mg/dL   CBC with Differential    Collection Time: 03/10/24  6:11 AM   Result Value Ref Range    WBC 14.93 (H) 4.50 - 11.50 x10(3)/mcL    RBC 4.41 (L) 4.70 - 6.10 x10(6)/mcL    Hgb 11.9 (L) 14.0 - 18.0 g/dL    Hct 37.4 (L) 42.0 - 52.0 %    MCV 84.8 80.0 - 94.0 fL    MCH 27.0 27.0 - 31.0 pg    MCHC 31.8 (L) 33.0 - 36.0 g/dL    RDW 15.5 11.5 - 17.0 %    Platelet 552 (H) 130 - 400 x10(3)/mcL    MPV 11.1 (H) 7.4 - 10.4 fL    Neut % 73.2 %    Lymph % 10.4 %    Mono % 11.7 %    Eos % 2.1 %    Basophil % 0.5 %    Lymph # 1.56 0.6 - 4.6 x10(3)/mcL    Neut # 10.92 (H) 2.1 - 9.2 x10(3)/mcL    Mono # 1.75 (H) 0.1 - 1.3 x10(3)/mcL    Eos # 0.31 0 - 0.9 x10(3)/mcL    Baso # 0.07 <=0.2 x10(3)/mcL    IG# 0.32 (H) 0 - 0.04 x10(3)/mcL    IG% 2.1 %    NRBC% 0.0 %     Telemetry: PAF/CVR    Physical Exam  Vitals reviewed.   Constitutional:       General: He is not in acute distress.     Appearance: Normal appearance.   HENT:      Head: Normocephalic.      Mouth/Throat:      Mouth: Mucous membranes are moist.   Eyes:      Extraocular Movements: Extraocular movements intact.      Conjunctiva/sclera: Conjunctivae normal.   Cardiovascular:      Rate and Rhythm: Normal rate. Rhythm irregular.      Pulses: Normal pulses.      Heart sounds: No murmur heard.  Pulmonary:      Effort: Pulmonary effort is normal. No respiratory distress.      Breath sounds: Normal breath sounds.      Comments: NC O2  Abdominal:      Palpations: Abdomen is soft.   Genitourinary:     Comments: Urinary Catheter   Skin:     General: Skin is warm.      Comments: Midline Sternotomy YVETTE with No Sign of Bleed/Infection. Right Lower Abdominal Site Oozing Serosanguinous Fluid, Improving. Dressing Intact.   Neurological:      General: No focal deficit present.      Mental Status: He is alert.   Psychiatric:         Mood and Affect: Mood normal.       Current  Inpatient Medications:  Current Facility-Administered Medications:     acetaminophen oral solution 650 mg, 650 mg, Per OG tube, Q6H PRN, Vasile Carter PA-C    acetaminophen tablet 650 mg, 650 mg, Oral, Q6H PRN, Pablo Yepez MD, 650 mg at 03/07/24 0907    albumin human 5% bottle 12.5 g, 12.5 g, Intravenous, PRN, Vasile Carter PA-C, Stopped at 02/28/24 1442    allopurinol split tablet 50 mg, 50 mg, Oral, Daily, Tanner Rdz MD, 50 mg at 03/10/24 0829    amiodarone tablet 200 mg, 200 mg, Oral, Daily, Lexy Palomares FNP, 200 mg at 03/10/24 0829    aspirin EC tablet 81 mg, 81 mg, Oral, Daily, Vasile Carter PA-C, 81 mg at 03/10/24 0829    atorvastatin tablet 40 mg, 40 mg, Oral, QHS, Tanner Rdz MD, 40 mg at 03/09/24 2108    ciprofloxacin (CIPRO)400mg/200ml D5W IVPB 400 mg, 400 mg, Intravenous, Q12H, AmStan hodge MD, Last Rate: 200 mL/hr at 03/10/24 1317, 400 mg at 03/10/24 1317    dextrose 10% bolus 125 mL 125 mL, 12.5 g, Intravenous, PRN, Pablo Yepez MD    dextrose 10% bolus 250 mL 250 mL, 25 g, Intravenous, PRN, Pablo Yepez MD    electrolyte-A infusion, , Intravenous, PRN, Pablo Yepez MD, Stopped at 02/28/24 0700    enoxaparin injection 40 mg, 40 mg, Subcutaneous, Daily, Cherry Suarez FNP, 40 mg at 03/09/24 1741    ferrous sulfate tablet 1 each, 1 tablet, Oral, Every other day, Tanner Rzd MD, 1 each at 03/10/24 0829    folic acid tablet 1 mg, 1 mg, Oral, Daily, Vasile Carter PA-C, 1 mg at 03/10/24 0829    furosemide tablet 20 mg, 20 mg, Oral, BID, Lexy Palomares, FNP, 20 mg at 03/09/24 0822    heparin, porcine (PF) 100 unit/mL injection flush 500 Units, 5 mL, Intravenous, On Call Procedure, Pablo Yepez MD    heparin, porcine (PF) 100 unit/mL injection flush 500 Units, 5 mL, Intravenous, On Call Procedure, Nita Contreras MD    HYDROcodone-acetaminophen 5-325 mg per tablet 1 tablet, 1 tablet, Oral, Q6H PRN, Stan Lazar  MD PEGGY, 1 tablet at 03/10/24 0830    lactulose 10 gram/15 ml solution 20 g, 20 g, Oral, Q6H PRN, Vasile Carter PA-C    loperamide capsule 2 mg, 2 mg, Oral, Continuous PRN, Vasile Carter PA-C, 2 mg at 03/02/24 1253    melatonin tablet 6 mg, 6 mg, Oral, Nightly PRN, Tanner Rdz MD, 6 mg at 03/09/24 2108    metoclopramide injection 5 mg, 5 mg, Intravenous, Q6H PRN, Vaisle Carter PA-C    metoprolol succinate (TOPROL-XL) 24 hr split tablet 12.5 mg, 12.5 mg, Oral, Daily, Cherry Suarez FNP, 12.5 mg at 03/10/24 0829    nitroGLYCERIN SL tablet 0.4 mg, 0.4 mg, Sublingual, Q5 Min PRN, Tanner Rdz MD    ondansetron disintegrating tablet 8 mg, 8 mg, Oral, Q8H PRN, Tanner Rdz MD, 8 mg at 03/06/24 0822    ondansetron injection 8 mg, 8 mg, Intravenous, Q6H PRN, Pablo Yepez MD, 8 mg at 03/10/24 1330    pantoprazole EC tablet 40 mg, 40 mg, Oral, Daily, Tanner Rdz MD, 40 mg at 03/10/24 0829    simethicone chewable tablet 80 mg, 1 tablet, Oral, TID PRN, Tanner Rdz MD, 80 mg at 03/07/24 0908    sodium chloride 0.9% flush 10 mL, 10 mL, Intravenous, PRN, Tanner Rdz MD    sodium chloride 0.9% flush 10 mL, 10 mL, Intravenous, PRN, Millie Jimenez NP    sucralfate tablet 1 g, 1 g, Oral, QID (AC & HS), Vasile Carter PA-C, 1 g at 03/10/24 0829  VTE Risk Mitigation (From admission, onward)           Ordered     enoxaparin injection 40 mg  Daily         03/07/24 0559     IP VTE HIGH RISK PATIENT  Once         03/02/24 0759     heparin, porcine (PF) 100 unit/mL injection flush 500 Units  On Call Procedure         02/27/24 0718     heparin, porcine (PF) 100 unit/mL injection flush 500 Units  On Call Procedure         02/27/24 0257     Place SUSIE hose  Until discontinued         02/22/24 1458     Place sequential compression device  Until discontinued         02/21/24 2057                  Assessment:   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  30% - Symptomatically Improving    - ECHO Limited (3.7.24) - LVEF 30%, Severe Global Hypokinesis     - ECHO (2.16.24): EF 40%, Grade II DD    - Small Pleural Effusions on CT Imaging (2.22.24)  CAD (Multivessel)    - Status Post CABG (3V) LIMA to LAD, SVG to OM1, SVG to RCA (2.27.24)    - RHC/Impella Placement and [place] Catheter (2.23.24)- Impella Removal/Femoral Artery Repair in Surgery on 2.27.24    - LHC (2.19.24): pLAD lesion 70%, oLCx 80%, OM1 80%, OM2 1 lesion 70% 2nd lesion 50%, mLAD 50%, pRCA 60%  VHD/AS     - Status Post Bio AVR (Epic max 23mm) (2.27.24)    - ECHO (2.16.24): Aortic Valve: There is moderate aortic valve sclerosis. Severely restricted motion. There is severe stenosis. Aortic valve area by VTI is 0.66 cm². Aortic valve peak velocity is 4.45 m/s. Mean gradient is 50 mmHg. The dimensionless index is 0.17.   Cardiogenic Shock (Post Cardiotomy) - Off Vasopressor Support - Resolved     - History of Hypertension   Ischemic Cardiomyopathy/EF 30%  Elevated LFTs - Improving   Pulmonary HTN    - ECHO PASP 69mmHg     - RHC (2.22.24): PA 84/34, PCWP 24 mmHg  Hematuria 2/2 Traumatic/Difficult Indwelling Catheter Placement (Resolved)  Urethral Stricture s/p Dilatation 2.23.24  PAF - Currently CVR    - Status Post Bedside Cardioversion x 2 on 2.27.24 (Both Unsuccessful)    - Status Post MOISE Ligation (2.27.24)    - CHADsVASc - 5 Points - 7.2% Stroke Risk per Year   Left Bundle Branch Block   VHD    - ECHO (3.7.24): Bio AVR, Mild MR    - ECHO (2.16.24): Severe AS, mild MR, mild to moderate TR  JEAN-CLAUDE/CKD Stage II - Improved     - Baseline Cr 1.6  Anemia- stable    - Status Post Transfusion on 2.28.24 & 2.29.24  Thrombocytopenia - Resolved   Leukocytosis - Persistent    - Groin Wound Culture: Serratia Marcescens and Pseudomonas Aeruginosa   Hypokalemia - Resolved  Hyponatremia - Worsening     Plan:   Continue IV Abx per Primary Team   Continue ASA, Amiodarone, Statin, BB  Monitor LFTs - May need to stop Statin if  they Increases   No ACEi/ARB/ARNI or Aldactone 2/2 JEAN-CLAUDE (Add Back when Renal Indices Improve and BP Allows)  Continue IV Lasix 20mg IVP BID   Accurate I&Os and Daily Weights   Keep K > 4.0 and Mg  > 2.0  Aggressive Mobilization of PT and Q1HR IS (PT/OT Eval and Treat)  Consult Case Management for Rehab Services   Labs in AM: CBC, CMP and Mg    Kaleb Rdz, KWAN  Cardiology  [facility]   03/10/2024    Physician addendum:  I have seen and examined this patient as a split-shared visit with the ARLEY d/t complicated medical management of above problems written in assessment and high acuity requiring physician expertise in medical decision-making. I performed the substantive portion of the history and exam. Above medical decision-making is also formulated by me.    Cardiovascular exam:  S1, S2  Lungs:  fine crackles at bases.  Extremities:  1+ edema bilaterally    Plan:  Continue IV Lasix.  Medications as above.      All George MD  Cardiologist

## 2024-03-10 NOTE — PROGRESS NOTES
UROLOGY  PROGRESS  NOTE    Brain Snyder 1945  [id number]  3/10/2024    Primary Urologist: N/A  On Call Urology: Judd Green MD    Subjective:  78-year-old man urinary retention had a Nguyen catheter placed.  We were asked to evaluate the patient for questionable discharge around the catheter      Objective:  Wt Readings from Last 3 Encounters:   03/09/24 82.6 kg (182 lb)   02/19/24 81.1 kg (178 lb 12.8 oz)     Temp Readings from Last 3 Encounters:   03/10/24 98 °F (36.7 °C) (Oral)   02/21/24 97.4 °F (36.3 °C) (Oral)     BP Readings from Last 3 Encounters:   03/10/24 99/74   02/21/24 109/63     Pulse Readings from Last 3 Encounters:   03/10/24 91   02/21/24 71       Intake/Output:  No intake/output data recorded.  I/O last 3 completed shifts:  In: 360 [P.O.:360]  Out: 6425 [Urine:6425]       Exam:    NCAT  Card RRR  Resp unlabored  Abd soft nontender nondistended   Nguyen catheter in place with clear yellow urine.  The patient is uncircumcised.  The catheters on a little traction that seems to be irritating his foreskin.  He has a minimal amount of foreskin erosion.  We untaped the catheter and re-taped it in a position that will not cause any irritation.   Extremity no C/C/E      Recent Results (from the past 24 hour(s))   Comprehensive metabolic panel    Collection Time: 03/10/24  6:11 AM   Result Value Ref Range    Sodium Level 130 (L) 136 - 145 mmol/L    Potassium Level 3.9 3.5 - 5.1 mmol/L    Chloride 91 (L) 98 - 107 mmol/L    Carbon Dioxide 30 23 - 31 mmol/L    Glucose Level 89 82 - 115 mg/dL    Blood Urea Nitrogen 20.3 8.4 - 25.7 mg/dL    Creatinine 1.22 (H) 0.73 - 1.18 mg/dL    Calcium Level Total 8.2 (L) 8.8 - 10.0 mg/dL    Protein Total 5.6 (L) 5.8 - 7.6 gm/dL    Albumin Level 2.5 (L) 3.4 - 4.8 g/dL    Globulin 3.1 2.4 - 3.5 gm/dL    Albumin/Globulin Ratio 0.8 (L) 1.1 - 2.0 ratio    Bilirubin Total 1.8 (H) <=1.5 mg/dL    Alkaline Phosphatase 114 40 - 150 unit/L    Alanine Aminotransferase 73 (H) 0  - 55 unit/L    Aspartate Aminotransferase 88 (H) 5 - 34 unit/L    eGFR >60 mls/min/1.73/m2   Magnesium    Collection Time: 03/10/24  6:11 AM   Result Value Ref Range    Magnesium Level 1.90 1.60 - 2.60 mg/dL   CBC with Differential    Collection Time: 03/10/24  6:11 AM   Result Value Ref Range    WBC 14.93 (H) 4.50 - 11.50 x10(3)/mcL    RBC 4.41 (L) 4.70 - 6.10 x10(6)/mcL    Hgb 11.9 (L) 14.0 - 18.0 g/dL    Hct 37.4 (L) 42.0 - 52.0 %    MCV 84.8 80.0 - 94.0 fL    MCH 27.0 27.0 - 31.0 pg    MCHC 31.8 (L) 33.0 - 36.0 g/dL    RDW 15.5 11.5 - 17.0 %    Platelet 552 (H) 130 - 400 x10(3)/mcL    MPV 11.1 (H) 7.4 - 10.4 fL    Neut % 73.2 %    Lymph % 10.4 %    Mono % 11.7 %    Eos % 2.1 %    Basophil % 0.5 %    Lymph # 1.56 0.6 - 4.6 x10(3)/mcL    Neut # 10.92 (H) 2.1 - 9.2 x10(3)/mcL    Mono # 1.75 (H) 0.1 - 1.3 x10(3)/mcL    Eos # 0.31 0 - 0.9 x10(3)/mcL    Baso # 0.07 <=0.2 x10(3)/mcL    IG# 0.32 (H) 0 - 0.04 x10(3)/mcL    IG% 2.1 %    NRBC% 0.0 %             Assessment:  Urinary retention  Urethral stricture      Plan:  Catheter is in good position and draining well.  Clear yellow urine  We did reposition the catheter and the tape was secured it so that is not on quite as much tension.  Very small amount of irritation of the foreskin.  No evidence of infection.  Also should be noted that there can be some mucus around the catheter but I see no active infection    Judd Green MD

## 2024-03-10 NOTE — PROGRESS NOTES
[facility] Hospital Medicine Progress Note        Chief Complaint: Inpatient Follow-up for      HPI per admitting team:   78-year-old male with a history of aortic insufficiency/stenosis, CAD, CKD IIIb, HTN AFib on Eliquis admitted to [facility] 02/15/2024 with chest pain, found to have NSTEMI (peak troponin 0.17) and underwent C 02/20/2024 showing severe multivessel stenosis and therefore was transferred to [facility] for CABG evaluation. Cardiology was consulted Patient had Impella placed on 02/23 and was admitted to the ICU.  Was started on aggressive diuresis with IV Lasix.  CV surgery was consulted.  Urology was consulted for hematuria; Nguyen catheter placed over guidewire with dilation secondary to urethral strictures. IV Heparin infusion was initiated per CIS but held due to hematuria/hemoptysis on 02/24.  He was also noted to have an JEAN-CLAUDE. Received 2 units PRBCs on 02/26 and was planned for high-risk PCI on 02/26 which was later canceled.  Underwent CABG x 3 2/27 (LIMA to LAD, RSVG to OM 1, R SVG to RCA, bioprosthetic AVR).  Remained on mechanical ventilation thereafter.  Patient noted to have tachycardia with atrial fibrillation/RVR requiring IV amiodarone along with pressors (norepinephrine/Milrinone) for hypotension. Patient underwent cardioversion x 2 with minimal improvement in HR/BP.  Patient self-extubated overnight on 02/29 and was noted to have adequate saturations on 2 L O2 via NC thereafter.  PT/OT consulted.  Renal function noted to be improving. Continued on IV diuresis as he appeared to be significantly volume overloaded postoperatively along with elevated pulmonary artery wedge pressures.  Started on p.o. amiodarone taper on 03/03.  Patient noted to have drainage from right groin wound and started on vancomycin on 03/04.  Wound culture grew Pseudomonas.  Chest tubes discontinued on 03/02.  Downgraded from ICU on 03/02.  CIS and CV surgery continued following patient.  CV  surgery signed off on 03/06 and discontinued vancomycin.  Patient was placed on oral Levaquin.  Hospital medicine consulted on 03/06 for assistance with medical management and DC planning.     Interval Hx:   patient was seen at bedside, no family member at bedside   Patient reports he has feeling a little better today   No new complaints   Vitals significant for low normal blood pressure, heart rates in the 50s, saturating with 2 L of oxygen via nasal cannula   White cell count continues to creep up slowly at 14.9, hemoglobin 11.9, platelets 5 5 2, sodium 130, creatinine 1.22  Liver enzymes improving slightly at 88/73   Repeat CT abdomen and pelvis of 3/9 shows improving stranding and improving hematoma in the right groin, persistent left-sided effusion and atelectasis     Case was discussed with patient's nurse and  on the floor.     Objective/physical exam:  General: alert male lying comfortably in bed, in no acute distress  HENT:  NC in place; oral and oropharyngeal mucosa moist, pink, with no erythema or exudates, no ear pain or discharge  Neck: normal neck movement, no lymph nodes or swellings, no JVD or Carotid bruit  Respiratory: clear breathing sounds bilaterally, no crackles, rales, ronchi or wheezes  Cardiovascular: clear S1 and S2, no murmurs, rubs or gallops  Peripheral Vascular: no lesions, ulcers or erosions, normal peripheral pulses; 2+ B/L pedal edema  Gastrointestinal: soft, non-distended abdomen with TTP in epigastric, hypogastric, RUQ/LUQ regions; no guarding, rigidity or rebound tenderness, normal bowel sounds; right groin incision wound noted to be erythematous with some serum bilirubin drainage with overlying dressing  Integumentary: normal skin color, no rashes or lesions  Neuro: AAO x 3; motor strength 5/5 in B/L UEs & LEs; sensation intact to gross and fine touch B/L; CN II-XII grossly intact        Assessment/Plan:  Right flank pain- due to right-sided lower abdomen hematoma-    R Groin Purulent Cellulitis  Transaminitis, trending upwards ?  Congestive hepatopathy versus infection  Leukocytosis 2/2 possible infectious process  Fluid overload 2/2 HFrEF exacerbation  HFrEF/HFpEF  JEAN-CLAUDE on stage II CKD - Improved  Pulmonary hypertension   NSTEMI, CAD sp CABG x 3  S/p AVR  Atrial Fibrillation cvr   Normocytic anemia  Hematuria s/p Nguyen  Urethral stricture s/p dilation     Plan  Will consult pulmonology for persistent left-sided effusion with atelectasis-discussed with Dr. Davis   If WBC trends upwards tomorrow will consult ID for further input   Suspend lisinopril and Aldactone, due to low blood pressure   Continue p.o. Lasix 20 mg b.i.d., metoprolol 12.5 q.day , on p.o. amiodarone taper  Continue use of incentive spirometry and oxygen supplementation   Appreciate Urology input Nguyen catheter has been checked out no signs of active infection   Consult palliative Care for goals of care discussion   Wound culture grew Pseudomonas and Serratia sensitive to ciprofloxacin   Continue IV ciprofloxacin b.I.d.  Trend liver enzymes- likely due to congestive hepatopathy   Patient currently on p.o. Lasix 20 mg b.i.d., blood pressure is soft no room for increment in Lasix doses  Ultrasound abdomen is normal  Continue atorvastatin for now,  CIS on board; follow recommendations   Continued on allopurinol 50 mg daily, aspirin 81 mg, atorvastatin 40 mg., FeSO4 every other day, folic acid 1 mg, metoprolol tartrate 12.5 mg b.i.d., Protonix 40 mg daily, , sucralfate 1 g q.i.d.  P.o. Norco 5 mg q.6 p.r.n. for pain  PT/OT recommending high-intensity therapy   Cm on board for placement        VTE prophylaxis:  Lovenox     Patient condition:  guarded     Anticipated discharge and Disposition:     Pending     All diagnosis and differential diagnosis have been reviewed; assessment and plan has been documented; I have personally reviewed the labs and test results that are presently available; I have reviewed the  patients medication list; I have reviewed the consulting providers response and recommendations. I have reviewed or attempted to review medical records based upon their availability     All of the patient's questions have been  addressed and answered. Patient's is agreeable to the above stated plan. I will continue to monitor closely and make adjustments to medical management as needed.            VITAL SIGNS: 24 HRS MIN & MAX LAST   Temp  Min: 97.4 °F (36.3 °C)  Max: 98.5 °F (36.9 °C) 98 °F (36.7 °C)   BP  Min: 82/46  Max: 102/68 99/74   Pulse  Min: 52  Max: 91  91   Resp  Min: 18  Max: 19 18   SpO2  Min: 94 %  Max: 96 % 95 %     I have reviewed the following labs:  Recent Labs   Lab 03/08/24 0428 03/09/24  0704 03/10/24  0611   WBC 12.09* 13.20* 14.93*   RBC 4.10* 4.43* 4.41*   HGB 11.2* 12.0* 11.9*   HCT 35.6* 37.7* 37.4*   MCV 86.8 85.1 84.8   MCH 27.3 27.1 27.0   MCHC 31.5* 31.8* 31.8*   RDW 15.8 15.6 15.5   * 502* 552*   MPV 10.8* 10.4 11.1*     Recent Labs   Lab 03/06/24  0419 03/07/24  0406 03/08/24 0428 03/09/24  0704 03/10/24  0611   *   < > 132* 132* 130*   K 3.1*   < > 3.3* 3.7 3.9   CO2 27   < > 27 28 30   BUN 22.2   < > 20.6 20.3 20.3   CREATININE 1.10   < > 1.21* 1.30* 1.22*   CALCIUM 7.6*   < > 8.0* 8.0* 8.2*   MG 2.00  --   --  2.00 1.90   ALBUMIN 2.5*   < > 2.4* 2.4* 2.5*   ALKPHOS 152*   < > 121 114 114   ALT 58*   < > 59* 78* 73*   AST 49*   < > 75* 109* 88*   BILITOT 2.1*   < > 1.8* 1.9* 1.8*    < > = values in this interval not displayed.     Microbiology Results (last 7 days)       Procedure Component Value Units Date/Time    Wound Culture [[id number]]  (Abnormal)  (Susceptibility) Collected: 03/03/24 1920    Order Status: Completed Specimen: Wound from Groin Updated: 03/07/24 1053     Wound Culture Many Serratia marcescens      Many Pseudomonas aeruginosa             See below for Radiology    Scheduled Med:   allopurinoL  50 mg Oral Daily    amiodarone  200 mg Oral Daily     aspirin  81 mg Oral Daily    atorvastatin  40 mg Oral QHS    ciprofloxacin  400 mg Intravenous Q12H    enoxparin  40 mg Subcutaneous Daily    ferrous sulfate  1 tablet Oral Every other day    folic acid  1 mg Oral Daily    furosemide  20 mg Oral BID    metoprolol succinate  12.5 mg Oral Daily    pantoprazole  40 mg Oral Daily    sucralfate  1 g Oral QID (AC & HS)      Continuous Infusions:   loperamide        PRN Meds:  acetaminophen, acetaminophen, albumin human 5%, dextrose 10%, dextrose 10%, electrolyte-A, heparin, porcine (PF), heparin, porcine (PF), HYDROcodone-acetaminophen, lactulose 10 gram/15 ml, loperamide, melatonin, metoclopramide, nitroGLYCERIN, ondansetron, ondansetron, simethicone, sodium chloride 0.9%, sodium chloride 0.9%     Assessment/Plan:      VTE prophylaxis:     Patient condition:  Stable/Fair/Guarded/ Serious/ Critical    Anticipated discharge and Disposition:         All diagnosis and differential diagnosis have been reviewed; assessment and plan has been documented; I have personally reviewed the labs and test results that are presently available; I have reviewed the patients medication list; I have reviewed the consulting providers response and recommendations. I have reviewed or attempted to review medical records based upon their availability    All of the patient's questions have been  addressed and answered. Patient's is agreeable to the above stated plan. I will continue to monitor closely and make adjustments to medical management as needed.  _____________________________________________________________________    Nutrition Status:    Radiology:  I have personally reviewed the following imaging and agree with the radiologist.     CV Ultrasound doppler arterial legs bilat  Patent right lower extremity arterial system with triphasic waveforms   throughout demonstrating mild arterial flow reduction.  Patent left lower extremity arterial system with triphasic waveforms   throughout  demonstrating mild arterial flow reduction.      Stan Lazar MD  Department of Hospital Medicine   [facility]   03/10/2024

## 2024-03-10 NOTE — CONSULTS
Tried to communicate as much as I could but he is lying supine and there is initial in the groin after the Impella removal, which was placed on 02/23 upon transferring from [facility] with multiple complications from a cardiovascular and renal standpoint.  I saw the effusion on his x-ray but he has not in any imminent respiratory distress.      Underwent CABG x 3 2/27 (LIMA to LAD, RSVG to OM 1, R SVG to RCA, bioprosthetic AVR).  Remained on mechanical ventilation thereafter.  Patient noted to have tachycardia with atrial fibrillation/RVR requiring IV amiodarone along with pressors (norepinephrine/Milrinone) for hypotension. Patient underwent cardioversion x 2 with minimal improvement in HR/BP.  Patient self-extubated overnight on 02/29 and was noted to have adequate saturations on 2 L O2 via NC thereafter.  PT/OT consulted.  Renal function noted to be improving. Continued on IV diuresis as he appeared to be significantly volume overloaded postoperatively along with elevated pulmonary artery wedge pressures.  Started on p.o. amiodarone taper on 03/03.  Patient noted to have drainage from right groin wound and started on vancomycin on 03/04.  Wound culture grew Pseudomonas.  Chest tubes discontinued on 03/02.  Downgraded from ICU on 03/02.  CIS and CV surgery continued following patient.  CV surgery signed off on 03/06 and discontinued vancomycin.  Patient was placed on oral Levaquin.     Interval Hx:   patient was seen at bedside, no family member at bedside   Patient reports he has feeling a little better today   No new complaints   Vitals significant for low normal blood pressure, heart rates in the 50s, saturating with 2 L of oxygen via nasal cannula   White cell count continues to creep up slowly at 14.9, hemoglobin 11.9, platelets 5 5 2, sodium 130, creatinine 1.22  Liver enzymes improving slightly at 88/73   Repeat CT abdomen and pelvis of 3/9 shows improving stranding and improving hematoma in the right groin,  persistent left-sided effusion and atelectasis       CT of the abdomen was reviewed and the left-sided loculation was noted.    The effusion is noted it has not surprising a role with such a complicated history.  He seems to be in chronically sick appearance but not acutely distressed.  Can not get him up at the side of the bed safely and we took note of this effusion.  I do not think it is causing any imminent problems and we can follow-up on it intermittently when we can set him up for an ultrasound check and evaluation and possible drainage.  We will have the primary team update me when this is foreseeable in the near future.

## 2024-03-11 LAB
ALBUMIN SERPL-MCNC: 2.5 G/DL (ref 3.4–4.8)
ALBUMIN/GLOB SERPL: 0.7 RATIO (ref 1.1–2)
ALP SERPL-CCNC: 116 UNIT/L (ref 40–150)
ALT SERPL-CCNC: 71 UNIT/L (ref 0–55)
AST SERPL-CCNC: 78 UNIT/L (ref 5–34)
BASOPHILS # BLD AUTO: 0.06 X10(3)/MCL
BASOPHILS NFR BLD AUTO: 0.4 %
BILIRUB SERPL-MCNC: 1.6 MG/DL
BUN SERPL-MCNC: 22.8 MG/DL (ref 8.4–25.7)
CALCIUM SERPL-MCNC: 8.1 MG/DL (ref 8.8–10)
CHLORIDE SERPL-SCNC: 91 MMOL/L (ref 98–107)
CO2 SERPL-SCNC: 26 MMOL/L (ref 23–31)
CORTIS SERPL-SCNC: 16.7 UG/DL
CREAT SERPL-MCNC: 1.3 MG/DL (ref 0.73–1.18)
EOSINOPHIL # BLD AUTO: 0.22 X10(3)/MCL (ref 0–0.9)
EOSINOPHIL NFR BLD AUTO: 1.4 %
ERYTHROCYTE [DISTWIDTH] IN BLOOD BY AUTOMATED COUNT: 15.4 % (ref 11.5–17)
GFR SERPLBLD CREATININE-BSD FMLA CKD-EPI: 57 MLS/MIN/1.73/M2
GLOBULIN SER-MCNC: 3.4 GM/DL (ref 2.4–3.5)
GLUCOSE SERPL-MCNC: 112 MG/DL (ref 82–115)
HCT VFR BLD AUTO: 36.6 % (ref 42–52)
HGB BLD-MCNC: 11.8 G/DL (ref 14–18)
IMM GRANULOCYTES # BLD AUTO: 0.29 X10(3)/MCL (ref 0–0.04)
IMM GRANULOCYTES NFR BLD AUTO: 1.9 %
LYMPHOCYTES # BLD AUTO: 1.29 X10(3)/MCL (ref 0.6–4.6)
LYMPHOCYTES NFR BLD AUTO: 8.4 %
MAGNESIUM SERPL-MCNC: 2 MG/DL (ref 1.6–2.6)
MCH RBC QN AUTO: 27.1 PG (ref 27–31)
MCHC RBC AUTO-ENTMCNC: 32.2 G/DL (ref 33–36)
MCV RBC AUTO: 84.1 FL (ref 80–94)
MONOCYTES # BLD AUTO: 1.8 X10(3)/MCL (ref 0.1–1.3)
MONOCYTES NFR BLD AUTO: 11.8 %
NEUTROPHILS # BLD AUTO: 11.62 X10(3)/MCL (ref 2.1–9.2)
NEUTROPHILS NFR BLD AUTO: 76.1 %
NRBC BLD AUTO-RTO: 0 %
OSMOLALITY SERPL: 276 MOSM/KG (ref 280–300)
PLATELET # BLD AUTO: 633 X10(3)/MCL (ref 130–400)
PMV BLD AUTO: 10.4 FL (ref 7.4–10.4)
POTASSIUM SERPL-SCNC: 4.1 MMOL/L (ref 3.5–5.1)
PROT SERPL-MCNC: 5.9 GM/DL (ref 5.8–7.6)
RBC # BLD AUTO: 4.35 X10(6)/MCL (ref 4.7–6.1)
SODIUM SERPL-SCNC: 128 MMOL/L (ref 136–145)
TSH SERPL-ACNC: 1.8 UIU/ML (ref 0.35–4.94)
WBC # SPEC AUTO: 15.28 X10(3)/MCL (ref 4.5–11.5)

## 2024-03-11 PROCEDURE — 25000003 PHARM REV CODE 250: Performed by: INTERNAL MEDICINE

## 2024-03-11 PROCEDURE — 25000003 PHARM REV CODE 250: Performed by: PHYSICIAN ASSISTANT

## 2024-03-11 PROCEDURE — 97110 THERAPEUTIC EXERCISES: CPT

## 2024-03-11 PROCEDURE — 63600175 PHARM REV CODE 636 W HCPCS: Performed by: INTERNAL MEDICINE

## 2024-03-11 PROCEDURE — 97535 SELF CARE MNGMENT TRAINING: CPT

## 2024-03-11 PROCEDURE — 84443 ASSAY THYROID STIM HORMONE: CPT | Performed by: NURSE PRACTITIONER

## 2024-03-11 PROCEDURE — 63600175 PHARM REV CODE 636 W HCPCS: Performed by: NURSE PRACTITIONER

## 2024-03-11 PROCEDURE — 21400001 HC TELEMETRY ROOM

## 2024-03-11 PROCEDURE — 82533 TOTAL CORTISOL: CPT | Performed by: NURSE PRACTITIONER

## 2024-03-11 PROCEDURE — 94761 N-INVAS EAR/PLS OXIMETRY MLT: CPT

## 2024-03-11 PROCEDURE — 83735 ASSAY OF MAGNESIUM: CPT | Performed by: NURSE PRACTITIONER

## 2024-03-11 PROCEDURE — 25000003 PHARM REV CODE 250: Performed by: NURSE PRACTITIONER

## 2024-03-11 PROCEDURE — 85025 COMPLETE CBC W/AUTO DIFF WBC: CPT | Performed by: INTERNAL MEDICINE

## 2024-03-11 PROCEDURE — 83930 ASSAY OF BLOOD OSMOLALITY: CPT | Performed by: STUDENT IN AN ORGANIZED HEALTH CARE EDUCATION/TRAINING PROGRAM

## 2024-03-11 PROCEDURE — 97164 PT RE-EVAL EST PLAN CARE: CPT

## 2024-03-11 PROCEDURE — 80053 COMPREHEN METABOLIC PANEL: CPT | Performed by: INTERNAL MEDICINE

## 2024-03-11 PROCEDURE — 25000003 PHARM REV CODE 250

## 2024-03-11 PROCEDURE — 27000221 HC OXYGEN, UP TO 24 HOURS

## 2024-03-11 PROCEDURE — 99221 1ST HOSP IP/OBS SF/LOW 40: CPT | Mod: ,,, | Performed by: INTERNAL MEDICINE

## 2024-03-11 RX ADMIN — AMIODARONE HYDROCHLORIDE 200 MG: 200 TABLET ORAL at 11:03

## 2024-03-11 RX ADMIN — FOLIC ACID 1 MG: 1 TABLET ORAL at 11:03

## 2024-03-11 RX ADMIN — ALLOPURINOL 50 MG: 300 TABLET ORAL at 11:03

## 2024-03-11 RX ADMIN — ACETAMINOPHEN 650 MG: 325 TABLET, FILM COATED ORAL at 11:03

## 2024-03-11 RX ADMIN — ENOXAPARIN SODIUM 40 MG: 40 INJECTION SUBCUTANEOUS at 05:03

## 2024-03-11 RX ADMIN — SIMETHICONE 80 MG: 80 TABLET, CHEWABLE ORAL at 12:03

## 2024-03-11 RX ADMIN — ATORVASTATIN CALCIUM 40 MG: 40 TABLET, FILM COATED ORAL at 09:03

## 2024-03-11 RX ADMIN — CIPROFLOXACIN 400 MG: 2 INJECTION, SOLUTION INTRAVENOUS at 01:03

## 2024-03-11 RX ADMIN — Medication 6 MG: at 09:03

## 2024-03-11 RX ADMIN — PANTOPRAZOLE SODIUM 40 MG: 40 TABLET, DELAYED RELEASE ORAL at 11:03

## 2024-03-11 RX ADMIN — ACETAMINOPHEN 650 MG: 325 TABLET, FILM COATED ORAL at 10:03

## 2024-03-11 RX ADMIN — FUROSEMIDE 20 MG: 20 TABLET ORAL at 11:03

## 2024-03-11 RX ADMIN — ONDANSETRON 8 MG: 4 TABLET, ORALLY DISINTEGRATING ORAL at 12:03

## 2024-03-11 RX ADMIN — SUCRALFATE 1 G: 1 TABLET ORAL at 05:03

## 2024-03-11 RX ADMIN — SUCRALFATE 1 G: 1 TABLET ORAL at 09:03

## 2024-03-11 RX ADMIN — SUCRALFATE 1 G: 1 TABLET ORAL at 11:03

## 2024-03-11 RX ADMIN — ASPIRIN 81 MG: 81 TABLET, COATED ORAL at 11:03

## 2024-03-11 RX ADMIN — METOPROLOL SUCCINATE 12.5 MG: 25 TABLET, EXTENDED RELEASE ORAL at 11:03

## 2024-03-11 RX ADMIN — CEFEPIME 2 G: 2 INJECTION, POWDER, FOR SOLUTION INTRAVENOUS at 12:03

## 2024-03-11 NOTE — NURSING
Artificial Intelligence Notification  [facility]  [address] [place] [address]  Phone: [phone]     This documentation was triggered by an Artificial Intelligence Notification.     Admit Date: 2024   LOS: 19  Code Status: Full Code  : 1945  Age: 78 y.o.  Weight:   Wt Readings from Last 1 Encounters:   24 85.6 kg (188 lb 11.2 oz)        Sex: male  Bed: 980/[facility]  MRN: [id number]  Attending Physician: Stan Lazar MD     Date of Alert: 2024  Time AI Alert Received: 1554             Vitals:    24 1537   BP: (!) 62/35   Pulse: 83   Resp: 18   Temp: 98 °F (36.7 °C)       Artificial Intelligence alert discussed with Provider:     Name: abbey yuen   Date/Time of Provider Notification: 3/11 @1552      Patient Condition: stable, spoke with assigned nurse, rechecked bp, bp wnl, will cont to be available if needed.

## 2024-03-11 NOTE — PLAN OF CARE
Plan of Care    General surgery consulted for evaluation of R groin wound at prior Impella insertion site. CT preformed yesterday shows approx 1.5 cm hematoma at site. Area inspected bedside and is self draining with scant serous > purulent drainage with manual expression. Pt remains AF. Recommend wound care consult and continue to manually express fluid from site q shift. If area stops self draining, please obtain US to better define anatomy. Please call surgery with any questions or concerns.     Smooth Clemente MD  [facility] General Surgery

## 2024-03-11 NOTE — PROGRESS NOTES
"  [facility]    Cardiology  Progress Note    Patient Name: Brain Snyder  MRN: [id number]  Admission Date: 2/21/2024  Hospital Length of Stay: 19 days  Code Status: Full Code   Attending Physician: Stan Lazar MD   Primary Care Physician: Nidia Shepherd NP  Expected Discharge Date:   Principal Problem:CAD (coronary artery disease)    Subjective:   Reason for Consult:  CAD     HPI: 78-year-old female that is unknown to [facility] with a PMHx of PAF on Eliquis Aortic insufficiency, MV CAD, HTN. He is Chinese speaking and an  was used for interview. He was orginally admitted to [facility] on 2.15.24 with CP & NSTEMI and underwent a LHC on 2.20.24 taht showed MV CAD (reported noted below) He was transferred to [facility] on 2.21.24 for a CABG/AVR evaluation. On arrive he was hemodynamically stable on a heparin infusion. He denied chest pain, SOB, and nausea on current exam, [facility] was consulted for CAD    Hospital Course:   2.23.24: Patient seen resting in bed. He denies SOB or CP. He remains on heparin infusion. Family at bedside   2.24.24: NAD. S/p Impella Placement/[place]-Chelo Catheter. "I am ok." + Blood Clots from Nose/Mouth, Hematuria 2/2 traumatic Indwelling Catheter Placement. Heparin Drip per Protocol. Impella P5  2.25.24: NAD. "I'm ok." Denies CP, SOB and Palps. PA Pressure Reading is not Accurate. CVP 5. Impella at P5. R Groin Impella P7. Remains Off Heparin Drip per Protocol. Now in SR.   2.26.24: NAD Noted. SR on Tele. Right Groin Impella in place, bruising with no hematoma noted. Distal pulses DP intact. Legs warm. NC 2 L/Min. Denies CP/SOB. Consent obtained from his daughter Brii Snyder. NPO for MV PCI Today.  2.27.24: NAD Noted. Impella remains in place at P7 Support. Good UA Noted, some pink tinged urine noted. SR on Tele. Pressors off. Denies CP/SOB. NPO for surgery today. Heparin on hold for surgery.  2.28.24: Patient is critically ill. AF RVR on Tele, twice shocked this AM with no " "success. On Amiodarone/Milrinone- Cardizem Infusion now off given hypotension and ICMO. Also no José Miguel/Levophed. Concern for bleeding noted, transfusion noted. Patient is intubated/Mechanically Vented. Hemodynamics: CO/CI 4.3/2.3 CVP 20.  2.29.24: Patient self extubated this AM. He is stable on NC Oxygen. Denies CP/SOB. SR on Tele. On Amiodarone Infusion and receiving blood products. BP Stable. Clinically much improved from yesterday.   3.1.24: NAD. Afib mild RVR on tele. On Primacor @ 0.187 mcg/kg/min. Amiodarone infusing per protocol. LFT's worsening. WBC worsening. + Incisional CP. On 5 L NC.   3.2.24: NAD. Net Negative 2700 urine output.   3.3.24: NAD. VSS. No complaints of CP/Palps. Reports SOB is improving. Creat 1.61 (Improved)  3.4.24: NAD. VSS. No complaints. On 2 L. Net Negative Fluid 3540 over 24 hours. Creat 1.37. LFTs slighting worse today  3.5.24: NAD. VSS. Denies CP, SOB and Palps. Family at Bedside. Fluid Balance Net Negative 4360mL. BUN/Crea 23.5/1.16, AST/ALT 69/71  3.6.24: Patient sitting up in bedside chair. Appears SOB. C/o abdominal bloating and gaseous. Reports + BM since surgery. Abdomen distended. Patient nausea and spit up bile colored fluid. + peripheral edema. O2 via NC. Excellent diuresis. Persistent leukocytosis. K+ 3.1, mild transaminitis. Groin Wound cx -- GNR and pseudomonas. CV surgery has signed off.   3.7.24: Patient sitting up in bed. NAD. Denies any pain. Noted SS drainage from impella insertion site. Leukocytosis has resolved. BP marginal at times. Afib, CVR. Good UOP; however weight is increasing.   3.8.24: Patient awake in bed. He has been weaned off. O2. Good diuresis overnight. Mild bump in creatinine-- 1.21 from 1.15 yesterday. Liver enzymes uptrending. VSS.   3.9.24: NAD. Language Barrier/Daughter at Bedside for Translation. Denies CP, SOB and Palps. Deconditioned. Fluid Balance Net Negative 5600mL.   3.10.24: NAD. "[place]." Denies CP, SOB and Palps. Daughter at " "Bedside/Translating. Denies CP, SOB and Palps. Remains Deconditions. Fluid Balance Net Negative 2405mL. Na 130, BUN/Crea 20.3/1.22, AST/ALT 88/73  3.11.24: Awake in bed. NAD. Note continued leukocytosis. Afebrile. Still oozing from Impella insertion site. Remains on ABX. Lasix has been held due to periods of hypotension. Worsening hyponatremia. Creatinine 1.30. Persistent transaminitis    PMH: PAF (on Eliquis), Aortic insufficiency, MV CAD, HTN  PSH: [facility]  Family History: None Reported  Social History: Former Smoker, Denies ETOH or Illicit Drug Use    Previous Diagnostics:  CABG/Bio AVR/MOISE Ligation (2.27.24):  Coronary artery bypass grafting X 3, LIMA to LAD, reversed SVG to OM1, Reversed Saphenous venous graft to RCA  Bioprosthetic aortic valve replacement (Epic max 23mm), Endoscopic venous harvesting of left greater saphenous vein, Left atrial appendage ligation, explant of right femoral impella device, right femoral artery repair.    RHC/Impella Placement (2.23.24):  Right heart catheterization performed showed the following:  PA= 61/24 (27) mm Hg  PCWP=  31/26 (27) mm Hg  AO saturation= 93 % RA  PA saturation= 60% with Impella (49% yesterday)    (02/22/24) -  0.5  Following that we elected to advance the Impella via right common femoral artery approach:  - Abiomed stiff wire  - 14 Ethiopian peel-away sheath  - Heparin 10,000 unit bolus given peripherally  - Dilator removed  - 0.035" J-wire  - 6 Ethiopian pigtail catheter positioned in the left ventricle  - Abiomed 0.025" wire  - Impella CP positioned in left ventricle  - Pulsatile motor current (appropriate positioning)  - Placed the marker just below the aortic valve  - Cardiac output was 2.8 L/min provided by the device.  Impella was at 84cm  Access Closure :    Sheath sutured to the groin  Secured.  Attestation:I was present for and supervised all key portions of the procedure  Impression/plan:   Successful Impella insertion and swan-Chelo in the setting of Acute " HF/low cardiac output/precardiogenic shock/Critcal-severe AS/MVCAD - LM distal    RHC (2.22.24):  Right heart catheterization demonstrating severe pulmonary hypertension 84/34 and PCWP 24 mmHg  Reduced cardiac output/cardiac index at 3.43/1.81 L/min/m2 with pulmonary artery saturations 49.3%  For applied to the right femoral vein following removal of the 7 Armenian sheath  The estimated blood loss was none.    Carotid US (2.22.24):  The bilateral internal carotid artery demonstrated less than 50% stenosis.   The bilateral vertebral arteries were patent with antegrade flow    LHC (2.20.24):   Prox LAD lesion was 70% stenosed.  Ost Cx to Prox Cx lesion was 80% stenosed.  1st Mrg lesion was 80% stenosed.  2nd Mrg-1 lesion was 70% stenosed.  2nd Mrg-2 lesion was 50% stenosed.  Mid LAD lesion was 50% stenosed.  Prox RCA lesion was 60% stenosed.  estimated blood loss was <50 mL.  There was three vessel coronary artery disease.  LVEDP: 30mmHg  Recommendations:   Refer for CABG evaluation and AVR   Preformed by Dr. Flannery at [facility]     ECHO (2.15.24):  Left Ventricle: The left ventricle is normal in size. Mildly increased ventricular mass. Mildly increased wall thickness. There is mild eccentric hypertrophy. Moderate global hypokinesis present. Septal motion is consistent with bundle branch block. There is moderately reduced systolic function. Biplane (2D) method of discs ejection fraction is 40%. Grade II diastolic dysfunction.  Right Ventricle: Right ventricular enlargement. Systolic function is normal.  Left Atrium: Left atrium is severely dilated.  Right Atrium: Right atrium is moderately dilated.  Aortic Valve: There is moderate aortic valve sclerosis. Severely restricted motion. There is severe stenosis. Aortic valve area by VTI is 0.66 cm². Aortic valve peak velocity is 4.45 m/s. Mean gradient is 50 mmHg. The dimensionless index is 0.17. There is mild to moderate aortic regurgitation.  Mitral Valve: Mildly calcified leaflets.  There is no stenosis. There is mild regurgitation.  Tricuspid Valve: The tricuspid valve is structurally normal. There is mild to moderate regurgitation.  Pulmonic Valve: There is no significant regurgitation.  Aorta: Aortic root is upper limit of normal measuring 3.6 cm.  Pulmonary Artery: There is severe pulmonary hypertension. The estimated pulmonary artery systolic pressure is 69 mmHg.  IVC/SVC: Intermediate venous pressure at 8 mmHg.  Pericardium: There is no pericardial effusion.     Review of Systems   Constitutional: Positive for malaise/fatigue.   Cardiovascular:  Negative for chest pain, dyspnea on exertion and leg swelling.   Respiratory:  Negative for shortness of breath.    All other systems reviewed and are negative.    Objective:     Vital Signs (Most Recent):  Temp: 98.7 °F (37.1 °C) (03/11/24 0820)  Pulse: 100 (03/11/24 0820)  Resp: 18 (03/11/24 0050)  BP: 94/64 (03/11/24 0820)  SpO2: (!) 94 % (03/11/24 1114) Vital Signs (24h Range):  Temp:  [97.6 °F (36.4 °C)-98.7 °F (37.1 °C)] 98.7 °F (37.1 °C)  Pulse:  [] 100  Resp:  [18-19] 18  SpO2:  [94 %-95 %] 94 %  BP: ()/(39-64) 94/64   Weight: 85.6 kg (188 lb 11.2 oz)  Body mass index is 31.4 kg/m².  SpO2: (!) 94 %       Intake/Output Summary (Last 24 hours) at 3/11/2024 1147  Last data filed at 3/11/2024 0412  Gross per 24 hour   Intake --   Output 3900 ml   Net -3900 ml       Lines/Drains/Airways       Drain  Duration                  Urethral Catheter 02/28/24 1445 Coude 20 Fr. 11 days              Peripheral Intravenous Line  Duration                  Peripheral IV - Single Lumen 03/02/24 1053 20 G Anterior;Right Upper Arm 8 days                  Significant Labs:   Recent Results (from the past 72 hour(s))   Comprehensive metabolic panel    Collection Time: 03/09/24  7:04 AM   Result Value Ref Range    Sodium Level 132 (L) 136 - 145 mmol/L    Potassium Level 3.7 3.5 - 5.1 mmol/L    Chloride 96 (L) 98 - 107 mmol/L    Carbon Dioxide 28 23 -  31 mmol/L    Glucose Level 96 82 - 115 mg/dL    Blood Urea Nitrogen 20.3 8.4 - 25.7 mg/dL    Creatinine 1.30 (H) 0.73 - 1.18 mg/dL    Calcium Level Total 8.0 (L) 8.8 - 10.0 mg/dL    Protein Total 5.7 (L) 5.8 - 7.6 gm/dL    Albumin Level 2.4 (L) 3.4 - 4.8 g/dL    Globulin 3.3 2.4 - 3.5 gm/dL    Albumin/Globulin Ratio 0.7 (L) 1.1 - 2.0 ratio    Bilirubin Total 1.9 (H) <=1.5 mg/dL    Alkaline Phosphatase 114 40 - 150 unit/L    Alanine Aminotransferase 78 (H) 0 - 55 unit/L    Aspartate Aminotransferase 109 (H) 5 - 34 unit/L    eGFR 57 mls/min/1.73/m2   Magnesium    Collection Time: 03/09/24  7:04 AM   Result Value Ref Range    Magnesium Level 2.00 1.60 - 2.60 mg/dL   CBC with Differential    Collection Time: 03/09/24  7:04 AM   Result Value Ref Range    WBC 13.20 (H) 4.50 - 11.50 x10(3)/mcL    RBC 4.43 (L) 4.70 - 6.10 x10(6)/mcL    Hgb 12.0 (L) 14.0 - 18.0 g/dL    Hct 37.7 (L) 42.0 - 52.0 %    MCV 85.1 80.0 - 94.0 fL    MCH 27.1 27.0 - 31.0 pg    MCHC 31.8 (L) 33.0 - 36.0 g/dL    RDW 15.6 11.5 - 17.0 %    Platelet 502 (H) 130 - 400 x10(3)/mcL    MPV 10.4 7.4 - 10.4 fL    Neut % 71.8 %    Lymph % 10.2 %    Mono % 12.4 %    Eos % 2.3 %    Basophil % 0.5 %    Lymph # 1.35 0.6 - 4.6 x10(3)/mcL    Neut # 9.46 (H) 2.1 - 9.2 x10(3)/mcL    Mono # 1.64 (H) 0.1 - 1.3 x10(3)/mcL    Eos # 0.31 0 - 0.9 x10(3)/mcL    Baso # 0.07 <=0.2 x10(3)/mcL    IG# 0.37 (H) 0 - 0.04 x10(3)/mcL    IG% 2.8 %    NRBC% 0.0 %   Comprehensive metabolic panel    Collection Time: 03/10/24  6:11 AM   Result Value Ref Range    Sodium Level 130 (L) 136 - 145 mmol/L    Potassium Level 3.9 3.5 - 5.1 mmol/L    Chloride 91 (L) 98 - 107 mmol/L    Carbon Dioxide 30 23 - 31 mmol/L    Glucose Level 89 82 - 115 mg/dL    Blood Urea Nitrogen 20.3 8.4 - 25.7 mg/dL    Creatinine 1.22 (H) 0.73 - 1.18 mg/dL    Calcium Level Total 8.2 (L) 8.8 - 10.0 mg/dL    Protein Total 5.6 (L) 5.8 - 7.6 gm/dL    Albumin Level 2.5 (L) 3.4 - 4.8 g/dL    Globulin 3.1 2.4 - 3.5 gm/dL     Albumin/Globulin Ratio 0.8 (L) 1.1 - 2.0 ratio    Bilirubin Total 1.8 (H) <=1.5 mg/dL    Alkaline Phosphatase 114 40 - 150 unit/L    Alanine Aminotransferase 73 (H) 0 - 55 unit/L    Aspartate Aminotransferase 88 (H) 5 - 34 unit/L    eGFR >60 mls/min/1.73/m2   Magnesium    Collection Time: 03/10/24  6:11 AM   Result Value Ref Range    Magnesium Level 1.90 1.60 - 2.60 mg/dL   CBC with Differential    Collection Time: 03/10/24  6:11 AM   Result Value Ref Range    WBC 14.93 (H) 4.50 - 11.50 x10(3)/mcL    RBC 4.41 (L) 4.70 - 6.10 x10(6)/mcL    Hgb 11.9 (L) 14.0 - 18.0 g/dL    Hct 37.4 (L) 42.0 - 52.0 %    MCV 84.8 80.0 - 94.0 fL    MCH 27.0 27.0 - 31.0 pg    MCHC 31.8 (L) 33.0 - 36.0 g/dL    RDW 15.5 11.5 - 17.0 %    Platelet 552 (H) 130 - 400 x10(3)/mcL    MPV 11.1 (H) 7.4 - 10.4 fL    Neut % 73.2 %    Lymph % 10.4 %    Mono % 11.7 %    Eos % 2.1 %    Basophil % 0.5 %    Lymph # 1.56 0.6 - 4.6 x10(3)/mcL    Neut # 10.92 (H) 2.1 - 9.2 x10(3)/mcL    Mono # 1.75 (H) 0.1 - 1.3 x10(3)/mcL    Eos # 0.31 0 - 0.9 x10(3)/mcL    Baso # 0.07 <=0.2 x10(3)/mcL    IG# 0.32 (H) 0 - 0.04 x10(3)/mcL    IG% 2.1 %    NRBC% 0.0 %   Comprehensive metabolic panel    Collection Time: 03/11/24  4:45 AM   Result Value Ref Range    Sodium Level 128 (L) 136 - 145 mmol/L    Potassium Level 4.1 3.5 - 5.1 mmol/L    Chloride 91 (L) 98 - 107 mmol/L    Carbon Dioxide 26 23 - 31 mmol/L    Glucose Level 112 82 - 115 mg/dL    Blood Urea Nitrogen 22.8 8.4 - 25.7 mg/dL    Creatinine 1.30 (H) 0.73 - 1.18 mg/dL    Calcium Level Total 8.1 (L) 8.8 - 10.0 mg/dL    Protein Total 5.9 5.8 - 7.6 gm/dL    Albumin Level 2.5 (L) 3.4 - 4.8 g/dL    Globulin 3.4 2.4 - 3.5 gm/dL    Albumin/Globulin Ratio 0.7 (L) 1.1 - 2.0 ratio    Bilirubin Total 1.6 (H) <=1.5 mg/dL    Alkaline Phosphatase 116 40 - 150 unit/L    Alanine Aminotransferase 71 (H) 0 - 55 unit/L    Aspartate Aminotransferase 78 (H) 5 - 34 unit/L    eGFR 57 mls/min/1.73/m2   Magnesium    Collection Time:  03/11/24  4:45 AM   Result Value Ref Range    Magnesium Level 2.00 1.60 - 2.60 mg/dL   CBC with Differential    Collection Time: 03/11/24  4:46 AM   Result Value Ref Range    WBC 15.28 (H) 4.50 - 11.50 x10(3)/mcL    RBC 4.35 (L) 4.70 - 6.10 x10(6)/mcL    Hgb 11.8 (L) 14.0 - 18.0 g/dL    Hct 36.6 (L) 42.0 - 52.0 %    MCV 84.1 80.0 - 94.0 fL    MCH 27.1 27.0 - 31.0 pg    MCHC 32.2 (L) 33.0 - 36.0 g/dL    RDW 15.4 11.5 - 17.0 %    Platelet 633 (H) 130 - 400 x10(3)/mcL    MPV 10.4 7.4 - 10.4 fL    Neut % 76.1 %    Lymph % 8.4 %    Mono % 11.8 %    Eos % 1.4 %    Basophil % 0.4 %    Lymph # 1.29 0.6 - 4.6 x10(3)/mcL    Neut # 11.62 (H) 2.1 - 9.2 x10(3)/mcL    Mono # 1.80 (H) 0.1 - 1.3 x10(3)/mcL    Eos # 0.22 0 - 0.9 x10(3)/mcL    Baso # 0.06 <=0.2 x10(3)/mcL    IG# 0.29 (H) 0 - 0.04 x10(3)/mcL    IG% 1.9 %    NRBC% 0.0 %     Telemetry: PAF/CVR    Physical Exam  Vitals reviewed.   Constitutional:       General: He is not in acute distress.     Appearance: Normal appearance.   HENT:      Head: Normocephalic.      Mouth/Throat:      Mouth: Mucous membranes are moist.   Eyes:      Extraocular Movements: Extraocular movements intact.      Conjunctiva/sclera: Conjunctivae normal.   Cardiovascular:      Rate and Rhythm: Normal rate. Rhythm irregular.      Pulses: Normal pulses.      Heart sounds: No murmur heard.  Pulmonary:      Effort: Pulmonary effort is normal. No respiratory distress.      Breath sounds: Normal breath sounds.      Comments: NC O2  Abdominal:      Palpations: Abdomen is soft.   Genitourinary:     Comments: Urinary Catheter   Skin:     General: Skin is warm.      Comments: Midline Sternotomy YVETTE with No Sign of Bleed/Infection. Right Lower Abdominal Site Oozing Serosanguinous Fluid. Dressing noted with SS drainage.    Neurological:      General: No focal deficit present.      Mental Status: He is alert.   Psychiatric:         Mood and Affect: Mood normal.       Current Inpatient Medications:  Current  Facility-Administered Medications:     acetaminophen oral solution 650 mg, 650 mg, Per OG tube, Q6H PRN, Vasile Carter PA-C    acetaminophen tablet 650 mg, 650 mg, Oral, Q6H PRN, Pablo Yepez MD, 650 mg at 03/11/24 1117    albumin human 5% bottle 12.5 g, 12.5 g, Intravenous, PRN, Vasile Carter PA-C, Stopped at 02/28/24 1442    allopurinol split tablet 50 mg, 50 mg, Oral, Daily, Tanner Rdz MD, 50 mg at 03/11/24 1117    amiodarone tablet 200 mg, 200 mg, Oral, Daily, Lexy Palomares FNP, 200 mg at 03/11/24 1118    aspirin EC tablet 81 mg, 81 mg, Oral, Daily, Vasile Carter PA-C, 81 mg at 03/11/24 1117    atorvastatin tablet 40 mg, 40 mg, Oral, QHS, Tanner Rdz MD, 40 mg at 03/10/24 2133    ceFEPIme (MAXIPIME) 2 g in dextrose 5 % in water (D5W) 100 mL IVPB (MB+), 2 g, Intravenous, Q12H, Elieser Pace MD    dextrose 10% bolus 125 mL 125 mL, 12.5 g, Intravenous, PRN, Pablo Yepez MD    dextrose 10% bolus 250 mL 250 mL, 25 g, Intravenous, PRN, Pablo Yepez MD    electrolyte-A infusion, , Intravenous, PRN, Pablo Yepez MD, Stopped at 02/28/24 0700    enoxaparin injection 40 mg, 40 mg, Subcutaneous, Daily, Cherry Suarez FNP, 40 mg at 03/10/24 1628    ferrous sulfate tablet 1 each, 1 tablet, Oral, Every other day, Tanner Rdz MD, 1 each at 03/10/24 0829    folic acid tablet 1 mg, 1 mg, Oral, Daily, Vasile Carter PA-C, 1 mg at 03/11/24 1117    furosemide tablet 20 mg, 20 mg, Oral, BID, Lexy Palomares FNP, 20 mg at 03/11/24 1118    heparin, porcine (PF) 100 unit/mL injection flush 500 Units, 5 mL, Intravenous, On Call Procedure, Pablo Yepez MD    heparin, porcine (PF) 100 unit/mL injection flush 500 Units, 5 mL, Intravenous, On Call Procedure, Nita Contreras MD    HYDROcodone-acetaminophen 5-325 mg per tablet 1 tablet, 1 tablet, Oral, Q6H PRN, Stan Lazar MD, 1 tablet at 03/10/24 1628    lactulose 10 gram/15 ml solution 20  g, 20 g, Oral, Q6H PRN, Vasile Carter PA-C    loperamide capsule 2 mg, 2 mg, Oral, Continuous PRN, Vasile Carter PA-C, 2 mg at 03/02/24 1253    melatonin tablet 6 mg, 6 mg, Oral, Nightly PRN, Tanner Rdz MD, 6 mg at 03/09/24 2108    metoclopramide injection 5 mg, 5 mg, Intravenous, Q6H PRN, Vasile Carter PA-C    metoprolol succinate (TOPROL-XL) 24 hr split tablet 12.5 mg, 12.5 mg, Oral, Daily, Cherry Suarez FNP, 12.5 mg at 03/11/24 1117    nitroGLYCERIN SL tablet 0.4 mg, 0.4 mg, Sublingual, Q5 Min PRN, Tanner Rdz MD    ondansetron disintegrating tablet 8 mg, 8 mg, Oral, Q8H PRN, Tanner Rdz MD, 8 mg at 03/06/24 0822    ondansetron injection 8 mg, 8 mg, Intravenous, Q6H PRN, Pablo Yepez MD, 8 mg at 03/10/24 1330    pantoprazole EC tablet 40 mg, 40 mg, Oral, Daily, Tanner Rdz MD, 40 mg at 03/11/24 1117    simethicone chewable tablet 80 mg, 1 tablet, Oral, TID PRN, Tanner Rdz MD, 80 mg at 03/07/24 0908    sodium chloride 0.9% flush 10 mL, 10 mL, Intravenous, PRN, Tanner Rdz MD    sodium chloride 0.9% flush 10 mL, 10 mL, Intravenous, PRN, Millie Jimenez NP    sucralfate tablet 1 g, 1 g, Oral, QID (AC & HS), Vasile Carter PA-C, 1 g at 03/11/24 1121  VTE Risk Mitigation (From admission, onward)           Ordered     enoxaparin injection 40 mg  Daily         03/07/24 0559     IP VTE HIGH RISK PATIENT  Once         03/02/24 0759     heparin, porcine (PF) 100 unit/mL injection flush 500 Units  On Call Procedure         02/27/24 0718     heparin, porcine (PF) 100 unit/mL injection flush 500 Units  On Call Procedure         02/27/24 0257     Place SUSIE hose  Until discontinued         02/22/24 1458     Place sequential compression device  Until discontinued         02/21/24 2057                  Assessment:   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% - Symptomatically Improving    - ECHO Limited (3.7.24) - LVEF 30%,  Severe Global Hypokinesis     - ECHO (2.16.24): EF 40%, Grade II DD    - Small Pleural Effusions on CT Imaging (2.22.24)  CAD (Multivessel)    - Status Post CABG (3V) LIMA to LAD, SVG to OM1, SVG to RCA (2.27.24)    - RHC/Impella Placement and [address] Catheter (2.23.24)- Impella Removal/Femoral Artery Repair in Surgery on 2.27.24    - LHC (2.19.24): pLAD lesion 70%, oLCx 80%, OM1 80%, OM2 1 lesion 70% 2nd lesion 50%, mLAD 50%, pRCA 60%  VHD/AS     - Status Post Bio AVR (Epic max 23mm) (2.27.24)    - ECHO (2.16.24): Aortic Valve: There is moderate aortic valve sclerosis. Severely restricted motion. There is severe stenosis. Aortic valve area by VTI is 0.66 cm². Aortic valve peak velocity is 4.45 m/s. Mean gradient is 50 mmHg. The dimensionless index is 0.17.   Cardiogenic Shock (Post Cardiotomy) - Off Vasopressor Support - Resolved     - History of Hypertension   Ischemic Cardiomyopathy/EF 30%  Elevated LFTs - Improving   Pulmonary HTN    - ECHO PASP 69mmHg     - RHC (2.22.24): PA 84/34, PCWP 24 mmHg  Hematuria 2/2 Traumatic/Difficult Indwelling Catheter Placement (Resolved)  Urethral Stricture s/p Dilatation 2.23.24  PAF - Currently CVR    - Status Post Bedside Cardioversion x 2 on 2.27.24 (Both Unsuccessful)    - Status Post MOISE Ligation (2.27.24)    - CHADsVASc - 5 Points - 7.2% Stroke Risk per Year   Left Bundle Branch Block   VHD    - ECHO (3.7.24): Bio AVR, Mild MR    - ECHO (2.16.24): Severe AS, mild MR, mild to moderate TR  JEAN-CLAUDE/CKD Stage II - Improved     - Baseline Cr 1.6  Anemia- stable    - Status Post Transfusion on 2.28.24 & 2.29.24  Thrombocytopenia - Resolved   Leukocytosis - Persistent    - Groin Wound Culture: Serratia Marcescens and Pseudomonas Aeruginosa   Hypokalemia - Resolved  Hyponatremia - Worsening     Plan:   Continue IV Abx per Primary Team   Continue ASA, Amiodarone, Statin, BB  Monitor LFTs - May need to stop Statin if they Increases   No ACEi/ARB/ARNI or Aldactone 2/2 JEAN-CLAUDE (Add Back when  Renal Indices Improve and BP Allows)  Continue IV Lasix 20mg po BID   Consult nephrology due to worsening hyponatremia  Consult general surgery to assess whether right groin/lowere abdominal site needs I&D  Agree with ID consult  Accurate I&Os and Daily Weights   Keep K > 4.0 and Mg  > 2.0  Aggressive Mobilization of PT and Q1HR IS (PT/OT Eval and Treat)  Consult Case Management for Rehab Services   Labs in AM: CBC, CMP and Mg    CORBIN PerryP  Cardiology  [facility]   03/11/2024    Physician addendum:  I have seen and examined this patient as a split-shared visit with the ARLEY d/t complicated medical management of above problems written in assessment and high acuity requiring physician expertise in medical decision-making. I performed the substantive portion of the history and exam. Above medical decision-making is also formulated by me.    Plan:   Continue IV lasix  Surgery evaluation for right groin non healing incision drainage   3/11/2024

## 2024-03-11 NOTE — PLAN OF CARE
Medicaid is still pending- unable to get rehab placemt due to pending status    I called Medicaid dept , Lana and the pt still had not turned in any needed documents for his Medicaid application.

## 2024-03-11 NOTE — PROGRESS NOTES
[facility] Hospital Medicine Progress Note        Chief Complaint: Inpatient Follow-up for      HPI per admitting team:   78-year-old male with a history of aortic insufficiency/stenosis, CAD, CKD IIIb, HTN AFib on Eliquis admitted to [facility] 02/15/2024 with chest pain, found to have NSTEMI (peak troponin 0.17) and underwent C 02/20/2024 showing severe multivessel stenosis and therefore was transferred to [facility] for CABG evaluation. Cardiology was consulted Patient had Impella placed on 02/23 and was admitted to the ICU.  Was started on aggressive diuresis with IV Lasix.  CV surgery was consulted.  Urology was consulted for hematuria; Nguyen catheter placed over guidewire with dilation secondary to urethral strictures. IV Heparin infusion was initiated per CIS but held due to hematuria/hemoptysis on 02/24.  He was also noted to have an JEAN-CLAUDE. Received 2 units PRBCs on 02/26 and was planned for high-risk PCI on 02/26 which was later canceled.  Underwent CABG x 3 2/27 (LIMA to LAD, RSVG to OM 1, R SVG to RCA, bioprosthetic AVR).  Remained on mechanical ventilation thereafter.  Patient noted to have tachycardia with atrial fibrillation/RVR requiring IV amiodarone along with pressors (norepinephrine/Milrinone) for hypotension. Patient underwent cardioversion x 2 with minimal improvement in HR/BP.  Patient self-extubated overnight on 02/29 and was noted to have adequate saturations on 2 L O2 via NC thereafter.  PT/OT consulted.  Renal function noted to be improving. Continued on IV diuresis as he appeared to be significantly volume overloaded postoperatively along with elevated pulmonary artery wedge pressures.  Started on p.o. amiodarone taper on 03/03.  Patient noted to have drainage from right groin wound and started on vancomycin on 03/04.  Wound culture grew Pseudomonas.  Chest tubes discontinued on 03/02.  Downgraded from ICU on 03/02.  CIS and CV surgery continued following patient.  CV  surgery signed off on 03/06 and discontinued vancomycin.  Patient was placed on oral Levaquin.  Hospital medicine consulted on 03/06 for assistance with medical management and DC planning.   Repeat CT abdomen and pelvis of 3/9 shows improving stranding and improving hematoma in the right groin, persistent left-sided effusion and atelectasis      Interval Hx:   patient was seen at bedside, no family member at bedside   Patient reports he has feeling a little better today   No new complaints   Vitals reviewed with blood pressure low normal, heart rates in the low 100s, saturating 94% on 2 L of oxygen   White cell count continues to creep upwards at 15.2, hemoglobin 11.8, creatinine 1.3, sodium 128  AST ALT 78/71, improving slowly   Bilirubin 1.6, higher limit of normal     Case was discussed with patient's nurse and  on the floor.     Objective/physical exam:  General: alert male lying comfortably in bed, in no acute distress  HENT:  NC in place; oral and oropharyngeal mucosa moist, pink, with no erythema or exudates, no ear pain or discharge  Neck: normal neck movement, no lymph nodes or swellings, no JVD or Carotid bruit  Respiratory: clear breathing sounds bilaterally, no crackles, rales, ronchi or wheezes  Cardiovascular: clear S1 and S2, no murmurs, rubs or gallops  Peripheral Vascular: no lesions, ulcers or erosions, normal peripheral pulses; 2+ B/L pedal edema  Gastrointestinal: soft, non-distended abdomen with TTP in epigastric, hypogastric, RUQ/LUQ regions; no guarding, rigidity or rebound tenderness, normal bowel sounds; right groin incision wound noted to be erythematous with some serum bilirubin drainage with overlying dressing  Integumentary: normal skin color, no rashes or lesions  Neuro: AAO x 3; motor strength 5/5 in B/L UEs & LEs; sensation intact to gross and fine touch B/L; CN II-XII grossly intact        Assessment/Plan:  Right flank pain- due to right-sided lower abdomen hematoma-   R  Groin Purulent Cellulitis  Transaminitis, trending upwards ?  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 - worse  Natremia, worsening  Pulmonary hypertension   NSTEMI, CAD sp CABG x 3  S/p AVR  Atrial Fibrillation cvr   Normocytic anemia  Hematuria s/p Nguyen  Urethral stricture s/p dilation     Plan  Consult ID for antibiotic management   Appreciate ID input change to IV cefepime, day 1   Wound culture grew Pseudomonas and Serratia   Will follow up with General surgery input - however I do not think he requires I and D at this time given CT scan is improving  Follow-up with Nephrology with regards to low-sodium and continues to trickle downwards   Appreciate pulmonology input continue to monitor effusion, no need for thoracentesis at this time  Appreciate CIS input continue p.o. Lasix 20 mg b.i.d., metoprolol 12.5 mg q.day and amiodarone  Suspend lisinopril and Aldactone, due to low blood pressure   Continue use of incentive spirometry and oxygen supplementation   Appreciate Urology input Nguyen catheter has been checked out no signs of active infection   Consult palliative Care for goals of care discussion   Trend liver enzymes- likely due to congestive hepatopathy , improving   Patient currently on p.o. Lasix 20 mg b.i.d., blood pressure is soft no room for increment in Lasix doses  Ultrasound abdomen is normal  Continue atorvastatin for now,  Continued on allopurinol 50 mg daily, aspirin 81 mg, atorvastatin 40 mg., FeSO4 every other day, folic acid 1 mg, Protonix 40 mg daily, , sucralfate 1 g q.i.d.  P.o. Norco 5 mg q.6 p.r.n. for pain  PT/OT recommending high-intensity therapy   Cm on board for placement        VTE prophylaxis:  Lovenox     Patient condition:  guarded     Anticipated discharge and Disposition:     Pending     All diagnosis and differential diagnosis have been reviewed;  assessment and plan has been documented; I have personally reviewed the labs and test results that are presently available; I have reviewed the patients medication list; I have reviewed the consulting providers response and recommendations. I have reviewed or attempted to review medical records based upon their availability     All of the patient's questions have been  addressed and answered. Patient's is agreeable to the above stated plan. I will continue to monitor closely and make adjustments to medical management as needed.          VITAL SIGNS: 24 HRS MIN & MAX LAST   Temp  Min: 97.6 °F (36.4 °C)  Max: 98.7 °F (37.1 °C) 98.7 °F (37.1 °C)   BP  Min: 89/61  Max: 115/52 94/64   Pulse  Min: 83  Max: 100  100   Resp  Min: 18  Max: 19 18   SpO2  Min: 94 %  Max: 95 % (!) 94 %     I have reviewed the following labs:  Recent Labs   Lab 03/09/24  0704 03/10/24  0611 03/11/24 0446   WBC 13.20* 14.93* 15.28*   RBC 4.43* 4.41* 4.35*   HGB 12.0* 11.9* 11.8*   HCT 37.7* 37.4* 36.6*   MCV 85.1 84.8 84.1   MCH 27.1 27.0 27.1   MCHC 31.8* 31.8* 32.2*   RDW 15.6 15.5 15.4   * 552* 633*   MPV 10.4 11.1* 10.4     Recent Labs   Lab 03/09/24  0704 03/10/24  0611 03/11/24  0445   * 130* 128*   K 3.7 3.9 4.1   CO2 28 30 26   BUN 20.3 20.3 22.8   CREATININE 1.30* 1.22* 1.30*   CALCIUM 8.0* 8.2* 8.1*   MG 2.00 1.90 2.00   ALBUMIN 2.4* 2.5* 2.5*   ALKPHOS 114 114 116   ALT 78* 73* 71*   * 88* 78*   BILITOT 1.9* 1.8* 1.6*     Microbiology Results (last 7 days)       Procedure Component Value Units Date/Time    Wound Culture [[id number]]  (Abnormal)  (Susceptibility) Collected: 03/03/24 1920    Order Status: Completed Specimen: Wound from Groin Updated: 03/07/24 1053     Wound Culture Many Serratia marcescens      Many Pseudomonas aeruginosa             See below for Radiology    Scheduled Med:   allopurinoL  50 mg Oral Daily    amiodarone  200 mg Oral Daily    aspirin  81 mg Oral Daily    atorvastatin  40 mg Oral QHS     ceFEPime IV (PEDS and ADULTS)  2 g Intravenous Q12H    enoxparin  40 mg Subcutaneous Daily    ferrous sulfate  1 tablet Oral Every other day    folic acid  1 mg Oral Daily    furosemide  20 mg Oral BID    metoprolol succinate  12.5 mg Oral Daily    pantoprazole  40 mg Oral Daily    sucralfate  1 g Oral QID (AC & HS)      Continuous Infusions:   loperamide        PRN Meds:  acetaminophen, acetaminophen, albumin human 5%, dextrose 10%, dextrose 10%, electrolyte-A, heparin, porcine (PF), heparin, porcine (PF), HYDROcodone-acetaminophen, lactulose 10 gram/15 ml, loperamide, melatonin, metoclopramide, nitroGLYCERIN, ondansetron, ondansetron, simethicone, sodium chloride 0.9%, sodium chloride 0.9%     Assessment/Plan:      VTE prophylaxis:     Patient condition:  Stable/Fair/Guarded/ Serious/ Critical    Anticipated discharge and Disposition:         All diagnosis and differential diagnosis have been reviewed; assessment and plan has been documented; I have personally reviewed the labs and test results that are presently available; I have reviewed the patients medication list; I have reviewed the consulting providers response and recommendations. I have reviewed or attempted to review medical records based upon their availability    All of the patient's questions have been  addressed and answered. Patient's is agreeable to the above stated plan. I will continue to monitor closely and make adjustments to medical management as needed.  _____________________________________________________________________    Nutrition Status:    Radiology:  I have personally reviewed the following imaging and agree with the radiologist.     CV Ultrasound doppler arterial legs bilat  Patent right lower extremity arterial system with triphasic waveforms   throughout demonstrating mild arterial flow reduction.  Patent left lower extremity arterial system with triphasic waveforms   throughout demonstrating mild arterial flow  reduction.      Stan Lazar MD  Department of Hospital Medicine   [facility]   03/11/2024

## 2024-03-11 NOTE — PT/OT/SLP RE-EVAL
Physical Therapy Re-Evaluation    Patient Name:  Brain Snyder   MRN:  [id number]    Recommendations:     Discharge therapy intensity: High Intensity Therapy   Discharge Equipment Recommendations: to be determined by next level of care   Barriers to discharge: Impaired mobility and Ongoing medical needs    Assessment:     Brain Snyder is a 78 y.o. male admitted with a medical diagnosis of CAD s/p CABG on 2/27, s/p R groin impella and mechanical intubation, self-extubated on 2/29 and was downgraded from ICU on 3/2.  He presents with the following impairments/functional limitations: weakness, impaired endurance, impaired self care skills, impaired functional mobility, gait instability, impaired balance, decreased upper extremity function, decreased lower extremity function, pain, impaired cardiopulmonary response to activity . Activity tolerance remains limited, this date c/o pain to BLE worse at L posterior knee. Communicated to RN. Remains 2 person assist for functional mobility but steadily progressing.     language line used throughout session.    Rehab Prognosis: Good; patient would benefit from acute skilled PT services to address these deficits and reach maximum level of function.    Recent Surgery: Procedure(s) (LRB):  CORONARY ARTERY BYPASS GRAFT (CABG) (N/A)  Replacement-valve-aortic (N/A)  SURGICAL PROCUREMENT, VEIN, ENDOSCOPIC (N/A)  Impella, Removal (Right) 13 Days Post-Op    Plan:     During this hospitalization, patient to be seen 6 x/week to address the identified rehab impairments via gait training, therapeutic activities, therapeutic exercises, neuromuscular re-education and progress toward the following goals:    Plan of Care Expires:  04/01/24    PT/PTA conference to discuss PT POC and patient's progression towards goals held with Susan Hagan PTA.     Subjective     Chief Complaint: pain in BLE  Patient/Family Comments/goals: PLOF  Pain/Comfort:  Pain Rating 1:  (c/o  severe pain to posterior L knee)    Patients cultural, spiritual, Adventist conflicts given the current situation: no    Objective:     Communicated with RN prior to session.  Patient found HOB elevated with telemetry, peripheral IV, gustafson catheter  upon PT entry to room.    General Precautions: Standard, fall, sternal  Orthopedic Precautions:N/A   Braces: N/A  Respiratory Status: Room air  Blood Pressure: NT      Exams:  RLE Strength: 3/5 gross  LLE Strength: 3/5 gross no OP 2/2 pain  Skin integrity:  sternal incision intact      Functional Mobility:  Bed Mobility:     Supine to Sit: maximal assistance and of 2 persons  Sit to Supine: maximal assistance and of 2 persons  Transfers:     Sit to Stand:  minimum assistance, moderate assistance, and of 2 persons with rolling walker      AM-PAC 6 CLICK MOBILITY  Total Score:11       Treatment & Education:  - Performed seated LAQ and standing marches (B) until fatigue ~2x10 ea  - 2nd standing trial only able to stand for ~10s prior to c/o severe pain and requesting to sit    Patient and daughter/s were provided with verbal education education regarding PT role/goals/POC, post-op precautions, fall prevention, safety awareness, discharge/DME recommendations, and pressure ulcer prevention.  Understanding was verbalized.     Patient left HOB elevated with all lines intact, call button in reach, wedge under L side, pressure relief boots, RN notified, and daughter present.    GOALS:   Multidisciplinary Problems       Physical Therapy Goals          Problem: Physical Therapy    Goal Priority Disciplines Outcome Goal Variances Interventions   Physical Therapy Goal     PT, PT/OT Ongoing, Progressing     Description: Goals to be met by: 2024     Patient will increase functional independence with mobility by performin. Supine to sit with MInimal Assistance  2. Sit to stand transfer with Minimal Assistance  3. Gait  x 150 feet with Minimal Assistance using Rolling Walker.                           History:     Past Medical History:   Diagnosis Date    A-fib     Aortic insufficiency     CAD (coronary artery disease)     Hypertension        Past Surgical History:   Procedure Laterality Date    AORTIC VALVE REPLACEMENT N/A 2/27/2024    Procedure: Replacement-valve-aortic;  Surgeon: Nita Contreras MD;  Location: [facility];  Service: Cardiothoracic;  Laterality: N/A;    CORONARY ANGIOGRAPHY N/A 2/20/2024    Procedure: ANGIOGRAM, CORONARY ARTERY;  Surgeon: Vasile Callahan MD;  Location: [facility] CATH LAB;  Service: Cardiology;  Laterality: N/A;    CORONARY ARTERY BYPASS GRAFT (CABG) N/A 2/27/2024    Procedure: CORONARY ARTERY BYPASS GRAFT (CABG);  Surgeon: Nita Contreras MD;  Location: [facility];  Service: Cardiothoracic;  Laterality: N/A;    ENDOSCOPIC HARVEST OF VEIN N/A 2/27/2024    Procedure: SURGICAL PROCUREMENT, VEIN, ENDOSCOPIC;  Surgeon: Nita Contreras MD;  Location: [facility];  Service: Cardiothoracic;  Laterality: N/A;    IMPELLA, REMOVAL Right 2/27/2024    Procedure: Impella, Removal;  Surgeon: Nita Contreras MD;  Location: [facility];  Service: Cardiothoracic;  Laterality: Right;    INSERTION OF INTRAVASCULAR MICROAXIAL BLOOD PUMP N/A 2/23/2024    Procedure: INSERTION, IMPELLA;  Surgeon: All Montoya MD;  Location: [facility] CATH LAB;  Service: Cardiology;  Laterality: N/A;  with swanz    RIGHT HEART CATHETERIZATION N/A 2/22/2024    Procedure: INSERTION, CATHETER, RIGHT HEART;  Surgeon: Shreya Lomax MD;  Location: [facility] CATH LAB;  Service: Cardiology;  Laterality: N/A;       Time Tracking:     PT Received On: 03/11/24  PT Start Time: 1442     PT Stop Time: 1515  PT Total Time (min): 33 min     Billable Minutes: Re-eval 20 and Therapeutic Exercise 13 mins      03/11/2024

## 2024-03-11 NOTE — PT/OT/SLP PROGRESS
Occupational Therapy   Treatment    Name: Brain Snyder  MRN: [id number]  Admitting Diagnosis:  CAD (coronary artery disease)  13 Days Post-Op    Recommendations:     Recommended therapy intensity at discharge: High Intensity Therapy   Discharge Equipment Recommendations:  to be determined by next level of care  Barriers to discharge:  Other (Comment) (ongoing medical needs)    Assessment:     Brain Snyder is a 78 y.o. male with a medical diagnosis of multivessel CAD s/p CABGx3, moderate aortic insufficiency, severe aortic stenosis s/p AVR, afib RVR on amiodarone, severe pulmonary htn, CKD, htn, hemoptysis, hematuria. He presents Slovenian speaking;  #[id number] utilized throughout OT session. He presents with BLE pain; RN notified and pt agreeable to therapy. Performance deficits affecting function are weakness, impaired endurance, impaired self care skills, impaired functional mobility, gait instability, impaired balance, decreased upper extremity function, decreased lower extremity function, pain, impaired cardiopulmonary response to activity, decreased safety awareness. He required max A x2 for bed mobility and sit<>stand transfer. Pt educated on sternal precautions and required frequent cueing for adherence throughout session. Continue to recommend high intensity therapy upon d/c.     Rehab Prognosis:  Good; patient would benefit from acute skilled OT services to address these deficits and reach maximum level of function.       Plan:     Patient to be seen 5 x/week to address the above listed problems via self-care/home management, therapeutic activities, therapeutic exercises  Plan of Care Expires: 04/01/24  Plan of Care Reviewed with: patient, daughter    Subjective     Pain/Comfort:  Pain Rating 1:  (verbalized BLE pain but did not rate)  Pain Addressed 1: Nurse notified, Reposition    Objective:     Communicated with: RN prior to session.  Patient found HOB elevated with telemetry, peripheral IV,  gustafson catheter upon OT entry to room.    General Precautions: Standard, fall, sternal    Orthopedic Precautions:N/A  Braces: N/A  Respiratory Status: Room air     Occupational Performance:     Bed Mobility:    Patient completed Supine to Sit with maximal assistance and x2 persons  Patient completed Sit to Supine with maximal assistance and x2 persons     Functional Mobility/Transfers:  Patient completed Sit <> Stand Transfer with maximal assistance and of x2 persons  with  rolling walker and cardiac bear.    Functional Mobility: Pt able to tolerate standing for ~30 seconds before requiring seated rest break. Pt noted to have BM while standing and was returned to supine for perineal hygiene.     Activities of Daily Living:  Lower Body Dressing: total assistance to don socks while supine.   Toileting: total assistance for perineal hygiene while sidelying.     Therapeutic Positioning    OT interventions performed during the course of today's session in an effort to prevent and/or reduce acquired pressure injuries:   Therapeutic positioning was provided at the conclusion of session to offload all bony prominences for the prevention and/or reduction of pressure injuries    Skin assessment: all bony prominences were assessed    Findings: known area of altered skin integrity at sternal incision.     [facility] 6 Click ADL: 12    Patient Education:  Patient and daughter/s were provided with verbal education education regarding OT role/goals/POC, post op precautions, fall prevention, safety awareness, Discharge/DME recommendations, and pressure ulcer prevention.  Understanding was verbalized, however additional teaching warranted.      Patient left HOB elevated with all lines intact, call button in reach, wedge under L side, pressure relief boots, RN notified, and daughter present.    GOALS:   Multidisciplinary Problems       Occupational Therapy Goals          Problem: Occupational Therapy    Goal Priority Disciplines Outcome  Interventions   Occupational Therapy Goal     OT, PT/OT Ongoing, Progressing    Description: Goals to be met by: 4/1/24     Patient will increase functional independence with ADLs by performing:    UE Dressing with min A   Grooming while standing at sink with Minimal Assistance.  Toileting from toilet with Minimal Assistance for hygiene and clothing management.   Sitting at edge of bed x30 minutes with Minimal Assistance.  Toilet transfer to bedside commode with Minimal Assistance.                         Time Tracking:     OT Date of Treatment: 03/11/24  OT Start Time: 1037  OT Stop Time: 1100  OT Total Time (min): 23 min    Billable Minutes:Self Care/Home Management 23 minutes.     OT/YVETTE: OT     Number of YVETTE visits since last OT visit: 5    3/11/2024

## 2024-03-11 NOTE — PROGRESS NOTES
Inpatient Nutrition Assessment    Admit Date: 2/21/2024   Total duration of encounter: 19 days   Patient Age: 78 y.o.    Nutrition Recommendation/Prescription     Oral diet, Diet clear liquid as tolerated. Encouraged small and frequent meals.   Boost Breeze (provides 250 kcal, 9 g protein per serving) BID as tolerated.   Antiemetics as feasible.   Medical management of electrolytes.   PPN recommendations, if warranted, until able to consume adequate po intake:  Clinimix-E 4.25/5 @ 80ml/hr will provide: 653kcals (35% est needs), 82g protein (80% est needs), 1920ml free water    Communication of Recommendations: reviewed with family    Nutrition Assessment     Malnutrition Assessment/Nutrition-Focused Physical Exam  Malnutrition Context: acute illness or injury (03/11/24 1350)  Malnutrition Level: moderate (03/11/24 1350)  Energy Intake (Malnutrition): less than or equal to 50% for greater than or equal to 5 days (03/11/24 1350)  Weight Loss (Malnutrition):  (unable to determine) (03/11/24 1350)  Subcutaneous Fat (Malnutrition):  (does not meet criteria) (03/11/24 1350)           Muscle Mass (Malnutrition): mild depletion (03/11/24 1350)  [place] (Muscle Loss): mild depletion     Clavicle and Acromion Bone Region (Muscle Loss): mild depletion                 Fluid Accumulation (Malnutrition): mild (03/11/24 1350)        A minimum of two characteristics is recommended for diagnosis of either severe or non-severe malnutrition.  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% - (Compensated)   JEAN-CLAUDE on CKD    Relevant Medical History: PAF on Eliquis, Aortic insufficiency, MVCAD, HTN     Scheduled Medications:  allopurinoL, 50 mg, Daily  amiodarone, 200 mg, Daily  aspirin, 81 mg, Daily  atorvastatin, 40 mg, QHS  ceFEPime IV (PEDS and ADULTS), 2 g, Q12H  enoxparin, 40 mg, Daily  ferrous sulfate, 1 tablet, Every other day  folic acid, 1 mg, Daily  furosemide, 20 mg, BID  metoprolol succinate, 12.5 mg, Daily  pantoprazole, 40 mg, Daily  sucralfate, 1 g, QID (AC & HS)    Continuous Infusions:  loperamide    PRN Medications: acetaminophen, acetaminophen, albumin human 5%, dextrose 10%, dextrose 10%, electrolyte-A, heparin, porcine (PF), heparin, porcine (PF), HYDROcodone-acetaminophen, lactulose 10 gram/15 ml, loperamide, melatonin, metoclopramide, nitroGLYCERIN, ondansetron, ondansetron, simethicone, sodium chloride 0.9%, sodium chloride 0.9%    Calorie Containing IV Medications: no significant kcals from medications at this time    Recent Labs   Lab 03/05/24  0415 03/06/24  0419 03/07/24  0406 03/07/24  0407 03/08/24  0428 03/09/24  0704 03/10/24  0611 03/11/24  0445 03/11/24  0446   * 134* 135*  --  132* 132* 130* 128*  --    K 3.1* 3.1* 3.5  --  3.3* 3.7 3.9 4.1  --    CALCIUM 7.5* 7.6* 7.4*  --  8.0* 8.0* 8.2* 8.1*  --    MG 1.90 2.00  --   --   --  2.00 1.90 2.00  --    CHLORIDE 99 97* 100  --  95* 96* 91* 91*  --    CO2 27 27 27  --  27 28 30 26  --    BUN 23.5 22.2 20.2  --  20.6 20.3 20.3 22.8  --    CREATININE 1.16 1.10 1.15  --  1.21* 1.30* 1.22* 1.30*  --    EGFRNORACEVR >60 >60 >60  --  >60 57 >60 57  --    GLUCOSE 87 91 94  --  93 96 89 112  --    BILITOT 2.1* 2.1* 1.9*  --  1.8* 1.9* 1.8* 1.6*  --    ALKPHOS 171* 152* 123  --  121 114 114 116  --    ALT 71* 58* 52  --  59* 78* 73* 71*  --    AST 69* 49* 58*  --  75* 109* 88* 78*  --    ALBUMIN 2.3* 2.5* 2.2*  --  2.4* 2.4* 2.5* 2.5*  --    LIPASE  --  94*  --   --   --   --   --   --   --    WBC  "12.48* 13.5  13.48*  --  10.84  10.84 12.09* 13.20* 14.93*  --  15.28*   HGB 11.4* 11.4*  --  10.7* 11.2* 12.0* 11.9*  --  11.8*   HCT 34.7* 35.1*  --  33.5* 35.6* 37.7* 37.4*  --  36.6*     Nutrition Orders:  Diet clear liquid  Dietary nutrition supplements Boost Breeze Wild Berry; BID    Appetite/Oral Intake: poor/25-50% of meals  Factors Affecting Nutritional Intake: decreased appetite, nausea, and vomiting  Food/[place]/Cultural Preferences: unable to obtain  Food Allergies: no known food allergies  Last Bowel Movement: 03/09/24  Wound(s):      Comments    2/27: Pt NPO this AM, in OR for CABG/AVR at time of rounds. Pt with poor intake since admission per EMR. Last BM 2/25, meds noted. Per MST, no reports of decreased appetite or unintentional weight loss prior admission. Weight fluctuations noted past week, likely related to fluid. Will trend weights.    3/1/24: Pt previously on liquid diet. Diet changed earlier due pt pocketing meds. SLP following pt. Will follow for need for TF to start if NG placed. Pt confused, not able to answer questions.     3/6/24: Spoke to family member at bedside, pt eating minimally. Reports intermittent nausea and vomiting. Having Bms, last documented on 3/3. Drinking some of Boost VHC but states "too sweet" and worsens nausea. Will trial Boost Breeze until GI symptoms improve. Receiving zofran prn.     3/11/24: Spoke to daughter at bedside, fair intake of liquids. Drinking ~1 Boost Breeze daily. Continues with intermittent nausea and vomiting affecting po intake. Has been on clear liquid diet since 3/7. Will leave PPN recommendations since he will not be able to meet nutritional needs on clear liquids or with n/v symptoms.     Anthropometrics    Height: 5' 5" (165.1 cm),    Last Weight: 85.6 kg (188 lb 11.2 oz) (03/11/24 0555), Weight Method: Bed Scale  BMI (Calculated): 31.4  BMI Classification: overweight (BMI 25-29.9)        Ideal Body Weight (IBW), Male: 136 lb               "                  Usual Weight Provided By: unable to obtain usual weight    Wt Readings from Last 5 Encounters:   03/11/24 85.6 kg (188 lb 11.2 oz)   02/19/24 81.1 kg (178 lb 12.8 oz)     Weight Change(s) Since Admission: +3.4kg since admit, some likely fluid related but also with inadequate po intake  Wt Readings from Last 1 Encounters:   03/11/24 0555 85.6 kg (188 lb 11.2 oz)   03/09/24 0559 82.6 kg (182 lb)   03/08/24 0505 86.5 kg (190 lb 12.8 oz)   03/07/24 0500 92 kg (202 lb 14.4 oz)   03/06/24 0545 70.6 kg (155 lb 10.3 oz)   03/05/24 0500 91.6 kg (202 lb)   03/04/24 0442 90.3 kg (199 lb 1.6 oz)   03/03/24 0606 90.7 kg (200 lb)   02/23/24 0629 78.2 kg (172 lb 6.4 oz)   02/21/24 2116 82.2 kg (181 lb 3.5 oz)   Admit Weight: 82.2 kg (181 lb 3.5 oz) (02/21/24 2116), Weight Method: Bed Scale    Estimated Needs    Weight Used For Calorie Calculations: 78.2 kg (172 lb 6.4 oz)  Energy Calorie Requirements (kcal): 1863kcal (1.3 stress factor)  Energy Need Method: [place]-St Jeor  Weight Used For Protein Calculations: 78.2 kg (172 lb 6.4 oz)  Protein Requirements: 102-118gm (1.3-1.5g/kg)  Fluid Requirements (mL): 1955ml (25ml/kg)    Enteral Nutrition     Patient not receiving enteral nutrition at this time.    Parenteral Nutrition     Patient not receiving parenteral nutrition support at this time.    Evaluation of Received Nutrient Intake    Calories: not meeting estimated needs  Protein: not meeting estimated needs    Patient Education     Not applicable.    Nutrition Diagnosis     PES: Inadequate oral intake related to acute illness as evidenced by <50% intake for > 5 days. (active)   PES: Malnutrition related to acute illness as evidenced by <50% EER >/=5 days, muscle loss. (new)     Nutrition Interventions     Intervention(s): general/healthful diet, modified composition of parenteral nutrition, modified rate of parenteral nutrition, commercial beverage, prescription medication, and collaboration with other  providers    Goal: Meet greater than 80% of nutritional needs by follow-up. (goal not met)  Goal: Consume % of oral supplements by follow-up. (goal not met)    Nutrition Goals & Monitoring     Dietitian will monitor: food and beverage intake, weight, electrolyte/renal panel, glucose/endocrine profile, and gastrointestinal profile    Nutrition Risk/Follow-Up: high (follow-up in 1-4 days)   Please consult if re-assessment needed sooner.

## 2024-03-11 NOTE — CONSULTS
Infectious Diseases Consultation       Inpatient consult to Infectious Diseases  Consult performed by: Elieser Pace MD  Consult ordered by: Stan Lazar MD        HPI:   78-year-old Thai-speaking male with past medical history of HTN, CKD CAD, aortic valvular heart disease, AFib on Eliquis, is admitted to [facility] on 02/21/2024, presenting as a transfer from [facility] for CABG.  He was noted to have NSTEMI, had to be admitted to the ICU with ampulla placement on 02/23, He is status post CABG with bioprosthetic AVR on 02/27, self-extubated on 02/29 with chest tube discontinued  and downgraded from ICU on 03/02.  He has been without fevers but with progressively worsening leukocytosis WBC 12.09 on 03/08 and 15.28 today 3/11.  He is also noted to have acute kidney injury with creatinine up to 2.03 on 03/01, elevated transaminases AST up to 124 and ALT 66 on 10/1, anemic with low albumin.  Right groin wound culture from 03/03 with many Serratia and many Pseudomonas.  3/6 CT scan of the abdomen and pelvis with interval worsening of bilateral pleural effusions and atelectasis with left-sided pleural effusion large, interval development of a pericardial effusion, cardiomegaly, urinary bladder decompressed with some inflammatory changes seen pelvis with could be related to cystitis to be correlated with urinalysis, findings consistent with recent procedural the right groin vision to be catheterization with a hematoma seen in the right inguinal and suprarenal region as a small-to-moderate in size, most trending seen extending towards the aortic bifurcation with follow up recommended the bifurcation of the hematoma.  A follow-up CT scan of the abdomen and pelvis on 3/9 showing edema and small hematoma about the right groin appearance similar to prior exam with decreased fat stranding about the aortic bifurcation and interval, left pleural effusion compressive atelectasis, cardiomegaly  pericardial effusion noted, colonic diverticulosis without evidence of diverticulitis.  He is on antibiotic coverage with ciprofloxacin, was on vancomycin which has been discontinued.    Past Medical and Surgical History  Allergies :   Patient has no known allergies.    Medical :   He has a past medical history of A-fib, Aortic insufficiency, CAD (coronary artery disease), and Hypertension.    Surgical :   He has a past surgical history that includes Coronary angiography (N/A, 2/20/2024); Right heart catheterization (N/A, 2/22/2024); Insertion of intravascular microaxial blood pump (N/A, 2/23/2024); Coronary Artery Bypass Graft (CABG) (N/A, 2/27/2024); Aortic valve replacement (N/A, 2/27/2024); Endoscopic harvest of vein (N/A, 2/27/2024); and impella, removal (Right, 2/27/2024).     Family History  His family history is not on file.    Social History  He reports that he has quit smoking. His smoking use included cigarettes. He has never used smokeless tobacco. He reports that he does not currently use alcohol. He reports that he does not currently use drugs.     Review of Systems   Constitutional:  Positive for malaise/fatigue.   HENT: Negative.     Respiratory: Negative.     Gastrointestinal: Negative.    Genitourinary: Negative.    Musculoskeletal: Negative.    Neurological:  Positive for weakness.   Endo/Heme/Allergies: Negative.    Psychiatric/Behavioral: Negative.       All other Systems review done and negative.  Objective   Physical Exam  Vitals reviewed.   Constitutional:       General: He is not in acute distress.     Appearance: He is not toxic-appearing.      Comments: Lying in bed.  Daughter at the bedside helping to translate   HENT:      Head: Normocephalic and atraumatic.   Eyes:      Pupils: Pupils are equal, round, and reactive to light.   Cardiovascular:      Rate and Rhythm: Normal rate and regular rhythm.      Heart sounds: Normal heart sounds.   Pulmonary:      Effort: Pulmonary effort is  "normal. No respiratory distress.      Breath sounds: Normal breath sounds.   Abdominal:      General: Bowel sounds are normal. There is no distension.      Palpations: Abdomen is soft.      Tenderness: There is no abdominal tenderness.   Genitourinary:     Comments: Nguyen catheter noted  Musculoskeletal:         General: No deformity.      Cervical back: Neck supple.      Right lower leg: No edema.      Left lower leg: No edema.   Skin:     Findings: No erythema or rash.      Comments: Right groin wound with serous drainage.  Sternal surgical incision healing well   Neurological:      Mental Status: He is alert and oriented to person, place, and time.   Psychiatric:      Comments: Calm and cooperative       VITAL SIGNS: 24 HR MIN & MAX LAST    Temp  Min: 97.5 °F (36.4 °C)  Max: 98.7 °F (37.1 °C)  98.7 °F (37.1 °C)        BP  Min: 89/61  Max: 115/52  94/64     Pulse  Min: 83  Max: 100  100     Resp  Min: 18  Max: 19  18    SpO2  Min: 94 %  Max: 100 %  (!) 94 %      HT: 5' 5" (165.1 cm)  WT: 85.6 kg (188 lb 11.2 oz)  BMI: 31.4     Recent Results (from the past 24 hour(s))   Comprehensive metabolic panel    Collection Time: 03/11/24  4:45 AM   Result Value Ref Range    Sodium Level 128 (L) 136 - 145 mmol/L    Potassium Level 4.1 3.5 - 5.1 mmol/L    Chloride 91 (L) 98 - 107 mmol/L    Carbon Dioxide 26 23 - 31 mmol/L    Glucose Level 112 82 - 115 mg/dL    Blood Urea Nitrogen 22.8 8.4 - 25.7 mg/dL    Creatinine 1.30 (H) 0.73 - 1.18 mg/dL    Calcium Level Total 8.1 (L) 8.8 - 10.0 mg/dL    Protein Total 5.9 5.8 - 7.6 gm/dL    Albumin Level 2.5 (L) 3.4 - 4.8 g/dL    Globulin 3.4 2.4 - 3.5 gm/dL    Albumin/Globulin Ratio 0.7 (L) 1.1 - 2.0 ratio    Bilirubin Total 1.6 (H) <=1.5 mg/dL    Alkaline Phosphatase 116 40 - 150 unit/L    Alanine Aminotransferase 71 (H) 0 - 55 unit/L    Aspartate Aminotransferase 78 (H) 5 - 34 unit/L    eGFR 57 mls/min/1.73/m2   Magnesium    Collection Time: 03/11/24  4:45 AM   Result Value Ref Range "    Magnesium Level 2.00 1.60 - 2.60 mg/dL   CBC with Differential    Collection Time: 03/11/24  4:46 AM   Result Value Ref Range    WBC 15.28 (H) 4.50 - 11.50 x10(3)/mcL    RBC 4.35 (L) 4.70 - 6.10 x10(6)/mcL    Hgb 11.8 (L) 14.0 - 18.0 g/dL    Hct 36.6 (L) 42.0 - 52.0 %    MCV 84.1 80.0 - 94.0 fL    MCH 27.1 27.0 - 31.0 pg    MCHC 32.2 (L) 33.0 - 36.0 g/dL    RDW 15.4 11.5 - 17.0 %    Platelet 633 (H) 130 - 400 x10(3)/mcL    MPV 10.4 7.4 - 10.4 fL    Neut % 76.1 %    Lymph % 8.4 %    Mono % 11.8 %    Eos % 1.4 %    Basophil % 0.4 %    Lymph # 1.29 0.6 - 4.6 x10(3)/mcL    Neut # 11.62 (H) 2.1 - 9.2 x10(3)/mcL    Mono # 1.80 (H) 0.1 - 1.3 x10(3)/mcL    Eos # 0.22 0 - 0.9 x10(3)/mcL    Baso # 0.06 <=0.2 x10(3)/mcL    IG# 0.29 (H) 0 - 0.04 x10(3)/mcL    IG% 1.9 %    NRBC% 0.0 %       Imaging     CT ABDOMEN PELVIS WITHOUT CONTRAST    CLINICAL HISTORY:  Evaluate inguinal hematoma.    TECHNIQUE:  Axial CT images of the abdomen and pelvis were obtained without intravenous contrast.   Coronal and sagittal reformations were obtained.  Automated exposure control was utilized.  Total exam DLP is 393 mGy cm.    COMPARISON:  03/06/2024    FINDINGS:  Left pleural effusion with left lower lobe compressive atelectasis again identified.  There is right basilar atelectasis.  There is cardiomegaly.  Atherosclerotic calcifications noted.  There is pericardial effusion again demonstrated.  There is hyperdense material within the gallbladder compatible with vicarious excretion from a previous IV contrast bolus.  The liver, pancreas, spleen, and adrenal glands appear unremarkable.  There is punctate nonobstructing right renal calculus.  Kidneys demonstrate no hydronephrosis or obstructing calculus.  There is no bowel obstruction.  There is colonic diverticulosis without evidence of acute diverticulitis.  There is moderate colonic stool present.  The appendix is normal.  There is right inguinal edema and hematoma again identified similar  to previous exam.  There is decreased stranding about the aortic bifurcation.  There is trace pelvic free fluid dependently again noted.  There is Nguyen catheter within urinary bladder.  Atherosclerotic calcifications noted.  Abdominal aorta is not aneurysmal.  There is no intraperitoneal free air.  There is no lymphadenopathy.  No acute displaced fracture identified.   Impression:       1. Edema and small hematoma about the right groin appears similar to prior exam.  There is decreased fat stranding about the aortic bifurcation in the interval.  2. Left pleural effusion with left lower lobe compressive atelectasis again identified.  3. Cardiomegaly with pericardial effusion again noted.  4. Colonic diverticulosis without evidence of acute diverticulitis.  5. Additional findings as above.       Impression  1. SIRS with leukocytosis  2. Polymicrobial right groin wound infection-Pseudomonas and Serratia   3. Right lower abdominal/groin hematoma  4.  CAD/VHD s/p CABG/AVR  5.  Acute kidney injury/chronic kidney disease   6. HFrEF / pleural and pericardial effusions  7. Anemia  8.  Protein calorie malnutrition     Recommendations  I agree with the management of this patient.  History of CAD and aortic valvular heart disease presenting with NSTEMI, with HFrEF status post CABG and AVR with worsening leukocytosis which is likely multifactorial including possibly from infection and noninfectious etiology with I groin polymicrobial wound infection with Pseudomonas and Serratia isolates as well as right groin/lower abdominal hematoma likely contributing.  I will evaluate further with you and urinalysis as well as procalcitonin level with plan to obtain blood cultures if fever spikes or worsening leukocytosis.  We will continue antibiotic coverage with placement on cefepime.  I will discontinue ciprofloxacin due to concern for arrhythmia especially considering complex cardiac history and concern for drug interactions with  amiodarone.  We will follow with labs in a.m.  Case is discussed with patient, daughter and medicine team.  I would like to thank the team very much for the opportunity to assist in the care of this patient.

## 2024-03-11 NOTE — CONSULTS
Nephrology Initial Consult Note    Patient Name: Brain Snyder  Age: 78 y.o.  : 1945  MRN: [id number]  Admission Date: 2024    Reason for Consult:      Hyponatremia, JEAN-CLAUDE    HPI:     Brain Snyder is a 78 y.o. male who presented to this facility on 2024 as a transfer for CV surgery evaluation status post [facility] on 2024 showing multivessel CAD.  He remained in the ICU with Impella to the right groin post transfer.  On  2024 he underwent CABG x3 and bioprosthetic AVR.  Postoperatively he had atrial fibrillation with RVR requiring amiodarone infusion.    -He self extubated on .    -By 3/1 LFTs and WBC began to worsen.  -Developed abdominal distention with peripheral edema on .  Culture of wound to groin positive for Gram-negative rods and Pseudomonas (Impella site)    On  nephrology was consulted for hyponatremia.  Up until 48 hours ago he was receiving twice daily IV Lasix, Aldactone, and metolazone.  Serum sodium was stable around 134/135 until/ when began to worsen.  Today, 128.  Urine output 3900 mL in the past 24 hours, 1600 already today. He appears quite weakened. Family at bedside and reports he has recently gotten out of bed 20 minutes ago. He has no history of hyponatremia. Family reports he eats minimally but drinks about 3L of water per day. Discussed with patient and family via .       Current Facility-Administered Medications   Medication Dose Route Frequency Provider Last Rate Last Admin    acetaminophen oral solution 650 mg  650 mg Per OG tube Q6H PRN Vasile Carter PA-C        acetaminophen tablet 650 mg  650 mg Oral Q6H PRN Pablo Yepez MD   650 mg at 24 1117    albumin human 5% bottle 12.5 g  12.5 g Intravenous PRN Vasile Carter PA-C   Stopped at 24 1442    allopurinol split tablet 50 mg  50 mg Oral Daily Tanner Rdz MD   50 mg at 24 1117    amiodarone tablet 200 mg  200 mg Oral Daily Lexy Palomares FNP    200 mg at 03/11/24 1118    aspirin EC tablet 81 mg  81 mg Oral Daily Vasile Carter PA-C   81 mg at 03/11/24 1117    atorvastatin tablet 40 mg  40 mg Oral QHS Tanner Rdz MD   40 mg at 03/10/24 2133    ceFEPIme (MAXIPIME) 2 g in dextrose 5 % in water (D5W) 100 mL IVPB (MB+)  2 g Intravenous Q12H Elieser Pace  mL/hr at 03/11/24 1254 2 g at 03/11/24 1254    dextrose 10% bolus 125 mL 125 mL  12.5 g Intravenous PRN Pablo Yepez MD        dextrose 10% bolus 250 mL 250 mL  25 g Intravenous PRN Pablo Yepez MD        electrolyte-A infusion   Intravenous PRN Pablo Yepez MD   Stopped at 02/28/24 0700    enoxaparin injection 40 mg  40 mg Subcutaneous Daily Cherry Suarez FNP   40 mg at 03/10/24 1628    ferrous sulfate tablet 1 each  1 tablet Oral Every other day Tanner Rdz MD   1 each at 03/10/24 0829    folic acid tablet 1 mg  1 mg Oral Daily Vasile Carter PA-C   1 mg at 03/11/24 1117    furosemide tablet 20 mg  20 mg Oral BID Lexy Palomares FNP   20 mg at 03/11/24 1118    heparin, porcine (PF) 100 unit/mL injection flush 500 Units  5 mL Intravenous On Call Procedure Pablo Yepez MD        heparin, porcine (PF) 100 unit/mL injection flush 500 Units  5 mL Intravenous On Call Procedure Nita Contreras MD        HYDROcodone-acetaminophen 5-325 mg per tablet 1 tablet  1 tablet Oral Q6H PRN Stan Lazar MD   1 tablet at 03/10/24 1628    lactulose 10 gram/15 ml solution 20 g  20 g Oral Q6H PRN Vasile Carter PA-C        loperamide capsule 2 mg  2 mg Oral Continuous PRN Vasile Carter PA-C   2 mg at 03/02/24 1253    melatonin tablet 6 mg  6 mg Oral Nightly PRN Tanner Rdz MD   6 mg at 03/09/24 2108    metoclopramide injection 5 mg  5 mg Intravenous Q6H PRN Vasile Carter, PA-C        metoprolol succinate (TOPROL-XL) 24 hr split tablet 12.5 mg  12.5 mg Oral Daily Cherry Suarez FNP   12.5 mg at 03/11/24 1118     nitroGLYCERIN SL tablet 0.4 mg  0.4 mg Sublingual Q5 Min PRN Tanner Rdz MD        ondansetron disintegrating tablet 8 mg  8 mg Oral Q8H PRN Tanner Rdz MD   8 mg at 03/11/24 1253    ondansetron injection 8 mg  8 mg Intravenous Q6H PRN Pablo Yepez MD   8 mg at 03/10/24 1330    pantoprazole EC tablet 40 mg  40 mg Oral Daily Tanner Rdz MD   40 mg at 03/11/24 1117    simethicone chewable tablet 80 mg  1 tablet Oral TID PRN Tanner Rdz MD   80 mg at 03/11/24 1253    sodium chloride 0.9% flush 10 mL  10 mL Intravenous PRN Tanner Rdz MD        sodium chloride 0.9% flush 10 mL  10 mL Intravenous PRN Millie Jimenez NP        sucralfate tablet 1 g  1 g Oral QID (AC & HS) Vasile Carter PA-C   1 g at 03/11/24 1121       Nidia Shepherd NP    Past Medical History:   Diagnosis Date    A-fib     Aortic insufficiency     CAD (coronary artery disease)     Hypertension       Past Surgical History:   Procedure Laterality Date    AORTIC VALVE REPLACEMENT N/A 2/27/2024    Procedure: Replacement-valve-aortic;  Surgeon: Nita Contreras MD;  Location: [facility];  Service: Cardiothoracic;  Laterality: N/A;    CORONARY ANGIOGRAPHY N/A 2/20/2024    Procedure: ANGIOGRAM, CORONARY ARTERY;  Surgeon: Vasile Callahan MD;  Location: [facility] CATH LAB;  Service: Cardiology;  Laterality: N/A;    CORONARY ARTERY BYPASS GRAFT (CABG) N/A 2/27/2024    Procedure: CORONARY ARTERY BYPASS GRAFT (CABG);  Surgeon: Nita Contreras MD;  Location: [facility] OR;  Service: Cardiothoracic;  Laterality: N/A;    ENDOSCOPIC HARVEST OF VEIN N/A 2/27/2024    Procedure: SURGICAL PROCUREMENT, VEIN, ENDOSCOPIC;  Surgeon: Nita Contreras MD;  Location: [facility] OR;  Service: Cardiothoracic;  Laterality: N/A;    IMPELLA, REMOVAL Right 2/27/2024    Procedure: Impella, Removal;  Surgeon: Nita Contreras MD;  Location: [facility];  Service: Cardiothoracic;  Laterality: Right;    INSERTION OF  INTRAVASCULAR MICROAXIAL BLOOD PUMP N/A 2/23/2024    Procedure: INSERTION, IMPELLA;  Surgeon: All Montoya MD;  Location: [facility] CATH LAB;  Service: Cardiology;  Laterality: N/A;  with swanz    RIGHT HEART CATHETERIZATION N/A 2/22/2024    Procedure: INSERTION, CATHETER, RIGHT HEART;  Surgeon: Shreya Lomax MD;  Location: [facility] CATH LAB;  Service: Cardiology;  Laterality: N/A;      No family history on file.  Social History     Tobacco Use    Smoking status: Former     Types: Cigarettes    Smokeless tobacco: Never   Substance Use Topics    Alcohol use: Not Currently     Medications Prior to Admission   Medication Sig Dispense Refill Last Dose    amiodarone (PACERONE) 200 MG Tab Take 400 mg by mouth 2 (two) times daily.       atorvastatin (LIPITOR) 20 MG tablet Take 40 mg by mouth every evening.       furosemide (LASIX) 40 MG tablet Take 40 mg by mouth once daily.        Review of patient's allergies indicates:  No Known Allergies         Review of Systems:     Review of Systems   Constitutional:  Negative for chills and fever.   Respiratory: Negative.     Cardiovascular:  Negative for leg swelling.   Gastrointestinal: Negative.    Genitourinary: Negative.          Objective:       VITAL SIGNS: 24 HR MIN & MAX LAST    Temp  Min: 97.6 °F (36.4 °C)  Max: 98.7 °F (37.1 °C)  98.7 °F (37.1 °C)        BP  Min: 89/61  Max: 115/52  94/64     Pulse  Min: 83  Max: 100  100     Resp  Min: 18  Max: 19  18    SpO2  Min: 94 %  Max: 95 %  (!) 94 %      GEN: Chronically ill appearing in NAD  HEENT: Conjunctiva anicteric, pupils equal, dry MM, OP benign  CV: RRR +S1,S2 without murmur  PULM: CTAB, unlabored  ABD: Soft, somewhat enlarged abdomen, non tender   EXT: No cyanosis or edema  SKIN: Warm and dry  Urinary catheter with light yellow urine   PSYCH: Awake, slow to respond   Vascular access: none           Component Value Date/Time     (L) 03/11/2024 0445     (L) 03/10/2024 0611    K 4.1 03/11/2024 0445    K 3.9  03/10/2024 0611    CHLORIDE 91 (L) 03/11/2024 0445    CHLORIDE 91 (L) 03/10/2024 0611    CO2 26 03/11/2024 0445    CO2 30 03/10/2024 0611    BUN 22.8 03/11/2024 0445    BUN 20.3 03/10/2024 0611    CREATININE 1.30 (H) 03/11/2024 0445    CREATININE 1.22 (H) 03/10/2024 0611    CALCIUM 8.1 (L) 03/11/2024 0445    CALCIUM 8.2 (L) 03/10/2024 0611    PHOS 3.6 02/29/2024 0221            Component Value Date/Time    WBC 15.28 (H) 03/11/2024 0446    WBC 14.93 (H) 03/10/2024 0611    WBC 10.84 03/07/2024 0407    WBC 13.5 03/06/2024 0419    HGB 11.8 (L) 03/11/2024 0446    HGB 11.9 (L) 03/10/2024 0611    HCT 36.6 (L) 03/11/2024 0446    HCT 37.4 (L) 03/10/2024 0611    HCT 20 (LL) 02/27/2024 2041    HCT 27 (L) 02/27/2024 1947     (H) 03/11/2024 0446     (H) 03/10/2024 0611       CT Abdomen Pelvis Without Contrast [[id number]] Resulted: 03/09/24 1203   Order Status: Completed Updated: 03/09/24 1206   Narrative:     EXAMINATION:  CT ABDOMEN PELVIS WITHOUT CONTRAST    CLINICAL HISTORY:  Evaluate inguinal hematoma.    TECHNIQUE:  Axial CT images of the abdomen and pelvis were obtained without intravenous contrast.   Coronal and sagittal reformations were obtained.  Automated exposure control was utilized.  Total exam DLP is 393 mGy cm.    COMPARISON:  03/06/2024    FINDINGS:  Left pleural effusion with left lower lobe compressive atelectasis again identified.  There is right basilar atelectasis.  There is cardiomegaly.  Atherosclerotic calcifications noted.  There is pericardial effusion again demonstrated.  There is hyperdense material within the gallbladder compatible with vicarious excretion from a previous IV contrast bolus.  The liver, pancreas, spleen, and adrenal glands appear unremarkable.  There is punctate nonobstructing right renal calculus.  Kidneys demonstrate no hydronephrosis or obstructing calculus.  There is no bowel obstruction.  There is colonic diverticulosis without evidence of acute diverticulitis.   There is moderate colonic stool present.  The appendix is normal.  There is right inguinal edema and hematoma again identified similar to previous exam.  There is decreased stranding about the aortic bifurcation.  There is trace pelvic free fluid dependently again noted.  There is Nguyen catheter within urinary bladder.  Atherosclerotic calcifications noted.  Abdominal aorta is not aneurysmal.  There is no intraperitoneal free air.  There is no lymphadenopathy.  No acute displaced fracture identified.   Impression:       1. Edema and small hematoma about the right groin appears similar to prior exam.  There is decreased fat stranding about the aortic bifurcation in the interval.  2. Left pleural effusion with left lower lobe compressive atelectasis again identified.  3. Cardiomegaly with pericardial effusion again noted.  4. Colonic diverticulosis without evidence of acute diverticulitis.  5. Additional findings as above.      Electronically signed by: Eduardo Howard MD  Date: 03/09/2024  Time: 12:03         Assessment / Plan:     Worsening hyponatremia secondary to excess free water intake and electrolyte losses through diuretics   2.  Multivessel CAD status post CABG and AVR 2/27  3. Right groin Impella site now with wound growing Pseudomonas and Serratia marcescens as of 3/3  -repeat CT 3/9 shows hematoma in edema of the right groin  4. Acute on chronic heart failure-most recent EF 30% with severe global hypokinesis  5. Elevated LFT  6. CKD baseline Cr 1.6   7. Anemia s/p PRBC transfusion 2/28 and 2/29      -worsening hyponatremia likely secondary to thiazide diuretics and excess free water intake.   -Start fluid restriction 2 L per day   -check urine sodium, urine   creatinine, urine osmolality  -check TSH and serum cortisol to rule out other etiologies

## 2024-03-12 LAB
ALBUMIN SERPL-MCNC: 2.5 G/DL (ref 3.4–4.8)
ALBUMIN/GLOB SERPL: 0.8 RATIO (ref 1.1–2)
ALP SERPL-CCNC: 107 UNIT/L (ref 40–150)
ALT SERPL-CCNC: 67 UNIT/L (ref 0–55)
AST SERPL-CCNC: 74 UNIT/L (ref 5–34)
AV INDEX (PROSTH): 0.23
AV MEAN GRADIENT: 16 MMHG
AV PEAK GRADIENT: 31 MMHG
AV VALVE AREA BY VELOCITY RATIO: 0.81 CM²
AV VALVE AREA: 0.59 CM²
AV VELOCITY RATIO: 0.32
BASOPHILS # BLD AUTO: 0.08 X10(3)/MCL
BASOPHILS NFR BLD AUTO: 0.5 %
BILIRUB SERPL-MCNC: 1.6 MG/DL
BUN SERPL-MCNC: 24.7 MG/DL (ref 8.4–25.7)
CALCIUM SERPL-MCNC: 8.1 MG/DL (ref 8.8–10)
CHLORIDE SERPL-SCNC: 94 MMOL/L (ref 98–107)
CO2 SERPL-SCNC: 25 MMOL/L (ref 23–31)
CREAT SERPL-MCNC: 1.33 MG/DL (ref 0.73–1.18)
CREAT UR-MCNC: 56.3 MG/DL (ref 63–166)
CV ECHO LV RWT: 0.39 CM
DOP CALC AO PEAK VEL: 2.8 M/S
DOP CALC AO VTI: 45.4 CM
DOP CALC LVOT AREA: 2.5 CM2
DOP CALC LVOT DIAMETER: 1.8 CM
DOP CALC LVOT PEAK VEL: 0.89 M/S
DOP CALC LVOT STROKE VOLUME: 26.96 CM3
DOP CALCLVOT PEAK VEL VTI: 10.6 CM
ECHO LV POSTERIOR WALL: 1.1 CM (ref 0.6–1.1)
EOSINOPHIL # BLD AUTO: 0.35 X10(3)/MCL (ref 0–0.9)
EOSINOPHIL NFR BLD AUTO: 2.3 %
ERYTHROCYTE [DISTWIDTH] IN BLOOD BY AUTOMATED COUNT: 15.4 % (ref 11.5–17)
FRACTIONAL SHORTENING: 23 % (ref 28–44)
GFR SERPLBLD CREATININE-BSD FMLA CKD-EPI: 55 MLS/MIN/1.73/M2
GLOBULIN SER-MCNC: 3.3 GM/DL (ref 2.4–3.5)
GLUCOSE SERPL-MCNC: 99 MG/DL (ref 82–115)
HCT VFR BLD AUTO: 37.8 % (ref 42–52)
HGB BLD-MCNC: 12.1 G/DL (ref 14–18)
IMM GRANULOCYTES # BLD AUTO: 0.33 X10(3)/MCL (ref 0–0.04)
IMM GRANULOCYTES NFR BLD AUTO: 2.2 %
INTERVENTRICULAR SEPTUM: 1.2 CM (ref 0.6–1.1)
LEFT INTERNAL DIMENSION IN SYSTOLE: 4.3 CM (ref 2.1–4)
LEFT VENTRICLE DIASTOLIC VOLUME INDEX: 79.79 ML/M2
LEFT VENTRICLE DIASTOLIC VOLUME: 154 ML
LEFT VENTRICLE MASS INDEX: 137 G/M2
LEFT VENTRICLE SYSTOLIC VOLUME INDEX: 43.1 ML/M2
LEFT VENTRICLE SYSTOLIC VOLUME: 83.1 ML
LEFT VENTRICULAR INTERNAL DIMENSION IN DIASTOLE: 5.6 CM (ref 3.5–6)
LEFT VENTRICULAR MASS: 264.7 G
LVOT MG: 2 MMHG
LVOT MV: 0.59 CM/S
LYMPHOCYTES # BLD AUTO: 1.62 X10(3)/MCL (ref 0.6–4.6)
LYMPHOCYTES NFR BLD AUTO: 10.8 %
MCH RBC QN AUTO: 27 PG (ref 27–31)
MCHC RBC AUTO-ENTMCNC: 32 G/DL (ref 33–36)
MCV RBC AUTO: 84.4 FL (ref 80–94)
MONOCYTES # BLD AUTO: 1.75 X10(3)/MCL (ref 0.1–1.3)
MONOCYTES NFR BLD AUTO: 11.7 %
NEUTROPHILS # BLD AUTO: 10.82 X10(3)/MCL (ref 2.1–9.2)
NEUTROPHILS NFR BLD AUTO: 72.5 %
NRBC BLD AUTO-RTO: 0 %
OHS LV EJECTION FRACTION SIMPSONS BIPLANE MOD: 40 %
OSMOLALITY UR: 300 MOSM/KG (ref 300–1300)
PLATELET # BLD AUTO: 590 X10(3)/MCL (ref 130–400)
PMV BLD AUTO: 10.2 FL (ref 7.4–10.4)
POTASSIUM SERPL-SCNC: 4.2 MMOL/L (ref 3.5–5.1)
PROT SERPL-MCNC: 5.8 GM/DL (ref 5.8–7.6)
RBC # BLD AUTO: 4.48 X10(6)/MCL (ref 4.7–6.1)
SODIUM SERPL-SCNC: 129 MMOL/L (ref 136–145)
SODIUM UR-SCNC: 42 MMOL/L
WBC # SPEC AUTO: 14.95 X10(3)/MCL (ref 4.5–11.5)
Z-SCORE OF LEFT VENTRICULAR DIMENSION IN END DIASTOLE: 0.25
Z-SCORE OF LEFT VENTRICULAR DIMENSION IN END SYSTOLE: 1.94

## 2024-03-12 PROCEDURE — 25000003 PHARM REV CODE 250: Performed by: PHYSICIAN ASSISTANT

## 2024-03-12 PROCEDURE — 97530 THERAPEUTIC ACTIVITIES: CPT

## 2024-03-12 PROCEDURE — 84300 ASSAY OF URINE SODIUM: CPT | Performed by: NURSE PRACTITIONER

## 2024-03-12 PROCEDURE — 63600175 PHARM REV CODE 636 W HCPCS: Performed by: INTERNAL MEDICINE

## 2024-03-12 PROCEDURE — 25000003 PHARM REV CODE 250: Performed by: INTERNAL MEDICINE

## 2024-03-12 PROCEDURE — 97168 OT RE-EVAL EST PLAN CARE: CPT

## 2024-03-12 PROCEDURE — 80053 COMPREHEN METABOLIC PANEL: CPT | Performed by: INTERNAL MEDICINE

## 2024-03-12 PROCEDURE — 83935 ASSAY OF URINE OSMOLALITY: CPT | Performed by: NURSE PRACTITIONER

## 2024-03-12 PROCEDURE — 63600175 PHARM REV CODE 636 W HCPCS: Performed by: NURSE PRACTITIONER

## 2024-03-12 PROCEDURE — 94761 N-INVAS EAR/PLS OXIMETRY MLT: CPT

## 2024-03-12 PROCEDURE — 27000221 HC OXYGEN, UP TO 24 HOURS

## 2024-03-12 PROCEDURE — 97535 SELF CARE MNGMENT TRAINING: CPT

## 2024-03-12 PROCEDURE — 99233 SBSQ HOSP IP/OBS HIGH 50: CPT | Mod: ,,, | Performed by: INTERNAL MEDICINE

## 2024-03-12 PROCEDURE — 84145 PROCALCITONIN (PCT): CPT | Performed by: INTERNAL MEDICINE

## 2024-03-12 PROCEDURE — 85025 COMPLETE CBC W/AUTO DIFF WBC: CPT | Performed by: INTERNAL MEDICINE

## 2024-03-12 PROCEDURE — 25000003 PHARM REV CODE 250: Performed by: NURSE PRACTITIONER

## 2024-03-12 PROCEDURE — 21400001 HC TELEMETRY ROOM

## 2024-03-12 PROCEDURE — 25000003 PHARM REV CODE 250

## 2024-03-12 PROCEDURE — 82570 ASSAY OF URINE CREATININE: CPT | Performed by: NURSE PRACTITIONER

## 2024-03-12 RX ADMIN — FUROSEMIDE 20 MG: 20 TABLET ORAL at 08:03

## 2024-03-12 RX ADMIN — ASPIRIN 81 MG: 81 TABLET, COATED ORAL at 08:03

## 2024-03-12 RX ADMIN — SUCRALFATE 1 G: 1 TABLET ORAL at 08:03

## 2024-03-12 RX ADMIN — SUCRALFATE 1 G: 1 TABLET ORAL at 10:03

## 2024-03-12 RX ADMIN — ALLOPURINOL 50 MG: 300 TABLET ORAL at 08:03

## 2024-03-12 RX ADMIN — FOLIC ACID 1 MG: 1 TABLET ORAL at 08:03

## 2024-03-12 RX ADMIN — PANTOPRAZOLE SODIUM 40 MG: 40 TABLET, DELAYED RELEASE ORAL at 08:03

## 2024-03-12 RX ADMIN — ATORVASTATIN CALCIUM 40 MG: 40 TABLET, FILM COATED ORAL at 08:03

## 2024-03-12 RX ADMIN — CEFEPIME 2 G: 2 INJECTION, POWDER, FOR SOLUTION INTRAVENOUS at 12:03

## 2024-03-12 RX ADMIN — METOPROLOL SUCCINATE 12.5 MG: 25 TABLET, EXTENDED RELEASE ORAL at 08:03

## 2024-03-12 RX ADMIN — ENOXAPARIN SODIUM 40 MG: 40 INJECTION SUBCUTANEOUS at 05:03

## 2024-03-12 RX ADMIN — FERROUS SULFATE TAB 325 MG (65 MG ELEMENTAL FE) 1 EACH: 325 (65 FE) TAB at 08:03

## 2024-03-12 RX ADMIN — SUCRALFATE 1 G: 1 TABLET ORAL at 06:03

## 2024-03-12 RX ADMIN — SUCRALFATE 1 G: 1 TABLET ORAL at 05:03

## 2024-03-12 RX ADMIN — ACETAMINOPHEN 650 MG: 650 SOLUTION ORAL at 12:03

## 2024-03-12 RX ADMIN — AMIODARONE HYDROCHLORIDE 200 MG: 200 TABLET ORAL at 08:03

## 2024-03-12 RX ADMIN — ACETAMINOPHEN 650 MG: 650 SOLUTION ORAL at 05:03

## 2024-03-12 NOTE — PT/OT/SLP RE-EVAL
Occupational Therapy   Re-evaluation    Name: Brain Snyder  MRN: [id number]  Admitting Diagnosis: CAD  Recent Surgery: Procedure(s) (LRB):  CORONARY ARTERY BYPASS GRAFT (CABG) (N/A)  Replacement-valve-aortic (N/A)  SURGICAL PROCUREMENT, VEIN, ENDOSCOPIC (N/A)  Impella, Removal (Right) 14 Days Post-Op    Recommendations:     Discharge therapy intensity: Moderate Intensity Therapy   Discharge Equipment Recommendations:  to be determined by next level of care  Barriers to discharge:   (Ongoing medical needs)    Assessment:     Brain Snyder is a 78 y.o. male with a medical diagnosis of CAD s/p CABG on 2/27, s/p R groin impella and mechanical intubation, self-extubated on 2/29 and was downgraded from ICU on 3/2.  He presents with the following performance deficits affecting function: weakness, impaired endurance, impaired sensation, impaired self care skills, impaired balance, gait instability, impaired functional mobility, decreased upper extremity function, decreased lower extremity function, decreased ROM, pain, decreased safety awareness.    Rehab Prognosis: Fair; patient would benefit from acute skilled OT services to address these deficits and reach maximum level of function.       Plan:     Patient to be seen 5 x/week to address the above listed problems via self-care/home management, therapeutic activities, therapeutic exercises  Plan of Care Expires: 04/09/24  Plan of Care Reviewed with: patient, daughter    Subjective     Chief Complaint: L knee pain  Patient/Family Comments/goals: To go home    Pain/Comfort:  Pain Rating 1:  (Rating not provided, pt reporting L knee and B foot pain with mobility)  Location - Side 1: Bilateral  Location 1: knee  Pain Addressed 1: Reposition, Distraction, Nurse notified    Patients cultural, spiritual, Judaism conflicts given the current situation: no    Objective:     OT communicated with nurse and PT prior to session.      Patient was found HOB elevated with  telemetry, peripheral IV, gustafson catheter upon OT entry to room.    General Precautions: Standard, fall, sternal  Orthopedic Precautions: N/A  Braces: N/A    Bed Mobility:    Patient completed Rolling/Turning to Left with  maximal assistance  Patient completed Rolling/Turning to Right with moderate assistance  Patient completed Supine to Sit with maximal assistance and 2 persons    Functional Mobility/Transfers:  Patient completed Sit <> Stand Transfer with moderate assistance and of 2 persons  with  rolling walker   Patient completed Bed <> Chair Transfer using Step Transfer technique with moderate assistance and of 2 persons with rolling walker  Functional Mobility: Pt required max verbal cues for RW management during functional transfers. No LOB noted, however, required increased time and effort to complete transfer 2/2 L knee and B foot pain with mobility.     Activities of Daily Living:  Lower Body Dressing: dependence donning socks while in supine  Toileting: dependence jewel cares while rolling in bed    Functional Cognition:  Intact    Visual Perceptual Skills:  Intact    Upper Extremity Function:  Right Upper Extremity:   WFL grossly assessed    Left Upper Extremity:  WFL grossly assessed    Balance:   Intact    Therapeutic Positioning  Risk for acquired pressure injuries is increased due to relative decrease in mobility d/t hospitalization , impaired mobility, impaired sensation, and incontinence.    OT interventions performed during the course of today's session in an effort to prevent and/or reduce acquired pressure injuries:   Education was provided on benefits of and recommendations for therapeutic positioning     Findings: known area of altered skin integrity at jewel area    OT recommendations for therapeutic positioning throughout hospitalization:   Follow [facility] Pressure Injury Prevention Protocol    Additional Treatment:  Therapeutic Activity: Pt instructed on safety with functional transfer while  maintaining sternal precautions and safety with RW management during transfers . Pt required constant cueing for aligning hips to recliner before sitting, reporting increased BLE pain with mobility.     Patient Education:  Patient provided with verbal education and demonstrations education regarding OT role/goals/POC, post op precautions, fall prevention, and safety awareness.  Understanding was verbalized, however additional teaching warranted.     Patient left up in chair with all lines intact, call button in reach, and nurse notified    GOALS:   Multidisciplinary Problems       Occupational Therapy Goals          Problem: Occupational Therapy    Goal Priority Disciplines Outcome Interventions   Occupational Therapy Goal     OT, PT/OT Ongoing, Not Progressing    Description: Goals to be met by: 4/1/24     Patient will increase functional independence with ADLs by performing:    UE Dressing with min A   Grooming while standing at sink with Minimal Assistance.  Toileting from commode with Moderate Assistance for hygiene and clothing management.   Sitting at edge of bed x30 minutes with Minimal Assistance. (Met)  Toilet transfer to bedside commode with Minimal Assistance.                         History:     Past Medical History:   Diagnosis Date    A-fib     Aortic insufficiency     CAD (coronary artery disease)     Hypertension          Past Surgical History:   Procedure Laterality Date    AORTIC VALVE REPLACEMENT N/A 2/27/2024    Procedure: Replacement-valve-aortic;  Surgeon: Nita Contreras MD;  Location: [facility];  Service: Cardiothoracic;  Laterality: N/A;    CORONARY ANGIOGRAPHY N/A 2/20/2024    Procedure: ANGIOGRAM, CORONARY ARTERY;  Surgeon: Vasile Callahan MD;  Location: [facility] CATH LAB;  Service: Cardiology;  Laterality: N/A;    CORONARY ARTERY BYPASS GRAFT (CABG) N/A 2/27/2024    Procedure: CORONARY ARTERY BYPASS GRAFT (CABG);  Surgeon: Nita Contreras MD;  Location: [facility] OR;  Service:  Cardiothoracic;  Laterality: N/A;    ENDOSCOPIC HARVEST OF VEIN N/A 2/27/2024    Procedure: SURGICAL PROCUREMENT, VEIN, ENDOSCOPIC;  Surgeon: Nita Contreras MD;  Location: [facility] OR;  Service: Cardiothoracic;  Laterality: N/A;    IMPELLA, REMOVAL Right 2/27/2024    Procedure: Impella, Removal;  Surgeon: Nita Contreras MD;  Location: [facility] OR;  Service: Cardiothoracic;  Laterality: Right;    INSERTION OF INTRAVASCULAR MICROAXIAL BLOOD PUMP N/A 2/23/2024    Procedure: INSERTION, IMPELLA;  Surgeon: All Montoya MD;  Location: [facility] CATH LAB;  Service: Cardiology;  Laterality: N/A;  with swanz    RIGHT HEART CATHETERIZATION N/A 2/22/2024    Procedure: INSERTION, CATHETER, RIGHT HEART;  Surgeon: Shreya Lomax MD;  Location: [facility] CATH LAB;  Service: Cardiology;  Laterality: N/A;       Time Tracking:     OT Date of Treatment: 03/12/24  OT Start Time: 1100  OT Stop Time: 1130  OT Total Time (min): 30 min    Billable Minutes:Re-eval 15 minutes  Therapeutic Activity 15 minutes    3/12/2024

## 2024-03-12 NOTE — PROGRESS NOTES
[facility] Hospital Medicine Progress Note      Chief Complaint: Inpatient Follow-up for      HPI per admitting team:   78-year-old male with a history of aortic insufficiency/stenosis, CAD, CKD IIIb, HTN AFib on Eliquis admitted to [facility] 02/15/2024 with chest pain, found to have NSTEMI (peak troponin 0.17) and underwent C 02/20/2024 showing severe multivessel stenosis and therefore was transferred to [facility] for CABG evaluation. Cardiology was consulted Patient had Impella placed on 02/23 and was admitted to the ICU.  Was started on aggressive diuresis with IV Lasix.  CV surgery was consulted.  Urology was consulted for hematuria; Nguyen catheter placed over guidewire with dilation secondary to urethral strictures. IV Heparin infusion was initiated per CIS but held due to hematuria/hemoptysis on 02/24.  He was also noted to have an JEAN-CLAUDE. Received 2 units PRBCs on 02/26 and was planned for high-risk PCI on 02/26 which was later canceled.  Underwent CABG x 3 2/27 (LIMA to LAD, RSVG to OM 1, R SVG to RCA, bioprosthetic AVR).  Remained on mechanical ventilation thereafter.  Patient noted to have tachycardia with atrial fibrillation/RVR requiring IV amiodarone along with pressors (norepinephrine/Milrinone) for hypotension. Patient underwent cardioversion x 2 with minimal improvement in HR/BP.  Patient self-extubated overnight on 02/29 and was noted to have adequate saturations on 2 L O2 via NC thereafter.  PT/OT consulted.  Renal function noted to be improving. Continued on IV diuresis as he appeared to be significantly volume overloaded postoperatively along with elevated pulmonary artery wedge pressures.  Started on p.o. amiodarone taper on 03/03.  Patient noted to have drainage from right groin wound and started on vancomycin on 03/04.  Wound culture grew Pseudomonas.  Chest tubes discontinued on 03/02.  Downgraded from ICU on 03/02.  CIS and CV surgery continued following patient.  CV  surgery signed off on 03/06 and discontinued vancomycin.  Patient was placed on oral Levaquin.  Hospital medicine consulted on 03/06 for assistance with medical management and DC planning.   Repeat CT abdomen and pelvis of 3/9 shows improving stranding and improving hematoma in the right groin, persistent left-sided effusion and atelectasis        Interval Hx:   patient was seen at bedside, no family member at bedside   No new complaints   Vitals reviewed with blood pressure low normal, heart rates in the low 100s, saturating 94% on 2 L of oxygen   Labs reviewed and fairly stable   General surgery evaluated patient has right groin no indication for surgery at this time  Palliative care on board  ID adjusted antibiotics to IV cefepime  Nephrology placed on fluid restriction       Case was discussed with patient's nurse and  on the floor.     Objective/physical exam:  General: alert male lying comfortably in bed, in no acute distress  HENT:  NC in place; oral and oropharyngeal mucosa moist, pink, with no erythema or exudates, no ear pain or discharge  Neck: normal neck movement, no lymph nodes or swellings, no JVD or Carotid bruit  Respiratory: clear breathing sounds bilaterally, no crackles, rales, ronchi or wheezes  Cardiovascular: clear S1 and S2, no murmurs, rubs or gallops  Peripheral Vascular: no lesions, ulcers or erosions, normal peripheral pulses; 2+ B/L pedal edema  Gastrointestinal: soft, non-distended abdomen with TTP in epigastric, hypogastric, RUQ/LUQ regions; no guarding, rigidity or rebound tenderness, normal bowel sounds; right groin incision wound noted to be erythematous with some serum bilirubin drainage with overlying dressing  Integumentary: normal skin color, no rashes or lesions  Neuro: AAO x 3; motor strength 5/5 in B/L UEs & LEs; sensation intact to gross and fine touch B/L; CN II-XII grossly intact        Assessment/Plan:  Right flank pain- due to right-sided lower abdomen  hematoma-   R Groin Purulent Cellulitis  Transaminitis, trending upwards ?  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 - worse  Natremia, worsening  Pulmonary hypertension   NSTEMI, CAD sp CABG x 3  S/p AVR  Atrial Fibrillation cvr   Normocytic anemia  Hematuria s/p Nguyen  Urethral stricture s/p dilation     Plan  Labs reviewed and fairly stable   General surgery evaluated patient has right groin no indication for surgery at this time  Palliative care on board  ID adjusted antibiotics to IV cefepime  Appreciate ID input change to IV cefepime, day 2  Wound culture grew Pseudomonas and Serratia   Nephrology placed on fluid restriction  Appreciate pulmonology input continue to monitor effusion, no need for thoracentesis at this time  Appreciate CIS input continue p.o. Lasix 20 mg b.i.d., metoprolol 12.5 mg q.day and amiodarone  Suspend lisinopril and Aldactone, due to low blood pressure   Continue use of incentive spirometry and oxygen supplementation   Appreciate Urology input Nguyen catheter has been checked out no signs of active infection   Trend liver enzymes- likely due to congestive hepatopathy , improving   Patient currently on p.o. Lasix 20 mg b.i.d., blood pressure is soft no room for increment in Lasix doses  Ultrasound abdomen is normal  Continue atorvastatin for now,  Continued on allopurinol 50 mg daily, aspirin 81 mg, atorvastatin 40 mg., FeSO4 every other day, folic acid 1 mg, Protonix 40 mg daily, , sucralfate 1 g q.i.d.  P.o. Norco 5 mg q.6 p.r.n. for pain  PT/OT recommending high-intensity therapy   Cm on board for placement     VTE prophylaxis:  Lovenox     Patient condition:  guarded     Anticipated discharge and Disposition:     Pending     All diagnosis and differential diagnosis have been reviewed; assessment and plan has been documented; I have personally reviewed the  labs and test results that are presently available; I have reviewed the patients medication list; I have reviewed the consulting providers response and recommendations. I have reviewed or attempted to review medical records based upon their availability     All of the patient's questions have been  addressed and answered. Patient's is agreeable to the above stated plan. I will continue to monitor closely and make adjustments to medical management as needed.       VITAL SIGNS: 24 HRS MIN & MAX LAST   Temp  Min: 97.4 °F (36.3 °C)  Max: 98 °F (36.7 °C) 97.7 °F (36.5 °C)   BP  Min: 62/35  Max: 107/64 99/64   Pulse  Min: 78  Max: 186  (!) 186   Resp  Min: 18  Max: 18 18   SpO2  Min: 95 %  Max: 100 % 100 %     I have reviewed the following labs:  Recent Labs   Lab 03/10/24  0611 03/11/24 0446 03/12/24 0449   WBC 14.93* 15.28* 14.95*   RBC 4.41* 4.35* 4.48*   HGB 11.9* 11.8* 12.1*   HCT 37.4* 36.6* 37.8*   MCV 84.8 84.1 84.4   MCH 27.0 27.1 27.0   MCHC 31.8* 32.2* 32.0*   RDW 15.5 15.4 15.4   * 633* 590*   MPV 11.1* 10.4 10.2     Recent Labs   Lab 03/09/24  0704 03/10/24  0611 03/11/24 0445 03/12/24 0449   * 130* 128* 129*   K 3.7 3.9 4.1 4.2   CO2 28 30 26 25   BUN 20.3 20.3 22.8 24.7   CREATININE 1.30* 1.22* 1.30* 1.33*   CALCIUM 8.0* 8.2* 8.1* 8.1*   MG 2.00 1.90 2.00  --    ALBUMIN 2.4* 2.5* 2.5* 2.5*   ALKPHOS 114 114 116 107   ALT 78* 73* 71* 67*   * 88* 78* 74*   BILITOT 1.9* 1.8* 1.6* 1.6*     Microbiology Results (last 7 days)       Procedure Component Value Units Date/Time    Wound Culture [[id number]]  (Abnormal)  (Susceptibility) Collected: 03/03/24 1920    Order Status: Completed Specimen: Wound from Groin Updated: 03/07/24 1053     Wound Culture Many Serratia marcescens      Many Pseudomonas aeruginosa             See below for Radiology    Scheduled Med:   allopurinoL  50 mg Oral Daily    amiodarone  200 mg Oral Daily    aspirin  81 mg Oral Daily    atorvastatin  40 mg Oral QHS     ceFEPime IV (PEDS and ADULTS)  2 g Intravenous Q12H    enoxparin  40 mg Subcutaneous Daily    ferrous sulfate  1 tablet Oral Every other day    folic acid  1 mg Oral Daily    furosemide  20 mg Oral BID    metoprolol succinate  12.5 mg Oral Daily    pantoprazole  40 mg Oral Daily    sucralfate  1 g Oral QID (AC & HS)      Continuous Infusions:   loperamide        PRN Meds:  acetaminophen, acetaminophen, albumin human 5%, dextrose 10%, dextrose 10%, electrolyte-A, heparin, porcine (PF), heparin, porcine (PF), HYDROcodone-acetaminophen, lactulose 10 gram/15 ml, loperamide, melatonin, metoclopramide, nitroGLYCERIN, ondansetron, ondansetron, simethicone, sodium chloride 0.9%, sodium chloride 0.9%     Assessment/Plan:      VTE prophylaxis:     Patient condition:  Stable/Fair/Guarded/ Serious/ Critical    Anticipated discharge and Disposition:         All diagnosis and differential diagnosis have been reviewed; assessment and plan has been documented; I have personally reviewed the labs and test results that are presently available; I have reviewed the patients medication list; I have reviewed the consulting providers response and recommendations. I have reviewed or attempted to review medical records based upon their availability    All of the patient's questions have been  addressed and answered. Patient's is agreeable to the above stated plan. I will continue to monitor closely and make adjustments to medical management as needed.  _____________________________________________________________________    Nutrition Status:    Radiology:  I have personally reviewed the following imaging and agree with the radiologist.     Echo Saline Bubble? No  Addendum:   Left Ventricle: Regional wall motion abnormalities present. Septal  motion is consistent with post-operative status. Akinetic inf -posterior  wall There is moderately reduced systolic function with a visually  estimated ejection fraction of 35 - 40%.     Right  Ventricle: Mild right ventricular enlargement. Systolic function  is severely reduced.     Aortic Valve: There is a bioprosthetic valve in the aortic position.     Pericardium: There is a trivial effusion. No indication of cardiac  tamponade. Left pleural effusion.  Narrative:   Limited study to assess function. Left Ventricle: The left ventricle is   mildly dilated. Normal wall thickness. There is mildly reduced systolic   function with a visually estimated ejection fraction of 40 - 45%.    Left Ventricle: Regional wall motion abnormalities present. Septal   motion is consistent with post-operative status. Akinetic inf -posterior   wall There is moderately reduced systolic function with a visually   estimated ejection fraction of 35 - 40%.    Right Ventricle: Mild right ventricular enlargement. Systolic function   is severely reduced.    Aortic Valve: There is a bioprosthetic valve in the aortic position.    Pericardium: There is a trivial effusion. No indication of cardiac   tamponade. Left pleural effusion.      Stan Lazar MD  Department of Hospital Medicine   [facility]   03/12/2024

## 2024-03-12 NOTE — PLAN OF CARE
Problem: Occupational Therapy  Goal: Occupational Therapy Goal  Description: Goals to be met by: 4/1/24     Patient will increase functional independence with ADLs by performing:    UE Dressing with min A   Grooming while standing at sink with Minimal Assistance.  Toileting from commode with Moderate Assistance for hygiene and clothing management.   Sitting at edge of bed x30 minutes with Minimal Assistance. (Met)  Toilet transfer to bedside commode with Minimal Assistance.    Outcome: Ongoing, Not Progressing

## 2024-03-12 NOTE — PLAN OF CARE
Called pt daughter- Brii [phone] to remind to bring needed documents for Medicaid dept to turn in- No answer .   Lana- Medicaid [phone]    I spoke to Allegra, pt other daughter will relay message about needed documents

## 2024-03-12 NOTE — NURSING
Artificial Intelligence Notification  [facility]  [address] [place] [address]  Phone: [phone]     This documentation was triggered by an Artificial Intelligence Notification.     Admit Date: 2024   LOS: 20  Code Status: Full Code  : 1945  Age: 78 y.o.  Weight:   Wt Readings from Last 1 Encounters:   24 80.3 kg (177 lb)        Sex: male  Bed: 980/[facility]  MRN: [id number]  Attending Physician: Stan Lazar MD     Date of Alert: 2024  Time AI Alert Received: 1217             Vitals:    24 1513   BP: 108/69   Pulse: 60   Resp: 18   Temp: 97.9 °F (36.6 °C)       Artificial Intelligence alert discussed with Provider:     Name: Mingo LOPEZ   Date/Time of Provider Notification: 1230      Patient Condition: pt admitted 2024 with impella. Cabg and avr done 2024. Pt developed afib rvr post op and positive wound culture with serratia marcesccens and pseudomonas at impella site.  On appropriate antibiotics. Heart rate documented at 186 at time of AI alert. Spoke with JESSICA Aguila. HR charted in error by student nurse. Pts actual heart rate 68 at the time. Comment placed in chart. No need for intervention at this time. Will continue to monitor.

## 2024-03-12 NOTE — PROGRESS NOTES
Nephrology follow up progress note    HPI:      Brain Snyder is a 78 y.o. male who presented to this facility on 02/21/2024 as a transfer for CV surgery evaluation status post [facility] on 02/20/2024 showing multivessel CAD.  He remained in the ICU with Impella to the right groin post transfer.  On  02/27/2024 he underwent CABG x3 and bioprosthetic AVR.  Postoperatively he had atrial fibrillation with RVR requiring amiodarone infusion.    -He self extubated on 02/29.    -By 3/1 LFTs and WBC began to worsen.  -Developed abdominal distention with peripheral edema on 03/06.  Culture of wound to groin positive for Gram-negative rods and Pseudomonas (Impella site)     On 03/11 nephrology was consulted for hyponatremia.  Up until 48 hours ago he was receiving twice daily IV Lasix, Aldactone, and metolazone.  Serum sodium was stable around 134/135 until/8 when began to worsen.  Today, 128.  Urine output 3900 mL in the past 24 hours, 1600 already today. He appears quite weakened. Family at bedside and reports he has recently gotten out of bed 20 minutes ago. He has no history of hyponatremia. Family reports he eats minimally but drinks about 3L of water per day. Discussed with patient and family via .     Interval history:     Patient resting in bed today with wife at bedside. Hyponatremia improving with cessation of diuretics.      Review of Systems:       Past medical, family, surgical, and social history reviewed and unchanged from initial consult note.     Objective:       VITAL SIGNS: 24 HR MIN & MAX LAST    Temp  Min: 97.4 °F (36.3 °C)  Max: 97.9 °F (36.6 °C)  97.9 °F (36.6 °C)        BP  Min: 78/42  Max: 108/69  108/69     Pulse  Min: 60  Max: 186  60     Resp  Min: 18  Max: 18  18    SpO2  Min: 95 %  Max: 100 %  96 %      GEN: Chronically ill appearing in NAD  HEENT: Conjunctiva anicteric, pupils equal, MMM, OP benign  CV: RRR +S1,S2 without murmur  PULM: CTAB, unlabored, room air   ABD: Soft, NT/ND abdomen  with NABS  EXT: No cyanosis, trace edema, bilateral heel boots, SUSIE  SKIN: Warm and dry  PSYCH: Awake, alert, and appropriately conversant  Vascular access: none           Component Value Date/Time     (L) 03/12/2024 0449     (L) 03/11/2024 0445    K 4.2 03/12/2024 0449    K 4.1 03/11/2024 0445    CHLORIDE 94 (L) 03/12/2024 0449    CHLORIDE 91 (L) 03/11/2024 0445    CO2 25 03/12/2024 0449    CO2 26 03/11/2024 0445    BUN 24.7 03/12/2024 0449    BUN 22.8 03/11/2024 0445    CREATININE 1.33 (H) 03/12/2024 0449    CREATININE 1.30 (H) 03/11/2024 0445    CALCIUM 8.1 (L) 03/12/2024 0449    CALCIUM 8.1 (L) 03/11/2024 0445    PHOS 3.6 02/29/2024 0221            Component Value Date/Time    WBC 14.95 (H) 03/12/2024 0449    WBC 15.28 (H) 03/11/2024 0446    WBC 10.84 03/07/2024 0407    WBC 13.5 03/06/2024 0419    HGB 12.1 (L) 03/12/2024 0449    HGB 11.8 (L) 03/11/2024 0446    HCT 37.8 (L) 03/12/2024 0449    HCT 36.6 (L) 03/11/2024 0446    HCT 20 (LL) 02/27/2024 2041    HCT 27 (L) 02/27/2024 1947     (H) 03/12/2024 0449     (H) 03/11/2024 0446       Imaging reviewed      Assessment / Plan:     Worsening hyponatremia secondary to excess free water intake and electrolyte losses through diuretics   2.  Multivessel CAD status post CABG and AVR 2/27  3. Right groin Impella site now with wound growing Pseudomonas and Serratia marcescens as of 3/3  -repeat CT 3/9 shows hematoma in edema of the right groin  4. Acute on chronic heart failure-most recent EF 30% with severe global hypokinesis  5. Elevated LFT  6. CKD baseline Cr 1.6   7. Anemia s/p PRBC transfusion 2/28 and 2/29        -worsening hyponatremia likely secondary to thiazide diuretics and excess free water intake.   Pending urine testing which was sent to the lab about an hour ago

## 2024-03-12 NOTE — PROGRESS NOTES
"  [facility]    Cardiology  Progress Note    Patient Name: Brain Snyder  MRN: [id number]  Admission Date: 2/21/2024  Hospital Length of Stay: 20 days  Code Status: Full Code   Attending Physician: Stan Lazar MD   Primary Care Physician: Nidia Shepherd NP  Expected Discharge Date:   Principal Problem:CAD (coronary artery disease)    Subjective:   Reason for Consult:  CAD     HPI: 78-year-old female that is unknown to [facility] with a PMHx of PAF on Eliquis Aortic insufficiency, MV CAD, HTN. He is Portuguese speaking and an  was used for interview. He was orginally admitted to [facility] on 2.15.24 with CP & NSTEMI and underwent a LHC on 2.20.24 taht showed MV CAD (reported noted below) He was transferred to [facility] on 2.21.24 for a CABG/AVR evaluation. On arrive he was hemodynamically stable on a heparin infusion. He denied chest pain, SOB, and nausea on current exam, [facility] was consulted for CAD    Hospital Course:   2.23.24: Patient seen resting in bed. He denies SOB or CP. He remains on heparin infusion. Family at bedside   2.24.24: NAD. S/p Impella Placement/Sanford-Chelo Catheter. "I am ok." + Blood Clots from Nose/Mouth, Hematuria 2/2 traumatic Indwelling Catheter Placement. Heparin Drip per Protocol. Impella P5  2.25.24: NAD. "I'm ok." Denies CP, SOB and Palps. PA Pressure Reading is not Accurate. CVP 5. Impella at P5. R Groin Impella P7. Remains Off Heparin Drip per Protocol. Now in SR.   2.26.24: NAD Noted. SR on Tele. Right Groin Impella in place, bruising with no hematoma noted. Distal pulses DP intact. Legs warm. NC 2 L/Min. Denies CP/SOB. Consent obtained from his daughter Brii Snyder. NPO for MV PCI Today.  2.27.24: NAD Noted. Impella remains in place at P7 Support. Good UA Noted, some pink tinged urine noted. SR on Tele. Pressors off. Denies CP/SOB. NPO for surgery today. Heparin on hold for surgery.  2.28.24: Patient is critically ill. AF RVR on Tele, twice shocked this AM with no " "success. On Amiodarone/Milrinone- Cardizem Infusion now off given hypotension and ICMO. Also no José Miguel/Levophed. Concern for bleeding noted, transfusion noted. Patient is intubated/Mechanically Vented. Hemodynamics: CO/CI 4.3/2.3 CVP 20.  2.29.24: Patient self extubated this AM. He is stable on NC Oxygen. Denies CP/SOB. SR on Tele. On Amiodarone Infusion and receiving blood products. BP Stable. Clinically much improved from yesterday.   3.1.24: NAD. Afib mild RVR on tele. On Primacor @ 0.187 mcg/kg/min. Amiodarone infusing per protocol. LFT's worsening. WBC worsening. + Incisional CP. On 5 L NC.   3.2.24: NAD. Net Negative 2700 urine output.   3.3.24: NAD. VSS. No complaints of CP/Palps. Reports SOB is improving. Creat 1.61 (Improved)  3.4.24: NAD. VSS. No complaints. On 2 L. Net Negative Fluid 3540 over 24 hours. Creat 1.37. LFTs slighting worse today  3.5.24: NAD. VSS. Denies CP, SOB and Palps. Family at Bedside. Fluid Balance Net Negative 4360mL. BUN/Crea 23.5/1.16, AST/ALT 69/71  3.6.24: Patient sitting up in bedside chair. Appears SOB. C/o abdominal bloating and gaseous. Reports + BM since surgery. Abdomen distended. Patient nausea and spit up bile colored fluid. + peripheral edema. O2 via NC. Excellent diuresis. Persistent leukocytosis. K+ 3.1, mild transaminitis. Groin Wound cx -- GNR and pseudomonas. CV surgery has signed off.   3.7.24: Patient sitting up in bed. NAD. Denies any pain. Noted SS drainage from impella insertion site. Leukocytosis has resolved. BP marginal at times. Afib, CVR. Good UOP; however weight is increasing.   3.8.24: Patient awake in bed. He has been weaned off. O2. Good diuresis overnight. Mild bump in creatinine-- 1.21 from 1.15 yesterday. Liver enzymes uptrending. VSS.   3.9.24: NAD. Language Barrier/Daughter at Bedside for Translation. Denies CP, SOB and Palps. Deconditioned. Fluid Balance Net Negative 5600mL.   3.10.24: NAD. "[place]." Denies CP, SOB and Palps. Daughter at " Bedside/Translating. Denies CP, SOB and Palps. Remains Deconditions. Fluid Balance Net Negative 2405mL. Na 130, BUN/Crea 20.3/1.22, AST/ALT 88/73  3.11.24: Patient awake in bed. NAD. Reviewed echo-EF 40-45% with mild RV enlargement and severely reduced RV function. Na 129 today. Renal function mildly bumped        PMH: PAF (on Eliquis), Aortic insufficiency, MV CAD, HTN  PSH: [facility]  Family History: None Reported  Social History: Former Smoker, Denies ETOH or Illicit Drug Use    Previous Diagnostics:  TTE 03.11.24:  Limited study to assess function. Left Ventricle: The left ventricle is mildly dilated. Normal wall thickness. There is mildly reduced systolic function with a visually estimated ejection fraction of 40 - 45%.    Left Ventricle: Regional wall motion abnormalities present. Septal motion is consistent with post-operative status. Akinetic inf -posterior wall There is moderately reduced systolic function with a visually estimated ejection fraction of 35 - 40%.    Right Ventricle: Mild right ventricular enlargement. Systolic function is severely reduced.    Aortic Valve: There is a bioprosthetic valve in the aortic position.    Pericardium: There is a trivial effusion. No indication of cardiac tamponade. Left pleural effusion.       CABG/Bio AVR/MOISE Ligation (2.27.24):  Coronary artery bypass grafting X 3, LIMA to LAD, reversed SVG to OM1, Reversed Saphenous venous graft to RCA  Bioprosthetic aortic valve replacement (Epic max 23mm), Endoscopic venous harvesting of left greater saphenous vein, Left atrial appendage ligation, explant of right femoral impella device, right femoral artery repair.    RHC/Impella Placement (2.23.24):  Right heart catheterization performed showed the following:  PA= 61/24 (27) mm Hg  PCWP=  31/26 (27) mm Hg  AO saturation= 93 % RA  PA saturation= 60% with Impella (49% yesterday)    (02/22/24) -  0.5  - Cardiac output was 2.8 L/min provided by the device.  Impella was at  84cm  Impression/plan:   Successful Impella insertion and swan-Chelo in the setting of Acute HF/low cardiac output/precardiogenic shock/Critcal-severe AS/MVCAD - LM distal    RHC (2.22.24):  Right heart catheterization demonstrating severe pulmonary hypertension 84/34 and PCWP 24 mmHg  Reduced cardiac output/cardiac index at 3.43/1.81 L/min/m2 with pulmonary artery saturations 49.3%  For applied to the right femoral vein following removal of the 7 Georgian sheath  The estimated blood loss was none.    Carotid US (2.22.24):  The bilateral internal carotid artery demonstrated less than 50% stenosis.   The bilateral vertebral arteries were patent with antegrade flow    LHC (2.20.24):   Prox LAD lesion was 70% stenosed.  Ost Cx to Prox Cx lesion was 80% stenosed.  1st Mrg lesion was 80% stenosed.  2nd Mrg-1 lesion was 70% stenosed.  2nd Mrg-2 lesion was 50% stenosed.  Mid LAD lesion was 50% stenosed.  Prox RCA lesion was 60% stenosed.  estimated blood loss was <50 mL.  There was three vessel coronary artery disease.  LVEDP: 30mmHg  Recommendations:   Refer for CABG evaluation and AVR   Preformed by Dr. Flannery at [facility]     ECHO (2.15.24):  Left Ventricle: The left ventricle is normal in size. Mildly increased ventricular mass. Mildly increased wall thickness. There is mild eccentric hypertrophy. Moderate global hypokinesis present. Septal motion is consistent with bundle branch block. There is moderately reduced systolic function. Biplane (2D) method of discs ejection fraction is 40%. Grade II diastolic dysfunction.  Right Ventricle: Right ventricular enlargement. Systolic function is normal.  Left Atrium: Left atrium is severely dilated.  Right Atrium: Right atrium is moderately dilated.  Aortic Valve: There is moderate aortic valve sclerosis. Severely restricted motion. There is severe stenosis. Aortic valve area by VTI is 0.66 cm². Aortic valve peak velocity is 4.45 m/s. Mean gradient is 50 mmHg. The dimensionless index is  0.17. There is mild to moderate aortic regurgitation.  Mitral Valve: Mildly calcified leaflets. There is no stenosis. There is mild regurgitation.  Tricuspid Valve: The tricuspid valve is structurally normal. There is mild to moderate regurgitation.  Pulmonic Valve: There is no significant regurgitation.  Aorta: Aortic root is upper limit of normal measuring 3.6 cm.  Pulmonary Artery: There is severe pulmonary hypertension. The estimated pulmonary artery systolic pressure is 69 mmHg.  IVC/SVC: Intermediate venous pressure at 8 mmHg.  Pericardium: There is no pericardial effusion.     Review of Systems   Constitutional: Positive for malaise/fatigue.   Cardiovascular:  Negative for chest pain, dyspnea on exertion and leg swelling.   Respiratory:  Negative for shortness of breath.    All other systems reviewed and are negative.    Objective:     Vital Signs (Most Recent):  Temp: 97.7 °F (36.5 °C) (03/12/24 0810)  Pulse: 84 (03/12/24 0810)  Resp: 18 (03/12/24 0324)  BP: 102/66 (03/12/24 0810)  SpO2: 98 % (03/12/24 0810) Vital Signs (24h Range):  Temp:  [97.4 °F (36.3 °C)-98 °F (36.7 °C)] 97.7 °F (36.5 °C)  Pulse:  [78-87] 84  Resp:  [18] 18  SpO2:  [94 %-98 %] 98 %  BP: ()/(35-66) 102/66   Weight: 80.3 kg (177 lb)  Body mass index is 29.45 kg/m².  SpO2: 98 %       Intake/Output Summary (Last 24 hours) at 3/12/2024 0914  Last data filed at 3/12/2024 0544  Gross per 24 hour   Intake 1624 ml   Output 3500 ml   Net -1876 ml       Lines/Drains/Airways       Drain  Duration                  Urethral Catheter 02/28/24 1445 Coude 20 Fr. 12 days              Peripheral Intravenous Line  Duration                  Peripheral IV - Single Lumen 03/02/24 1053 20 G Anterior;Right Upper Arm 9 days                  Significant Labs:   Recent Results (from the past 72 hour(s))   Comprehensive metabolic panel    Collection Time: 03/10/24  6:11 AM   Result Value Ref Range    Sodium Level 130 (L) 136 - 145 mmol/L    Potassium Level  3.9 3.5 - 5.1 mmol/L    Chloride 91 (L) 98 - 107 mmol/L    Carbon Dioxide 30 23 - 31 mmol/L    Glucose Level 89 82 - 115 mg/dL    Blood Urea Nitrogen 20.3 8.4 - 25.7 mg/dL    Creatinine 1.22 (H) 0.73 - 1.18 mg/dL    Calcium Level Total 8.2 (L) 8.8 - 10.0 mg/dL    Protein Total 5.6 (L) 5.8 - 7.6 gm/dL    Albumin Level 2.5 (L) 3.4 - 4.8 g/dL    Globulin 3.1 2.4 - 3.5 gm/dL    Albumin/Globulin Ratio 0.8 (L) 1.1 - 2.0 ratio    Bilirubin Total 1.8 (H) <=1.5 mg/dL    Alkaline Phosphatase 114 40 - 150 unit/L    Alanine Aminotransferase 73 (H) 0 - 55 unit/L    Aspartate Aminotransferase 88 (H) 5 - 34 unit/L    eGFR >60 mls/min/1.73/m2   Magnesium    Collection Time: 03/10/24  6:11 AM   Result Value Ref Range    Magnesium Level 1.90 1.60 - 2.60 mg/dL   CBC with Differential    Collection Time: 03/10/24  6:11 AM   Result Value Ref Range    WBC 14.93 (H) 4.50 - 11.50 x10(3)/mcL    RBC 4.41 (L) 4.70 - 6.10 x10(6)/mcL    Hgb 11.9 (L) 14.0 - 18.0 g/dL    Hct 37.4 (L) 42.0 - 52.0 %    MCV 84.8 80.0 - 94.0 fL    MCH 27.0 27.0 - 31.0 pg    MCHC 31.8 (L) 33.0 - 36.0 g/dL    RDW 15.5 11.5 - 17.0 %    Platelet 552 (H) 130 - 400 x10(3)/mcL    MPV 11.1 (H) 7.4 - 10.4 fL    Neut % 73.2 %    Lymph % 10.4 %    Mono % 11.7 %    Eos % 2.1 %    Basophil % 0.5 %    Lymph # 1.56 0.6 - 4.6 x10(3)/mcL    Neut # 10.92 (H) 2.1 - 9.2 x10(3)/mcL    Mono # 1.75 (H) 0.1 - 1.3 x10(3)/mcL    Eos # 0.31 0 - 0.9 x10(3)/mcL    Baso # 0.07 <=0.2 x10(3)/mcL    IG# 0.32 (H) 0 - 0.04 x10(3)/mcL    IG% 2.1 %    NRBC% 0.0 %   Comprehensive metabolic panel    Collection Time: 03/11/24  4:45 AM   Result Value Ref Range    Sodium Level 128 (L) 136 - 145 mmol/L    Potassium Level 4.1 3.5 - 5.1 mmol/L    Chloride 91 (L) 98 - 107 mmol/L    Carbon Dioxide 26 23 - 31 mmol/L    Glucose Level 112 82 - 115 mg/dL    Blood Urea Nitrogen 22.8 8.4 - 25.7 mg/dL    Creatinine 1.30 (H) 0.73 - 1.18 mg/dL    Calcium Level Total 8.1 (L) 8.8 - 10.0 mg/dL    Protein Total 5.9 5.8 - 7.6  gm/dL    Albumin Level 2.5 (L) 3.4 - 4.8 g/dL    Globulin 3.4 2.4 - 3.5 gm/dL    Albumin/Globulin Ratio 0.7 (L) 1.1 - 2.0 ratio    Bilirubin Total 1.6 (H) <=1.5 mg/dL    Alkaline Phosphatase 116 40 - 150 unit/L    Alanine Aminotransferase 71 (H) 0 - 55 unit/L    Aspartate Aminotransferase 78 (H) 5 - 34 unit/L    eGFR 57 mls/min/1.73/m2   Magnesium    Collection Time: 03/11/24  4:45 AM   Result Value Ref Range    Magnesium Level 2.00 1.60 - 2.60 mg/dL   Cortisol    Collection Time: 03/11/24  4:45 AM   Result Value Ref Range    Cortisol Level 16.7 ug/dL   TSH    Collection Time: 03/11/24  4:45 AM   Result Value Ref Range    TSH 1.802 0.350 - 4.940 uIU/mL   CBC with Differential    Collection Time: 03/11/24  4:46 AM   Result Value Ref Range    WBC 15.28 (H) 4.50 - 11.50 x10(3)/mcL    RBC 4.35 (L) 4.70 - 6.10 x10(6)/mcL    Hgb 11.8 (L) 14.0 - 18.0 g/dL    Hct 36.6 (L) 42.0 - 52.0 %    MCV 84.1 80.0 - 94.0 fL    MCH 27.1 27.0 - 31.0 pg    MCHC 32.2 (L) 33.0 - 36.0 g/dL    RDW 15.4 11.5 - 17.0 %    Platelet 633 (H) 130 - 400 x10(3)/mcL    MPV 10.4 7.4 - 10.4 fL    Neut % 76.1 %    Lymph % 8.4 %    Mono % 11.8 %    Eos % 1.4 %    Basophil % 0.4 %    Lymph # 1.29 0.6 - 4.6 x10(3)/mcL    Neut # 11.62 (H) 2.1 - 9.2 x10(3)/mcL    Mono # 1.80 (H) 0.1 - 1.3 x10(3)/mcL    Eos # 0.22 0 - 0.9 x10(3)/mcL    Baso # 0.06 <=0.2 x10(3)/mcL    IG# 0.29 (H) 0 - 0.04 x10(3)/mcL    IG% 1.9 %    NRBC% 0.0 %   Echo Saline Bubble? No    Collection Time: 03/11/24 12:28 PM   Result Value Ref Range    Celis's Biplane MOD Ejection Fraction 40 %    LVOT stroke volume 26.96 cm3    LVIDd 5.60 3.5 - 6.0 cm    LV Systolic Volume 83.10 mL    LV Systolic Volume Index 43.1 mL/m2    LVIDs 4.30 (A) 2.1 - 4.0 cm    LV Diastolic Volume 154.00 mL    LV Diastolic Volume Index 79.79 mL/m2    IVS 1.20 (A) 0.6 - 1.1 cm    LVOT diameter 1.80 cm    LVOT area 2.5 cm2    FS 23 (A) 28 - 44 %    Left Ventricle Relative Wall Thickness 0.39 cm    Posterior Wall 1.10 0.6  - 1.1 cm    LV mass 264.70 g    LV Mass Index 137 g/m2    LVOT peak noé 0.89 m/s    Left Ventricular Outflow Tract Mean Velocity 0.59 cm/s    Left Ventricular Outflow Tract Mean Gradient 2.00 mmHg    AV mean gradient 16 mmHg    AV peak gradient 31 mmHg    Ao peak noé 2.80 m/s    Ao VTI 45.40 cm    LVOT peak VTI 10.60 cm    AV valve area 0.59 cm²    AV Velocity Ratio 0.32     AV index (prosthetic) 0.23     MACY by Velocity Ratio 0.81 cm²    ZLVIDS 1.94     ZLVIDD 0.25    Osmolality, Serum    Collection Time: 03/11/24  4:37 PM   Result Value Ref Range    Osmolality 276 (L) 280 - 300 mOsm/kg   Comprehensive metabolic panel    Collection Time: 03/12/24  4:49 AM   Result Value Ref Range    Sodium Level 129 (L) 136 - 145 mmol/L    Potassium Level 4.2 3.5 - 5.1 mmol/L    Chloride 94 (L) 98 - 107 mmol/L    Carbon Dioxide 25 23 - 31 mmol/L    Glucose Level 99 82 - 115 mg/dL    Blood Urea Nitrogen 24.7 8.4 - 25.7 mg/dL    Creatinine 1.33 (H) 0.73 - 1.18 mg/dL    Calcium Level Total 8.1 (L) 8.8 - 10.0 mg/dL    Protein Total 5.8 5.8 - 7.6 gm/dL    Albumin Level 2.5 (L) 3.4 - 4.8 g/dL    Globulin 3.3 2.4 - 3.5 gm/dL    Albumin/Globulin Ratio 0.8 (L) 1.1 - 2.0 ratio    Bilirubin Total 1.6 (H) <=1.5 mg/dL    Alkaline Phosphatase 107 40 - 150 unit/L    Alanine Aminotransferase 67 (H) 0 - 55 unit/L    Aspartate Aminotransferase 74 (H) 5 - 34 unit/L    eGFR 55 mls/min/1.73/m2   CBC with Differential    Collection Time: 03/12/24  4:49 AM   Result Value Ref Range    WBC 14.95 (H) 4.50 - 11.50 x10(3)/mcL    RBC 4.48 (L) 4.70 - 6.10 x10(6)/mcL    Hgb 12.1 (L) 14.0 - 18.0 g/dL    Hct 37.8 (L) 42.0 - 52.0 %    MCV 84.4 80.0 - 94.0 fL    MCH 27.0 27.0 - 31.0 pg    MCHC 32.0 (L) 33.0 - 36.0 g/dL    RDW 15.4 11.5 - 17.0 %    Platelet 590 (H) 130 - 400 x10(3)/mcL    MPV 10.2 7.4 - 10.4 fL    Neut % 72.5 %    Lymph % 10.8 %    Mono % 11.7 %    Eos % 2.3 %    Basophil % 0.5 %    Lymph # 1.62 0.6 - 4.6 x10(3)/mcL    Neut # 10.82 (H) 2.1 - 9.2  x10(3)/mcL    Mono # 1.75 (H) 0.1 - 1.3 x10(3)/mcL    Eos # 0.35 0 - 0.9 x10(3)/mcL    Baso # 0.08 <=0.2 x10(3)/mcL    IG# 0.33 (H) 0 - 0.04 x10(3)/mcL    IG% 2.2 %    NRBC% 0.0 %     Telemetry: PAF/CVR    Physical Exam  Vitals reviewed.   Constitutional:       General: He is not in acute distress.     Appearance: Normal appearance.   HENT:      Head: Normocephalic.      Mouth/Throat:      Mouth: Mucous membranes are moist.   Eyes:      Extraocular Movements: Extraocular movements intact.      Conjunctiva/sclera: Conjunctivae normal.   Cardiovascular:      Rate and Rhythm: Normal rate. Rhythm irregular.      Pulses: Normal pulses.      Heart sounds: No murmur heard.  Pulmonary:      Effort: Pulmonary effort is normal. No respiratory distress.      Breath sounds: Normal breath sounds.      Comments: NC O2  Abdominal:      Palpations: Abdomen is soft.   Genitourinary:     Comments: Urinary Catheter   Skin:     General: Skin is warm.      Comments: Midline Sternotomy YVETTE with No Sign of Bleed/Infection. Right Lower Abdominal Site Oozing Serosanguinous Fluid, Improving. Dressing Intact.   Neurological:      General: No focal deficit present.      Mental Status: He is alert.   Psychiatric:         Mood and Affect: Mood normal.       Current Inpatient Medications:  Current Facility-Administered Medications:     acetaminophen oral solution 650 mg, 650 mg, Per OG tube, Q6H PRN, Vasile Carter PA-C    acetaminophen tablet 650 mg, 650 mg, Oral, Q6H PRN, Pablo Yepez MD, 650 mg at 03/11/24 2230    albumin human 5% bottle 12.5 g, 12.5 g, Intravenous, PRN, Vasile Carter PA-C, Stopped at 02/28/24 1442    allopurinol split tablet 50 mg, 50 mg, Oral, Daily, Tanner Rdz MD, 50 mg at 03/12/24 0833    amiodarone tablet 200 mg, 200 mg, Oral, Daily, Lexy Palomares FNP, 200 mg at 03/12/24 0833    aspirin EC tablet 81 mg, 81 mg, Oral, Daily, Vasile Carter PA-C, 81 mg at 03/12/24 0833    atorvastatin tablet 40  mg, 40 mg, Oral, QHS, Tanner Rdz MD, 40 mg at 03/11/24 2142    ceFEPIme (MAXIPIME) 2 g in dextrose 5 % in water (D5W) 100 mL IVPB (MB+), 2 g, Intravenous, Q12H, Elieser Pace MD, Stopped at 03/12/24 0037    dextrose 10% bolus 125 mL 125 mL, 12.5 g, Intravenous, PRN, Pablo Yepez MD    dextrose 10% bolus 250 mL 250 mL, 25 g, Intravenous, PRN, Pablo Yepez MD    electrolyte-A infusion, , Intravenous, PRN, Pablo Yepez MD, Stopped at 02/28/24 0700    enoxaparin injection 40 mg, 40 mg, Subcutaneous, Daily, Cherry Suarez, FNP, 40 mg at 03/11/24 1744    ferrous sulfate tablet 1 each, 1 tablet, Oral, Every other day, Tanner Rdz MD, 1 each at 03/12/24 0833    folic acid tablet 1 mg, 1 mg, Oral, Daily, Vasile Carter PA-C, 1 mg at 03/12/24 0832    furosemide tablet 20 mg, 20 mg, Oral, BID, Lexy Palomares, FNP, 20 mg at 03/12/24 0832    heparin, porcine (PF) 100 unit/mL injection flush 500 Units, 5 mL, Intravenous, On Call Procedure, Pablo Yepez MD    heparin, porcine (PF) 100 unit/mL injection flush 500 Units, 5 mL, Intravenous, On Call Procedure, Nita Contreras MD    HYDROcodone-acetaminophen 5-325 mg per tablet 1 tablet, 1 tablet, Oral, Q6H PRN, Stan Lazar MD, 1 tablet at 03/10/24 1628    lactulose 10 gram/15 ml solution 20 g, 20 g, Oral, Q6H PRN, Vasile Carter PA-C    loperamide capsule 2 mg, 2 mg, Oral, Continuous PRN, Vasile Carter PA-C, 2 mg at 03/02/24 1253    melatonin tablet 6 mg, 6 mg, Oral, Nightly PRN, Tanner Rdz MD, 6 mg at 03/11/24 2142    metoclopramide injection 5 mg, 5 mg, Intravenous, Q6H PRN, Vasile Carter, JENN    metoprolol succinate (TOPROL-XL) 24 hr split tablet 12.5 mg, 12.5 mg, Oral, Daily, Cherry Suarez FNP, 12.5 mg at 03/12/24 0833    nitroGLYCERIN SL tablet 0.4 mg, 0.4 mg, Sublingual, Q5 Min PRN, Tanner Rdz MD    ondansetron disintegrating tablet 8 mg, 8 mg, Oral, Q8H PRN, Kendell  Tanner AVILA MD, 8 mg at 03/11/24 1253    ondansetron injection 8 mg, 8 mg, Intravenous, Q6H PRN, Pablo Yepez MD, 8 mg at 03/10/24 1330    pantoprazole EC tablet 40 mg, 40 mg, Oral, Daily, Tanner Rdz MD, 40 mg at 03/12/24 0832    simethicone chewable tablet 80 mg, 1 tablet, Oral, TID PRN, Tanner Rdz MD, 80 mg at 03/11/24 1253    sodium chloride 0.9% flush 10 mL, 10 mL, Intravenous, PRN, Tanner Rdz MD    sodium chloride 0.9% flush 10 mL, 10 mL, Intravenous, PRN, Millie Jimenez NP    sucralfate tablet 1 g, 1 g, Oral, QID (AC & HS), Vasile Carter PA-C, 1 g at 03/12/24 0609  VTE Risk Mitigation (From admission, onward)           Ordered     enoxaparin injection 40 mg  Daily         03/07/24 0559     IP VTE HIGH RISK PATIENT  Once         03/02/24 0759     heparin, porcine (PF) 100 unit/mL injection flush 500 Units  On Call Procedure         02/27/24 0718     heparin, porcine (PF) 100 unit/mL injection flush 500 Units  On Call Procedure         02/27/24 0257     Place SUSIE hose  Until discontinued         02/22/24 1458     Place sequential compression device  Until discontinued         02/21/24 2057                  Assessment:   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 (2.19.24): pLAD lesion 70%, oLCx 80%, OM1 80%, OM2 1 lesion 70% 2nd lesion 50%, mLAD 50%, pRCA 60%  VHD/AS     - Status Post Bio AVR (Epic max 23mm) (2.27.24)    - ECHO (2.16.24): Aortic Valve: There is moderate aortic valve sclerosis. Severely restricted motion. There is severe stenosis. Aortic  valve area by VTI is 0.66 cm². Aortic valve peak velocity is 4.45 m/s. Mean gradient is 50 mmHg. The dimensionless index is 0.17.   Cardiogenic Shock (Post Cardiotomy) - Off Vasopressor Support - Resolved     - History of Hypertension   Ischemic Cardiomyopathy/EF 30%  Elevated LFTs - Improving   Pulmonary HTN    - ECHO PASP 69mmHg     - RHC (2.22.24): PA 84/34, PCWP 24 mmHg  Hematuria 2/2 Traumatic/Difficult Indwelling Catheter Placement (Resolved)  Urethral Stricture s/p Dilatation 2.23.24  PAF - Currently CVR    - Status Post Bedside Cardioversion x 2 on 2.27.24 (Both Unsuccessful)    - Status Post MOISE Ligation (2.27.24)    - CHADsVASc - 5 Points - 7.2% Stroke Risk per Year   Left Bundle Branch Block   VHD    - ECHO (3.7.24): Bio AVR, Mild MR    - ECHO (2.16.24): Severe AS, mild MR, mild to moderate TR  JEAN-CLAUDE/CKD Stage II - Improved     - Baseline Cr 1.6  Anemia- stable    - Status Post Transfusion on 2.28.24 & 2.29.24  Thrombocytopenia - Resolved   Leukocytosis - Persistent    - Groin Wound Culture: Serratia Marcescens and Pseudomonas Aeruginosa   Hypokalemia - Resolved  Hyponatremia - Worsening     Plan:   Continue IV Abx per Primary Team   Continue ASA, Amiodarone, Statin, BB  Monitor LFTs - May need to stop Statin if they Increases   No ACEi/ARB/ARNI or Aldactone 2/2 JEAN-CLADUE (Add Back when Renal Indices Improve and BP Allows)  Continue IV Lasix 20mg po BID   Accurate I&Os and Daily Weights   Keep K > 4.0 and Mg  > 2.0  Aggressive Mobilization of PT and Q1HR IS (PT/OT Eval and Treat)  Consult Case Management for Rehab Services   Labs in AM: CBC, CMP and Mg    BLANQUITA Perry  Cardiology  [facility]   03/12/2024

## 2024-03-12 NOTE — PROGRESS NOTES
Infectious Diseases Progress Note  78-year-old French-speaking male with past medical history of HTN, CKD CAD, aortic valvular heart disease, AFib on Eliquis, is admitted to [facility] on 02/21/2024, presenting as a transfer from [facility] for CABG.  He was noted to have NSTEMI, had to be admitted to the ICU with ampulla placement on 02/23, He is status post CABG with bioprosthetic AVR on 02/27, self-extubated on 02/29 with chest tube discontinued  and downgraded from ICU on 03/02.  He has been without fevers but with progressively worsening leukocytosis WBC 12.09 on 03/08 and 15.28 today 3/11.  He is also noted to have acute kidney injury with creatinine up to 2.03 on 03/01, elevated transaminases AST up to 124 and ALT 66 on 10/1, anemic with low albumin.  Right groin wound culture from 03/03 with many Serratia and many Pseudomonas.  3/6 CT scan of the abdomen and pelvis with interval worsening of bilateral pleural effusions and atelectasis with left-sided pleural effusion large, interval development of a pericardial effusion, cardiomegaly, urinary bladder decompressed with some inflammatory changes seen pelvis with could be related to cystitis to be correlated with urinalysis, findings consistent with recent procedural the right groin vision to be catheterization with a hematoma seen in the right inguinal and suprarenal region as a small-to-moderate in size, most trending seen extending towards the aortic bifurcation with follow up recommended the bifurcation of the hematoma.  A follow-up CT scan of the abdomen and pelvis on 3/9 showing edema and small hematoma about the right groin appearance similar to prior exam with decreased fat stranding about the aortic bifurcation and interval, left pleural effusion compressive atelectasis, cardiomegaly pericardial effusion noted, colonic diverticulosis without evidence of diverticulitis. He was on vancomycin and Cipro which have been discontinued.    He is on cefepime.    Subjective:  No new complaints, no fevers, doing about the same.  Lying in bed in no acute distress      Past Medical History:   Diagnosis Date    A-fib     Aortic insufficiency     CAD (coronary artery disease)     Hypertension      Past Surgical History:   Procedure Laterality Date    AORTIC VALVE REPLACEMENT N/A 2/27/2024    Procedure: Replacement-valve-aortic;  Surgeon: Nita Contreras MD;  Location: [facility];  Service: Cardiothoracic;  Laterality: N/A;    CORONARY ANGIOGRAPHY N/A 2/20/2024    Procedure: ANGIOGRAM, CORONARY ARTERY;  Surgeon: Vasile Callahan MD;  Location: [facility] CATH LAB;  Service: Cardiology;  Laterality: N/A;    CORONARY ARTERY BYPASS GRAFT (CABG) N/A 2/27/2024    Procedure: CORONARY ARTERY BYPASS GRAFT (CABG);  Surgeon: Nita Contreras MD;  Location: [facility];  Service: Cardiothoracic;  Laterality: N/A;    ENDOSCOPIC HARVEST OF VEIN N/A 2/27/2024    Procedure: SURGICAL PROCUREMENT, VEIN, ENDOSCOPIC;  Surgeon: Nita Contreras MD;  Location: [facility];  Service: Cardiothoracic;  Laterality: N/A;    IMPELLA, REMOVAL Right 2/27/2024    Procedure: Impella, Removal;  Surgeon: Nita Contreras MD;  Location: [facility];  Service: Cardiothoracic;  Laterality: Right;    INSERTION OF INTRAVASCULAR MICROAXIAL BLOOD PUMP N/A 2/23/2024    Procedure: INSERTION, IMPELLA;  Surgeon: All Montoya MD;  Location: [facility] CATH LAB;  Service: Cardiology;  Laterality: N/A;  with swanz    RIGHT HEART CATHETERIZATION N/A 2/22/2024    Procedure: INSERTION, CATHETER, RIGHT HEART;  Surgeon: Shreya Lomax MD;  Location: [facility] CATH LAB;  Service: Cardiology;  Laterality: N/A;     Social History     Socioeconomic History    Marital status:    Tobacco Use    Smoking status: Former     Types: Cigarettes    Smokeless tobacco: Never   Substance and Sexual Activity    Alcohol use: Not Currently    Drug use: Not Currently     Social Determinants of Health     Financial  Resource Strain: Low Risk  (2/16/2024)    Overall Financial Resource Strain (CARDIA)     Difficulty of Paying Living Expenses: Not hard at all   Food Insecurity: No Food Insecurity (2/16/2024)    Hunger Vital Sign     Worried About Running Out of Food in the Last Year: Never true     Ran Out of Food in the Last Year: Never true   Transportation Needs: No Transportation Needs (2/16/2024)    PRAPARE - Transportation     Lack of Transportation (Medical): No     Lack of Transportation (Non-Medical): No   Physical Activity: Inactive (2/16/2024)    Exercise Vital Sign     Days of Exercise per Week: 0 days     Minutes of Exercise per Session: 0 min   Stress: Stress Concern Present (2/16/2024)    [place] [employer] - Occupational Stress Questionnaire     Feeling of Stress : To some extent   Social Connections: Socially Integrated (2/22/2024)    Social Connection and Isolation Panel [NHANES]     Frequency of Communication with Friends and Family: Twice a week     Frequency of Social Gatherings with Friends and Family: Twice a week     Attends Confucianism Services: 1 to 4 times per year     Active Member of Clubs or Organizations: Yes     Attends Club or Organization Meetings: 1 to 4 times per year     Marital Status:    Housing Stability: Low Risk  (2/16/2024)    Housing Stability Vital Sign     Unable to Pay for Housing in the Last Year: No     Number of Places Lived in the Last Year: 1     Unstable Housing in the Last Year: No       ROS  Constitutional:  Positive for malaise/fatigue.   HENT: Negative.     Respiratory: Negative.     Gastrointestinal: Negative.    Genitourinary: Negative.    Musculoskeletal: Negative.    Neurological:  Positive for weakness.   Endo/Heme/Allergies: Negative.    Psychiatric/Behavioral: Negative.     All other Systems review done and negative.    Review of patient's allergies indicates:  No Known Allergies      Scheduled Meds:   allopurinoL  50 mg Oral Daily     "amiodarone  200 mg Oral Daily    aspirin  81 mg Oral Daily    atorvastatin  40 mg Oral QHS    ceFEPime IV (PEDS and ADULTS)  2 g Intravenous Q12H    enoxparin  40 mg Subcutaneous Daily    ferrous sulfate  1 tablet Oral Every other day    folic acid  1 mg Oral Daily    metoprolol succinate  12.5 mg Oral Daily    pantoprazole  40 mg Oral Daily    sucralfate  1 g Oral QID (AC & HS)     Continuous Infusions:   loperamide       PRN Meds:acetaminophen, acetaminophen, albumin human 5%, dextrose 10%, dextrose 10%, electrolyte-A, heparin, porcine (PF), heparin, porcine (PF), HYDROcodone-acetaminophen, lactulose 10 gram/15 ml, loperamide, melatonin, metoclopramide, nitroGLYCERIN, ondansetron, ondansetron, simethicone, sodium chloride 0.9%, sodium chloride 0.9%    Objective:  BP 99/64   Pulse (!) 186   Temp 97.7 °F (36.5 °C) (Oral)   Resp 18   Ht 5' 5" (1.651 m)   Wt 80.3 kg (177 lb)   SpO2 100%   BMI 29.45 kg/m²     Physical Exam:   Physical Exam  Vitals reviewed.   Constitutional:       General: He is not in acute distress.     Appearance: He is not toxic-appearing.   HENT:      Head: Normocephalic and atraumatic.   Cardiovascular:      Rate and Rhythm: Normal rate and regular rhythm.      Heart sounds: Normal heart sounds.   Pulmonary:      Effort: Pulmonary effort is normal. No respiratory distress.      Breath sounds: Normal breath sounds.   Abdominal:      General: Bowel sounds are normal. There is no distension.      Palpations: Abdomen is soft.      Tenderness: There is no abdominal tenderness.   Musculoskeletal:         General: No deformity.      Cervical back: Neck supple.      Right lower leg: No edema.      Left lower leg: No edema.   Skin:     Findings: No erythema or rash.      Comments: Right groin wound dressed.  Neurological:      Mental Status: He is alert and oriented to person, place, and time.   Psychiatric:      Comments: Calm and cooperative      Imaging       Lab Review   Recent Results (from " the past 24 hour(s))   Osmolality, Serum    Collection Time: 03/11/24  4:37 PM   Result Value Ref Range    Osmolality 276 (L) 280 - 300 mOsm/kg   Comprehensive metabolic panel    Collection Time: 03/12/24  4:49 AM   Result Value Ref Range    Sodium Level 129 (L) 136 - 145 mmol/L    Potassium Level 4.2 3.5 - 5.1 mmol/L    Chloride 94 (L) 98 - 107 mmol/L    Carbon Dioxide 25 23 - 31 mmol/L    Glucose Level 99 82 - 115 mg/dL    Blood Urea Nitrogen 24.7 8.4 - 25.7 mg/dL    Creatinine 1.33 (H) 0.73 - 1.18 mg/dL    Calcium Level Total 8.1 (L) 8.8 - 10.0 mg/dL    Protein Total 5.8 5.8 - 7.6 gm/dL    Albumin Level 2.5 (L) 3.4 - 4.8 g/dL    Globulin 3.3 2.4 - 3.5 gm/dL    Albumin/Globulin Ratio 0.8 (L) 1.1 - 2.0 ratio    Bilirubin Total 1.6 (H) <=1.5 mg/dL    Alkaline Phosphatase 107 40 - 150 unit/L    Alanine Aminotransferase 67 (H) 0 - 55 unit/L    Aspartate Aminotransferase 74 (H) 5 - 34 unit/L    eGFR 55 mls/min/1.73/m2   CBC with Differential    Collection Time: 03/12/24  4:49 AM   Result Value Ref Range    WBC 14.95 (H) 4.50 - 11.50 x10(3)/mcL    RBC 4.48 (L) 4.70 - 6.10 x10(6)/mcL    Hgb 12.1 (L) 14.0 - 18.0 g/dL    Hct 37.8 (L) 42.0 - 52.0 %    MCV 84.4 80.0 - 94.0 fL    MCH 27.0 27.0 - 31.0 pg    MCHC 32.0 (L) 33.0 - 36.0 g/dL    RDW 15.4 11.5 - 17.0 %    Platelet 590 (H) 130 - 400 x10(3)/mcL    MPV 10.2 7.4 - 10.4 fL    Neut % 72.5 %    Lymph % 10.8 %    Mono % 11.7 %    Eos % 2.3 %    Basophil % 0.5 %    Lymph # 1.62 0.6 - 4.6 x10(3)/mcL    Neut # 10.82 (H) 2.1 - 9.2 x10(3)/mcL    Mono # 1.75 (H) 0.1 - 1.3 x10(3)/mcL    Eos # 0.35 0 - 0.9 x10(3)/mcL    Baso # 0.08 <=0.2 x10(3)/mcL    IG# 0.33 (H) 0 - 0.04 x10(3)/mcL    IG% 2.2 %    NRBC% 0.0 %             Assessment/Plan:  1. SIRS with leukocytosis  2. Polymicrobial right groin wound infection-Pseudomonas and Serratia   3. Right lower abdominal/groin hematoma  4.  CAD/VHD s/p CABG/AVR  5.  Acute kidney injury/chronic kidney disease   6. HFrEF / pleural and  pericardial effusions  7. Anemia  8.  Protein calorie malnutrition      -Continue cefepime, plan about a 14 day course of antibiotics coverage for Pseudomonas and Serratia  -No fevers and leukocytosis trending down, follow  -3/3 groin wound culture with many Pseudomonas and many Serratia isolates   -3/9 CT abdomen and pelvis results noted  -Renal impairment noted with slightly worse creatinine, nephrology on board, follow  -Seen by pulmonology, Urology, Cardiology, CT surgery with inputs noted  -Discussed with patient, daughter and nursing

## 2024-03-12 NOTE — PROGRESS NOTES
"Patient Name: Brain Snyder   MRN: [id number]   Admission Date: 2/21/2024   Hospital Length of Stay: 20   Attending Provider: Stan Lazar MD   Consulting Provider: Kris Matt M.D.  Reason for Consult: Goals of Care  Primary Care Physician: Nidia Shepherd NP     Principal Problem: CAD (coronary artery disease)     Patient information was obtained from patient, relative(s), and ER records.      Final diagnoses:  [R07.9] Chest pain     Assessment/Plan:     I met with the patient and his wife and had his daughter, Brii, by phone along with the . He was willing to discuss goals of care in light of his known heart disease s/p CABG. I explained that his condition is stable and that we hope that he will continue to improve. I verified that he wishes to transfer to a skilled rehab to continue aggressive skilled therapy. I asked if they understood the difficulty with placement and that they will likely need to provide some citizenship documentation in order to move forward with placement.  His daughter said that she is working with  to get a "card" for payment. I suspect that he is medicaid pending. I asked if the patient had any uncontrolled symptoms. He denied any dyspnea, pain or other symptoms.    I asked his daughter If the patient has ever discussed with family any future medical goals or wishes. She said that he has not.    I reviewed code status with him. I reviewed the risks associated with aggressive resuscitation measures in the case of a cardiopulmonary arrest setting. The patient responded that he usually lets his daughters decide these things for him. However, he said that he wishes to put his rafal in God and let the doctors do whatever is necessary to treat him. I asked for clarification that despite the risks noted, he would prefer to remain a full code status. He was not clear on his answer. His daughter stated that she understood and that she would meet with her sister and talk " this over further with her parents at home then get back to us.  He will remain a full code for now.    We reviewed the patient's current clinical status with the nurse. We reviewed clinical documentation, labs and imaging.       History of Present Illness:     77 y/o M h/o CKDIIIb, AF, severe AS s/p AVR 2/16/24, global hypokinesis, systolic CHF EF 30%, severe pulmonary hypertension, currently admitted with NSTEMI s/p CABG on 2/27/24, urethral stricture with catheter, AF with RVR and required cardioversion x 2 after CABG, right groin purulent cellulitis with growth of pseudomonas and serratia on antibiotics. He also has a large Left pleural effusion, felt by pulmonologist to be stable. We were consulted to review goals of care with patient and family.      Active Ambulatory Problems     Diagnosis Date Noted    Mixed hyperlipidemia 02/15/2024    Cardiomyopathy 02/15/2024    Aortic valve regurgitation 02/15/2024    Chest pain 02/15/2024    CAD (coronary artery disease) 02/15/2024    Hypertension 02/15/2024    NSTEMI (non-ST elevated myocardial infarction) 02/22/2024    Atrial fibrillation 02/22/2024     Resolved Ambulatory Problems     Diagnosis Date Noted    No Resolved Ambulatory Problems     Past Medical History:   Diagnosis Date    A-fib     Aortic insufficiency         Past Surgical History:   Procedure Laterality Date    AORTIC VALVE REPLACEMENT N/A 2/27/2024    Procedure: Replacement-valve-aortic;  Surgeon: Nita Contreras MD;  Location: [facility];  Service: Cardiothoracic;  Laterality: N/A;    CORONARY ANGIOGRAPHY N/A 2/20/2024    Procedure: ANGIOGRAM, CORONARY ARTERY;  Surgeon: Vasile Callahan MD;  Location: [facility] CATH LAB;  Service: Cardiology;  Laterality: N/A;    CORONARY ARTERY BYPASS GRAFT (CABG) N/A 2/27/2024    Procedure: CORONARY ARTERY BYPASS GRAFT (CABG);  Surgeon: Nita Contreras MD;  Location: [facility] OR;  Service: Cardiothoracic;  Laterality: N/A;    ENDOSCOPIC HARVEST OF VEIN N/A  2/27/2024    Procedure: SURGICAL PROCUREMENT, VEIN, ENDOSCOPIC;  Surgeon: Nita Contreras MD;  Location: [facility] OR;  Service: Cardiothoracic;  Laterality: N/A;    IMPELLA, REMOVAL Right 2/27/2024    Procedure: Impella, Removal;  Surgeon: Nita Contreras MD;  Location: [facility] OR;  Service: Cardiothoracic;  Laterality: Right;    INSERTION OF INTRAVASCULAR MICROAXIAL BLOOD PUMP N/A 2/23/2024    Procedure: INSERTION, IMPELLA;  Surgeon: All Montoya MD;  Location: [facility] CATH LAB;  Service: Cardiology;  Laterality: N/A;  with swanz    RIGHT HEART CATHETERIZATION N/A 2/22/2024    Procedure: INSERTION, CATHETER, RIGHT HEART;  Surgeon: Shreya Lomax MD;  Location: [facility] CATH LAB;  Service: Cardiology;  Laterality: N/A;        Review of patient's allergies indicates:  No Known Allergies       Current Facility-Administered Medications:     acetaminophen oral solution 650 mg, 650 mg, Per OG tube, Q6H PRN, Vasile Carter PA-C, 650 mg at 03/12/24 1217    acetaminophen tablet 650 mg, 650 mg, Oral, Q6H PRN, Pablo Yepez MD, 650 mg at 03/11/24 2230    albumin human 5% bottle 12.5 g, 12.5 g, Intravenous, PRN, Vasile Carter PA-C, Stopped at 02/28/24 1442    allopurinol split tablet 50 mg, 50 mg, Oral, Daily, Tanner Rdz MD, 50 mg at 03/12/24 0833    amiodarone tablet 200 mg, 200 mg, Oral, Daily, Lexy Palomares FNP, 200 mg at 03/12/24 0833    aspirin EC tablet 81 mg, 81 mg, Oral, Daily, Vasile Carter PA-C, 81 mg at 03/12/24 0833    atorvastatin tablet 40 mg, 40 mg, Oral, QHS, Tanner Rdz MD, 40 mg at 03/11/24 2142    ceFEPIme (MAXIPIME) 2 g in dextrose 5 % in water (D5W) 100 mL IVPB (MB+), 2 g, Intravenous, Q12H, Elieser Pace MD, Stopped at 03/12/24 1253    dextrose 10% bolus 125 mL 125 mL, 12.5 g, Intravenous, PRN, Pablo Yepez MD    dextrose 10% bolus 250 mL 250 mL, 25 g, Intravenous, PRN, Pablo Yepez MD    electrolyte-A infusion, , Intravenous, PRN, Lucho,  Pablo MÁRQUEZ MD, Stopped at 02/28/24 0700    enoxaparin injection 40 mg, 40 mg, Subcutaneous, Daily, Cherry Suarez FNP, 40 mg at 03/11/24 1744    ferrous sulfate tablet 1 each, 1 tablet, Oral, Every other day, Tanner Rdz MD, 1 each at 03/12/24 0833    folic acid tablet 1 mg, 1 mg, Oral, Daily, Vasile Carter PA-C, 1 mg at 03/12/24 0832    heparin, porcine (PF) 100 unit/mL injection flush 500 Units, 5 mL, Intravenous, On Call Procedure, Pablo Yepez MD    heparin, porcine (PF) 100 unit/mL injection flush 500 Units, 5 mL, Intravenous, On Call Procedure, Nita Contreras MD    HYDROcodone-acetaminophen 5-325 mg per tablet 1 tablet, 1 tablet, Oral, Q6H PRN, Stan Lazar MD, 1 tablet at 03/10/24 1628    lactulose 10 gram/15 ml solution 20 g, 20 g, Oral, Q6H PRN, Vasile Carter PA-C    loperamide capsule 2 mg, 2 mg, Oral, Continuous PRN, Vasile Carter PA-C, 2 mg at 03/02/24 1253    melatonin tablet 6 mg, 6 mg, Oral, Nightly PRN, Tanner Rdz MD, 6 mg at 03/11/24 2142    metoclopramide injection 5 mg, 5 mg, Intravenous, Q6H PRN, Vasile Carter PA-C    metoprolol succinate (TOPROL-XL) 24 hr split tablet 12.5 mg, 12.5 mg, Oral, Daily, Cherry Suarez FNP, 12.5 mg at 03/12/24 0833    nitroGLYCERIN SL tablet 0.4 mg, 0.4 mg, Sublingual, Q5 Min PRN, Tanner Rdz MD    ondansetron disintegrating tablet 8 mg, 8 mg, Oral, Q8H PRN, Tanner Rdz MD, 8 mg at 03/11/24 1253    ondansetron injection 8 mg, 8 mg, Intravenous, Q6H PRN, Pablo Yepez MD, 8 mg at 03/10/24 1330    pantoprazole EC tablet 40 mg, 40 mg, Oral, Daily, Tanner Rdz MD, 40 mg at 03/12/24 0832    simethicone chewable tablet 80 mg, 1 tablet, Oral, TID PRN, Tanner Rdz MD, 80 mg at 03/11/24 1253    sodium chloride 0.9% flush 10 mL, 10 mL, Intravenous, PRN, Tanner Rdz MD    sodium chloride 0.9% flush 10 mL, 10 mL, Intravenous, PRN, Millie Jimenez, NP     "sucralfate tablet 1 g, 1 g, Oral, QID (AC & HS), RaulVasile PA-C, 1 g at 03/12/24 1036     acetaminophen, acetaminophen, albumin human 5%, dextrose 10%, dextrose 10%, electrolyte-A, heparin, porcine (PF), heparin, porcine (PF), HYDROcodone-acetaminophen, lactulose 10 gram/15 ml, loperamide, melatonin, metoclopramide, nitroGLYCERIN, ondansetron, ondansetron, simethicone, sodium chloride 0.9%, sodium chloride 0.9%     No family history on file.     Review of Systems   Respiratory:  Negative for shortness of breath.    Musculoskeletal:  Negative for arthralgias.            Objective:   BP 99/64   Pulse (!) 186   Temp 97.7 °F (36.5 °C) (Oral)   Resp 18   Ht 5' 5" (1.651 m)   Wt 80.3 kg (177 lb)   SpO2 100%   BMI 29.45 kg/m²      Physical Exam  Vitals reviewed.   Constitutional:       Appearance: He is not ill-appearing.   HENT:      Head: Normocephalic.      Right Ear: External ear normal.      Left Ear: External ear normal.      Nose: Nose normal.      Mouth/Throat:      Mouth: Mucous membranes are moist.      Pharynx: Oropharynx is clear.   Eyes:      Extraocular Movements: Extraocular movements intact.      Conjunctiva/sclera: Conjunctivae normal.      Pupils: Pupils are equal, round, and reactive to light.   Pulmonary:      Effort: Pulmonary effort is normal.   Abdominal:      General: Abdomen is flat.      Palpations: Abdomen is soft.   Musculoskeletal:      Right lower leg: Edema present.      Left lower leg: Edema present.   Skin:     General: Skin is warm.   Neurological:      Mental Status: He is alert and oriented to person, place, and time.            Review of Symptoms  Review of Symptoms      Symptom Assessment (ESAS 0-10 Scale)  Pain:  0  Dyspnea:  0  Anxiety:  0  Nausea:  0  Depression:  0  Anorexia:  0  Fatigue:  0  Insomnia:  0  Restlessness:  0  Agitation:  0     CAM / Delirium:  Negative      Pain Assessment:  OME in 24 hours:  0  Location(s):      Performance Status:  " 40    Psychosocial/Cultural:   See Palliative Psychosocial Note: No  **Primary  to Follow**  Palliative Care  Consult: No     Time-Based Charting:  Yes    Total Time Spent: 0 minutes      Advance Care Planning   Advance Directives:   Living Will: No    Do Not Resuscitate Status: No      Decision Making:  Patient answered questions and Family answered questions            Caregiver burden formerly assessed: Yes and No        > 50% of 40 min of encounter was spent in chart review, face to face discussion of goals of care, symptom assessment, coordination of care and emotional support.     Kris Matt M.D.  Palliative Medicine  [facility]

## 2024-03-12 NOTE — PT/OT/SLP PROGRESS
Physical Therapy Treatment    Patient Name:  Brain Snyder   MRN:  [id number]    Recommendations:     Discharge therapy intensity: Moderate Intensity Therapy   Discharge Equipment Recommendations: to be determined by next level of care  Barriers to discharge: Impaired mobility and Ongoing medical needs    Assessment:     Brain Snyder is a 78 y.o. male admitted with a medical diagnosis of CAD s/p CABG on 2/27, s/p R groin impella and mechanical intubation, self-extubated on 2/29 and was downgraded from ICU on 3/2.  He presents with the following impairments/functional limitations: weakness, impaired endurance, impaired self care skills, impaired functional mobility, gait instability, impaired balance, decreased upper extremity function, decreased lower extremity function, pain, impaired cardiopulmonary response to activity . Pt continues to require 2 person assist, modA , severely limited by pain in L knee and fatigue after sitting up in chair x2 hours today.     used throughout session.    Rehab Prognosis: Good; patient would benefit from acute skilled PT services to address these deficits and reach maximum level of function.    Recent Surgery: Procedure(s) (LRB):  CORONARY ARTERY BYPASS GRAFT (CABG) (N/A)  Replacement-valve-aortic (N/A)  SURGICAL PROCUREMENT, VEIN, ENDOSCOPIC (N/A)  Impella, Removal (Right) 14 Days Post-Op    Plan:     During this hospitalization, patient to be seen 5 x/week to address the identified rehab impairments via gait training, therapeutic activities, therapeutic exercises, neuromuscular re-education and progress toward the following goals:    Plan of Care Expires:  04/01/24    Subjective     Chief Complaint: pain  Patient/Family Comments/goals: back to bed  Pain/Comfort:  Pain Rating 1:  (c/o severe pain in L knee)      Objective:     Communicated with RN prior to session.  Patient found up in chair with telemetry, peripheral IV, gustafson catheter upon PT entry to room.      General Precautions: Standard, fall, sternal  Orthopedic Precautions: N/A  Braces: N/A  Respiratory Status: Room air  Blood Pressure: NT  Skin Integrity:  incisions to LLE swollen      Functional Mobility:  Bed Mobility:     Sit to Supine: maximal assistance and of 2 persons  Transfers:     Sit to Stand:  moderate assistance and of 2 persons with rolling walker  Bed to Chair: minimum assistance and of 2 persons with  rolling walker  using  Step Transfer    Therapeutic Activities/Exercises:  Declined further mobility    Education:  Patient and family were provided with verbal education education regarding PT role/goals/POC.  Understanding was verbalized.     Patient left HOB elevated with all lines intact, call button in reach, wedge under L side, pressure relief boots, RN notified, and family present    GOALS:   Multidisciplinary Problems       Physical Therapy Goals          Problem: Physical Therapy    Goal Priority Disciplines Outcome Goal Variances Interventions   Physical Therapy Goal     PT, PT/OT Ongoing, Progressing     Description: Goals to be met by: 2024     Patient will increase functional independence with mobility by performin. Supine to sit with MInimal Assistance  2. Sit to stand transfer with Minimal Assistance  3. Gait  x 150 feet with Minimal Assistance using Rolling Walker.                          Time Tracking:     PT Received On: 24  PT Start Time: 1320     PT Stop Time: 1343  PT Total Time (min): 23 min     Billable Minutes: Therapeutic Activity 23 mins    Treatment Type: Treatment  PT/PTA: PT     Number of PTA visits since last PT visit: 2024

## 2024-03-13 LAB
ALBUMIN SERPL-MCNC: 2.6 G/DL (ref 3.4–4.8)
ALBUMIN/GLOB SERPL: 0.7 RATIO (ref 1.1–2)
ALP SERPL-CCNC: 110 UNIT/L (ref 40–150)
ALT SERPL-CCNC: 76 UNIT/L (ref 0–55)
AST SERPL-CCNC: 90 UNIT/L (ref 5–34)
BASOPHILS # BLD AUTO: 0.09 X10(3)/MCL
BASOPHILS NFR BLD AUTO: 0.7 %
BILIRUB SERPL-MCNC: 1.5 MG/DL
BUN SERPL-MCNC: 25.9 MG/DL (ref 8.4–25.7)
CALCIUM SERPL-MCNC: 8.4 MG/DL (ref 8.8–10)
CHLORIDE SERPL-SCNC: 92 MMOL/L (ref 98–107)
CO2 SERPL-SCNC: 27 MMOL/L (ref 23–31)
CREAT SERPL-MCNC: 1.32 MG/DL (ref 0.73–1.18)
EOSINOPHIL # BLD AUTO: 0.36 X10(3)/MCL (ref 0–0.9)
EOSINOPHIL NFR BLD AUTO: 2.6 %
ERYTHROCYTE [DISTWIDTH] IN BLOOD BY AUTOMATED COUNT: 15.7 % (ref 11.5–17)
GFR SERPLBLD CREATININE-BSD FMLA CKD-EPI: 56 MLS/MIN/1.73/M2
GLOBULIN SER-MCNC: 3.5 GM/DL (ref 2.4–3.5)
GLUCOSE SERPL-MCNC: 95 MG/DL (ref 82–115)
HCT VFR BLD AUTO: 39.2 % (ref 42–52)
HGB BLD-MCNC: 12.2 G/DL (ref 14–18)
IMM GRANULOCYTES # BLD AUTO: 0.33 X10(3)/MCL (ref 0–0.04)
IMM GRANULOCYTES NFR BLD AUTO: 2.4 %
LYMPHOCYTES # BLD AUTO: 1.49 X10(3)/MCL (ref 0.6–4.6)
LYMPHOCYTES NFR BLD AUTO: 10.8 %
MCH RBC QN AUTO: 26.5 PG (ref 27–31)
MCHC RBC AUTO-ENTMCNC: 31.1 G/DL (ref 33–36)
MCV RBC AUTO: 85 FL (ref 80–94)
MONOCYTES # BLD AUTO: 1.77 X10(3)/MCL (ref 0.1–1.3)
MONOCYTES NFR BLD AUTO: 12.8 %
NEUTROPHILS # BLD AUTO: 9.75 X10(3)/MCL (ref 2.1–9.2)
NEUTROPHILS NFR BLD AUTO: 70.7 %
NRBC BLD AUTO-RTO: 0 %
PLATELET # BLD AUTO: 637 X10(3)/MCL (ref 130–400)
PMV BLD AUTO: 10.5 FL (ref 7.4–10.4)
POTASSIUM SERPL-SCNC: 4.3 MMOL/L (ref 3.5–5.1)
PROCALCITONIN SERPL-MCNC: 0.18 NG/ML
PROT SERPL-MCNC: 6.1 GM/DL (ref 5.8–7.6)
RBC # BLD AUTO: 4.61 X10(6)/MCL (ref 4.7–6.1)
SODIUM SERPL-SCNC: 129 MMOL/L (ref 136–145)
WBC # SPEC AUTO: 13.79 X10(3)/MCL (ref 4.5–11.5)

## 2024-03-13 PROCEDURE — 85025 COMPLETE CBC W/AUTO DIFF WBC: CPT | Performed by: INTERNAL MEDICINE

## 2024-03-13 PROCEDURE — 21400001 HC TELEMETRY ROOM

## 2024-03-13 PROCEDURE — 97530 THERAPEUTIC ACTIVITIES: CPT

## 2024-03-13 PROCEDURE — 63600175 PHARM REV CODE 636 W HCPCS: Performed by: NURSE PRACTITIONER

## 2024-03-13 PROCEDURE — 94760 N-INVAS EAR/PLS OXIMETRY 1: CPT

## 2024-03-13 PROCEDURE — 25000003 PHARM REV CODE 250: Performed by: INTERNAL MEDICINE

## 2024-03-13 PROCEDURE — 63600175 PHARM REV CODE 636 W HCPCS: Performed by: INTERNAL MEDICINE

## 2024-03-13 PROCEDURE — 25000003 PHARM REV CODE 250: Performed by: PHYSICIAN ASSISTANT

## 2024-03-13 PROCEDURE — 80053 COMPREHEN METABOLIC PANEL: CPT | Performed by: INTERNAL MEDICINE

## 2024-03-13 PROCEDURE — 25000003 PHARM REV CODE 250: Performed by: NURSE PRACTITIONER

## 2024-03-13 PROCEDURE — 25000003 PHARM REV CODE 250

## 2024-03-13 PROCEDURE — 27000221 HC OXYGEN, UP TO 24 HOURS

## 2024-03-13 PROCEDURE — 25000003 PHARM REV CODE 250: Performed by: STUDENT IN AN ORGANIZED HEALTH CARE EDUCATION/TRAINING PROGRAM

## 2024-03-13 RX ORDER — CODEINE PHOSPHATE AND GUAIFENESIN 10; 100 MG/5ML; MG/5ML
10 SOLUTION ORAL 3 TIMES DAILY
Status: DISCONTINUED | OUTPATIENT
Start: 2024-03-13 | End: 2024-03-17

## 2024-03-13 RX ORDER — FUROSEMIDE 40 MG/1
40 TABLET ORAL DAILY
Status: DISCONTINUED | OUTPATIENT
Start: 2024-03-13 | End: 2024-03-15

## 2024-03-13 RX ORDER — SODIUM CHLORIDE 1 G/1
1000 TABLET ORAL 2 TIMES DAILY
Status: DISCONTINUED | OUTPATIENT
Start: 2024-03-13 | End: 2024-03-13

## 2024-03-13 RX ORDER — SODIUM CHLORIDE 1 G/1
1000 TABLET ORAL 3 TIMES DAILY
Status: DISCONTINUED | OUTPATIENT
Start: 2024-03-13 | End: 2024-03-14

## 2024-03-13 RX ADMIN — SUCRALFATE 1 G: 1 TABLET ORAL at 05:03

## 2024-03-13 RX ADMIN — ALLOPURINOL 50 MG: 300 TABLET ORAL at 08:03

## 2024-03-13 RX ADMIN — SUCRALFATE 1 G: 1 TABLET ORAL at 08:03

## 2024-03-13 RX ADMIN — CEFEPIME 2 G: 2 INJECTION, POWDER, FOR SOLUTION INTRAVENOUS at 12:03

## 2024-03-13 RX ADMIN — SODIUM CHLORIDE 1000 MG: 1 TABLET ORAL at 08:03

## 2024-03-13 RX ADMIN — ATORVASTATIN CALCIUM 40 MG: 40 TABLET, FILM COATED ORAL at 08:03

## 2024-03-13 RX ADMIN — Medication 6 MG: at 08:03

## 2024-03-13 RX ADMIN — ENOXAPARIN SODIUM 40 MG: 40 INJECTION SUBCUTANEOUS at 05:03

## 2024-03-13 RX ADMIN — ACETAMINOPHEN 650 MG: 325 TABLET, FILM COATED ORAL at 08:03

## 2024-03-13 RX ADMIN — GUAIFENESIN AND CODEINE PHOSPHATE 10 ML: 100; 10 SOLUTION ORAL at 05:03

## 2024-03-13 RX ADMIN — SUCRALFATE 1 G: 1 TABLET ORAL at 10:03

## 2024-03-13 RX ADMIN — METOPROLOL SUCCINATE 12.5 MG: 25 TABLET, EXTENDED RELEASE ORAL at 08:03

## 2024-03-13 RX ADMIN — AMIODARONE HYDROCHLORIDE 200 MG: 200 TABLET ORAL at 08:03

## 2024-03-13 RX ADMIN — ASPIRIN 81 MG: 81 TABLET, COATED ORAL at 08:03

## 2024-03-13 RX ADMIN — HYDROCODONE BITARTRATE AND ACETAMINOPHEN 1 TABLET: 5; 325 TABLET ORAL at 01:03

## 2024-03-13 RX ADMIN — LACTULOSE 20 G: 10 SOLUTION ORAL at 01:03

## 2024-03-13 RX ADMIN — GUAIFENESIN AND CODEINE PHOSPHATE 10 ML: 100; 10 SOLUTION ORAL at 12:03

## 2024-03-13 RX ADMIN — ACETAMINOPHEN 650 MG: 325 TABLET, FILM COATED ORAL at 04:03

## 2024-03-13 RX ADMIN — PANTOPRAZOLE SODIUM 40 MG: 40 TABLET, DELAYED RELEASE ORAL at 08:03

## 2024-03-13 RX ADMIN — SODIUM CHLORIDE 1000 MG: 1 TABLET ORAL at 05:03

## 2024-03-13 RX ADMIN — FOLIC ACID 1 MG: 1 TABLET ORAL at 08:03

## 2024-03-13 NOTE — PROGRESS NOTES
"  [facility]    Cardiology  Progress Note    Patient Name: Brain Snyder  MRN: [id number]  Admission Date: 2/21/2024  Hospital Length of Stay: 21 days  Code Status: Full Code   Attending Physician: Stan Lazar MD   Primary Care Physician: Nidia Shepherd NP  Expected Discharge Date:   Principal Problem:CAD (coronary artery disease)    Subjective:   Reason for Consult:  CAD     HPI: 78-year-old female that is unknown to [facility] with a PMHx of PAF on Eliquis Aortic insufficiency, MV CAD, HTN. He is Green[place]ic speaking and an  was used for interview. He was orginally admitted to [facility] on 2.15.24 with CP & NSTEMI and underwent a LHC on 2.20.24 that showed MV CAD (reported noted below) He was transferred to [facility] on 2.21.24 for a CABG/AVR evaluation. On arrive he was hemodynamically stable on a heparin infusion. He denied chest pain, SOB, and nausea on current exam, [facility] was consulted for CAD    Hospital Course:   2.23.24: Patient seen resting in bed. He denies SOB or CP. He remains on heparin infusion. Family at bedside   2.24.24: NAD. S/p Impella Placement/[place]-Chelo Catheter. "I am ok." + Blood Clots from Nose/Mouth, Hematuria 2/2 traumatic Indwelling Catheter Placement. Heparin Drip per Protocol. Impella P5  2.25.24: NAD. "I'm ok." Denies CP, SOB and Palps. PA Pressure Reading is not Accurate. CVP 5. Impella at P5. R Groin Impella P7. Remains Off Heparin Drip per Protocol. Now in SR.   2.26.24: NAD Noted. SR on Tele. Right Groin Impella in place, bruising with no hematoma noted. Distal pulses DP intact. Legs warm. NC 2 L/Min. Denies CP/SOB. Consent obtained from his daughter Brii Snyder. NPO for MV PCI Today.  2.27.24: NAD Noted. Impella remains in place at P7 Support. Good UA Noted, some pink tinged urine noted. SR on Tele. Pressors off. Denies CP/SOB. NPO for surgery today. Heparin on hold for surgery.  2.28.24: Patient is critically ill. AF RVR on Tele, twice shocked this AM with no " "success. On Amiodarone/Milrinone- Cardizem Infusion now off given hypotension and ICMO. Also no José Miguel/Levophed. Concern for bleeding noted, transfusion noted. Patient is intubated/Mechanically Vented. Hemodynamics: CO/CI 4.3/2.3 CVP 20.  2.29.24: Patient self extubated this AM. He is stable on NC Oxygen. Denies CP/SOB. SR on Tele. On Amiodarone Infusion and receiving blood products. BP Stable. Clinically much improved from yesterday.   3.1.24: NAD. Afib mild RVR on tele. On Primacor @ 0.187 mcg/kg/min. Amiodarone infusing per protocol. LFT's worsening. WBC worsening. + Incisional CP. On 5 L NC.   3.2.24: NAD. Net Negative 2700 urine output.   3.3.24: NAD. VSS. No complaints of CP/Palps. Reports SOB is improving. Creat 1.61 (Improved)  3.4.24: NAD. VSS. No complaints. On 2 L. Net Negative Fluid 3540 over 24 hours. Creat 1.37. LFTs slighting worse today  3.5.24: NAD. VSS. Denies CP, SOB and Palps. Family at Bedside. Fluid Balance Net Negative 4360mL. BUN/Crea 23.5/1.16, AST/ALT 69/71  3.6.24: Patient sitting up in bedside chair. Appears SOB. C/o abdominal bloating and gaseous. Reports + BM since surgery. Abdomen distended. Patient nausea and spit up bile colored fluid. + peripheral edema. O2 via NC. Excellent diuresis. Persistent leukocytosis. K+ 3.1, mild transaminitis. Groin Wound cx -- GNR and pseudomonas. CV surgery has signed off.   3.7.24: Patient sitting up in bed. NAD. Denies any pain. Noted SS drainage from impella insertion site. Leukocytosis has resolved. BP marginal at times. Afib, CVR. Good UOP; however weight is increasing.   3.8.24: Patient awake in bed. He has been weaned off. O2. Good diuresis overnight. Mild bump in creatinine-- 1.21 from 1.15 yesterday. Liver enzymes uptrending. VSS.   3.9.24: NAD. Language Barrier/Daughter at Bedside for Translation. Denies CP, SOB and Palps. Deconditioned. Fluid Balance Net Negative 5600mL.   3.10.24: NAD. "[place]." Denies CP, SOB and Palps. Daughter at " Bedside/Translating. Denies CP, SOB and Palps. Remains Deconditions. Fluid Balance Net Negative 2405mL. Na 130, BUN/Crea 20.3/1.22, AST/ALT 88/73  3.11.24: Patient awake in bed. NAD. Reviewed echo-EF 40-45% with mild RV enlargement and severely reduced RV function. Na 129 today. Renal function mildly bumped  3.12.24: Patient awake in bed. NAD. Hyponatremia stable. Nephrology following and has initiated a fluid restriction. Impella site still oozing SS fluid. Surgery recommending manually expressing fluid q shift and obtaining US to better define anatomy. WBC 13.79. Afebrile.         PMH: PAF (on Eliquis), Aortic insufficiency, MV CAD, HTN  PSH: LHC  Family History: None Reported  Social History: Former Smoker, Denies ETOH or Illicit Drug Use    Previous Diagnostics:  TTE 03.11.24:  Left Ventricle: Regional wall motion abnormalities present. Septal motion is consistent with post-operative status. Akinetic inf -posterior wall There is moderately reduced systolic function with a visually estimated ejection fraction of 35 - 40%.    Right Ventricle: Mild right ventricular enlargement. Systolic function is severely reduced.    Aortic Valve: There is a bioprosthetic valve in the aortic position.    Pericardium: There is a trivial effusion. No indication of cardiac tamponade. Left pleural effusion.       CABG/Bio AVR/MOISE Ligation (2.27.24):  Coronary artery bypass grafting X 3, LIMA to LAD, reversed SVG to OM1, Reversed Saphenous venous graft to RCA  Bioprosthetic aortic valve replacement (Epic max 23mm), Endoscopic venous harvesting of left greater saphenous vein, Left atrial appendage ligation, explant of right femoral impella device, right femoral artery repair.    RHC/Impella Placement (2.23.24):  Right heart catheterization performed showed the following:  PA= 61/24 (27) mm Hg  PCWP=  31/26 (27) mm Hg  AO saturation= 93 % RA  PA saturation= 60% with Impella (49% yesterday)    (02/22/24) -  0.5  - Cardiac output was  2.8 L/min provided by the device.  Impella was at 84cm  Impression/plan:   Successful Impella insertion and swan-Chelo in the setting of Acute HF/low cardiac output/precardiogenic shock/Critcal-severe AS/MVCAD - LM distal    RHC (2.22.24):  Right heart catheterization demonstrating severe pulmonary hypertension 84/34 and PCWP 24 mmHg  Reduced cardiac output/cardiac index at 3.43/1.81 L/min/m2 with pulmonary artery saturations 49.3%  For applied to the right femoral vein following removal of the 7 Sinhala sheath  The estimated blood loss was none.    Carotid US (2.22.24):  The bilateral internal carotid artery demonstrated less than 50% stenosis.   The bilateral vertebral arteries were patent with antegrade flow    LHC (2.20.24):   Prox LAD lesion was 70% stenosed.  Ost Cx to Prox Cx lesion was 80% stenosed.  1st Mrg lesion was 80% stenosed.  2nd Mrg-1 lesion was 70% stenosed.  2nd Mrg-2 lesion was 50% stenosed.  Mid LAD lesion was 50% stenosed.  Prox RCA lesion was 60% stenosed.  estimated blood loss was <50 mL.  There was three vessel coronary artery disease.  LVEDP: 30mmHg  Recommendations:   Refer for CABG evaluation and AVR   Preformed by Dr. Flannery at [facility]     ECHO (2.15.24):  Left Ventricle: The left ventricle is normal in size. Mildly increased ventricular mass. Mildly increased wall thickness. There is mild eccentric hypertrophy. Moderate global hypokinesis present. Septal motion is consistent with bundle branch block. There is moderately reduced systolic function. Biplane (2D) method of discs ejection fraction is 40%. Grade II diastolic dysfunction.  Right Ventricle: Right ventricular enlargement. Systolic function is normal.  Left Atrium: Left atrium is severely dilated.  Right Atrium: Right atrium is moderately dilated.  Aortic Valve: There is moderate aortic valve sclerosis. Severely restricted motion. There is severe stenosis. Aortic valve area by VTI is 0.66 cm². Aortic valve peak velocity is 4.45 m/s. Mean  gradient is 50 mmHg. The dimensionless index is 0.17. There is mild to moderate aortic regurgitation.  Mitral Valve: Mildly calcified leaflets. There is no stenosis. There is mild regurgitation.  Tricuspid Valve: The tricuspid valve is structurally normal. There is mild to moderate regurgitation.  Pulmonic Valve: There is no significant regurgitation.  Aorta: Aortic root is upper limit of normal measuring 3.6 cm.  Pulmonary Artery: There is severe pulmonary hypertension. The estimated pulmonary artery systolic pressure is 69 mmHg.  IVC/SVC: Intermediate venous pressure at 8 mmHg.  Pericardium: There is no pericardial effusion.     Review of Systems   Constitutional: Positive for malaise/fatigue.   Cardiovascular:  Negative for chest pain, dyspnea on exertion and leg swelling.   Respiratory:  Negative for shortness of breath.    All other systems reviewed and are negative.    Objective:     Vital Signs (Most Recent):  Temp: 97.6 °F (36.4 °C) (03/13/24 0739)  Pulse: 87 (03/13/24 0748)  Resp: 18 (03/13/24 0739)  BP: (!) 100/56 (03/13/24 0748)  SpO2: 96 % (03/13/24 1025) Vital Signs (24h Range):  Temp:  [97.5 °F (36.4 °C)-98.7 °F (37.1 °C)] 97.6 °F (36.4 °C)  Pulse:  [] 87  Resp:  [18-20] 18  SpO2:  [93 %-100 %] 96 %  BP: ()/(47-69) 100/56   Weight: 79.7 kg (175 lb 9.6 oz)  Body mass index is 29.22 kg/m².  SpO2: 96 %       Intake/Output Summary (Last 24 hours) at 3/13/2024 1116  Last data filed at 3/13/2024 0454  Gross per 24 hour   Intake 1130 ml   Output 2600 ml   Net -1470 ml       Lines/Drains/Airways       Drain  Duration                  Urethral Catheter 02/28/24 1445 Coude 20 Fr. 13 days              Peripheral Intravenous Line  Duration                  Peripheral IV - Single Lumen 03/02/24 1053 20 G Anterior;Right Upper Arm 10 days                  Significant Labs:   Recent Results (from the past 72 hour(s))   Comprehensive metabolic panel    Collection Time: 03/11/24  4:45 AM   Result Value Ref  Range    Sodium Level 128 (L) 136 - 145 mmol/L    Potassium Level 4.1 3.5 - 5.1 mmol/L    Chloride 91 (L) 98 - 107 mmol/L    Carbon Dioxide 26 23 - 31 mmol/L    Glucose Level 112 82 - 115 mg/dL    Blood Urea Nitrogen 22.8 8.4 - 25.7 mg/dL    Creatinine 1.30 (H) 0.73 - 1.18 mg/dL    Calcium Level Total 8.1 (L) 8.8 - 10.0 mg/dL    Protein Total 5.9 5.8 - 7.6 gm/dL    Albumin Level 2.5 (L) 3.4 - 4.8 g/dL    Globulin 3.4 2.4 - 3.5 gm/dL    Albumin/Globulin Ratio 0.7 (L) 1.1 - 2.0 ratio    Bilirubin Total 1.6 (H) <=1.5 mg/dL    Alkaline Phosphatase 116 40 - 150 unit/L    Alanine Aminotransferase 71 (H) 0 - 55 unit/L    Aspartate Aminotransferase 78 (H) 5 - 34 unit/L    eGFR 57 mls/min/1.73/m2   Magnesium    Collection Time: 03/11/24  4:45 AM   Result Value Ref Range    Magnesium Level 2.00 1.60 - 2.60 mg/dL   Cortisol    Collection Time: 03/11/24  4:45 AM   Result Value Ref Range    Cortisol Level 16.7 ug/dL   TSH    Collection Time: 03/11/24  4:45 AM   Result Value Ref Range    TSH 1.802 0.350 - 4.940 uIU/mL   CBC with Differential    Collection Time: 03/11/24  4:46 AM   Result Value Ref Range    WBC 15.28 (H) 4.50 - 11.50 x10(3)/mcL    RBC 4.35 (L) 4.70 - 6.10 x10(6)/mcL    Hgb 11.8 (L) 14.0 - 18.0 g/dL    Hct 36.6 (L) 42.0 - 52.0 %    MCV 84.1 80.0 - 94.0 fL    MCH 27.1 27.0 - 31.0 pg    MCHC 32.2 (L) 33.0 - 36.0 g/dL    RDW 15.4 11.5 - 17.0 %    Platelet 633 (H) 130 - 400 x10(3)/mcL    MPV 10.4 7.4 - 10.4 fL    Neut % 76.1 %    Lymph % 8.4 %    Mono % 11.8 %    Eos % 1.4 %    Basophil % 0.4 %    Lymph # 1.29 0.6 - 4.6 x10(3)/mcL    Neut # 11.62 (H) 2.1 - 9.2 x10(3)/mcL    Mono # 1.80 (H) 0.1 - 1.3 x10(3)/mcL    Eos # 0.22 0 - 0.9 x10(3)/mcL    Baso # 0.06 <=0.2 x10(3)/mcL    IG# 0.29 (H) 0 - 0.04 x10(3)/mcL    IG% 1.9 %    NRBC% 0.0 %   Echo Saline Bubble? No    Collection Time: 03/11/24 12:28 PM   Result Value Ref Range    Celis's Biplane MOD Ejection Fraction 40 %    LVOT stroke volume 26.96 cm3    LVIDd 5.60 3.5  - 6.0 cm    LV Systolic Volume 83.10 mL    LV Systolic Volume Index 43.1 mL/m2    LVIDs 4.30 (A) 2.1 - 4.0 cm    LV Diastolic Volume 154.00 mL    LV Diastolic Volume Index 79.79 mL/m2    IVS 1.20 (A) 0.6 - 1.1 cm    LVOT diameter 1.80 cm    LVOT area 2.5 cm2    FS 23 (A) 28 - 44 %    Left Ventricle Relative Wall Thickness 0.39 cm    Posterior Wall 1.10 0.6 - 1.1 cm    LV mass 264.70 g    LV Mass Index 137 g/m2    LVOT peak noé 0.89 m/s    Left Ventricular Outflow Tract Mean Velocity 0.59 cm/s    Left Ventricular Outflow Tract Mean Gradient 2.00 mmHg    AV mean gradient 16 mmHg    AV peak gradient 31 mmHg    Ao peak noé 2.80 m/s    Ao VTI 45.40 cm    LVOT peak VTI 10.60 cm    AV valve area 0.59 cm²    AV Velocity Ratio 0.32     AV index (prosthetic) 0.23     MACY by Velocity Ratio 0.81 cm²    ZLVIDS 1.94     ZLVIDD 0.25    Osmolality, Serum    Collection Time: 03/11/24  4:37 PM   Result Value Ref Range    Osmolality 276 (L) 280 - 300 mOsm/kg   Comprehensive metabolic panel    Collection Time: 03/12/24  4:49 AM   Result Value Ref Range    Sodium Level 129 (L) 136 - 145 mmol/L    Potassium Level 4.2 3.5 - 5.1 mmol/L    Chloride 94 (L) 98 - 107 mmol/L    Carbon Dioxide 25 23 - 31 mmol/L    Glucose Level 99 82 - 115 mg/dL    Blood Urea Nitrogen 24.7 8.4 - 25.7 mg/dL    Creatinine 1.33 (H) 0.73 - 1.18 mg/dL    Calcium Level Total 8.1 (L) 8.8 - 10.0 mg/dL    Protein Total 5.8 5.8 - 7.6 gm/dL    Albumin Level 2.5 (L) 3.4 - 4.8 g/dL    Globulin 3.3 2.4 - 3.5 gm/dL    Albumin/Globulin Ratio 0.8 (L) 1.1 - 2.0 ratio    Bilirubin Total 1.6 (H) <=1.5 mg/dL    Alkaline Phosphatase 107 40 - 150 unit/L    Alanine Aminotransferase 67 (H) 0 - 55 unit/L    Aspartate Aminotransferase 74 (H) 5 - 34 unit/L    eGFR 55 mls/min/1.73/m2   CBC with Differential    Collection Time: 03/12/24  4:49 AM   Result Value Ref Range    WBC 14.95 (H) 4.50 - 11.50 x10(3)/mcL    RBC 4.48 (L) 4.70 - 6.10 x10(6)/mcL    Hgb 12.1 (L) 14.0 - 18.0 g/dL    Hct  37.8 (L) 42.0 - 52.0 %    MCV 84.4 80.0 - 94.0 fL    MCH 27.0 27.0 - 31.0 pg    MCHC 32.0 (L) 33.0 - 36.0 g/dL    RDW 15.4 11.5 - 17.0 %    Platelet 590 (H) 130 - 400 x10(3)/mcL    MPV 10.2 7.4 - 10.4 fL    Neut % 72.5 %    Lymph % 10.8 %    Mono % 11.7 %    Eos % 2.3 %    Basophil % 0.5 %    Lymph # 1.62 0.6 - 4.6 x10(3)/mcL    Neut # 10.82 (H) 2.1 - 9.2 x10(3)/mcL    Mono # 1.75 (H) 0.1 - 1.3 x10(3)/mcL    Eos # 0.35 0 - 0.9 x10(3)/mcL    Baso # 0.08 <=0.2 x10(3)/mcL    IG# 0.33 (H) 0 - 0.04 x10(3)/mcL    IG% 2.2 %    NRBC% 0.0 %   Sodium, Random Urine    Collection Time: 03/12/24  3:12 PM   Result Value Ref Range    Urine Sodium 42.0 mmol/L   Creatinine, Random Urine    Collection Time: 03/12/24  3:12 PM   Result Value Ref Range    Urine Creatinine 56.3 (L) 63.0 - 166.0 mg/dL   Osmolality, Urine    Collection Time: 03/12/24  3:12 PM   Result Value Ref Range    Urine Osmolality 300 300 - 1,300 mOsm/kg   Comprehensive metabolic panel    Collection Time: 03/13/24  6:15 AM   Result Value Ref Range    Sodium Level 129 (L) 136 - 145 mmol/L    Potassium Level 4.3 3.5 - 5.1 mmol/L    Chloride 92 (L) 98 - 107 mmol/L    Carbon Dioxide 27 23 - 31 mmol/L    Glucose Level 95 82 - 115 mg/dL    Blood Urea Nitrogen 25.9 (H) 8.4 - 25.7 mg/dL    Creatinine 1.32 (H) 0.73 - 1.18 mg/dL    Calcium Level Total 8.4 (L) 8.8 - 10.0 mg/dL    Protein Total 6.1 5.8 - 7.6 gm/dL    Albumin Level 2.6 (L) 3.4 - 4.8 g/dL    Globulin 3.5 2.4 - 3.5 gm/dL    Albumin/Globulin Ratio 0.7 (L) 1.1 - 2.0 ratio    Bilirubin Total 1.5 <=1.5 mg/dL    Alkaline Phosphatase 110 40 - 150 unit/L    Alanine Aminotransferase 76 (H) 0 - 55 unit/L    Aspartate Aminotransferase 90 (H) 5 - 34 unit/L    eGFR 56 mls/min/1.73/m2   CBC with Differential    Collection Time: 03/13/24  6:15 AM   Result Value Ref Range    WBC 13.79 (H) 4.50 - 11.50 x10(3)/mcL    RBC 4.61 (L) 4.70 - 6.10 x10(6)/mcL    Hgb 12.2 (L) 14.0 - 18.0 g/dL    Hct 39.2 (L) 42.0 - 52.0 %    MCV 85.0 80.0  - 94.0 fL    MCH 26.5 (L) 27.0 - 31.0 pg    MCHC 31.1 (L) 33.0 - 36.0 g/dL    RDW 15.7 11.5 - 17.0 %    Platelet 637 (H) 130 - 400 x10(3)/mcL    MPV 10.5 (H) 7.4 - 10.4 fL    Neut % 70.7 %    Lymph % 10.8 %    Mono % 12.8 %    Eos % 2.6 %    Basophil % 0.7 %    Lymph # 1.49 0.6 - 4.6 x10(3)/mcL    Neut # 9.75 (H) 2.1 - 9.2 x10(3)/mcL    Mono # 1.77 (H) 0.1 - 1.3 x10(3)/mcL    Eos # 0.36 0 - 0.9 x10(3)/mcL    Baso # 0.09 <=0.2 x10(3)/mcL    IG# 0.33 (H) 0 - 0.04 x10(3)/mcL    IG% 2.4 %    NRBC% 0.0 %     Telemetry: PAF/CVR    Physical Exam  Vitals reviewed.   Constitutional:       General: He is not in acute distress.     Appearance: Normal appearance.   HENT:      Head: Normocephalic.      Mouth/Throat:      Mouth: Mucous membranes are moist.   Eyes:      Extraocular Movements: Extraocular movements intact.      Conjunctiva/sclera: Conjunctivae normal.   Cardiovascular:      Rate and Rhythm: Normal rate. Rhythm irregular.      Pulses: Normal pulses.      Heart sounds: No murmur heard.  Pulmonary:      Effort: Pulmonary effort is normal. No respiratory distress.      Breath sounds: Normal breath sounds.      Comments: NC O2  Abdominal:      Palpations: Abdomen is soft.   Genitourinary:     Comments: Urinary Catheter   Skin:     General: Skin is warm.      Comments: Midline Sternotomy YVETTE with No Sign of Bleed/Infection. Right Lower Abdominal Site Oozing Serosanguinous Fluid, Improving. Dressing Intact.   Neurological:      General: No focal deficit present.      Mental Status: He is alert.   Psychiatric:         Mood and Affect: Mood normal.       Current Inpatient Medications:  Current Facility-Administered Medications:     acetaminophen oral solution 650 mg, 650 mg, Per OG tube, Q6H PRN, Vasile Carter PA-C, 650 mg at 03/12/24 1726    acetaminophen tablet 650 mg, 650 mg, Oral, Q6H PRN, Pablo Yepez MD, 650 mg at 03/13/24 0438    albumin human 5% bottle 12.5 g, 12.5 g, Intravenous, PRN, Vasile Carter,  JENN, Stopped at 02/28/24 1442    allopurinol split tablet 50 mg, 50 mg, Oral, Daily, Tanner Rdz MD, 50 mg at 03/13/24 0833    amiodarone tablet 200 mg, 200 mg, Oral, Daily, Lexy Palomares FNP, 200 mg at 03/13/24 0833    aspirin EC tablet 81 mg, 81 mg, Oral, Daily, Vasile Carter PA-C, 81 mg at 03/13/24 0833    atorvastatin tablet 40 mg, 40 mg, Oral, QHS, Tanner Rdz MD, 40 mg at 03/12/24 2016    ceFEPIme (MAXIPIME) 2 g in dextrose 5 % in water (D5W) 100 mL IVPB (MB+), 2 g, Intravenous, Q12H, Elieser Pace MD, Stopped at 03/13/24 0033    dextrose 10% bolus 125 mL 125 mL, 12.5 g, Intravenous, PRN, Pablo Yepez MD    dextrose 10% bolus 250 mL 250 mL, 25 g, Intravenous, PRN, Pablo Yepez MD    electrolyte-A infusion, , Intravenous, PRN, Pablo Yepez MD, Stopped at 02/28/24 0700    enoxaparin injection 40 mg, 40 mg, Subcutaneous, Daily, Cherry Suarze FNP, 40 mg at 03/12/24 1726    ferrous sulfate tablet 1 each, 1 tablet, Oral, Every other day, Tanner Rdz MD, 1 each at 03/12/24 0833    folic acid tablet 1 mg, 1 mg, Oral, Daily, Vasile Carter PA-C, 1 mg at 03/13/24 0833    heparin, porcine (PF) 100 unit/mL injection flush 500 Units, 5 mL, Intravenous, On Call Procedure, Pablo Yepez MD    heparin, porcine (PF) 100 unit/mL injection flush 500 Units, 5 mL, Intravenous, On Call Procedure, Nita Contreras MD    HYDROcodone-acetaminophen 5-325 mg per tablet 1 tablet, 1 tablet, Oral, Q6H PRN, Stan Lazar MD, 1 tablet at 03/10/24 1628    lactulose 10 gram/15 ml solution 20 g, 20 g, Oral, Q6H PRN, Vasile Carter PA-C    loperamide capsule 2 mg, 2 mg, Oral, Continuous PRN, Vasile Carter PA-C, 2 mg at 03/02/24 1253    melatonin tablet 6 mg, 6 mg, Oral, Nightly PRN, Tanner Rdz MD, 6 mg at 03/11/24 2142    metoclopramide injection 5 mg, 5 mg, Intravenous, Q6H PRN, Vasile Carter PA-C    metoprolol succinate (TOPROL-XL) 24  hr split tablet 12.5 mg, 12.5 mg, Oral, Daily, Cherry Suarez, FNP, 12.5 mg at 03/13/24 0833    nitroGLYCERIN SL tablet 0.4 mg, 0.4 mg, Sublingual, Q5 Min PRN, Tanner Rdz MD    ondansetron disintegrating tablet 8 mg, 8 mg, Oral, Q8H PRN, Tanner Rdz MD, 8 mg at 03/11/24 1253    ondansetron injection 8 mg, 8 mg, Intravenous, Q6H PRN, Pablo Yepez MD, 8 mg at 03/10/24 1330    pantoprazole EC tablet 40 mg, 40 mg, Oral, Daily, Tanner Rdz MD, 40 mg at 03/13/24 0833    simethicone chewable tablet 80 mg, 1 tablet, Oral, TID PRN, Tanner Rdz MD, 80 mg at 03/11/24 1253    sodium chloride 0.9% flush 10 mL, 10 mL, Intravenous, PRN, Tanner Rdz MD    sodium chloride 0.9% flush 10 mL, 10 mL, Intravenous, PRN, Millie Jimenez NP    sucralfate tablet 1 g, 1 g, Oral, QID (AC & HS), Vasile Carter PA-C, 1 g at 03/13/24 1052  VTE Risk Mitigation (From admission, onward)           Ordered     enoxaparin injection 40 mg  Daily         03/07/24 0559     IP VTE HIGH RISK PATIENT  Once         03/02/24 0759     heparin, porcine (PF) 100 unit/mL injection flush 500 Units  On Call Procedure         02/27/24 0718     heparin, porcine (PF) 100 unit/mL injection flush 500 Units  On Call Procedure         02/27/24 0257     Place SUSIE hose  Until discontinued         02/22/24 1458     Place sequential compression device  Until discontinued         02/21/24 2057                  Assessment:   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% - Symptomatically Improving    - ECHO Limited (3.7.24) - LVEF 30%, Severe Global Hypokinesis     - ECHO (2.16.24): EF 40%, Grade II DD    - Small Pleural Effusions on CT Imaging (2.22.24)  CAD (Multivessel)    - Status Post CABG (3V) LIMA to LAD, SVG to OM1, SVG to RCA (2.27.24)    - RHC/Impella Placement and [place] Catheter (2.23.24)- Impella Removal/Femoral Artery Repair in Surgery on 2.27.24    - [facility] (2.19.24): pLAD lesion 70%, oLCx  80%, OM1 80%, OM2 1 lesion 70% 2nd lesion 50%, mLAD 50%, pRCA 60%  VHD/AS     - Status Post Bio AVR (Epic max 23mm) (2.27.24)    - ECHO (2.16.24): Aortic Valve: There is moderate aortic valve sclerosis. Severely restricted motion. There is severe stenosis. Aortic valve area by VTI is 0.66 cm². Aortic valve peak velocity is 4.45 m/s. Mean gradient is 50 mmHg. The dimensionless index is 0.17.   Cardiogenic Shock (Post Cardiotomy) - Off Vasopressor Support - Resolved     - History of Hypertension   Ischemic Cardiomyopathy/EF 30%  Elevated LFTs -   Pulmonary HTN    - ECHO PASP 69mmHg     - RHC (2.22.24): PA 84/34, PCWP 24 mmHg  Hematuria 2/2 Traumatic/Difficult Indwelling Catheter Placement (Resolved)  Urethral Stricture s/p Dilatation 2.23.24  PAF - Currently CVR    - Status Post Bedside Cardioversion x 2 on 2.27.24 (Both Unsuccessful)    - Status Post MOISE Ligation (2.27.24)    - CHADsVASc - 5 Points - 7.2% Stroke Risk per Year   Left Bundle Branch Block   VHD    - ECHO (3.7.24): Bio AVR, Mild MR    - ECHO (2.16.24): Severe AS, mild MR, mild to moderate TR  JEAN-CLAUDE/CKD Stage II - I  - Baseline Cr 1.6  Anemia- stable    - Status Post Transfusion on 2.28.24 & 2.29.24  Thrombocytopenia - Resolved   Leukocytosis - Persistent    - Groin Wound Culture: Serratia Marcescens and Pseudomonas Aeruginosa   Hypokalemia - Resolved  Hyponatremia - stable    Plan:   Continue IV Abx per Primary Team   Continue ASA, Statin, and BB  DC amiodarone due to persistent transaminitis.   HF GDMT- Continue BB. Hold diuretics due to hyponatremia.   No ACEi/ARB/ARNI or Aldactone 2/2 JEAN-CLAUDE (Add Back when Renal Indices Improve and BP Allows)  Accurate I&Os and Daily Weights   Keep K > 4.0 and Mg  > 2.0  Aggressive Mobilization of PT and Q1HR IS (PT/OT Eval and Treat)  Labs in AM: CBC, CMP and Mg      Discussed with Dr. Leon Suarez, P  Cardiology  [facility]   03/13/2024

## 2024-03-13 NOTE — PLAN OF CARE
I spoke to Shannan with our financial dept/Medicaid and Brii did call her but still has not submitted any documents for proof of identity. I asked that she call or text me if they turn in needed documents .

## 2024-03-13 NOTE — PROGRESS NOTES
Nephrology follow up progress note    HPI:      Brain Snyder is a 78 y.o. male who presented to this facility on 02/21/2024 as a transfer for CV surgery evaluation status post [facility] on 02/20/2024 showing multivessel CAD.  He remained in the ICU with Impella to the right groin post transfer.  On  02/27/2024 he underwent CABG x3 and bioprosthetic AVR.  Postoperatively he had atrial fibrillation with RVR requiring amiodarone infusion.    -He self extubated on 02/29.    -By 3/1 LFTs and WBC began to worsen.  -Developed abdominal distention with peripheral edema on 03/06.  Culture of wound to groin positive for Gram-negative rods and Pseudomonas (Impella site)     On 03/11 nephrology was consulted for hyponatremia.  Up until 48 hours ago he was receiving twice daily IV Lasix, Aldactone, and metolazone.  Serum sodium was stable around 134/135 until/8 when began to worsen.  Today, 128.  Urine output 3900 mL in the past 24 hours, 1600 already today. He appears quite weakened. Family at bedside and reports he has recently gotten out of bed 20 minutes ago. He has no history of hyponatremia. Family reports he eats minimally but drinks about 3L of water per day. Discussed with patient and family via .     Interval history:     Patient resting in bed today with wife at bedside. Hyponatremia improving with cessation of diuretics initially now plateau. He reports back pain worsened with need to have a BM.      Review of Systems:       Past medical, family, surgical, and social history reviewed and unchanged from initial consult note.     Objective:       VITAL SIGNS: 24 HR MIN & MAX LAST    Temp  Min: 97.5 °F (36.4 °C)  Max: 98.7 °F (37.1 °C)  97.7 °F (36.5 °C)        BP  Min: 94/54  Max: 111/81  111/81     Pulse  Min: 60  Max: 96  86     Resp  Min: 16  Max: 20  16    SpO2  Min: 93 %  Max: 99 %  98 %      GEN: Chronically ill appearing in NAD  HEENT: Conjunctiva anicteric, pupils equal, MMM, OP benign  CV: RRR +S1,S2  without murmur  PULM: CTAB, unlabored, room air   ABD: Soft, NT/ND abdomen with NABS  EXT: No cyanosis, trace edema, bilateral heel boots, SUSIE  SKIN: Warm and dry  PSYCH: Awake, alert, and appropriately conversant  Vascular access: none           Component Value Date/Time     (L) 03/13/2024 0615     (L) 03/12/2024 0449    K 4.3 03/13/2024 0615    K 4.2 03/12/2024 0449    CHLORIDE 92 (L) 03/13/2024 0615    CHLORIDE 94 (L) 03/12/2024 0449    CO2 27 03/13/2024 0615    CO2 25 03/12/2024 0449    BUN 25.9 (H) 03/13/2024 0615    BUN 24.7 03/12/2024 0449    CREATININE 1.32 (H) 03/13/2024 0615    CREATININE 1.33 (H) 03/12/2024 0449    CALCIUM 8.4 (L) 03/13/2024 0615    CALCIUM 8.1 (L) 03/12/2024 0449    PHOS 3.6 02/29/2024 0221            Component Value Date/Time    WBC 13.79 (H) 03/13/2024 0615    WBC 14.95 (H) 03/12/2024 0449    WBC 10.84 03/07/2024 0407    WBC 13.5 03/06/2024 0419    HGB 12.2 (L) 03/13/2024 0615    HGB 12.1 (L) 03/12/2024 0449    HCT 39.2 (L) 03/13/2024 0615    HCT 37.8 (L) 03/12/2024 0449    HCT 20 (LL) 02/27/2024 2041    HCT 27 (L) 02/27/2024 1947     (H) 03/13/2024 0615     (H) 03/12/2024 0449       Imaging reviewed      Assessment / Plan:     Worsening hyponatremia secondary to excess free water intake and electrolyte losses through diuretics   2.  Multivessel CAD status post CABG and AVR 2/27  3. Right groin Impella site now with wound growing Pseudomonas and Serratia marcescens as of 3/3  -repeat CT 3/9 shows hematoma in edema of the right groin  4. Acute on chronic heart failure-most recent EF 30% with severe global hypokinesis  5. Elevated LFT  6. CKD baseline Cr 1.6   7. Anemia s/p PRBC transfusion 2/28 and 2/29        Urine studies consistent with SIADH.   Start Lasix 40 mg tablet with twice daily NacL tablets  Continue 1.5 L fluid restriction

## 2024-03-13 NOTE — PT/OT/SLP PROGRESS
Occupational Therapy   Treatment    Name: Brain Snyder  MRN: [id number]  Admitting Diagnosis:  CAD (coronary artery disease)  15 Days Post-Op    Recommendations:     Recommended therapy intensity at discharge: Moderate Intensity Therapy   Discharge Equipment Recommendations:  to be determined by next level of care  Barriers to discharge:   (Ongoing medical needs)    Assessment:     Brain Snyder is a 78 y.o. male with a medical diagnosis of CAD s/p CABG on 2/27, s/p R groin impella and mechanical intubation, self-extubated on 2/29 and was downgraded from ICU on 3/2. Pt agreeable to working with therapy, tolerated session well. Pt was overall Mod A for bed mobility and bed> chair transfer with RW. Pt required set up for self feeding while seated in recliner. Performance deficits affecting function are weakness, impaired endurance, impaired sensation, impaired self care skills, impaired balance, gait instability, impaired functional mobility, decreased upper extremity function, decreased lower extremity function, decreased ROM, pain, decreased safety awareness.     Rehab Prognosis:  Good; patient would benefit from acute skilled OT services to address these deficits and reach maximum level of function.       Plan:     Patient to be seen 5 x/week to address the above listed problems via self-care/home management, therapeutic activities, therapeutic exercises  Plan of Care Expires: 04/09/24  Plan of Care Reviewed with: patient    Subjective     Pain/Comfort:  Pain Rating 1:  (Rating not provided, sacral pain reported)  Location 1: sacral spine  Pain Addressed 1: Reposition, Distraction    Objective:     Communicated with: Nurse prior to session.  Patient found HOB elevated with telemetry, peripheral IV, gustafson catheter upon OT entry to room.    General Precautions: Standard, fall, sternal    Orthopedic Precautions:N/A  Braces: N/A  Respiratory Status: Room air    Occupational Performance:     Bed Mobility:     Patient completed Supine to Sit with moderate assistance     Functional Mobility/Transfers:  Patient completed Sit <> Stand Transfer with moderate assistance  with  rolling walker   Patient completed Bed > Chair Transfer using Step Transfer technique with moderate assistance with rolling walker  Functional Mobility: Pt progressed from previous session, requiring Mod A x1 for functional transfers and ambulation with RW, no LOB noted. Pt did not report BLE pain with mobility during this session.     Activities of Daily Living:  Feeding:  set up  meal tray for self feeding while seated in recliner  Toileting: dependence gustafson catheter in place    Therapeutic Positioning    OT interventions performed during the course of today's session in an effort to prevent and/or reduce acquired pressure injuries:   Education was provided on benefits of and recommendations for therapeutic positioning     Findings:  visible skin intact    Patient Education:  Patient provided with verbal education and demonstrations education regarding OT role/goals/POC, post op precautions, fall prevention, and safety awareness.  Understanding was verbalized, however additional teaching warranted.      Patient left up in chair with all lines intact, call button in reach, and nurse notified.    GOALS:   Multidisciplinary Problems       Occupational Therapy Goals          Problem: Occupational Therapy    Goal Priority Disciplines Outcome Interventions   Occupational Therapy Goal     OT, PT/OT Ongoing, Not Progressing    Description: Goals to be met by: 4/1/24     Patient will increase functional independence with ADLs by performing:    UE Dressing with min A   Grooming while standing at sink with Minimal Assistance.  Toileting from commode with Moderate Assistance for hygiene and clothing management.   Sitting at edge of bed x30 minutes with Minimal Assistance. (Met)  Toilet transfer to bedside commode with Minimal Assistance.                          Time Tracking:     OT Date of Treatment: 03/13/24  OT Start Time: 1141  OT Stop Time: 1157  OT Total Time (min): 16 min    Billable Minutes:Therapeutic Activity 16 minutes    OT/YVETTE: OT     Number of YVETTE visits since last OT visit: 1    3/13/2024

## 2024-03-13 NOTE — PROGRESS NOTES
[facility] Hospital Medicine Progress Note        Chief Complaint: Inpatient Follow-up for      HPI per admitting team:   78-year-old male with a history of aortic insufficiency/stenosis, CAD, CKD IIIb, HTN AFib on Eliquis admitted to [facility] 02/15/2024 with chest pain, found to have NSTEMI (peak troponin 0.17) and underwent C 02/20/2024 showing severe multivessel stenosis and therefore was transferred to [facility] for CABG evaluation. Cardiology was consulted Patient had Impella placed on 02/23 and was admitted to the ICU.  Was started on aggressive diuresis with IV Lasix.  CV surgery was consulted.  Urology was consulted for hematuria; Nguyen catheter placed over guidewire with dilation secondary to urethral strictures. IV Heparin infusion was initiated per CIS but held due to hematuria/hemoptysis on 02/24.  He was also noted to have an JEAN-CLAUDE. Received 2 units PRBCs on 02/26 and was planned for high-risk PCI on 02/26 which was later canceled.  Underwent CABG x 3 2/27 (LIMA to LAD, RSVG to OM 1, R SVG to RCA, bioprosthetic AVR).  Remained on mechanical ventilation thereafter.  Patient noted to have tachycardia with atrial fibrillation/RVR requiring IV amiodarone along with pressors (norepinephrine/Milrinone) for hypotension. Patient underwent cardioversion x 2 with minimal improvement in HR/BP.  Patient self-extubated overnight on 02/29 and was noted to have adequate saturations on 2 L O2 via NC thereafter.  PT/OT consulted.  Renal function noted to be improving. Continued on IV diuresis as he appeared to be significantly volume overloaded postoperatively along with elevated pulmonary artery wedge pressures.  Started on p.o. amiodarone taper on 03/03.  Patient noted to have drainage from right groin wound and started on vancomycin on 03/04.  Wound culture grew Pseudomonas.  Chest tubes discontinued on 03/02.  Downgraded from ICU on 03/02.  CIS and CV surgery continued following patient.  CV  surgery signed off on 03/06 and discontinued vancomycin.  Patient was placed on oral Levaquin.  Hospital medicine consulted on 03/06 for assistance with medical management and DC planning.   Repeat CT abdomen and pelvis of 3/9 shows improving stranding and improving hematoma in the right groin, persistent left-sided effusion and atelectasis        Interval Hx:   patient was seen at bedside, no family member at bedside   Patient complains of low back pain, cough and his SCDs too tight on his feet bilaterally  Vitals reviewed with blood pressure low normal, heart rates in the low 100s, saturating 94% on 2 L of oxygen   Labs reviewed and fairly stable   General surgery evaluated patient has right groin no indication for surgery at this time  Palliative care on board  ID adjusted antibiotics to IV cefepime  Nephrology placed on fluid restriction  Cardiology has suspended amiodarone due to elevated liver enzymes and Lasix due to hyponatremia     Case was discussed with patient's nurse and  on the floor.     Objective/physical exam:  General: alert male lying comfortably in bed, in no acute distress  HENT:  NC in place; oral and oropharyngeal mucosa moist, pink, with no erythema or exudates, no ear pain or discharge  Neck: normal neck movement, no lymph nodes or swellings, no JVD or Carotid bruit  Respiratory: clear breathing sounds bilaterally, no crackles, rales, ronchi or wheezes  Cardiovascular: clear S1 and S2, no murmurs, rubs or gallops  Peripheral Vascular: no lesions, ulcers or erosions, normal peripheral pulses; 2+ B/L pedal edema  Gastrointestinal: soft, non-distended abdomen with TTP in epigastric, hypogastric, RUQ/LUQ regions; no guarding, rigidity or rebound tenderness, normal bowel sounds; right groin incision wound noted to be erythematous with some serum bilirubin drainage with overlying dressing  Integumentary: normal skin color, no rashes or lesions  Neuro: AAO x 3; motor strength 5/5 in B/L  UEs & LEs; sensation intact to gross and fine touch B/L; CN II-XII grossly intact        Assessment/Plan:  Right flank pain- due to right-sided lower abdomen hematoma-   R Groin Purulent Cellulitis, improving   Transaminitis, trending upwards ?  Congestive hepatopathy versus infection vs drug induced  Leukocytosis 2/2 possible infectious process, improving   Fluid overload 2/2 HFrEF exacerbation, fairly euvolemic clinically  HFrEF/HFpEF, euvolemic   JEAN-CLAUDE on stage II CKD - resolved   hyponatremia, improving slowly  Pulmonary hypertension   NSTEMI, CAD sp CABG x 3  S/p AVR  Atrial Fibrillation cvr   Normocytic anemia  Hematuria s/p Nguyen  Urethral stricture s/p dilation     Plan  Labs reviewed and fairly stable   General surgery evaluated patient has right groin no indication for surgery at this time  Palliative care on board  ID adjusted antibiotics to IV cefepime, leucocytosis improving slowly   Appreciate ID input change to IV cefepime, day 3  Wound culture grew Pseudomonas and Serratia   Nephrology placed on fluid restriction  Appreciate pulmonology input continue to monitor effusion, no need for thoracentesis at this time  Cardiology has suspended amiodarone due to elevated liver enzymes and Lasix due to hyponatremia  Appreciate CIS input continue po metoprolol 12.5 mg q.day   Suspend lisinopril and Aldactone, due to low blood pressure   Continue use of incentive spirometry and oxygen supplementation   Appreciate Urology input Nguyen catheter has been checked out no signs of active infection   Trend liver enzymes-   Ultrasound abdomen is normal  Continue atorvastatin for now,  Continued on allopurinol 50 mg daily, aspirin 81 mg, atorvastatin 40 mg., FeSO4 every other day, folic acid 1 mg, Protonix 40 mg daily, , sucralfate 1 g q.i.d.  P.o. Norco 5 mg q.6 p.r.n. for pain  Antitussives p.r.n.  PT/OT recommending moderate-intensity therapy   Cm on board for placement     VTE prophylaxis:  Lovenox     Patient  condition:  guarded     Anticipated discharge and Disposition:     Patient awaiting Medicaid approval.  Medicaid awaiting patient to submit some documents for processing     All diagnosis and differential diagnosis have been reviewed; assessment and plan has been documented; I have personally reviewed the labs and test results that are presently available; I have reviewed the patients medication list; I have reviewed the consulting providers response and recommendations. I have reviewed or attempted to review medical records based upon their availability     All of the patient's questions have been  addressed and answered. Patient's is agreeable to the above stated plan. I will continue to monitor closely and make adjustments to medical management as needed.      VITAL SIGNS: 24 HRS MIN & MAX LAST   Temp  Min: 97.5 °F (36.4 °C)  Max: 98.7 °F (37.1 °C) 97.7 °F (36.5 °C)   BP  Min: 94/54  Max: 111/81 111/81   Pulse  Min: 60  Max: 186  86   Resp  Min: 18  Max: 20 18   SpO2  Min: 93 %  Max: 100 % 98 %     I have reviewed the following labs:  Recent Labs   Lab 03/11/24 0446 03/12/24 0449 03/13/24 0615   WBC 15.28* 14.95* 13.79*   RBC 4.35* 4.48* 4.61*   HGB 11.8* 12.1* 12.2*   HCT 36.6* 37.8* 39.2*   MCV 84.1 84.4 85.0   MCH 27.1 27.0 26.5*   MCHC 32.2* 32.0* 31.1*   RDW 15.4 15.4 15.7   * 590* 637*   MPV 10.4 10.2 10.5*     Recent Labs   Lab 03/09/24  0704 03/10/24  0611 03/11/24 0445 03/12/24 0449 03/13/24  0615   * 130* 128* 129* 129*   K 3.7 3.9 4.1 4.2 4.3   CO2 28 30 26 25 27   BUN 20.3 20.3 22.8 24.7 25.9*   CREATININE 1.30* 1.22* 1.30* 1.33* 1.32*   CALCIUM 8.0* 8.2* 8.1* 8.1* 8.4*   MG 2.00 1.90 2.00  --   --    ALBUMIN 2.4* 2.5* 2.5* 2.5* 2.6*   ALKPHOS 114 114 116 107 110   ALT 78* 73* 71* 67* 76*   * 88* 78* 74* 90*   BILITOT 1.9* 1.8* 1.6* 1.6* 1.5     Microbiology Results (last 7 days)       Procedure Component Value Units Date/Time    Wound Culture [[id number]]  (Abnormal)   (Susceptibility) Collected: 03/03/24 1920    Order Status: Completed Specimen: Wound from Groin Updated: 03/07/24 1053     Wound Culture Many Serratia marcescens      Many Pseudomonas aeruginosa             See below for Radiology    Scheduled Med:   allopurinoL  50 mg Oral Daily    aspirin  81 mg Oral Daily    atorvastatin  40 mg Oral QHS    ceFEPime IV (PEDS and ADULTS)  2 g Intravenous Q12H    enoxparin  40 mg Subcutaneous Daily    ferrous sulfate  1 tablet Oral Every other day    folic acid  1 mg Oral Daily    metoprolol succinate  12.5 mg Oral Daily    pantoprazole  40 mg Oral Daily    sucralfate  1 g Oral QID (AC & HS)      Continuous Infusions:   loperamide        PRN Meds:  acetaminophen, acetaminophen, albumin human 5%, dextrose 10%, dextrose 10%, electrolyte-A, heparin, porcine (PF), heparin, porcine (PF), HYDROcodone-acetaminophen, lactulose 10 gram/15 ml, loperamide, melatonin, metoclopramide, nitroGLYCERIN, ondansetron, ondansetron, simethicone, sodium chloride 0.9%, sodium chloride 0.9%     Assessment/Plan:      VTE prophylaxis:     Patient condition:  Stable/Fair/Guarded/ Serious/ Critical    Anticipated discharge and Disposition:         All diagnosis and differential diagnosis have been reviewed; assessment and plan has been documented; I have personally reviewed the labs and test results that are presently available; I have reviewed the patients medication list; I have reviewed the consulting providers response and recommendations. I have reviewed or attempted to review medical records based upon their availability    All of the patient's questions have been  addressed and answered. Patient's is agreeable to the above stated plan. I will continue to monitor closely and make adjustments to medical management as needed.  _____________________________________________________________________    Nutrition Status:    Radiology:  I have personally reviewed the following imaging and agree with the  radiologist.     Echo Saline Bubble? No  Addendum:   Left Ventricle: Regional wall motion abnormalities present. Septal  motion is consistent with post-operative status. Akinetic inf -posterior  wall There is moderately reduced systolic function with a visually  estimated ejection fraction of 35 - 40%.     Right Ventricle: Mild right ventricular enlargement. Systolic function  is severely reduced.     Aortic Valve: There is a bioprosthetic valve in the aortic position.     Pericardium: There is a trivial effusion. No indication of cardiac  tamponade. Left pleural effusion.  Narrative:   Limited study to assess function. Left Ventricle: The left ventricle is   mildly dilated. Normal wall thickness. There is mildly reduced systolic   function with a visually estimated ejection fraction of 40 - 45%.    Left Ventricle: Regional wall motion abnormalities present. Septal   motion is consistent with post-operative status. Akinetic inf -posterior   wall There is moderately reduced systolic function with a visually   estimated ejection fraction of 35 - 40%.    Right Ventricle: Mild right ventricular enlargement. Systolic function   is severely reduced.    Aortic Valve: There is a bioprosthetic valve in the aortic position.    Pericardium: There is a trivial effusion. No indication of cardiac   tamponade. Left pleural effusion.      Stan Lazar MD  Department of Hospital Medicine   [facility]   03/13/2024

## 2024-03-14 LAB
ALBUMIN SERPL-MCNC: 2.5 G/DL (ref 3.4–4.8)
ALBUMIN/GLOB SERPL: 0.7 RATIO (ref 1.1–2)
ALP SERPL-CCNC: 108 UNIT/L (ref 40–150)
ALT SERPL-CCNC: 76 UNIT/L (ref 0–55)
AST SERPL-CCNC: 90 UNIT/L (ref 5–34)
BASOPHILS # BLD AUTO: 0.08 X10(3)/MCL
BASOPHILS NFR BLD AUTO: 0.7 %
BILIRUB SERPL-MCNC: 1.3 MG/DL
BUN SERPL-MCNC: 24 MG/DL (ref 8.4–25.7)
CALCIUM SERPL-MCNC: 8.1 MG/DL (ref 8.8–10)
CHLORIDE SERPL-SCNC: 95 MMOL/L (ref 98–107)
CO2 SERPL-SCNC: 23 MMOL/L (ref 23–31)
CREAT SERPL-MCNC: 1.34 MG/DL (ref 0.73–1.18)
EOSINOPHIL # BLD AUTO: 0.3 X10(3)/MCL (ref 0–0.9)
EOSINOPHIL NFR BLD AUTO: 2.6 %
ERYTHROCYTE [DISTWIDTH] IN BLOOD BY AUTOMATED COUNT: 15.5 % (ref 11.5–17)
GFR SERPLBLD CREATININE-BSD FMLA CKD-EPI: 55 MLS/MIN/1.73/M2
GLOBULIN SER-MCNC: 3.4 GM/DL (ref 2.4–3.5)
GLUCOSE SERPL-MCNC: 136 MG/DL (ref 82–115)
HCT VFR BLD AUTO: 38.1 % (ref 42–52)
HGB BLD-MCNC: 11.9 G/DL (ref 14–18)
IMM GRANULOCYTES # BLD AUTO: 0.22 X10(3)/MCL (ref 0–0.04)
IMM GRANULOCYTES NFR BLD AUTO: 1.9 %
LYMPHOCYTES # BLD AUTO: 1.38 X10(3)/MCL (ref 0.6–4.6)
LYMPHOCYTES NFR BLD AUTO: 12.1 %
MCH RBC QN AUTO: 26.5 PG (ref 27–31)
MCHC RBC AUTO-ENTMCNC: 31.2 G/DL (ref 33–36)
MCV RBC AUTO: 84.9 FL (ref 80–94)
MONOCYTES # BLD AUTO: 1.38 X10(3)/MCL (ref 0.1–1.3)
MONOCYTES NFR BLD AUTO: 12.1 %
NEUTROPHILS # BLD AUTO: 8 X10(3)/MCL (ref 2.1–9.2)
NEUTROPHILS NFR BLD AUTO: 70.6 %
NRBC BLD AUTO-RTO: 0 %
PLATELET # BLD AUTO: 576 X10(3)/MCL (ref 130–400)
PMV BLD AUTO: 10.7 FL (ref 7.4–10.4)
POTASSIUM SERPL-SCNC: 4.7 MMOL/L (ref 3.5–5.1)
PROT SERPL-MCNC: 5.9 GM/DL (ref 5.8–7.6)
RBC # BLD AUTO: 4.49 X10(6)/MCL (ref 4.7–6.1)
SODIUM SERPL-SCNC: 128 MMOL/L (ref 136–145)
WBC # SPEC AUTO: 11.36 X10(3)/MCL (ref 4.5–11.5)

## 2024-03-14 PROCEDURE — 25000003 PHARM REV CODE 250: Performed by: PHYSICIAN ASSISTANT

## 2024-03-14 PROCEDURE — 25000003 PHARM REV CODE 250: Performed by: STUDENT IN AN ORGANIZED HEALTH CARE EDUCATION/TRAINING PROGRAM

## 2024-03-14 PROCEDURE — 25000003 PHARM REV CODE 250: Performed by: INTERNAL MEDICINE

## 2024-03-14 PROCEDURE — 63600175 PHARM REV CODE 636 W HCPCS: Performed by: INTERNAL MEDICINE

## 2024-03-14 PROCEDURE — 80053 COMPREHEN METABOLIC PANEL: CPT | Performed by: INTERNAL MEDICINE

## 2024-03-14 PROCEDURE — 94760 N-INVAS EAR/PLS OXIMETRY 1: CPT

## 2024-03-14 PROCEDURE — 27000221 HC OXYGEN, UP TO 24 HOURS

## 2024-03-14 PROCEDURE — 21400001 HC TELEMETRY ROOM

## 2024-03-14 PROCEDURE — 97530 THERAPEUTIC ACTIVITIES: CPT

## 2024-03-14 PROCEDURE — 63600175 PHARM REV CODE 636 W HCPCS: Performed by: NURSE PRACTITIONER

## 2024-03-14 PROCEDURE — 25000003 PHARM REV CODE 250: Performed by: NURSE PRACTITIONER

## 2024-03-14 PROCEDURE — 85025 COMPLETE CBC W/AUTO DIFF WBC: CPT | Performed by: INTERNAL MEDICINE

## 2024-03-14 RX ORDER — SODIUM CHLORIDE 1 G/1
2000 TABLET ORAL 3 TIMES DAILY
Status: DISCONTINUED | OUTPATIENT
Start: 2024-03-14 | End: 2024-03-16

## 2024-03-14 RX ADMIN — SUCRALFATE 1 G: 1 TABLET ORAL at 06:03

## 2024-03-14 RX ADMIN — CEFEPIME 2 G: 2 INJECTION, POWDER, FOR SOLUTION INTRAVENOUS at 11:03

## 2024-03-14 RX ADMIN — ACETAMINOPHEN 650 MG: 325 TABLET, FILM COATED ORAL at 08:03

## 2024-03-14 RX ADMIN — ASPIRIN 81 MG: 81 TABLET, COATED ORAL at 09:03

## 2024-03-14 RX ADMIN — GUAIFENESIN AND CODEINE PHOSPHATE 10 ML: 100; 10 SOLUTION ORAL at 01:03

## 2024-03-14 RX ADMIN — HYDROCODONE BITARTRATE AND ACETAMINOPHEN 1 TABLET: 5; 325 TABLET ORAL at 02:03

## 2024-03-14 RX ADMIN — SUCRALFATE 1 G: 1 TABLET ORAL at 08:03

## 2024-03-14 RX ADMIN — SODIUM CHLORIDE 2000 MG: 1 TABLET ORAL at 08:03

## 2024-03-14 RX ADMIN — SUCRALFATE 1 G: 1 TABLET ORAL at 11:03

## 2024-03-14 RX ADMIN — ONDANSETRON 8 MG: 2 INJECTION INTRAMUSCULAR; INTRAVENOUS at 01:03

## 2024-03-14 RX ADMIN — FERROUS SULFATE TAB 325 MG (65 MG ELEMENTAL FE) 1 EACH: 325 (65 FE) TAB at 09:03

## 2024-03-14 RX ADMIN — GUAIFENESIN AND CODEINE PHOSPHATE 10 ML: 100; 10 SOLUTION ORAL at 09:03

## 2024-03-14 RX ADMIN — SODIUM CHLORIDE 1000 MG: 1 TABLET ORAL at 09:03

## 2024-03-14 RX ADMIN — CEFEPIME 2 G: 2 INJECTION, POWDER, FOR SOLUTION INTRAVENOUS at 12:03

## 2024-03-14 RX ADMIN — SODIUM CHLORIDE 2000 MG: 1 TABLET ORAL at 02:03

## 2024-03-14 RX ADMIN — GUAIFENESIN AND CODEINE PHOSPHATE 10 ML: 100; 10 SOLUTION ORAL at 02:03

## 2024-03-14 RX ADMIN — PANTOPRAZOLE SODIUM 40 MG: 40 TABLET, DELAYED RELEASE ORAL at 09:03

## 2024-03-14 RX ADMIN — ATORVASTATIN CALCIUM 40 MG: 40 TABLET, FILM COATED ORAL at 08:03

## 2024-03-14 RX ADMIN — GUAIFENESIN AND CODEINE PHOSPHATE 10 ML: 100; 10 SOLUTION ORAL at 08:03

## 2024-03-14 RX ADMIN — ENOXAPARIN SODIUM 40 MG: 40 INJECTION SUBCUTANEOUS at 05:03

## 2024-03-14 RX ADMIN — FUROSEMIDE 40 MG: 40 TABLET ORAL at 09:03

## 2024-03-14 RX ADMIN — FOLIC ACID 1 MG: 1 TABLET ORAL at 09:03

## 2024-03-14 RX ADMIN — ALLOPURINOL 50 MG: 300 TABLET ORAL at 09:03

## 2024-03-14 NOTE — PT/OT/SLP PROGRESS
Occupational Therapy   Treatment    Name: Brain Snyder  MRN: [id number]  Admitting Diagnosis:  CAD (coronary artery disease)  16 Days Post-Op    Recommendations:     Recommended therapy intensity at discharge: Moderate Intensity Therapy   Discharge Equipment Recommendations:  to be determined by next level of care  Barriers to discharge:   (Ongoing medical needs)    Assessment:     Brain Snyder is a 78 y.o. male with a medical diagnosis of CAD (coronary artery disease) s/p CABG on 2/27, s/p R groin impella and mechanical intubation, self-extubated on 2/29 and was downgraded from ICU on 3/2.  He presents with good effort, requiring Mod A for bed mobility and bed>chair transfer with RW. Pt required max vc's for safety with RW management and aligning hips with recliner before sitting. Performance deficits affecting function are weakness, impaired endurance, impaired sensation, impaired self care skills, impaired balance, gait instability, impaired functional mobility, decreased upper extremity function, decreased lower extremity function, decreased ROM, pain, decreased safety awareness.     Rehab Prognosis:  Good; patient would benefit from acute skilled OT services to address these deficits and reach maximum level of function.       Plan:     Patient to be seen 5 x/week to address the above listed problems via self-care/home management, therapeutic activities, therapeutic exercises  Plan of Care Expires: 04/09/24  Plan of Care Reviewed with: patient (relative)    Subjective     Pain/Comfort:  Pain Rating 1:  (Rating not provided, pt reporting pain of R knee with mobility)  Location - Side 1: Right  Location 1: knee  Pain Addressed 1: Reposition, Distraction    Objective:     Communicated with: Nurse prior to session.  Patient found HOB elevated with telemetry, gustafson catheter upon OT entry to room.    General Precautions: Standard, fall, sternal    Orthopedic Precautions:N/A  Braces: N/A  Respiratory Status: Room  air    Occupational Performance:     Bed Mobility:    Patient completed Supine to Sit with moderate assistance     Functional Mobility/Transfers:  Patient completed Sit <> Stand Transfer with moderate assistance  with  rolling walker   Patient completed Bed <> Chair Transfer using Step Transfer technique with moderate assistance with rolling walker    Activities of Daily Living:  Feeding:  set up of drinks on bedside table to encourage independence with self feeding.  Toileting: dependence gustafson catheter in place    Therapeutic Positioning    OT interventions performed during the course of today's session in an effort to prevent and/or reduce acquired pressure injuries:   Education was provided on benefits of and recommendations for therapeutic positioning     Findings:  Visible skin intact    Patient Education:  Patient and family were provided with verbal education and demonstrations education regarding OT role/goals/POC, post op precautions, fall prevention, and safety awareness.  Understanding was verbalized, however additional teaching warranted.      Patient left up in chair with all lines intact, call button in reach, nurse notified, and relative present.    GOALS:   Multidisciplinary Problems       Occupational Therapy Goals          Problem: Occupational Therapy    Goal Priority Disciplines Outcome Interventions   Occupational Therapy Goal     OT, PT/OT Ongoing, Not Progressing    Description: Goals to be met by: 4/1/24     Patient will increase functional independence with ADLs by performing:    UE Dressing with min A   Grooming while standing at sink with Minimal Assistance.  Toileting from commode with Moderate Assistance for hygiene and clothing management.   Sitting at edge of bed x30 minutes with Minimal Assistance. (Met)  Toilet transfer to bedside commode with Minimal Assistance.                         Time Tracking:     OT Date of Treatment: 03/14/24  OT Start Time: 1315  OT Stop Time: 1335  OT  Total Time (min): 20 min    Billable Minutes:Therapeutic Activity 20 minutes    OT/YVETTE: OT     Number of YVETTE visits since last OT visit: 2    3/14/2024

## 2024-03-14 NOTE — PT/OT/SLP PROGRESS
Physical Therapy      Patient Name:  Brain Snyder   MRN:  [id number]    Patient not seen today secondary to refusing participation despite maximal education, just got back to bed with nsg and c/o pain and need to have a BM . Will follow-up as schedule allows.

## 2024-03-14 NOTE — PROGRESS NOTES
[facility] Hospital Medicine Progress Note        Chief Complaint: Inpatient Follow-up for R groin cellulitis    HPI per admitting team: 78-year-old male with a history of aortic insufficiency/stenosis, CAD, CKD IIIb, HTN AFib on Eliquis admitted to [facility] 02/15/2024 with chest pain, found to have NSTEMI (peak troponin 0.17) and underwent C 02/20/2024 showing severe multivessel stenosis and therefore was transferred to [facility] for CABG evaluation. Cardiology was consulted Patient had Impella placed on 02/23 and was admitted to the ICU.  Was started on aggressive diuresis with IV Lasix.  CV surgery was consulted.  Urology was consulted for hematuria; Nguyen catheter placed over guidewire with dilation secondary to urethral strictures. IV Heparin infusion was initiated per CIS but held due to hematuria/hemoptysis on 02/24.  He was also noted to have an JEAN-CLAUDE. Received 2 units PRBCs on 02/26 and was planned for high-risk PCI on 02/26 which was later canceled.  Underwent CABG x 3 2/27 (LIMA to LAD, RSVG to OM 1, R SVG to RCA, bioprosthetic AVR).  Remained on mechanical ventilation thereafter.  Patient noted to have tachycardia with atrial fibrillation/RVR requiring IV amiodarone along with pressors (norepinephrine/Milrinone) for hypotension. Patient underwent cardioversion x 2 with minimal improvement in HR/BP.  Patient self-extubated overnight on 02/29 and was noted to have adequate saturations on 2 L O2 via NC thereafter.  PT/OT consulted.  Renal function noted to be improving. Continued on IV diuresis as he appeared to be significantly volume overloaded postoperatively along with elevated pulmonary artery wedge pressures.  Started on p.o. amiodarone taper on 03/03.  Patient noted to have drainage from right groin wound and started on vancomycin on 03/04. Wound culture grew Pseudomonas.  Chest tubes discontinued on 03/02.  Downgraded from ICU on 03/02.  CIS and CV surgery continued following  patient.  CV surgery signed off on 03/06 and discontinued vancomycin.  Patient was placed on oral Levaquin.  Hospital medicine consulted on 03/06 for assistance with medical management and DC planning.  Repeat CT abdomen and pelvis of 3/9 shows improving stranding and improving hematoma in the right groin, persistent left-sided effusion and atelectasis  Patient complains of low back pain, cough and his SCDs too tight on his feet bilaterally  Vitals reviewed with blood pressure low normal, heart rates in the low 100s, saturating 94% on 2 L of oxygen   Labs reviewed and fairly stable   General surgery evaluated patient has right groin no indication for surgery at this time  Palliative care on board  ID adjusted antibiotics to IV cefepime  Nephrology placed on fluid restriction  Cardiology has suspended amiodarone due to elevated liver enzymes and Lasix due to hyponatremia    Interval Hx:   Patient is sleeping comfortably.  Daughter is at bedside who speaks little English.  She was not aware if sister has submitted paperwork for Medicaid, informed her she did per   No major concerns per daughter  Case was discussed with patient's nurse and  on the floor.    Objective/physical exam:  General: In no acute distress, afebrile, elderly male  Chest: Clear to auscultation bilaterally anteriorly  Heart: RRR, +S1, S2, no appreciable murmur  Abdomen: Soft, nontender, BS +  MSK: Warm, bilateral lower extremity edema present, no clubbing or cyanosis  Neurologic:  Asleep, did not wake up to my examination    VITAL SIGNS: 24 HRS MIN & MAX LAST   Temp  Min: 97.6 °F (36.4 °C)  Max: 98.3 °F (36.8 °C) 97.8 °F (36.6 °C)   BP  Min: 86/67  Max: 111/81 105/68   Pulse  Min: 74  Max: 87  80   Resp  Min: 16  Max: 19 18   SpO2  Min: 95 %  Max: 98 % 97 %     I reviewed the labs below:  Recent Labs   Lab 03/11/24  0446 03/12/24  0449 03/13/24  0615   WBC 15.28* 14.95* 13.79*   RBC 4.35* 4.48* 4.61*   HGB 11.8* 12.1* 12.2*    HCT 36.6* 37.8* 39.2*   MCV 84.1 84.4 85.0   MCH 27.1 27.0 26.5*   MCHC 32.2* 32.0* 31.1*   RDW 15.4 15.4 15.7   * 590* 637*   MPV 10.4 10.2 10.5*     Recent Labs   Lab 03/09/24  0704 03/10/24  0611 03/11/24  0445 03/12/24  0449 03/13/24  0615   * 130* 128* 129* 129*   K 3.7 3.9 4.1 4.2 4.3   CO2 28 30 26 25 27   BUN 20.3 20.3 22.8 24.7 25.9*   CREATININE 1.30* 1.22* 1.30* 1.33* 1.32*   CALCIUM 8.0* 8.2* 8.1* 8.1* 8.4*   MG 2.00 1.90 2.00  --   --    ALBUMIN 2.4* 2.5* 2.5* 2.5* 2.6*   ALKPHOS 114 114 116 107 110   ALT 78* 73* 71* 67* 76*   * 88* 78* 74* 90*   BILITOT 1.9* 1.8* 1.6* 1.6* 1.5     Microbiology Results (last 7 days)       Procedure Component Value Units Date/Time    Wound Culture [[id number]]  (Abnormal)  (Susceptibility) Collected: 03/03/24 1920    Order Status: Completed Specimen: Wound from Groin Updated: 03/07/24 1053     Wound Culture Many Serratia marcescens      Many Pseudomonas aeruginosa             See below for Radiology    Scheduled Med:   allopurinoL  50 mg Oral Daily    aspirin  81 mg Oral Daily    atorvastatin  40 mg Oral QHS    ceFEPime IV (PEDS and ADULTS)  2 g Intravenous Q12H    enoxparin  40 mg Subcutaneous Daily    ferrous sulfate  1 tablet Oral Every other day    folic acid  1 mg Oral Daily    furosemide  40 mg Oral Daily    guaiFENesin-codeine 100-10 mg/5 ml  10 mL Oral TID    metoprolol succinate  12.5 mg Oral Daily    pantoprazole  40 mg Oral Daily    sodium chloride  1,000 mg Oral TID    sucralfate  1 g Oral QID (AC & HS)      Continuous Infusions:   loperamide        PRN Meds:  acetaminophen, acetaminophen, albumin human 5%, dextrose 10%, dextrose 10%, electrolyte-A, heparin, porcine (PF), heparin, porcine (PF), HYDROcodone-acetaminophen, lactulose 10 gram/15 ml, loperamide, melatonin, metoclopramide, nitroGLYCERIN, ondansetron, ondansetron, simethicone, sodium chloride 0.9%, sodium chloride 0.9%     Assessment/Plan:  Right flank pain- due to right-sided  lower abdomen hematoma  R Groin Purulent Cellulitis at previous impella insertion site, improving   Transaminitis- multifactorial-  Congestive hepatopathy versus infection vs drug induced  Leukocytosis- resolved  Fluid overload 2/2 HFrEF exacerbation, fairly euvolemic clinically  HFrEF/HFpEF, euvolemic   JEAN-CLAUDE on stage II CKD - resolved   Hyponatremia- persist  Pulmonary hypertension   NSTEMI, CAD sp CABG x 3  S/p AVR  Atrial Fibrillation- controlled ventricular rate  Normocytic anemia  Hematuria s/p Nguyen  Urethral stricture s/p dilation  Moderate malnutrition         General surgery evaluated patient, reviewed CT scan of the area, given wound waning with scant serous/purulent drainage with manual expression, recommended wound care consult and continued to manually express fluid from site Q shift.  If area stopped draining, recommended do ultrasound to better define anatomy  ID Dr KELLY adjusted antibiotics to IV cefepime- day 5, duration of treatment per ID  Leukocytosis resolved  Wound culture grew Pseudomonas and Serratia marcescens- both sensitive to cefepime  Nephrology placed pt on 1500 mL fluid restriction for hyponatremia  Lasix 40 mg po daily started on 03/13/2024 per nephrology  Appreciate pulmonology input continue to monitor effusion, no need for thoracentesis at this time  Cardiology has suspended amiodarone due to elevated liver enzymes  Appreciate CIS input, continue po metoprolol succinate 12.5 mg q.day, atorvastatin 40 mg HS  Suspend lisinopril and Aldactone, due to low blood pressure   Continue use of incentive spirometry and oxygen supplementation, currently on 2 L supplemental oxygen via nasal cannula  Appreciate Urology input, Nguyen catheter has been checked out--> no signs of active infection   Trend AST/ALT- 90/76, stable  Ultrasound right upper quadrant shows mild-to-moderate hepatomegaly.  No biliary dilation  Continued allopurinol 50 mg daily, aspirin 81 mg, FeSO4 every other day, folic acid 1  mg, Protonix 40 mg daily, sucralfate 1 g q.i.d.  P.o. Norco 5 mg q.6 p.r.n. for pain  Antitussives p.r.n.  PT/OT recommending moderate-intensity therapy, case management on board, awaiting Medicaid approval for SNF.  Patient's daughter has submitted paperwork to Medicaid  Palliative care also on board, greatly appreciated  Morning CBC, CMP ordered       VTE prophylaxis:  Lovenox     Patient condition:  guarded     Anticipated discharge and Disposition:    SNF, awaiting Medicaid approval      All diagnosis and differential diagnosis have been reviewed; assessment and plan has been documented; I have personally reviewed the labs and test results that are presently available; I have reviewed the patients medication list; I have reviewed the consulting providers response and recommendations. I have reviewed or attempted to review medical records based upon their availability    All of the patient's questions have been  addressed and answered. Patient's is agreeable to the above stated plan. I will continue to monitor closely and make adjustments to medical management as needed.  _____________________________________________________________________    Nutrition Status:  Patient meets ASPEN criteria for moderate malnutrition of acute illness or injury per RD assessment as evidenced by:  Energy Intake (Malnutrition): less than or equal to 50% for greater than or equal to 5 days  Weight Loss (Malnutrition):  (unable to determine)  Subcutaneous Fat (Malnutrition):  (does not meet criteria)  Muscle Mass (Malnutrition): mild depletion  Fluid Accumulation (Malnutrition): mild        A minimum of two characteristics is recommended for diagnosis of either severe or non-severe malnutrition.   Radiology:   I have personally reviewed the images and agree with radiologist report  US Soft Tissue, Lower Extremity, Right  EXAMINATION:  US SOFT TISSUE, LOWER EXTREMITY, RIGHT    CLINICAL HISTORY:  evaluate for fluid collection to right  lower abd/groin;    TECHNIQUE:  Grayscale and color Doppler imaging of the soft tissue of the right groin.    COMPARISON:  CT abdomen pelvis 03/09/2024    FINDINGS:  No organized fluid collection seen.  No mass.    Electronically signed by: Elida Fiore  Date:    03/13/2024  Time:    15:02      Tom Gilbert MD  Department of Hospital Medicine   [facility]   03/14/2024

## 2024-03-14 NOTE — PROGRESS NOTES
Nephrology follow up progress note    HPI:      Brain Snyder is a 78 y.o. male who presented to this facility on 02/21/2024 as a transfer for CV surgery evaluation status post [facility] on 02/20/2024 showing multivessel CAD.  He remained in the ICU with Impella to the right groin post transfer.  On  02/27/2024 he underwent CABG x3 and bioprosthetic AVR.  Postoperatively he had atrial fibrillation with RVR requiring amiodarone infusion.    -He self extubated on 02/29.    -By 3/1 LFTs and WBC began to worsen.  -Developed abdominal distention with peripheral edema on 03/06.  Culture of wound to groin positive for Gram-negative rods and Pseudomonas (Impella site)     On 03/11 nephrology was consulted for hyponatremia.  Up until 48 hours ago he was receiving twice daily IV Lasix, Aldactone, and metolazone.  Serum sodium was stable around 134/135 until/8 when began to worsen.  Today, 128.  Urine output 3900 mL in the past 24 hours, 1600 already today. He appears quite weakened. Family at bedside and reports he has recently gotten out of bed 20 minutes ago. He has no history of hyponatremia.    Interval history:     Patient relatively stable.  Seen with the assistance of .  Only complaint is back and abdominal pain.  He estimates he only drank about 1 bottle of water yesterday.  No chest pain, shortness of breath, nausea, vomiting, or lower extremity edema.     Review of Systems:       Past medical, family, surgical, and social history reviewed and unchanged from initial consult note.     Objective:       VITAL SIGNS: 24 HR MIN & MAX LAST    Temp  Min: 97.6 °F (36.4 °C)  Max: 98.3 °F (36.8 °C)  97.6 °F (36.4 °C)        BP  Min: 86/67  Max: 105/68  100/71     Pulse  Min: 74  Max: 93  93     Resp  Min: 18  Max: 20  20    SpO2  Min: 95 %  Max: 98 %  98 %      GEN: Chronically ill appearing  male in NAD  CV: RRR +S1,S2 without murmur  PULM: CTAB, unlabored, room air   ABD: Soft, NT/ND abdomen with NABS  EXT:  No cyanosis, trace edema, bilateral heel boots  SKIN: Warm and dry  PSYCH: Awake, alert, and appropriately conversant  Vascular access: no dialysis access          Component Value Date/Time     (L) 03/14/2024 0727     (L) 03/13/2024 0615    K 4.7 03/14/2024 0727    K 4.3 03/13/2024 0615    CHLORIDE 95 (L) 03/14/2024 0727    CHLORIDE 92 (L) 03/13/2024 0615    CO2 23 03/14/2024 0727    CO2 27 03/13/2024 0615    BUN 24.0 03/14/2024 0727    BUN 25.9 (H) 03/13/2024 0615    CREATININE 1.34 (H) 03/14/2024 0727    CREATININE 1.32 (H) 03/13/2024 0615    CALCIUM 8.1 (L) 03/14/2024 0727    CALCIUM 8.4 (L) 03/13/2024 0615    PHOS 3.6 02/29/2024 0221            Component Value Date/Time    WBC 11.36 03/14/2024 0727    WBC 13.79 (H) 03/13/2024 0615    WBC 10.84 03/07/2024 0407    WBC 13.5 03/06/2024 0419    HGB 11.9 (L) 03/14/2024 0727    HGB 12.2 (L) 03/13/2024 0615    HCT 38.1 (L) 03/14/2024 0727    HCT 39.2 (L) 03/13/2024 0615    HCT 20 (LL) 02/27/2024 2041    HCT 27 (L) 02/27/2024 1947     (H) 03/14/2024 0727     (H) 03/13/2024 0615       Imaging reviewed      Assessment / Plan:     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  Multivessel CAD status post CABG and AVR 2/27  3. Right groin Impella site now with wound growing Pseudomonas and Serratia marcescens as of 3/3  -repeat CT 3/9 shows hematoma in edema of the right groin  4. Acute on chronic heart failure-most recent EF 30% with severe global hypokinesis  5. Elevated LFT  6. CKD baseline Cr 1.6   7. Anemia s/p PRBC transfusion 2/28 and 2/29     Plan:  Hyponatremia not improving.  Increase salt tablets to 2 g t.i.d..  Continue Lasix 40 mg daily, and 1.5 L fluid restriction.  If not improving by tomorrow may need to give a dose of tolvaptan.

## 2024-03-14 NOTE — PLAN OF CARE
Shannan with Medicaid has informed me that the pt daughter Brii has submitted documents needed to continue the Medicaid application.

## 2024-03-14 NOTE — PROGRESS NOTES
[facility] [address]    Patient Name:  Brain Snyder   MRN:  [id number]  Date: 3/14/2024  Diagnosis: CAD (coronary artery disease)    History:     Past Medical History:   Diagnosis Date    A-fib     Aortic insufficiency     CAD (coronary artery disease)     Hypertension        Social History     Socioeconomic History    Marital status:    Tobacco Use    Smoking status: Former     Types: Cigarettes    Smokeless tobacco: Never   Substance and Sexual Activity    Alcohol use: Not Currently    Drug use: Not Currently     Social Determinants of Health     Financial Resource Strain: Low Risk  (2/16/2024)    Overall Financial Resource Strain (CARDIA)     Difficulty of Paying Living Expenses: Not hard at all   Food Insecurity: No Food Insecurity (2/16/2024)    Hunger Vital Sign     Worried About Running Out of Food in the Last Year: Never true     Ran Out of Food in the Last Year: Never true   Transportation Needs: No Transportation Needs (2/16/2024)    PRAPARE - Transportation     Lack of Transportation (Medical): No     Lack of Transportation (Non-Medical): No   Physical Activity: Inactive (2/16/2024)    Exercise Vital Sign     Days of Exercise per Week: 0 days     Minutes of Exercise per Session: 0 min   Stress: Stress Concern Present (2/16/2024)    [employer] [place] [employer] - Occupational Stress Questionnaire     Feeling of Stress : To some extent   Social Connections: Socially Integrated (2/22/2024)    Social Connection and Isolation Panel [NHANES]     Frequency of Communication with Friends and Family: Twice a week     Frequency of Social Gatherings with Friends and Family: Twice a week     Attends Je[employer] witness Services: 1 to 4 times per year     Active Member of Clubs or Organizations: Yes     Attends Club or Organization Meetings: 1 to 4 times per year     Marital Status:    Housing Stability: Low Risk  (2/16/2024)    Housing Stability Vital Sign      Unable to Pay for Housing in the Last Year: No     Number of Places Lived in the Last Year: 1     Unstable Housing in the Last Year: No       Precautions:     Allergies as of 02/21/2024    (No Known Allergies)       [facility] Assessment Details/Treatment      03/14/24 0850   WO Assessment   Visit Date 03/14/24   Visit Time 0850   Consult Type Follow Up   [facility] Speciality Wound   Wound surgical   Intervention chart review;assessed;applied   Teaching on-going        Incision/Site 02/27/24 1430 Right Leg anterior;lower;proximal Laparoscopic Puncture   Date First Assessed/Time First Assessed: 02/27/24 1430   Present Prior to Hospital Arrival?: No  Side: Right  Location: Leg  Orientation: anterior;lower;proximal  Incision Type: Laparoscopic Puncture  Closure Method: Sutures  Additional Comments: HAYLEY...   Wound Image    Dressing Appearance Intact;Moist drainage   Drainage Amount None   Drainage Characteristics/Odor No odor   Appearance Yellow;Pink;Moist   Black (%), Wound Tissue Color 0 %   Red (%), Wound Tissue Color 50 %   Yellow (%), Wound Tissue Color 50 %   Periwound Area Intact;Dry;Pink   Wound Edges Irregular   Care Cleansed with:;Antimicrobial agent;Wound cleanser;Other (see comments)  (Vashe)   Dressing Applied;Gauze;Absorptive Pad;Other (comment)  (Secure with medipore tape)     [facility] follow up for right groin. Discussed plan of care with nurse Aguila prior to visiting the patient. Treatment recommendations in place. Daughter and  at bedside. Nursing team to continue with current treatment recommendation in place. Nurse worried about the amount of drainage coming from incision, patient did have ultrasound yesterday to right groin area. Nursing to monitor drainage. Patient demonstrated the ability to mobilize self. Daughter has no further questions at this time. Will follow up.   03/14/2024

## 2024-03-15 LAB
ALBUMIN SERPL-MCNC: 2.7 G/DL (ref 3.4–4.8)
ALBUMIN/GLOB SERPL: 0.8 RATIO (ref 1.1–2)
ALP SERPL-CCNC: 127 UNIT/L (ref 40–150)
ALT SERPL-CCNC: 84 UNIT/L (ref 0–55)
AST SERPL-CCNC: 98 UNIT/L (ref 5–34)
BILIRUB SERPL-MCNC: 1.4 MG/DL
BUN SERPL-MCNC: 24.6 MG/DL (ref 8.4–25.7)
CALCIUM SERPL-MCNC: 8.3 MG/DL (ref 8.8–10)
CHLORIDE SERPL-SCNC: 96 MMOL/L (ref 98–107)
CO2 SERPL-SCNC: 27 MMOL/L (ref 23–31)
CREAT SERPL-MCNC: 1.57 MG/DL (ref 0.73–1.18)
ERYTHROCYTE [DISTWIDTH] IN BLOOD BY AUTOMATED COUNT: 15.6 % (ref 11.5–17)
GFR SERPLBLD CREATININE-BSD FMLA CKD-EPI: 45 MLS/MIN/1.73/M2
GLOBULIN SER-MCNC: 3.5 GM/DL (ref 2.4–3.5)
GLUCOSE SERPL-MCNC: 98 MG/DL (ref 82–115)
HCT VFR BLD AUTO: 40.4 % (ref 42–52)
HGB BLD-MCNC: 12.7 G/DL (ref 14–18)
MCH RBC QN AUTO: 27.1 PG (ref 27–31)
MCHC RBC AUTO-ENTMCNC: 31.4 G/DL (ref 33–36)
MCV RBC AUTO: 86.1 FL (ref 80–94)
NRBC BLD AUTO-RTO: 0 %
OHS QRS DURATION: 176 MS
OHS QTC CALCULATION: 578 MS
PLATELET # BLD AUTO: 593 X10(3)/MCL (ref 130–400)
PMV BLD AUTO: 10.3 FL (ref 7.4–10.4)
POTASSIUM SERPL-SCNC: 4.9 MMOL/L (ref 3.5–5.1)
PROT SERPL-MCNC: 6.2 GM/DL (ref 5.8–7.6)
RBC # BLD AUTO: 4.69 X10(6)/MCL (ref 4.7–6.1)
SODIUM SERPL-SCNC: 130 MMOL/L (ref 136–145)
TROPONIN I SERPL-MCNC: 0.18 NG/ML (ref 0–0.04)
WBC # SPEC AUTO: 12.25 X10(3)/MCL (ref 4.5–11.5)

## 2024-03-15 PROCEDURE — 84484 ASSAY OF TROPONIN QUANT: CPT | Performed by: INTERNAL MEDICINE

## 2024-03-15 PROCEDURE — 21400001 HC TELEMETRY ROOM

## 2024-03-15 PROCEDURE — 97535 SELF CARE MNGMENT TRAINING: CPT

## 2024-03-15 PROCEDURE — 25000003 PHARM REV CODE 250: Performed by: INTERNAL MEDICINE

## 2024-03-15 PROCEDURE — 80053 COMPREHEN METABOLIC PANEL: CPT | Performed by: INTERNAL MEDICINE

## 2024-03-15 PROCEDURE — 93010 ELECTROCARDIOGRAM REPORT: CPT | Mod: ,,, | Performed by: INTERNAL MEDICINE

## 2024-03-15 PROCEDURE — 85027 COMPLETE CBC AUTOMATED: CPT | Performed by: INTERNAL MEDICINE

## 2024-03-15 PROCEDURE — 93005 ELECTROCARDIOGRAM TRACING: CPT

## 2024-03-15 PROCEDURE — 99900035 HC TECH TIME PER 15 MIN (STAT)

## 2024-03-15 PROCEDURE — 25000003 PHARM REV CODE 250: Performed by: PHYSICIAN ASSISTANT

## 2024-03-15 PROCEDURE — 63600175 PHARM REV CODE 636 W HCPCS: Performed by: NURSE PRACTITIONER

## 2024-03-15 PROCEDURE — 25000003 PHARM REV CODE 250: Performed by: STUDENT IN AN ORGANIZED HEALTH CARE EDUCATION/TRAINING PROGRAM

## 2024-03-15 PROCEDURE — 94760 N-INVAS EAR/PLS OXIMETRY 1: CPT

## 2024-03-15 PROCEDURE — 97530 THERAPEUTIC ACTIVITIES: CPT

## 2024-03-15 PROCEDURE — 25000003 PHARM REV CODE 250: Performed by: NURSE PRACTITIONER

## 2024-03-15 PROCEDURE — 63600175 PHARM REV CODE 636 W HCPCS: Performed by: INTERNAL MEDICINE

## 2024-03-15 PROCEDURE — 97116 GAIT TRAINING THERAPY: CPT | Mod: CQ

## 2024-03-15 RX ORDER — DIGOXIN 0.25 MG/ML
500 INJECTION INTRAMUSCULAR; INTRAVENOUS ONCE
Status: COMPLETED | OUTPATIENT
Start: 2024-03-15 | End: 2024-03-15

## 2024-03-15 RX ORDER — DIGOXIN 0.25 MG/ML
250 INJECTION INTRAMUSCULAR; INTRAVENOUS EVERY 6 HOURS
Status: COMPLETED | OUTPATIENT
Start: 2024-03-15 | End: 2024-03-16

## 2024-03-15 RX ORDER — CYPROHEPTADINE HYDROCHLORIDE 4 MG/1
4 TABLET ORAL 2 TIMES DAILY
Status: DISCONTINUED | OUTPATIENT
Start: 2024-03-15 | End: 2024-03-26 | Stop reason: HOSPADM

## 2024-03-15 RX ORDER — DRONABINOL 2.5 MG/1
2.5 CAPSULE ORAL 2 TIMES DAILY
Status: DISCONTINUED | OUTPATIENT
Start: 2024-03-15 | End: 2024-03-15

## 2024-03-15 RX ORDER — MIDODRINE HYDROCHLORIDE 5 MG/1
5 TABLET ORAL 2 TIMES DAILY PRN
Status: DISCONTINUED | OUTPATIENT
Start: 2024-03-15 | End: 2024-03-20

## 2024-03-15 RX ADMIN — SUCRALFATE 1 G: 1 TABLET ORAL at 12:03

## 2024-03-15 RX ADMIN — SUCRALFATE 1 G: 1 TABLET ORAL at 06:03

## 2024-03-15 RX ADMIN — GUAIFENESIN AND CODEINE PHOSPHATE 5 ML: 100; 10 SOLUTION ORAL at 08:03

## 2024-03-15 RX ADMIN — PANTOPRAZOLE SODIUM 40 MG: 40 TABLET, DELAYED RELEASE ORAL at 08:03

## 2024-03-15 RX ADMIN — SUCRALFATE 1 G: 1 TABLET ORAL at 08:03

## 2024-03-15 RX ADMIN — DIGOXIN 500 MCG: 0.25 INJECTION INTRAMUSCULAR; INTRAVENOUS at 09:03

## 2024-03-15 RX ADMIN — FOLIC ACID 1 MG: 1 TABLET ORAL at 08:03

## 2024-03-15 RX ADMIN — DIGOXIN 250 MCG: 250 INJECTION, SOLUTION INTRAMUSCULAR; INTRAVENOUS; PARENTERAL at 04:03

## 2024-03-15 RX ADMIN — ENOXAPARIN SODIUM 40 MG: 40 INJECTION SUBCUTANEOUS at 05:03

## 2024-03-15 RX ADMIN — GUAIFENESIN AND CODEINE PHOSPHATE 10 ML: 100; 10 SOLUTION ORAL at 03:03

## 2024-03-15 RX ADMIN — CEFEPIME 2 G: 2 INJECTION, POWDER, FOR SOLUTION INTRAVENOUS at 02:03

## 2024-03-15 RX ADMIN — SUCRALFATE 1 G: 1 TABLET ORAL at 03:03

## 2024-03-15 RX ADMIN — METOPROLOL SUCCINATE 12.5 MG: 25 TABLET, EXTENDED RELEASE ORAL at 08:03

## 2024-03-15 RX ADMIN — CYPROHEPTADINE HYDROCHLORIDE 4 MG: 4 TABLET ORAL at 12:03

## 2024-03-15 RX ADMIN — SODIUM CHLORIDE 250 ML: 9 INJECTION, SOLUTION INTRAVENOUS at 11:03

## 2024-03-15 RX ADMIN — SIMETHICONE 80 MG: 80 TABLET, CHEWABLE ORAL at 03:03

## 2024-03-15 RX ADMIN — SODIUM CHLORIDE 2000 MG: 1 TABLET ORAL at 08:03

## 2024-03-15 RX ADMIN — CYPROHEPTADINE HYDROCHLORIDE 4 MG: 4 TABLET ORAL at 08:03

## 2024-03-15 RX ADMIN — SODIUM CHLORIDE 2000 MG: 1 TABLET ORAL at 03:03

## 2024-03-15 RX ADMIN — GUAIFENESIN AND CODEINE PHOSPHATE 10 ML: 100; 10 SOLUTION ORAL at 08:03

## 2024-03-15 RX ADMIN — ASPIRIN 81 MG: 81 TABLET, COATED ORAL at 08:03

## 2024-03-15 RX ADMIN — ALLOPURINOL 50 MG: 300 TABLET ORAL at 08:03

## 2024-03-15 NOTE — PROGRESS NOTES
Inpatient Nutrition Assessment    Admit Date: 2/21/2024   Total duration of encounter: 23 days   Patient Age: 78 y.o.    Nutrition Recommendation/Prescription     Oral diet, Diet heart healthy Fluid - 1500mL as tolerated. Encouraged small and frequent meals.   Boost Breeze (provides 250 kcal, 9 g protein per serving) BID as tolerated.   Antiemetics and bowel regimen as feasible.   Medical management of electrolytes.     Communication of Recommendations: reviewed with nurse    Nutrition Assessment     Malnutrition Assessment/Nutrition-Focused Physical Exam  Malnutrition Context: acute illness or injury (03/11/24 1350)  Malnutrition Level: moderate (03/11/24 1350)  Energy Intake (Malnutrition): less than or equal to 50% for greater than or equal to 5 days (03/11/24 1350)  Weight Loss (Malnutrition):  (unable to determine) (03/11/24 1350)  Subcutaneous Fat (Malnutrition):  (does not meet criteria) (03/11/24 1350)           Muscle Mass (Malnutrition): mild depletion (03/11/24 1350)  [place] (Muscle Loss): mild depletion     Clavicle and Acromion Bone Region (Muscle Loss): mild depletion                 Fluid Accumulation (Malnutrition): mild (03/11/24 1350)        A minimum of two characteristics is recommended for diagnosis of either severe or non-severe malnutrition.  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% - (Compensated)   JEAN-CLAUDE on CKD    Relevant Medical History: PAF on Eliquis, Aortic insufficiency, MVCAD, HTN     Scheduled  Medications:  allopurinoL, 50 mg, Daily  aspirin, 81 mg, Daily  ceFEPime IV (PEDS and ADULTS), 2 g, Q12H  cyproheptadine, 4 mg, BID  enoxparin, 40 mg, Daily  ferrous sulfate, 1 tablet, Every other day  folic acid, 1 mg, Daily  guaiFENesin-codeine 100-10 mg/5 ml, 10 mL, TID  metoprolol succinate, 12.5 mg, Daily  pantoprazole, 40 mg, Daily  sodium chloride, 2,000 mg, TID  sucralfate, 1 g, QID (AC & HS)    Continuous Infusions:  loperamide    PRN Medications: acetaminophen, acetaminophen, albumin human 5%, dextrose 10%, dextrose 10%, electrolyte-A, heparin, porcine (PF), heparin, porcine (PF), HYDROcodone-acetaminophen, lactulose 10 gram/15 ml, loperamide, melatonin, metoclopramide, nitroGLYCERIN, ondansetron, ondansetron, simethicone, sodium chloride 0.9%, sodium chloride 0.9%    Calorie Containing IV Medications: no significant kcals from medications at this time    Recent Labs   Lab 03/09/24  0704 03/10/24  0611 03/11/24  0445 03/11/24  0446 03/12/24  0449 03/13/24  0615 03/14/24  0727 03/15/24  0402   * 130* 128*  --  129* 129* 128* 130*   K 3.7 3.9 4.1  --  4.2 4.3 4.7 4.9   CALCIUM 8.0* 8.2* 8.1*  --  8.1* 8.4* 8.1* 8.3*   MG 2.00 1.90 2.00  --   --   --   --   --    CHLORIDE 96* 91* 91*  --  94* 92* 95* 96*   CO2 28 30 26  --  25 27 23 27   BUN 20.3 20.3 22.8  --  24.7 25.9* 24.0 24.6   CREATININE 1.30* 1.22* 1.30*  --  1.33* 1.32* 1.34* 1.57*   EGFRNORACEVR 57 >60 57  --  55 56 55 45   GLUCOSE 96 89 112  --  99 95 136* 98   BILITOT 1.9* 1.8* 1.6*  --  1.6* 1.5 1.3 1.4   ALKPHOS 114 114 116  --  107 110 108 127   ALT 78* 73* 71*  --  67* 76* 76* 84*   * 88* 78*  --  74* 90* 90* 98*   ALBUMIN 2.4* 2.5* 2.5*  --  2.5* 2.6* 2.5* 2.7*   WBC 13.20* 14.93*  --  15.28* 14.95* 13.79* 11.36 12.25*   HGB 12.0* 11.9*  --  11.8* 12.1* 12.2* 11.9* 12.7*   HCT 37.7* 37.4*  --  36.6* 37.8* 39.2* 38.1* 40.4*       Nutrition Orders:  Diet heart healthy Fluid - 1500mL  Dietary nutrition supplements Boost Breeze Wild  "Laura; BID    Appetite/Oral Intake: poor/25-50% of meals  Factors Affecting Nutritional Intake: constipation, decreased appetite, and nausea  Food/Mu-ism/Cultural Preferences: unable to obtain  Food Allergies: no known food allergies  Last Bowel Movement: 03/11/24  Wound(s):      Comments    2/27: Pt NPO this AM, in OR for CABG/AVR at time of rounds. Pt with poor intake since admission per EMR. Last BM 2/25, meds noted. Per MST, no reports of decreased appetite or unintentional weight loss prior admission. Weight fluctuations noted past week, likely related to fluid. Will trend weights.    3/1/24: Pt previously on liquid diet. Diet changed earlier due pt pocketing meds. SLP following pt. Will follow for need for TF to start if NG placed. Pt confused, not able to answer questions.     3/6/24: Spoke to family member at bedside, pt eating minimally. Reports intermittent nausea and vomiting. Having Bms, last documented on 3/3. Drinking some of Boost VHC but states "too sweet" and worsens nausea. Will trial Boost Breeze until GI symptoms improve. Receiving zofran prn.     3/11/24: Spoke to daughter at bedside, fair intake of liquids. Drinking ~1 Boost Breeze daily. Continues with intermittent nausea and vomiting affecting po intake. Has been on clear liquid diet since 3/7. Will leave PPN recommendations since he will not be able to meet nutritional needs on clear liquids or with n/v symptoms.     3/15/24: Ate only mashed potatoes for lunch, states feeling nauseated. Getting Boost Breeze BID. RN in room with , about to administer enema. Last documented BM 3/11.     Anthropometrics    Height: 5' 5" (165.1 cm),    Last Weight: 80.6 kg (177 lb 11.2 oz) (03/15/24 0555), Weight Method: Bed Scale  BMI (Calculated): 29.6  BMI Classification: overweight (BMI 25-29.9)        Ideal Body Weight (IBW), Male: 136 lb                                Usual Weight Provided By: unable to obtain usual weight    Wt Readings from " Last 5 Encounters:   03/15/24 80.6 kg (177 lb 11.2 oz)   02/19/24 81.1 kg (178 lb 12.8 oz)     Weight Change(s) Since Admission: +2.2kg since admit  Wt Readings from Last 1 Encounters:   03/15/24 0555 80.6 kg (177 lb 11.2 oz)   03/13/24 0625 79.7 kg (175 lb 9.6 oz)   03/12/24 0544 80.3 kg (177 lb)   03/11/24 0555 85.6 kg (188 lb 11.2 oz)   03/09/24 0559 82.6 kg (182 lb)   03/08/24 0505 86.5 kg (190 lb 12.8 oz)   03/07/24 0500 92 kg (202 lb 14.4 oz)   03/06/24 0545 70.6 kg (155 lb 10.3 oz)   03/05/24 0500 91.6 kg (202 lb)   03/04/24 0442 90.3 kg (199 lb 1.6 oz)   03/03/24 0606 90.7 kg (200 lb)   02/23/24 0629 78.2 kg (172 lb 6.4 oz)   02/21/24 2116 82.2 kg (181 lb 3.5 oz)   Admit Weight: 82.2 kg (181 lb 3.5 oz) (02/21/24 2116), Weight Method: Bed Scale    Estimated Needs    Weight Used For Calorie Calculations: 78.2 kg (172 lb 6.4 oz)  Energy Calorie Requirements (kcal): 1863kcal (1.3 stress factor)  Energy Need Method: [place]-St Jeor  Weight Used For Protein Calculations: 78.2 kg (172 lb 6.4 oz)  Protein Requirements: 102-118gm (1.3-1.5g/kg)  Fluid Requirements (mL): 1955ml (25ml/kg)    Enteral Nutrition     Patient not receiving enteral nutrition at this time.    Parenteral Nutrition     Patient not receiving parenteral nutrition support at this time.    Evaluation of Received Nutrient Intake    Calories: not meeting estimated needs  Protein: not meeting estimated needs    Patient Education     Not applicable.    Nutrition Diagnosis     PES: Inadequate oral intake related to acute illness as evidenced by <50% intake for > 5 days. (active)   PES: Malnutrition related to acute illness as evidenced by <50% EER >/=5 days, muscle loss. (active)     Nutrition Interventions     Intervention(s): general/healthful diet, commercial beverage, prescription medication, and collaboration with other providers    Goal: Meet greater than 80% of nutritional needs by follow-up. (goal not met)  Goal: Consume % of oral  supplements by follow-up. (goal not met)    Nutrition Goals & Monitoring     Dietitian will monitor: food and beverage intake, weight, electrolyte/renal panel, glucose/endocrine profile, and gastrointestinal profile    Nutrition Risk/Follow-Up: high (follow-up in 1-4 days)   Please consult if re-assessment needed sooner.

## 2024-03-15 NOTE — PROGRESS NOTES
[facility] Hospital Medicine Progress Note        Chief Complaint: Inpatient Follow-up for R groin cellulitis    HPI per admitting team: 78-year-old male with a history of aortic insufficiency/stenosis, CAD, CKD IIIb, HTN AFib on Eliquis admitted to [facility] 02/15/2024 with chest pain, found to have NSTEMI (peak troponin 0.17) and underwent C 02/20/2024 showing severe multivessel stenosis and therefore was transferred to [facility] for CABG evaluation. Cardiology was consulted Patient had Impella placed on 02/23 and was admitted to the ICU.  Was started on aggressive diuresis with IV Lasix.  CV surgery was consulted.  Urology was consulted for hematuria; Nguyen catheter placed over guidewire with dilation secondary to urethral strictures. IV Heparin infusion was initiated per CIS but held due to hematuria/hemoptysis on 02/24.  He was also noted to have an JEAN-CLAUDE. Received 2 units PRBCs on 02/26 and was planned for high-risk PCI on 02/26 which was later canceled.  Underwent CABG x 3 2/27 (LIMA to LAD, RSVG to OM 1, R SVG to RCA, bioprosthetic AVR).  Remained on mechanical ventilation thereafter.  Patient noted to have tachycardia with atrial fibrillation/RVR requiring IV amiodarone along with pressors (norepinephrine/Milrinone) for hypotension. Patient underwent cardioversion x 2 with minimal improvement in HR/BP.  Patient self-extubated overnight on 02/29 and was noted to have adequate saturations on 2 L O2 via NC thereafter.  PT/OT consulted.  Renal function noted to be improving. Continued on IV diuresis as he appeared to be significantly volume overloaded postoperatively along with elevated pulmonary artery wedge pressures.  Started on p.o. amiodarone taper on 03/03.  Patient noted to have drainage from right groin wound and started on vancomycin on 03/04. Wound culture grew Pseudomonas.  Chest tubes discontinued on 03/02.  Downgraded from ICU on 03/02.  CIS and CV surgery continued following  patient.  CV surgery signed off on 03/06 and discontinued vancomycin.  Patient was placed on oral Levaquin.  Hospital medicine consulted on 03/06 for assistance with medical management and DC planning.  Repeat CT abdomen and pelvis of 3/9 shows improving stranding and improving hematoma in the right groin, persistent left-sided effusion and atelectasis  Patient complains of low back pain, cough and his SCDs too tight on his feet bilaterally  Vitals reviewed with blood pressure low normal, heart rates in the low 100s, saturating 94% on 2 L of oxygen   Labs reviewed and fairly stable   General surgery evaluated patient, reviewed CT scan of the area, given wound waning with scant serous/purulent drainage with manual expression, recommended wound care consult and continued to manually express fluid from site Q shift.  If area stopped draining, recommended do ultrasound to better define anatomy  Palliative care on board  ID adjusted antibiotics to IV cefepime  Nephrology placed on fluid restriction  Cardiology has suspended amiodarone due to elevated liver enzymes and Lasix due to hyponatremia    Interval Hx:   Patient is laying in bed, wife is at bedside.  I had a long conversation with the patient with a .  Patient complained of abdominal pain.  I inquired also about the chest pain he complained in the morning.  Reported he is constipated and whenever he strains, his abdomen as well as his chest hurts.  I informed patient that I have ordered enema for him to clean him out.  Also informed him of his x-ray abdomen which shows a lot of stools in the gut.  Also informed patient that I have started an appetite stimulant for him as he has not eating.  Encourage patient to eat his food, drank his ensure and drink DM over fluid that is provided to him given he is on fluid restriction.  Patient verbalized understanding  Informed patient that Wound Care will continue to look at the right groin and go from there  Case  was also discussed with Cardiology NP Cherry and Dr. Barba as patient the pressure was low this morning and I gave him 250 cc normal saline bolus and held his Lasix    Patient and wife both verbalized understanding  Case was discussed with patient's nurse and  on the floor.    Objective/physical exam:  General: In no acute distress, afebrile, elderly male, does not speak Croatian  Chest: Clear to auscultation bilaterally anteriorly  Heart: RRR, +S1, S2, no appreciable murmur  Abdomen: Soft, nontender, BS +  MSK: Warm, bilateral lower extremity edema present, no clubbing or cyanosis  Neurologic:  Awake alert and oriented, able to move all 4 extremities    VITAL SIGNS: 24 HRS MIN & MAX LAST   Temp  Min: 97.6 °F (36.4 °C)  Max: 97.9 °F (36.6 °C) 97.7 °F (36.5 °C)   BP  Min: 83/58  Max: 105/68 (!) 83/58   Pulse  Min: 82  Max: 111  90   Resp  Min: 18  Max: 20 18   SpO2  Min: 96 %  Max: 98 % 97 %     I reviewed the labs below:  Recent Labs   Lab 03/13/24  0615 03/14/24  0727 03/15/24  0402   WBC 13.79* 11.36 12.25*   RBC 4.61* 4.49* 4.69*   HGB 12.2* 11.9* 12.7*   HCT 39.2* 38.1* 40.4*   MCV 85.0 84.9 86.1   MCH 26.5* 26.5* 27.1   MCHC 31.1* 31.2* 31.4*   RDW 15.7 15.5 15.6   * 576* 593*   MPV 10.5* 10.7* 10.3       Recent Labs   Lab 03/09/24  0704 03/10/24  0611 03/11/24  0445 03/12/24  0449 03/13/24  0615 03/14/24  0727 03/15/24  0402   * 130* 128*   < > 129* 128* 130*   K 3.7 3.9 4.1   < > 4.3 4.7 4.9   CO2 28 30 26   < > 27 23 27   BUN 20.3 20.3 22.8   < > 25.9* 24.0 24.6   CREATININE 1.30* 1.22* 1.30*   < > 1.32* 1.34* 1.57*   CALCIUM 8.0* 8.2* 8.1*   < > 8.4* 8.1* 8.3*   MG 2.00 1.90 2.00  --   --   --   --    ALBUMIN 2.4* 2.5* 2.5*   < > 2.6* 2.5* 2.7*   ALKPHOS 114 114 116   < > 110 108 127   ALT 78* 73* 71*   < > 76* 76* 84*   * 88* 78*   < > 90* 90* 98*   BILITOT 1.9* 1.8* 1.6*   < > 1.5 1.3 1.4    < > = values in this interval not displayed.       Microbiology Results (last 7  days)       ** No results found for the last 168 hours. **             See below for Radiology    Scheduled Med:   allopurinoL  50 mg Oral Daily    aspirin  81 mg Oral Daily    ceFEPime IV (PEDS and ADULTS)  2 g Intravenous Q12H    digoxin  500 mcg Intravenous Once    enoxparin  40 mg Subcutaneous Daily    ferrous sulfate  1 tablet Oral Every other day    folic acid  1 mg Oral Daily    furosemide  40 mg Oral Daily    guaiFENesin-codeine 100-10 mg/5 ml  10 mL Oral TID    metoprolol succinate  12.5 mg Oral Daily    pantoprazole  40 mg Oral Daily    sodium chloride  2,000 mg Oral TID    sucralfate  1 g Oral QID (AC & HS)      Continuous Infusions:   loperamide        PRN Meds:  acetaminophen, acetaminophen, albumin human 5%, dextrose 10%, dextrose 10%, electrolyte-A, heparin, porcine (PF), heparin, porcine (PF), HYDROcodone-acetaminophen, lactulose 10 gram/15 ml, loperamide, melatonin, metoclopramide, nitroGLYCERIN, ondansetron, ondansetron, simethicone, sodium chloride 0.9%, sodium chloride 0.9%     Assessment/Plan:  Right flank pain- due to right-sided lower abdomen hematoma- improving   R Groin Purulent Cellulitis at previous impella insertion site, slowly improving   Transaminitis- multifactorial-  Congestive hepatopathy versus infection vs drug induced  Leukocytosis- resolved  Fluid overload 2/2 HFrEF exacerbation, fairly euvolemic clinically  HFrEF/HFpEF, euvolemic   JEAN-CLAUDE on stage II CKD - resolved   Hyponatremia due to SIADH  Pulmonary hypertension   NSTEMI, CAD sp CABG x 3  S/p AVR  Atrial Fibrillation with RVR  Normocytic anemia  Urethral stricture s/p dilation with gustafson 2/23  Hypotension this morning  Constipation-resolved  Moderate malnutrition       Blood pressure dropped to 83/58, ordered 250 cc normal saline bolus conservatively given underlying heart failure with reduced EF, held Lasix.   Case personally discussed with Cardiology, agree with the above plan, also recommended starting midodrine 5 mg b.i.d.  p.r.n. systolic blood pressure less than 90  Patient also complained of chest pain this morning, ordered stat EKG and troponin  EKG showed atrial fibrillation with RVR, left bundle branch block  Troponin 0.18  I informed Cherry BURNS of the above as well.  ID Dr KELLY adjusted antibiotics to IV cefepime- day 5, duration of treatment per ID, pending recs   Leukocytosis again loaded today with 12 K  Wound culture grew Pseudomonas and Serratia marcescens- both sensitive to cefepime  Nephrology placed pt on 1500 mL fluid restriction for hyponatremia and started sodium chloride oral tablets 2000 mg t.i.d., sodium improved to 130  Lasix on hold given hypotension today  Creatinine slightly worsened to 1.5, monitor closely  Appreciate pulmonology input continue to monitor effusion, no need for thoracentesis at this time  Cardiology has suspended amiodarone and atorvastatin due to elevated liver enzymes  Appreciate CIS input, continue po metoprolol succinate 12.5 mg q.day,  Patient also received digoxin 500 mcg push followed by 250 mcg q.6 for 2 doses for rate control  Lisinopril and Aldactone also suspended, due to low blood pressure   Continue use of incentive spirometry and oxygen supplementation, currently on 2 L supplemental oxygen via nasal cannula, encouraged patient  Appreciate Urology input, Nguyen catheter has been checked out--> no signs of active infection, recommended to remove on the morning of 03/16  Trend AST/ALT- 98/84, slightly trended up  Ultrasound right upper quadrant shows mild-to-moderate hepatomegaly.  No biliary dilation  Continued allopurinol 50 mg daily, aspirin 81 mg, FeSO4 every other day, folic acid 1 mg, Protonix 40 mg daily, sucralfate 1 g q.i.d.  P.o. Norco 5 mg q.6 p.r.n. for pain  Antitussives p.r.n.  PT/OT recommending moderate-intensity therapy, case management on board, awaiting Medicaid approval for SNF.  Patient's daughter has submitted paperwork to Medicaid  Palliative care also on board,  greatly appreciated  Wound Care also on board, case personally discussed with Lionel, informed that the wound site is very small for packing but she will continue to express drain age from the site  Abdominal KUB shows nonspecific gas pattern, patient is still constipated, ordered soapsuds enema, patient unable to hold, ordered Dulcolax suppository--> patient had a good bowel movement informed relief  Given his poor oral intake, ordered Marinol but pharmacy informed me it is on back order, changed to cyproheptadine for appetite stimulation  Morning CBC, CMP ordered       VTE prophylaxis:  Lovenox 40     Patient condition:  guarded     Anticipated discharge and Disposition:    SNF, awaiting Medicaid approval    Critical care note:  Critical care diagnosis:  Hypotension requiring normal saline bolus, AFib RVR requiring IV did pushes  Critical care interventions: Hands-on evaluation, review of labs/radiographs/records and discussion with patient and family if present  Critical care time spent: 35 minutes        All diagnosis and differential diagnosis have been reviewed; assessment and plan has been documented; I have personally reviewed the labs and test results that are presently available; I have reviewed the patients medication list; I have reviewed the consulting providers response and recommendations. I have reviewed or attempted to review medical records based upon their availability    All of the patient's questions have been  addressed and answered. Patient's is agreeable to the above stated plan. I will continue to monitor closely and make adjustments to medical management as needed.  _____________________________________________________________________    Nutrition Status:  Patient meets ASPEN criteria for moderate malnutrition of acute illness or injury per RD assessment as evidenced by:  Energy Intake (Malnutrition): less than or equal to 50% for greater than or equal to 5 days  Weight Loss (Malnutrition):   (unable to determine)  Subcutaneous Fat (Malnutrition):  (does not meet criteria)  Muscle Mass (Malnutrition): mild depletion  Fluid Accumulation (Malnutrition): mild        A minimum of two characteristics is recommended for diagnosis of either severe or non-severe malnutrition.   Radiology:   I have personally reviewed the images and agree with radiologist report  US Soft Tissue, Lower Extremity, Right  EXAMINATION:  US SOFT TISSUE, LOWER EXTREMITY, RIGHT    CLINICAL HISTORY:  evaluate for fluid collection to right lower abd/groin;    TECHNIQUE:  Grayscale and color Doppler imaging of the soft tissue of the right groin.    COMPARISON:  CT abdomen pelvis 03/09/2024    FINDINGS:  No organized fluid collection seen.  No mass.    Electronically signed by: Elida Fiore  Date:    03/13/2024  Time:    15:02      Tom Gilbert MD  Department of Hospital Medicine   [facility]   03/15/2024

## 2024-03-15 NOTE — PT/OT/SLP PROGRESS
Occupational Therapy   Treatment    Name: Brain Snyder  MRN: [id number]  Admitting Diagnosis:  CAD (coronary artery disease)  17 Days Post-Op    Recommendations:     Recommended therapy intensity at discharge: Moderate Intensity Therapy   Discharge Equipment Recommendations:  to be determined by next level of care  Barriers to discharge:   (Ongoing medical needs)    Assessment:     Brain Snyder is a 78 y.o. male with a medical diagnosis of CAD (coronary artery disease) s/p CABG on 2/27, s/p R groin impella and mechanical intubation, self-extubated on 2/29 and was downgraded from ICU on 3/2. Pt was overall Mod A for bed mobility and functional transfers with RW. Pt demo'd good return maintaining sternal precautions. Pt with no LOB noted during static standing, however, demo'ing limited endurance during grooming in stand. Therapist provided extensive education on importance and benefit of participating in therapy. Pt reported understanding. Performance deficits affecting function are weakness, impaired endurance, impaired sensation, impaired self care skills, impaired balance, gait instability, impaired functional mobility, decreased upper extremity function, decreased lower extremity function, decreased ROM, pain, decreased safety awareness.     Rehab Prognosis:  Good; patient would benefit from acute skilled OT services to address these deficits and reach maximum level of function.       Plan:     Patient to be seen 5 x/week to address the above listed problems via self-care/home management, therapeutic activities, therapeutic exercises  Plan of Care Expires: 04/09/24  Plan of Care Reviewed with: patient (relative)    Subjective     Pain/Comfort:  Pain Rating 1:  (Rating not provided, pt reporting abdominal discomfort)  Location 1: abdomen    Objective:     Communicated with: Nurse prior to session.  Patient found HOB elevated with telemetry, pulse ox (continuous), gustafson catheter upon OT entry to  room.    General Precautions: Standard, fall, sternal    Orthopedic Precautions:N/A  Braces: N/A  Respiratory Status: Room air     Occupational Performance:     Bed Mobility:    Patient completed Supine to Sit with moderate assistance     Functional Mobility/Transfers:  Patient completed Sit <> Stand Transfer with moderate assistance  with  rolling walker   Functional Mobility: Pt was overall Mod A for functional mobility, requiring frequent rest breaks after remaining in stand for ~1-2 minutes at a time.     Activities of Daily Living:  Grooming: contact guard assistance hand washing, oral care, and face washing while in stand at the sink with RW.   Lower Body Dressing: dependence donning B socks while in supine  Toileting: dependence jewel cares while in stand with RW    Therapeutic Positioning    OT interventions performed during the course of today's session in an effort to prevent and/or reduce acquired pressure injuries:   Education was provided on benefits of and recommendations for therapeutic positioning     Findings:  visible skin intact    Patient Education:  Patient provided with verbal education and demonstrations education regarding OT role/goals/POC, post op precautions, fall prevention, safety awareness, and Discharge/DME recommendations.  Understanding was verbalized, however additional teaching warranted.      Patient left up in chair with all lines intact, call button in reach, and nurse notified.    GOALS:   Multidisciplinary Problems       Occupational Therapy Goals          Problem: Occupational Therapy    Goal Priority Disciplines Outcome Interventions   Occupational Therapy Goal     OT, PT/OT Ongoing, Not Progressing    Description: Goals to be met by: 4/1/24     Patient will increase functional independence with ADLs by performing:    UE Dressing with min A   Grooming while standing at sink with Minimal Assistance.  Toileting from commode with Moderate Assistance for hygiene and clothing  management.   Sitting at edge of bed x30 minutes with Minimal Assistance. (Met)  Toilet transfer to bedside commode with Minimal Assistance.                         Time Tracking:     OT Date of Treatment: 03/15/24  OT Start Time: 1018  OT Stop Time: 1111  OT Total Time (min): 53 min    Billable Minutes:Self Care/Home Management 33 minutes  Therapeutic Activity 20 minutes    OT/YVETTE: OT     Number of YVETTE visits since last OT visit: 3    3/15/2024

## 2024-03-15 NOTE — PT/OT/SLP PROGRESS
Physical Therapy Treatment    Patient Name:  Brain Snyder   MRN:  [id number]    Recommendations:     Discharge therapy intensity: Moderate Intensity Therapy   Discharge Equipment Recommendations: to be determined by next level of care  Barriers to discharge: Ongoing medical needs    Assessment:     Brain Snyder is a 78 y.o. male admitted with a medical diagnosis of CAD s/p CABG on 2/27, s/p R groin impella and mechanical intubation, self-extubated on 2/29 and was downgraded from ICU on 3/2.  He presents with the following impairments/functional limitations: weakness, impaired endurance, impaired self care skills, impaired functional mobility, gait instability, impaired balance, decreased upper extremity function, decreased lower extremity function, pain, impaired cardiopulmonary response to activity.    Rehab Prognosis: Good; patient would benefit from acute skilled PT services to address these deficits and reach maximum level of function.    Recent Surgery: Procedure(s) (LRB):  CORONARY ARTERY BYPASS GRAFT (CABG) (N/A)  Replacement-valve-aortic (N/A)  SURGICAL PROCUREMENT, VEIN, ENDOSCOPIC (N/A)  Impella, Removal (Right) 17 Days Post-Op    Plan:     During this hospitalization, patient to be seen 5 x/week to address the identified rehab impairments via gait training, therapeutic activities, therapeutic exercises, neuromuscular re-education and progress toward the following goals:    Plan of Care Expires:  04/01/24    Subjective     Chief Complaint:   Patient/Family Comments/goals:   Pain/Comfort:         Objective:     Communicated with nursing prior to session.  Patient found up in chair with pulse ox (continuous), telemetry, gustafson catheter, peripheral IV upon PT entry to room.     General Precautions: Standard, fall, sternal  Orthopedic Precautions: N/A  Braces: N/A  Respiratory Status: Room air  Blood Pressure: NT    Functional Mobility:  Bed Mobility:     Rolling L/R:  contact guard assistance  Scooting:  contact guard assistance  Sit to Supine: minimum assistance  Transfers:     Sit to Stand:  moderate assistance with hands on knees  Gait: 12'/20' w/RW, CGA    Patient left supine with all lines intact, call button in reach, nurse notified, and spouse present    GOALS:   Multidisciplinary Problems       Physical Therapy Goals          Problem: Physical Therapy    Goal Priority Disciplines Outcome Goal Variances Interventions   Physical Therapy Goal     PT, PT/OT Ongoing, Progressing     Description: Goals to be met by: 2024     Patient will increase functional independence with mobility by performin. Supine to sit with MInimal Assistance  2. Sit to stand transfer with Minimal Assistance  3. Gait  x 150 feet with Minimal Assistance using Rolling Walker.                          Time Tracking:     PT Received On: 03/15/24  PT Start Time: 1503     PT Stop Time: 1526  PT Total Time (min): 23 min     Billable Minutes: Gait Training 23    Treatment Type: Treatment  PT/PTA: PTA     Number of PTA visits since last PT visit: 2     03/15/2024

## 2024-03-15 NOTE — PROGRESS NOTES
"  [facility]    Cardiology  Progress Note    Patient Name: Brain Snyder  MRN: [id number]  Admission Date: 2/21/2024  Hospital Length of Stay: 23 days  Code Status: Full Code   Attending Physician: Stan Lazar MD   Primary Care Physician: Nidia Shepherd NP  Expected Discharge Date:   Principal Problem:CAD (coronary artery disease)    Subjective:   Reason for Consult:  CAD     HPI: 78-year-old female that is unknown to [facility] with a PMHx of PAF on Eliquis Aortic insufficiency, MV CAD, HTN. He is Greenlandic speaking and an  was used for interview. He was orginally admitted to [facility] on 2.15.24 with CP & NSTEMI and underwent a LHC on 2.20.24 that showed MV CAD (reported noted below) He was transferred to [facility] on 2.21.24 for a CABG/AVR evaluation. On arrive he was hemodynamically stable on a heparin infusion. He denied chest pain, SOB, and nausea on current exam, [facility] was consulted for CAD    Hospital Course:   2.23.24: Patient seen resting in bed. He denies SOB or CP. He remains on heparin infusion. Family at bedside   2.24.24: NAD. S/p Impella Placement/[place]helo Catheter. "I am ok." + Blood Clots from Nose/Mouth, Hematuria 2/2 traumatic Indwelling Catheter Placement. Heparin Drip per Protocol. Impella P5  2.25.24: NAD. "I'm ok." Denies CP, SOB and Palps. PA Pressure Reading is not Accurate. CVP 5. Impella at P5. R Groin Impella P7. Remains Off Heparin Drip per Protocol. Now in SR.   2.26.24: NAD Noted. SR on Tele. Right Groin Impella in place, bruising with no hematoma noted. Distal pulses DP intact. Legs warm. NC 2 L/Min. Denies CP/SOB. Consent obtained from his daughter Brii Snyder. NPO for MV PCI Today.  2.27.24: NAD Noted. Impella remains in place at P7 Support. Good UA Noted, some pink tinged urine noted. SR on Tele. Pressors off. Denies CP/SOB. NPO for surgery today. Heparin on hold for surgery.  2.28.24: Patient is critically ill. AF RVR on Tele, twice shocked this AM with no " "success. On Amiodarone/Milrinone- Cardizem Infusion now off given hypotension and ICMO. Also no José Miguel/Levophed. Concern for bleeding noted, transfusion noted. Patient is intubated/Mechanically Vented. Hemodynamics: CO/CI 4.3/2.3 CVP 20.  2.29.24: Patient self extubated this AM. He is stable on NC Oxygen. Denies CP/SOB. SR on Tele. On Amiodarone Infusion and receiving blood products. BP Stable. Clinically much improved from yesterday.   3.1.24: NAD. Afib mild RVR on tele. On Primacor @ 0.187 mcg/kg/min. Amiodarone infusing per protocol. LFT's worsening. WBC worsening. + Incisional CP. On 5 L NC.   3.2.24: NAD. Net Negative 2700 urine output.   3.3.24: NAD. VSS. No complaints of CP/Palps. Reports SOB is improving. Creat 1.61 (Improved)  3.4.24: NAD. VSS. No complaints. On 2 L. Net Negative Fluid 3540 over 24 hours. Creat 1.37. LFTs slighting worse today  3.5.24: NAD. VSS. Denies CP, SOB and Palps. Family at Bedside. Fluid Balance Net Negative 4360mL. BUN/Crea 23.5/1.16, AST/ALT 69/71  3.6.24: Patient sitting up in bedside chair. Appears SOB. C/o abdominal bloating and gaseous. Reports + BM since surgery. Abdomen distended. Patient nausea and spit up bile colored fluid. + peripheral edema. O2 via NC. Excellent diuresis. Persistent leukocytosis. K+ 3.1, mild transaminitis. Groin Wound cx -- GNR and pseudomonas. CV surgery has signed off.   3.7.24: Patient sitting up in bed. NAD. Denies any pain. Noted SS drainage from impella insertion site. Leukocytosis has resolved. BP marginal at times. Afib, CVR. Good UOP; however weight is increasing.   3.8.24: Patient awake in bed. He has been weaned off. O2. Good diuresis overnight. Mild bump in creatinine-- 1.21 from 1.15 yesterday. Liver enzymes uptrending. VSS.   3.9.24: NAD. Language Barrier/Daughter at Bedside for Translation. Denies CP, SOB and Palps. Deconditioned. Fluid Balance Net Negative 5600mL.   3.10.24: NAD. "[place]." Denies CP, SOB and Palps. Daughter at " Bedside/Translating. Denies CP, SOB and Palps. Remains Deconditions. Fluid Balance Net Negative 2405mL. Na 130, BUN/Crea 20.3/1.22, AST/ALT 88/73  3.11.24: Patient awake in bed. NAD. Reviewed echo-EF 40-45% with mild RV enlargement and severely reduced RV function. Na 129 today. Renal function mildly bumped  3.12.24: Patient awake in bed. NAD. Hyponatremia stable. Nephrology following and has initiated a fluid restriction. Impella site still oozing SS fluid. Surgery recommending manually expressing fluid q shift and obtaining US to better define anatomy. WBC 13.79. Afebrile.   3.13.24: Patient resting in bed. NAD. Clear breath sounds. No edema. Good UOP. Na improving with addition of salt tabs. Creatinine 1.57. Still has transaminitis. Amiodarone placed on hold yesterday. Currently Afib, 90s. Mild hypotension at times. Nurse reports patient c/o chest pain today. When questioned, he reported incisional chest pain during attempts at BM.         PMH: PAF (on Eliquis), Aortic insufficiency, MV CAD, HTN  PSH: [facility]  Family History: None Reported  Social History: Former Smoker, Denies ETOH or Illicit Drug Use    Previous Diagnostics:  TTE 03.11.24:  Left Ventricle: Regional wall motion abnormalities present. Septal motion is consistent with post-operative status. Akinetic inf -posterior wall There is moderately reduced systolic function with a visually estimated ejection fraction of 35 - 40%.    Right Ventricle: Mild right ventricular enlargement. Systolic function is severely reduced.    Aortic Valve: There is a bioprosthetic valve in the aortic position.    Pericardium: There is a trivial effusion. No indication of cardiac tamponade. Left pleural effusion.       CABG/Bio AVR/MOISE Ligation (2.27.24):  Coronary artery bypass grafting X 3, LIMA to LAD, reversed SVG to OM1, Reversed Saphenous venous graft to RCA  Bioprosthetic aortic valve replacement (Epic max 23mm), Endoscopic venous harvesting of left greater saphenous  vein, Left atrial appendage ligation, explant of right femoral impella device, right femoral artery repair.    RHC/Impella Placement (2.23.24):  Right heart catheterization performed showed the following:  PA= 61/24 (27) mm Hg  PCWP=  31/26 (27) mm Hg  AO saturation= 93 % RA  PA saturation= 60% with Impella (49% yesterday)    (02/22/24) -  0.5  - Cardiac output was 2.8 L/min provided by the device.  Impella was at 84cm  Impression/plan:   Successful Impella insertion and swan-Chelo in the setting of Acute HF/low cardiac output/precardiogenic shock/Critcal-severe AS/MVCAD - LM distal    RHC (2.22.24):  Right heart catheterization demonstrating severe pulmonary hypertension 84/34 and PCWP 24 mmHg  Reduced cardiac output/cardiac index at 3.43/1.81 L/min/m2 with pulmonary artery saturations 49.3%  For applied to the right femoral vein following removal of the 7 [place] sheath  The estimated blood loss was none.    Carotid US (2.22.24):  The bilateral internal carotid artery demonstrated less than 50% stenosis.   The bilateral vertebral arteries were patent with antegrade flow    LHC (2.20.24):   Prox LAD lesion was 70% stenosed.  Ost Cx to Prox Cx lesion was 80% stenosed.  1st Mrg lesion was 80% stenosed.  2nd Mrg-1 lesion was 70% stenosed.  2nd Mrg-2 lesion was 50% stenosed.  Mid LAD lesion was 50% stenosed.  Prox RCA lesion was 60% stenosed.  estimated blood loss was <50 mL.  There was three vessel coronary artery disease.  LVEDP: 30mmHg  Recommendations:   Refer for CABG evaluation and AVR   Preformed by Dr. Flannery at [facility]     ECHO (2.15.24):  Left Ventricle: The left ventricle is normal in size. Mildly increased ventricular mass. Mildly increased wall thickness. There is mild eccentric hypertrophy. Moderate global hypokinesis present. Septal motion is consistent with bundle branch block. There is moderately reduced systolic function. Biplane (2D) method of discs ejection fraction is 40%. Grade II diastolic  dysfunction.  Right Ventricle: Right ventricular enlargement. Systolic function is normal.  Left Atrium: Left atrium is severely dilated.  Right Atrium: Right atrium is moderately dilated.  Aortic Valve: There is moderate aortic valve sclerosis. Severely restricted motion. There is severe stenosis. Aortic valve area by VTI is 0.66 cm². Aortic valve peak velocity is 4.45 m/s. Mean gradient is 50 mmHg. The dimensionless index is 0.17. There is mild to moderate aortic regurgitation.  Mitral Valve: Mildly calcified leaflets. There is no stenosis. There is mild regurgitation.  Tricuspid Valve: The tricuspid valve is structurally normal. There is mild to moderate regurgitation.  Pulmonic Valve: There is no significant regurgitation.  Aorta: Aortic root is upper limit of normal measuring 3.6 cm.  Pulmonary Artery: There is severe pulmonary hypertension. The estimated pulmonary artery systolic pressure is 69 mmHg.  IVC/SVC: Intermediate venous pressure at 8 mmHg.  Pericardium: There is no pericardial effusion.     Review of Systems   Cardiovascular:  Negative for chest pain, dyspnea on exertion and leg swelling.   Respiratory:  Negative for shortness of breath.    All other systems reviewed and are negative.    Objective:     Vital Signs (Most Recent):  Temp: 97.7 °F (36.5 °C) (03/15/24 0453)  Pulse: (!) 111 (03/15/24 0453)  Resp: 20 (03/15/24 0453)  BP: 98/64 (03/15/24 0555)  SpO2: 97 % (03/15/24 0453) Vital Signs (24h Range):  Temp:  [97.6 °F (36.4 °C)-97.9 °F (36.6 °C)] 97.7 °F (36.5 °C)  Pulse:  [] 111  Resp:  [18-20] 20  SpO2:  [96 %-98 %] 97 %  BP: ()/(48-72) 98/64   Weight: 80.6 kg (177 lb 11.2 oz)  Body mass index is 29.57 kg/m².  SpO2: 97 %       Intake/Output Summary (Last 24 hours) at 3/15/2024 0736  Last data filed at 3/15/2024 0457  Gross per 24 hour   Intake 840 ml   Output 2400 ml   Net -1560 ml       Lines/Drains/Airways       Drain  Duration                  Urethral Catheter 02/28/24 1445 Coude  20 Fr. 15 days              Peripheral Intravenous Line  Duration                  Peripheral IV - Single Lumen 03/02/24 1053 20 G Anterior;Right Upper Arm 12 days                  Significant Labs:   Recent Results (from the past 72 hour(s))   Sodium, Random Urine    Collection Time: 03/12/24  3:12 PM   Result Value Ref Range    Urine Sodium 42.0 mmol/L   Creatinine, Random Urine    Collection Time: 03/12/24  3:12 PM   Result Value Ref Range    Urine Creatinine 56.3 (L) 63.0 - 166.0 mg/dL   Osmolality, Urine    Collection Time: 03/12/24  3:12 PM   Result Value Ref Range    Urine Osmolality 300 300 - 1,300 mOsm/kg   Comprehensive metabolic panel    Collection Time: 03/13/24  6:15 AM   Result Value Ref Range    Sodium Level 129 (L) 136 - 145 mmol/L    Potassium Level 4.3 3.5 - 5.1 mmol/L    Chloride 92 (L) 98 - 107 mmol/L    Carbon Dioxide 27 23 - 31 mmol/L    Glucose Level 95 82 - 115 mg/dL    Blood Urea Nitrogen 25.9 (H) 8.4 - 25.7 mg/dL    Creatinine 1.32 (H) 0.73 - 1.18 mg/dL    Calcium Level Total 8.4 (L) 8.8 - 10.0 mg/dL    Protein Total 6.1 5.8 - 7.6 gm/dL    Albumin Level 2.6 (L) 3.4 - 4.8 g/dL    Globulin 3.5 2.4 - 3.5 gm/dL    Albumin/Globulin Ratio 0.7 (L) 1.1 - 2.0 ratio    Bilirubin Total 1.5 <=1.5 mg/dL    Alkaline Phosphatase 110 40 - 150 unit/L    Alanine Aminotransferase 76 (H) 0 - 55 unit/L    Aspartate Aminotransferase 90 (H) 5 - 34 unit/L    eGFR 56 mls/min/1.73/m2   CBC with Differential    Collection Time: 03/13/24  6:15 AM   Result Value Ref Range    WBC 13.79 (H) 4.50 - 11.50 x10(3)/mcL    RBC 4.61 (L) 4.70 - 6.10 x10(6)/mcL    Hgb 12.2 (L) 14.0 - 18.0 g/dL    Hct 39.2 (L) 42.0 - 52.0 %    MCV 85.0 80.0 - 94.0 fL    MCH 26.5 (L) 27.0 - 31.0 pg    MCHC 31.1 (L) 33.0 - 36.0 g/dL    RDW 15.7 11.5 - 17.0 %    Platelet 637 (H) 130 - 400 x10(3)/mcL    MPV 10.5 (H) 7.4 - 10.4 fL    Neut % 70.7 %    Lymph % 10.8 %    Mono % 12.8 %    Eos % 2.6 %    Basophil % 0.7 %    Lymph # 1.49 0.6 - 4.6 x10(3)/mcL     Neut # 9.75 (H) 2.1 - 9.2 x10(3)/mcL    Mono # 1.77 (H) 0.1 - 1.3 x10(3)/mcL    Eos # 0.36 0 - 0.9 x10(3)/mcL    Baso # 0.09 <=0.2 x10(3)/mcL    IG# 0.33 (H) 0 - 0.04 x10(3)/mcL    IG% 2.4 %    NRBC% 0.0 %   Comprehensive metabolic panel    Collection Time: 03/14/24  7:27 AM   Result Value Ref Range    Sodium Level 128 (L) 136 - 145 mmol/L    Potassium Level 4.7 3.5 - 5.1 mmol/L    Chloride 95 (L) 98 - 107 mmol/L    Carbon Dioxide 23 23 - 31 mmol/L    Glucose Level 136 (H) 82 - 115 mg/dL    Blood Urea Nitrogen 24.0 8.4 - 25.7 mg/dL    Creatinine 1.34 (H) 0.73 - 1.18 mg/dL    Calcium Level Total 8.1 (L) 8.8 - 10.0 mg/dL    Protein Total 5.9 5.8 - 7.6 gm/dL    Albumin Level 2.5 (L) 3.4 - 4.8 g/dL    Globulin 3.4 2.4 - 3.5 gm/dL    Albumin/Globulin Ratio 0.7 (L) 1.1 - 2.0 ratio    Bilirubin Total 1.3 <=1.5 mg/dL    Alkaline Phosphatase 108 40 - 150 unit/L    Alanine Aminotransferase 76 (H) 0 - 55 unit/L    Aspartate Aminotransferase 90 (H) 5 - 34 unit/L    eGFR 55 mls/min/1.73/m2   CBC with Differential    Collection Time: 03/14/24  7:27 AM   Result Value Ref Range    WBC 11.36 4.50 - 11.50 x10(3)/mcL    RBC 4.49 (L) 4.70 - 6.10 x10(6)/mcL    Hgb 11.9 (L) 14.0 - 18.0 g/dL    Hct 38.1 (L) 42.0 - 52.0 %    MCV 84.9 80.0 - 94.0 fL    MCH 26.5 (L) 27.0 - 31.0 pg    MCHC 31.2 (L) 33.0 - 36.0 g/dL    RDW 15.5 11.5 - 17.0 %    Platelet 576 (H) 130 - 400 x10(3)/mcL    MPV 10.7 (H) 7.4 - 10.4 fL    Neut % 70.6 %    Lymph % 12.1 %    Mono % 12.1 %    Eos % 2.6 %    Basophil % 0.7 %    Lymph # 1.38 0.6 - 4.6 x10(3)/mcL    Neut # 8.00 2.1 - 9.2 x10(3)/mcL    Mono # 1.38 (H) 0.1 - 1.3 x10(3)/mcL    Eos # 0.30 0 - 0.9 x10(3)/mcL    Baso # 0.08 <=0.2 x10(3)/mcL    IG# 0.22 (H) 0 - 0.04 x10(3)/mcL    IG% 1.9 %    NRBC% 0.0 %   CBC Without Differential    Collection Time: 03/15/24  4:02 AM   Result Value Ref Range    WBC 12.25 (H) 4.50 - 11.50 x10(3)/mcL    RBC 4.69 (L) 4.70 - 6.10 x10(6)/mcL    Hgb 12.7 (L) 14.0 - 18.0 g/dL    Hct  40.4 (L) 42.0 - 52.0 %    MCV 86.1 80.0 - 94.0 fL    MCH 27.1 27.0 - 31.0 pg    MCHC 31.4 (L) 33.0 - 36.0 g/dL    RDW 15.6 11.5 - 17.0 %    Platelet 593 (H) 130 - 400 x10(3)/mcL    MPV 10.3 7.4 - 10.4 fL    NRBC% 0.0 %   Comprehensive metabolic panel    Collection Time: 03/15/24  4:02 AM   Result Value Ref Range    Sodium Level 130 (L) 136 - 145 mmol/L    Potassium Level 4.9 3.5 - 5.1 mmol/L    Chloride 96 (L) 98 - 107 mmol/L    Carbon Dioxide 27 23 - 31 mmol/L    Glucose Level 98 82 - 115 mg/dL    Blood Urea Nitrogen 24.6 8.4 - 25.7 mg/dL    Creatinine 1.57 (H) 0.73 - 1.18 mg/dL    Calcium Level Total 8.3 (L) 8.8 - 10.0 mg/dL    Protein Total 6.2 5.8 - 7.6 gm/dL    Albumin Level 2.7 (L) 3.4 - 4.8 g/dL    Globulin 3.5 2.4 - 3.5 gm/dL    Albumin/Globulin Ratio 0.8 (L) 1.1 - 2.0 ratio    Bilirubin Total 1.4 <=1.5 mg/dL    Alkaline Phosphatase 127 40 - 150 unit/L    Alanine Aminotransferase 84 (H) 0 - 55 unit/L    Aspartate Aminotransferase 98 (H) 5 - 34 unit/L    eGFR 45 mls/min/1.73/m2     Telemetry: PAF/CVR    Physical Exam  Vitals reviewed.   Constitutional:       General: He is not in acute distress.     Appearance: Normal appearance.   HENT:      Head: Normocephalic.      Mouth/Throat:      Mouth: Mucous membranes are moist.   Eyes:      Extraocular Movements: Extraocular movements intact.      Conjunctiva/sclera: Conjunctivae normal.   Cardiovascular:      Rate and Rhythm: Normal rate. Rhythm irregular.      Pulses: Normal pulses.      Heart sounds: No murmur heard.  Pulmonary:      Effort: Pulmonary effort is normal. No respiratory distress.      Breath sounds: Normal breath sounds.      Comments: NC O2  Abdominal:      Palpations: Abdomen is soft.   Genitourinary:     Comments: Urinary Catheter   Skin:     General: Skin is warm.      Comments: Midline Sternotomy YVETTE with No Sign of Bleed/Infection. Right Lower Abdominal Site healing. Drainage decreasing   Neurological:      General: No focal deficit present.       Mental Status: He is alert.   Psychiatric:         Mood and Affect: Mood normal.       Current Inpatient Medications:  Current Facility-Administered Medications:     acetaminophen oral solution 650 mg, 650 mg, Per OG tube, Q6H PRN, Vasile Carter PA-C, 650 mg at 03/12/24 1726    acetaminophen tablet 650 mg, 650 mg, Oral, Q6H PRN, Pablo Yepez MD, 650 mg at 03/14/24 2023    albumin human 5% bottle 12.5 g, 12.5 g, Intravenous, PRN, Vasile Carter PA-C, Stopped at 02/28/24 1442    allopurinol split tablet 50 mg, 50 mg, Oral, Daily, Tanner Rdz MD, 50 mg at 03/14/24 0931    aspirin EC tablet 81 mg, 81 mg, Oral, Daily, Vasile Carter PA-C, 81 mg at 03/14/24 0931    atorvastatin tablet 40 mg, 40 mg, Oral, QHS, Tanner Rdz MD, 40 mg at 03/14/24 2023    ceFEPIme (MAXIPIME) 2 g in dextrose 5 % in water (D5W) 100 mL IVPB (MB+), 2 g, Intravenous, Q12H, Elieser Pace MD, Stopped at 03/14/24 1217    dextrose 10% bolus 125 mL 125 mL, 12.5 g, Intravenous, PRN, Pablo Yepez MD    dextrose 10% bolus 250 mL 250 mL, 25 g, Intravenous, PRN, Pablo Yepez MD    electrolyte-A infusion, , Intravenous, PRN, Pablo Yepez MD, Stopped at 02/28/24 0700    enoxaparin injection 40 mg, 40 mg, Subcutaneous, Daily, Cherry Suarez FNP, 40 mg at 03/14/24 1752    ferrous sulfate tablet 1 each, 1 tablet, Oral, Every other day, Tanner Rdz MD, 1 each at 03/14/24 0931    folic acid tablet 1 mg, 1 mg, Oral, Daily, Vasile Carter PA-C, 1 mg at 03/14/24 0931    furosemide tablet 40 mg, 40 mg, Oral, Daily, Laurel Marquez AGNP, 40 mg at 03/14/24 0931    guaiFENesin-codeine 100-10 mg/5 ml syrup 10 mL, 10 mL, Oral, TID, Stan Lazar MD, 10 mL at 03/14/24 2023    heparin, porcine (PF) 100 unit/mL injection flush 500 Units, 5 mL, Intravenous, On Call Procedure, Pablo Yepez MD    heparin, porcine (PF) 100 unit/mL injection flush 500 Units, 5 mL, Intravenous, On Call  Procedure, Nita Contreras MD    HYDROcodone-acetaminophen 5-325 mg per tablet 1 tablet, 1 tablet, Oral, Q6H PRN, Stan Lazar MD, 1 tablet at 03/14/24 1447    lactulose 10 gram/15 ml solution 20 g, 20 g, Oral, Q6H PRN, Vasile Carter PA-C, 20 g at 03/13/24 1343    loperamide capsule 2 mg, 2 mg, Oral, Continuous PRN, Vasile Carter PA-C, 2 mg at 03/02/24 1253    melatonin tablet 6 mg, 6 mg, Oral, Nightly PRN, Tanner Rdz MD, 6 mg at 03/13/24 2055    metoclopramide injection 5 mg, 5 mg, Intravenous, Q6H PRN, Vasile Carter PA-C    metoprolol succinate (TOPROL-XL) 24 hr split tablet 12.5 mg, 12.5 mg, Oral, Daily, Cherry Suarez FNP, 12.5 mg at 03/13/24 0833    nitroGLYCERIN SL tablet 0.4 mg, 0.4 mg, Sublingual, Q5 Min PRN, Tanner Rdz MD    ondansetron disintegrating tablet 8 mg, 8 mg, Oral, Q8H PRN, Tanner Rdz MD, 8 mg at 03/11/24 1253    ondansetron injection 8 mg, 8 mg, Intravenous, Q6H PRN, Pablo Yepez MD, 8 mg at 03/14/24 1301    pantoprazole EC tablet 40 mg, 40 mg, Oral, Daily, Tanner Rdz MD, 40 mg at 03/14/24 0931    simethicone chewable tablet 80 mg, 1 tablet, Oral, TID PRN, Tanner Rdz MD, 80 mg at 03/15/24 0315    sodium chloride 0.9% flush 10 mL, 10 mL, Intravenous, PRN, Tanner Rdz MD    sodium chloride 0.9% flush 10 mL, 10 mL, Intravenous, PRN, Millie Jimenez NP    sodium chloride oral tablet 2,000 mg, 2,000 mg, Oral, TID, Candelario Maldonado DO, 2,000 mg at 03/14/24 2023    sucralfate tablet 1 g, 1 g, Oral, QID (AC & HS), Vasile Carter PA-C, 1 g at 03/15/24 0602  VTE Risk Mitigation (From admission, onward)           Ordered     enoxaparin injection 40 mg  Daily         03/07/24 0559     IP VTE HIGH RISK PATIENT  Once         03/02/24 0759     heparin, porcine (PF) 100 unit/mL injection flush 500 Units  On Call Procedure         02/27/24 0718     heparin, porcine (PF) 100 unit/mL injection flush 500  Units  On Call Procedure         02/27/24 0257     Place SUSIE hose  Until discontinued         02/22/24 1458     Place sequential compression device  Until discontinued         02/21/24 2057                  Assessment:   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% - Symptomatically Improving    - ECHO Limited (3.7.24) - LVEF 30%, Severe Global Hypokinesis     - ECHO (2.16.24): EF 40%, Grade II DD    - Small Pleural Effusions on CT Imaging (2.22.24)  CAD (Multivessel)    - Status Post CABG (3V) LIMA to LAD, SVG to OM1, SVG to RCA (2.27.24)    - RHC/Impella Placement and [place] Catheter (2.23.24)- Impella Removal/Femoral Artery Repair in Surgery on 2.27.24    - C (2.19.24): pLAD lesion 70%, oLCx 80%, OM1 80%, OM2 1 lesion 70% 2nd lesion 50%, mLAD 50%, pRCA 60%  VHD/AS     - Status Post Bio AVR (Epic max 23mm) (2.27.24)    - ECHO (2.16.24): Aortic Valve: There is moderate aortic valve sclerosis. Severely restricted motion. There is severe stenosis. Aortic valve area by VTI is 0.66 cm². Aortic valve peak velocity is 4.45 m/s. Mean gradient is 50 mmHg. The dimensionless index is 0.17.   Cardiogenic Shock (Post Cardiotomy) - Off Vasopressor Support - Resolved     - History of Hypertension   Ischemic Cardiomyopathy/EF 30%  Elevated LFTs - persistent  Pulmonary HTN    - ECHO PASP 69mmHg     - RHC (2.22.24): PA 84/34, PCWP 24 mmHg  Hematuria 2/2 Traumatic/Difficult Indwelling Catheter Placement (Resolved)  Urethral Stricture s/p Dilatation 2.23.24  PAF - Currently CVR    - Status Post Bedside Cardioversion x 2 on 2.27.24 (Both Unsuccessful)    - Status Post MOISE Ligation (2.27.24)    - CHADsVASc - 5 Points - 7.2% Stroke Risk per Year   Left Bundle Branch Block   VHD    - ECHO (3.7.24): Bio AVR, Mild MR    - ECHO (2.16.24): Severe AS, mild MR, mild to moderate TR  JEAN-CLAUDE/CKD Stage II - I  - Baseline Cr 1.6  Anemia- stable    - Status Post Transfusion on 2.28.24 & 2.29.24  Thrombocytopenia - Resolved   Leukocytosis -  Persistent    - Groin Wound Culture: Serratia Marcescens and Pseudomonas Aeruginosa   Hyponatremia - stable  SIADH    Plan:   Continue IV Abx per Primary Team   Continue ASA and BB  DC statin due to persistent transaminitis.   HF GDMT- Continue BB. Hold diuretic due to hypotension  Unable to add ARNI/MRA/SGLT2 due to marginal Bps  May start midodrine 5mg tid prn sbp < 90  Give digoxin 500 mcg IVP for better HR control.   Accurate I&Os and Daily Weights   Keep K > 4.0 and Mg  > 2.0  Aggressive Mobilization of PT and Q1HR IS (PT/OT Eval and Treat)  Encouraged eating. He has been started on marinol to stimulate appetite    Cherry Suarez, BLANQUITA  Cardiology  [facility]   03/15/2024    Physician addendum:  I have seen and examined this patient as a split-shared visit with the ARLEY d/t complicated medical management of above problems written in assessment and high acuity requiring physician expertise in medical decision-making. I performed the substantive portion of the history and exam. Above medical decision-making is also formulated by me.    Plan:  Continue IV antibiotics - right groin wound appears to be healing  Hold diuretics due to hypotension   Add midodrine PRN for to keep SBP >90  Continue meds for constipation

## 2024-03-15 NOTE — PROGRESS NOTES
Nephrology follow up progress note    HPI:      Brain Snyder is a 78 y.o. male who presented to this facility on 02/21/2024 as a transfer for CV surgery evaluation status post [facility] on 02/20/2024 showing multivessel CAD.  He remained in the ICU with Impella to the right groin post transfer.  On  02/27/2024 he underwent CABG x3 and bioprosthetic AVR.  Postoperatively he had atrial fibrillation with RVR requiring amiodarone infusion.    -He self extubated on 02/29.    -By 3/1 LFTs and WBC began to worsen.  -Developed abdominal distention with peripheral edema on 03/06.  Culture of wound to groin positive for Gram-negative rods and Pseudomonas (Impella site)     On 03/11 nephrology was consulted for hyponatremia.  Up until 48 hours ago he was receiving twice daily IV Lasix, Aldactone, and metolazone.  Serum sodium was stable around 134/135 until/8 when began to worsen.  Today, 128.  Urine output 3900 mL in the past 24 hours, 1600 already today. He appears quite weakened. Family at bedside and reports he has recently gotten out of bed 20 minutes ago. He has no history of hyponatremia.    Interval history:     No acute events overnight.  Patient's only complaint continues to be wanting to have bowel movements, and back pain.  No chest pain, shortness of breath, nausea, vomiting, or lower extremity edema.      Review of Systems:       Past medical, family, surgical, and social history reviewed and unchanged from initial consult note.     Objective:       VITAL SIGNS: 24 HR MIN & MAX LAST    Temp  Min: 97.6 °F (36.4 °C)  Max: 97.9 °F (36.6 °C)  97.7 °F (36.5 °C)        BP  Min: 83/58  Max: 106/64  106/64     Pulse  Min: 82  Max: 111  90     Resp  Min: 18  Max: 20  18    SpO2  Min: 96 %  Max: 97 %  97 %      GEN: Chronically ill appearing  male in NAD  CV: RRR +S1,S2 without murmur  PULM: CTAB, unlabored, room air   ABD: Soft, NT/ND abdomen with NABS  EXT:  Trace lower extremity edema  SKIN: Warm and dry  PSYCH:  Awake, alert, and appropriately conversant  Vascular access: no dialysis access          Component Value Date/Time     (L) 03/15/2024 0402     (L) 03/14/2024 0727    K 4.9 03/15/2024 0402    K 4.7 03/14/2024 0727    CHLORIDE 96 (L) 03/15/2024 0402    CHLORIDE 95 (L) 03/14/2024 0727    CO2 27 03/15/2024 0402    CO2 23 03/14/2024 0727    BUN 24.6 03/15/2024 0402    BUN 24.0 03/14/2024 0727    CREATININE 1.57 (H) 03/15/2024 0402    CREATININE 1.34 (H) 03/14/2024 0727    CALCIUM 8.3 (L) 03/15/2024 0402    CALCIUM 8.1 (L) 03/14/2024 0727    PHOS 3.6 02/29/2024 0221            Component Value Date/Time    WBC 12.25 (H) 03/15/2024 0402    WBC 11.36 03/14/2024 0727    WBC 10.84 03/07/2024 0407    WBC 13.5 03/06/2024 0419    HGB 12.7 (L) 03/15/2024 0402    HGB 11.9 (L) 03/14/2024 0727    HCT 40.4 (L) 03/15/2024 0402    HCT 38.1 (L) 03/14/2024 0727    HCT 20 (LL) 02/27/2024 2041    HCT 27 (L) 02/27/2024 1947     (H) 03/15/2024 0402     (H) 03/14/2024 0727       Imaging reviewed      Assessment / Plan:     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  Multivessel CAD status post CABG and AVR 2/27  3. Right groin Impella site now with wound growing Pseudomonas and Serratia marcescens as of 3/3  -repeat CT 3/9 shows hematoma in edema of the right groin  4. Acute on chronic heart failure-most recent EF 30% with severe global hypokinesis  5. Elevated LFT  6. CKD baseline Cr 1.6   7. Anemia s/p PRBC transfusion 2/28 and 2/29     Plan:  Hyponatremia improving.  Continue salt tablet 2 g t.i.d..  1.5 L fluid restriction.  Renal function slightly worse.  Agree with holding Lasix for now.

## 2024-03-16 LAB
ALBUMIN SERPL-MCNC: 2.4 G/DL (ref 3.4–4.8)
BUN SERPL-MCNC: 22.5 MG/DL (ref 8.4–25.7)
CALCIUM SERPL-MCNC: 8.3 MG/DL (ref 8.8–10)
CHLORIDE SERPL-SCNC: 101 MMOL/L (ref 98–107)
CO2 SERPL-SCNC: 25 MMOL/L (ref 23–31)
CREAT SERPL-MCNC: 1.48 MG/DL (ref 0.73–1.18)
FERRITIN SERPL-MCNC: 3218.79 NG/ML (ref 21.81–274.66)
GFR SERPLBLD CREATININE-BSD FMLA CKD-EPI: 48 MLS/MIN/1.73/M2
GLUCOSE SERPL-MCNC: 99 MG/DL (ref 82–115)
IRON SATN MFR SERPL: 17 % (ref 20–50)
IRON SERPL-MCNC: 34 UG/DL (ref 65–175)
MAGNESIUM SERPL-MCNC: 2 MG/DL (ref 1.6–2.6)
PHOSPHATE SERPL-MCNC: 2.1 MG/DL (ref 2.3–4.7)
POTASSIUM SERPL-SCNC: 4.5 MMOL/L (ref 3.5–5.1)
SODIUM SERPL-SCNC: 133 MMOL/L (ref 136–145)
TIBC SERPL-MCNC: 167 UG/DL (ref 69–240)
TIBC SERPL-MCNC: 201 UG/DL (ref 250–450)
TRANSFERRIN SERPL-MCNC: 193 MG/DL (ref 163–344)

## 2024-03-16 PROCEDURE — 25000003 PHARM REV CODE 250: Performed by: INTERNAL MEDICINE

## 2024-03-16 PROCEDURE — 25000003 PHARM REV CODE 250: Performed by: PHYSICIAN ASSISTANT

## 2024-03-16 PROCEDURE — 63600175 PHARM REV CODE 636 W HCPCS: Performed by: NURSE PRACTITIONER

## 2024-03-16 PROCEDURE — 83540 ASSAY OF IRON: CPT | Performed by: NURSE PRACTITIONER

## 2024-03-16 PROCEDURE — 80069 RENAL FUNCTION PANEL: CPT | Performed by: STUDENT IN AN ORGANIZED HEALTH CARE EDUCATION/TRAINING PROGRAM

## 2024-03-16 PROCEDURE — 63600175 PHARM REV CODE 636 W HCPCS: Performed by: INTERNAL MEDICINE

## 2024-03-16 PROCEDURE — 21400001 HC TELEMETRY ROOM

## 2024-03-16 PROCEDURE — 25000003 PHARM REV CODE 250: Performed by: NURSE PRACTITIONER

## 2024-03-16 PROCEDURE — 83735 ASSAY OF MAGNESIUM: CPT | Performed by: INTERNAL MEDICINE

## 2024-03-16 PROCEDURE — 82728 ASSAY OF FERRITIN: CPT | Performed by: NURSE PRACTITIONER

## 2024-03-16 PROCEDURE — 25000003 PHARM REV CODE 250: Performed by: STUDENT IN AN ORGANIZED HEALTH CARE EDUCATION/TRAINING PROGRAM

## 2024-03-16 RX ORDER — MIDODRINE HYDROCHLORIDE 5 MG/1
5 TABLET ORAL
Status: DISCONTINUED | OUTPATIENT
Start: 2024-03-16 | End: 2024-03-18

## 2024-03-16 RX ORDER — DIGOXIN 125 MCG
0.12 TABLET ORAL DAILY
Status: DISCONTINUED | OUTPATIENT
Start: 2024-03-16 | End: 2024-03-17

## 2024-03-16 RX ADMIN — SUCRALFATE 1 G: 1 TABLET ORAL at 04:03

## 2024-03-16 RX ADMIN — DIGOXIN 250 MCG: 250 INJECTION, SOLUTION INTRAMUSCULAR; INTRAVENOUS; PARENTERAL at 12:03

## 2024-03-16 RX ADMIN — CYPROHEPTADINE HYDROCHLORIDE 4 MG: 4 TABLET ORAL at 08:03

## 2024-03-16 RX ADMIN — GUAIFENESIN AND CODEINE PHOSPHATE 10 ML: 100; 10 SOLUTION ORAL at 08:03

## 2024-03-16 RX ADMIN — SODIUM CHLORIDE 2000 MG: 1 TABLET ORAL at 08:03

## 2024-03-16 RX ADMIN — FOLIC ACID 1 MG: 1 TABLET ORAL at 08:03

## 2024-03-16 RX ADMIN — SUCRALFATE 1 G: 1 TABLET ORAL at 08:03

## 2024-03-16 RX ADMIN — DIGOXIN 0.12 MG: 125 TABLET ORAL at 11:03

## 2024-03-16 RX ADMIN — ENOXAPARIN SODIUM 40 MG: 40 INJECTION SUBCUTANEOUS at 04:03

## 2024-03-16 RX ADMIN — ALLOPURINOL 50 MG: 300 TABLET ORAL at 09:03

## 2024-03-16 RX ADMIN — MIDODRINE HYDROCHLORIDE 5 MG: 5 TABLET ORAL at 08:03

## 2024-03-16 RX ADMIN — ACETAMINOPHEN 650 MG: 325 TABLET, FILM COATED ORAL at 06:03

## 2024-03-16 RX ADMIN — MIDODRINE HYDROCHLORIDE 5 MG: 5 TABLET ORAL at 04:03

## 2024-03-16 RX ADMIN — ASPIRIN 81 MG: 81 TABLET, COATED ORAL at 09:03

## 2024-03-16 RX ADMIN — CEFEPIME 2 G: 2 INJECTION, POWDER, FOR SOLUTION INTRAVENOUS at 03:03

## 2024-03-16 RX ADMIN — MIDODRINE HYDROCHLORIDE 5 MG: 5 TABLET ORAL at 11:03

## 2024-03-16 RX ADMIN — CEFEPIME 2 G: 2 INJECTION, POWDER, FOR SOLUTION INTRAVENOUS at 04:03

## 2024-03-16 RX ADMIN — FERROUS SULFATE TAB 325 MG (65 MG ELEMENTAL FE) 1 EACH: 325 (65 FE) TAB at 08:03

## 2024-03-16 RX ADMIN — SUCRALFATE 1 G: 1 TABLET ORAL at 11:03

## 2024-03-16 RX ADMIN — PANTOPRAZOLE SODIUM 40 MG: 40 TABLET, DELAYED RELEASE ORAL at 08:03

## 2024-03-16 NOTE — PROGRESS NOTES
[facility] Hospital Medicine Progress Note        Chief Complaint: Inpatient Follow-up for R groin cellulitis    HPI per admitting team: 78-year-old male with a history of aortic insufficiency/stenosis, CAD, CKD IIIb, HTN AFib on Eliquis admitted to [facility] 02/15/2024 with chest pain, found to have NSTEMI (peak troponin 0.17) and underwent C 02/20/2024 showing severe multivessel stenosis and therefore was transferred to [facility] for CABG evaluation. Cardiology was consulted Patient had Impella placed on 02/23 and was admitted to the ICU.  Was started on aggressive diuresis with IV Lasix.  CV surgery was consulted.  Urology was consulted for hematuria; Nguyen catheter placed over guidewire with dilation secondary to urethral strictures. IV Heparin infusion was initiated per CIS but held due to hematuria/hemoptysis on 02/24.  He was also noted to have an JEAN-CLAUDE. Received 2 units PRBCs on 02/26 and was planned for high-risk PCI on 02/26 which was later canceled.  Underwent CABG x 3 2/27 (LIMA to LAD, RSVG to OM 1, R SVG to RCA, bioprosthetic AVR).  Remained on mechanical ventilation thereafter.  Patient noted to have tachycardia with atrial fibrillation/RVR requiring IV amiodarone along with pressors (norepinephrine/Milrinone) for hypotension. Patient underwent cardioversion x 2 with minimal improvement in HR/BP.  Patient self-extubated overnight on 02/29 and was noted to have adequate saturations on 2 L O2 via NC thereafter.  PT/OT consulted.  Renal function noted to be improving. Continued on IV diuresis as he appeared to be significantly volume overloaded postoperatively along with elevated pulmonary artery wedge pressures.  Started on p.o. amiodarone taper on 03/03.  Patient noted to have drainage from right groin wound and started on vancomycin on 03/04. Wound culture grew Pseudomonas.  Chest tubes discontinued on 03/02.  Downgraded from ICU on 03/02.  CIS and CV surgery continued following  patient.  CV surgery signed off on 03/06 and discontinued vancomycin.  Patient was placed on oral Levaquin.  Hospital medicine consulted on 03/06 for assistance with medical management and DC planning.  Repeat CT abdomen and pelvis of 3/9 shows improving stranding and improving hematoma in the right groin, persistent left-sided effusion and atelectasis  Patient complains of low back pain, cough and his SCDs too tight on his feet bilaterally  Vitals reviewed with blood pressure low normal, heart rates in the low 100s, saturating 94% on 2 L of oxygen   Labs reviewed and fairly stable   General surgery evaluated patient, reviewed CT scan of the area, given wound waning with scant serous/purulent drainage with manual expression, recommended wound care consult and continued to manually express fluid from site Q shift.  If area stopped draining, recommended do ultrasound to better define anatomy  Palliative care on board  ID adjusted antibiotics to IV cefepime  Nephrology placed on fluid restriction  Cardiology has suspended amiodarone due to elevated liver enzymes and Lasix due to hyponatremia    Interval Hx:   Patient is sleeping comfortably.  Did not wake up to my calling on examination.    No family is at bedside either  Case was discussed with patient's nurse and  on the floor.    Objective/physical exam:  General: In no acute distress, afebrile, elderly male, does not speak [place]  Chest: Clear to auscultation bilaterally anteriorly  Heart: RRR, +S1, S2, no appreciable murmur  Abdomen: Soft, nontender, BS +  MSK: Warm, bilateral lower extremity edema present, no clubbing or cyanosis  Neurologic:  Asleep comfortably.  Did not wake up to my calling on examination    VITAL SIGNS: 24 HRS MIN & MAX LAST   Temp  Min: 97.5 °F (36.4 °C)  Max: 98 °F (36.7 °C) 97.8 °F (36.6 °C)   BP  Min: 83/58  Max: 106/64 93/60   Pulse  Min: 65  Max: 90  65   Resp  Min: 18  Max: 18 18   SpO2  Min: 97 %  Max: 99 % 99 %     I  reviewed the labs below:  Recent Labs   Lab 03/13/24  0615 03/14/24  0727 03/15/24  0402   WBC 13.79* 11.36 12.25*   RBC 4.61* 4.49* 4.69*   HGB 12.2* 11.9* 12.7*   HCT 39.2* 38.1* 40.4*   MCV 85.0 84.9 86.1   MCH 26.5* 26.5* 27.1   MCHC 31.1* 31.2* 31.4*   RDW 15.7 15.5 15.6   * 576* 593*   MPV 10.5* 10.7* 10.3       Recent Labs   Lab 03/10/24  0611 03/11/24  0445 03/12/24  0449 03/13/24  0615 03/14/24  0727 03/15/24  0402 03/16/24  0400   * 128*   < > 129* 128* 130* 133*   K 3.9 4.1   < > 4.3 4.7 4.9 4.5   CO2 30 26   < > 27 23 27 25   BUN 20.3 22.8   < > 25.9* 24.0 24.6 22.5   CREATININE 1.22* 1.30*   < > 1.32* 1.34* 1.57* 1.48*   CALCIUM 8.2* 8.1*   < > 8.4* 8.1* 8.3* 8.3*   MG 1.90 2.00  --   --   --   --  2.00   ALBUMIN 2.5* 2.5*   < > 2.6* 2.5* 2.7* 2.4*   ALKPHOS 114 116   < > 110 108 127  --    ALT 73* 71*   < > 76* 76* 84*  --    AST 88* 78*   < > 90* 90* 98*  --    BILITOT 1.8* 1.6*   < > 1.5 1.3 1.4  --     < > = values in this interval not displayed.       Microbiology Results (last 7 days)       ** No results found for the last 168 hours. **             See below for Radiology    Scheduled Med:   allopurinoL  50 mg Oral Daily    aspirin  81 mg Oral Daily    ceFEPime IV (PEDS and ADULTS)  2 g Intravenous Q12H    cyproheptadine  4 mg Oral BID    enoxparin  40 mg Subcutaneous Daily    ferrous sulfate  1 tablet Oral Every other day    folic acid  1 mg Oral Daily    guaiFENesin-codeine 100-10 mg/5 ml  10 mL Oral TID    metoprolol succinate  12.5 mg Oral Daily    pantoprazole  40 mg Oral Daily    sodium chloride  2,000 mg Oral TID    sucralfate  1 g Oral QID (AC & HS)      Continuous Infusions:   loperamide        PRN Meds:  acetaminophen, acetaminophen, albumin human 5%, dextrose 10%, dextrose 10%, electrolyte-A, heparin, porcine (PF), heparin, porcine (PF), HYDROcodone-acetaminophen, lactulose 10 gram/15 ml, loperamide, melatonin, metoclopramide, midodrine, nitroGLYCERIN, ondansetron,  ondansetron, simethicone, sodium chloride 0.9%, sodium chloride 0.9%     Assessment/Plan:  Hypotension- off and on  Right flank pain- due to right-sided lower abdomen hematoma- improving   R Groin Purulent Cellulitis at previous impella insertion site, slowly improving   Transaminitis- multifactorial-  Congestive hepatopathy versus infection vs drug induced  Leukocytosis- resolved  Fluid overload 2/2 HFrEF exacerbation, fairly euvolemic clinically  HFrEF/HFpEF, euvolemic   JEAN-CLAUDE on stage II CKD - resolved   Hyponatremia due to SIADH  Pulmonary hypertension   NSTEMI, CAD sp CABG x 3  S/p AVR  Atrial Fibrillation with RVR  Normocytic anemia  Urethral stricture s/p dilation with gustafson 2/23  Constipation-resolved  Moderate malnutrition       Blood pressure dropped to 85/60, received midodrine 5 mg this morning  Cardiology started midodrine 5 mg b.i.d. p.r.n. systolic blood pressure less than 90  Yesterday complained of chest pain, EKG showed atrial fibrillation with RVR, left bundle branch block-->  received digoxin 500 mcg push followed by 250 mcg q.6 for 2 doses for rate control  Now started on digoxin 0.125 mg daily  Troponin 0.18, per Cardiology probably post CABG  Lasix, Lisinopril and Aldactone also suspended, due to low blood pressure  Continue po metoprolol succinate 12.5 mg q.day  Cardiology has suspended amiodarone and atorvastatin due to elevated liver enzymes  ID Dr KELLY adjusted antibiotics to IV cefepime- day 6, duration of treatment per ID, pending recs   Leukocytosis again loaded today with 12 K  Wound culture grew Pseudomonas and Serratia marcescens- both sensitive to cefepime  Nephrology placed pt on 1500 mL fluid restriction for hyponatremia and started sodium chloride oral tablets 2000 mg t.i.d., sodium improved to 133  Creatinine slightly improved to 1.4, monitor closely  Appreciate pulmonology input continue to monitor effusion, no need for thoracentesis at this time  Continue use of incentive  spirometry and oxygen supplementation, currently on room air  Appreciate Urology input, Nguyen catheter has been checked out--> no signs of active infection, case was personally discussed with Urology, per their recommendations, removed Nguyen on 03/16/2024  Trend AST/ALT- 98/84, slightly trended up  Ultrasound right upper quadrant shows mild-to-moderate hepatomegaly.  No biliary dilation  Continued allopurinol 50 mg daily, aspirin 81 mg, FeSO4 every other day, folic acid 1 mg, Protonix 40 mg daily, sucralfate 1 g q.i.d.  P.o. Norco 5 mg q.6 p.r.n. for pain  Antitussives p.r.n.  PT/OT recommending moderate-intensity therapy, case management on board, awaiting Medicaid approval for SNF.  Patient's daughter has submitted paperwork to Medicaid  Palliative care also on board, greatly appreciated  Wound Care also on board, case personally discussed with Lionel, informed that the wound site is very small for packing but she will continue to express drain age from the site  Abdominal KUB shows nonspecific gas pattern, patient is still constipated, ordered soapsuds enema, patient unable to hold, ordered Dulcolax suppository--> patient had a good bowel movement informed relief  Given his poor oral intake, ordered Marinol but pharmacy informed me it is on back order, changed to cyproheptadine for appetite stimulation  Morning CBC, CMP, digoxin level ordered       VTE prophylaxis:  Lovenox 40     Patient condition:  guarded     Anticipated discharge and Disposition:    SNF, awaiting Medicaid approval          All diagnosis and differential diagnosis have been reviewed; assessment and plan has been documented; I have personally reviewed the labs and test results that are presently available; I have reviewed the patients medication list; I have reviewed the consulting providers response and recommendations. I have reviewed or attempted to review medical records based upon their availability    All of the patient's questions have  been  addressed and answered. Patient's is agreeable to the above stated plan. I will continue to monitor closely and make adjustments to medical management as needed.  _____________________________________________________________________    Nutrition Status:  Patient meets ASPEN criteria for moderate malnutrition of acute illness or injury per RD assessment as evidenced by:  Energy Intake (Malnutrition): less than or equal to 50% for greater than or equal to 5 days  Weight Loss (Malnutrition):  (unable to determine)  Subcutaneous Fat (Malnutrition):  (does not meet criteria)  Muscle Mass (Malnutrition): mild depletion  Fluid Accumulation (Malnutrition): mild        A minimum of two characteristics is recommended for diagnosis of either severe or non-severe malnutrition.   Radiology:   I have personally reviewed the images and agree with radiologist report  X-Ray Abdomen AP 1 View  Narrative: EXAMINATION:  XR ABDOMEN AP 1 VIEW    CLINICAL HISTORY:  ABDOMINAL PAIN;    TECHNIQUE:  AP X-RAY OF THE ABDOMEN:    CPT 64735    FINDINGS:  Examination reveals some residual feces throughout the colon the gas pattern is nonspecific with no clear evidence of ileus or obstruction no abnormal masses identified a rectal tube is identified in position.    Two radiopaque densities are seen in the antrum/duodenal bulb might reflect radiopaque medications  Impression: Nonspecific gas pattern    Electronically signed by: Ananth Coker  Date:    03/15/2024  Time:    09:30      Tom Gilbert MD  Department of Hospital Medicine   [facility]   03/16/2024

## 2024-03-16 NOTE — PROGRESS NOTES
"  [facility]    Cardiology  Progress Note    Patient Name: Brain Snyder  MRN: [id number]  Admission Date: 2/21/2024  Hospital Length of Stay: 24 days  Code Status: Full Code   Attending Physician: Stan Lazar MD   Primary Care Physician: Nidia Shepherd NP  Expected Discharge Date:   Principal Problem:CAD (coronary artery disease)    Subjective:   Reason for Consult:  CAD     HPI: 78-year-old female that is unknown to [facility] with a PMHx of PAF on Eliquis Aortic insufficiency, MV CAD, HTN. He is Divehi speaking and an  was used for interview. He was orginally admitted to [facility] on 2.15.24 with CP & NSTEMI and underwent a LHC on 2.20.24 that showed MV CAD (reported noted below) He was transferred to [facility] on 2.21.24 for a CABG/AVR evaluation. On arrive he was hemodynamically stable on a heparin infusion. He denied chest pain, SOB, and nausea on current exam, [facility] was consulted for CAD    Hospital Course:   2.23.24: Patient seen resting in bed. He denies SOB or CP. He remains on heparin infusion. Family at bedside   2.24.24: NAD. S/p Impella Placement/Sheldon-Chelo Catheter. "I am ok." + Blood Clots from Nose/Mouth, Hematuria 2/2 traumatic Indwelling Catheter Placement. Heparin Drip per Protocol. Impella P5  2.25.24: NAD. "I'm ok." Denies CP, SOB and Palps. PA Pressure Reading is not Accurate. CVP 5. Impella at P5. R Groin Impella P7. Remains Off Heparin Drip per Protocol. Now in SR.   2.26.24: NAD Noted. SR on Tele. Right Groin Impella in place, bruising with no hematoma noted. Distal pulses DP intact. Legs warm. NC 2 L/Min. Denies CP/SOB. Consent obtained from his daughter Brii Snyder. NPO for MV PCI Today.  2.27.24: NAD Noted. Impella remains in place at P7 Support. Good UA Noted, some pink tinged urine noted. SR on Tele. Pressors off. Denies CP/SOB. NPO for surgery today. Heparin on hold for surgery.  2.28.24: Patient is critically ill. AF RVR on Tele, twice shocked this AM with no " "success. On Amiodarone/Milrinone- Cardizem Infusion now off given hypotension and ICMO. Also no José Miguel/Levophed. Concern for bleeding noted, transfusion noted. Patient is intubated/Mechanically Vented. Hemodynamics: CO/CI 4.3/2.3 CVP 20.  2.29.24: Patient self extubated this AM. He is stable on NC Oxygen. Denies CP/SOB. SR on Tele. On Amiodarone Infusion and receiving blood products. BP Stable. Clinically much improved from yesterday.   3.1.24: NAD. Afib mild RVR on tele. On Primacor @ 0.187 mcg/kg/min. Amiodarone infusing per protocol. LFT's worsening. WBC worsening. + Incisional CP. On 5 L NC.   3.2.24: NAD. Net Negative 2700 urine output.   3.3.24: NAD. VSS. No complaints of CP/Palps. Reports SOB is improving. Creat 1.61 (Improved)  3.4.24: NAD. VSS. No complaints. On 2 L. Net Negative Fluid 3540 over 24 hours. Creat 1.37. LFTs slighting worse today  3.5.24: NAD. VSS. Denies CP, SOB and Palps. Family at Bedside. Fluid Balance Net Negative 4360mL. BUN/Crea 23.5/1.16, AST/ALT 69/71  3.6.24: Patient sitting up in bedside chair. Appears SOB. C/o abdominal bloating and gaseous. Reports + BM since surgery. Abdomen distended. Patient nausea and spit up bile colored fluid. + peripheral edema. O2 via NC. Excellent diuresis. Persistent leukocytosis. K+ 3.1, mild transaminitis. Groin Wound cx -- GNR and pseudomonas. CV surgery has signed off.   3.7.24: Patient sitting up in bed. NAD. Denies any pain. Noted SS drainage from impella insertion site. Leukocytosis has resolved. BP marginal at times. Afib, CVR. Good UOP; however weight is increasing.   3.8.24: Patient awake in bed. He has been weaned off. O2. Good diuresis overnight. Mild bump in creatinine-- 1.21 from 1.15 yesterday. Liver enzymes uptrending. VSS.   3.9.24: NAD. Language Barrier/Daughter at Bedside for Translation. Denies CP, SOB and Palps. Deconditioned. Fluid Balance Net Negative 5600mL.   3.10.24: NAD. "[place]." Denies CP, SOB and Palps. Daughter at " Bedside/Translating. Denies CP, SOB and Palps. Remains Deconditions. Fluid Balance Net Negative 2405mL. Na 130, BUN/Crea 20.3/1.22, AST/ALT 88/73  3.11.24: Patient awake in bed. NAD. Reviewed echo-EF 40-45% with mild RV enlargement and severely reduced RV function. Na 129 today. Renal function mildly bumped  3.12.24: Patient awake in bed. NAD. Hyponatremia stable. Nephrology following and has initiated a fluid restriction. Impella site still oozing SS fluid. Surgery recommending manually expressing fluid q shift and obtaining US to better define anatomy. WBC 13.79. Afebrile.   3.15.24: Patient resting in bed. NAD. Clear breath sounds. No edema. Good UOP. Na improving with addition of salt tabs. Creatinine 1.57. Still has transaminitis. Amiodarone placed on hold yesterday. Currently Afib, 90s. Mild hypotension at times. Nurse reports patient c/o chest pain today. When questioned, he reported incisional chest pain during attempts at BM.   3.16.24; Patient sleeping in bedside recliner. NAD. Na improving- 133 today. Renal indices improving. Albumin 2.4. EKG Afib, RVR with LBBB yesterday. Troponin 0.183--likely due to recent CABG. He was given digoxin IV load due to RVR. He was given a dose of midodrine this am due to sbp < 90.         PMH: PAF (on Eliquis), Aortic insufficiency, MV CAD, HTN  PSH: [facility]  Family History: None Reported  Social History: Former Smoker, Denies ETOH or Illicit Drug Use    Previous Diagnostics:  TTE 03.11.24:  Left Ventricle: Regional wall motion abnormalities present. Septal motion is consistent with post-operative status. Akinetic inf -posterior wall There is moderately reduced systolic function with a visually estimated ejection fraction of 35 - 40%.    Right Ventricle: Mild right ventricular enlargement. Systolic function is severely reduced.    Aortic Valve: There is a bioprosthetic valve in the aortic position.    Pericardium: There is a trivial effusion. No indication of cardiac tamponade.  Left pleural effusion.       CABG/Bio AVR/MOISE Ligation (2.27.24):  Coronary artery bypass grafting X 3, LIMA to LAD, reversed SVG to OM1, Reversed Saphenous venous graft to RCA  Bioprosthetic aortic valve replacement (Epic max 23mm), Endoscopic venous harvesting of left greater saphenous vein, Left atrial appendage ligation, explant of right femoral impella device, right femoral artery repair.    RHC/Impella Placement (2.23.24):  Right heart catheterization performed showed the following:  PA= 61/24 (27) mm Hg  PCWP=  31/26 (27) mm Hg  AO saturation= 93 % RA  PA saturation= 60% with Impella (49% yesterday)    (02/22/24) -  0.5  - Cardiac output was 2.8 L/min provided by the device.  Impella was at 84cm  Impression/plan:   Successful Impella insertion and swan-Chelo in the setting of Acute HF/low cardiac output/precardiogenic shock/Critcal-severe AS/MVCAD - LM distal    RHC (2.22.24):  Right heart catheterization demonstrating severe pulmonary hypertension 84/34 and PCWP 24 mmHg  Reduced cardiac output/cardiac index at 3.43/1.81 L/min/m2 with pulmonary artery saturations 49.3%  For applied to the right femoral vein following removal of the 7 Citizen of [place] sheath  The estimated blood loss was none.    Carotid US (2.22.24):  The bilateral internal carotid artery demonstrated less than 50% stenosis.   The bilateral vertebral arteries were patent with antegrade flow    LHC (2.20.24):   Prox LAD lesion was 70% stenosed.  Ost Cx to Prox Cx lesion was 80% stenosed.  1st Mrg lesion was 80% stenosed.  2nd Mrg-1 lesion was 70% stenosed.  2nd Mrg-2 lesion was 50% stenosed.  Mid LAD lesion was 50% stenosed.  Prox RCA lesion was 60% stenosed.  estimated blood loss was <50 mL.  There was three vessel coronary artery disease.  LVEDP: 30mmHg  Recommendations:   Refer for CABG evaluation and AVR   Preformed by Dr. Flannery at [facility]     ECHO (2.15.24):  Left Ventricle: The left ventricle is normal in size. Mildly increased ventricular mass. Mildly  increased wall thickness. There is mild eccentric hypertrophy. Moderate global hypokinesis present. Septal motion is consistent with bundle branch block. There is moderately reduced systolic function. Biplane (2D) method of discs ejection fraction is 40%. Grade II diastolic dysfunction.  Right Ventricle: Right ventricular enlargement. Systolic function is normal.  Left Atrium: Left atrium is severely dilated.  Right Atrium: Right atrium is moderately dilated.  Aortic Valve: There is moderate aortic valve sclerosis. Severely restricted motion. There is severe stenosis. Aortic valve area by VTI is 0.66 cm². Aortic valve peak velocity is 4.45 m/s. Mean gradient is 50 mmHg. The dimensionless index is 0.17. There is mild to moderate aortic regurgitation.  Mitral Valve: Mildly calcified leaflets. There is no stenosis. There is mild regurgitation.  Tricuspid Valve: The tricuspid valve is structurally normal. There is mild to moderate regurgitation.  Pulmonic Valve: There is no significant regurgitation.  Aorta: Aortic root is upper limit of normal measuring 3.6 cm.  Pulmonary Artery: There is severe pulmonary hypertension. The estimated pulmonary artery systolic pressure is 69 mmHg.  IVC/SVC: Intermediate venous pressure at 8 mmHg.  Pericardium: There is no pericardial effusion.     Review of Systems   Cardiovascular:  Negative for chest pain, dyspnea on exertion and leg swelling.   Respiratory:  Negative for shortness of breath.    All other systems reviewed and are negative.    Objective:     Vital Signs (Most Recent):  Temp: 97.8 °F (36.6 °C) (03/16/24 0742)  Pulse: 65 (03/16/24 0742)  Resp: 16 (03/16/24 0851)  BP: (!) 85/60 (03/16/24 0742)  SpO2: 99 % (03/16/24 0742) Vital Signs (24h Range):  Temp:  [97.5 °F (36.4 °C)-98 °F (36.7 °C)] 97.8 °F (36.6 °C)  Pulse:  [65-81] 65  Resp:  [16-18] 16  SpO2:  [97 %-99 %] 99 %  BP: ()/(50-67) 85/60   Weight: 75.3 kg (166 lb 0.1 oz)  Body mass index is 27.62 kg/m².  SpO2: 99  %       Intake/Output Summary (Last 24 hours) at 3/16/2024 1058  Last data filed at 3/16/2024 0602  Gross per 24 hour   Intake 380 ml   Output 3300 ml   Net -2920 ml       Lines/Drains/Airways       Drain  Duration                  Urethral Catheter 02/28/24 1445 Coude 20 Fr. 16 days              Peripheral Intravenous Line  Duration                  Peripheral IV - Single Lumen 03/02/24 1053 20 G Anterior;Right Upper Arm 13 days                  Significant Labs:   Recent Results (from the past 72 hour(s))   Comprehensive metabolic panel    Collection Time: 03/14/24  7:27 AM   Result Value Ref Range    Sodium Level 128 (L) 136 - 145 mmol/L    Potassium Level 4.7 3.5 - 5.1 mmol/L    Chloride 95 (L) 98 - 107 mmol/L    Carbon Dioxide 23 23 - 31 mmol/L    Glucose Level 136 (H) 82 - 115 mg/dL    Blood Urea Nitrogen 24.0 8.4 - 25.7 mg/dL    Creatinine 1.34 (H) 0.73 - 1.18 mg/dL    Calcium Level Total 8.1 (L) 8.8 - 10.0 mg/dL    Protein Total 5.9 5.8 - 7.6 gm/dL    Albumin Level 2.5 (L) 3.4 - 4.8 g/dL    Globulin 3.4 2.4 - 3.5 gm/dL    Albumin/Globulin Ratio 0.7 (L) 1.1 - 2.0 ratio    Bilirubin Total 1.3 <=1.5 mg/dL    Alkaline Phosphatase 108 40 - 150 unit/L    Alanine Aminotransferase 76 (H) 0 - 55 unit/L    Aspartate Aminotransferase 90 (H) 5 - 34 unit/L    eGFR 55 mls/min/1.73/m2   CBC with Differential    Collection Time: 03/14/24  7:27 AM   Result Value Ref Range    WBC 11.36 4.50 - 11.50 x10(3)/mcL    RBC 4.49 (L) 4.70 - 6.10 x10(6)/mcL    Hgb 11.9 (L) 14.0 - 18.0 g/dL    Hct 38.1 (L) 42.0 - 52.0 %    MCV 84.9 80.0 - 94.0 fL    MCH 26.5 (L) 27.0 - 31.0 pg    MCHC 31.2 (L) 33.0 - 36.0 g/dL    RDW 15.5 11.5 - 17.0 %    Platelet 576 (H) 130 - 400 x10(3)/mcL    MPV 10.7 (H) 7.4 - 10.4 fL    Neut % 70.6 %    Lymph % 12.1 %    Mono % 12.1 %    Eos % 2.6 %    Basophil % 0.7 %    Lymph # 1.38 0.6 - 4.6 x10(3)/mcL    Neut # 8.00 2.1 - 9.2 x10(3)/mcL    Mono # 1.38 (H) 0.1 - 1.3 x10(3)/mcL    Eos # 0.30 0 - 0.9 x10(3)/mcL     Baso # 0.08 <=0.2 x10(3)/mcL    IG# 0.22 (H) 0 - 0.04 x10(3)/mcL    IG% 1.9 %    NRBC% 0.0 %   CBC Without Differential    Collection Time: 03/15/24  4:02 AM   Result Value Ref Range    WBC 12.25 (H) 4.50 - 11.50 x10(3)/mcL    RBC 4.69 (L) 4.70 - 6.10 x10(6)/mcL    Hgb 12.7 (L) 14.0 - 18.0 g/dL    Hct 40.4 (L) 42.0 - 52.0 %    MCV 86.1 80.0 - 94.0 fL    MCH 27.1 27.0 - 31.0 pg    MCHC 31.4 (L) 33.0 - 36.0 g/dL    RDW 15.6 11.5 - 17.0 %    Platelet 593 (H) 130 - 400 x10(3)/mcL    MPV 10.3 7.4 - 10.4 fL    NRBC% 0.0 %   Comprehensive metabolic panel    Collection Time: 03/15/24  4:02 AM   Result Value Ref Range    Sodium Level 130 (L) 136 - 145 mmol/L    Potassium Level 4.9 3.5 - 5.1 mmol/L    Chloride 96 (L) 98 - 107 mmol/L    Carbon Dioxide 27 23 - 31 mmol/L    Glucose Level 98 82 - 115 mg/dL    Blood Urea Nitrogen 24.6 8.4 - 25.7 mg/dL    Creatinine 1.57 (H) 0.73 - 1.18 mg/dL    Calcium Level Total 8.3 (L) 8.8 - 10.0 mg/dL    Protein Total 6.2 5.8 - 7.6 gm/dL    Albumin Level 2.7 (L) 3.4 - 4.8 g/dL    Globulin 3.5 2.4 - 3.5 gm/dL    Albumin/Globulin Ratio 0.8 (L) 1.1 - 2.0 ratio    Bilirubin Total 1.4 <=1.5 mg/dL    Alkaline Phosphatase 127 40 - 150 unit/L    Alanine Aminotransferase 84 (H) 0 - 55 unit/L    Aspartate Aminotransferase 98 (H) 5 - 34 unit/L    eGFR 45 mls/min/1.73/m2   EKG 12-lead    Collection Time: 03/15/24  9:04 AM   Result Value Ref Range    QRS Duration 176 ms    [facility]HS QTC Calculation 578 ms   Troponin I    Collection Time: 03/15/24  9:26 AM   Result Value Ref Range    Troponin-I 0.183 (H) 0.000 - 0.045 ng/mL   Renal Function Panel    Collection Time: 03/16/24  4:00 AM   Result Value Ref Range    Sodium Level 133 (L) 136 - 145 mmol/L    Potassium Level 4.5 3.5 - 5.1 mmol/L    Chloride 101 98 - 107 mmol/L    Carbon Dioxide 25 23 - 31 mmol/L    Glucose Level 99 82 - 115 mg/dL    Blood Urea Nitrogen 22.5 8.4 - 25.7 mg/dL    Creatinine 1.48 (H) 0.73 - 1.18 mg/dL    Calcium Level Total 8.3 (L) 8.8 -  10.0 mg/dL    Albumin Level 2.4 (L) 3.4 - 4.8 g/dL    Phosphorus Level 2.1 (L) 2.3 - 4.7 mg/dL    eGFR 48 mls/min/1.73/m2   Magnesium    Collection Time: 03/16/24  4:00 AM   Result Value Ref Range    Magnesium Level 2.00 1.60 - 2.60 mg/dL     Telemetry: PAF/CVR    Physical Exam  Vitals reviewed.   Constitutional:       General: He is not in acute distress.     Appearance: Normal appearance.   HENT:      Head: Normocephalic.      Mouth/Throat:      Mouth: Mucous membranes are moist.   Eyes:      Extraocular Movements: Extraocular movements intact.      Conjunctiva/sclera: Conjunctivae normal.   Cardiovascular:      Rate and Rhythm: Normal rate. Rhythm irregular.      Pulses: Normal pulses.      Heart sounds: No murmur heard.  Pulmonary:      Effort: Pulmonary effort is normal. No respiratory distress.      Breath sounds: Normal breath sounds.      Comments: On RA  Abdominal:      Palpations: Abdomen is soft.   Genitourinary:     Comments: Urinary Catheter   Skin:     General: Skin is warm.      Comments: Midline Sternotomy YVETTE with No Sign of Bleed/Infection. Right Lower Abdominal Site healing. Drainage decreasing   Neurological:      General: No focal deficit present.      Mental Status: He is alert.   Psychiatric:         Mood and Affect: Mood normal.       Current Inpatient Medications:  Current Facility-Administered Medications:     acetaminophen oral solution 650 mg, 650 mg, Per OG tube, Q6H PRN, Vasile Carter PA-C, 650 mg at 03/12/24 1726    acetaminophen tablet 650 mg, 650 mg, Oral, Q6H PRN, Pablo Yepez MD, 650 mg at 03/16/24 0609    albumin human 5% bottle 12.5 g, 12.5 g, Intravenous, PRN, Vasile Carter PA-C, Stopped at 02/28/24 1442    allopurinol split tablet 50 mg, 50 mg, Oral, Daily, Tanner Rdz MD, 50 mg at 03/16/24 0908    aspirin EC tablet 81 mg, 81 mg, Oral, Daily, Vasile Carter PA-C, 81 mg at 03/16/24 0908    ceFEPIme (MAXIPIME) 2 g in dextrose 5 % in water (D5W) 100 mL  IVPB (MB+), 2 g, Intravenous, Q12H, Elieser Pace MD, Stopped at 03/16/24 0354    cyproheptadine 4 mg tablet 4 mg, 4 mg, Oral, BID, Tom Gilbert MD, 4 mg at 03/16/24 0851    dextrose 10% bolus 125 mL 125 mL, 12.5 g, Intravenous, PRN, Pablo Yepez MD    dextrose 10% bolus 250 mL 250 mL, 25 g, Intravenous, PRN, Pablo Yepez MD    electrolyte-A infusion, , Intravenous, PRN, Pablo Yepez MD, Stopped at 02/28/24 0700    enoxaparin injection 40 mg, 40 mg, Subcutaneous, Daily, Cherry Suarez FNP, 40 mg at 03/15/24 1724    ferrous sulfate tablet 1 each, 1 tablet, Oral, Every other day, Tanner Rdz MD, 1 each at 03/16/24 0851    folic acid tablet 1 mg, 1 mg, Oral, Daily, Vasile Carter PA-C, 1 mg at 03/16/24 0851    guaiFENesin-codeine 100-10 mg/5 ml syrup 10 mL, 10 mL, Oral, TID, Stan Lazar MD, 10 mL at 03/16/24 0851    heparin, porcine (PF) 100 unit/mL injection flush 500 Units, 5 mL, Intravenous, On Call Procedure, Pablo Yepez MD    heparin, porcine (PF) 100 unit/mL injection flush 500 Units, 5 mL, Intravenous, On Call Procedure, Nita Contreras MD    HYDROcodone-acetaminophen 5-325 mg per tablet 1 tablet, 1 tablet, Oral, Q6H PRN, Stan Lazar MD, 1 tablet at 03/14/24 1447    lactulose 10 gram/15 ml solution 20 g, 20 g, Oral, Q6H PRN, Vasile Carter PA-C, 20 g at 03/13/24 1343    loperamide capsule 2 mg, 2 mg, Oral, Continuous PRN, Vasile Carter PA-C, 2 mg at 03/02/24 1253    melatonin tablet 6 mg, 6 mg, Oral, Nightly PRN, Tanner Rdz MD, 6 mg at 03/13/24 2055    metoclopramide injection 5 mg, 5 mg, Intravenous, Q6H PRN, Vasile Carter, JENN    metoprolol succinate (TOPROL-XL) 24 hr split tablet 12.5 mg, 12.5 mg, Oral, Daily, Cherry Suarez FNP, 12.5 mg at 03/15/24 0841    midodrine tablet 5 mg, 5 mg, Oral, BID PRN, Tom Gilbert MD, 5 mg at 03/16/24 0851    nitroGLYCERIN SL tablet 0.4 mg, 0.4 mg, Sublingual, Q5 Min PRN, [place],  Tanner AVILA MD    ondansetron disintegrating tablet 8 mg, 8 mg, Oral, Q8H PRN, Tanner Rdz MD, 8 mg at 03/11/24 1253    ondansetron injection 8 mg, 8 mg, Intravenous, Q6H PRN, Pablo Yepez MD, 8 mg at 03/14/24 1301    pantoprazole EC tablet 40 mg, 40 mg, Oral, Daily, Tanner Rdz MD, 40 mg at 03/16/24 0851    simethicone chewable tablet 80 mg, 1 tablet, Oral, TID PRN, Tanner Rdz MD, 80 mg at 03/15/24 0315    sodium chloride 0.9% flush 10 mL, 10 mL, Intravenous, PRN, Tanner Rdz MD    sodium chloride 0.9% flush 10 mL, 10 mL, Intravenous, PRN, Millie Jimenez, NP    sodium chloride oral tablet 2,000 mg, 2,000 mg, Oral, TID, Candelario Maldonado DO, 2,000 mg at 03/16/24 0851    sucralfate tablet 1 g, 1 g, Oral, QID (AC & HS), Vasile Carter, JENN, 1 g at 03/15/24 2018  VTE Risk Mitigation (From admission, onward)           Ordered     enoxaparin injection 40 mg  Daily         03/07/24 0559     IP VTE HIGH RISK PATIENT  Once         03/02/24 0759     heparin, porcine (PF) 100 unit/mL injection flush 500 Units  On Call Procedure         02/27/24 0718     heparin, porcine (PF) 100 unit/mL injection flush 500 Units  On Call Procedure         02/27/24 0257     Place SUSIE hose  Until discontinued         02/22/24 1458     Place sequential compression device  Until discontinued         02/21/24 2057                  Assessment:   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% - Symptomatically Improving    - ECHO Limited (3.7.24) - LVEF 30%, Severe Global Hypokinesis     - ECHO (2.16.24): EF 40%, Grade II DD    - Small Pleural Effusions on CT Imaging (2.22.24)  CAD (Multivessel)    - Status Post CABG (3V) LIMA to LAD, SVG to OM1, SVG to RCA (2.27.24)    - RHC/Impella Placement and Pyatt Catheter (2.23.24)- Impella Removal/Femoral Artery Repair in Surgery on 2.27.24    - [facility] (2.19.24): pLAD lesion 70%, oLCx 80%, OM1 80%, OM2 1 lesion 70% 2nd lesion 50%, mLAD 50%, pRCA  60%  VHD/AS     - Status Post Bio AVR (Epic max 23mm) (2.27.24)    - ECHO (2.16.24): Aortic Valve: There is moderate aortic valve sclerosis. Severely restricted motion. There is severe stenosis. Aortic valve area by VTI is 0.66 cm². Aortic valve peak velocity is 4.45 m/s. Mean gradient is 50 mmHg. The dimensionless index is 0.17.   Cardiogenic Shock (Post Cardiotomy) - Off Vasopressor Support - Resolved     - History of Hypertension   Ischemic Cardiomyopathy/EF 30%  Elevated LFTs - persistent  Pulmonary HTN    - ECHO PASP 69mmHg     - RHC (2.22.24): PA 84/34, PCWP 24 mmHg  Hematuria 2/2 Traumatic/Difficult Indwelling Catheter Placement (Resolved)  Urethral Stricture s/p Dilatation 2.23.24  PAF - Currently CVR    - Status Post Bedside Cardioversion x 2 on 2.27.24 (Both Unsuccessful)    - Status Post MOISE Ligation (2.27.24)    - CHADsVASc - 5 Points - 7.2% Stroke Risk per Year   Left Bundle Branch Block   VHD    - ECHO (3.7.24): Bio AVR, Mild MR    - ECHO (2.16.24): Severe AS, mild MR, mild to moderate TR  JEAN-CLAUDE/CKD Stage II - I  - Baseline Cr 1.6  Anemia- stable    - Status Post Transfusion on 2.28.24 & 2.29.24  Thrombocytopenia - Resolved   Leukocytosis - Persistent    - Groin Wound Culture: Serratia Marcescens and Pseudomonas Aeruginosa   Hyponatremia - stable  SIADH    Plan:   Continue IV Abx per Primary Team   Continue ASA and BB  Statin and amiodarone have been discontinued due to persistent transaminitis.   HF GDMT- Continue BB. Hold diuretic due to hypotension  Give midodrine 5 mg bid prn sbp < 90  Unable to add ARNI/MRA/SGLT2 due to periods of hypotension  May start midodrine 5mg tid prn sbp < 90  Start digoxin 0.125 mg daily. Check digoxin level in am  Accurate I&Os and Daily Weights   Keep K > 4.0 and Mg  > 2.0  Aggressive Mobilization of PT and Q1HR IS (PT/OT Eval and Treat)  Encouraged eating. He has been started on appetite stimulant    Cherry Suarez, P  Cardiology  [facility]    03/16/2024

## 2024-03-16 NOTE — PROGRESS NOTES
NEPHROLOGY PROGRESS NOTE      Patient Demographics:  Name:  Brain Snyder  Age: 78 y.o.  MRN:  [id number]  Admission Date: 2/21/2024      Subjective:    Up in chair  On RA    Mobility is the biggest issue    Renal status is improved  BP is low    Current Facility-Administered Medications   Medication Dose Route Frequency Provider Last Rate Last Admin    acetaminophen oral solution 650 mg  650 mg Per OG tube Q6H PRN Vasile Carter PA-C   650 mg at 03/12/24 1726    acetaminophen tablet 650 mg  650 mg Oral Q6H PRN Pablo Yepez MD   650 mg at 03/16/24 0609    albumin human 5% bottle 12.5 g  12.5 g Intravenous PRN Vasile Carter PA-C   Stopped at 02/28/24 1442    allopurinol split tablet 50 mg  50 mg Oral Daily Tanner Rdz MD   50 mg at 03/16/24 0908    aspirin EC tablet 81 mg  81 mg Oral Daily Vasile Carter PA-C   81 mg at 03/16/24 0908    ceFEPIme (MAXIPIME) 2 g in dextrose 5 % in water (D5W) 100 mL IVPB (MB+)  2 g Intravenous Q12H Elieser Pace MD   Stopped at 03/16/24 0354    cyproheptadine 4 mg tablet 4 mg  4 mg Oral BID Tom Gilbert MD   4 mg at 03/16/24 0851    dextrose 10% bolus 125 mL 125 mL  12.5 g Intravenous PRN Pablo Yepez MD        dextrose 10% bolus 250 mL 250 mL  25 g Intravenous PRN Pablo Yepez MD        digoxin tablet 0.125 mg  0.125 mg Oral Daily Cherry Suarez FNP        electrolyte-A infusion   Intravenous PRN Pablo Yepez MD   Stopped at 02/28/24 0700    enoxaparin injection 40 mg  40 mg Subcutaneous Daily Cherry Suarez FNP   40 mg at 03/15/24 1724    ferrous sulfate tablet 1 each  1 tablet Oral Every other day Tanner Rdz MD   1 each at 03/16/24 0851    folic acid tablet 1 mg  1 mg Oral Daily Vasile Carter PA-C   1 mg at 03/16/24 0851    guaiFENesin-codeine 100-10 mg/5 ml syrup 10 mL  10 mL Oral TID Amusa, Iradat A, MD   10 mL at 03/16/24 0851    heparin, porcine (PF) 100 unit/mL injection flush 500 Units  5 mL  Intravenous On Call Procedure Pablo Yepez MD        heparin, porcine (PF) 100 unit/mL injection flush 500 Units  5 mL Intravenous On Call Procedure Nita Contreras MD        HYDROcodone-acetaminophen 5-325 mg per tablet 1 tablet  1 tablet Oral Q6H PRN Stan Lazar MD   1 tablet at 03/14/24 1447    lactulose 10 gram/15 ml solution 20 g  20 g Oral Q6H PRN Vasile Carter PA-C   20 g at 03/13/24 1343    loperamide capsule 2 mg  2 mg Oral Continuous PRN Vasile Carter PA-C   2 mg at 03/02/24 1253    melatonin tablet 6 mg  6 mg Oral Nightly PRN Tanner Rdz MD   6 mg at 03/13/24 2055    metoclopramide injection 5 mg  5 mg Intravenous Q6H PRN Vasile Carter PA-C        metoprolol succinate (TOPROL-XL) 24 hr split tablet 12.5 mg  12.5 mg Oral Daily Cherry Suarez FNP   12.5 mg at 03/15/24 0841    midodrine tablet 5 mg  5 mg Oral BID PRN Tom Gilbert MD   5 mg at 03/16/24 0851    nitroGLYCERIN SL tablet 0.4 mg  0.4 mg Sublingual Q5 Min PRN Tanner Rdz MD        ondansetron disintegrating tablet 8 mg  8 mg Oral Q8H PRN Tanner Rdz MD   8 mg at 03/11/24 1253    ondansetron injection 8 mg  8 mg Intravenous Q6H PRN Pablo Yepez MD   8 mg at 03/14/24 1301    pantoprazole EC tablet 40 mg  40 mg Oral Daily Tanner Rdz MD   40 mg at 03/16/24 0851    simethicone chewable tablet 80 mg  1 tablet Oral TID PRN Tanner Rdz MD   80 mg at 03/15/24 0315    sodium chloride 0.9% flush 10 mL  10 mL Intravenous PRN Tanner Rdz MD        sodium chloride 0.9% flush 10 mL  10 mL Intravenous PRN Millie Jimenez NP        sodium chloride oral tablet 2,000 mg  2,000 mg Oral TID Candelario Maldonado DO   2,000 mg at 03/16/24 0851    sucralfate tablet 1 g  1 g Oral QID (AC & HS) Vasile Carter PA-C   1 g at 03/15/24 2018           Review of Systems   Constitutional:  Positive for malaise/fatigue.   HENT: Negative.     Eyes: Negative.    Respiratory:  "Negative.     Cardiovascular: Negative.    Gastrointestinal: Negative.    Genitourinary: Negative.    Musculoskeletal: Negative.    Skin: Negative.    Neurological:  Positive for weakness.         Objective:    BP (!) 85/60   Pulse 65   Temp 97.8 °F (36.6 °C) (Oral)   Resp 16   Ht 5' 5" (1.651 m)   Wt 75.3 kg (166 lb 0.1 oz)   SpO2 99%   BMI 27.62 kg/m²       Intake/Output Summary (Last 24 hours) at 3/16/2024 1125  Last data filed at 3/16/2024 0602  Gross per 24 hour   Intake 380 ml   Output 3300 ml   Net -2920 ml             Physical Exam  Vitals reviewed.   Constitutional:       Appearance: Normal appearance.   HENT:      Head: Normocephalic and atraumatic.      Nose: Nose normal.   Eyes:      Extraocular Movements: Extraocular movements intact.      Pupils: Pupils are equal, round, and reactive to light.   Cardiovascular:      Rate and Rhythm: Normal rate and regular rhythm.      Pulses: Normal pulses.      Heart sounds: Normal heart sounds.   Pulmonary:      Effort: Pulmonary effort is normal.      Breath sounds: Normal breath sounds.   Abdominal:      General: Bowel sounds are normal.      Palpations: Abdomen is soft.   Musculoskeletal:         General: Normal range of motion.      Cervical back: Normal range of motion.      Comments: Sig deconditioning   Skin:     General: Skin is warm and dry.   Neurological:      General: No focal deficit present.      Mental Status: He is alert and oriented to person, place, and time. Mental status is at baseline.   Psychiatric:         Mood and Affect: Mood normal.            Inpatient Diagnostics:  Recent Results (from the past 24 hour(s))   Renal Function Panel    Collection Time: 03/16/24  4:00 AM   Result Value Ref Range    Sodium Level 133 (L) 136 - 145 mmol/L    Potassium Level 4.5 3.5 - 5.1 mmol/L    Chloride 101 98 - 107 mmol/L    Carbon Dioxide 25 23 - 31 mmol/L    Glucose Level 99 82 - 115 mg/dL    Blood Urea Nitrogen 22.5 8.4 - 25.7 mg/dL    Creatinine " 1.48 (H) 0.73 - 1.18 mg/dL    Calcium Level Total 8.3 (L) 8.8 - 10.0 mg/dL    Albumin Level 2.4 (L) 3.4 - 4.8 g/dL    Phosphorus Level 2.1 (L) 2.3 - 4.7 mg/dL    eGFR 48 mls/min/1.73/m2   Magnesium    Collection Time: 03/16/24  4:00 AM   Result Value Ref Range    Magnesium Level 2.00 1.60 - 2.60 mg/dL     A/P--NE 03/16    1---JEAN-CLAUDE/ Fluid Overload/ CKD III--Indices are a bit better today--Add low dose Midodrine for perfusion  2---Hyponatremia--Resolving/ DC NaCl tablets  3---Fluid Overload--Resolved  4---Anemia secondary to Chronic Disease--Check Fe studies/ Follow H&H    IV--SL    ORDERS:  Midodrine TID  DC NaCl tablets  Fe studies  CBC/CMP in am                          Patient stable.  Discussed with NP.  Renal indices slightly improved with good UOP.  Add midodrine for BP support.  Monitor for renal improvement.  Continue hydration/supportive care.  Will follow.

## 2024-03-17 LAB
ALBUMIN SERPL-MCNC: 2.6 G/DL (ref 3.4–4.8)
ALBUMIN/GLOB SERPL: 0.8 RATIO (ref 1.1–2)
ALP SERPL-CCNC: 110 UNIT/L (ref 40–150)
ALT SERPL-CCNC: 82 UNIT/L (ref 0–55)
AST SERPL-CCNC: 90 UNIT/L (ref 5–34)
BASOPHILS # BLD AUTO: 0.11 X10(3)/MCL
BASOPHILS NFR BLD AUTO: 1 %
BILIRUB SERPL-MCNC: 1.1 MG/DL
BUN SERPL-MCNC: 23.7 MG/DL (ref 8.4–25.7)
CALCIUM SERPL-MCNC: 8.3 MG/DL (ref 8.8–10)
CHLORIDE SERPL-SCNC: 103 MMOL/L (ref 98–107)
CO2 SERPL-SCNC: 22 MMOL/L (ref 23–31)
CREAT SERPL-MCNC: 1.29 MG/DL (ref 0.73–1.18)
DIGOXIN SERPL-MCNC: 2.1 NG/ML (ref 0.8–2)
EOSINOPHIL # BLD AUTO: 0.34 X10(3)/MCL (ref 0–0.9)
EOSINOPHIL NFR BLD AUTO: 3.2 %
ERYTHROCYTE [DISTWIDTH] IN BLOOD BY AUTOMATED COUNT: 15.3 % (ref 11.5–17)
GFR SERPLBLD CREATININE-BSD FMLA CKD-EPI: 57 MLS/MIN/1.73/M2
GLOBULIN SER-MCNC: 3.2 GM/DL (ref 2.4–3.5)
GLUCOSE SERPL-MCNC: 88 MG/DL (ref 82–115)
HCT VFR BLD AUTO: 39.7 % (ref 42–52)
HGB BLD-MCNC: 12.3 G/DL (ref 14–18)
IMM GRANULOCYTES # BLD AUTO: 0.22 X10(3)/MCL (ref 0–0.04)
IMM GRANULOCYTES NFR BLD AUTO: 2.1 %
LYMPHOCYTES # BLD AUTO: 1.96 X10(3)/MCL (ref 0.6–4.6)
LYMPHOCYTES NFR BLD AUTO: 18.6 %
MCH RBC QN AUTO: 26.7 PG (ref 27–31)
MCHC RBC AUTO-ENTMCNC: 31 G/DL (ref 33–36)
MCV RBC AUTO: 86.3 FL (ref 80–94)
MONOCYTES # BLD AUTO: 1.42 X10(3)/MCL (ref 0.1–1.3)
MONOCYTES NFR BLD AUTO: 13.5 %
NEUTROPHILS # BLD AUTO: 6.5 X10(3)/MCL (ref 2.1–9.2)
NEUTROPHILS NFR BLD AUTO: 61.6 %
NRBC BLD AUTO-RTO: 0 %
PLATELET # BLD AUTO: 419 X10(3)/MCL (ref 130–400)
PMV BLD AUTO: 9.8 FL (ref 7.4–10.4)
POTASSIUM SERPL-SCNC: 5.1 MMOL/L (ref 3.5–5.1)
PROT SERPL-MCNC: 5.8 GM/DL (ref 5.8–7.6)
RBC # BLD AUTO: 4.6 X10(6)/MCL (ref 4.7–6.1)
SODIUM SERPL-SCNC: 132 MMOL/L (ref 136–145)
WBC # SPEC AUTO: 10.55 X10(3)/MCL (ref 4.5–11.5)

## 2024-03-17 PROCEDURE — 80053 COMPREHEN METABOLIC PANEL: CPT | Performed by: NURSE PRACTITIONER

## 2024-03-17 PROCEDURE — 25000003 PHARM REV CODE 250: Performed by: INTERNAL MEDICINE

## 2024-03-17 PROCEDURE — 63600175 PHARM REV CODE 636 W HCPCS: Performed by: INTERNAL MEDICINE

## 2024-03-17 PROCEDURE — 63600175 PHARM REV CODE 636 W HCPCS: Mod: JZ,JG | Performed by: INTERNAL MEDICINE

## 2024-03-17 PROCEDURE — 25000003 PHARM REV CODE 250: Performed by: PHYSICIAN ASSISTANT

## 2024-03-17 PROCEDURE — 25000003 PHARM REV CODE 250: Performed by: NURSE PRACTITIONER

## 2024-03-17 PROCEDURE — 21400001 HC TELEMETRY ROOM

## 2024-03-17 PROCEDURE — 80162 ASSAY OF DIGOXIN TOTAL: CPT | Performed by: NURSE PRACTITIONER

## 2024-03-17 PROCEDURE — 85025 COMPLETE CBC W/AUTO DIFF WBC: CPT | Performed by: NURSE PRACTITIONER

## 2024-03-17 PROCEDURE — 63600175 PHARM REV CODE 636 W HCPCS: Performed by: NURSE PRACTITIONER

## 2024-03-17 RX ORDER — METRONIDAZOLE 500 MG/100ML
500 INJECTION, SOLUTION INTRAVENOUS
Status: DISCONTINUED | OUTPATIENT
Start: 2024-03-17 | End: 2024-03-18

## 2024-03-17 RX ORDER — CODEINE PHOSPHATE AND GUAIFENESIN 10; 100 MG/5ML; MG/5ML
10 SOLUTION ORAL 3 TIMES DAILY
Status: DISPENSED | OUTPATIENT
Start: 2024-03-17 | End: 2024-03-22

## 2024-03-17 RX ORDER — POLYETHYLENE GLYCOL 3350 17 G/17G
17 POWDER, FOR SOLUTION ORAL 2 TIMES DAILY
Status: DISCONTINUED | OUTPATIENT
Start: 2024-03-17 | End: 2024-03-26 | Stop reason: HOSPADM

## 2024-03-17 RX ORDER — BISACODYL 10 MG/1
10 SUPPOSITORY RECTAL DAILY PRN
Status: DISCONTINUED | OUTPATIENT
Start: 2024-03-17 | End: 2024-03-26 | Stop reason: HOSPADM

## 2024-03-17 RX ORDER — DICLOFENAC SODIUM 10 MG/G
2 GEL TOPICAL 3 TIMES DAILY
Status: DISCONTINUED | OUTPATIENT
Start: 2024-03-17 | End: 2024-03-26 | Stop reason: HOSPADM

## 2024-03-17 RX ORDER — CIPROFLOXACIN 2 MG/ML
400 INJECTION, SOLUTION INTRAVENOUS
Status: DISCONTINUED | OUTPATIENT
Start: 2024-03-17 | End: 2024-03-18

## 2024-03-17 RX ADMIN — MIDODRINE HYDROCHLORIDE 5 MG: 5 TABLET ORAL at 05:03

## 2024-03-17 RX ADMIN — CEFEPIME 2 G: 2 INJECTION, POWDER, FOR SOLUTION INTRAVENOUS at 05:03

## 2024-03-17 RX ADMIN — DICLOFENAC SODIUM 2 G: 10 GEL TOPICAL at 10:03

## 2024-03-17 RX ADMIN — CYPROHEPTADINE HYDROCHLORIDE 4 MG: 4 TABLET ORAL at 10:03

## 2024-03-17 RX ADMIN — ACETAMINOPHEN 650 MG: 325 TABLET, FILM COATED ORAL at 09:03

## 2024-03-17 RX ADMIN — MIDODRINE HYDROCHLORIDE 5 MG: 5 TABLET ORAL at 11:03

## 2024-03-17 RX ADMIN — SUCRALFATE 1 G: 1 TABLET ORAL at 11:03

## 2024-03-17 RX ADMIN — POLYETHYLENE GLYCOL 3350 17 G: 17 POWDER, FOR SOLUTION ORAL at 12:03

## 2024-03-17 RX ADMIN — CIPROFLOXACIN 400 MG: 2 INJECTION, SOLUTION INTRAVENOUS at 07:03

## 2024-03-17 RX ADMIN — GUAIFENESIN AND CODEINE PHOSPHATE 5 ML: 100; 10 SOLUTION ORAL at 09:03

## 2024-03-17 RX ADMIN — GUAIFENESIN AND CODEINE PHOSPHATE 10 ML: 100; 10 SOLUTION ORAL at 11:03

## 2024-03-17 RX ADMIN — ALLOPURINOL 50 MG: 300 TABLET ORAL at 10:03

## 2024-03-17 RX ADMIN — POLYETHYLENE GLYCOL 3350 17 G: 17 POWDER, FOR SOLUTION ORAL at 09:03

## 2024-03-17 RX ADMIN — Medication 6 MG: at 11:03

## 2024-03-17 RX ADMIN — CYPROHEPTADINE HYDROCHLORIDE 4 MG: 4 TABLET ORAL at 09:03

## 2024-03-17 RX ADMIN — CEFEPIME 2 G: 2 INJECTION, POWDER, FOR SOLUTION INTRAVENOUS at 02:03

## 2024-03-17 RX ADMIN — PANTOPRAZOLE SODIUM 40 MG: 40 TABLET, DELAYED RELEASE ORAL at 10:03

## 2024-03-17 RX ADMIN — ENOXAPARIN SODIUM 40 MG: 40 INJECTION SUBCUTANEOUS at 05:03

## 2024-03-17 RX ADMIN — SUCRALFATE 1 G: 1 TABLET ORAL at 09:03

## 2024-03-17 RX ADMIN — METRONIDAZOLE 500 MG: 5 INJECTION, SOLUTION INTRAVENOUS at 09:03

## 2024-03-17 RX ADMIN — METOPROLOL SUCCINATE 12.5 MG: 25 TABLET, EXTENDED RELEASE ORAL at 10:03

## 2024-03-17 RX ADMIN — ASPIRIN 81 MG: 81 TABLET, COATED ORAL at 10:03

## 2024-03-17 RX ADMIN — DIGOXIN 0.12 MG: 125 TABLET ORAL at 10:03

## 2024-03-17 RX ADMIN — MIDODRINE HYDROCHLORIDE 5 MG: 5 TABLET ORAL at 10:03

## 2024-03-17 RX ADMIN — FOLIC ACID 1 MG: 1 TABLET ORAL at 10:03

## 2024-03-17 RX ADMIN — SUCRALFATE 1 G: 1 TABLET ORAL at 05:03

## 2024-03-17 NOTE — PROGRESS NOTES
[facility] Hospital Medicine Progress Note        Chief Complaint: Inpatient Follow-up for R groin cellulitis    HPI per admitting team: 78-year-old male with a history of aortic insufficiency/stenosis, CAD, CKD IIIb, HTN AFib on Eliquis admitted to [facility] 02/15/2024 with chest pain, found to have NSTEMI (peak troponin 0.17) and underwent C 02/20/2024 showing severe multivessel stenosis and therefore was transferred to [facility] for CABG evaluation. Cardiology was consulted Patient had Impella placed on 02/23 and was admitted to the ICU.  Was started on aggressive diuresis with IV Lasix.  CV surgery was consulted.  Urology was consulted for hematuria; Nguyen catheter placed over guidewire with dilation secondary to urethral strictures. IV Heparin infusion was initiated per CIS but held due to hematuria/hemoptysis on 02/24.  He was also noted to have an JEAN-CLAUDE. Received 2 units PRBCs on 02/26 and was planned for high-risk PCI on 02/26 which was later canceled.  Underwent CABG x 3 2/27 (LIMA to LAD, RSVG to OM 1, R SVG to RCA, bioprosthetic AVR).  Remained on mechanical ventilation thereafter.  Patient noted to have tachycardia with atrial fibrillation/RVR requiring IV amiodarone along with pressors (norepinephrine/Milrinone) for hypotension. Patient underwent cardioversion x 2 with minimal improvement in HR/BP.  Patient self-extubated overnight on 02/29 and was noted to have adequate saturations on 2 L O2 via NC thereafter.  PT/OT consulted.  Renal function noted to be improving. Continued on IV diuresis as he appeared to be significantly volume overloaded postoperatively along with elevated pulmonary artery wedge pressures.  Started on p.o. amiodarone taper on 03/03.  Patient noted to have drainage from right groin wound and started on vancomycin on 03/04. Wound culture grew Pseudomonas.  Chest tubes discontinued on 03/02.  Downgraded from ICU on 03/02.  CIS and CV surgery continued following  patient.  CV surgery signed off on 03/06 and discontinued vancomycin.  Patient was placed on oral Levaquin.  Hospital medicine consulted on 03/06 for assistance with medical management and DC planning.  Repeat CT abdomen and pelvis of 3/9 shows improving stranding and improving hematoma in the right groin, persistent left-sided effusion and atelectasis  Patient complains of low back pain, cough and his SCDs too tight on his feet bilaterally  Vitals reviewed with blood pressure low normal, heart rates in the low 100s, saturating 94% on 2 L of oxygen   Labs reviewed and fairly stable   General surgery evaluated patient, reviewed CT scan of the area, given wound waning with scant serous/purulent drainage with manual expression, recommended wound care consult and continued to manually express fluid from site Q shift.  If area stopped draining, recommended do ultrasound to better define anatomy  Palliative care on board  ID adjusted antibiotics to IV cefepime  Nephrology placed on fluid restriction  Cardiology has suspended amiodarone due to elevated liver enzymes and Lasix due to hyponatremia    Interval Hx:   Patient is laying in bed, complains of lower back pain and abdominal pain, patient feels he did not had a bowel movement in the last few days.  Informed patient that he had a bowel movement Friday.  He could not recall  Noted patient did drink his juice, his ensure, and 1 muffin.  He told the  that he eat very small meals at a time.  I showed patient that we will treat his pain for the back  Case was discussed with patient's nurse and  on the floor.    Objective/physical exam:  General:  Looks uncomfortable today, afebrile, elderly male, does not speak Haitian  Chest: Clear to auscultation bilaterally anteriorly  Heart: RRR, +S1, S2, no appreciable murmur  Abdomen: Soft, tender to palpate and right upper quadrant and left lower quadrant.  Bowel sounds positive x4  MSK: Warm, bilateral lower  extremity edema present, no clubbing or cyanosis  Neurologic:  Awake but looks uncomfortable.  Able to move all 4 extremities    VITAL SIGNS: 24 HRS MIN & MAX LAST   Temp  Min: 97.2 °F (36.2 °C)  Max: 98.6 °F (37 °C) 97.7 °F (36.5 °C)   BP  Min: 90/56  Max: 127/70 106/63   Pulse  Min: 56  Max: 72  (!) 59   Resp  Min: 18  Max: 20 18   SpO2  Min: 96 %  Max: 99 % 98 %     I reviewed the labs below:  Recent Labs   Lab 03/14/24  0727 03/15/24  0402 03/17/24  0353   WBC 11.36 12.25* 10.55   RBC 4.49* 4.69* 4.60*   HGB 11.9* 12.7* 12.3*   HCT 38.1* 40.4* 39.7*   MCV 84.9 86.1 86.3   MCH 26.5* 27.1 26.7*   MCHC 31.2* 31.4* 31.0*   RDW 15.5 15.6 15.3   * 593* 419*   MPV 10.7* 10.3 9.8     Recent Labs   Lab 03/11/24  0445 03/12/24  0449 03/14/24  0727 03/15/24  0402 03/16/24  0400 03/17/24  0353   *   < > 128* 130* 133* 132*   K 4.1   < > 4.7 4.9 4.5 5.1   CO2 26   < > 23 27 25 22*   BUN 22.8   < > 24.0 24.6 22.5 23.7   CREATININE 1.30*   < > 1.34* 1.57* 1.48* 1.29*   CALCIUM 8.1*   < > 8.1* 8.3* 8.3* 8.3*   MG 2.00  --   --   --  2.00  --    ALBUMIN 2.5*   < > 2.5* 2.7* 2.4* 2.6*   ALKPHOS 116   < > 108 127  --  110   ALT 71*   < > 76* 84*  --  82*   AST 78*   < > 90* 98*  --  90*   BILITOT 1.6*   < > 1.3 1.4  --  1.1    < > = values in this interval not displayed.     Microbiology Results (last 7 days)       ** No results found for the last 168 hours. **             See below for Radiology    Scheduled Med:   allopurinoL  50 mg Oral Daily    aspirin  81 mg Oral Daily    ceFEPime IV (PEDS and ADULTS)  2 g Intravenous Q12H    ciprofloxacin  400 mg Intravenous Q12H    cyproheptadine  4 mg Oral BID    diclofenac sodium  2 g Topical (Top) TID    enoxparin  40 mg Subcutaneous Daily    ferrous sulfate  1 tablet Oral Every other day    folic acid  1 mg Oral Daily    guaiFENesin-codeine 100-10 mg/5 ml  10 mL Oral TID    metoprolol succinate  12.5 mg Oral Daily    metronidazole  500 mg Intravenous Q8H    midodrine  5 mg  Oral TID WM    pantoprazole  40 mg Oral Daily    polyethylene glycol  17 g Oral BID    sucralfate  1 g Oral QID (AC & HS)      Continuous Infusions:   loperamide        PRN Meds:  acetaminophen, acetaminophen, albumin human 5%, bisacodyL, dextrose 10%, dextrose 10%, electrolyte-A, heparin, porcine (PF), heparin, porcine (PF), HYDROcodone-acetaminophen, lactulose 10 gram/15 ml, loperamide, melatonin, metoclopramide, midodrine, nitroGLYCERIN, ondansetron, ondansetron, simethicone, sodium chloride 0.9%, sodium chloride 0.9%     Assessment/Plan:  Hypotension- improved with midodrine   Right flank pain- due to right-sided lower abdomen hematoma- improving   R Groin Purulent Cellulitis at previous impella insertion site, slowly improving   Transaminitis- multifactorial-  Congestive hepatopathy versus infection vs drug induced  Leukocytosis- resolved  Fluid overload 2/2 HFrEF exacerbation, fairly euvolemic clinically  HFrEF/HFpEF, euvolemic   JEAN-CLAUDE on stage II CKD - resolved   Hyponatremia due to SIADH- improving   Pulmonary hypertension   NSTEMI, CAD sp CABG x 3  S/p AVR  Atrial Fibrillation with RVR  Normocytic anemia  Urethral stricture s/p dilation with gustafson 2/23  Constipation-resolved  Moderate malnutrition         Cardiology started midodrine 5 mg b.i.d. p.r.n. systolic blood pressure less than 90, last dose received for yesterday morning   EKG showed atrial fibrillation with RVR, left bundle branch block-->  received digoxin 500 mcg push followed by 250 mcg q.6 for 2 doses for rate control  Cont digoxin 0.125 mg daily  Digoxin level 2.1 (H)  Troponin 0.18, per Cardiology probably post CABG  Lasix, Lisinopril and Aldactone also suspended, due to low blood pressure  Continue po metoprolol succinate 12.5 mg q.day  Cardiology has suspended amiodarone and atorvastatin due to elevated liver enzymes  Pt continue to complain of abdominal and low back pain--> ordered CT Abddmen pelvis w/o contrast--> pending   Added volatren  get to the lower back for pain controll  ID Dr KELLY adjusted antibiotics to IV cefepime- day 7, duration of treatment per ID, pending recs   Leukocytosis resolved, 10K  Wound culture grew Pseudomonas and Serratia marcescens- both sensitive to cefepime  Nephrology placed pt on 1500 mL fluid restriction for hyponatremia and started sodium chloride oral tablets 2000 mg t.i.d., sodium improved to 132  Creatinine slightly improved to 1.2, monitor closely  Appreciate pulmonology input continue to monitor effusion, no need for thoracentesis at this time  Continue use of incentive spirometry and oxygen supplementation, currently on room air  Appreciate Urology input, case was personally discussed with Alexandra Gonzalez, per their recommendations to removed Estevez on 03/16/2024, ordered and verbally informed the pt Nadia that I communicated with Cora Holliday NP and she recommended estevez to be removed, Nadia Easley RN wrote in her handsoff that she did not removed estevez as it was placed by urology over guide wire. SHE DID NOT REMOVE THE ESTEVEZ NOR DID SHE CONTACTED THE UROLOGY TO VERIFY. Requested pt nurse to reach out t Urology to verify again, she spoke to Dr Saenz and he asked to remove it Monday morning while they are around so they can monitor the voiding trial, remove estevez Monday 3/18  Trend AST/ALT- 90/82  Ultrasound right upper quadrant shows mild-to-moderate hepatomegaly.  No biliary dilation  Continued allopurinol 50 mg daily, aspirin 81 mg, FeSO4 every other day, folic acid 1 mg, Protonix 40 mg daily, sucralfate 1 g q.i.d.  P.o. Norco 5 mg q.6 p.r.n. for pain  Antitussives p.r.n.  PT/OT recommending moderate-intensity therapy, case management on board, awaiting Medicaid approval for SNF.  Patient's daughter has submitted paperwork to Medicaid  Palliative care also on board, greatly appreciated  Wound Care also on board, case personally discussed with Lionel, informed that the wound site is very small for  packing but she will continue to express drain age from the site  Abdominal KUB shows nonspecific gas pattern, patient is still constipated, ordered soapsuds enema, patient unable to hold, ordered Dulcolax suppository--> patient had a good bowel movement informed relief 3/15  Noted no BM on 3/16 or today, ordered Miralax BID and after reviewing the ct abdomen results, will decide if to give dulcolax suppository   Given his poor oral intake, ordered Marinol but pharmacy informed me it is on back order, changed to cyproheptadine for appetite stimulation  Morning CMP ordered    VTE prophylaxis:  Lovenox 40     Patient condition:  guarded     Anticipated discharge and Disposition:    SNF, awaiting Medicaid approval      Addendum made at 2:11 p.m. after reviewing CT scan of the abdomen pelvis.  Left lower quadrant changes maybe early infectious.  Right groin cellulitis persist.  Stool burden consistent with constipation.  Ordered Dulcolax suppository x1, MiraLax b.i.d., and started on Cipro 400 mg IV b.i.d. and Flagyl 500 mg IV t.i.d.    All diagnosis and differential diagnosis have been reviewed; assessment and plan has been documented; I have personally reviewed the labs and test results that are presently available; I have reviewed the patients medication list; I have reviewed the consulting providers response and recommendations. I have reviewed or attempted to review medical records based upon their availability    All of the patient's questions have been  addressed and answered. Patient's is agreeable to the above stated plan. I will continue to monitor closely and make adjustments to medical management as needed.  _____________________________________________________________________    Nutrition Status:  Patient meets ASPEN criteria for moderate malnutrition of acute illness or injury per RD assessment as evidenced by:  Energy Intake (Malnutrition): less than or equal to 50% for greater than or equal to 5  days  Weight Loss (Malnutrition):  (unable to determine)  Subcutaneous Fat (Malnutrition):  (does not meet criteria)  Muscle Mass (Malnutrition): mild depletion  Fluid Accumulation (Malnutrition): mild        A minimum of two characteristics is recommended for diagnosis of either severe or non-severe malnutrition.   Radiology:   I have personally reviewed the images and agree with radiologist report  CT Abdomen Pelvis  Without Contrast  Narrative: EXAMINATION:  CT ABDOMEN PELVIS WITHOUT CONTRAST    CLINICAL HISTORY:  Abdominal pain, acute, nonlocalized;    TECHNIQUE:  Multidetector non-contrast axial CT images of the abdomen and pelvis were obtained with coronal and sagittal reconstructions.    Automatic exposure control was utilized to reduce the patient's radiation dose.    DLP= 307    COMPARISON:  03/15/2024    FINDINGS:  01. HEPATOBILIARY: No focal hepatic lesion is identified, however evaluation is limited secondary to lack of IV contrast. The gallbladder is normal.    02. SPLEEN: Normal    03. PANCREAS: No focal masses or ductal dilatation.    04. ADRENALS: No adrenal nodules.    05. KIDNEYS: The right kidney demonstrates no stone, hydronephrosis, or hydroureter. No focal mass identified. The left kidney demonstrates no stone, hydronephrosis, or hydroureter. No focal mass identified.    06. LYMPHADENOPATHY/RETROPERITONEUM: There is no retroperitoneal lymphadenopathy. The abdominal aorta is normal in course and caliber.    07. BOWEL: Stool throughout the colon as may be seen with constipation.    08. PELVIC VISCERA: Normal. No pelvic mass.    09. PELVIC LYMPH NODES: No lymphadenopathy.    10. PERITONEUM/ABDOMINAL WALL: Inflammatory change noted within the right groin possibly related to recent surgery.  Small focus of air noted.    11. SKELETAL: No aggressive appearing lytic/blastic lesion. No acute fractures, subluxations or dislocations.    12. LUNG BASES: Left pleural effusion with atelectatic changes of the  left lower lobe  Impression: Left pleural effusion with atelectatic changes of the left lower lobe early infectious process is not excluded.    Inflammatory change noted within the right groin possibly related to recent surgery.  Small focus of air noted.  Correlate with physical exam.    Stool noted throughout the colon as would be seen with constipation.    Electronically signed by: Mann Hernandez  Date:    03/17/2024  Time:    11:25      Tom Gilbert MD  Department of Hospital Medicine   [facility]   03/17/2024

## 2024-03-17 NOTE — PROGRESS NOTES
NEPHROLOGY PROGRESS NOTE      Patient Demographics:  Name:  Brain Snyder  Age: 78 y.o.  MRN:  [id number]  Admission Date: 2/21/2024      Subjective:      Having increased abdominal pain    Renal status is better  Excellent output    Current Facility-Administered Medications   Medication Dose Route Frequency Provider Last Rate Last Admin    acetaminophen oral solution 650 mg  650 mg Per OG tube Q6H PRN Vasile Carter PA-C   650 mg at 03/12/24 1726    acetaminophen tablet 650 mg  650 mg Oral Q6H PRN Pablo Yepez MD   650 mg at 03/16/24 0609    albumin human 5% bottle 12.5 g  12.5 g Intravenous PRN Vasile Carter PA-C   Stopped at 02/28/24 1442    allopurinol split tablet 50 mg  50 mg Oral Daily Tanner Rdz MD   50 mg at 03/16/24 0908    aspirin EC tablet 81 mg  81 mg Oral Daily Vasile Carter PA-C   81 mg at 03/16/24 0908    ceFEPIme (MAXIPIME) 2 g in dextrose 5 % in water (D5W) 100 mL IVPB (MB+)  2 g Intravenous Q12H Elieser Pace MD   Stopped at 03/17/24 0309    cyproheptadine 4 mg tablet 4 mg  4 mg Oral BID Tom Gilbert MD   4 mg at 03/16/24 2038    dextrose 10% bolus 125 mL 125 mL  12.5 g Intravenous PRN Pablo Yepez MD        dextrose 10% bolus 250 mL 250 mL  25 g Intravenous PRN Pablo Yepez MD        digoxin tablet 0.125 mg  0.125 mg Oral Daily Cherry Suarez FNP   0.125 mg at 03/16/24 1151    electrolyte-A infusion   Intravenous PRN Pablo Yepez MD   Stopped at 02/28/24 0700    enoxaparin injection 40 mg  40 mg Subcutaneous Daily Cherry Suarez FNP   40 mg at 03/16/24 1631    ferrous sulfate tablet 1 each  1 tablet Oral Every other day Tanner Rdz MD   1 each at 03/16/24 0851    folic acid tablet 1 mg  1 mg Oral Daily Vasile Carter PA-C   1 mg at 03/16/24 0851    guaiFENesin-codeine 100-10 mg/5 ml syrup 10 mL  10 mL Oral TID Stan Lazar MD   10 mL at 03/16/24 2038    heparin, porcine (PF) 100 unit/mL injection flush 500 Units   5 mL Intravenous On Call Procedure Pablo Yepez MD        heparin, porcine (PF) 100 unit/mL injection flush 500 Units  5 mL Intravenous On Call Procedure Nita Contreras MD        HYDROcodone-acetaminophen 5-325 mg per tablet 1 tablet  1 tablet Oral Q6H PRN Stan Lazar MD   1 tablet at 03/14/24 1447    lactulose 10 gram/15 ml solution 20 g  20 g Oral Q6H PRN Vasile Carter PA-C   20 g at 03/13/24 1343    loperamide capsule 2 mg  2 mg Oral Continuous PRN Vasile Carter PA-C   2 mg at 03/02/24 1253    melatonin tablet 6 mg  6 mg Oral Nightly PRN Tanner Rdz MD   6 mg at 03/13/24 2055    metoclopramide injection 5 mg  5 mg Intravenous Q6H PRN Vasile Carter PA-C        metoprolol succinate (TOPROL-XL) 24 hr split tablet 12.5 mg  12.5 mg Oral Daily Cherry Suarez FNP   12.5 mg at 03/15/24 0841    midodrine tablet 5 mg  5 mg Oral BID PRN Tom Gilbert MD   5 mg at 03/16/24 0851    midodrine tablet 5 mg  5 mg Oral TID WM Sahra Phan ANP   5 mg at 03/16/24 1631    nitroGLYCERIN SL tablet 0.4 mg  0.4 mg Sublingual Q5 Min PRN Tanner Rdz MD        ondansetron disintegrating tablet 8 mg  8 mg Oral Q8H PRN Tanner Rdz MD   8 mg at 03/11/24 1253    ondansetron injection 8 mg  8 mg Intravenous Q6H PRN Pablo Yepez MD   8 mg at 03/14/24 1301    pantoprazole EC tablet 40 mg  40 mg Oral Daily Tanner Rdz MD   40 mg at 03/16/24 0851    simethicone chewable tablet 80 mg  1 tablet Oral TID PRN Tanner Rdz MD   80 mg at 03/15/24 0315    sodium chloride 0.9% flush 10 mL  10 mL Intravenous PRN Tanner Rdz MD        sodium chloride 0.9% flush 10 mL  10 mL Intravenous PRN Millie Jimenez, GRANT        sucralfate tablet 1 g  1 g Oral QID (AC & HS) Vasile Carter, JENN   1 g at 03/16/24 2039           Review of Systems   Constitutional:  Positive for malaise/fatigue.   HENT: Negative.     Eyes: Negative.    Respiratory: Negative.    "  Cardiovascular: Negative.    Gastrointestinal: Negative.    Genitourinary: Negative.    Musculoskeletal: Negative.    Skin: Negative.    Neurological:  Positive for weakness.         Objective:    /70   Pulse (!) 56   Temp 97.9 °F (36.6 °C) (Oral)   Resp 18   Ht 5' 5" (1.651 m)   Wt 75.3 kg (166 lb 0.1 oz)   SpO2 99%   BMI 27.62 kg/m²       Intake/Output Summary (Last 24 hours) at 3/17/2024 1001  Last data filed at 3/17/2024 0605  Gross per 24 hour   Intake 1500 ml   Output 2000 ml   Net -500 ml             Physical Exam  Vitals reviewed.   Constitutional:       Appearance: Normal appearance.   HENT:      Head: Normocephalic and atraumatic.      Nose: Nose normal.   Cardiovascular:      Rate and Rhythm: Normal rate and regular rhythm.      Pulses: Normal pulses.      Heart sounds: Normal heart sounds.   Pulmonary:      Effort: Pulmonary effort is normal.      Breath sounds: Normal breath sounds.   Abdominal:      General: Bowel sounds are normal.      Palpations: Abdomen is soft.      Tenderness: There is abdominal tenderness.   Musculoskeletal:         General: Normal range of motion.      Cervical back: Normal range of motion.      Comments: Some deconditioning   Skin:     General: Skin is warm and dry.   Neurological:      General: No focal deficit present.      Mental Status: He is alert and oriented to person, place, and time. Mental status is at baseline.   Psychiatric:         Mood and Affect: Mood normal.            Inpatient Diagnostics:  Recent Results (from the past 24 hour(s))   Digoxin Level    Collection Time: 03/17/24  3:53 AM   Result Value Ref Range    Digoxin Level 2.1 (H) 0.8 - 2.0 ng/mL   Comprehensive Metabolic Panel    Collection Time: 03/17/24  3:53 AM   Result Value Ref Range    Sodium Level 132 (L) 136 - 145 mmol/L    Potassium Level 5.1 3.5 - 5.1 mmol/L    Chloride 103 98 - 107 mmol/L    Carbon Dioxide 22 (L) 23 - 31 mmol/L    Glucose Level 88 82 - 115 mg/dL    Blood Urea " Nitrogen 23.7 8.4 - 25.7 mg/dL    Creatinine 1.29 (H) 0.73 - 1.18 mg/dL    Calcium Level Total 8.3 (L) 8.8 - 10.0 mg/dL    Protein Total 5.8 5.8 - 7.6 gm/dL    Albumin Level 2.6 (L) 3.4 - 4.8 g/dL    Globulin 3.2 2.4 - 3.5 gm/dL    Albumin/Globulin Ratio 0.8 (L) 1.1 - 2.0 ratio    Bilirubin Total 1.1 <=1.5 mg/dL    Alkaline Phosphatase 110 40 - 150 unit/L    Alanine Aminotransferase 82 (H) 0 - 55 unit/L    Aspartate Aminotransferase 90 (H) 5 - 34 unit/L    eGFR 57 mls/min/1.73/m2   CBC with Differential    Collection Time: 03/17/24  3:53 AM   Result Value Ref Range    WBC 10.55 4.50 - 11.50 x10(3)/mcL    RBC 4.60 (L) 4.70 - 6.10 x10(6)/mcL    Hgb 12.3 (L) 14.0 - 18.0 g/dL    Hct 39.7 (L) 42.0 - 52.0 %    MCV 86.3 80.0 - 94.0 fL    MCH 26.7 (L) 27.0 - 31.0 pg    MCHC 31.0 (L) 33.0 - 36.0 g/dL    RDW 15.3 11.5 - 17.0 %    Platelet 419 (H) 130 - 400 x10(3)/mcL    MPV 9.8 7.4 - 10.4 fL    Neut % 61.6 %    Lymph % 18.6 %    Mono % 13.5 %    Eos % 3.2 %    Basophil % 1.0 %    Lymph # 1.96 0.6 - 4.6 x10(3)/mcL    Neut # 6.50 2.1 - 9.2 x10(3)/mcL    Mono # 1.42 (H) 0.1 - 1.3 x10(3)/mcL    Eos # 0.34 0 - 0.9 x10(3)/mcL    Baso # 0.11 <=0.2 x10(3)/mcL    IG# 0.22 (H) 0 - 0.04 x10(3)/mcL    IG% 2.1 %    NRBC% 0.0 %     A/P--NE 03/17    1---JEAN-CLAUDE/ Fluid Overload/ CKD III--Indices are better today--Excellent output--Cont Midodrine for perfusion  2---Hyponatremia--Stable  3---Fluid Overload--Resolved  4---Anemia secondary to Chronic Disease-Fe studies reviewed/ Follow H&H     IV--SL     ORDERS:  CMP in am                         Patient stable.  Discussed with NP.  Renal indices improved with good UOP.  Monitor for renal improvement.  Continue hydration/supportive care.  Will follow.

## 2024-03-18 LAB
ALBUMIN SERPL-MCNC: 2.6 G/DL (ref 3.4–4.8)
ALBUMIN/GLOB SERPL: 0.8 RATIO (ref 1.1–2)
ALP SERPL-CCNC: 109 UNIT/L (ref 40–150)
ALT SERPL-CCNC: 76 UNIT/L (ref 0–55)
AST SERPL-CCNC: 80 UNIT/L (ref 5–34)
BILIRUB SERPL-MCNC: 1.1 MG/DL
BUN SERPL-MCNC: 20.8 MG/DL (ref 8.4–25.7)
CALCIUM SERPL-MCNC: 8.4 MG/DL (ref 8.8–10)
CHLORIDE SERPL-SCNC: 101 MMOL/L (ref 98–107)
CO2 SERPL-SCNC: 23 MMOL/L (ref 23–31)
CREAT SERPL-MCNC: 1.26 MG/DL (ref 0.73–1.18)
GFR SERPLBLD CREATININE-BSD FMLA CKD-EPI: 59 MLS/MIN/1.73/M2
GLOBULIN SER-MCNC: 3.2 GM/DL (ref 2.4–3.5)
GLUCOSE SERPL-MCNC: 81 MG/DL (ref 82–115)
POTASSIUM SERPL-SCNC: 5.2 MMOL/L (ref 3.5–5.1)
PROT SERPL-MCNC: 5.8 GM/DL (ref 5.8–7.6)
SODIUM SERPL-SCNC: 131 MMOL/L (ref 136–145)

## 2024-03-18 PROCEDURE — 63600175 PHARM REV CODE 636 W HCPCS: Performed by: NURSE PRACTITIONER

## 2024-03-18 PROCEDURE — 25000003 PHARM REV CODE 250: Performed by: NURSE PRACTITIONER

## 2024-03-18 PROCEDURE — 25000003 PHARM REV CODE 250: Performed by: INTERNAL MEDICINE

## 2024-03-18 PROCEDURE — 25000003 PHARM REV CODE 250: Performed by: PHYSICIAN ASSISTANT

## 2024-03-18 PROCEDURE — 63600175 PHARM REV CODE 636 W HCPCS: Performed by: INTERNAL MEDICINE

## 2024-03-18 PROCEDURE — 63600175 PHARM REV CODE 636 W HCPCS: Mod: JZ,JG | Performed by: INTERNAL MEDICINE

## 2024-03-18 PROCEDURE — 21400001 HC TELEMETRY ROOM

## 2024-03-18 PROCEDURE — 80053 COMPREHEN METABOLIC PANEL: CPT | Performed by: NURSE PRACTITIONER

## 2024-03-18 PROCEDURE — 97530 THERAPEUTIC ACTIVITIES: CPT | Mod: CO

## 2024-03-18 RX ORDER — BISACODYL 10 MG/1
10 SUPPOSITORY RECTAL ONCE
Status: COMPLETED | OUTPATIENT
Start: 2024-03-18 | End: 2024-03-18

## 2024-03-18 RX ADMIN — ENOXAPARIN SODIUM 40 MG: 40 INJECTION SUBCUTANEOUS at 04:03

## 2024-03-18 RX ADMIN — CEFEPIME 2 G: 2 INJECTION, POWDER, FOR SOLUTION INTRAVENOUS at 03:03

## 2024-03-18 RX ADMIN — POLYETHYLENE GLYCOL 3350 17 G: 17 POWDER, FOR SOLUTION ORAL at 08:03

## 2024-03-18 RX ADMIN — ACETAMINOPHEN 650 MG: 650 SOLUTION ORAL at 05:03

## 2024-03-18 RX ADMIN — DICLOFENAC SODIUM 2 G: 10 GEL TOPICAL at 09:03

## 2024-03-18 RX ADMIN — SUCRALFATE 1 G: 1 TABLET ORAL at 09:03

## 2024-03-18 RX ADMIN — POLYETHYLENE GLYCOL 3350 17 G: 17 POWDER, FOR SOLUTION ORAL at 09:03

## 2024-03-18 RX ADMIN — ASPIRIN 81 MG: 81 TABLET, COATED ORAL at 08:03

## 2024-03-18 RX ADMIN — MIDODRINE HYDROCHLORIDE 5 MG: 5 TABLET ORAL at 08:03

## 2024-03-18 RX ADMIN — CYPROHEPTADINE HYDROCHLORIDE 4 MG: 4 TABLET ORAL at 09:03

## 2024-03-18 RX ADMIN — GUAIFENESIN AND CODEINE PHOSPHATE 10 ML: 100; 10 SOLUTION ORAL at 08:03

## 2024-03-18 RX ADMIN — DICLOFENAC SODIUM 2 G: 10 GEL TOPICAL at 03:03

## 2024-03-18 RX ADMIN — GUAIFENESIN AND CODEINE PHOSPHATE 10 ML: 100; 10 SOLUTION ORAL at 03:03

## 2024-03-18 RX ADMIN — METHYLNALTREXONE BROMIDE 6 MG: 12 INJECTION, SOLUTION SUBCUTANEOUS at 03:03

## 2024-03-18 RX ADMIN — CIPROFLOXACIN 400 MG: 2 INJECTION, SOLUTION INTRAVENOUS at 08:03

## 2024-03-18 RX ADMIN — SUCRALFATE 1 G: 1 TABLET ORAL at 06:03

## 2024-03-18 RX ADMIN — FERROUS SULFATE TAB 325 MG (65 MG ELEMENTAL FE) 1 EACH: 325 (65 FE) TAB at 08:03

## 2024-03-18 RX ADMIN — METRONIDAZOLE 500 MG: 5 INJECTION, SOLUTION INTRAVENOUS at 04:03

## 2024-03-18 RX ADMIN — BISACODYL 10 MG: 10 SUPPOSITORY RECTAL at 03:03

## 2024-03-18 RX ADMIN — MIDODRINE HYDROCHLORIDE 7.5 MG: 2.5 TABLET ORAL at 04:03

## 2024-03-18 RX ADMIN — FOLIC ACID 1 MG: 1 TABLET ORAL at 08:03

## 2024-03-18 RX ADMIN — PANTOPRAZOLE SODIUM 40 MG: 40 TABLET, DELAYED RELEASE ORAL at 08:03

## 2024-03-18 RX ADMIN — SODIUM ZIRCONIUM CYCLOSILICATE 10 G: 10 POWDER, FOR SUSPENSION ORAL at 08:03

## 2024-03-18 RX ADMIN — DICLOFENAC SODIUM 2 G: 10 GEL TOPICAL at 08:03

## 2024-03-18 RX ADMIN — ALLOPURINOL 50 MG: 300 TABLET ORAL at 08:03

## 2024-03-18 RX ADMIN — GUAIFENESIN AND CODEINE PHOSPHATE 10 ML: 100; 10 SOLUTION ORAL at 09:03

## 2024-03-18 RX ADMIN — Medication 6 MG: at 09:03

## 2024-03-18 RX ADMIN — SUCRALFATE 1 G: 1 TABLET ORAL at 04:03

## 2024-03-18 RX ADMIN — CYPROHEPTADINE HYDROCHLORIDE 4 MG: 4 TABLET ORAL at 08:03

## 2024-03-18 NOTE — PROGRESS NOTES
[facility] Hospital Medicine Progress Note        Chief Complaint: Inpatient Follow-up for R groin cellulitis    HPI per admitting team: 78-year-old male with a history of aortic insufficiency/stenosis, CAD, CKD IIIb, HTN AFib on Eliquis admitted to [facility] 02/15/2024 with chest pain, found to have NSTEMI (peak troponin 0.17) and underwent C 02/20/2024 showing severe multivessel stenosis and therefore was transferred to [facility] for CABG evaluation. Cardiology was consulted Patient had Impella placed on 02/23 and was admitted to the ICU.  Was started on aggressive diuresis with IV Lasix.  CV surgery was consulted.  Urology was consulted for hematuria; Nguyen catheter placed over guidewire with dilation secondary to urethral strictures. IV Heparin infusion was initiated per CIS but held due to hematuria/hemoptysis on 02/24.  He was also noted to have an JEAN-CLAUDE. Received 2 units PRBCs on 02/26 and was planned for high-risk PCI on 02/26 which was later canceled.  Underwent CABG x 3 2/27 (LIMA to LAD, RSVG to OM 1, R SVG to RCA, bioprosthetic AVR).  Remained on mechanical ventilation thereafter.  Patient noted to have tachycardia with atrial fibrillation/RVR requiring IV amiodarone along with pressors (norepinephrine/Milrinone) for hypotension. Patient underwent cardioversion x 2 with minimal improvement in HR/BP.  Patient self-extubated overnight on 02/29 and was noted to have adequate saturations on 2 L O2 via NC thereafter.  PT/OT consulted.  Renal function noted to be improving. Continued on IV diuresis as he appeared to be significantly volume overloaded postoperatively along with elevated pulmonary artery wedge pressures.  Started on p.o. amiodarone taper on 03/03.  Patient noted to have drainage from right groin wound and started on vancomycin on 03/04. Wound culture grew Pseudomonas.  Chest tubes discontinued on 03/02.  Downgraded from ICU on 03/02.  CIS and CV surgery continued following  patient.  CV surgery signed off on 03/06 and discontinued vancomycin.  Patient was placed on oral Levaquin.  Hospital medicine consulted on 03/06 for assistance with medical management and DC planning.  Repeat CT abdomen and pelvis of 3/9 shows improving stranding and improving hematoma in the right groin, persistent left-sided effusion and atelectasis  Patient complains of low back pain, cough and his SCDs too tight on his feet bilaterally  Vitals reviewed with blood pressure low normal, heart rates in the low 100s, saturating 94% on 2 L of oxygen   Labs reviewed and fairly stable   General surgery evaluated patient, reviewed CT scan of the area, given wound waning with scant serous/purulent drainage with manual expression, recommended wound care consult and continued to manually express fluid from site Q shift.  If area stopped draining, recommended do ultrasound to better define anatomy  Palliative care on board  ID adjusted antibiotics to IV cefepime  Nephrology placed on fluid restriction  Cardiology has suspended amiodarone due to elevated liver enzymes and Lasix due to hyponatremia    Interval Hx:   Patient is laying in bed, daughter is at bedside.  Daughter reported he has started to feel better  Informed Daughter that yesterday I did the  CT abdomen as he was hurting in the left lower quadrant and also concern for diverticulitis.  He did show start of an infection and I started him on 2 other abx.  Also informed patient's daughter that he is not eligible for Medicaid as he has an illegal alien.  Informed that he will be unable to go to rehab so the family will have to step up whenever he is medically ready to go home  Daughter verbalized understanding  Case was discussed with patient's nurse and  on the floor.    Objective/physical exam:  General:  Sleeping comfortably  Chest: Clear to auscultation bilaterally anteriorly  Heart: RRR, +S1, S2, no appreciable murmur  Abdomen: Soft, tender to  palpate and right upper quadrant and left lower quadrant.  Bowel sounds positive x4  MSK: Warm, bilateral lower extremity edema present, no clubbing or cyanosis  Neurologic:  Sleeping comfortably and did not wake up to my calling on examination    VITAL SIGNS: 24 HRS MIN & MAX LAST   Temp  Min: 97.1 °F (36.2 °C)  Max: 98 °F (36.7 °C) 98 °F (36.7 °C)   BP  Min: 93/55  Max: 127/70 (!) 93/55   Pulse  Min: 53  Max: 59  (!) 58   Resp  Min: 18  Max: 18 18   SpO2  Min: 96 %  Max: 99 % 97 %     I reviewed the labs below:  Recent Labs   Lab 03/14/24  0727 03/15/24  0402 03/17/24  0353   WBC 11.36 12.25* 10.55   RBC 4.49* 4.69* 4.60*   HGB 11.9* 12.7* 12.3*   HCT 38.1* 40.4* 39.7*   MCV 84.9 86.1 86.3   MCH 26.5* 27.1 26.7*   MCHC 31.2* 31.4* 31.0*   RDW 15.5 15.6 15.3   * 593* 419*   MPV 10.7* 10.3 9.8       Recent Labs   Lab 03/15/24  0402 03/16/24  0400 03/17/24  0353 03/18/24  0433   * 133* 132* 131*   K 4.9 4.5 5.1 5.2*   CO2 27 25 22* 23   BUN 24.6 22.5 23.7 20.8   CREATININE 1.57* 1.48* 1.29* 1.26*   CALCIUM 8.3* 8.3* 8.3* 8.4*   MG  --  2.00  --   --    ALBUMIN 2.7* 2.4* 2.6* 2.6*   ALKPHOS 127  --  110 109   ALT 84*  --  82* 76*   AST 98*  --  90* 80*   BILITOT 1.4  --  1.1 1.1       Microbiology Results (last 7 days)       ** No results found for the last 168 hours. **             See below for Radiology    Scheduled Med:   allopurinoL  50 mg Oral Daily    aspirin  81 mg Oral Daily    ceFEPime IV (PEDS and ADULTS)  2 g Intravenous Q12H    ciprofloxacin  400 mg Intravenous Q12H    cyproheptadine  4 mg Oral BID    diclofenac sodium  2 g Topical (Top) TID    enoxparin  40 mg Subcutaneous Daily    ferrous sulfate  1 tablet Oral Every other day    folic acid  1 mg Oral Daily    guaiFENesin-codeine 100-10 mg/5 ml  10 mL Oral TID    metoprolol succinate  12.5 mg Oral Daily    metronidazole  500 mg Intravenous Q8H    midodrine  5 mg Oral TID WM    pantoprazole  40 mg Oral Daily    polyethylene glycol  17 g Oral  BID    sucralfate  1 g Oral QID (AC & HS)      Continuous Infusions:   loperamide        PRN Meds:  acetaminophen, acetaminophen, albumin human 5%, bisacodyL, dextrose 10%, dextrose 10%, electrolyte-A, heparin, porcine (PF), heparin, porcine (PF), HYDROcodone-acetaminophen, lactulose 10 gram/15 ml, loperamide, melatonin, metoclopramide, midodrine, nitroGLYCERIN, ondansetron, ondansetron, simethicone, sodium chloride 0.9%, sodium chloride 0.9%     Assessment/Plan:  Constipation  Hypotension- improved with midodrine   Right flank pain- due to right-sided lower abdomen hematoma- improving   R Groin wound infection with Cellulitis at previous impella insertion site- Pseudomonas aeruginosa/Serratia marcescens, slowly improving   Transaminitis- multifactorial-  Congestive hepatopathy versus infection vs drug induced- now trending down  Leukocytosis- resolved  Fluid overload 2/2 HFrEF exacerbation, fairly euvolemic clinically  HFrEF/HFpEF, euvolemic   JAEN-CLAUDE on stage II CKD - resolved   Hyponatremia due to SIADH- improving   Pulmonary hypertension   NSTEMI, CAD sp CABG x 3  S/p AVR  Atrial Fibrillation with RVR  Normocytic anemia  Urethral stricture s/p dilation with gustafson 2/23  Hyperkalemia-mild  Moderate malnutrition       Per Cardiology, midodrine can be dosed up till 10 mg t.i.d. if needed, currently up to 7.5 mg t.i.d  EKG showed atrial fibrillation with RVR, left bundle branch block-->  received digoxin 500 mcg push followed by 250 mcg q.6 for 2 doses for rate control and now on digoxin 0.125 mg daily  Troponin 0.18, per Cardiology probably post CABG Troponinemia  Lasix, Lisinopril and Aldactone also suspended, due to low blood pressure  Continue po metoprolol succinate 12.5 mg q.day  Cardiology has suspended amiodarone and atorvastatin due to elevated liver enzymes  Pt continue to complain of abdominal and low back pain--> ordered CT Abddmen pelvis w/o contrast--> inflammatory changes noted in the right groin, possibly  related to recent surgery.  Small focus of air noted.  Stool throughout the colon at would be seen with constipation.  Left pleural effusion with atelectasis, changes of left lower lobe early infection process can not be excluded  Ordered Dulcolax suppository x1 but per Mar it looks like it was not given.  Reordered again today x1  We tried enema but patient is unable to hold the enema to work  Cont Miralax BID   Added volatren get to the lower back for pain controll  Wound culture grew Pseudomonas and Serratia marcescens- both sensitive to cefepime  ID Dr KELLY adjusted antibiotics to IV cefepime- day 8, duration of treatment per ID, pending recs   Leukocytosis resolved, 10K  Nephrology placed pt on 1500 mL fluid restriction for hyponatremia and started sodium chloride oral tablets 2000 mg t.i.d., sodium improved to 131--> sodium chloride tablets discontinued per Nephrology  Creatinine slightly improved to 1.2, monitor closely  Appreciate pulmonology input continue to monitor effusion, no need for thoracentesis at this time  Continue use of incentive spirometry and oxygen supplementation, currently on room air  Appreciate Urology input, case was personally discussed with Alexandra Gonzalez, per their recommendations to removed Estevez on 03/16/2024, ordered and verbally informed the pt Nadia that I communicated with Cora Holliday NP and she recommended estevez to be removed, Nadia Easley RN wrote in her handsoff that she did not removed estevez as it was placed by urology over guide wire. SHE DID NOT REMOVE THE ESTEVEZ NOR DID SHE CONTACTED THE UROLOGY TO VERIFY. Requested pt nurse to reach out to Urology to verify again, she spoke to Dr Saenz and he asked to remove it Monday morning while they are around so they can monitor the voiding trial, remove estevez Monday 3/18  Estevez removed 3/18, case was personally discussed with isidra BURNS with Urology  Trend AST/ALT- 80/78  Ultrasound right upper quadrant shows  mild-to-moderate hepatomegaly.  No biliary dilation  Continued allopurinol 50 mg daily, aspirin 81 mg, FeSO4 every other day, folic acid 1 mg, Protonix 40 mg daily, sucralfate 1 g q.i.d.  P.o. Norco 5 mg q.6 p.r.n. for pain  Antitussives p.r.n.  PT/OT recommending moderate-intensity therapy, case management on board, awaiting Medicaid approval for SNF.  Patient's daughter has submitted paperwork to Medicaid--> unfortunately patient is not eligible for Medicaid given he is an illegal resident   Palliative care also on board, greatly appreciated  Wound Care also on board, case personally discussed with Lionel, informed that the wound site is very small for packing but she will continue to express drain age from the site  Given his poor oral intake, ordered Marinol but pharmacy informed me it is on back order, changed to cyproheptadine for appetite stimulation  Morning BMP ordered    VTE prophylaxis:  Lovenox 40     Patient condition:  guarded     Anticipated discharge and Disposition:    Home once medically ready, not a candidate for Medicaid, awaiting ID regarding antibiotic choice and duration    All diagnosis and differential diagnosis have been reviewed; assessment and plan has been documented; I have personally reviewed the labs and test results that are presently available; I have reviewed the patients medication list; I have reviewed the consulting providers response and recommendations. I have reviewed or attempted to review medical records based upon their availability    All of the patient's questions have been  addressed and answered. Patient's is agreeable to the above stated plan. I will continue to monitor closely and make adjustments to medical management as needed.  _____________________________________________________________________    Nutrition Status:  Patient meets ASPEN criteria for moderate malnutrition of acute illness or injury per RD assessment as evidenced by:  Energy Intake (Malnutrition):  less than or equal to 50% for greater than or equal to 5 days  Weight Loss (Malnutrition):  (unable to determine)  Subcutaneous Fat (Malnutrition):  (does not meet criteria)  Muscle Mass (Malnutrition): mild depletion  Fluid Accumulation (Malnutrition): mild        A minimum of two characteristics is recommended for diagnosis of either severe or non-severe malnutrition.   Radiology:   I have personally reviewed the images and agree with radiologist report  CT Abdomen Pelvis  Without Contrast  Narrative: EXAMINATION:  CT ABDOMEN PELVIS WITHOUT CONTRAST    CLINICAL HISTORY:  Abdominal pain, acute, nonlocalized;    TECHNIQUE:  Multidetector non-contrast axial CT images of the abdomen and pelvis were obtained with coronal and sagittal reconstructions.    Automatic exposure control was utilized to reduce the patient's radiation dose.    DLP= 307    COMPARISON:  03/15/2024    FINDINGS:  01. HEPATOBILIARY: No focal hepatic lesion is identified, however evaluation is limited secondary to lack of IV contrast. The gallbladder is normal.    02. SPLEEN: Normal    03. PANCREAS: No focal masses or ductal dilatation.    04. ADRENALS: No adrenal nodules.    05. KIDNEYS: The right kidney demonstrates no stone, hydronephrosis, or hydroureter. No focal mass identified. The left kidney demonstrates no stone, hydronephrosis, or hydroureter. No focal mass identified.    06. LYMPHADENOPATHY/RETROPERITONEUM: There is no retroperitoneal lymphadenopathy. The abdominal aorta is normal in course and caliber.    07. BOWEL: Stool throughout the colon as may be seen with constipation.    08. PELVIC VISCERA: Normal. No pelvic mass.    09. PELVIC LYMPH NODES: No lymphadenopathy.    10. PERITONEUM/ABDOMINAL WALL: Inflammatory change noted within the right groin possibly related to recent surgery.  Small focus of air noted.    11. SKELETAL: No aggressive appearing lytic/blastic lesion. No acute fractures, subluxations or dislocations.    12. LUNG  BASES: Left pleural effusion with atelectatic changes of the left lower lobe  Impression: Left pleural effusion with atelectatic changes of the left lower lobe early infectious process is not excluded.    Inflammatory change noted within the right groin possibly related to recent surgery.  Small focus of air noted.  Correlate with physical exam.    Stool noted throughout the colon as would be seen with constipation.    Electronically signed by: Mann Hernandez  Date:    03/17/2024  Time:    11:25      Tom Gilbert MD  Department of Hospital Medicine   [facility]   03/18/2024

## 2024-03-18 NOTE — PROGRESS NOTES
"  [facility]    Cardiology  Progress Note    Patient Name: Brain Snyder  MRN: [id number]  Admission Date: 2/21/2024  Hospital Length of Stay: 26 days  Code Status: Full Code   Attending Physician: Tom Gilbert MD   Primary Care Physician: Nidia Shepherd NP  Expected Discharge Date:   Principal Problem:CAD (coronary artery disease)    Subjective:   Reason for Consult:  CAD     HPI: 78-year-old female that is unknown to [facility] with a PMHx of PAF on Eliquis Aortic insufficiency, MV CAD, HTN. He is Kiswahili speaking and an  was used for interview. He was orginally admitted to [facility] on 2.15.24 with CP & NSTEMI and underwent a LHC on 2.20.24 that showed MV CAD (reported noted below) He was transferred to [facility] on 2.21.24 for a CABG/AVR evaluation. On arrive he was hemodynamically stable on a heparin infusion. He denied chest pain, SOB, and nausea on current exam, [facility] was consulted for CAD    Hospital Course:   2.23.24: Patient seen resting in bed. He denies SOB or CP. He remains on heparin infusion. Family at bedside   2.24.24: NAD. S/p Impella Placement/Guymon-Cehlo Catheter. "I am ok." + Blood Clots from Nose/Mouth, Hematuria 2/2 traumatic Indwelling Catheter Placement. Heparin Drip per Protocol. Impella P5  2.25.24: NAD. "I'm ok." Denies CP, SOB and Palps. PA Pressure Reading is not Accurate. CVP 5. Impella at P5. R Groin Impella P7. Remains Off Heparin Drip per Protocol. Now in SR.   2.26.24: NAD Noted. SR on Tele. Right Groin Impella in place, bruising with no hematoma noted. Distal pulses DP intact. Legs warm. NC 2 L/Min. Denies CP/SOB. Consent obtained from his daughter Brii Snyder. NPO for MV PCI Today.  2.27.24: NAD Noted. Impella remains in place at P7 Support. Good UA Noted, some pink tinged urine noted. SR on Tele. Pressors off. Denies CP/SOB. NPO for surgery today. Heparin on hold for surgery.  2.28.24: Patient is critically ill. AF RVR on Tele, twice shocked this AM with no success. " "On Amiodarone/Milrinone- Cardizem Infusion now off given hypotension and ICMO. Also no José Miguel/Levophed. Concern for bleeding noted, transfusion noted. Patient is intubated/Mechanically Vented. Hemodynamics: CO/CI 4.3/2.3 CVP 20.  2.29.24: Patient self extubated this AM. He is stable on NC Oxygen. Denies CP/SOB. SR on Tele. On Amiodarone Infusion and receiving blood products. BP Stable. Clinically much improved from yesterday.   3.1.24: NAD. Afib mild RVR on tele. On Primacor @ 0.187 mcg/kg/min. Amiodarone infusing per protocol. LFT's worsening. WBC worsening. + Incisional CP. On 5 L NC.   3.2.24: NAD. Net Negative 2700 urine output.   3.3.24: NAD. VSS. No complaints of CP/Palps. Reports SOB is improving. Creat 1.61 (Improved)  3.4.24: NAD. VSS. No complaints. On 2 L. Net Negative Fluid 3540 over 24 hours. Creat 1.37. LFTs slighting worse today  3.5.24: NAD. VSS. Denies CP, SOB and Palps. Family at Bedside. Fluid Balance Net Negative 4360mL. BUN/Crea 23.5/1.16, AST/ALT 69/71  3.6.24: Patient sitting up in bedside chair. Appears SOB. C/o abdominal bloating and gaseous. Reports + BM since surgery. Abdomen distended. Patient nausea and spit up bile colored fluid. + peripheral edema. O2 via NC. Excellent diuresis. Persistent leukocytosis. K+ 3.1, mild transaminitis. Groin Wound cx -- GNR and pseudomonas. CV surgery has signed off.   3.7.24: Patient sitting up in bed. NAD. Denies any pain. Noted SS drainage from impella insertion site. Leukocytosis has resolved. BP marginal at times. Afib, CVR. Good UOP; however weight is increasing.   3.8.24: Patient awake in bed. He has been weaned off. O2. Good diuresis overnight. Mild bump in creatinine-- 1.21 from 1.15 yesterday. Liver enzymes uptrending. VSS.   3.9.24: NAD. Language Barrier/Daughter at Bedside for Translation. Denies CP, SOB and Palps. Deconditioned. Fluid Balance Net Negative 5600mL.   3.10.24: NAD. "[place]." Denies CP, SOB and Palps. Daughter at Bedside/Translating. " Denies CP, SOB and Palps. Remains Deconditions. Fluid Balance Net Negative 2405mL. Na 130, BUN/Crea 20.3/1.22, AST/ALT 88/73  3.11.24: Patient awake in bed. NAD. Reviewed echo-EF 40-45% with mild RV enlargement and severely reduced RV function. Na 129 today. Renal function mildly bumped  3.12.24: Patient awake in bed. NAD. Hyponatremia stable. Nephrology following and has initiated a fluid restriction. Impella site still oozing SS fluid. Surgery recommending manually expressing fluid q shift and obtaining US to better define anatomy. WBC 13.79. Afebrile.   3.15.24: Patient resting in bed. NAD. Clear breath sounds. No edema. Good UOP. Na improving with addition of salt tabs. Creatinine 1.57. Still has transaminitis. Amiodarone placed on hold yesterday. Currently Afib, 90s. Mild hypotension at times. Nurse reports patient c/o chest pain today. When questioned, he reported incisional chest pain during attempts at BM.   3.16.24; Patient sleeping in bedside recliner. NAD. Na improving- 133 today. Renal indices improving. Albumin 2.4. EKG Afib, RVR with LBBB yesterday. Troponin 0.183--likely due to recent CABG. He was given digoxin IV load due to RVR. He was given a dose of midodrine this am due to sbp < 90.   3.17.24: Awake in bed. Bp better with addition of midodrine tid. Converted to SB this am. Currently on digoxin and low dose BB. Na and renal indices stable.         PMH: PAF (on Eliquis), Aortic insufficiency, MV CAD, HTN  PSH: [facility]  Family History: None Reported  Social History: Former Smoker, Denies ETOH or Illicit Drug Use    Previous Diagnostics:  TTE 03.11.24:  Left Ventricle: Regional wall motion abnormalities present. Septal motion is consistent with post-operative status. Akinetic inf -posterior wall There is moderately reduced systolic function with a visually estimated ejection fraction of 35 - 40%.    Right Ventricle: Mild right ventricular enlargement. Systolic function is severely reduced.    Aortic  Valve: There is a bioprosthetic valve in the aortic position.    Pericardium: There is a trivial effusion. No indication of cardiac tamponade. Left pleural effusion.       CABG/Bio AVR/MOISE Ligation (2.27.24):  Coronary artery bypass grafting X 3, LIMA to LAD, reversed SVG to OM1, Reversed Saphenous venous graft to RCA  Bioprosthetic aortic valve replacement (Epic max 23mm), Endoscopic venous harvesting of left greater saphenous vein, Left atrial appendage ligation, explant of right femoral impella device, right femoral artery repair.    RHC/Impella Placement (2.23.24):  Right heart catheterization performed showed the following:  PA= 61/24 (27) mm Hg  PCWP=  31/26 (27) mm Hg  AO saturation= 93 % RA  PA saturation= 60% with Impella (49% yesterday)    (02/22/24) -  0.5  - Cardiac output was 2.8 L/min provided by the device.  Impella was at 84cm  Impression/plan:   Successful Impella insertion and swan-Chelo in the setting of Acute HF/low cardiac output/precardiogenic shock/Critcal-severe AS/MVCAD - LM distal    RHC (2.22.24):  Right heart catheterization demonstrating severe pulmonary hypertension 84/34 and PCWP 24 mmHg  Reduced cardiac output/cardiac index at 3.43/1.81 L/min/m2 with pulmonary artery saturations 49.3%  For applied to the right femoral vein following removal of the 7 Sinhala sheath  The estimated blood loss was none.    Carotid US (2.22.24):  The bilateral internal carotid artery demonstrated less than 50% stenosis.   The bilateral vertebral arteries were patent with antegrade flow    LHC (2.20.24):   Prox LAD lesion was 70% stenosed.  Ost Cx to Prox Cx lesion was 80% stenosed.  1st Mrg lesion was 80% stenosed.  2nd Mrg-1 lesion was 70% stenosed.  2nd Mrg-2 lesion was 50% stenosed.  Mid LAD lesion was 50% stenosed.  Prox RCA lesion was 60% stenosed.  estimated blood loss was <50 mL.  There was three vessel coronary artery disease.  LVEDP: 30mmHg  Recommendations:   Refer for CABG evaluation and AVR    Preformed by Dr. lFannery at [facility]     ECHO (2.15.24):  Left Ventricle: The left ventricle is normal in size. Mildly increased ventricular mass. Mildly increased wall thickness. There is mild eccentric hypertrophy. Moderate global hypokinesis present. Septal motion is consistent with bundle branch block. There is moderately reduced systolic function. Biplane (2D) method of discs ejection fraction is 40%. Grade II diastolic dysfunction.  Right Ventricle: Right ventricular enlargement. Systolic function is normal.  Left Atrium: Left atrium is severely dilated.  Right Atrium: Right atrium is moderately dilated.  Aortic Valve: There is moderate aortic valve sclerosis. Severely restricted motion. There is severe stenosis. Aortic valve area by VTI is 0.66 cm². Aortic valve peak velocity is 4.45 m/s. Mean gradient is 50 mmHg. The dimensionless index is 0.17. There is mild to moderate aortic regurgitation.  Mitral Valve: Mildly calcified leaflets. There is no stenosis. There is mild regurgitation.  Tricuspid Valve: The tricuspid valve is structurally normal. There is mild to moderate regurgitation.  Pulmonic Valve: There is no significant regurgitation.  Aorta: Aortic root is upper limit of normal measuring 3.6 cm.  Pulmonary Artery: There is severe pulmonary hypertension. The estimated pulmonary artery systolic pressure is 69 mmHg.  IVC/SVC: Intermediate venous pressure at 8 mmHg.  Pericardium: There is no pericardial effusion.     Review of Systems   Cardiovascular:  Negative for chest pain, dyspnea on exertion and leg swelling.   Respiratory:  Negative for shortness of breath.    All other systems reviewed and are negative.    Objective:     Vital Signs (Most Recent):  Temp: 98 °F (36.7 °C) (03/18/24 0335)  Pulse: (!) 59 (03/18/24 0335)  Resp: 18 (03/18/24 0335)  BP: (!) 93/55 (03/18/24 0335)  SpO2: 97 % (03/18/24 0335) Vital Signs (24h Range):  Temp:  [97.1 °F (36.2 °C)-98 °F (36.7 °C)] 98 °F (36.7 °C)  Pulse:  [53-59]  59  Resp:  [18] 18  SpO2:  [96 %-99 %] 97 %  BP: ()/(52-70) 93/55   Weight:  (unable to weigh pt bed not working)  Body mass index is 27.62 kg/m².  SpO2: 97 %       Intake/Output Summary (Last 24 hours) at 3/18/2024 0545  Last data filed at 3/18/2024 0502  Gross per 24 hour   Intake 1200 ml   Output 2650 ml   Net -1450 ml       Lines/Drains/Airways       Drain  Duration                  Urethral Catheter 02/28/24 1445 Coude 20 Fr. 18 days              Peripheral Intravenous Line  Duration                  Peripheral IV - Single Lumen 03/02/24 1053 20 G Anterior;Right Upper Arm 15 days                  Significant Labs:   Recent Results (from the past 72 hour(s))   EKG 12-lead    Collection Time: 03/15/24  9:04 AM   Result Value Ref Range    QRS Duration 176 ms    OHS QTC Calculation 578 ms   Troponin I    Collection Time: 03/15/24  9:26 AM   Result Value Ref Range    Troponin-I 0.183 (H) 0.000 - 0.045 ng/mL   Renal Function Panel    Collection Time: 03/16/24  4:00 AM   Result Value Ref Range    Sodium Level 133 (L) 136 - 145 mmol/L    Potassium Level 4.5 3.5 - 5.1 mmol/L    Chloride 101 98 - 107 mmol/L    Carbon Dioxide 25 23 - 31 mmol/L    Glucose Level 99 82 - 115 mg/dL    Blood Urea Nitrogen 22.5 8.4 - 25.7 mg/dL    Creatinine 1.48 (H) 0.73 - 1.18 mg/dL    Calcium Level Total 8.3 (L) 8.8 - 10.0 mg/dL    Albumin Level 2.4 (L) 3.4 - 4.8 g/dL    Phosphorus Level 2.1 (L) 2.3 - 4.7 mg/dL    eGFR 48 mls/min/1.73/m2   Magnesium    Collection Time: 03/16/24  4:00 AM   Result Value Ref Range    Magnesium Level 2.00 1.60 - 2.60 mg/dL   Iron Panel    Collection Time: 03/16/24  4:00 AM   Result Value Ref Range    Iron Binding Capacity Unsaturated 167 69 - 240 ug/dL    Iron Level 34 (L) 65 - 175 ug/dL    Transferrin 193 163 - 344 mg/dL    Iron Binding Capacity Total 201 (L) 250 - 450 ug/dL    Iron Saturation 17 (L) 20 - 50 %   Ferritin    Collection Time: 03/16/24  4:00 AM   Result Value Ref Range    Ferritin Level  3,218.79 (H) 21.81 - 274.66 ng/mL   Digoxin Level    Collection Time: 03/17/24  3:53 AM   Result Value Ref Range    Digoxin Level 2.1 (H) 0.8 - 2.0 ng/mL   Comprehensive Metabolic Panel    Collection Time: 03/17/24  3:53 AM   Result Value Ref Range    Sodium Level 132 (L) 136 - 145 mmol/L    Potassium Level 5.1 3.5 - 5.1 mmol/L    Chloride 103 98 - 107 mmol/L    Carbon Dioxide 22 (L) 23 - 31 mmol/L    Glucose Level 88 82 - 115 mg/dL    Blood Urea Nitrogen 23.7 8.4 - 25.7 mg/dL    Creatinine 1.29 (H) 0.73 - 1.18 mg/dL    Calcium Level Total 8.3 (L) 8.8 - 10.0 mg/dL    Protein Total 5.8 5.8 - 7.6 gm/dL    Albumin Level 2.6 (L) 3.4 - 4.8 g/dL    Globulin 3.2 2.4 - 3.5 gm/dL    Albumin/Globulin Ratio 0.8 (L) 1.1 - 2.0 ratio    Bilirubin Total 1.1 <=1.5 mg/dL    Alkaline Phosphatase 110 40 - 150 unit/L    Alanine Aminotransferase 82 (H) 0 - 55 unit/L    Aspartate Aminotransferase 90 (H) 5 - 34 unit/L    eGFR 57 mls/min/1.73/m2   CBC with Differential    Collection Time: 03/17/24  3:53 AM   Result Value Ref Range    WBC 10.55 4.50 - 11.50 x10(3)/mcL    RBC 4.60 (L) 4.70 - 6.10 x10(6)/mcL    Hgb 12.3 (L) 14.0 - 18.0 g/dL    Hct 39.7 (L) 42.0 - 52.0 %    MCV 86.3 80.0 - 94.0 fL    MCH 26.7 (L) 27.0 - 31.0 pg    MCHC 31.0 (L) 33.0 - 36.0 g/dL    RDW 15.3 11.5 - 17.0 %    Platelet 419 (H) 130 - 400 x10(3)/mcL    MPV 9.8 7.4 - 10.4 fL    Neut % 61.6 %    Lymph % 18.6 %    Mono % 13.5 %    Eos % 3.2 %    Basophil % 1.0 %    Lymph # 1.96 0.6 - 4.6 x10(3)/mcL    Neut # 6.50 2.1 - 9.2 x10(3)/mcL    Mono # 1.42 (H) 0.1 - 1.3 x10(3)/mcL    Eos # 0.34 0 - 0.9 x10(3)/mcL    Baso # 0.11 <=0.2 x10(3)/mcL    IG# 0.22 (H) 0 - 0.04 x10(3)/mcL    IG% 2.1 %    NRBC% 0.0 %   Comprehensive Metabolic Panel    Collection Time: 03/18/24  4:33 AM   Result Value Ref Range    Sodium Level 131 (L) 136 - 145 mmol/L    Potassium Level 5.2 (H) 3.5 - 5.1 mmol/L    Chloride 101 98 - 107 mmol/L    Carbon Dioxide 23 23 - 31 mmol/L    Glucose Level 81 (L) 82  - 115 mg/dL    Blood Urea Nitrogen 20.8 8.4 - 25.7 mg/dL    Creatinine 1.26 (H) 0.73 - 1.18 mg/dL    Calcium Level Total 8.4 (L) 8.8 - 10.0 mg/dL    Protein Total 5.8 5.8 - 7.6 gm/dL    Albumin Level 2.6 (L) 3.4 - 4.8 g/dL    Globulin 3.2 2.4 - 3.5 gm/dL    Albumin/Globulin Ratio 0.8 (L) 1.1 - 2.0 ratio    Bilirubin Total 1.1 <=1.5 mg/dL    Alkaline Phosphatase 109 40 - 150 unit/L    Alanine Aminotransferase 76 (H) 0 - 55 unit/L    Aspartate Aminotransferase 80 (H) 5 - 34 unit/L    eGFR 59 mls/min/1.73/m2     Telemetry: PAF/CVR    Physical Exam  Vitals reviewed.   Constitutional:       General: He is not in acute distress.     Appearance: Normal appearance.   HENT:      Head: Normocephalic.      Mouth/Throat:      Mouth: Mucous membranes are moist.   Eyes:      Extraocular Movements: Extraocular movements intact.      Conjunctiva/sclera: Conjunctivae normal.   Cardiovascular:      Rate and Rhythm: Bradycardia present. Rhythm irregular.      Pulses: Normal pulses.      Heart sounds: No murmur heard.  Pulmonary:      Effort: Pulmonary effort is normal. No respiratory distress.      Breath sounds: Normal breath sounds.      Comments: On RA  Abdominal:      Palpations: Abdomen is soft.   Genitourinary:     Comments: Urinary Catheter   Skin:     General: Skin is warm.      Comments: Midline Sternotomy YVETTE with No Sign of Bleed/Infection. Right Lower Abdominal Site healing. Drainage decreasing   Neurological:      General: No focal deficit present.      Mental Status: He is alert.   Psychiatric:         Mood and Affect: Mood normal.       Current Inpatient Medications:  Current Facility-Administered Medications:     acetaminophen oral solution 650 mg, 650 mg, Per OG tube, Q6H PRN, Vasile Carter PA-C, 650 mg at 03/12/24 1726    acetaminophen tablet 650 mg, 650 mg, Oral, Q6H PRN, Pablo Yepez MD, 650 mg at 03/17/24 2127    albumin human 5% bottle 12.5 g, 12.5 g, Intravenous, PRN, Vasile Carter PA-C, Stopped at  02/28/24 1442    allopurinol split tablet 50 mg, 50 mg, Oral, Daily, Tanner Rdz MD, 50 mg at 03/17/24 1027    aspirin EC tablet 81 mg, 81 mg, Oral, Daily, Vasile Carter PA-C, 81 mg at 03/17/24 1027    bisacodyL suppository 10 mg, 10 mg, Rectal, Daily PRN, Tom Gilbert MD    ceFEPIme (MAXIPIME) 2 g in dextrose 5 % in water (D5W) 100 mL IVPB (MB+), 2 g, Intravenous, Q12H, Elieser Pace MD, Stopped at 03/18/24 0345    ciprofloxacin (CIPRO)400mg/200ml D5W IVPB 400 mg, 400 mg, Intravenous, Q12H, Tom Gilbert MD, Stopped at 03/17/24 2034    cyproheptadine 4 mg tablet 4 mg, 4 mg, Oral, BID, Tom Gilbert MD, 4 mg at 03/17/24 2127    dextrose 10% bolus 125 mL 125 mL, 12.5 g, Intravenous, PRN, Pablo Yepez MD    dextrose 10% bolus 250 mL 250 mL, 25 g, Intravenous, PRN, Pablo Yepez MD    diclofenac sodium 1 % gel 2 g, 2 g, Topical (Top), TID, Tom Gilbert MD, 2 g at 03/17/24 2254    electrolyte-A infusion, , Intravenous, PRN, Pablo Yepez MD, Stopped at 02/28/24 0700    enoxaparin injection 40 mg, 40 mg, Subcutaneous, Daily, Cherry Suarez FNP, 40 mg at 03/17/24 1702    ferrous sulfate tablet 1 each, 1 tablet, Oral, Every other day, Tanner Rdz MD, 1 each at 03/16/24 0851    folic acid tablet 1 mg, 1 mg, Oral, Daily, Vasile Carter PA-C, 1 mg at 03/17/24 1027    guaiFENesin-codeine 100-10 mg/5 ml syrup 10 mL, 10 mL, Oral, TID, Tom Gilbert MD, 10 mL at 03/17/24 2325    heparin, porcine (PF) 100 unit/mL injection flush 500 Units, 5 mL, Intravenous, On Call Procedure, Pablo Yepez MD    heparin, porcine (PF) 100 unit/mL injection flush 500 Units, 5 mL, Intravenous, On Call Procedure, Nita Contreras MD    HYDROcodone-acetaminophen 5-325 mg per tablet 1 tablet, 1 tablet, Oral, Q6H PRN, Stan Lazar MD, 1 tablet at 03/14/24 1447    lactulose 10 gram/15 ml solution 20 g, 20 g, Oral, Q6H PRN, Vasile Carter PA-C, 20 g at 03/13/24 1343    loperamide  capsule 2 mg, 2 mg, Oral, Continuous PRN, Vasile Carter PA-C, 2 mg at 03/02/24 1253    melatonin tablet 6 mg, 6 mg, Oral, Nightly PRN, Tanner Rdz MD, 6 mg at 03/17/24 2325    metoclopramide injection 5 mg, 5 mg, Intravenous, Q6H PRN, Vasile Carter PA-C    metoprolol succinate (TOPROL-XL) 24 hr split tablet 12.5 mg, 12.5 mg, Oral, Daily, Cherry Suarez, FNP, 12.5 mg at 03/17/24 1027    metronidazole IVPB 500 mg, 500 mg, Intravenous, Q8H, Tom Gilbert MD, Last Rate: 100 mL/hr at 03/18/24 0445, 500 mg at 03/18/24 0445    midodrine tablet 5 mg, 5 mg, Oral, BID PRN, Tom Gilbert MD, 5 mg at 03/17/24 1027    midodrine tablet 5 mg, 5 mg, Oral, TID WM, Sahra Phan, KWAN, 5 mg at 03/17/24 1702    nitroGLYCERIN SL tablet 0.4 mg, 0.4 mg, Sublingual, Q5 Min PRN, Tanner Rdz MD    ondansetron disintegrating tablet 8 mg, 8 mg, Oral, Q8H PRN, Tanner Rdz MD, 8 mg at 03/11/24 1253    ondansetron injection 8 mg, 8 mg, Intravenous, Q6H PRN, Pablo Yepez MD, 8 mg at 03/14/24 1301    pantoprazole EC tablet 40 mg, 40 mg, Oral, Daily, Tanner Rdz MD, 40 mg at 03/17/24 1027    polyethylene glycol packet 17 g, 17 g, Oral, BID, Tom Gilbert MD, 17 g at 03/17/24 2127    simethicone chewable tablet 80 mg, 1 tablet, Oral, TID PRN, Tanner Rdz MD, 80 mg at 03/15/24 0315    sodium chloride 0.9% flush 10 mL, 10 mL, Intravenous, PRN, Tanner Rdz MD    sodium chloride 0.9% flush 10 mL, 10 mL, Intravenous, PRN, Millie Jimenez NP    sucralfate tablet 1 g, 1 g, Oral, QID (AC & HS), Vasile Carter, JENN, 1 g at 03/17/24 2123  VTE Risk Mitigation (From admission, onward)           Ordered     enoxaparin injection 40 mg  Daily         03/07/24 0559     IP VTE HIGH RISK PATIENT  Once         03/02/24 0759     heparin, porcine (PF) 100 unit/mL injection flush 500 Units  On Call Procedure         02/27/24 0718     heparin, porcine (PF) 100 unit/mL injection flush 500  Units  On Call Procedure         02/27/24 0257     Place SUSIE hose  Until discontinued         02/22/24 1458     Place sequential compression device  Until discontinued         02/21/24 2057                  Assessment:   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% - Symptomatically Improving    - ECHO Limited (3.7.24) - LVEF 30%, Severe Global Hypokinesis     - ECHO (2.16.24): EF 40%, Grade II DD    - Small Pleural Effusions on CT Imaging (2.22.24)  CAD (Multivessel)    - Status Post CABG (3V) LIMA to LAD, SVG to OM1, SVG to RCA (2.27.24)    - RHC/Impella Placement and [place] Catheter (2.23.24)- Impella Removal/Femoral Artery Repair in Surgery on 2.27.24    - C (2.19.24): pLAD lesion 70%, oLCx 80%, OM1 80%, OM2 1 lesion 70% 2nd lesion 50%, mLAD 50%, pRCA 60%  VHD/AS     - Status Post Bio AVR (Epic max 23mm) (2.27.24)    - ECHO (2.16.24): Aortic Valve: There is moderate aortic valve sclerosis. Severely restricted motion. There is severe stenosis. Aortic valve area by VTI is 0.66 cm². Aortic valve peak velocity is 4.45 m/s. Mean gradient is 50 mmHg. The dimensionless index is 0.17.   Cardiogenic Shock (Post Cardiotomy) - Off Vasopressor Support - Resolved     - History of Hypertension   Ischemic Cardiomyopathy/EF 30%  Elevated LFTs - persistent  Pulmonary HTN    - ECHO PASP 69mmHg     - RHC (2.22.24): PA 84/34, PCWP 24 mmHg  Hematuria 2/2 Traumatic/Difficult Indwelling Catheter Placement (Resolved)  Urethral Stricture s/p Dilatation 2.23.24  PAF - Currently CVR    - Status Post Bedside Cardioversion x 2 on 2.27.24 (Both Unsuccessful)    - Status Post MOISE Ligation (2.27.24)    - CHADsVASc - 5 Points - 7.2% Stroke Risk per Year   Left Bundle Branch Block   VHD    - ECHO (3.7.24): Bio AVR, Mild MR    - ECHO (2.16.24): Severe AS, mild MR, mild to moderate TR  JEAN-CLAUDE/CKD Stage II - I  - Baseline Cr 1.6  Anemia- stable    - Status Post Transfusion on 2.28.24 & 2.29.24  Thrombocytopenia - Resolved   Leukocytosis -  Persistent    - Groin Wound Culture: Serratia Marcescens and Pseudomonas Aeruginosa   Hyponatremia - stable  SIADH    Plan:   Continue IV Abx per Primary Team   Continue ASA and BB  Statin and amiodarone have been discontinued due to persistent transaminitis.   HF GDMT- Continue BB. Hold diuretic due to hypotension  Continue midodrine 5 mg tid  Unable to add ARNI/MRA/SGLT2 due to hypotension  Hold digoxin due to bradycardia  Accurate I&Os and Daily Weights   Keep K > 4.0 and Mg  > 2.0  Aggressive Mobilization of PT and Q1HR IS (PT/OT Eval and Treat)  Encouraged eating. He has been started on appetite stimulant    BLANQUITA Perry  Cardiology  [facility]   03/17/2024

## 2024-03-18 NOTE — PROGRESS NOTES
"  [facility]    Cardiology  Progress Note    Patient Name: Brain Snyder  MRN: [id number]  Admission Date: 2/21/2024  Hospital Length of Stay: 26 days  Code Status: Full Code   Attending Physician: Tom Gilbert MD   Primary Care Physician: Nidia Shepherd NP  Expected Discharge Date:   Principal Problem:CAD (coronary artery disease)    Subjective:   Reason for Consult:  CAD     HPI: 78-year-old female that is unknown to [facility] with a PMHx of PAF on Eliquis Aortic insufficiency, MV CAD, HTN. He is Wolof speaking and an  was used for interview. He was orginally admitted to [facility] on 2.15.24 with CP & NSTEMI and underwent a LHC on 2.20.24 that showed MV CAD (reported noted below) He was transferred to [facility] on 2.21.24 for a CABG/AVR evaluation. On arrive he was hemodynamically stable on a heparin infusion. He denied chest pain, SOB, and nausea on current exam, [facility] was consulted for CAD    Hospital Course:   2.23.24: Patient seen resting in bed. He denies SOB or CP. He remains on heparin infusion. Family at bedside   2.24.24: NAD. S/p Impella Placement/Sawyer-Chelo Catheter. "I am ok." + Blood Clots from Nose/Mouth, Hematuria 2/2 traumatic Indwelling Catheter Placement. Heparin Drip per Protocol. Impella P5  2.25.24: NAD. "I'm ok." Denies CP, SOB and Palps. PA Pressure Reading is not Accurate. CVP 5. Impella at P5. R Groin Impella P7. Remains Off Heparin Drip per Protocol. Now in SR.   2.26.24: NAD Noted. SR on Tele. Right Groin Impella in place, bruising with no hematoma noted. Distal pulses DP intact. Legs warm. NC 2 L/Min. Denies CP/SOB. Consent obtained from his daughter Brii Snyder. NPO for MV PCI Today.  2.27.24: NAD Noted. Impella remains in place at P7 Support. Good UA Noted, some pink tinged urine noted. SR on Tele. Pressors off. Denies CP/SOB. NPO for surgery today. Heparin on hold for surgery.  2.28.24: Patient is critically ill. AF RVR on Tele, twice shocked this AM with no success. " "On Amiodarone/Milrinone- Cardizem Infusion now off given hypotension and ICMO. Also no José Miguel/Levophed. Concern for bleeding noted, transfusion noted. Patient is intubated/Mechanically Vented. Hemodynamics: CO/CI 4.3/2.3 CVP 20.  2.29.24: Patient self extubated this AM. He is stable on NC Oxygen. Denies CP/SOB. SR on Tele. On Amiodarone Infusion and receiving blood products. BP Stable. Clinically much improved from yesterday.   3.1.24: NAD. Afib mild RVR on tele. On Primacor @ 0.187 mcg/kg/min. Amiodarone infusing per protocol. LFT's worsening. WBC worsening. + Incisional CP. On 5 L NC.   3.2.24: NAD. Net Negative 2700 urine output.   3.3.24: NAD. VSS. No complaints of CP/Palps. Reports SOB is improving. Creat 1.61 (Improved)  3.4.24: NAD. VSS. No complaints. On 2 L. Net Negative Fluid 3540 over 24 hours. Creat 1.37. LFTs slighting worse today  3.5.24: NAD. VSS. Denies CP, SOB and Palps. Family at Bedside. Fluid Balance Net Negative 4360mL. BUN/Crea 23.5/1.16, AST/ALT 69/71  3.6.24: Patient sitting up in bedside chair. Appears SOB. C/o abdominal bloating and gaseous. Reports + BM since surgery. Abdomen distended. Patient nausea and spit up bile colored fluid. + peripheral edema. O2 via NC. Excellent diuresis. Persistent leukocytosis. K+ 3.1, mild transaminitis. Groin Wound cx -- GNR and pseudomonas. CV surgery has signed off.   3.7.24: Patient sitting up in bed. NAD. Denies any pain. Noted SS drainage from impella insertion site. Leukocytosis has resolved. BP marginal at times. Afib, CVR. Good UOP; however weight is increasing.   3.8.24: Patient awake in bed. He has been weaned off. O2. Good diuresis overnight. Mild bump in creatinine-- 1.21 from 1.15 yesterday. Liver enzymes uptrending. VSS.   3.9.24: NAD. Language Barrier/Daughter at Bedside for Translation. Denies CP, SOB and Palps. Deconditioned. Fluid Balance Net Negative 5600mL.   3.10.24: NAD. "Speed." Denies CP, SOB and Palps. Daughter at Bedside/Translating. " Denies CP, SOB and Palps. Remains Deconditions. Fluid Balance Net Negative 2405mL. Na 130, BUN/Crea 20.3/1.22, AST/ALT 88/73  3.11.24: Patient awake in bed. NAD. Reviewed echo-EF 40-45% with mild RV enlargement and severely reduced RV function. Na 129 today. Renal function mildly bumped  3.12.24: Patient awake in bed. NAD. Hyponatremia stable. Nephrology following and has initiated a fluid restriction. Impella site still oozing SS fluid. Surgery recommending manually expressing fluid q shift and obtaining US to better define anatomy. WBC 13.79. Afebrile.   3.15.24: Patient resting in bed. NAD. Clear breath sounds. No edema. Good UOP. Na improving with addition of salt tabs. Creatinine 1.57. Still has transaminitis. Amiodarone placed on hold yesterday. Currently Afib, 90s. Mild hypotension at times. Nurse reports patient c/o chest pain today. When questioned, he reported incisional chest pain during attempts at BM.   3.16.24; Patient sleeping in bedside recliner. NAD. Na improving- 133 today. Renal indices improving. Albumin 2.4. EKG Afib, RVR with LBBB yesterday. Troponin 0.183--likely due to recent CABG. He was given digoxin IV load due to RVR. He was given a dose of midodrine this am due to sbp < 90.   3.17.24: Awake in bed. Bp better with addition of midodrine tid. Converted to SB this am. Currently on digoxin and low dose BB. Na and renal indices stable.   3.18.24: Patient awake in bed. NAD. Right groin impella site healing well. Transaminitis improving. Creatinine stable. Marginal Bps. Remains on midodrine with SBP 90s. Renal indices stable. K+ 5.2 today        PMH: PAF (on Eliquis), Aortic insufficiency, MV CAD, HTN  PSH: C  Family History: None Reported  Social History: Former Smoker, Denies ETOH or Illicit Drug Use    Previous Diagnostics:  TTE 03.11.24:  Left Ventricle: Regional wall motion abnormalities present. Septal motion is consistent with post-operative status. Akinetic inf -posterior wall There is  moderately reduced systolic function with a visually estimated ejection fraction of 35 - 40%.    Right Ventricle: Mild right ventricular enlargement. Systolic function is severely reduced.    Aortic Valve: There is a bioprosthetic valve in the aortic position.    Pericardium: There is a trivial effusion. No indication of cardiac tamponade. Left pleural effusion.       CABG/Bio AVR/MOISE Ligation (2.27.24):  Coronary artery bypass grafting X 3, LIMA to LAD, reversed SVG to OM1, Reversed Saphenous venous graft to RCA  Bioprosthetic aortic valve replacement (Epic max 23mm), Endoscopic venous harvesting of left greater saphenous vein, Left atrial appendage ligation, explant of right femoral impella device, right femoral artery repair.    RHC/Impella Placement (2.23.24):  Right heart catheterization performed showed the following:  PA= 61/24 (27) mm Hg  PCWP=  31/26 (27) mm Hg  AO saturation= 93 % RA  PA saturation= 60% with Impella (49% yesterday)    (02/22/24) -  0.5  - Cardiac output was 2.8 L/min provided by the device.  Impella was at 84cm  Impression/plan:   Successful Impella insertion and swan-Chelo in the setting of Acute HF/low cardiac output/precardiogenic shock/Critcal-severe AS/MVCAD - LM distal    RHC (2.22.24):  Right heart catheterization demonstrating severe pulmonary hypertension 84/34 and PCWP 24 mmHg  Reduced cardiac output/cardiac index at 3.43/1.81 L/min/m2 with pulmonary artery saturations 49.3%  For applied to the right femoral vein following removal of the 7 [place] sheath  The estimated blood loss was none.    Carotid US (2.22.24):  The bilateral internal carotid artery demonstrated less than 50% stenosis.   The bilateral vertebral arteries were patent with antegrade flow    LHC (2.20.24):   Prox LAD lesion was 70% stenosed.  Ost Cx to Prox Cx lesion was 80% stenosed.  1st Mrg lesion was 80% stenosed.  2nd Mrg-1 lesion was 70% stenosed.  2nd Mrg-2 lesion was 50% stenosed.  Mid LAD lesion was 50%  stenosed.  Prox RCA lesion was 60% stenosed.  estimated blood loss was <50 mL.  There was three vessel coronary artery disease.  LVEDP: 30mmHg  Recommendations:   Refer for CABG evaluation and AVR   Preformed by Dr. Flannery at [facility]     ECHO (2.15.24):  Left Ventricle: The left ventricle is normal in size. Mildly increased ventricular mass. Mildly increased wall thickness. There is mild eccentric hypertrophy. Moderate global hypokinesis present. Septal motion is consistent with bundle branch block. There is moderately reduced systolic function. Biplane (2D) method of discs ejection fraction is 40%. Grade II diastolic dysfunction.  Right Ventricle: Right ventricular enlargement. Systolic function is normal.  Left Atrium: Left atrium is severely dilated.  Right Atrium: Right atrium is moderately dilated.  Aortic Valve: There is moderate aortic valve sclerosis. Severely restricted motion. There is severe stenosis. Aortic valve area by VTI is 0.66 cm². Aortic valve peak velocity is 4.45 m/s. Mean gradient is 50 mmHg. The dimensionless index is 0.17. There is mild to moderate aortic regurgitation.  Mitral Valve: Mildly calcified leaflets. There is no stenosis. There is mild regurgitation.  Tricuspid Valve: The tricuspid valve is structurally normal. There is mild to moderate regurgitation.  Pulmonic Valve: There is no significant regurgitation.  Aorta: Aortic root is upper limit of normal measuring 3.6 cm.  Pulmonary Artery: There is severe pulmonary hypertension. The estimated pulmonary artery systolic pressure is 69 mmHg.  IVC/SVC: Intermediate venous pressure at 8 mmHg.  Pericardium: There is no pericardial effusion.     Review of Systems   Cardiovascular:  Negative for chest pain, dyspnea on exertion and leg swelling.   Respiratory:  Negative for shortness of breath.    All other systems reviewed and are negative.    Objective:     Vital Signs (Most Recent):  Temp: 98.3 °F (36.8 °C) (03/18/24 0740)  Pulse: (!) 52 (03/18/24  0829)  Resp: 18 (03/18/24 0829)  BP: (!) 96/56 (03/18/24 0829)  SpO2: 97 % (03/18/24 0400) Vital Signs (24h Range):  Temp:  [97.1 °F (36.2 °C)-98.3 °F (36.8 °C)] 98.3 °F (36.8 °C)  Pulse:  [52-59] 52  Resp:  [18] 18  SpO2:  [96 %-99 %] 97 %  BP: ()/(52-68) 96/56   Weight:  (unable to weigh pt bed not working)  Body mass index is 27.62 kg/m².  SpO2: 97 %       Intake/Output Summary (Last 24 hours) at 3/18/2024 1054  Last data filed at 3/18/2024 0554  Gross per 24 hour   Intake 1200 ml   Output 2600 ml   Net -1400 ml       Lines/Drains/Airways       Peripheral Intravenous Line  Duration                  Peripheral IV - Single Lumen 03/18/24 0622 Anterior;Left Forearm <1 day                  Significant Labs:   Recent Results (from the past 72 hour(s))   Renal Function Panel    Collection Time: 03/16/24  4:00 AM   Result Value Ref Range    Sodium Level 133 (L) 136 - 145 mmol/L    Potassium Level 4.5 3.5 - 5.1 mmol/L    Chloride 101 98 - 107 mmol/L    Carbon Dioxide 25 23 - 31 mmol/L    Glucose Level 99 82 - 115 mg/dL    Blood Urea Nitrogen 22.5 8.4 - 25.7 mg/dL    Creatinine 1.48 (H) 0.73 - 1.18 mg/dL    Calcium Level Total 8.3 (L) 8.8 - 10.0 mg/dL    Albumin Level 2.4 (L) 3.4 - 4.8 g/dL    Phosphorus Level 2.1 (L) 2.3 - 4.7 mg/dL    eGFR 48 mls/min/1.73/m2   Magnesium    Collection Time: 03/16/24  4:00 AM   Result Value Ref Range    Magnesium Level 2.00 1.60 - 2.60 mg/dL   Iron Panel    Collection Time: 03/16/24  4:00 AM   Result Value Ref Range    Iron Binding Capacity Unsaturated 167 69 - 240 ug/dL    Iron Level 34 (L) 65 - 175 ug/dL    Transferrin 193 163 - 344 mg/dL    Iron Binding Capacity Total 201 (L) 250 - 450 ug/dL    Iron Saturation 17 (L) 20 - 50 %   Ferritin    Collection Time: 03/16/24  4:00 AM   Result Value Ref Range    Ferritin Level 3,218.79 (H) 21.81 - 274.66 ng/mL   Digoxin Level    Collection Time: 03/17/24  3:53 AM   Result Value Ref Range    Digoxin Level 2.1 (H) 0.8 - 2.0 ng/mL    Comprehensive Metabolic Panel    Collection Time: 03/17/24  3:53 AM   Result Value Ref Range    Sodium Level 132 (L) 136 - 145 mmol/L    Potassium Level 5.1 3.5 - 5.1 mmol/L    Chloride 103 98 - 107 mmol/L    Carbon Dioxide 22 (L) 23 - 31 mmol/L    Glucose Level 88 82 - 115 mg/dL    Blood Urea Nitrogen 23.7 8.4 - 25.7 mg/dL    Creatinine 1.29 (H) 0.73 - 1.18 mg/dL    Calcium Level Total 8.3 (L) 8.8 - 10.0 mg/dL    Protein Total 5.8 5.8 - 7.6 gm/dL    Albumin Level 2.6 (L) 3.4 - 4.8 g/dL    Globulin 3.2 2.4 - 3.5 gm/dL    Albumin/Globulin Ratio 0.8 (L) 1.1 - 2.0 ratio    Bilirubin Total 1.1 <=1.5 mg/dL    Alkaline Phosphatase 110 40 - 150 unit/L    Alanine Aminotransferase 82 (H) 0 - 55 unit/L    Aspartate Aminotransferase 90 (H) 5 - 34 unit/L    eGFR 57 mls/min/1.73/m2   CBC with Differential    Collection Time: 03/17/24  3:53 AM   Result Value Ref Range    WBC 10.55 4.50 - 11.50 x10(3)/mcL    RBC 4.60 (L) 4.70 - 6.10 x10(6)/mcL    Hgb 12.3 (L) 14.0 - 18.0 g/dL    Hct 39.7 (L) 42.0 - 52.0 %    MCV 86.3 80.0 - 94.0 fL    MCH 26.7 (L) 27.0 - 31.0 pg    MCHC 31.0 (L) 33.0 - 36.0 g/dL    RDW 15.3 11.5 - 17.0 %    Platelet 419 (H) 130 - 400 x10(3)/mcL    MPV 9.8 7.4 - 10.4 fL    Neut % 61.6 %    Lymph % 18.6 %    Mono % 13.5 %    Eos % 3.2 %    Basophil % 1.0 %    Lymph # 1.96 0.6 - 4.6 x10(3)/mcL    Neut # 6.50 2.1 - 9.2 x10(3)/mcL    Mono # 1.42 (H) 0.1 - 1.3 x10(3)/mcL    Eos # 0.34 0 - 0.9 x10(3)/mcL    Baso # 0.11 <=0.2 x10(3)/mcL    IG# 0.22 (H) 0 - 0.04 x10(3)/mcL    IG% 2.1 %    NRBC% 0.0 %   Comprehensive Metabolic Panel    Collection Time: 03/18/24  4:33 AM   Result Value Ref Range    Sodium Level 131 (L) 136 - 145 mmol/L    Potassium Level 5.2 (H) 3.5 - 5.1 mmol/L    Chloride 101 98 - 107 mmol/L    Carbon Dioxide 23 23 - 31 mmol/L    Glucose Level 81 (L) 82 - 115 mg/dL    Blood Urea Nitrogen 20.8 8.4 - 25.7 mg/dL    Creatinine 1.26 (H) 0.73 - 1.18 mg/dL    Calcium Level Total 8.4 (L) 8.8 - 10.0 mg/dL     Protein Total 5.8 5.8 - 7.6 gm/dL    Albumin Level 2.6 (L) 3.4 - 4.8 g/dL    Globulin 3.2 2.4 - 3.5 gm/dL    Albumin/Globulin Ratio 0.8 (L) 1.1 - 2.0 ratio    Bilirubin Total 1.1 <=1.5 mg/dL    Alkaline Phosphatase 109 40 - 150 unit/L    Alanine Aminotransferase 76 (H) 0 - 55 unit/L    Aspartate Aminotransferase 80 (H) 5 - 34 unit/L    eGFR 59 mls/min/1.73/m2     Telemetry: PAF/CVR    Physical Exam  Vitals reviewed.   Constitutional:       General: He is not in acute distress.     Appearance: Normal appearance.   HENT:      Head: Normocephalic.      Mouth/Throat:      Mouth: Mucous membranes are moist.   Eyes:      Extraocular Movements: Extraocular movements intact.      Conjunctiva/sclera: Conjunctivae normal.   Cardiovascular:      Rate and Rhythm: Bradycardia present. Rhythm irregular.      Pulses: Normal pulses.      Heart sounds: No murmur heard.  Pulmonary:      Effort: Pulmonary effort is normal. No respiratory distress.      Breath sounds: Normal breath sounds.      Comments: On RA  Abdominal:      Palpations: Abdomen is soft.   Genitourinary:     Comments: Urinary Catheter   Skin:     General: Skin is warm.      Comments: Midline Sternotomy YVETTE with No Sign of Bleed/Infection. Right Lower Abdominal Site healing well. Dressing CDI   Neurological:      General: No focal deficit present.      Mental Status: He is alert.   Psychiatric:         Mood and Affect: Mood normal.       Current Inpatient Medications:  Current Facility-Administered Medications:     acetaminophen oral solution 650 mg, 650 mg, Per OG tube, Q6H PRN, Vasile Carter PA-C, 650 mg at 03/12/24 1726    acetaminophen tablet 650 mg, 650 mg, Oral, Q6H PRN, Pablo Yepez MD, 650 mg at 03/17/24 2127    albumin human 5% bottle 12.5 g, 12.5 g, Intravenous, PRN, Vasile Carter PA-C, Stopped at 02/28/24 1442    allopurinol split tablet 50 mg, 50 mg, Oral, Daily, Tanner Rdz MD, 50 mg at 03/18/24 0829    aspirin EC tablet 81 mg, 81  mg, Oral, Daily, Vasile Carter PA-C, 81 mg at 03/18/24 0829    bisacodyL suppository 10 mg, 10 mg, Rectal, Daily PRN, Tom Gilbert MD    ceFEPIme (MAXIPIME) 2 g in dextrose 5 % in water (D5W) 100 mL IVPB (MB+), 2 g, Intravenous, Q12H, Elieser Pace MD, Stopped at 03/18/24 0345    ciprofloxacin (CIPRO)400mg/200ml D5W IVPB 400 mg, 400 mg, Intravenous, Q12H, Tom Gilbert MD, Stopped at 03/18/24 0928    cyproheptadine 4 mg tablet 4 mg, 4 mg, Oral, BID, Tom Gilbert MD, 4 mg at 03/18/24 0829    dextrose 10% bolus 125 mL 125 mL, 12.5 g, Intravenous, PRN, Pablo Yepez MD    dextrose 10% bolus 250 mL 250 mL, 25 g, Intravenous, PRN, Pablo Yepez MD    diclofenac sodium 1 % gel 2 g, 2 g, Topical (Top), TID, Tom Gilbert MD, 2 g at 03/18/24 0830    electrolyte-A infusion, , Intravenous, PRN, Pablo Yepez MD, Stopped at 02/28/24 0700    enoxaparin injection 40 mg, 40 mg, Subcutaneous, Daily, Cherry Suarez FNP, 40 mg at 03/17/24 1702    ferrous sulfate tablet 1 each, 1 tablet, Oral, Every other day, Tanner Rdz MD, 1 each at 03/18/24 0829    folic acid tablet 1 mg, 1 mg, Oral, Daily, Vasile Carter PA-C, 1 mg at 03/18/24 0829    guaiFENesin-codeine 100-10 mg/5 ml syrup 10 mL, 10 mL, Oral, TID, Tom Gilbert MD, 10 mL at 03/18/24 0829    heparin, porcine (PF) 100 unit/mL injection flush 500 Units, 5 mL, Intravenous, On Call Procedure, Pablo Yepez MD    heparin, porcine (PF) 100 unit/mL injection flush 500 Units, 5 mL, Intravenous, On Call Procedure, Nita Contreras MD    HYDROcodone-acetaminophen 5-325 mg per tablet 1 tablet, 1 tablet, Oral, Q6H PRN, Stan Lazar MD, 1 tablet at 03/14/24 1447    lactulose 10 gram/15 ml solution 20 g, 20 g, Oral, Q6H PRN, Vasile Carter PA-C, 20 g at 03/13/24 1343    loperamide capsule 2 mg, 2 mg, Oral, Continuous PRN, Vasile Carter PA-C, 2 mg at 03/02/24 1253    melatonin tablet 6 mg, 6 mg, Oral, Nightly PRN, Kendell  Tanner AVILA MD, 6 mg at 03/17/24 2325    metoclopramide injection 5 mg, 5 mg, Intravenous, Q6H PRN, Vasile Carter PA-C    metoprolol succinate (TOPROL-XL) 24 hr split tablet 12.5 mg, 12.5 mg, Oral, Daily, Daniela Cherry OSORIO, FNP, 12.5 mg at 03/17/24 1027    metronidazole IVPB 500 mg, 500 mg, Intravenous, Q8H, Tom Gilbert MD, Stopped at 03/18/24 0545    midodrine tablet 5 mg, 5 mg, Oral, BID PRN, Tom Gilbert MD, 5 mg at 03/18/24 0829    midodrine tablet 5 mg, 5 mg, Oral, TID WM, Sahra Phan ANP, 5 mg at 03/18/24 0830    nitroGLYCERIN SL tablet 0.4 mg, 0.4 mg, Sublingual, Q5 Min PRN, Tanner Rdz MD    ondansetron disintegrating tablet 8 mg, 8 mg, Oral, Q8H PRN, Tanner Rdz MD, 8 mg at 03/11/24 1253    ondansetron injection 8 mg, 8 mg, Intravenous, Q6H PRN, Pablo Yepez MD, 8 mg at 03/14/24 1301    pantoprazole EC tablet 40 mg, 40 mg, Oral, Daily, Tanner Rdz MD, 40 mg at 03/18/24 0829    polyethylene glycol packet 17 g, 17 g, Oral, BID, Tom Gilbert MD, 17 g at 03/18/24 0829    simethicone chewable tablet 80 mg, 1 tablet, Oral, TID PRN, Tanner Rdz MD, 80 mg at 03/15/24 0315    sodium chloride 0.9% flush 10 mL, 10 mL, Intravenous, PRN, Tanner Rdz MD    sodium chloride 0.9% flush 10 mL, 10 mL, Intravenous, PRN, Millie Jimenez, GRANT    sucralfate tablet 1 g, 1 g, Oral, QID (AC & HS), Vasile Carter PA-C, 1 g at 03/18/24 0608  VTE Risk Mitigation (From admission, onward)           Ordered     enoxaparin injection 40 mg  Daily         03/07/24 0559     IP VTE HIGH RISK PATIENT  Once         03/02/24 0759     heparin, porcine (PF) 100 unit/mL injection flush 500 Units  On Call Procedure         02/27/24 0718     heparin, porcine (PF) 100 unit/mL injection flush 500 Units  On Call Procedure         02/27/24 0257     Place SUSIE hose  Until discontinued         02/22/24 1458     Place sequential compression device  Until discontinued         02/21/24  2057                  Assessment:   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% - Symptomatically Improving    - ECHO Limited (3.7.24) - LVEF 30%, Severe Global Hypokinesis     - ECHO (2.16.24): EF 40%, Grade II DD    - Small Pleural Effusions on CT Imaging (2.22.24)  CAD (Multivessel)    - Status Post CABG (3V) LIMA to LAD, SVG to OM1, SVG to RCA (2.27.24)    - RHC/Impella Placement and Lane Catheter (2.23.24)- Impella Removal/Femoral Artery Repair in Surgery on 2.27.24    - LHC (2.19.24): pLAD lesion 70%, oLCx 80%, OM1 80%, OM2 1 lesion 70% 2nd lesion 50%, mLAD 50%, pRCA 60%  VHD/AS     - Status Post Bio AVR (Epic max 23mm) (2.27.24)    - ECHO (2.16.24): Aortic Valve: There is moderate aortic valve sclerosis. Severely restricted motion. There is severe stenosis. Aortic valve area by VTI is 0.66 cm². Aortic valve peak velocity is 4.45 m/s. Mean gradient is 50 mmHg. The dimensionless index is 0.17.   Cardiogenic Shock (Post Cardiotomy) - Off Vasopressor Support - Resolved     - History of Hypertension   Ischemic Cardiomyopathy/EF 30%  Elevated LFTs - persistent  Pulmonary HTN    - ECHO PASP 69mmHg     - RHC (2.22.24): PA 84/34, PCWP 24 mmHg  Hematuria 2/2 Traumatic/Difficult Indwelling Catheter Placement (Resolved)  Urethral Stricture s/p Dilatation 2.23.24  PAF - Currently CVR    - Status Post Bedside Cardioversion x 2 on 2.27.24 (Both Unsuccessful)    - Status Post MOISE Ligation (2.27.24)    - CHADsVASc - 5 Points - 7.2% Stroke Risk per Year   Left Bundle Branch Block   VHD    - ECHO (3.7.24): Bio AVR, Mild MR    - ECHO (2.16.24): Severe AS, mild MR, mild to moderate TR  JEAN-CLAUDE/CKD Stage II - I  - Baseline Cr 1.6  Anemia- stable    - Status Post Transfusion on 2.28.24 & 2.29.24  Thrombocytopenia - Resolved   Leukocytosis - Persistent    - Groin Wound Culture: Serratia Marcescens and Pseudomonas Aeruginosa   Hyponatremia - stable  SIADH    Plan:   Continue IV Abx per Primary Team   Continue ASA and  BB  Statin and amiodarone have been discontinued due to persistent transaminitis. Resume statin once transaminitis resolved  HF GDMT- Continue BB. Hold diuretic due to hypotension  Continue midodrine 5 mg tid. May increase up to 10 mg tid if sbp < 90  Unable to add ARNI/MRA/SGLT2 due to hypotension  Hold digoxin due to bradycardia  Accurate I&Os and Daily Weights   Keep K > 4.0 and Mg  > 2.0  Aggressive Mobilization of PT and Q1HR IS (PT/OT Eval and Treat)  Encouraged eating. He has been started on appetite stimulant      Cherry Suarez, BLANQUITA  Cardiology  [facility]   03/17/2024

## 2024-03-18 NOTE — PT/OT/SLP PROGRESS
Occupational Therapy   Treatment    Name: Brain Snyder  MRN: [id number]  Admitting Diagnosis:  CAD (coronary artery disease)  20 Days Post-Op    Recommendations:     Recommended therapy intensity at discharge: Moderate Intensity Therapy   Discharge Equipment Recommendations:  to be determined by next level of care  Barriers to discharge:       Assessment:     Brain Snyder is a 78 y.o. male with a medical diagnosis of CAD (coronary artery disease).  He presents with improved balance and increased endurance, cueing required for adherence to sternal pxns throughout session. Guatemalan speaking CRAMER during session, able to communicate with patient appropriately. Recommending Mod Intensity therapy.  Performance deficits affecting function are weakness, impaired endurance, impaired sensation, impaired self care skills, impaired balance, gait instability, impaired functional mobility, decreased upper extremity function, decreased lower extremity function, decreased ROM, pain, decreased safety awareness.     Rehab Prognosis:  Good; patient would benefit from acute skilled OT services to address these deficits and reach maximum level of function.       Plan:     Patient to be seen 5 x/week to address the above listed problems via self-care/home management, therapeutic activities, therapeutic exercises  Plan of Care Expires: 04/09/24  Plan of Care Reviewed with: patient, spouse    Subjective     Pain/Comfort:       Objective:     Communicated with: RN prior to session.  Patient found HOB elevated with   upon OT entry to room.    General Precautions: Standard, fall, sternal    Orthopedic Precautions:N/A  Braces: N/A  Respiratory Status: Nasal cannula, flow 2 L/min       Occupational Performance:   (Bed Mobility- Mod A) Pt. Using cardiac bear for adherence to sternal pxns during supine to sit.  (Sitting balance- SBA-CGA)  Pt. Performing grooming task (brushing teeth) with appropriate preparation and setup noted.   (Sit to  stand- Mod A)  Pt. Requiring Mod A during stand step t/f from EOB to BS chair using RW for UE support with balance, cueing required for safety with RW and step progression.   Pt. Left on angel awa with bernadette pad in chair with all needs within reach, RN notified as well.       Therapeutic Positioning    OT interventions performed during the course of today's session in an effort to prevent and/or reduce acquired pressure injuries:   Therapeutic positioning was provided at the conclusion of session to offload all bony prominences for the prevention and/or reduction of pressure injuries        [facility] 6 Click ADL:      Patient Education:  Patient provided with verbal education education regarding fall prevention and safety awareness.  Additional teaching is warranted.      Patient left up in chair with all lines intact and call button in reach.    GOALS:   Multidisciplinary Problems       Occupational Therapy Goals          Problem: Occupational Therapy    Goal Priority Disciplines Outcome Interventions   Occupational Therapy Goal     OT, PT/OT Ongoing, Not Progressing    Description: Goals to be met by: 4/1/24     Patient will increase functional independence with ADLs by performing:    UE Dressing with min A   Grooming while standing at sink with Minimal Assistance.  Toileting from commode with Moderate Assistance for hygiene and clothing management.   Sitting at edge of bed x30 minutes with Minimal Assistance. (Met)  Toilet transfer to bedside commode with Minimal Assistance.                         Time Tracking:     OT Date of Treatment: 03/18/24  OT Start Time: 1410  OT Stop Time: 1425  OT Total Time (min): 15 min    Billable Minutes:Therapeutic Activity 1    OT/YVETTE: YVETTE     Number of YVETTE visits since last OT visit: 4    3/18/2024

## 2024-03-18 NOTE — PLAN OF CARE
Problem: Adult Inpatient Plan of Care  Goal: Plan of Care Review  Outcome: Ongoing, Progressing  Goal: Patient-Specific Goal (Individualized)  Outcome: Ongoing, Progressing  Goal: Absence of Hospital-Acquired Illness or Injury  Outcome: Ongoing, Progressing  Goal: Optimal Comfort and Wellbeing  Outcome: Ongoing, Progressing  Goal: Readiness for Transition of Care  Outcome: Ongoing, Progressing     Problem: Fall Injury Risk  Goal: Absence of Fall and Fall-Related Injury  Outcome: Ongoing, Progressing     Problem: Infection  Goal: Absence of Infection Signs and Symptoms  Outcome: Ongoing, Progressing     Problem: Skin Injury Risk Increased  Goal: Skin Health and Integrity  Outcome: Ongoing, Progressing     Problem: Activity Intolerance (Cardiovascular Surgery)  Goal: Improved Activity Tolerance  Outcome: Ongoing, Progressing     Problem: Adjustment to Surgery (Cardiovascular Surgery)  Goal: Optimal Coping with Heart Surgery  Outcome: Ongoing, Progressing     Problem: Bleeding (Cardiovascular Surgery)  Goal: Bleeding (Cardiovascular Surgery)  Outcome: Ongoing, Progressing     Problem: Cardiac Function Impaired (Cardiovascular Surgery)  Goal: Effective Cardiac Function  Outcome: Ongoing, Progressing     Problem: Cerebral Tissue Perfusion (Cardiovascular Surgery)  Goal: Optimal Cerebral Tissue Perfusion (Cardiovascular Surgery)  Outcome: Ongoing, Progressing     Problem: Fluid and Electrolyte Imbalance (Cardiovascular Surgery)  Goal: Fluid and Electrolyte Balance (Cardiovascular Surgery)  Outcome: Ongoing, Progressing     Problem: Glycemic Control Impaired (Cardiovascular Surgery)  Goal: Blood Glucose Level Within Targeted Range (Cardiovascular Surgery)  Outcome: Ongoing, Progressing     Problem: Infection (Cardiovascular Surgery)  Goal: Absence of Infection Signs and Symptoms  Outcome: Ongoing, Progressing     Problem: Ongoing Anesthesia Effects (Cardiovascular Surgery)  Goal: Anesthesia/Sedation Recovery  Outcome:  Ongoing, Progressing     Problem: Pain (Cardiovascular Surgery)  Goal: Acceptable Pain Control  Outcome: Ongoing, Progressing     Problem: Postoperative Nausea and Vomiting (Cardiovascular Surgery)  Goal: Nausea and Vomiting Relief (Cardiovascular Surgery)  Outcome: Ongoing, Progressing     Problem: Respiratory Compromise (Cardiovascular Surgery)  Goal: Effective Oxygenation and Ventilation (Cardiovascular Surgery)  Outcome: Ongoing, Progressing     Problem: Coping Ineffective  Goal: Effective Coping  Outcome: Ongoing, Progressing

## 2024-03-19 LAB
ANION GAP SERPL CALC-SCNC: 7 MEQ/L
BASOPHILS # BLD AUTO: 0.09 X10(3)/MCL
BASOPHILS NFR BLD AUTO: 0.9 %
BUN SERPL-MCNC: 18.1 MG/DL (ref 8.4–25.7)
CALCIUM SERPL-MCNC: 8.8 MG/DL (ref 8.8–10)
CHLORIDE SERPL-SCNC: 103 MMOL/L (ref 98–107)
CO2 SERPL-SCNC: 23 MMOL/L (ref 23–31)
CREAT SERPL-MCNC: 1.28 MG/DL (ref 0.73–1.18)
CREAT/UREA NIT SERPL: 14
EOSINOPHIL # BLD AUTO: 0.37 X10(3)/MCL (ref 0–0.9)
EOSINOPHIL NFR BLD AUTO: 3.6 %
ERYTHROCYTE [DISTWIDTH] IN BLOOD BY AUTOMATED COUNT: 15.6 % (ref 11.5–17)
GFR SERPLBLD CREATININE-BSD FMLA CKD-EPI: 58 MLS/MIN/1.73/M2
GLUCOSE SERPL-MCNC: 97 MG/DL (ref 82–115)
HCT VFR BLD AUTO: 39.5 % (ref 42–52)
HGB BLD-MCNC: 12.4 G/DL (ref 14–18)
IMM GRANULOCYTES # BLD AUTO: 0.15 X10(3)/MCL (ref 0–0.04)
IMM GRANULOCYTES NFR BLD AUTO: 1.5 %
LYMPHOCYTES # BLD AUTO: 1.66 X10(3)/MCL (ref 0.6–4.6)
LYMPHOCYTES NFR BLD AUTO: 16.4 %
MCH RBC QN AUTO: 27.4 PG (ref 27–31)
MCHC RBC AUTO-ENTMCNC: 31.4 G/DL (ref 33–36)
MCV RBC AUTO: 87.2 FL (ref 80–94)
MONOCYTES # BLD AUTO: 1.26 X10(3)/MCL (ref 0.1–1.3)
MONOCYTES NFR BLD AUTO: 12.4 %
NEUTROPHILS # BLD AUTO: 6.61 X10(3)/MCL (ref 2.1–9.2)
NEUTROPHILS NFR BLD AUTO: 65.2 %
NRBC BLD AUTO-RTO: 0 %
PLATELET # BLD AUTO: 380 X10(3)/MCL (ref 130–400)
PMV BLD AUTO: 9.7 FL (ref 7.4–10.4)
POTASSIUM SERPL-SCNC: 4.7 MMOL/L (ref 3.5–5.1)
RBC # BLD AUTO: 4.53 X10(6)/MCL (ref 4.7–6.1)
SODIUM SERPL-SCNC: 133 MMOL/L (ref 136–145)
WBC # SPEC AUTO: 10.14 X10(3)/MCL (ref 4.5–11.5)

## 2024-03-19 PROCEDURE — 25000003 PHARM REV CODE 250: Performed by: PHYSICIAN ASSISTANT

## 2024-03-19 PROCEDURE — 25000003 PHARM REV CODE 250: Performed by: NURSE PRACTITIONER

## 2024-03-19 PROCEDURE — 63600175 PHARM REV CODE 636 W HCPCS: Performed by: INTERNAL MEDICINE

## 2024-03-19 PROCEDURE — 25000003 PHARM REV CODE 250: Performed by: INTERNAL MEDICINE

## 2024-03-19 PROCEDURE — 97530 THERAPEUTIC ACTIVITIES: CPT

## 2024-03-19 PROCEDURE — 85025 COMPLETE CBC W/AUTO DIFF WBC: CPT | Performed by: NURSE PRACTITIONER

## 2024-03-19 PROCEDURE — 97116 GAIT TRAINING THERAPY: CPT | Mod: CQ

## 2024-03-19 PROCEDURE — 63600175 PHARM REV CODE 636 W HCPCS: Performed by: NURSE PRACTITIONER

## 2024-03-19 PROCEDURE — 80048 BASIC METABOLIC PNL TOTAL CA: CPT | Performed by: INTERNAL MEDICINE

## 2024-03-19 PROCEDURE — 97535 SELF CARE MNGMENT TRAINING: CPT

## 2024-03-19 PROCEDURE — 21400001 HC TELEMETRY ROOM

## 2024-03-19 PROCEDURE — 97530 THERAPEUTIC ACTIVITIES: CPT | Mod: CQ

## 2024-03-19 RX ADMIN — DICLOFENAC SODIUM 2 G: 10 GEL TOPICAL at 04:03

## 2024-03-19 RX ADMIN — FOLIC ACID 1 MG: 1 TABLET ORAL at 10:03

## 2024-03-19 RX ADMIN — GUAIFENESIN AND CODEINE PHOSPHATE 10 ML: 100; 10 SOLUTION ORAL at 10:03

## 2024-03-19 RX ADMIN — PANTOPRAZOLE SODIUM 40 MG: 40 TABLET, DELAYED RELEASE ORAL at 10:03

## 2024-03-19 RX ADMIN — ENOXAPARIN SODIUM 40 MG: 40 INJECTION SUBCUTANEOUS at 04:03

## 2024-03-19 RX ADMIN — ASPIRIN 81 MG: 81 TABLET, COATED ORAL at 10:03

## 2024-03-19 RX ADMIN — CEFEPIME 2 G: 2 INJECTION, POWDER, FOR SOLUTION INTRAVENOUS at 03:03

## 2024-03-19 RX ADMIN — ACETAMINOPHEN 650 MG: 325 TABLET, FILM COATED ORAL at 09:03

## 2024-03-19 RX ADMIN — DICLOFENAC SODIUM 2 G: 10 GEL TOPICAL at 10:03

## 2024-03-19 RX ADMIN — SUCRALFATE 1 G: 1 TABLET ORAL at 12:03

## 2024-03-19 RX ADMIN — MIDODRINE HYDROCHLORIDE 7.5 MG: 2.5 TABLET ORAL at 04:03

## 2024-03-19 RX ADMIN — MIDODRINE HYDROCHLORIDE 7.5 MG: 2.5 TABLET ORAL at 10:03

## 2024-03-19 RX ADMIN — CYPROHEPTADINE HYDROCHLORIDE 4 MG: 4 TABLET ORAL at 10:03

## 2024-03-19 RX ADMIN — CYPROHEPTADINE HYDROCHLORIDE 4 MG: 4 TABLET ORAL at 09:03

## 2024-03-19 RX ADMIN — Medication 6 MG: at 09:03

## 2024-03-19 RX ADMIN — SUCRALFATE 1 G: 1 TABLET ORAL at 09:03

## 2024-03-19 RX ADMIN — METOPROLOL SUCCINATE 12.5 MG: 25 TABLET, EXTENDED RELEASE ORAL at 10:03

## 2024-03-19 RX ADMIN — CEFEPIME 2 G: 2 INJECTION, POWDER, FOR SOLUTION INTRAVENOUS at 04:03

## 2024-03-19 RX ADMIN — ALLOPURINOL 50 MG: 300 TABLET ORAL at 10:03

## 2024-03-19 RX ADMIN — POLYETHYLENE GLYCOL 3350 17 G: 17 POWDER, FOR SOLUTION ORAL at 10:03

## 2024-03-19 RX ADMIN — SUCRALFATE 1 G: 1 TABLET ORAL at 04:03

## 2024-03-19 NOTE — PROGRESS NOTES
[facility] Hospital Medicine Progress Note        Chief Complaint: Inpatient Follow-up for R groin cellulitis    HPI per admitting team: 78-year-old male with a history of aortic insufficiency/stenosis, CAD, CKD IIIb, HTN AFib on Eliquis admitted to [facility] 02/15/2024 with chest pain, found to have NSTEMI (peak troponin 0.17) and underwent C 02/20/2024 showing severe multivessel stenosis and therefore was transferred to [facility] for CABG evaluation. Cardiology was consulted Patient had Impella placed on 02/23 and was admitted to the ICU.  Was started on aggressive diuresis with IV Lasix.  CV surgery was consulted.  Urology was consulted for hematuria; Nguyen catheter placed over guidewire with dilation secondary to urethral strictures. IV Heparin infusion was initiated per CIS but held due to hematuria/hemoptysis on 02/24.  He was also noted to have an JEAN-CLAUDE. Received 2 units PRBCs on 02/26 and was planned for high-risk PCI on 02/26 which was later canceled.  Underwent CABG x 3 2/27 (LIMA to LAD, RSVG to OM 1, R SVG to RCA, bioprosthetic AVR).  Remained on mechanical ventilation thereafter.  Patient noted to have tachycardia with atrial fibrillation/RVR requiring IV amiodarone along with pressors (norepinephrine/Milrinone) for hypotension. Patient underwent cardioversion x 2 with minimal improvement in HR/BP.  Patient self-extubated overnight on 02/29 and was noted to have adequate saturations on 2 L O2 via NC thereafter.  PT/OT consulted.  Renal function noted to be improving. Continued on IV diuresis as he appeared to be significantly volume overloaded postoperatively along with elevated pulmonary artery wedge pressures.  Started on p.o. amiodarone taper on 03/03.  Patient noted to have drainage from right groin wound and started on vancomycin on 03/04. Wound culture grew Pseudomonas.  Chest tubes discontinued on 03/02.  Downgraded from ICU on 03/02.  CIS and CV surgery continued following  patient.  CV surgery signed off on 03/06 and discontinued vancomycin.  Patient was placed on oral Levaquin.  Hospital medicine consulted on 03/06 for assistance with medical management and DC planning.  Repeat CT abdomen and pelvis of 3/9 shows improving stranding and improving hematoma in the right groin, persistent left-sided effusion and atelectasis  Patient complains of low back pain, cough and his SCDs too tight on his feet bilaterally  Vitals reviewed with blood pressure low normal, heart rates in the low 100s, saturating 94% on 2 L of oxygen   Labs reviewed and fairly stable   General surgery evaluated patient, reviewed CT scan of the area, given wound waning with scant serous/purulent drainage with manual expression, recommended wound care consult and continued to manually express fluid from site Q shift.  If area stopped draining, recommended do ultrasound to better define anatomy  Palliative care on board  ID adjusted antibiotics to IV cefepime  Nephrology placed on fluid restriction  Cardiology has suspended amiodarone due to elevated liver enzymes and Lasix due to hyponatremia    Interval Hx:   Patient is laying in bed, no family is at bedside  Still complained of mild abdominal pain  Nurse confirmed he gave the Dulcolax suppository yesterday and he did had bowel movement last night  Later he informed me that when therapy stood him up, patient had a huge bowel movement again   Case was discussed with patient's nurse and  on the floor.    Objective/physical exam:  General:  Laying in bed  Chest: Clear to auscultation bilaterally anteriorly  Heart: RRR, +S1, S2, no appreciable murmur  Abdomen: Soft, tender to palpate and right upper quadrant and left lower quadrant.  Bowel sounds positive x4  MSK: Warm, bilateral lower extremity edema present, no clubbing or cyanosis  Neurologic:  Laying in bed, able to move all 4 extremities, looks chronically ill    VITAL SIGNS: 24 HRS MIN & MAX LAST   Temp   Min: 97.4 °F (36.3 °C)  Max: 98.3 °F (36.8 °C) 97.5 °F (36.4 °C)   BP  Min: 96/56  Max: 152/72 (!) 152/72   Pulse  Min: 44  Max: 58  (!) 51   Resp  Min: 14  Max: 18 16   SpO2  Min: 96 %  Max: 99 % 96 %     I reviewed the labs below:  Recent Labs   Lab 03/15/24  0402 03/17/24  0353 03/19/24  0601   WBC 12.25* 10.55 10.14   RBC 4.69* 4.60* 4.53*   HGB 12.7* 12.3* 12.4*   HCT 40.4* 39.7* 39.5*   MCV 86.1 86.3 87.2   MCH 27.1 26.7* 27.4   MCHC 31.4* 31.0* 31.4*   RDW 15.6 15.3 15.6   * 419* 380   MPV 10.3 9.8 9.7       Recent Labs   Lab 03/15/24  0402 03/16/24  0400 03/17/24  0353 03/18/24  0433 03/19/24  0553   * 133* 132* 131* 133*   K 4.9 4.5 5.1 5.2* 4.7   CO2 27 25 22* 23 23   BUN 24.6 22.5 23.7 20.8 18.1   CREATININE 1.57* 1.48* 1.29* 1.26* 1.28*   CALCIUM 8.3* 8.3* 8.3* 8.4* 8.8   MG  --  2.00  --   --   --    ALBUMIN 2.7* 2.4* 2.6* 2.6*  --    ALKPHOS 127  --  110 109  --    ALT 84*  --  82* 76*  --    AST 98*  --  90* 80*  --    BILITOT 1.4  --  1.1 1.1  --        Microbiology Results (last 7 days)       ** No results found for the last 168 hours. **             See below for Radiology    Scheduled Med:   allopurinoL  50 mg Oral Daily    aspirin  81 mg Oral Daily    ceFEPime IV (PEDS and ADULTS)  2 g Intravenous Q12H    cyproheptadine  4 mg Oral BID    diclofenac sodium  2 g Topical (Top) TID    enoxparin  40 mg Subcutaneous Daily    ferrous sulfate  1 tablet Oral Every other day    folic acid  1 mg Oral Daily    guaiFENesin-codeine 100-10 mg/5 ml  10 mL Oral TID    metoprolol succinate  12.5 mg Oral Daily    midodrine  7.5 mg Oral TID WM    pantoprazole  40 mg Oral Daily    polyethylene glycol  17 g Oral BID    sucralfate  1 g Oral QID (AC & HS)      Continuous Infusions:   loperamide        PRN Meds:  acetaminophen, acetaminophen, albumin human 5%, bisacodyL, dextrose 10%, dextrose 10%, electrolyte-A, heparin, porcine (PF), heparin, porcine (PF), HYDROcodone-acetaminophen, lactulose 10 gram/15  ml, loperamide, melatonin, metoclopramide, midodrine, nitroGLYCERIN, ondansetron, ondansetron, simethicone, sodium chloride 0.9%, sodium chloride 0.9%     Assessment/Plan:  Constipation- resolved  Hypotension- improved with midodrine   Right flank pain- due to right-sided lower abdomen hematoma- improving   R Groin wound infection with Cellulitis at previous impella insertion site- Pseudomonas aeruginosa/Serratia marcescens, slowly improving   Transaminitis- multifactorial-  Congestive hepatopathy versus infection vs drug induced- now trending down  Leukocytosis- resolved  Fluid overload 2/2 HFrEF exacerbation, fairly euvolemic clinically  HFrEF/HFpEF, euvolemic   JEAN-CLAUDE on stage II CKD - resolved   Hyponatremia due to SIADH- improving   Pulmonary hypertension   NSTEMI, CAD sp CABG x 3  S/p AVR  Atrial Fibrillation with RVR  Normocytic anemia  Urethral stricture s/p dilation with gustafson 2/23  Hyperkalemia-mild  Moderate malnutrition       Per Cardiology, midodrine can be dosed up till 10 mg t.i.d. if needed, currently up to 7.5 mg t.i.d and blood pressure has responded  EKG showed atrial fibrillation with RVR, left bundle branch block-->  received digoxin 500 mcg push followed by 250 mcg q.6 for 2 doses for rate control and now on digoxin 0.125 mg daily  Troponin 0.18, per Cardiology probably post CABG Troponinemia  Lasix, Lisinopril and Aldactone also suspended, due to low blood pressure  Continue po metoprolol succinate 12.5 mg q.day  Cardiology has suspended amiodarone and atorvastatin due to elevated liver enzymes  Pt continue to complain of abdominal and low back pain--> ordered CT Abddmen pelvis w/o contrast--> inflammatory changes noted in the right groin, possibly related to recent surgery.  Small focus of air noted.  Stool throughout the colon at would be seen with constipation.  Left pleural effusion with atelectasis, changes of left lower lobe early infection process can not be excluded  Ordered Dulcolax  suppository x1 and relistor--> had a BM last night and a huge BM today  Cont Miralax BID   Added volatren get to the lower back for pain controll  Wound culture grew Pseudomonas and Serratia marcescens- both sensitive to cefepime  ID Dr KELLY adjusted antibiotics to IV cefepime- day 8, duration of treatment per ID, pending recs   Leukocytosis resolved, 10K  Nephrology placed pt on 1500 mL fluid restriction for hyponatremia and started sodium chloride oral tablets 2000 mg t.i.d., sodium improved to 131--> sodium chloride tablets discontinued per Nephrology  Creatinine slightly improved to 1.2, monitor closely, case personally discussed with Laurel BURNS, nephrology will sign off   Appreciate pulmonology input continue to monitor effusion, no need for thoracentesis at this time  Continue use of incentive spirometry and oxygen supplementation, currently on room air, pt not compliant with instructions   Appreciate Urology input, case was personally discussed with Cora Gonzalez and Isidra BURNS, per their recommendations, Nguyen removed on 3/18, no issues with voiding since then  Nguyen removed 3/18, case was personally discussed with isidra BURNS with Urology  Trend AST/ALT- 80/78  Ultrasound right upper quadrant shows mild-to-moderate hepatomegaly.  No biliary dilation  Continued allopurinol 50 mg daily, aspirin 81 mg, FeSO4 every other day, folic acid 1 mg, Protonix 40 mg daily, sucralfate 1 g q.i.d.  P.o. Norco 5 mg q.6 p.r.n. for pain  Antitussives p.r.n.  PT/OT recommending moderate-intensity therapy, case management on board, awaiting Medicaid approval for SNF.  Patient's daughter has submitted paperwork to Medicaid--> unfortunately patient is not eligible for Medicaid given illegal status  Palliative care also on board, greatly appreciated  Wound Care also on board, case personally discussed with Lionel, informed that the wound site is very small for packing but she will continue to express drain age from the site  Given his  poor oral intake, ordered Marinol but pharmacy informed me it is on back order, changed to cyproheptadine for appetite stimulation  Morning labs stable, repeat Thursday     VTE prophylaxis:  Lovenox 40     Patient condition:  guarded     Anticipated discharge and Disposition:    Home once medically ready, not a candidate for Medicaid, awaiting ID regarding antibiotic choice and duration of treatment     All diagnosis and differential diagnosis have been reviewed; assessment and plan has been documented; I have personally reviewed the labs and test results that are presently available; I have reviewed the patients medication list; I have reviewed the consulting providers response and recommendations. I have reviewed or attempted to review medical records based upon their availability    All of the patient's questions have been  addressed and answered. Patient's is agreeable to the above stated plan. I will continue to monitor closely and make adjustments to medical management as needed.  _____________________________________________________________________    Nutrition Status:  Patient meets ASPEN criteria for moderate malnutrition of acute illness or injury per RD assessment as evidenced by:  Energy Intake (Malnutrition): less than or equal to 50% for greater than or equal to 5 days  Weight Loss (Malnutrition):  (unable to determine)  Subcutaneous Fat (Malnutrition):  (does not meet criteria)  Muscle Mass (Malnutrition): mild depletion  Fluid Accumulation (Malnutrition): mild        A minimum of two characteristics is recommended for diagnosis of either severe or non-severe malnutrition.   Radiology:   I have personally reviewed the images and agree with radiologist report  CT Abdomen Pelvis  Without Contrast  Narrative: EXAMINATION:  CT ABDOMEN PELVIS WITHOUT CONTRAST    CLINICAL HISTORY:  Abdominal pain, acute, nonlocalized;    TECHNIQUE:  Multidetector non-contrast axial CT images of the abdomen and pelvis were  obtained with coronal and sagittal reconstructions.    Automatic exposure control was utilized to reduce the patient's radiation dose.    DLP= 307    COMPARISON:  03/15/2024    FINDINGS:  01. HEPATOBILIARY: No focal hepatic lesion is identified, however evaluation is limited secondary to lack of IV contrast. The gallbladder is normal.    02. SPLEEN: Normal    03. PANCREAS: No focal masses or ductal dilatation.    04. ADRENALS: No adrenal nodules.    05. KIDNEYS: The right kidney demonstrates no stone, hydronephrosis, or hydroureter. No focal mass identified. The left kidney demonstrates no stone, hydronephrosis, or hydroureter. No focal mass identified.    06. LYMPHADENOPATHY/RETROPERITONEUM: There is no retroperitoneal lymphadenopathy. The abdominal aorta is normal in course and caliber.    07. BOWEL: Stool throughout the colon as may be seen with constipation.    08. PELVIC VISCERA: Normal. No pelvic mass.    09. PELVIC LYMPH NODES: No lymphadenopathy.    10. PERITONEUM/ABDOMINAL WALL: Inflammatory change noted within the right groin possibly related to recent surgery.  Small focus of air noted.    11. SKELETAL: No aggressive appearing lytic/blastic lesion. No acute fractures, subluxations or dislocations.    12. LUNG BASES: Left pleural effusion with atelectatic changes of the left lower lobe  Impression: Left pleural effusion with atelectatic changes of the left lower lobe early infectious process is not excluded.    Inflammatory change noted within the right groin possibly related to recent surgery.  Small focus of air noted.  Correlate with physical exam.    Stool noted throughout the colon as would be seen with constipation.    Electronically signed by: Mann Hernandez  Date:    03/17/2024  Time:    11:25      Tom Gilbert MD  Department of Hospital Medicine   [facility]   03/19/2024

## 2024-03-19 NOTE — PROGRESS NOTES
"  [facility]    Cardiology  Progress Note    Patient Name: Brain Snyder  MRN: [id number]  Admission Date: 2/21/2024  Hospital Length of Stay: 27 days  Code Status: Full Code   Attending Physician: Tom Gilbert MD   Primary Care Physician: Nidia Shepherd NP  Expected Discharge Date:   Principal Problem:CAD (coronary artery disease)    Subjective:   Reason for Consult:  CAD     HPI: 78-year-old female that is unknown to [facility] with a PMHx of PAF on Eliquis Aortic insufficiency, MV CAD, HTN. He is French speaking and an  was used for interview. He was orginally admitted to [facility] on 2.15.24 with CP & NSTEMI and underwent a LHC on 2.20.24 that showed MV CAD (reported noted below) He was transferred to [facility] on 2.21.24 for a CABG/AVR evaluation. On arrive he was hemodynamically stable on a heparin infusion. He denied chest pain, SOB, and nausea on current exam, [facility] was consulted for CAD    Hospital Course:   2.23.24: Patient seen resting in bed. He denies SOB or CP. He remains on heparin infusion. Family at bedside   2.24.24: NAD. S/p Impella Placement/[place]-Chelo Catheter. "I am ok." + Blood Clots from Nose/Mouth, Hematuria 2/2 traumatic Indwelling Catheter Placement. Heparin Drip per Protocol. Impella P5  2.25.24: NAD. "I'm ok." Denies CP, SOB and Palps. PA Pressure Reading is not Accurate. CVP 5. Impella at P5. R Groin Impella P7. Remains Off Heparin Drip per Protocol. Now in SR.   2.26.24: NAD Noted. SR on Tele. Right Groin Impella in place, bruising with no hematoma noted. Distal pulses DP intact. Legs warm. NC 2 L/Min. Denies CP/SOB. Consent obtained from his daughter Brii Snyder. NPO for MV PCI Today.  2.27.24: NAD Noted. Impella remains in place at P7 Support. Good UA Noted, some pink tinged urine noted. SR on Tele. Pressors off. Denies CP/SOB. NPO for surgery today. Heparin on hold for surgery.  2.28.24: Patient is critically ill. AF RVR on Tele, twice shocked this AM with no success. " "On Amiodarone/Milrinone- Cardizem Infusion now off given hypotension and ICMO. Also no José Miguel/Levophed. Concern for bleeding noted, transfusion noted. Patient is intubated/Mechanically Vented. Hemodynamics: CO/CI 4.3/2.3 CVP 20.  2.29.24: Patient self extubated this AM. He is stable on NC Oxygen. Denies CP/SOB. SR on Tele. On Amiodarone Infusion and receiving blood products. BP Stable. Clinically much improved from yesterday.   3.1.24: NAD. Afib mild RVR on tele. On Primacor @ 0.187 mcg/kg/min. Amiodarone infusing per protocol. LFT's worsening. WBC worsening. + Incisional CP. On 5 L NC.   3.2.24: NAD. Net Negative 2700 urine output.   3.3.24: NAD. VSS. No complaints of CP/Palps. Reports SOB is improving. Creat 1.61 (Improved)  3.4.24: NAD. VSS. No complaints. On 2 L. Net Negative Fluid 3540 over 24 hours. Creat 1.37. LFTs slighting worse today  3.5.24: NAD. VSS. Denies CP, SOB and Palps. Family at Bedside. Fluid Balance Net Negative 4360mL. BUN/Crea 23.5/1.16, AST/ALT 69/71  3.6.24: Patient sitting up in bedside chair. Appears SOB. C/o abdominal bloating and gaseous. Reports + BM since surgery. Abdomen distended. Patient nausea and spit up bile colored fluid. + peripheral edema. O2 via NC. Excellent diuresis. Persistent leukocytosis. K+ 3.1, mild transaminitis. Groin Wound cx -- GNR and pseudomonas. CV surgery has signed off.   3.7.24: Patient sitting up in bed. NAD. Denies any pain. Noted SS drainage from impella insertion site. Leukocytosis has resolved. BP marginal at times. Afib, CVR. Good UOP; however weight is increasing.   3.8.24: Patient awake in bed. He has been weaned off. O2. Good diuresis overnight. Mild bump in creatinine-- 1.21 from 1.15 yesterday. Liver enzymes uptrending. VSS.   3.9.24: NAD. Language Barrier/Daughter at Bedside for Translation. Denies CP, SOB and Palps. Deconditioned. Fluid Balance Net Negative 5600mL.   3.10.24: NAD. "[place]ons." Denies CP, SOB and Palps. Daughter at Bedside/Translating. " Denies CP, SOB and Palps. Remains Deconditions. Fluid Balance Net Negative 2405mL. Na 130, BUN/Crea 20.3/1.22, AST/ALT 88/73  3.11.24: Patient awake in bed. NAD. Reviewed echo-EF 40-45% with mild RV enlargement and severely reduced RV function. Na 129 today. Renal function mildly bumped  3.12.24: Patient awake in bed. NAD. Hyponatremia stable. Nephrology following and has initiated a fluid restriction. Impella site still oozing SS fluid. Surgery recommending manually expressing fluid q shift and obtaining US to better define anatomy. WBC 13.79. Afebrile.   3.15.24: Patient resting in bed. NAD. Clear breath sounds. No edema. Good UOP. Na improving with addition of salt tabs. Creatinine 1.57. Still has transaminitis. Amiodarone placed on hold yesterday. Currently Afib, 90s. Mild hypotension at times. Nurse reports patient c/o chest pain today. When questioned, he reported incisional chest pain during attempts at BM.   3.16.24; Patient sleeping in bedside recliner. NAD. Na improving- 133 today. Renal indices improving. Albumin 2.4. EKG Afib, RVR with LBBB yesterday. Troponin 0.183--likely due to recent CABG. He was given digoxin IV load due to RVR. He was given a dose of midodrine this am due to sbp < 90.   3.17.24: Awake in bed. Bp better with addition of midodrine tid. Converted to SB this am. Currently on digoxin and low dose BB. Na and renal indices stable.   3.18.24: Patient awake in bed. NAD. Right groin impella site healing well. Transaminitis improving. Creatinine stable. Marginal Bps. Remains on midodrine with SBP 90s. Renal indices stable. K+ 5.2 today  3.19.24: Sleeping in bed. NAD. On RA. Clear breath sounds. No edema. Diuretics remain on hold. Midodrine was increased to 7.5 mg tid. BP up to 150s at times. Remains SB. Digoxin discontinued 3.17.24      PMH: PAF (on Eliquis), Aortic insufficiency, MV CAD, HTN  PSH: [facility]  Family History: None Reported  Social History: Former Smoker, Denies ETOH or Illicit Drug  Use      Previous Diagnostics:  TTE 03.11.24:  Left Ventricle: Regional wall motion abnormalities present. Septal motion is consistent with post-operative status. Akinetic inf -posterior wall There is moderately reduced systolic function with a visually estimated ejection fraction of 35 - 40%.    Right Ventricle: Mild right ventricular enlargement. Systolic function is severely reduced.    Aortic Valve: There is a bioprosthetic valve in the aortic position.    Pericardium: There is a trivial effusion. No indication of cardiac tamponade. Left pleural effusion.       CABG/Bio AVR/MOISE Ligation (2.27.24):  Coronary artery bypass grafting X 3, LIMA to LAD, reversed SVG to OM1, Reversed Saphenous venous graft to RCA  Bioprosthetic aortic valve replacement (Epic max 23mm), Endoscopic venous harvesting of left greater saphenous vein, Left atrial appendage ligation, explant of right femoral impella device, right femoral artery repair.    RHC/Impella Placement (2.23.24):  Right heart catheterization performed showed the following:  PA= 61/24 (27) mm Hg  PCWP=  31/26 (27) mm Hg  AO saturation= 93 % RA  PA saturation= 60% with Impella (49% yesterday)    (02/22/24) -  0.5  - Cardiac output was 2.8 L/min provided by the device.  Impella was at 84cm  Impression/plan:   Successful Impella insertion and swan-Chelo in the setting of Acute HF/low cardiac output/precardiogenic shock/Critcal-severe AS/MVCAD - LM distal    RHC (2.22.24):  Right heart catheterization demonstrating severe pulmonary hypertension 84/34 and PCWP 24 mmHg  Reduced cardiac output/cardiac index at 3.43/1.81 L/min/m2 with pulmonary artery saturations 49.3%  For applied to the right femoral vein following removal of the 7 Citizen of [place] sheath  The estimated blood loss was none.    Carotid US (2.22.24):  The bilateral internal carotid artery demonstrated less than 50% stenosis.   The bilateral vertebral arteries were patent with antegrade flow    LHC (2.20.24):   Prox  LAD lesion was 70% stenosed.  Ost Cx to Prox Cx lesion was 80% stenosed.  1st Mrg lesion was 80% stenosed.  2nd Mrg-1 lesion was 70% stenosed.  2nd Mrg-2 lesion was 50% stenosed.  Mid LAD lesion was 50% stenosed.  Prox RCA lesion was 60% stenosed.  estimated blood loss was <50 mL.  There was three vessel coronary artery disease.  LVEDP: 30mmHg  Recommendations:   Refer for CABG evaluation and AVR   Preformed by Dr. Flannery at [facility]     ECHO (2.15.24):  Left Ventricle: The left ventricle is normal in size. Mildly increased ventricular mass. Mildly increased wall thickness. There is mild eccentric hypertrophy. Moderate global hypokinesis present. Septal motion is consistent with bundle branch block. There is moderately reduced systolic function. Biplane (2D) method of discs ejection fraction is 40%. Grade II diastolic dysfunction.  Right Ventricle: Right ventricular enlargement. Systolic function is normal.  Left Atrium: Left atrium is severely dilated.  Right Atrium: Right atrium is moderately dilated.  Aortic Valve: There is moderate aortic valve sclerosis. Severely restricted motion. There is severe stenosis. Aortic valve area by VTI is 0.66 cm². Aortic valve peak velocity is 4.45 m/s. Mean gradient is 50 mmHg. The dimensionless index is 0.17. There is mild to moderate aortic regurgitation.  Mitral Valve: Mildly calcified leaflets. There is no stenosis. There is mild regurgitation.  Tricuspid Valve: The tricuspid valve is structurally normal. There is mild to moderate regurgitation.  Pulmonic Valve: There is no significant regurgitation.  Aorta: Aortic root is upper limit of normal measuring 3.6 cm.  Pulmonary Artery: There is severe pulmonary hypertension. The estimated pulmonary artery systolic pressure is 69 mmHg.  IVC/SVC: Intermediate venous pressure at 8 mmHg.  Pericardium: There is no pericardial effusion.     Review of Systems   Cardiovascular:  Negative for chest pain, dyspnea on exertion and leg swelling.    Respiratory:  Negative for shortness of breath.    All other systems reviewed and are negative.    Objective:     Vital Signs (Most Recent):  Temp: 97.9 °F (36.6 °C) (03/19/24 0721)  Pulse: (!) 58 (03/19/24 0721)  Resp: 18 (03/19/24 0721)  BP: 132/70 (03/19/24 0721)  SpO2: 98 % (03/19/24 0721) Vital Signs (24h Range):  Temp:  [97.4 °F (36.3 °C)-98.1 °F (36.7 °C)] 97.9 °F (36.6 °C)  Pulse:  [44-58] 58  Resp:  [14-18] 18  SpO2:  [96 %-99 %] 98 %  BP: (111-152)/(59-72) 132/70   Weight:  (unable to weigh pt bed not working)  Body mass index is 27.62 kg/m².  SpO2: 98 %       Intake/Output Summary (Last 24 hours) at 3/19/2024 1052  Last data filed at 3/18/2024 2120  Gross per 24 hour   Intake 240 ml   Output 250 ml   Net -10 ml       Lines/Drains/Airways       Peripheral Intravenous Line  Duration                  Peripheral IV - Single Lumen 03/18/24 0622 Anterior;Left Forearm 1 day                  Significant Labs:   Recent Results (from the past 72 hour(s))   Digoxin Level    Collection Time: 03/17/24  3:53 AM   Result Value Ref Range    Digoxin Level 2.1 (H) 0.8 - 2.0 ng/mL   Comprehensive Metabolic Panel    Collection Time: 03/17/24  3:53 AM   Result Value Ref Range    Sodium Level 132 (L) 136 - 145 mmol/L    Potassium Level 5.1 3.5 - 5.1 mmol/L    Chloride 103 98 - 107 mmol/L    Carbon Dioxide 22 (L) 23 - 31 mmol/L    Glucose Level 88 82 - 115 mg/dL    Blood Urea Nitrogen 23.7 8.4 - 25.7 mg/dL    Creatinine 1.29 (H) 0.73 - 1.18 mg/dL    Calcium Level Total 8.3 (L) 8.8 - 10.0 mg/dL    Protein Total 5.8 5.8 - 7.6 gm/dL    Albumin Level 2.6 (L) 3.4 - 4.8 g/dL    Globulin 3.2 2.4 - 3.5 gm/dL    Albumin/Globulin Ratio 0.8 (L) 1.1 - 2.0 ratio    Bilirubin Total 1.1 <=1.5 mg/dL    Alkaline Phosphatase 110 40 - 150 unit/L    Alanine Aminotransferase 82 (H) 0 - 55 unit/L    Aspartate Aminotransferase 90 (H) 5 - 34 unit/L    eGFR 57 mls/min/1.73/m2   CBC with Differential    Collection Time: 03/17/24  3:53 AM   Result  Value Ref Range    WBC 10.55 4.50 - 11.50 x10(3)/mcL    RBC 4.60 (L) 4.70 - 6.10 x10(6)/mcL    Hgb 12.3 (L) 14.0 - 18.0 g/dL    Hct 39.7 (L) 42.0 - 52.0 %    MCV 86.3 80.0 - 94.0 fL    MCH 26.7 (L) 27.0 - 31.0 pg    MCHC 31.0 (L) 33.0 - 36.0 g/dL    RDW 15.3 11.5 - 17.0 %    Platelet 419 (H) 130 - 400 x10(3)/mcL    MPV 9.8 7.4 - 10.4 fL    Neut % 61.6 %    Lymph % 18.6 %    Mono % 13.5 %    Eos % 3.2 %    Basophil % 1.0 %    Lymph # 1.96 0.6 - 4.6 x10(3)/mcL    Neut # 6.50 2.1 - 9.2 x10(3)/mcL    Mono # 1.42 (H) 0.1 - 1.3 x10(3)/mcL    Eos # 0.34 0 - 0.9 x10(3)/mcL    Baso # 0.11 <=0.2 x10(3)/mcL    IG# 0.22 (H) 0 - 0.04 x10(3)/mcL    IG% 2.1 %    NRBC% 0.0 %   Comprehensive Metabolic Panel    Collection Time: 03/18/24  4:33 AM   Result Value Ref Range    Sodium Level 131 (L) 136 - 145 mmol/L    Potassium Level 5.2 (H) 3.5 - 5.1 mmol/L    Chloride 101 98 - 107 mmol/L    Carbon Dioxide 23 23 - 31 mmol/L    Glucose Level 81 (L) 82 - 115 mg/dL    Blood Urea Nitrogen 20.8 8.4 - 25.7 mg/dL    Creatinine 1.26 (H) 0.73 - 1.18 mg/dL    Calcium Level Total 8.4 (L) 8.8 - 10.0 mg/dL    Protein Total 5.8 5.8 - 7.6 gm/dL    Albumin Level 2.6 (L) 3.4 - 4.8 g/dL    Globulin 3.2 2.4 - 3.5 gm/dL    Albumin/Globulin Ratio 0.8 (L) 1.1 - 2.0 ratio    Bilirubin Total 1.1 <=1.5 mg/dL    Alkaline Phosphatase 109 40 - 150 unit/L    Alanine Aminotransferase 76 (H) 0 - 55 unit/L    Aspartate Aminotransferase 80 (H) 5 - 34 unit/L    eGFR 59 mls/min/1.73/m2   Basic Metabolic Panel    Collection Time: 03/19/24  5:53 AM   Result Value Ref Range    Sodium Level 133 (L) 136 - 145 mmol/L    Potassium Level 4.7 3.5 - 5.1 mmol/L    Chloride 103 98 - 107 mmol/L    Carbon Dioxide 23 23 - 31 mmol/L    Glucose Level 97 82 - 115 mg/dL    Blood Urea Nitrogen 18.1 8.4 - 25.7 mg/dL    Creatinine 1.28 (H) 0.73 - 1.18 mg/dL    BUN/Creatinine Ratio 14     Calcium Level Total 8.8 8.8 - 10.0 mg/dL    Anion Gap 7.0 mEq/L    eGFR 58 mls/min/1.73/m2   CBC with  Differential    Collection Time: 03/19/24  6:01 AM   Result Value Ref Range    WBC 10.14 4.50 - 11.50 x10(3)/mcL    RBC 4.53 (L) 4.70 - 6.10 x10(6)/mcL    Hgb 12.4 (L) 14.0 - 18.0 g/dL    Hct 39.5 (L) 42.0 - 52.0 %    MCV 87.2 80.0 - 94.0 fL    MCH 27.4 27.0 - 31.0 pg    MCHC 31.4 (L) 33.0 - 36.0 g/dL    RDW 15.6 11.5 - 17.0 %    Platelet 380 130 - 400 x10(3)/mcL    MPV 9.7 7.4 - 10.4 fL    Neut % 65.2 %    Lymph % 16.4 %    Mono % 12.4 %    Eos % 3.6 %    Basophil % 0.9 %    Lymph # 1.66 0.6 - 4.6 x10(3)/mcL    Neut # 6.61 2.1 - 9.2 x10(3)/mcL    Mono # 1.26 0.1 - 1.3 x10(3)/mcL    Eos # 0.37 0 - 0.9 x10(3)/mcL    Baso # 0.09 <=0.2 x10(3)/mcL    IG# 0.15 (H) 0 - 0.04 x10(3)/mcL    IG% 1.5 %    NRBC% 0.0 %     Telemetry: Sinus bradycardia      Physical Exam  Vitals reviewed.   Constitutional:       General: He is not in acute distress.     Appearance: Normal appearance.   HENT:      Head: Normocephalic.      Mouth/Throat:      Mouth: Mucous membranes are moist.   Eyes:      Extraocular Movements: Extraocular movements intact.      Conjunctiva/sclera: Conjunctivae normal.   Cardiovascular:      Rate and Rhythm: Bradycardia present. Rhythm irregular.      Pulses: Normal pulses.      Heart sounds: No murmur heard.  Pulmonary:      Effort: Pulmonary effort is normal. No respiratory distress.      Breath sounds: Normal breath sounds.      Comments: On RA  Abdominal:      Palpations: Abdomen is soft.   Genitourinary:     Comments: Nguyen removed    Skin:     General: Skin is warm.      Comments: Midline Sternotomy YVETTE with No Sign of Bleed/Infection. Right Lower Abdominal Site healing well. Dressing CDI   Neurological:      General: No focal deficit present.      Mental Status: He is alert.   Psychiatric:         Mood and Affect: Mood normal.       Current Inpatient Medications:  Current Facility-Administered Medications:     acetaminophen oral solution 650 mg, 650 mg, Per OG tube, Q6H PRN, Vasile Carter PA-C, 650 mg  at 03/18/24 1737    acetaminophen tablet 650 mg, 650 mg, Oral, Q6H PRN, Pablo Yepez MD, 650 mg at 03/17/24 2127    albumin human 5% bottle 12.5 g, 12.5 g, Intravenous, PRN, Vasile Carter PA-C, Stopped at 02/28/24 1442    allopurinol split tablet 50 mg, 50 mg, Oral, Daily, Tanner Rdz MD, 50 mg at 03/19/24 1005    aspirin EC tablet 81 mg, 81 mg, Oral, Daily, Vasile Carter PA-C, 81 mg at 03/19/24 1005    bisacodyL suppository 10 mg, 10 mg, Rectal, Daily PRN, Tom Gilbert MD    ceFEPIme (MAXIPIME) 2 g in dextrose 5 % in water (D5W) 100 mL IVPB (MB+), 2 g, Intravenous, Q12H, Elieser Pace MD, Stopped at 03/19/24 0443    cyproheptadine 4 mg tablet 4 mg, 4 mg, Oral, BID, Tom Gilbert MD, 4 mg at 03/19/24 1004    dextrose 10% bolus 125 mL 125 mL, 12.5 g, Intravenous, PRN, Pablo Yepez MD    dextrose 10% bolus 250 mL 250 mL, 25 g, Intravenous, PRN, Pablo Yepez MD    diclofenac sodium 1 % gel 2 g, 2 g, Topical (Top), TID, Tom Gilbert MD, 2 g at 03/19/24 1007    electrolyte-A infusion, , Intravenous, PRN, Pablo Yepez MD, Stopped at 02/28/24 0700    enoxaparin injection 40 mg, 40 mg, Subcutaneous, Daily, Cherry Suarez FNP, 40 mg at 03/18/24 1633    ferrous sulfate tablet 1 each, 1 tablet, Oral, Every other day, Tanner Rdz MD, 1 each at 03/18/24 0829    folic acid tablet 1 mg, 1 mg, Oral, Daily, Vasile Carter PA-C, 1 mg at 03/19/24 1004    guaiFENesin-codeine 100-10 mg/5 ml syrup 10 mL, 10 mL, Oral, TID, Tom Gilbert MD, 10 mL at 03/18/24 0995    heparin, porcine (PF) 100 unit/mL injection flush 500 Units, 5 mL, Intravenous, On Call Procedure, Pablo Yepez MD    heparin, porcine (PF) 100 unit/mL injection flush 500 Units, 5 mL, Intravenous, On Call Procedure, Nita Contreras MD    HYDROcodone-acetaminophen 5-325 mg per tablet 1 tablet, 1 tablet, Oral, Q6H PRN, Stan Lazar MD, 1 tablet at 03/14/24 1447    lactulose 10 gram/15 ml  solution 20 g, 20 g, Oral, Q6H PRN, Vasile Carter PA-C, 20 g at 03/13/24 1343    loperamide capsule 2 mg, 2 mg, Oral, Continuous PRN, Vasile Carter PA-C, 2 mg at 03/02/24 1253    melatonin tablet 6 mg, 6 mg, Oral, Nightly PRN, Tanner Rdz MD, 6 mg at 03/18/24 2153    metoclopramide injection 5 mg, 5 mg, Intravenous, Q6H PRN, Vasile Carter PA-C    metoprolol succinate (TOPROL-XL) 24 hr split tablet 12.5 mg, 12.5 mg, Oral, Daily, Cherry Suarez FNP, 12.5 mg at 03/19/24 1003    midodrine tablet 5 mg, 5 mg, Oral, BID PRN, Tom Gilbert MD, 5 mg at 03/18/24 0829    midodrine tablet 7.5 mg, 7.5 mg, Oral, TID WM, Tom Gilbert MD, 7.5 mg at 03/19/24 1004    nitroGLYCERIN SL tablet 0.4 mg, 0.4 mg, Sublingual, Q5 Min PRN, Tanner Rdz MD    ondansetron disintegrating tablet 8 mg, 8 mg, Oral, Q8H PRN, Tanner Rdz MD, 8 mg at 03/11/24 1253    ondansetron injection 8 mg, 8 mg, Intravenous, Q6H PRN, Pablo Yepez MD, 8 mg at 03/14/24 1301    pantoprazole EC tablet 40 mg, 40 mg, Oral, Daily, Tanner Rdz MD, 40 mg at 03/19/24 1005    polyethylene glycol packet 17 g, 17 g, Oral, BID, Tom Gilbert MD, 17 g at 03/19/24 1005    simethicone chewable tablet 80 mg, 1 tablet, Oral, TID PRN, Tanner Rdz MD, 80 mg at 03/15/24 0315    sodium chloride 0.9% flush 10 mL, 10 mL, Intravenous, PRN, Tanner Rdz MD    sodium chloride 0.9% flush 10 mL, 10 mL, Intravenous, PRN, Millie Jimenez, GRANT    sucralfate tablet 1 g, 1 g, Oral, QID (AC & HS), Vasile Carter PA-C, 1 g at 03/18/24 8425  VTE Risk Mitigation (From admission, onward)           Ordered     enoxaparin injection 40 mg  Daily         03/07/24 0559     IP VTE HIGH RISK PATIENT  Once         03/02/24 0759     heparin, porcine (PF) 100 unit/mL injection flush 500 Units  On Call Procedure         02/27/24 0718     heparin, porcine (PF) 100 unit/mL injection flush 500 Units  On Call Procedure          02/27/24 0257     Place SUSEI hose  Until discontinued         02/22/24 1458     Place sequential compression device  Until discontinued         02/21/24 2057                  Assessment:   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 Severely restricted motion. There is severe stenosis. Aortic valve area by VTI is 0.66 cm². Aortic valve peak velocity is 4.45 m/s. Mean gradient is 50 mmHg. The dimensionless index is 0.17.   Cardiogenic Shock (Post Cardiotomy) - Off Vasopressor Support - Resolved     - History of Hypertension   Ischemic Cardiomyopathy/EF 30%  Elevated LFTs - persistent  Pulmonary HTN    - ECHO PASP 69mmHg     - RHC (2.22.24): PA 84/34, PCWP 24 mmHg  Hematuria 2/2 Traumatic/Difficult Indwelling Catheter Placement (Resolved)  Urethral Stricture s/p Dilatation 2.23.24  PAF - Currently CVR    - Status Post Bedside Cardioversion x 2 on 2.27.24 (Both Unsuccessful)    - Status Post MOISE Ligation (2.27.24)    - CHADsVASc - 5 Points - 7.2% Stroke Risk per Year   Left Bundle Branch Block   VHD    - ECHO (3.7.24): Bio AVR, Mild MR    - ECHO (2.16.24): Severe AS, mild MR, mild to moderate TR  JEAN-CLAUDE/CKD Stage II - I  - Baseline Cr 1.6  Anemia- stable    - Status Post Transfusion on 2.28.24 & 2.29.24  Thrombocytopenia - Resolved   Leukocytosis - resolved    - Groin Wound Culture: Serratia Marcescens and Pseudomonas Aeruginosa   Hyponatremia - s/t SIADH    Plan:   Continue IV Abx per Primary Team   Continue ASA and  BB  Statin and amiodarone have been discontinued due to persistent transaminitis. Resume statin once transaminitis resolved  HF GDMT- Continue BB (hold for sbp < 100 or HR < 60). Hold diuretic due to hypotension  Continue midodrine 7.5 mg tid. Keep MAP > 65  Unable to add ARNI/MRA/SGLT2 due to hypotension  Accurate I&Os and Daily Weights   Keep K > 4.0 and Mg  > 2.0  Aggressive Mobilization of PT and Q1HR IS (PT/OT Eval and Treat)  Encouraged eating. He has been started on appetite stimulant      Cherry Suarez, BLANQUITA  Cardiology  [facility]   03/17/2024

## 2024-03-19 NOTE — PT/OT/SLP PROGRESS
"Physical Therapy Treatment    Patient Name:  Brain Snyder   MRN:  [id number]    Recommendations:     Discharge therapy intensity: Moderate Intensity Therapy   Discharge Equipment Recommendations: to be determined by next level of care  Barriers to discharge: Decreased caregiver support, Impaired mobility, and Ongoing medical needs    Assessment:     Brain Snyder is a 78 y.o. male admitted with a medical diagnosis of NSTEMI, CAD, HF, CAD, afib, s/p impella, s/p CABG x3, s/p AVR.  He presents with the following impairments/functional limitations: weakness, impaired endurance, impaired self care skills, impaired functional mobility, gait instability, impaired balance, decreased upper extremity function, decreased lower extremity function, pain, impaired cardiopulmonary response to activity.     used during treatment session. Patient complained of abdomen "rumbling" and requested to go to bed. Patient denied any needs to go to restroom. Patient agreed to ambulated before returning to bed.     Rehab Prognosis: Good; patient would benefit from acute skilled PT services to address these deficits and reach maximum level of function.    Recent Surgery: Procedure(s) (LRB):  CORONARY ARTERY BYPASS GRAFT (CABG) (N/A)  Replacement-valve-aortic (N/A)  SURGICAL PROCUREMENT, VEIN, ENDOSCOPIC (N/A)  Impella, Removal (Right) 21 Days Post-Op    Plan:     During this hospitalization, patient to be seen 5 x/week to address the identified rehab impairments via gait training, therapeutic activities, therapeutic exercises, neuromuscular re-education and progress toward the following goals:    Plan of Care Expires:  04/01/24    Subjective     Chief Complaint: none stated  Patient/Family Comments/goals: none stated  Pain/Comfort:  Pain Rating 1: 0/10      Objective:     Communicated with patient, nurse prior to session.  Patient found up in chair with pulse ox (continuous), telemetry upon PT entry to room.     General " Precautions: Standard, fall, sternal  Orthopedic Precautions: N/A  Braces: N/A  Respiratory Status: Room air    Functional Mobility:  Bed Mobility:     Rolling Right: minimum assistance  Scooting: minimum assistance  Sit to Supine: minimum assistance  Transfers:     Sit to Stand:  minimum assistance with rolling walker  Gait: Patient ambulated 20ft with rolling walker with step to gait pattern with chair following for safety. Patient required verbal cues to increase step length. Patient required verbal cues to maintain sternal precautions.     Education:  Patient provided with verbal education education regarding fall prevention and safety awareness.  Understanding was verbalized, however additional teaching warranted due to language barrier.     Patient left HOB elevated with all lines intact, call button in reach, and nurse notified    GOALS:   Multidisciplinary Problems       Physical Therapy Goals          Problem: Physical Therapy    Goal Priority Disciplines Outcome Goal Variances Interventions   Physical Therapy Goal     PT, PT/OT Ongoing, Progressing     Description: Goals to be met by: 2024     Patient will increase functional independence with mobility by performin. Supine to sit with MInimal Assistance  2. Sit to stand transfer with Minimal Assistance  3. Gait  x 150 feet with Minimal Assistance using Rolling Walker.                          Time Tracking:     PT Received On: 24  PT Start Time: 1504     PT Stop Time: 1530  PT Total Time (min): 26 min     Billable Minutes: Gait Training 15 and Therapeutic Activity 11    Treatment Type: Treatment  PT/PTA: PTA     Number of PTA visits since last PT visit: 3     2024

## 2024-03-19 NOTE — PROGRESS NOTES
Nephrology follow up progress note    HPI:      Brain Snyder is a 78 y.o. male who presented to this facility on 02/21/2024 as a transfer for CV surgery evaluation status post [facility] on 02/20/2024 showing multivessel CAD.  He remained in the ICU with Impella to the right groin post transfer.  On  02/27/2024 he underwent CABG x3 and bioprosthetic AVR.  Postoperatively he had atrial fibrillation with RVR requiring amiodarone infusion.    -He self extubated on 02/29.    -By 3/1 LFTs and WBC began to worsen.  -Developed abdominal distention with peripheral edema on 03/06.  Culture of wound to groin positive for Gram-negative rods and Pseudomonas (Impella site)     On 03/11 nephrology was consulted for hyponatremia.  Up until 48 hours ago he was receiving twice daily IV Lasix, Aldactone, and metolazone.  Serum sodium was stable around 134/135 until/8 when began to worsen.  Today, 128.  Urine output 3900 mL in the past 24 hours, 1600 already today. He appears quite weakened. Family at bedside and reports he has recently gotten out of bed 20 minutes ago. He has no history of hyponatremia.    Interval history:     3/19 - Hyponatremia stabilized with fluid restriction. He has been off of Lasix and NaCl tablets since 3/15. No acute events overnight.      Review of Systems:       Past medical, family, surgical, and social history reviewed and unchanged from initial consult note.     Objective:       VITAL SIGNS: 24 HR MIN & MAX LAST    Temp  Min: 97.4 °F (36.3 °C)  Max: 98.1 °F (36.7 °C)  97.9 °F (36.6 °C)        BP  Min: 111/69  Max: 152/72  132/70     Pulse  Min: 44  Max: 58  (!) 58     Resp  Min: 14  Max: 18  18    SpO2  Min: 96 %  Max: 99 %  98 %      Physical exam limited due to patient care. Currently undergoing bath.           Component Value Date/Time     (L) 03/19/2024 0553     (L) 03/18/2024 0433    K 4.7 03/19/2024 0553    K 5.2 (H) 03/18/2024 0433    CHLORIDE 103 03/19/2024 0553    CHLORIDE 101  03/18/2024 0433    CO2 23 03/19/2024 0553    CO2 23 03/18/2024 0433    BUN 18.1 03/19/2024 0553    BUN 20.8 03/18/2024 0433    CREATININE 1.28 (H) 03/19/2024 0553    CREATININE 1.26 (H) 03/18/2024 0433    CALCIUM 8.8 03/19/2024 0553    CALCIUM 8.4 (L) 03/18/2024 0433    PHOS 2.1 (L) 03/16/2024 0400            Component Value Date/Time    WBC 10.14 03/19/2024 0601    WBC 10.55 03/17/2024 0353    WBC 10.84 03/07/2024 0407    WBC 13.5 03/06/2024 0419    HGB 12.4 (L) 03/19/2024 0601    HGB 12.3 (L) 03/17/2024 0353    HCT 39.5 (L) 03/19/2024 0601    HCT 39.7 (L) 03/17/2024 0353    HCT 20 (LL) 02/27/2024 2041    HCT 27 (L) 02/27/2024 1947     03/19/2024 0601     (H) 03/17/2024 0353       Imaging reviewed      Assessment / Plan:     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  Multivessel CAD status post CABG and AVR 2/27  3. Right groin Impella site now with wound growing Pseudomonas and Serratia marcescens as of 3/3  -repeat CT 3/9 shows hematoma in edema of the right groin  4. Acute on chronic heart failure-most recent EF 30% with severe global hypokinesis  5. Elevated LFT  6. CKD baseline Cr 1.6   7. Anemia s/p PRBC transfusion 2/28 and 2/29     Plan:  Patient has stabilized with continued fluid restriction. He has been off of diuretics and salt tablets for 4 days.   We will sign off. Discussed with Dr Gilbert.   Please re consult if we can be of further assistance.

## 2024-03-19 NOTE — PT/OT/SLP PROGRESS
Occupational Therapy   Treatment    Name: Brain Snyder  MRN: [id number]  Admitting Diagnosis:  CAD (coronary artery disease)  21 Days Post-Op    Recommendations:     Recommended therapy intensity at discharge: Moderate Intensity Therapy   Discharge Equipment Recommendations:  to be determined by next level of care  Barriers to discharge:   (Ongoing medical needs)    Assessment:     Brain Snyder is a 78 y.o. male with a medical diagnosis of CAD (coronary artery disease).  He presents with CAD (coronary artery disease) s/p CABG on 2/27. Pt was overall Mod A for bed mobility and Min-Mod A for sit<>stand with RW while maintaining sternal precautions. During bed>chair transfer, pt incontinent of bowel. Therapist assisted pt to recliner at end of session. Pt required increased encouragement for OOB mobility and independence with self feeding. Pt reported understanding. Performance deficits affecting function are weakness, impaired endurance, impaired sensation, impaired self care skills, impaired balance, gait instability, impaired functional mobility, decreased upper extremity function, decreased lower extremity function, decreased ROM, pain, decreased safety awareness.     Rehab Prognosis:  Good; patient would benefit from acute skilled OT services to address these deficits and reach maximum level of function.       Plan:     Patient to be seen 5 x/week to address the above listed problems via self-care/home management, therapeutic activities, therapeutic exercises  Plan of Care Expires: 04/09/24  Plan of Care Reviewed with: patient, spouse    Subjective     Pain/Comfort:  Pain Rating 1:  (Rating not provided)    Objective:     Communicated with: Nurse prior to session.  Patient found HOB elevated with pulse ox (continuous), telemetry upon OT entry to room.    General Precautions: Standard, fall, sternal    Orthopedic Precautions:N/A  Braces: N/A  Respiratory Status: Room air     Occupational Performance:     Bed  Mobility:    Patient completed Supine to Sit with moderate assistance     Functional Mobility/Transfers:  Patient completed Sit <> Stand Transfer with minimum assistance and moderate assistance  with  rolling walker   Patient completed Bed <> Chair Transfer using Step Transfer technique with minimum assistance with rolling walker  Functional Mobility: Pt was Min A for transfer, no LOB noted. Cues provided for upright posture during mobility.    Activities of Daily Living:  Feeding:  set up  for self feeding/ bringing drink to mouth  Upper Body Dressing: minimum assistance change of gown while seated  Lower Body Dressing: total assistance doffing/ donning socks   Toileting: dependence jewel cares in stand with RW    Therapeutic Activities:  Pt instructed on safety using cardiac bear to maintaining sternal precautions during bed mobility. Pt demo'd good return.     Therapeutic Positioning    OT interventions performed during the course of today's session in an effort to prevent and/or reduce acquired pressure injuries:   Education was provided on benefits of and recommendations for therapeutic positioning     Findings:  visible skin intact, sternal incision clean and dry    Patient Education:  Patient provided with verbal education and demonstrations education regarding OT role/goals/POC, post op precautions, fall prevention, safety awareness, and Discharge/DME recommendations.  Additional teaching is warranted.      Patient left up in chair with all lines intact, call button in reach, and nurse notified.    GOALS:   Multidisciplinary Problems       Occupational Therapy Goals          Problem: Occupational Therapy    Goal Priority Disciplines Outcome Interventions   Occupational Therapy Goal     OT, PT/OT Ongoing, Not Progressing    Description: Goals to be met by: 4/1/24     Patient will increase functional independence with ADLs by performing:    UE Dressing with min A   Grooming while standing at sink with Minimal  Assistance.  Toileting from commode with Moderate Assistance for hygiene and clothing management.   Sitting at edge of bed x30 minutes with Minimal Assistance. (Met)  Toilet transfer to bedside commode with Minimal Assistance.                         Time Tracking:     OT Date of Treatment: 03/19/24  OT Start Time: 1123  OT Stop Time: 1207  OT Total Time (min): 44 min    Billable Minutes:Self Care/Home Management 34 minutes  Therapeutic Activity 10 minutes    OT/YVETTE: OT     Number of YVETTE visits since last OT visit: 5    3/19/2024

## 2024-03-19 NOTE — PROGRESS NOTES
Inpatient Nutrition Assessment    Admit Date: 2/21/2024   Total duration of encounter: 27 days   Patient Age: 78 y.o.    Nutrition Recommendation/Prescription     Oral diet, Diet heart healthy Fluid - 1500mL as tolerated.   Encourage small and frequent meals.   Boost Breeze (provides 250 kcal, 9 g protein per serving) BID as tolerated.   Antiemetics and bowel regimen as feasible.   Medical management of electrolytes.     Communication of Recommendations: reviewed with nurse and reviewed with patient    Nutrition Assessment     Malnutrition Assessment/Nutrition-Focused Physical Exam  Malnutrition Context: acute illness or injury (03/11/24 1350)  Malnutrition Level: moderate (03/11/24 1350)  Energy Intake (Malnutrition): less than or equal to 50% for greater than or equal to 5 days (03/11/24 1350)  Weight Loss (Malnutrition):  (unable to determine) (03/11/24 1350)  Subcutaneous Fat (Malnutrition):  (does not meet criteria) (03/11/24 1350)           Muscle Mass (Malnutrition): mild depletion (03/11/24 1350)  Orthodox Region (Muscle Loss): mild depletion     Clavicle and Acromion Bone Region (Muscle Loss): mild depletion                 Fluid Accumulation (Malnutrition): mild (03/11/24 1350)        A minimum of two characteristics is recommended for diagnosis of either severe or non-severe malnutrition.  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% - (Compensated)   JEAN-CLAUDE on CKD    Relevant Medical History: PAF on Eliquis, Aortic insufficiency, MVCAD, HTN      Scheduled Medications:  allopurinoL, 50 mg, Daily  aspirin, 81 mg, Daily  ceFEPime IV (PEDS and ADULTS), 2 g, Q12H  cyproheptadine, 4 mg, BID  diclofenac sodium, 2 g, TID  enoxparin, 40 mg, Daily  ferrous sulfate, 1 tablet, Every other day  folic acid, 1 mg, Daily  guaiFENesin-codeine 100-10 mg/5 ml, 10 mL, TID  metoprolol succinate, 12.5 mg, Daily  midodrine, 7.5 mg, TID WM  pantoprazole, 40 mg, Daily  polyethylene glycol, 17 g, BID  sucralfate, 1 g, QID (AC & HS)    Continuous Infusions:  loperamide    PRN Medications: acetaminophen, acetaminophen, albumin human 5%, bisacodyL, dextrose 10%, dextrose 10%, electrolyte-A, heparin, porcine (PF), heparin, porcine (PF), HYDROcodone-acetaminophen, lactulose 10 gram/15 ml, loperamide, melatonin, metoclopramide, midodrine, nitroGLYCERIN, ondansetron, ondansetron, simethicone, sodium chloride 0.9%, sodium chloride 0.9%    Calorie Containing IV Medications: no significant kcals from medications at this time    Recent Labs   Lab 03/13/24  0615 03/14/24  0727 03/15/24  0402 03/16/24  0400 03/17/24  0353 03/18/24  0433 03/19/24  0553 03/19/24  0601   * 128* 130* 133* 132* 131* 133*  --    K 4.3 4.7 4.9 4.5 5.1 5.2* 4.7  --    CALCIUM 8.4* 8.1* 8.3* 8.3* 8.3* 8.4* 8.8  --    PHOS  --   --   --  2.1*  --   --   --   --    MG  --   --   --  2.00  --   --   --   --    CHLORIDE 92* 95* 96* 101 103 101 103  --    CO2 27 23 27 25 22* 23 23  --    BUN 25.9* 24.0 24.6 22.5 23.7 20.8 18.1  --    CREATININE 1.32* 1.34* 1.57* 1.48* 1.29* 1.26* 1.28*  --    EGFRNORACEVR 56 55 45 48 57 59 58  --    GLUCOSE 95 136* 98 99 88 81* 97  --    BILITOT 1.5 1.3 1.4  --  1.1 1.1  --   --    ALKPHOS 110 108 127  --  110 109  --   --    ALT 76* 76* 84*  --  82* 76*  --   --    AST 90* 90* 98*  --  90* 80*  --   --    ALBUMIN 2.6* 2.5* 2.7* 2.4* 2.6* 2.6*  --   --    WBC 13.79* 11.36 12.25*  --  10.55  --   --  10.14   HGB 12.2* 11.9* 12.7*  --  12.3*  --   --  12.4*   HCT 39.2* 38.1* 40.4*  --  " 39.7*  --   --  39.5*       Nutrition Orders:  Diet heart healthy Fluid - 1500mL  Dietary nutrition supplements Boost Breeze Wild Berry; BID    Appetite/Oral Intake: poor/25-50% of meals  Factors Affecting Nutritional Intake: constipation, decreased appetite, and nausea  Food/Yazidi/Cultural Preferences: unable to obtain  Food Allergies: no known food allergies  Last Bowel Movement: 03/19/24  Wound(s):      Comments    2/27: Pt NPO this AM, in OR for CABG/AVR at time of rounds. Pt with poor intake since admission per EMR. Last BM 2/25, meds noted. Per MST, no reports of decreased appetite or unintentional weight loss prior admission. Weight fluctuations noted past week, likely related to fluid. Will trend weights.    3/1/24: Pt previously on liquid diet. Diet changed earlier due pt pocketing meds. SLP following pt. Will follow for need for TF to start if NG placed. Pt confused, not able to answer questions.     3/6/24: Spoke to family member at bedside, pt eating minimally. Reports intermittent nausea and vomiting. Having Bms, last documented on 3/3. Drinking some of Boost VHC but states "too sweet" and worsens nausea. Will trial Boost Breeze until GI symptoms improve. Receiving zofran prn.     3/11/24: Spoke to daughter at bedside, fair intake of liquids. Drinking ~1 Boost Breeze daily. Continues with intermittent nausea and vomiting affecting po intake. Has been on clear liquid diet since 3/7. Will leave PPN recommendations since he will not be able to meet nutritional needs on clear liquids or with n/v symptoms.     3/15/24: Ate only mashed potatoes for lunch, states feeling nauseated. Getting Boost Breeze BID. RN in room with , about to administer enema. Last documented BM 3/11.     3/19/24: Spoke to pt via . Ate only a banana today, denies abdominal pain but keeps talking about bowel movements. Had a small BM over night and a large BM with therapy today. Started on appetite stimulant " "over the weekend. Encouraged increased intake as tolerated.     Anthropometrics    Height: 5' 5" (165.1 cm),    Last Weight:  (unable to weigh pt bed not working) (03/17/24 0605), Weight Method: Bed Scale  BMI (Calculated): 27.6  BMI Classification: overweight (BMI 25-29.9)        Ideal Body Weight (IBW), Male: 136 lb                                Usual Weight Provided By: unable to obtain usual weight    Wt Readings from Last 5 Encounters:   03/16/24 75.3 kg (166 lb 0.1 oz)   02/19/24 81.1 kg (178 lb 12.8 oz)     Weight Change(s) Since Admission: -6.9kg since admit  Wt Readings from Last 1 Encounters:   03/16/24 0558 75.3 kg (166 lb 0.1 oz)   03/15/24 0555 80.6 kg (177 lb 11.2 oz)   03/13/24 0625 79.7 kg (175 lb 9.6 oz)   03/12/24 0544 80.3 kg (177 lb)   03/11/24 0555 85.6 kg (188 lb 11.2 oz)   03/09/24 0559 82.6 kg (182 lb)   03/08/24 0505 86.5 kg (190 lb 12.8 oz)   03/07/24 0500 92 kg (202 lb 14.4 oz)   03/06/24 0545 70.6 kg (155 lb 10.3 oz)   03/05/24 0500 91.6 kg (202 lb)   03/04/24 0442 90.3 kg (199 lb 1.6 oz)   03/03/24 0606 90.7 kg (200 lb)   02/23/24 0629 78.2 kg (172 lb 6.4 oz)   02/21/24 2116 82.2 kg (181 lb 3.5 oz)   Admit Weight: 82.2 kg (181 lb 3.5 oz) (02/21/24 2116), Weight Method: Bed Scale    Estimated Needs    Weight Used For Calorie Calculations: 78.2 kg (172 lb 6.4 oz)  Energy Calorie Requirements (kcal): 1863kcal (1.3 stress factor)  Energy Need Method: Penrose- Jeor  Weight Used For Protein Calculations: 78.2 kg (172 lb 6.4 oz)  Protein Requirements: 102-118gm (1.3-1.5g/kg)  Fluid Requirements (mL): 1955ml (25ml/kg)    Enteral Nutrition     Patient not receiving enteral nutrition at this time.    Parenteral Nutrition     Patient not receiving parenteral nutrition support at this time.    Evaluation of Received Nutrient Intake    Calories: not meeting estimated needs  Protein: not meeting estimated needs    Patient Education     Not applicable.    Nutrition Diagnosis     PES: Inadequate oral " intake related to acute illness as evidenced by <50% intake for > 5 days. (active)   PES: Malnutrition related to acute illness as evidenced by <50% EER >/=5 days, muscle loss. (active)     Nutrition Interventions     Intervention(s): general/healthful diet, commercial beverage, prescription medication, and collaboration with other providers    Goal: Meet greater than 80% of nutritional needs by follow-up. (goal not met)  Goal: Consume % of oral supplements by follow-up. (goal not met)    Nutrition Goals & Monitoring     Dietitian will monitor: food and beverage intake, weight, electrolyte/renal panel, glucose/endocrine profile, and gastrointestinal profile    Nutrition Risk/Follow-Up: high (follow-up in 1-4 days)   Please consult if re-assessment needed sooner.

## 2024-03-20 LAB
ANION GAP SERPL CALC-SCNC: 5 MEQ/L
BASOPHILS # BLD AUTO: 0.06 X10(3)/MCL
BASOPHILS NFR BLD AUTO: 0.7 %
BUN SERPL-MCNC: 21.3 MG/DL (ref 8.4–25.7)
CALCIUM SERPL-MCNC: 8.9 MG/DL (ref 8.8–10)
CHLORIDE SERPL-SCNC: 104 MMOL/L (ref 98–107)
CO2 SERPL-SCNC: 25 MMOL/L (ref 23–31)
CREAT SERPL-MCNC: 1.26 MG/DL (ref 0.73–1.18)
CREAT/UREA NIT SERPL: 17
EOSINOPHIL # BLD AUTO: 0.43 X10(3)/MCL (ref 0–0.9)
EOSINOPHIL NFR BLD AUTO: 4.9 %
ERYTHROCYTE [DISTWIDTH] IN BLOOD BY AUTOMATED COUNT: 15.8 % (ref 11.5–17)
GFR SERPLBLD CREATININE-BSD FMLA CKD-EPI: 59 MLS/MIN/1.73/M2
GLUCOSE SERPL-MCNC: 88 MG/DL (ref 82–115)
HCT VFR BLD AUTO: 36 % (ref 42–52)
HGB BLD-MCNC: 11.4 G/DL (ref 14–18)
IMM GRANULOCYTES # BLD AUTO: 0.15 X10(3)/MCL (ref 0–0.04)
IMM GRANULOCYTES NFR BLD AUTO: 1.7 %
LYMPHOCYTES # BLD AUTO: 2.22 X10(3)/MCL (ref 0.6–4.6)
LYMPHOCYTES NFR BLD AUTO: 25.2 %
MCH RBC QN AUTO: 26.6 PG (ref 27–31)
MCHC RBC AUTO-ENTMCNC: 31.7 G/DL (ref 33–36)
MCV RBC AUTO: 84.1 FL (ref 80–94)
MONOCYTES # BLD AUTO: 1.21 X10(3)/MCL (ref 0.1–1.3)
MONOCYTES NFR BLD AUTO: 13.7 %
NEUTROPHILS # BLD AUTO: 4.75 X10(3)/MCL (ref 2.1–9.2)
NEUTROPHILS NFR BLD AUTO: 53.8 %
NRBC BLD AUTO-RTO: 0 %
PLATELET # BLD AUTO: 362 X10(3)/MCL (ref 130–400)
PMV BLD AUTO: 9.5 FL (ref 7.4–10.4)
POTASSIUM SERPL-SCNC: 4.6 MMOL/L (ref 3.5–5.1)
RBC # BLD AUTO: 4.28 X10(6)/MCL (ref 4.7–6.1)
SODIUM SERPL-SCNC: 134 MMOL/L (ref 136–145)
WBC # SPEC AUTO: 8.82 X10(3)/MCL (ref 4.5–11.5)

## 2024-03-20 PROCEDURE — 25000003 PHARM REV CODE 250: Performed by: INTERNAL MEDICINE

## 2024-03-20 PROCEDURE — 21400001 HC TELEMETRY ROOM

## 2024-03-20 PROCEDURE — 97535 SELF CARE MNGMENT TRAINING: CPT

## 2024-03-20 PROCEDURE — 25000003 PHARM REV CODE 250: Performed by: PHYSICIAN ASSISTANT

## 2024-03-20 PROCEDURE — 63600175 PHARM REV CODE 636 W HCPCS: Performed by: NURSE PRACTITIONER

## 2024-03-20 PROCEDURE — 97530 THERAPEUTIC ACTIVITIES: CPT

## 2024-03-20 PROCEDURE — 63600175 PHARM REV CODE 636 W HCPCS: Performed by: INTERNAL MEDICINE

## 2024-03-20 PROCEDURE — 99900035 HC TECH TIME PER 15 MIN (STAT)

## 2024-03-20 PROCEDURE — 85025 COMPLETE CBC W/AUTO DIFF WBC: CPT | Performed by: NURSE PRACTITIONER

## 2024-03-20 PROCEDURE — 94761 N-INVAS EAR/PLS OXIMETRY MLT: CPT

## 2024-03-20 PROCEDURE — 97168 OT RE-EVAL EST PLAN CARE: CPT

## 2024-03-20 PROCEDURE — 25000003 PHARM REV CODE 250: Performed by: NURSE PRACTITIONER

## 2024-03-20 PROCEDURE — 97116 GAIT TRAINING THERAPY: CPT | Mod: CQ

## 2024-03-20 PROCEDURE — 97530 THERAPEUTIC ACTIVITIES: CPT | Mod: CQ

## 2024-03-20 PROCEDURE — 80048 BASIC METABOLIC PNL TOTAL CA: CPT | Performed by: NURSE PRACTITIONER

## 2024-03-20 RX ORDER — CIPROFLOXACIN 500 MG/1
500 TABLET ORAL EVERY 12 HOURS
Status: DISCONTINUED | OUTPATIENT
Start: 2024-03-20 | End: 2024-03-20

## 2024-03-20 RX ADMIN — ACETAMINOPHEN 650 MG: 325 TABLET, FILM COATED ORAL at 08:03

## 2024-03-20 RX ADMIN — DICLOFENAC SODIUM 2 G: 10 GEL TOPICAL at 08:03

## 2024-03-20 RX ADMIN — MIDODRINE HYDROCHLORIDE 7.5 MG: 2.5 TABLET ORAL at 09:03

## 2024-03-20 RX ADMIN — CYPROHEPTADINE HYDROCHLORIDE 4 MG: 4 TABLET ORAL at 08:03

## 2024-03-20 RX ADMIN — DICLOFENAC SODIUM 2 G: 10 GEL TOPICAL at 05:03

## 2024-03-20 RX ADMIN — SUCRALFATE 1 G: 1 TABLET ORAL at 11:03

## 2024-03-20 RX ADMIN — MIDODRINE HYDROCHLORIDE 7.5 MG: 2.5 TABLET ORAL at 01:03

## 2024-03-20 RX ADMIN — FERROUS SULFATE TAB 325 MG (65 MG ELEMENTAL FE) 1 EACH: 325 (65 FE) TAB at 09:03

## 2024-03-20 RX ADMIN — ASPIRIN 81 MG: 81 TABLET, COATED ORAL at 09:03

## 2024-03-20 RX ADMIN — METOPROLOL SUCCINATE 12.5 MG: 25 TABLET, EXTENDED RELEASE ORAL at 11:03

## 2024-03-20 RX ADMIN — CEFEPIME 2 G: 2 INJECTION, POWDER, FOR SOLUTION INTRAVENOUS at 04:03

## 2024-03-20 RX ADMIN — PANTOPRAZOLE SODIUM 40 MG: 40 TABLET, DELAYED RELEASE ORAL at 09:03

## 2024-03-20 RX ADMIN — GUAIFENESIN AND CODEINE PHOSPHATE 5 ML: 100; 10 SOLUTION ORAL at 09:03

## 2024-03-20 RX ADMIN — ALLOPURINOL 50 MG: 300 TABLET ORAL at 09:03

## 2024-03-20 RX ADMIN — ACETAMINOPHEN 650 MG: 325 TABLET, FILM COATED ORAL at 01:03

## 2024-03-20 RX ADMIN — SIMETHICONE 80 MG: 80 TABLET, CHEWABLE ORAL at 01:03

## 2024-03-20 RX ADMIN — Medication 6 MG: at 08:03

## 2024-03-20 RX ADMIN — CIPROFLOXACIN HYDROCHLORIDE 750 MG: 500 TABLET, FILM COATED ORAL at 08:03

## 2024-03-20 RX ADMIN — SUCRALFATE 1 G: 1 TABLET ORAL at 05:03

## 2024-03-20 RX ADMIN — GUAIFENESIN AND CODEINE PHOSPHATE 10 ML: 100; 10 SOLUTION ORAL at 08:03

## 2024-03-20 RX ADMIN — CYPROHEPTADINE HYDROCHLORIDE 4 MG: 4 TABLET ORAL at 09:03

## 2024-03-20 RX ADMIN — MIDODRINE HYDROCHLORIDE 7.5 MG: 2.5 TABLET ORAL at 05:03

## 2024-03-20 RX ADMIN — ENOXAPARIN SODIUM 40 MG: 40 INJECTION SUBCUTANEOUS at 05:03

## 2024-03-20 RX ADMIN — SUCRALFATE 1 G: 1 TABLET ORAL at 08:03

## 2024-03-20 RX ADMIN — DICLOFENAC SODIUM 2 G: 10 GEL TOPICAL at 09:03

## 2024-03-20 RX ADMIN — FOLIC ACID 1 MG: 1 TABLET ORAL at 09:03

## 2024-03-20 NOTE — PROGRESS NOTES
"  [facility]    Cardiology  Progress Note    Patient Name: Brain Snyder  MRN: [id number]  Admission Date: 2/21/2024  Hospital Length of Stay: 28 days  Code Status: Full Code   Attending Physician: Tom Gilbert MD   Primary Care Physician: Nidia Shepherd NP  Expected Discharge Date:   Principal Problem:CAD (coronary artery disease)    Subjective:   Reason for Consult:  CAD     HPI: 78-year-old female that is unknown to [facility] with a PMHx of PAF on Eliquis Aortic insufficiency, MV CAD, HTN. He is Yoruba speaking and an  was used for interview. He was orginally admitted to [facility] on 2.15.24 with CP & NSTEMI and underwent a LHC on 2.20.24 that showed MV CAD (reported noted below) He was transferred to [facility] on 2.21.24 for a CABG/AVR evaluation. On arrive he was hemodynamically stable on a heparin infusion. He denied chest pain, SOB, and nausea on current exam, [facility] was consulted for CAD    Hospital Course:   2.23.24: Patient seen resting in bed. He denies SOB or CP. He remains on heparin infusion. Family at bedside   2.24.24: NAD. S/p Impella Placement/[place]-Chelo Catheter. "I am ok." + Blood Clots from Nose/Mouth, Hematuria 2/2 traumatic Indwelling Catheter Placement. Heparin Drip per Protocol. Impella P5  2.25.24: NAD. "I'm ok." Denies CP, SOB and Palps. PA Pressure Reading is not Accurate. CVP 5. Impella at P5. R Groin Impella P7. Remains Off Heparin Drip per Protocol. Now in SR.   2.26.24: NAD Noted. SR on Tele. Right Groin Impella in place, bruising with no hematoma noted. Distal pulses DP intact. Legs warm. NC 2 L/Min. Denies CP/SOB. Consent obtained from his daughter Brii Snyder. NPO for MV PCI Today.  2.27.24: NAD Noted. Impella remains in place at P7 Support. Good UA Noted, some pink tinged urine noted. SR on Tele. Pressors off. Denies CP/SOB. NPO for surgery today. Heparin on hold for surgery.  2.28.24: Patient is critically ill. AF RVR on Tele, twice shocked this AM with no success. " "On Amiodarone/Milrinone- Cardizem Infusion now off given hypotension and ICMO. Also no José Miguel/Levophed. Concern for bleeding noted, transfusion noted. Patient is intubated/Mechanically Vented. Hemodynamics: CO/CI 4.3/2.3 CVP 20.  2.29.24: Patient self extubated this AM. He is stable on NC Oxygen. Denies CP/SOB. SR on Tele. On Amiodarone Infusion and receiving blood products. BP Stable. Clinically much improved from yesterday.   3.1.24: NAD. Afib mild RVR on tele. On Primacor @ 0.187 mcg/kg/min. Amiodarone infusing per protocol. LFT's worsening. WBC worsening. + Incisional CP. On 5 L NC.   3.2.24: NAD. Net Negative 2700 urine output.   3.3.24: NAD. VSS. No complaints of CP/Palps. Reports SOB is improving. Creat 1.61 (Improved)  3.4.24: NAD. VSS. No complaints. On 2 L. Net Negative Fluid 3540 over 24 hours. Creat 1.37. LFTs slighting worse today  3.5.24: NAD. VSS. Denies CP, SOB and Palps. Family at Bedside. Fluid Balance Net Negative 4360mL. BUN/Crea 23.5/1.16, AST/ALT 69/71  3.6.24: Patient sitting up in bedside chair. Appears SOB. C/o abdominal bloating and gaseous. Reports + BM since surgery. Abdomen distended. Patient nausea and spit up bile colored fluid. + peripheral edema. O2 via NC. Excellent diuresis. Persistent leukocytosis. K+ 3.1, mild transaminitis. Groin Wound cx -- GNR and pseudomonas. CV surgery has signed off.   3.7.24: Patient sitting up in bed. NAD. Denies any pain. Noted SS drainage from impella insertion site. Leukocytosis has resolved. BP marginal at times. Afib, CVR. Good UOP; however weight is increasing.   3.8.24: Patient awake in bed. He has been weaned off. O2. Good diuresis overnight. Mild bump in creatinine-- 1.21 from 1.15 yesterday. Liver enzymes uptrending. VSS.   3.9.24: NAD. Language Barrier/Daughter at Bedside for Translation. Denies CP, SOB and Palps. Deconditioned. Fluid Balance Net Negative 5600mL.   3.10.24: NAD. "Lenox." Denies CP, SOB and Palps. Daughter at Bedside/Translating. " Denies CP, SOB and Palps. Remains Deconditions. Fluid Balance Net Negative 2405mL. Na 130, BUN/Crea 20.3/1.22, AST/ALT 88/73  3.11.24: Patient awake in bed. NAD. Reviewed echo-EF 40-45% with mild RV enlargement and severely reduced RV function. Na 129 today. Renal function mildly bumped  3.12.24: Patient awake in bed. NAD. Hyponatremia stable. Nephrology following and has initiated a fluid restriction. Impella site still oozing SS fluid. Surgery recommending manually expressing fluid q shift and obtaining US to better define anatomy. WBC 13.79. Afebrile.   3.15.24: Patient resting in bed. NAD. Clear breath sounds. No edema. Good UOP. Na improving with addition of salt tabs. Creatinine 1.57. Still has transaminitis. Amiodarone placed on hold yesterday. Currently Afib, 90s. Mild hypotension at times. Nurse reports patient c/o chest pain today. When questioned, he reported incisional chest pain during attempts at BM.   3.16.24; Patient sleeping in bedside recliner. NAD. Na improving- 133 today. Renal indices improving. Albumin 2.4. EKG Afib, RVR with LBBB yesterday. Troponin 0.183--likely due to recent CABG. He was given digoxin IV load due to RVR. He was given a dose of midodrine this am due to sbp < 90.   3.17.24: Awake in bed. Bp better with addition of midodrine tid. Converted to SB this am. Currently on digoxin and low dose BB. Na and renal indices stable.   3.18.24: Patient awake in bed. NAD. Right groin impella site healing well. Transaminitis improving. Creatinine stable. Marginal Bps. Remains on midodrine with SBP 90s. Renal indices stable. K+ 5.2 today  3.19.24: Sleeping in bed. NAD. On RA. Clear breath sounds. No edema. Diuretics remain on hold. Midodrine was increased to 7.5 mg tid. BP up to 150s at times. Remains SB. Digoxin discontinued 3.17.24  3.20.24: Awake in bed. NAD. Family at bedside asking when patient will be discharged home. BP stable on midodrine. Currently SB 50s. Noted HR upper 40s during  sleep. H/H stable. Impella site continues to heal well. No leukocytosis. Renal indices stable.       PMH: PAF (on Eliquis), Aortic insufficiency, MV CAD, HTN  PSH: LHC  Family History: None Reported  Social History: Former Smoker, Denies ETOH or Illicit Drug Use      Previous Diagnostics:  TTE 03.11.24:  Left Ventricle: Regional wall motion abnormalities present. Septal motion is consistent with post-operative status. Akinetic inf -posterior wall There is moderately reduced systolic function with a visually estimated ejection fraction of 35 - 40%.    Right Ventricle: Mild right ventricular enlargement. Systolic function is severely reduced.    Aortic Valve: There is a bioprosthetic valve in the aortic position.    Pericardium: There is a trivial effusion. No indication of cardiac tamponade. Left pleural effusion.       CABG/Bio AVR/MOISE Ligation (2.27.24):  Coronary artery bypass grafting X 3, LIMA to LAD, reversed SVG to OM1, Reversed Saphenous venous graft to RCA  Bioprosthetic aortic valve replacement (Epic max 23mm), Endoscopic venous harvesting of left greater saphenous vein, Left atrial appendage ligation, explant of right femoral impella device, right femoral artery repair.    RHC/Impella Placement (2.23.24):  Right heart catheterization performed showed the following:  PA= 61/24 (27) mm Hg  PCWP=  31/26 (27) mm Hg  AO saturation= 93 % RA  PA saturation= 60% with Impella (49% yesterday)    (02/22/24) -  0.5  - Cardiac output was 2.8 L/min provided by the device.  Impella was at 84cm  Impression/plan:   Successful Impella insertion and swan-Chelo in the setting of Acute HF/low cardiac output/precardiogenic shock/Critcal-severe AS/MVCAD - LM distal    RHC (2.22.24):  Right heart catheterization demonstrating severe pulmonary hypertension 84/34 and PCWP 24 mmHg  Reduced cardiac output/cardiac index at 3.43/1.81 L/min/m2 with pulmonary artery saturations 49.3%  For applied to the right femoral vein following  removal of the 7 [place] sheath  The estimated blood loss was none.    Carotid US (2.22.24):  The bilateral internal carotid artery demonstrated less than 50% stenosis.   The bilateral vertebral arteries were patent with antegrade flow    LHC (2.20.24):   Prox LAD lesion was 70% stenosed.  Ost Cx to Prox Cx lesion was 80% stenosed.  1st Mrg lesion was 80% stenosed.  2nd Mrg-1 lesion was 70% stenosed.  2nd Mrg-2 lesion was 50% stenosed.  Mid LAD lesion was 50% stenosed.  Prox RCA lesion was 60% stenosed.  estimated blood loss was <50 mL.  There was three vessel coronary artery disease.  LVEDP: 30mmHg  Recommendations:   Refer for CABG evaluation and AVR   Preformed by [facility]. Flannery at [facility]     ECHO (2.15.24):  Left Ventricle: The left ventricle is normal in size. Mildly increased ventricular mass. Mildly increased wall thickness. There is mild eccentric hypertrophy. Moderate global hypokinesis present. Septal motion is consistent with bundle branch block. There is moderately reduced systolic function. Biplane (2D) method of discs ejection fraction is 40%. Grade II diastolic dysfunction.  Right Ventricle: Right ventricular enlargement. Systolic function is normal.  Left Atrium: Left atrium is severely dilated.  Right Atrium: Right atrium is moderately dilated.  Aortic Valve: There is moderate aortic valve sclerosis. Severely restricted motion. There is severe stenosis. Aortic valve area by VTI is 0.66 cm². Aortic valve peak velocity is 4.45 m/s. Mean gradient is 50 mmHg. The dimensionless index is 0.17. There is mild to moderate aortic regurgitation.  Mitral Valve: Mildly calcified leaflets. There is no stenosis. There is mild regurgitation.  Tricuspid Valve: The tricuspid valve is structurally normal. There is mild to moderate regurgitation.  Pulmonic Valve: There is no significant regurgitation.  Aorta: Aortic root is upper limit of normal measuring 3.6 cm.  Pulmonary Artery: There is severe pulmonary hypertension. The  estimated pulmonary artery systolic pressure is 69 mmHg.  IVC/SVC: Intermediate venous pressure at 8 mmHg.  Pericardium: There is no pericardial effusion.     Review of Systems   Cardiovascular:  Negative for chest pain, dyspnea on exertion and leg swelling.   Respiratory:  Negative for shortness of breath.    All other systems reviewed and are negative.    Objective:     Vital Signs (Most Recent):  Temp: 98.2 °F (36.8 °C) (03/20/24 0740)  Pulse: (!) 48 (03/20/24 0740)  Resp: 18 (03/20/24 0740)  BP: (!) 101/57 (03/20/24 0740)  SpO2: 97 % (03/20/24 0740) Vital Signs (24h Range):  Temp:  [97.4 °F (36.3 °C)-98.8 °F (37.1 °C)] 98.2 °F (36.8 °C)  Pulse:  [46-55] 48  Resp:  [18-19] 18  SpO2:  [96 %-100 %] 97 %  BP: (101-146)/(57-80) 101/57   Weight:  (unable to weigh pt bed not working)  Body mass index is 27.62 kg/m².  SpO2: 97 %       Intake/Output Summary (Last 24 hours) at 3/20/2024 1021  Last data filed at 3/20/2024 0500  Gross per 24 hour   Intake 46 ml   Output 600 ml   Net -554 ml       Lines/Drains/Airways       Peripheral Intravenous Line  Duration                  Peripheral IV - Single Lumen 03/18/24 0622 Anterior;Left Forearm 2 days                  Significant Labs:   Recent Results (from the past 72 hour(s))   Comprehensive Metabolic Panel    Collection Time: 03/18/24  4:33 AM   Result Value Ref Range    Sodium Level 131 (L) 136 - 145 mmol/L    Potassium Level 5.2 (H) 3.5 - 5.1 mmol/L    Chloride 101 98 - 107 mmol/L    Carbon Dioxide 23 23 - 31 mmol/L    Glucose Level 81 (L) 82 - 115 mg/dL    Blood Urea Nitrogen 20.8 8.4 - 25.7 mg/dL    Creatinine 1.26 (H) 0.73 - 1.18 mg/dL    Calcium Level Total 8.4 (L) 8.8 - 10.0 mg/dL    Protein Total 5.8 5.8 - 7.6 gm/dL    Albumin Level 2.6 (L) 3.4 - 4.8 g/dL    Globulin 3.2 2.4 - 3.5 gm/dL    Albumin/Globulin Ratio 0.8 (L) 1.1 - 2.0 ratio    Bilirubin Total 1.1 <=1.5 mg/dL    Alkaline Phosphatase 109 40 - 150 unit/L    Alanine Aminotransferase 76 (H) 0 - 55 unit/L     Aspartate Aminotransferase 80 (H) 5 - 34 unit/L    eGFR 59 mls/min/1.73/m2   Basic Metabolic Panel    Collection Time: 03/19/24  5:53 AM   Result Value Ref Range    Sodium Level 133 (L) 136 - 145 mmol/L    Potassium Level 4.7 3.5 - 5.1 mmol/L    Chloride 103 98 - 107 mmol/L    Carbon Dioxide 23 23 - 31 mmol/L    Glucose Level 97 82 - 115 mg/dL    Blood Urea Nitrogen 18.1 8.4 - 25.7 mg/dL    Creatinine 1.28 (H) 0.73 - 1.18 mg/dL    BUN/Creatinine Ratio 14     Calcium Level Total 8.8 8.8 - 10.0 mg/dL    Anion Gap 7.0 mEq/L    eGFR 58 mls/min/1.73/m2   CBC with Differential    Collection Time: 03/19/24  6:01 AM   Result Value Ref Range    WBC 10.14 4.50 - 11.50 x10(3)/mcL    RBC 4.53 (L) 4.70 - 6.10 x10(6)/mcL    Hgb 12.4 (L) 14.0 - 18.0 g/dL    Hct 39.5 (L) 42.0 - 52.0 %    MCV 87.2 80.0 - 94.0 fL    MCH 27.4 27.0 - 31.0 pg    MCHC 31.4 (L) 33.0 - 36.0 g/dL    RDW 15.6 11.5 - 17.0 %    Platelet 380 130 - 400 x10(3)/mcL    MPV 9.7 7.4 - 10.4 fL    Neut % 65.2 %    Lymph % 16.4 %    Mono % 12.4 %    Eos % 3.6 %    Basophil % 0.9 %    Lymph # 1.66 0.6 - 4.6 x10(3)/mcL    Neut # 6.61 2.1 - 9.2 x10(3)/mcL    Mono # 1.26 0.1 - 1.3 x10(3)/mcL    Eos # 0.37 0 - 0.9 x10(3)/mcL    Baso # 0.09 <=0.2 x10(3)/mcL    IG# 0.15 (H) 0 - 0.04 x10(3)/mcL    IG% 1.5 %    NRBC% 0.0 %   Basic Metabolic Panel    Collection Time: 03/20/24  6:35 AM   Result Value Ref Range    Sodium Level 134 (L) 136 - 145 mmol/L    Potassium Level 4.6 3.5 - 5.1 mmol/L    Chloride 104 98 - 107 mmol/L    Carbon Dioxide 25 23 - 31 mmol/L    Glucose Level 88 82 - 115 mg/dL    Blood Urea Nitrogen 21.3 8.4 - 25.7 mg/dL    Creatinine 1.26 (H) 0.73 - 1.18 mg/dL    BUN/Creatinine Ratio 17     Calcium Level Total 8.9 8.8 - 10.0 mg/dL    Anion Gap 5.0 mEq/L    eGFR 59 mls/min/1.73/m2   CBC with Differential    Collection Time: 03/20/24  6:35 AM   Result Value Ref Range    WBC 8.82 4.50 - 11.50 x10(3)/mcL    RBC 4.28 (L) 4.70 - 6.10 x10(6)/mcL    Hgb 11.4 (L) 14.0 - 18.0  g/dL    Hct 36.0 (L) 42.0 - 52.0 %    MCV 84.1 80.0 - 94.0 fL    MCH 26.6 (L) 27.0 - 31.0 pg    MCHC 31.7 (L) 33.0 - 36.0 g/dL    RDW 15.8 11.5 - 17.0 %    Platelet 362 130 - 400 x10(3)/mcL    MPV 9.5 7.4 - 10.4 fL    Neut % 53.8 %    Lymph % 25.2 %    Mono % 13.7 %    Eos % 4.9 %    Basophil % 0.7 %    Lymph # 2.22 0.6 - 4.6 x10(3)/mcL    Neut # 4.75 2.1 - 9.2 x10(3)/mcL    Mono # 1.21 0.1 - 1.3 x10(3)/mcL    Eos # 0.43 0 - 0.9 x10(3)/mcL    Baso # 0.06 <=0.2 x10(3)/mcL    IG# 0.15 (H) 0 - 0.04 x10(3)/mcL    IG% 1.7 %    NRBC% 0.0 %     Telemetry: Sinus bradycardia      Physical Exam  Vitals reviewed.   Constitutional:       General: He is not in acute distress.     Appearance: Normal appearance.   HENT:      Head: Normocephalic.      Mouth/Throat:      Mouth: Mucous membranes are moist.   Eyes:      Extraocular Movements: Extraocular movements intact.      Conjunctiva/sclera: Conjunctivae normal.   Cardiovascular:      Rate and Rhythm: Bradycardia present. Rhythm irregular.      Pulses: Normal pulses.      Heart sounds: No murmur heard.  Pulmonary:      Effort: Pulmonary effort is normal. No respiratory distress.      Breath sounds: Normal breath sounds.      Comments: On RA  Abdominal:      Palpations: Abdomen is soft.   Genitourinary:     Comments: Nguyen removed    Skin:     General: Skin is warm.      Comments: Midline Sternotomy YVETTE with No Sign of Bleed/Infection. Right Lower Abdominal Site healing well. Dressing CDI   Neurological:      General: No focal deficit present.      Mental Status: He is alert.   Psychiatric:         Mood and Affect: Mood normal.       Current Inpatient Medications:  Current Facility-Administered Medications:     acetaminophen oral solution 650 mg, 650 mg, Per OG tube, Q6H PRN, Vasile Carter, JENN, 650 mg at 03/18/24 9137    acetaminophen tablet 650 mg, 650 mg, Oral, Q6H PRN, Pablo Yepez MD, 650 mg at 03/19/24 2156    albumin human 5% bottle 12.5 g, 12.5 g, Intravenous,  PRN, Vasile Carter PA-C, Stopped at 02/28/24 1442    allopurinol split tablet 50 mg, 50 mg, Oral, Daily, Tanner Rdz MD, 50 mg at 03/20/24 0949    aspirin EC tablet 81 mg, 81 mg, Oral, Daily, Vasile Carter PA-C, 81 mg at 03/20/24 0950    bisacodyL suppository 10 mg, 10 mg, Rectal, Daily PRN, Tom Gilbert MD    ceFEPIme (MAXIPIME) 2 g in dextrose 5 % in water (D5W) 100 mL IVPB (MB+), 2 g, Intravenous, Q12H, Elieser Pace MD, Stopped at 03/20/24 0502    cyproheptadine 4 mg tablet 4 mg, 4 mg, Oral, BID, Tom Gilbert MD, 4 mg at 03/20/24 0950    dextrose 10% bolus 125 mL 125 mL, 12.5 g, Intravenous, PRN, Pablo Yepez MD    dextrose 10% bolus 250 mL 250 mL, 25 g, Intravenous, PRN, Pablo Yepez MD    diclofenac sodium 1 % gel 2 g, 2 g, Topical (Top), TID, Tom Gilbert MD, 2 g at 03/20/24 0952    electrolyte-A infusion, , Intravenous, PRN, Pablo Yepez MD, Stopped at 02/28/24 0700    enoxaparin injection 40 mg, 40 mg, Subcutaneous, Daily, Cherry Suarez FNP, 40 mg at 03/19/24 1636    ferrous sulfate tablet 1 each, 1 tablet, Oral, Every other day, Tanner Rdz MD, 1 each at 03/20/24 0950    folic acid tablet 1 mg, 1 mg, Oral, Daily, Vasile Carter PA-C, 1 mg at 03/20/24 0950    guaiFENesin-codeine 100-10 mg/5 ml syrup 10 mL, 10 mL, Oral, TID, Tom Gilbert MD, 5 mL at 03/20/24 0950    heparin, porcine (PF) 100 unit/mL injection flush 500 Units, 5 mL, Intravenous, On Call Procedure, Pablo Yepez MD    heparin, porcine (PF) 100 unit/mL injection flush 500 Units, 5 mL, Intravenous, On Call Procedure, Nita Contreras MD    HYDROcodone-acetaminophen 5-325 mg per tablet 1 tablet, 1 tablet, Oral, Q6H PRN, Stan Lazar MD, 1 tablet at 03/14/24 1447    lactulose 10 gram/15 ml solution 20 g, 20 g, Oral, Q6H PRN, Vasile Carter PA-C, 20 g at 03/13/24 1343    loperamide capsule 2 mg, 2 mg, Oral, Continuous PRN, Vasile Carter PA-C, 2 mg at 03/02/24  1253    melatonin tablet 6 mg, 6 mg, Oral, Nightly PRN, Tanner Rdz MD, 6 mg at 03/19/24 2156    metoclopramide injection 5 mg, 5 mg, Intravenous, Q6H PRN, Vasile Carter PA-C    metoprolol succinate (TOPROL-XL) 24 hr split tablet 12.5 mg, 12.5 mg, Oral, Daily, Cherry Suarez FNP, 12.5 mg at 03/19/24 1003    midodrine tablet 5 mg, 5 mg, Oral, BID PRN, Tom Gilbert MD, 5 mg at 03/18/24 0829    midodrine tablet 7.5 mg, 7.5 mg, Oral, TID WM, Tom Gilbert MD, 7.5 mg at 03/20/24 0949    nitroGLYCERIN SL tablet 0.4 mg, 0.4 mg, Sublingual, Q5 Min PRN, Tanner Rdz MD    ondansetron disintegrating tablet 8 mg, 8 mg, Oral, Q8H PRN, Tanner Rdz MD, 8 mg at 03/11/24 1253    ondansetron injection 8 mg, 8 mg, Intravenous, Q6H PRN, Pablo Yepez MD, 8 mg at 03/14/24 1301    pantoprazole EC tablet 40 mg, 40 mg, Oral, Daily, Tanner Rdz MD, 40 mg at 03/20/24 0950    polyethylene glycol packet 17 g, 17 g, Oral, BID, Tom Gilbert MD, 17 g at 03/19/24 1005    simethicone chewable tablet 80 mg, 1 tablet, Oral, TID PRN, Tanner Rdz MD, 80 mg at 03/15/24 0315    sodium chloride 0.9% flush 10 mL, 10 mL, Intravenous, PRN, Tanner Rdz MD    sodium chloride 0.9% flush 10 mL, 10 mL, Intravenous, PRN, Millie Jimenez NP    sucralfate tablet 1 g, 1 g, Oral, QID (AC & HS), Vasile Carter PA-C, 1 g at 03/19/24 2156  VTE Risk Mitigation (From admission, onward)           Ordered     enoxaparin injection 40 mg  Daily         03/07/24 0559     IP VTE HIGH RISK PATIENT  Once         03/02/24 0759     heparin, porcine (PF) 100 unit/mL injection flush 500 Units  On Call Procedure         02/27/24 0718     heparin, porcine (PF) 100 unit/mL injection flush 500 Units  On Call Procedure         02/27/24 0257     Place SUSIE hose  Until discontinued         02/22/24 1458     Place sequential compression device  Until discontinued         02/21/24 2057                  Assessment:    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 Severely restricted motion. There is severe stenosis. Aortic valve area by VTI is 0.66 cm². Aortic valve peak velocity is 4.45 m/s. Mean gradient is 50 mmHg. The dimensionless index is 0.17.   Cardiogenic Shock (Post Cardiotomy) - Off Vasopressor Support - Resolved     - History of Hypertension   Ischemic Cardiomyopathy/EF 30%  Elevated LFTs - persistent  Pulmonary HTN    - ECHO PASP 69mmHg     - RHC (2.22.24): PA 84/34, PCWP 24 mmHg  Hematuria 2/2 Traumatic/Difficult Indwelling Catheter Placement (Resolved)  Urethral Stricture s/p Dilatation 2.23.24  PAF - Currently CVR    - Status Post Bedside Cardioversion x 2 on 2.27.24 (Both Unsuccessful)    - Status Post MOISE Ligation (2.27.24)    - CHADsVASc - 5 Points - 7.2% Stroke Risk per Year   Left Bundle Branch Block   VHD    - ECHO (3.7.24): Bio AVR, Mild MR    - ECHO (2.16.24): Severe AS, mild MR, mild to moderate TR  JEAN-CLAUDE/CKD Stage II - I  - Baseline Cr 1.6  Anemia- stable    - Status Post Transfusion on 2.28.24 & 2.29.24  Thrombocytopenia - Resolved   Leukocytosis - resolved    - Groin Wound Culture: Serratia Marcescens and Pseudomonas Aeruginosa   Hyponatremia - s/t SIADH    Plan:   Continue IV Abx per Primary Team   Continue ASA and BB  Statin and amiodarone have been discontinued due to persistent transaminitis. Resume statin once transaminitis resolved  HF GDMT- Continue BB (hold for sbp < 100 or HR < 60). Hold  diuretic due to hypotension  Continue midodrine 7.5 mg tid. Keep MAP > 65  Unable to add ARNI/MRA/SGLT2 due to hypotension  Accurate I&Os and Daily Weights   Keep K > 4.0 and Mg  > 2.0  Aggressive Mobilization of PT and Q1HR IS (PT/OT Eval and Treat)      BLANQUITA Perry  Cardiology  [facility]   03/17/2024

## 2024-03-20 NOTE — PT/OT/SLP RE-EVAL
Occupational Therapy   Re-evaluation    Name: Brain Snyder  MRN: [id number]  Admitting Diagnosis: CAD  Recent Surgery: Procedure(s) (LRB):  CORONARY ARTERY BYPASS GRAFT (CABG) (N/A)  Replacement-valve-aortic (N/A)  SURGICAL PROCUREMENT, VEIN, ENDOSCOPIC (N/A)  Impella, Removal (Right) 22 Days Post-Op    Recommendations:     Discharge therapy intensity: Moderate Intensity Therapy   Discharge Equipment Recommendations:  none  Barriers to discharge:   (Ongoing medical needs)    Assessment:     Brain Snyder is a 78 y.o. male with a medical diagnosis of CAD s/p CABG on 2/27. Pt was overall Mod A for sit<>stand from low recliner and toilet. Pt was set up for grooming while seated and total A for jewel cares in stand with RW.  He presents with the following performance deficits affecting function: weakness, impaired endurance, impaired self care skills, impaired functional mobility, impaired balance, gait instability.    Rehab Prognosis: Good; patient would benefit from acute skilled OT services to address these deficits and reach maximum level of function.       Plan:     Patient to be seen 5 x/week to address the above listed problems via self-care/home management, therapeutic activities, therapeutic exercises  Plan of Care Expires: 04/09/24  Plan of Care Reviewed with: patient, daughter    Subjective     Chief Complaint: Urge to have a BM  Patient/Family Comments/goals: Return home    Pain/Comfort:  Pain Rating 1: 0/10    Patients cultural, spiritual, Church conflicts given the current situation: no    Objective:     OT communicated with nurse prior to session.      Patient was found up in chair with telemetry, pulse ox (continuous) upon OT entry to room.    General Precautions: Standard, fall, sternal  Orthopedic Precautions: N/A  Braces: N/A    Functional Mobility/Transfers:  Patient completed Sit <> Stand Transfer with moderate assistance  with  rolling walker   Patient completed Toilet Transfer  with  moderate assistance with  rolling walker  Functional Mobility: Pt was Mod A for functional transfers with RW and Min A for functional ambulation. No LOB noted.     Activities of Daily Living:  Grooming: supervision / set up for hand and face washing with wash cloth while seated  Toileting: total assistance for pulling Depend over hips in stand with RW and jewel cares.     Functional Cognition:  Intact    Visual Perceptual Skills:  Intact    Upper Extremity Function:  Right Upper Extremity:   WFL grossly assessed 2/2 sternal precautions     Left Upper Extremity:  WFL grossly assessed 2/2 sternal precautions     Balance:   Intact    Therapeutic Positioning  Risk for acquired pressure injuries is increased due to relative decrease in mobility d/t hospitalization , impaired mobility, and incontinence.    OT interventions performed during the course of today's session in an effort to prevent and/or reduce acquired pressure injuries:   Education was provided on benefits of and recommendations for therapeutic positioning     Findings:  visible skin intact    OT recommendations for therapeutic positioning throughout hospitalization:   Follow [facility] Pressure Injury Prevention Protocol    Additional Treatment:  Therapeutic Activity: Cues provided for RW management and safety with maintaining sternal precautions. Pt demo'd good return.     Patient Education:  Patient provided with verbal education and demonstrations education regarding OT role/goals/POC, post op precautions, fall prevention, and safety awareness.  Understanding was verbalized.     Patient left up in chair with all lines intact and call button in reach    GOALS:   Multidisciplinary Problems       Occupational Therapy Goals          Problem: Occupational Therapy    Goal Priority Disciplines Outcome Interventions   Occupational Therapy Goal     OT, PT/OT Ongoing, Progressing    Description: Goals to be met by: 4/1/24     Patient will increase functional  independence with ADLs by performing:    UE Dressing with min A   Grooming while standing at sink with Minimal Assistance.  Toileting from toilet with Moderate Assistance for hygiene and clothing management.   Sitting at edge of bed x30 minutes with Minimal Assistance. (Met)  Toilet transfer with Minimal Assistance.                         History:     Past Medical History:   Diagnosis Date    A-fib     Aortic insufficiency     CAD (coronary artery disease)     Hypertension          Past Surgical History:   Procedure Laterality Date    AORTIC VALVE REPLACEMENT N/A 2/27/2024    Procedure: Replacement-valve-aortic;  Surgeon: Nita Contreras MD;  Location: [facility];  Service: Cardiothoracic;  Laterality: N/A;    CORONARY ANGIOGRAPHY N/A 2/20/2024    Procedure: ANGIOGRAM, CORONARY ARTERY;  Surgeon: Vasile Callahan MD;  Location: [facility] CATH LAB;  Service: Cardiology;  Laterality: N/A;    CORONARY ARTERY BYPASS GRAFT (CABG) N/A 2/27/2024    Procedure: CORONARY ARTERY BYPASS GRAFT (CABG);  Surgeon: Nita Contreras MD;  Location: [facility];  Service: Cardiothoracic;  Laterality: N/A;    ENDOSCOPIC HARVEST OF VEIN N/A 2/27/2024    Procedure: SURGICAL PROCUREMENT, VEIN, ENDOSCOPIC;  Surgeon: Nita Contreras MD;  Location: [facility];  Service: Cardiothoracic;  Laterality: N/A;    IMPELLA, REMOVAL Right 2/27/2024    Procedure: Impella, Removal;  Surgeon: Nita Contreras MD;  Location: [facility];  Service: Cardiothoracic;  Laterality: Right;    INSERTION OF INTRAVASCULAR MICROAXIAL BLOOD PUMP N/A 2/23/2024    Procedure: INSERTION, IMPELLA;  Surgeon: All Montoya MD;  Location: [facility] CATH LAB;  Service: Cardiology;  Laterality: N/A;  with swanz    RIGHT HEART CATHETERIZATION N/A 2/22/2024    Procedure: INSERTION, CATHETER, RIGHT HEART;  Surgeon: Shreya Lomax MD;  Location: [facility] CATH LAB;  Service: Cardiology;  Laterality: N/A;       Time Tracking:     OT Date of Treatment: 03/20/24  OT Start Time:  1120  OT Stop Time: 1143  OT Total Time (min): 23 min    Billable Minutes:Re-eval 10 minutes  Therapeutic Activity 13 minutes    3/20/2024

## 2024-03-20 NOTE — PLAN OF CARE
Problem: Occupational Therapy  Goal: Occupational Therapy Goal  Description: Goals to be met by: 4/1/24     Patient will increase functional independence with ADLs by performing:    UE Dressing with min A   Grooming while standing at sink with Minimal Assistance.  Toileting from toilet with Moderate Assistance for hygiene and clothing management.   Sitting at edge of bed x30 minutes with Minimal Assistance. (Met)  Toilet transfer with Minimal Assistance.    Outcome: Ongoing, Progressing

## 2024-03-20 NOTE — PROGRESS NOTES
[facility] Hospital Medicine Progress Note        Chief Complaint: Inpatient Follow-up for R groin cellulitis    HPI per admitting team: 78-year-old male with a history of aortic insufficiency/stenosis, CAD, CKD IIIb, HTN AFib on Eliquis admitted to [facility] 02/15/2024 with chest pain, found to have NSTEMI (peak troponin 0.17) and underwent C 02/20/2024 showing severe multivessel stenosis and therefore was transferred to [facility] for CABG evaluation. Cardiology was consulted Patient had Impella placed on 02/23 and was admitted to the ICU.  Was started on aggressive diuresis with IV Lasix.  CV surgery was consulted.  Urology was consulted for hematuria; Nguyen catheter placed over guidewire with dilation secondary to urethral strictures. IV Heparin infusion was initiated per CIS but held due to hematuria/hemoptysis on 02/24.  He was also noted to have an JEAN-CLAUDE. Received 2 units PRBCs on 02/26 and was planned for high-risk PCI on 02/26 which was later canceled.  Underwent CABG x 3 2/27 (LIMA to LAD, RSVG to OM 1, R SVG to RCA, bioprosthetic AVR).  Remained on mechanical ventilation thereafter.  Patient noted to have tachycardia with atrial fibrillation/RVR requiring IV amiodarone along with pressors (norepinephrine/Milrinone) for hypotension. Patient underwent cardioversion x 2 with minimal improvement in HR/BP.  Patient self-extubated overnight on 02/29 and was noted to have adequate saturations on 2 L O2 via NC thereafter.  PT/OT consulted.  Renal function noted to be improving. Continued on IV diuresis as he appeared to be significantly volume overloaded postoperatively along with elevated pulmonary artery wedge pressures.  Started on p.o. amiodarone taper on 03/03.  Patient noted to have drainage from right groin wound and started on vancomycin on 03/04. Wound culture grew Pseudomonas.  Chest tubes discontinued on 03/02.  Downgraded from ICU on 03/02.  CIS and CV surgery continued following  patient.  CV surgery signed off on 03/06 and discontinued vancomycin.  Patient was placed on oral Levaquin.  Hospital medicine consulted on 03/06 for assistance with medical management and DC planning.  Repeat CT abdomen and pelvis of 3/9 shows improving stranding and improving hematoma in the right groin, persistent left-sided effusion and atelectasis  Patient complains of low back pain, cough and his SCDs too tight on his feet bilaterally  Vitals reviewed with blood pressure low normal, heart rates in the low 100s, saturating 94% on 2 L of oxygen   Labs reviewed and fairly stable   General surgery evaluated patient, reviewed CT scan of the area, given wound waning with scant serous/purulent drainage with manual expression, recommended wound care consult and continued to manually express fluid from site Q shift.  If area stopped draining, recommended do ultrasound to better define anatomy  Palliative care on board  ID adjusted antibiotics to IV cefepime  Nephrology placed on fluid restriction  Cardiology has suspended amiodarone due to elevated liver enzymes and Lasix due to hyponatremia  EKG showed atrial fibrillation with RVR, left bundle branch block-->  received digoxin 500 mcg push followed by 250 mcg q.6 for 2 doses for rate control and now on digoxin 0.125 mg daily which has now been discontinued  Troponin 0.18, per Cardiology probably post CABG Troponinemia    Interval Hx:   Patient is sitting in chair, reports he feels much better.  Stated abdominal pain and discomfort has resolved too  I had a long conversation with patient and his daughter in the room through tele , explained to patient that he needs to work with therapy as much as he can given no insurance, he has not eligible to go to a rehab place.  Patient informed that he lives with his brother-in-law and there is always somebody at home to help him.  Patient's daughter also verified  Patient had a good bowel movement yesterday,  reported appetite is improving slowly  Discuss possible discharge early next week if he continuously work throughout with the therapy in-house  I answered all their questions to the best of my knowledge in their satisfaction  Case was discussed with patient's nurse and updated  on my discussion with patient and family    Objective/physical exam:  General:  Sitting in chair, looks much better  Chest: Clear to auscultation bilaterally anteriorly  Heart: RRR, +S1, S2, no appreciable murmur  Abdomen: Soft, tender to palpate and right upper quadrant and left lower quadrant.  Bowel sounds positive x4  MSK: Warm, bilateral lower extremity edema present, no clubbing or cyanosis  Neurologic:  Awake alert and oriented, smiling, conversation    VITAL SIGNS: 24 HRS MIN & MAX LAST   Temp  Min: 97.4 °F (36.3 °C)  Max: 98.8 °F (37.1 °C) 98.8 °F (37.1 °C)   BP  Min: 102/57  Max: 146/80 (!) 102/57   Pulse  Min: 46  Max: 58  (!) 46   Resp  Min: 18  Max: 19 19   SpO2  Min: 96 %  Max: 100 % 96 %     I reviewed the labs below:  Recent Labs   Lab 03/17/24  0353 03/19/24  0601 03/20/24  0635   WBC 10.55 10.14 8.82   RBC 4.60* 4.53* 4.28*   HGB 12.3* 12.4* 11.4*   HCT 39.7* 39.5* 36.0*   MCV 86.3 87.2 84.1   MCH 26.7* 27.4 26.6*   MCHC 31.0* 31.4* 31.7*   RDW 15.3 15.6 15.8   * 380 362   MPV 9.8 9.7 9.5       Recent Labs   Lab 03/15/24  0402 03/16/24  0400 03/17/24  0353 03/18/24  0433 03/19/24  0553 03/20/24  0635   * 133* 132* 131* 133* 134*   K 4.9 4.5 5.1 5.2* 4.7 4.6   CO2 27 25 22* 23 23 25   BUN 24.6 22.5 23.7 20.8 18.1 21.3   CREATININE 1.57* 1.48* 1.29* 1.26* 1.28* 1.26*   CALCIUM 8.3* 8.3* 8.3* 8.4* 8.8 8.9   MG  --  2.00  --   --   --   --    ALBUMIN 2.7* 2.4* 2.6* 2.6*  --   --    ALKPHOS 127  --  110 109  --   --    ALT 84*  --  82* 76*  --   --    AST 98*  --  90* 80*  --   --    BILITOT 1.4  --  1.1 1.1  --   --        Microbiology Results (last 7 days)       ** No results found for the last 168  hours. **             See below for Radiology    Scheduled Med:   allopurinoL  50 mg Oral Daily    aspirin  81 mg Oral Daily    ceFEPime IV (PEDS and ADULTS)  2 g Intravenous Q12H    cyproheptadine  4 mg Oral BID    diclofenac sodium  2 g Topical (Top) TID    enoxparin  40 mg Subcutaneous Daily    ferrous sulfate  1 tablet Oral Every other day    folic acid  1 mg Oral Daily    guaiFENesin-codeine 100-10 mg/5 ml  10 mL Oral TID    metoprolol succinate  12.5 mg Oral Daily    midodrine  7.5 mg Oral TID WM    pantoprazole  40 mg Oral Daily    polyethylene glycol  17 g Oral BID    sucralfate  1 g Oral QID (AC & HS)      Continuous Infusions:   loperamide        PRN Meds:  acetaminophen, acetaminophen, albumin human 5%, bisacodyL, dextrose 10%, dextrose 10%, electrolyte-A, heparin, porcine (PF), heparin, porcine (PF), HYDROcodone-acetaminophen, lactulose 10 gram/15 ml, loperamide, melatonin, metoclopramide, midodrine, nitroGLYCERIN, ondansetron, ondansetron, simethicone, sodium chloride 0.9%, sodium chloride 0.9%     Assessment/Plan:  Constipation- resolved  Hypotension- improved with midodrine   Right flank pain- due to right-sided lower abdomen hematoma- resolved  R Groin wound infection with Cellulitis at previous impella insertion site- Pseudomonas aeruginosa/Serratia marcescens, slowly improving   Transaminitis- multifactorial-  Congestive hepatopathy versus infection vs drug induced- now trending down  Leukocytosis- resolved  Fluid overload 2/2 HFrEF exacerbation, fairly euvolemic clinically  HFrEF/HFpEF, euvolemic   JEAN-CLAUDE on stage II CKD - resolved   Hyponatremia due to SIADH- resolved  Pulmonary hypertension   NSTEMI, CAD sp CABG x 3, S/p AVR also  Atrial Fibrillation with RVR- resolved  Normocytic anemia  Urethral stricture s/p dilation with gustafson 2/23, removed 3/18  Hyperkalemia-mild- resolved  Moderate malnutrition       Per Cardiology, midodrine can be dosed up till 10 mg t.i.d. if needed, currently up to 7.5 mg  t.i.d and blood pressure has responded pretty well  Lasix, Lisinopril and Aldactone also suspended, due to low blood pressure  Continue po metoprolol succinate 12.5 mg q.day  Cardiology has suspended amiodarone and atorvastatin due to elevated liver enzymes  Trend AST/ALT- 80/78  Ultrasound right upper quadrant shows mild-to-moderate hepatomegaly.  No biliary dilation  Pt continue to complain of abdominal and low back pain--> ordered CT Abddmen pelvis w/o contrast--> inflammatory changes noted in the right groin, possibly related to recent surgery.  Small focus of air noted.  Stool throughout the colon at would be seen with constipation.  Left pleural effusion with atelectasis, changes of left lower lobe early infection process can not be excluded  Ordered Dulcolax suppository x1 and relistor--> had a BM last night and a huge BM 3/19  Cont Miralax BID   Added volatren get to the lower back for pain control  Wound culture grew Pseudomonas and Serratia marcescens- both sensitive to cefepime  ID Dr KELLY adjusted antibiotics to IV cefepime- day 9, Dr KELLY recommended total of 14 days treatment with end date 3/25.  Will switch to Cipro 750mg b.i.d. for that duration (renally dosed)  Nephrology placed pt on 1500 mL fluid restriction for hyponatremia, sodium improved to 134--> sodium chloride tablets discontinued per Nephrology  Creatinine slightly improved to 1.2, monitor closely, case personally discussed with Laurel BURNS, nephrology will sign off   Appreciate pulmonology input continue to monitor effusion, no need for thoracentesis at this time  Continue use of incentive spirometry and oxygen supplementation, currently on room air, pt not compliant with instructions   Appreciate Urology input, case was personally discussed with Cora Gonzalez and Isidra BURNS, per their recommendations, Nguyen removed on 3/18, no issues with voiding since then  Nguyen removed 3/18, case was personally discussed with isidra BURNS with  Urology  Continued allopurinol 50 mg daily, aspirin 81 mg, FeSO4 every other day, folic acid 1 mg, Protonix 40 mg daily, sucralfate 1 g q.i.d.  P.o. Norco 5 mg q.6 p.r.n. for pain  Antitussives p.r.n.  PT/OT recommending moderate-intensity therapy, case management on board, awaiting Medicaid approval for SNF.  Patient's daughter has submitted paperwork to Medicaid--> unfortunately patient is not eligible for Medicaid given illegal status  Patient is willing to work with therapy daily.  Tentative plan for discharge on Monday if patient is strong enough to go home with family  Palliative care also on board, greatly appreciated  Wound Care also on board, case personally discussed with Lionel, informed that the wound site is very small for packing but she will continue to express drain age from the site  Continue cyproheptadine, appetite is improving  Morning labs stable, repeat Friday    VTE prophylaxis:  Lovenox 40     Patient condition:  Fair     Anticipated discharge and Disposition:   not a candidate for Medicaid given illegal status, if physically improved, home with family on Monday (already discussed with pt and daughter vis  service)    All diagnosis and differential diagnosis have been reviewed; assessment and plan has been documented; I have personally reviewed the labs and test results that are presently available; I have reviewed the patients medication list; I have reviewed the consulting providers response and recommendations. I have reviewed or attempted to review medical records based upon their availability    All of the patient's questions have been  addressed and answered. Patient's is agreeable to the above stated plan. I will continue to monitor closely and make adjustments to medical management as needed.  _____________________________________________________________________    Nutrition Status:  Patient meets ASPEN criteria for moderate malnutrition of acute illness or injury per RD  assessment as evidenced by:  Energy Intake (Malnutrition): less than or equal to 50% for greater than or equal to 5 days  Weight Loss (Malnutrition):  (unable to determine)  Subcutaneous Fat (Malnutrition):  (does not meet criteria)  Muscle Mass (Malnutrition): mild depletion  Fluid Accumulation (Malnutrition): mild        A minimum of two characteristics is recommended for diagnosis of either severe or non-severe malnutrition.   Radiology:   I have personally reviewed the images and agree with radiologist report  CT Abdomen Pelvis  Without Contrast  Narrative: EXAMINATION:  CT ABDOMEN PELVIS WITHOUT CONTRAST    CLINICAL HISTORY:  Abdominal pain, acute, nonlocalized;    TECHNIQUE:  Multidetector non-contrast axial CT images of the abdomen and pelvis were obtained with coronal and sagittal reconstructions.    Automatic exposure control was utilized to reduce the patient's radiation dose.    DLP= 307    COMPARISON:  03/15/2024    FINDINGS:  01. HEPATOBILIARY: No focal hepatic lesion is identified, however evaluation is limited secondary to lack of IV contrast. The gallbladder is normal.    02. SPLEEN: Normal    03. PANCREAS: No focal masses or ductal dilatation.    04. ADRENALS: No adrenal nodules.    05. KIDNEYS: The right kidney demonstrates no stone, hydronephrosis, or hydroureter. No focal mass identified. The left kidney demonstrates no stone, hydronephrosis, or hydroureter. No focal mass identified.    06. LYMPHADENOPATHY/RETROPERITONEUM: There is no retroperitoneal lymphadenopathy. The abdominal aorta is normal in course and caliber.    07. BOWEL: Stool throughout the colon as may be seen with constipation.    08. PELVIC VISCERA: Normal. No pelvic mass.    09. PELVIC LYMPH NODES: No lymphadenopathy.    10. PERITONEUM/ABDOMINAL WALL: Inflammatory change noted within the right groin possibly related to recent surgery.  Small focus of air noted.    11. SKELETAL: No aggressive appearing lytic/blastic lesion. No  acute fractures, subluxations or dislocations.    12. LUNG BASES: Left pleural effusion with atelectatic changes of the left lower lobe  Impression: Left pleural effusion with atelectatic changes of the left lower lobe early infectious process is not excluded.    Inflammatory change noted within the right groin possibly related to recent surgery.  Small focus of air noted.  Correlate with physical exam.    Stool noted throughout the colon as would be seen with constipation.    Electronically signed by: Mann Hernandez  Date:    03/17/2024  Time:    11:25      Tom Gilbert MD  Department of Hospital Medicine   [facility]   03/20/2024

## 2024-03-20 NOTE — PT/OT/SLP PROGRESS
Physical Therapy Treatment    Patient Name:  Brain Snyder   MRN:  [id number]    Recommendations:     Discharge therapy intensity: Moderate Intensity Therapy   Discharge Equipment Recommendations: to be determined by next level of care  Barriers to discharge: Decreased caregiver support, Impaired mobility, and Ongoing medical needs    Assessment:     Brain Snyder is a 78 y.o. male admitted with a medical diagnosis of NSTEMI, CAD, HF, CAD, afib, s/p impella, s/p CABG x3, s/p AVR.  He presents with the following impairments/functional limitations: weakness, impaired endurance, impaired self care skills, impaired functional mobility, gait instability, impaired balance, decreased upper extremity function, decreased lower extremity function, pain, impaired cardiopulmonary response to activity.    Utilized   to explain and reinforce sternal precautions and patient verbalized understanding.     Rehab Prognosis: Good; patient would benefit from acute skilled PT services to address these deficits and reach maximum level of function.    Recent Surgery: Procedure(s) (LRB):  CORONARY ARTERY BYPASS GRAFT (CABG) (N/A)  Replacement-valve-aortic (N/A)  SURGICAL PROCUREMENT, VEIN, ENDOSCOPIC (N/A)  Impella, Removal (Right) 22 Days Post-Op    Plan:     During this hospitalization, patient to be seen 5 x/week to address the identified rehab impairments via gait training, therapeutic activities, therapeutic exercises, neuromuscular re-education and progress toward the following goals:    Plan of Care Expires:  04/01/24    Subjective     Chief Complaint: none stated  Patient/Family Comments/goals: none stated  Pain/Comfort:  Pain Rating 1: 0/10      Objective:     Communicated with patient, nurse prior to session.  Patient found up in chair with pulse ox (continuous), telemetry upon PT entry to room.     General Precautions: Standard, fall, sternal  Orthopedic Precautions: N/A  Braces: N/A  Respiratory Status: Room  air    Functional Mobility:  Bed Mobility:     Rolling Right: minimum assistance  Scooting: minimum assistance  Sit to Supine: minimum assistance  Transfers:     Sit to Stand:  minimum assistance with rolling walker. Patient required verbal cues to maintain sternal precautions.   Gait: Patient ambulated 39ft,16ft with rolling walker with varied gait pattern with chair following for safety. Patient presents with decreased step length however able to correct with verbal cues. Patient required verbal cues to maintain sternal precautions.     Education:  Patient provided with verbal education education regarding fall prevention and safety awareness.  Understanding was verbalized, however additional teaching warranted due to language barrier.     Patient left HOB elevated with all lines intact, call button in reach, and nurse notified    GOALS:   Multidisciplinary Problems       Physical Therapy Goals          Problem: Physical Therapy    Goal Priority Disciplines Outcome Goal Variances Interventions   Physical Therapy Goal     PT, PT/OT Ongoing, Progressing     Description: Goals to be met by: 2024     Patient will increase functional independence with mobility by performin. Supine to sit with MInimal Assistance  2. Sit to stand transfer with Minimal Assistance  3. Gait  x 150 feet with Minimal Assistance using Rolling Walker.                          Time Tracking:     PT Received On: 24  PT Start Time: 948     PT Stop Time: 1015  PT Total Time (min): 27 min     Billable Minutes: Gait Training 17 and Therapeutic Activity 10    Treatment Type: Treatment  PT/PTA: PTA     Number of PTA visits since last PT visit: 2024

## 2024-03-21 LAB
ANION GAP SERPL CALC-SCNC: 5 MEQ/L
BASOPHILS # BLD AUTO: 0.08 X10(3)/MCL
BASOPHILS NFR BLD AUTO: 0.9 %
BUN SERPL-MCNC: 18.3 MG/DL (ref 8.4–25.7)
CALCIUM SERPL-MCNC: 8.8 MG/DL (ref 8.8–10)
CHLORIDE SERPL-SCNC: 102 MMOL/L (ref 98–107)
CO2 SERPL-SCNC: 26 MMOL/L (ref 23–31)
CREAT SERPL-MCNC: 1.24 MG/DL (ref 0.73–1.18)
CREAT/UREA NIT SERPL: 15
EOSINOPHIL # BLD AUTO: 0.42 X10(3)/MCL (ref 0–0.9)
EOSINOPHIL NFR BLD AUTO: 4.6 %
ERYTHROCYTE [DISTWIDTH] IN BLOOD BY AUTOMATED COUNT: 15.9 % (ref 11.5–17)
GFR SERPLBLD CREATININE-BSD FMLA CKD-EPI: 60 MLS/MIN/1.73/M2
GLUCOSE SERPL-MCNC: 82 MG/DL (ref 82–115)
HCT VFR BLD AUTO: 38.3 % (ref 42–52)
HGB BLD-MCNC: 11.8 G/DL (ref 14–18)
IMM GRANULOCYTES # BLD AUTO: 0.14 X10(3)/MCL (ref 0–0.04)
IMM GRANULOCYTES NFR BLD AUTO: 1.5 %
LYMPHOCYTES # BLD AUTO: 2.5 X10(3)/MCL (ref 0.6–4.6)
LYMPHOCYTES NFR BLD AUTO: 27.7 %
MCH RBC QN AUTO: 26.5 PG (ref 27–31)
MCHC RBC AUTO-ENTMCNC: 30.8 G/DL (ref 33–36)
MCV RBC AUTO: 86.1 FL (ref 80–94)
MONOCYTES # BLD AUTO: 1.16 X10(3)/MCL (ref 0.1–1.3)
MONOCYTES NFR BLD AUTO: 12.8 %
NEUTROPHILS # BLD AUTO: 4.74 X10(3)/MCL (ref 2.1–9.2)
NEUTROPHILS NFR BLD AUTO: 52.5 %
NRBC BLD AUTO-RTO: 0 %
PLATELET # BLD AUTO: 330 X10(3)/MCL (ref 130–400)
PMV BLD AUTO: 9.7 FL (ref 7.4–10.4)
POTASSIUM SERPL-SCNC: 5 MMOL/L (ref 3.5–5.1)
RBC # BLD AUTO: 4.45 X10(6)/MCL (ref 4.7–6.1)
SODIUM SERPL-SCNC: 133 MMOL/L (ref 136–145)
WBC # SPEC AUTO: 9.04 X10(3)/MCL (ref 4.5–11.5)

## 2024-03-21 PROCEDURE — 97530 THERAPEUTIC ACTIVITIES: CPT

## 2024-03-21 PROCEDURE — 21400001 HC TELEMETRY ROOM

## 2024-03-21 PROCEDURE — 25000003 PHARM REV CODE 250: Performed by: PHYSICIAN ASSISTANT

## 2024-03-21 PROCEDURE — 80048 BASIC METABOLIC PNL TOTAL CA: CPT | Performed by: NURSE PRACTITIONER

## 2024-03-21 PROCEDURE — 25000003 PHARM REV CODE 250: Performed by: INTERNAL MEDICINE

## 2024-03-21 PROCEDURE — 97535 SELF CARE MNGMENT TRAINING: CPT

## 2024-03-21 PROCEDURE — 97116 GAIT TRAINING THERAPY: CPT

## 2024-03-21 PROCEDURE — 63600175 PHARM REV CODE 636 W HCPCS: Performed by: NURSE PRACTITIONER

## 2024-03-21 PROCEDURE — 85025 COMPLETE CBC W/AUTO DIFF WBC: CPT | Performed by: NURSE PRACTITIONER

## 2024-03-21 RX ADMIN — CIPROFLOXACIN HYDROCHLORIDE 750 MG: 500 TABLET, FILM COATED ORAL at 09:03

## 2024-03-21 RX ADMIN — GUAIFENESIN AND CODEINE PHOSPHATE 10 ML: 100; 10 SOLUTION ORAL at 09:03

## 2024-03-21 RX ADMIN — ENOXAPARIN SODIUM 40 MG: 40 INJECTION SUBCUTANEOUS at 04:03

## 2024-03-21 RX ADMIN — ALLOPURINOL 50 MG: 300 TABLET ORAL at 09:03

## 2024-03-21 RX ADMIN — SUCRALFATE 1 G: 1 TABLET ORAL at 06:03

## 2024-03-21 RX ADMIN — CYPROHEPTADINE HYDROCHLORIDE 4 MG: 4 TABLET ORAL at 09:03

## 2024-03-21 RX ADMIN — ASPIRIN 81 MG: 81 TABLET, COATED ORAL at 08:03

## 2024-03-21 RX ADMIN — Medication 6 MG: at 09:03

## 2024-03-21 RX ADMIN — FOLIC ACID 1 MG: 1 TABLET ORAL at 09:03

## 2024-03-21 RX ADMIN — DICLOFENAC SODIUM 2 G: 10 GEL TOPICAL at 04:03

## 2024-03-21 RX ADMIN — POLYETHYLENE GLYCOL 3350 17 G: 17 POWDER, FOR SOLUTION ORAL at 09:03

## 2024-03-21 RX ADMIN — MIDODRINE HYDROCHLORIDE 7.5 MG: 2.5 TABLET ORAL at 11:03

## 2024-03-21 RX ADMIN — DICLOFENAC SODIUM 2 G: 10 GEL TOPICAL at 09:03

## 2024-03-21 RX ADMIN — SUCRALFATE 1 G: 1 TABLET ORAL at 11:03

## 2024-03-21 RX ADMIN — SUCRALFATE 1 G: 1 TABLET ORAL at 09:03

## 2024-03-21 RX ADMIN — MIDODRINE HYDROCHLORIDE 7.5 MG: 2.5 TABLET ORAL at 04:03

## 2024-03-21 RX ADMIN — SUCRALFATE 1 G: 1 TABLET ORAL at 04:03

## 2024-03-21 RX ADMIN — PANTOPRAZOLE SODIUM 40 MG: 40 TABLET, DELAYED RELEASE ORAL at 09:03

## 2024-03-21 RX ADMIN — GUAIFENESIN AND CODEINE PHOSPHATE 10 ML: 100; 10 SOLUTION ORAL at 04:03

## 2024-03-21 NOTE — PT/OT/SLP PROGRESS
Occupational Therapy   Treatment    Name: Brain Snyder  MRN: [id number]  Admitting Diagnosis:  CAD (coronary artery disease)  23 Days Post-Op    Recommendations:     Recommended therapy intensity at discharge: Moderate Intensity Therapy   Discharge Equipment Recommendations:  none  Barriers to discharge:   (Ongoing medical needs)    Assessment:     Brain Snyder is a 78 y.o. male with a medical diagnosis of CAD (coronary artery disease) s/p CABG.  He presents with great effort, overall Min A for  transfers and CGA for functional ambulation with RW. Pt was CGA for grooming while in stand at the sink with RW. Performance deficits affecting function are weakness, impaired endurance, impaired self care skills, impaired functional mobility, impaired balance, gait instability.     Rehab Prognosis:  Good; patient would benefit from acute skilled OT services to address these deficits and reach maximum level of function.       Plan:     Patient to be seen 5 x/week to address the above listed problems via self-care/home management, therapeutic activities, therapeutic exercises  Plan of Care Expires: 04/09/24  Plan of Care Reviewed with: patient    Subjective     Pain/Comfort:  Pain Rating 1: 0/10    Objective:     Communicated with: Nurse prior to session.  Patient found HOB elevated with pulse ox (continuous), telemetry upon OT entry to room.    General Precautions: Standard, fall, sternal    Orthopedic Precautions:N/A  Braces: N/A  Respiratory Status: Room air    Occupational Performance:     Bed Mobility:    Patient completed Supine to Sit with minimum assistance     Functional Mobility/Transfers:  Patient completed Sit <> Stand Transfer with minimum assistance  with  rolling walker   Patient completed Toilet Transfer with minimum assistance with  rolling walker  Functional Mobility: Pt was overall CGA for functional ambulation, no LOB noted. Cues provided for step through when walking, pt demo'd good return.      Activities of Daily Living:  Grooming: contact guard assistance hand washing in stand at the sink, cues for shutting water off after completing task.    Therapeutic Activities:  Cues provided for safety with RW management and safety with hand placement to maintain sternal precautions during transfers. Pt demo'd good return.      Therapeutic Positioning    OT interventions performed during the course of today's session in an effort to prevent and/or reduce acquired pressure injuries:   Education was provided on benefits of and recommendations for therapeutic positioning     Findings: known area of altered skin integrity at sternal incision    Patient Education:  Patient provided with verbal education and demonstrations education regarding OT role/goals/POC, post op precautions, fall prevention, safety awareness, and Discharge/DME recommendations.  Understanding was verbalized, however additional teaching warranted.      Patient left up in chair with all lines intact, call button in reach, and nurse notified.    GOALS:   Multidisciplinary Problems       Occupational Therapy Goals          Problem: Occupational Therapy    Goal Priority Disciplines Outcome Interventions   Occupational Therapy Goal     OT, PT/OT Ongoing, Progressing    Description: Goals to be met by: 4/1/24     Patient will increase functional independence with ADLs by performing:    UE Dressing with min A   Grooming while standing at sink with Minimal Assistance.  Toileting from toilet with Moderate Assistance for hygiene and clothing management.   Sitting at edge of bed x30 minutes with Minimal Assistance. (Met)  Toilet transfer with Minimal Assistance.                         Time Tracking:     OT Date of Treatment: 03/21/24  OT Start Time: 1148  OT Stop Time: 1216  OT Total Time (min): 28 min    Billable Minutes:Self Care/Home Management 10 minutes  Therapeutic Activity 18 minutes    OT/YVETTE: OT     Number of YVETTE visits since last OT visit:  1    3/21/2024

## 2024-03-21 NOTE — PT/OT/SLP RE-EVAL
Physical Therapy Re-Evaluation    Patient Name:  Brain Snyder   MRN:  [id number]    Recommendations:     Discharge therapy intensity: Moderate Intensity Therapy   Discharge Equipment Recommendations:  (tbd)   Barriers to discharge: Impaired mobility    Assessment:     Brain Snyder is a 78 y.o. male admitted with a medical diagnosis of NSTEMI, CAD, HF, CAD, afib, s/p impella, s/p CABG x3, s/p AVR. .  He presents with the following impairments/functional limitations: impaired endurance, weakness, impaired self care skills, gait instability . Pt needed encouragement to take a walk this afternoon.He does not appear that motivated to progress with his endurance    Rehab Prognosis: Good; patient would benefit from acute skilled PT services to address these deficits and reach maximum level of function.    Recent Surgery: Procedure(s) (LRB):  CORONARY ARTERY BYPASS GRAFT (CABG) (N/A)  Replacement-valve-aortic (N/A)  SURGICAL PROCUREMENT, VEIN, ENDOSCOPIC (N/A)  Impella, Removal (Right) 23 Days Post-Op    Plan:     During this hospitalization, patient to be seen 5 x/week to address the identified rehab impairments via gait training, therapeutic activities, therapeutic exercises and progress toward the following goals:    Plan of Care Expires:  04/01/24    PT/PTA conference to discuss PT POC and patient's progression towards goals held with  hermann Kwan PTA .     Subjective     Chief Complaint:   Patient/Family Comments/goals:   Pain/Comfort:       Patients cultural, spiritual, Jain conflicts given the current situation: no    Objective:     Communicated with nurse prior to session.  Patient found up in chair with    upon PT entry to room.    General Precautions: Standard, fall  Orthopedic Precautions:N/A   Braces: N/A  Respiratory Status: Room air  Blood Pressure:       Exams:    Skin integrity: Visible skin intact      Functional Mobility:  Bed Mobility:     Scooting: maximal assistance  Sit to Supine:  minimum assistance  Transfers:     Sit to Stand:  minimum assistance with rolling walker  Bed to Chair: minimum assistance with  rolling walker  using  Step Transfer  Gait: pt ambulated with a rw cga/sba  for 20 ft      AM-PAC 6 CLICK MOBILITY  Total Score:        Treatment & Education:      Patient provided with verbal education education regarding PT role/goals/POC.  Understanding was verbalized.     Patient left supine with all lines intact and call button in reach.    GOALS:   Multidisciplinary Problems       Physical Therapy Goals          Problem: Physical Therapy    Goal Priority Disciplines Outcome Goal Variances Interventions   Physical Therapy Goal     PT, PT/OT Ongoing, Progressing     Description: Goals to be met by: 2024     Patient will increase functional independence with mobility by performin. Supine to sit with MInimal Assistance  2. Sit to stand transfer with Minimal Assistance  3. Gait  x 150 feet with Minimal Assistance using Rolling Walker.                          History:     Past Medical History:   Diagnosis Date    A-fib     Aortic insufficiency     CAD (coronary artery disease)     Hypertension        Past Surgical History:   Procedure Laterality Date    AORTIC VALVE REPLACEMENT N/A 2024    Procedure: Replacement-valve-aortic;  Surgeon: Nita Contreras MD;  Location: [facility];  Service: Cardiothoracic;  Laterality: N/A;    CORONARY ANGIOGRAPHY N/A 2024    Procedure: ANGIOGRAM, CORONARY ARTERY;  Surgeon: Vasile Callahan MD;  Location: [facility] CATH LAB;  Service: Cardiology;  Laterality: N/A;    CORONARY ARTERY BYPASS GRAFT (CABG) N/A 2024    Procedure: CORONARY ARTERY BYPASS GRAFT (CABG);  Surgeon: Nita Contreras MD;  Location: [facility] OR;  Service: Cardiothoracic;  Laterality: N/A;    ENDOSCOPIC HARVEST OF VEIN N/A 2024    Procedure: SURGICAL PROCUREMENT, VEIN, ENDOSCOPIC;  Surgeon: Nita Contreras MD;  Location: [facility];  Service:  Cardiothoracic;  Laterality: N/A;    IMPELLA, REMOVAL Right 2/27/2024    Procedure: Impella, Removal;  Surgeon: Nita Contreras MD;  Location: [facility] OR;  Service: Cardiothoracic;  Laterality: Right;    INSERTION OF INTRAVASCULAR MICROAXIAL BLOOD PUMP N/A 2/23/2024    Procedure: INSERTION, IMPELLA;  Surgeon: All Montoya MD;  Location: [facility] CATH LAB;  Service: Cardiology;  Laterality: N/A;  with swanz    RIGHT HEART CATHETERIZATION N/A 2/22/2024    Procedure: INSERTION, CATHETER, RIGHT HEART;  Surgeon: Shreya Lomax MD;  Location: [facility] CATH LAB;  Service: Cardiology;  Laterality: N/A;       Time Tracking:     PT Received On: 03/21/24  PT Start Time: 1300     PT Stop Time: 1318  PT Total Time (min): 18 min     Billable Minutes: Re-eval 18      03/21/2024

## 2024-03-21 NOTE — PROGRESS NOTES
[facility] [address]    Patient Name:  Brain Snyder   MRN:  [id number]  Date: 3/21/2024  Diagnosis: CAD (coronary artery disease)    History:     Past Medical History:   Diagnosis Date    A-fib     Aortic insufficiency     CAD (coronary artery disease)     Hypertension        Social History     Socioeconomic History    Marital status:    Tobacco Use    Smoking status: Former     Types: Cigarettes    Smokeless tobacco: Never   Substance and Sexual Activity    Alcohol use: Not Currently    Drug use: Not Currently     Social Determinants of Health     Financial Resource Strain: Low Risk  (2/16/2024)    Overall Financial Resource Strain (CARDIA)     Difficulty of Paying Living Expenses: Not hard at all   Food Insecurity: No Food Insecurity (2/16/2024)    Hunger Vital Sign     Worried About Running Out of Food in the Last Year: Never true     Ran Out of Food in the Last Year: Never true   Transportation Needs: No Transportation Needs (2/16/2024)    PRAPARE - Transportation     Lack of Transportation (Medical): No     Lack of Transportation (Non-Medical): No   Physical Activity: Inactive (2/16/2024)    Exercise Vital Sign     Days of Exercise per Week: 0 days     Minutes of Exercise per Session: 0 min   Stress: Stress Concern Present (2/16/2024)    [employer] - Occupational Stress Questionnaire     Feeling of Stress : To some extent   Social Connections: Socially Integrated (2/22/2024)    Social Connection and Isolation Panel [NHANES]     Frequency of Communication with Friends and Family: Twice a week     Frequency of Social Gatherings with Friends and Family: Twice a week     Attends Hinduism Services: 1 to 4 times per year     Active Member of Clubs or Organizations: Yes     Attends Club or Organization Meetings: 1 to 4 times per year     Marital Status:    Housing Stability: Low Risk  (2/16/2024)    Housing Stability Vital Sign      Unable to Pay for Housing in the Last Year: No     Number of Places Lived in the Last Year: 1     Unstable Housing in the Last Year: No       Precautions:     Allergies as of 02/21/2024    (No Known Allergies)       WO Assessment Details/Treatment      03/21/24 0851   WOCN Assessment   Visit Date 03/21/24   Visit Time 0851   Consult Type Follow Up   [facility] Speciality Wound   Wound surgical   Intervention chart review;assessed;applied   Teaching on-going        Incision/Site 02/27/24 2115 Right Groin anterior vertical;midline   Date First Assessed/Time First Assessed: 02/27/24 2115   Present Prior to Hospital Arrival?: No  Side: Right  Location: Groin  Orientation: anterior  Incision Type: vertical;midline  Additional Comments: From Impella Removal.   Wound Image    Dressing Appearance Intact;Dried drainage   Drainage Amount Scant   Drainage Characteristics/Odor Serosanguineous   Appearance Pink;Yellow;Dry   Black (%), Wound Tissue Color 0 %   Red (%), Wound Tissue Color 50 %   Yellow (%), Wound Tissue Color 50 %   Periwound Area Dry;Pink   Wound Edges Defined   Wound Length (cm) 4 cm   Wound Width (cm) 0.6 cm   Wound Depth (cm) 0.2 cm   Wound Volume (cm^3) 0.48 cm^3   Wound Surface Area (cm^2) 2.4 cm^2   Care Cleansed with:;Antimicrobial agent;Wound cleanser;Other (see comments)  (Vashe)   Dressing Reinforced     WOCN follow up for right groin. Discussed plan of care with nurse Das prior to visiting the patient. No family at bedside. Treatment recommendations in place. Patient demonstrated the ability to mobilize self by assisting to turn and reposition in the bed. Reinforced education from prior visit about hygiene, he verbalized understanding. Nursing to continue with treatment recommendations. No further questions at this time from staff or patient. Will follow up.   03/21/2024

## 2024-03-21 NOTE — PROGRESS NOTES
"  [facility]    Cardiology  Progress Note    Patient Name: Brain Snyder  MRN: [id number]  Admission Date: 2/21/2024  Hospital Length of Stay: 29 days  Code Status: Full Code   Attending Physician: Stan Lazar MD   Primary Care Physician: Nidia Shepherd NP  Expected Discharge Date:   Principal Problem:CAD (coronary artery disease)    Subjective:   Reason for Consult:  CAD     HPI: 78-year-old female that is unknown to [facility] with a PMHx of PAF on Eliquis Aortic insufficiency, MV CAD, HTN. He is Sami speaking and an  was used for interview. He was orginally admitted to [facility] on 2.15.24 with CP & NSTEMI and underwent a LHC on 2.20.24 that showed MV CAD (reported noted below) He was transferred to [facility] on 2.21.24 for a CABG/AVR evaluation. On arrive he was hemodynamically stable on a heparin infusion. He denied chest pain, SOB, and nausea on current exam, [facility] was consulted for CAD    Hospital Course:   2.23.24: Patient seen resting in bed. He denies SOB or CP. He remains on heparin infusion. Family at bedside   2.24.24: NAD. S/p Impella Placement/[place]-Chelo Catheter. "I am ok." + Blood Clots from Nose/Mouth, Hematuria 2/2 traumatic Indwelling Catheter Placement. Heparin Drip per Protocol. Impella P5  2.25.24: NAD. "I'm ok." Denies CP, SOB and Palps. PA Pressure Reading is not Accurate. CVP 5. Impella at P5. R Groin Impella P7. Remains Off Heparin Drip per Protocol. Now in SR.   2.26.24: NAD Noted. SR on Tele. Right Groin Impella in place, bruising with no hematoma noted. Distal pulses DP intact. Legs warm. NC 2 L/Min. Denies CP/SOB. Consent obtained from his daughter Brii Snyder. NPO for MV PCI Today.  2.27.24: NAD Noted. Impella remains in place at P7 Support. Good UA Noted, some pink tinged urine noted. SR on Tele. Pressors off. Denies CP/SOB. NPO for surgery today. Heparin on hold for surgery.  2.28.24: Patient is critically ill. AF RVR on Tele, twice shocked this AM with no " "success. On Amiodarone/Milrinone- Cardizem Infusion now off given hypotension and ICMO. Also no José Miguel/Levophed. Concern for bleeding noted, transfusion noted. Patient is intubated/Mechanically Vented. Hemodynamics: CO/CI 4.3/2.3 CVP 20.  2.29.24: Patient self extubated this AM. He is stable on NC Oxygen. Denies CP/SOB. SR on Tele. On Amiodarone Infusion and receiving blood products. BP Stable. Clinically much improved from yesterday.   3.1.24: NAD. Afib mild RVR on tele. On Primacor @ 0.187 mcg/kg/min. Amiodarone infusing per protocol. LFT's worsening. WBC worsening. + Incisional CP. On 5 L NC.   3.2.24: NAD. Net Negative 2700 urine output.   3.3.24: NAD. VSS. No complaints of CP/Palps. Reports SOB is improving. Creat 1.61 (Improved)  3.4.24: NAD. VSS. No complaints. On 2 L. Net Negative Fluid 3540 over 24 hours. Creat 1.37. LFTs slighting worse today  3.5.24: NAD. VSS. Denies CP, SOB and Palps. Family at Bedside. Fluid Balance Net Negative 4360mL. BUN/Crea 23.5/1.16, AST/ALT 69/71  3.6.24: Patient sitting up in bedside chair. Appears SOB. C/o abdominal bloating and gaseous. Reports + BM since surgery. Abdomen distended. Patient nausea and spit up bile colored fluid. + peripheral edema. O2 via NC. Excellent diuresis. Persistent leukocytosis. K+ 3.1, mild transaminitis. Groin Wound cx -- GNR and pseudomonas. CV surgery has signed off.   3.7.24: Patient sitting up in bed. NAD. Denies any pain. Noted SS drainage from impella insertion site. Leukocytosis has resolved. BP marginal at times. Afib, CVR. Good UOP; however weight is increasing.   3.8.24: Patient awake in bed. He has been weaned off. O2. Good diuresis overnight. Mild bump in creatinine-- 1.21 from 1.15 yesterday. Liver enzymes uptrending. VSS.   3.9.24: NAD. Language Barrier/Daughter at Bedside for Translation. Denies CP, SOB and Palps. Deconditioned. Fluid Balance Net Negative 5600mL.   3.10.24: NAD. "Seward." Denies CP, SOB and Palps. Daughter at " Bedside/Translating. Denies CP, SOB and Palps. Remains Deconditions. Fluid Balance Net Negative 2405mL. Na 130, BUN/Crea 20.3/1.22, AST/ALT 88/73  3.11.24: Patient awake in bed. NAD. Reviewed echo-EF 40-45% with mild RV enlargement and severely reduced RV function. Na 129 today. Renal function mildly bumped  3.12.24: Patient awake in bed. NAD. Hyponatremia stable. Nephrology following and has initiated a fluid restriction. Impella site still oozing SS fluid. Surgery recommending manually expressing fluid q shift and obtaining US to better define anatomy. WBC 13.79. Afebrile.   3.15.24: Patient resting in bed. NAD. Clear breath sounds. No edema. Good UOP. Na improving with addition of salt tabs. Creatinine 1.57. Still has transaminitis. Amiodarone placed on hold yesterday. Currently Afib, 90s. Mild hypotension at times. Nurse reports patient c/o chest pain today. When questioned, he reported incisional chest pain during attempts at BM.   3.16.24; Patient sleeping in bedside recliner. NAD. Na improving- 133 today. Renal indices improving. Albumin 2.4. EKG Afib, RVR with LBBB yesterday. Troponin 0.183--likely due to recent CABG. He was given digoxin IV load due to RVR. He was given a dose of midodrine this am due to sbp < 90.   3.17.24: Awake in bed. Bp better with addition of midodrine tid. Converted to SB this am. Currently on digoxin and low dose BB. Na and renal indices stable.   3.18.24: Patient awake in bed. NAD. Right groin impella site healing well. Transaminitis improving. Creatinine stable. Marginal Bps. Remains on midodrine with SBP 90s. Renal indices stable. K+ 5.2 today  3.19.24: Sleeping in bed. NAD. On RA. Clear breath sounds. No edema. Diuretics remain on hold. Midodrine was increased to 7.5 mg tid. BP up to 150s at times. Remains SB. Digoxin discontinued 3.17.24  3.20.24: Awake in bed. NAD. Family at bedside asking when patient will be discharged home. BP stable on midodrine. Currently SB 50s. Noted HR  upper 40s during sleep. H/H stable. Impella site continues to heal well. No leukocytosis. Renal indices stable.   3.21.24: Patient resting in bed. NAD. Blood pressure stable on midodrine. Bradycardic on tele review. Renal indices stable. Na 133.       PMH: PAF (on Eliquis), Aortic insufficiency, MV CAD, HTN  PSH: LHC  Family History: None Reported  Social History: Former Smoker, Denies ETOH or Illicit Drug Use      Previous Diagnostics:  TTE 03.11.24:  Left Ventricle: Regional wall motion abnormalities present. Septal motion is consistent with post-operative status. Akinetic inf -posterior wall There is moderately reduced systolic function with a visually estimated ejection fraction of 35 - 40%.    Right Ventricle: Mild right ventricular enlargement. Systolic function is severely reduced.    Aortic Valve: There is a bioprosthetic valve in the aortic position.    Pericardium: There is a trivial effusion. No indication of cardiac tamponade. Left pleural effusion.       CABG/Bio AVR/MOISE Ligation (2.27.24):  Coronary artery bypass grafting X 3, LIMA to LAD, reversed SVG to OM1, Reversed Saphenous venous graft to RCA  Bioprosthetic aortic valve replacement (Epic max 23mm), Endoscopic venous harvesting of left greater saphenous vein, Left atrial appendage ligation, explant of right femoral impella device, right femoral artery repair.    RHC/Impella Placement (2.23.24):  Right heart catheterization performed showed the following:  PA= 61/24 (27) mm Hg  PCWP=  31/26 (27) mm Hg  AO saturation= 93 % RA  PA saturation= 60% with Impella (49% yesterday)    (02/22/24) -  0.5  - Cardiac output was 2.8 L/min provided by the device.  Impella was at 84cm  Impression/plan:   Successful Impella insertion and swan-Chelo in the setting of Acute HF/low cardiac output/precardiogenic shock/Critcal-severe AS/MVCAD - LM distal    RHC (2.22.24):  Right heart catheterization demonstrating severe pulmonary hypertension 84/34 and PCWP 24  mmHg  Reduced cardiac output/cardiac index at 3.43/1.81 L/min/m2 with pulmonary artery saturations 49.3%  For applied to the right femoral vein following removal of the 7 German sheath  The estimated blood loss was none.    Carotid US (2.22.24):  The bilateral internal carotid artery demonstrated less than 50% stenosis.   The bilateral vertebral arteries were patent with antegrade flow    LHC (2.20.24):   Prox LAD lesion was 70% stenosed.  Ost Cx to Prox Cx lesion was 80% stenosed.  1st Mrg lesion was 80% stenosed.  2nd Mrg-1 lesion was 70% stenosed.  2nd Mrg-2 lesion was 50% stenosed.  Mid LAD lesion was 50% stenosed.  Prox RCA lesion was 60% stenosed.  estimated blood loss was <50 mL.  There was three vessel coronary artery disease.  LVEDP: 30mmHg  Recommendations:   Refer for CABG evaluation and AVR   Preformed by Dr. Flannery at [facility]     ECHO (2.15.24):  Left Ventricle: The left ventricle is normal in size. Mildly increased ventricular mass. Mildly increased wall thickness. There is mild eccentric hypertrophy. Moderate global hypokinesis present. Septal motion is consistent with bundle branch block. There is moderately reduced systolic function. Biplane (2D) method of discs ejection fraction is 40%. Grade II diastolic dysfunction.  Right Ventricle: Right ventricular enlargement. Systolic function is normal.  Left Atrium: Left atrium is severely dilated.  Right Atrium: Right atrium is moderately dilated.  Aortic Valve: There is moderate aortic valve sclerosis. Severely restricted motion. There is severe stenosis. Aortic valve area by VTI is 0.66 cm². Aortic valve peak velocity is 4.45 m/s. Mean gradient is 50 mmHg. The dimensionless index is 0.17. There is mild to moderate aortic regurgitation.  Mitral Valve: Mildly calcified leaflets. There is no stenosis. There is mild regurgitation.  Tricuspid Valve: The tricuspid valve is structurally normal. There is mild to moderate regurgitation.  Pulmonic Valve: There is no  significant regurgitation.  Aorta: Aortic root is upper limit of normal measuring 3.6 cm.  Pulmonary Artery: There is severe pulmonary hypertension. The estimated pulmonary artery systolic pressure is 69 mmHg.  IVC/SVC: Intermediate venous pressure at 8 mmHg.  Pericardium: There is no pericardial effusion.     Review of Systems   Cardiovascular:  Negative for chest pain, dyspnea on exertion and leg swelling.   Respiratory:  Negative for shortness of breath.    All other systems reviewed and are negative.    Objective:     Vital Signs (Most Recent):  Temp: 97.9 °F (36.6 °C) (03/21/24 1119)  Pulse: (!) 50 (03/21/24 1119)  Resp: 18 (03/21/24 1119)  BP: 99/61 (03/21/24 1119)  SpO2: 96 % (03/21/24 1119) Vital Signs (24h Range):  Temp:  [97.1 °F (36.2 °C)-97.9 °F (36.6 °C)] 97.9 °F (36.6 °C)  Pulse:  [43-54] 50  Resp:  [18-20] 18  SpO2:  [95 %-99 %] 96 %  BP: ()/(61-75) 99/61   Weight:  (bed malfunction unable to weigh pt)  Body mass index is 27.62 kg/m².  SpO2: 96 %       Intake/Output Summary (Last 24 hours) at 3/21/2024 1250  Last data filed at 3/21/2024 0603  Gross per 24 hour   Intake 1140 ml   Output 400 ml   Net 740 ml       Lines/Drains/Airways       Peripheral Intravenous Line  Duration                  Peripheral IV - Single Lumen 03/18/24 0622 Anterior;Left Forearm 3 days                  Significant Labs:   Recent Results (from the past 72 hour(s))   Basic Metabolic Panel    Collection Time: 03/19/24  5:53 AM   Result Value Ref Range    Sodium Level 133 (L) 136 - 145 mmol/L    Potassium Level 4.7 3.5 - 5.1 mmol/L    Chloride 103 98 - 107 mmol/L    Carbon Dioxide 23 23 - 31 mmol/L    Glucose Level 97 82 - 115 mg/dL    Blood Urea Nitrogen 18.1 8.4 - 25.7 mg/dL    Creatinine 1.28 (H) 0.73 - 1.18 mg/dL    BUN/Creatinine Ratio 14     Calcium Level Total 8.8 8.8 - 10.0 mg/dL    Anion Gap 7.0 mEq/L    eGFR 58 mls/min/1.73/m2   CBC with Differential    Collection Time: 03/19/24  6:01 AM   Result Value Ref Range     WBC 10.14 4.50 - 11.50 x10(3)/mcL    RBC 4.53 (L) 4.70 - 6.10 x10(6)/mcL    Hgb 12.4 (L) 14.0 - 18.0 g/dL    Hct 39.5 (L) 42.0 - 52.0 %    MCV 87.2 80.0 - 94.0 fL    MCH 27.4 27.0 - 31.0 pg    MCHC 31.4 (L) 33.0 - 36.0 g/dL    RDW 15.6 11.5 - 17.0 %    Platelet 380 130 - 400 x10(3)/mcL    MPV 9.7 7.4 - 10.4 fL    Neut % 65.2 %    Lymph % 16.4 %    Mono % 12.4 %    Eos % 3.6 %    Basophil % 0.9 %    Lymph # 1.66 0.6 - 4.6 x10(3)/mcL    Neut # 6.61 2.1 - 9.2 x10(3)/mcL    Mono # 1.26 0.1 - 1.3 x10(3)/mcL    Eos # 0.37 0 - 0.9 x10(3)/mcL    Baso # 0.09 <=0.2 x10(3)/mcL    IG# 0.15 (H) 0 - 0.04 x10(3)/mcL    IG% 1.5 %    NRBC% 0.0 %   Basic Metabolic Panel    Collection Time: 03/20/24  6:35 AM   Result Value Ref Range    Sodium Level 134 (L) 136 - 145 mmol/L    Potassium Level 4.6 3.5 - 5.1 mmol/L    Chloride 104 98 - 107 mmol/L    Carbon Dioxide 25 23 - 31 mmol/L    Glucose Level 88 82 - 115 mg/dL    Blood Urea Nitrogen 21.3 8.4 - 25.7 mg/dL    Creatinine 1.26 (H) 0.73 - 1.18 mg/dL    BUN/Creatinine Ratio 17     Calcium Level Total 8.9 8.8 - 10.0 mg/dL    Anion Gap 5.0 mEq/L    eGFR 59 mls/min/1.73/m2   CBC with Differential    Collection Time: 03/20/24  6:35 AM   Result Value Ref Range    WBC 8.82 4.50 - 11.50 x10(3)/mcL    RBC 4.28 (L) 4.70 - 6.10 x10(6)/mcL    Hgb 11.4 (L) 14.0 - 18.0 g/dL    Hct 36.0 (L) 42.0 - 52.0 %    MCV 84.1 80.0 - 94.0 fL    MCH 26.6 (L) 27.0 - 31.0 pg    MCHC 31.7 (L) 33.0 - 36.0 g/dL    RDW 15.8 11.5 - 17.0 %    Platelet 362 130 - 400 x10(3)/mcL    MPV 9.5 7.4 - 10.4 fL    Neut % 53.8 %    Lymph % 25.2 %    Mono % 13.7 %    Eos % 4.9 %    Basophil % 0.7 %    Lymph # 2.22 0.6 - 4.6 x10(3)/mcL    Neut # 4.75 2.1 - 9.2 x10(3)/mcL    Mono # 1.21 0.1 - 1.3 x10(3)/mcL    Eos # 0.43 0 - 0.9 x10(3)/mcL    Baso # 0.06 <=0.2 x10(3)/mcL    IG# 0.15 (H) 0 - 0.04 x10(3)/mcL    IG% 1.7 %    NRBC% 0.0 %   Basic Metabolic Panel    Collection Time: 03/21/24  6:17 AM   Result Value Ref Range    Sodium  Level 133 (L) 136 - 145 mmol/L    Potassium Level 5.0 3.5 - 5.1 mmol/L    Chloride 102 98 - 107 mmol/L    Carbon Dioxide 26 23 - 31 mmol/L    Glucose Level 82 82 - 115 mg/dL    Blood Urea Nitrogen 18.3 8.4 - 25.7 mg/dL    Creatinine 1.24 (H) 0.73 - 1.18 mg/dL    BUN/Creatinine Ratio 15     Calcium Level Total 8.8 8.8 - 10.0 mg/dL    Anion Gap 5.0 mEq/L    eGFR 60 mls/min/1.73/m2   CBC with Differential    Collection Time: 03/21/24  6:17 AM   Result Value Ref Range    WBC 9.04 4.50 - 11.50 x10(3)/mcL    RBC 4.45 (L) 4.70 - 6.10 x10(6)/mcL    Hgb 11.8 (L) 14.0 - 18.0 g/dL    Hct 38.3 (L) 42.0 - 52.0 %    MCV 86.1 80.0 - 94.0 fL    MCH 26.5 (L) 27.0 - 31.0 pg    MCHC 30.8 (L) 33.0 - 36.0 g/dL    RDW 15.9 11.5 - 17.0 %    Platelet 330 130 - 400 x10(3)/mcL    MPV 9.7 7.4 - 10.4 fL    Neut % 52.5 %    Lymph % 27.7 %    Mono % 12.8 %    Eos % 4.6 %    Basophil % 0.9 %    Lymph # 2.50 0.6 - 4.6 x10(3)/mcL    Neut # 4.74 2.1 - 9.2 x10(3)/mcL    Mono # 1.16 0.1 - 1.3 x10(3)/mcL    Eos # 0.42 0 - 0.9 x10(3)/mcL    Baso # 0.08 <=0.2 x10(3)/mcL    IG# 0.14 (H) 0 - 0.04 x10(3)/mcL    IG% 1.5 %    NRBC% 0.0 %     Telemetry: Sinus bradycardia      Physical Exam  Vitals reviewed.   Constitutional:       General: He is not in acute distress.     Appearance: Normal appearance.   HENT:      Head: Normocephalic.      Mouth/Throat:      Mouth: Mucous membranes are moist.   Eyes:      Extraocular Movements: Extraocular movements intact.      Conjunctiva/sclera: Conjunctivae normal.   Cardiovascular:      Rate and Rhythm: Bradycardia present. Rhythm irregular.      Pulses: Normal pulses.      Heart sounds: No murmur heard.  Pulmonary:      Effort: Pulmonary effort is normal. No respiratory distress.      Breath sounds: Normal breath sounds.      Comments: On RA  Abdominal:      Palpations: Abdomen is soft.   Genitourinary:     Comments: Nguyen removed    Skin:     General: Skin is warm.      Comments: Midline Sternotomy YVETTE with No Sign of  Bleed/Infection. Right Lower Abdominal Site healing well. Dressing CDI   Neurological:      General: No focal deficit present.      Mental Status: He is alert.   Psychiatric:         Mood and Affect: Mood normal.       Current Inpatient Medications:  Current Facility-Administered Medications:     acetaminophen oral solution 650 mg, 650 mg, Per OG tube, Q6H PRN, Vasile Carter PA-C, 650 mg at 03/18/24 1737    acetaminophen tablet 650 mg, 650 mg, Oral, Q6H PRN, Pablo Yepez MD, 650 mg at 03/20/24 2042    allopurinol split tablet 50 mg, 50 mg, Oral, Daily, Tanner Rdz MD, 50 mg at 03/21/24 0901    aspirin EC tablet 81 mg, 81 mg, Oral, Daily, Vasile Carter PA-C, 81 mg at 03/21/24 0859    bisacodyL suppository 10 mg, 10 mg, Rectal, Daily PRN, Tom Gilbert MD    ciprofloxacin HCl tablet 750 mg, 750 mg, Oral, Q12H, Tom Gilbert MD, 750 mg at 03/21/24 0901    cyproheptadine 4 mg tablet 4 mg, 4 mg, Oral, BID, Tom Gilbert MD, 4 mg at 03/21/24 0901    dextrose 10% bolus 125 mL 125 mL, 12.5 g, Intravenous, PRN, Pablo Yepez MD    dextrose 10% bolus 250 mL 250 mL, 25 g, Intravenous, PRN, Pablo Yepez MD    diclofenac sodium 1 % gel 2 g, 2 g, Topical (Top), TID, Tom Gilbert MD, 2 g at 03/20/24 2043    electrolyte-A infusion, , Intravenous, PRN, Pablo Yepez MD, Stopped at 02/28/24 0700    enoxaparin injection 40 mg, 40 mg, Subcutaneous, Daily, Cherry Suarez FNP, 40 mg at 03/20/24 1758    ferrous sulfate tablet 1 each, 1 tablet, Oral, Every other day, Tanner Rdz MD, 1 each at 03/20/24 0950    folic acid tablet 1 mg, 1 mg, Oral, Daily, Vasile Carter PA-C, 1 mg at 03/21/24 0901    guaiFENesin-codeine 100-10 mg/5 ml syrup 10 mL, 10 mL, Oral, TID, Tom Gilbert MD, 10 mL at 03/21/24 0901    HYDROcodone-acetaminophen 5-325 mg per tablet 1 tablet, 1 tablet, Oral, Q6H PRN, Stan Lazar MD, 1 tablet at 03/14/24 1447    lactulose 10 gram/15 ml solution 20 g, 20 g, Oral,  Q6H PRN, Vasile Carter PA-C, 20 g at 03/13/24 1343    loperamide capsule 2 mg, 2 mg, Oral, Continuous PRN, Vasile Carter PA-C, 2 mg at 03/02/24 1253    melatonin tablet 6 mg, 6 mg, Oral, Nightly PRN, Tanner Rdz MD, 6 mg at 03/20/24 2058    metoclopramide injection 5 mg, 5 mg, Intravenous, Q6H PRN, Vasile Carter PA-C    metoprolol succinate (TOPROL-XL) 24 hr split tablet 12.5 mg, 12.5 mg, Oral, Daily, Cherry Suarez FNP, 12.5 mg at 03/20/24 1150    midodrine tablet 7.5 mg, 7.5 mg, Oral, TID WM, Tom Gilbert MD, 7.5 mg at 03/21/24 1150    nitroGLYCERIN SL tablet 0.4 mg, 0.4 mg, Sublingual, Q5 Min PRN, Tanner Rdz MD    ondansetron disintegrating tablet 8 mg, 8 mg, Oral, Q8H PRN, Tanner Rdz MD, 8 mg at 03/11/24 1253    pantoprazole EC tablet 40 mg, 40 mg, Oral, Daily, Tanner Rdz MD, 40 mg at 03/21/24 0901    polyethylene glycol packet 17 g, 17 g, Oral, BID, Tom Gilbert MD, 17 g at 03/21/24 0901    simethicone chewable tablet 80 mg, 1 tablet, Oral, TID PRN, Tanner Rdz MD, 80 mg at 03/20/24 1352    sodium chloride 0.9% flush 10 mL, 10 mL, Intravenous, PRN, Millie Jimenez NP    sucralfate tablet 1 g, 1 g, Oral, QID (AC & HS), Vasile Carter PA-C, 1 g at 03/21/24 1149  VTE Risk Mitigation (From admission, onward)           Ordered     enoxaparin injection 40 mg  Daily         03/07/24 0559     IP VTE HIGH RISK PATIENT  Once         03/02/24 0759     Place SUSIE hose  Until discontinued         02/22/24 1457     Place sequential compression device  Until discontinued         02/21/24 2057                  Assessment:   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 Severely restricted motion. There is severe stenosis. Aortic valve area by VTI is 0.66 cm². Aortic valve peak velocity is 4.45 m/s. Mean gradient is 50 mmHg. The dimensionless index is 0.17.   Cardiogenic Shock (Post Cardiotomy) - Off Vasopressor Support - Resolved     - History of Hypertension   Ischemic Cardiomyopathy/EF 30%  Elevated LFTs - persistent  Pulmonary HTN    - ECHO PASP 69mmHg     - RHC (2.22.24): PA 84/34, PCWP 24 mmHg  Hematuria 2/2 Traumatic/Difficult Indwelling Catheter Placement (Resolved)  Urethral Stricture s/p Dilatation 2.23.24  PAF - Currently CVR    - Status Post Bedside Cardioversion x 2 on 2.27.24 (Both Unsuccessful)    - Status Post MOISE Ligation (2.27.24)    - CHADsVASc - 5 Points - 7.2% Stroke Risk per Year   Left Bundle Branch Block   VHD    - ECHO (3.7.24): Bio AVR, Mild MR    - ECHO (2.16.24): Severe AS, mild MR, mild to moderate TR  JEAN-CLAUDE/CKD Stage II - I  - Baseline Cr 1.6  Anemia- stable    - Status Post Transfusion on 2.28.24 & 2.29.24  Thrombocytopenia - Resolved   Leukocytosis - resolved    - Groin Wound Culture: Serratia Marcescens and Pseudomonas Aeruginosa   Hyponatremia - s/t SIADH    Plan:   Continue IV Abx per Primary Team   Continue ASA and BB  Statin and amiodarone have been discontinued due to persistent transaminitis. Resume statin once transaminitis resolved  HF GDMT- Continue BB (hold for sbp < 100 or HR < 60). Hold diuretic due to hypotension  Continue midodrine 7.5 mg tid. Wean for MAP  > 65  Unable to add ARNI/MRA/SGLT2 due to hypotension  Accurate I&Os and Daily Weights   Keep K > 4.0 and Mg  > 2.0  Aggressive Mobilization of PT and Q1HR IS (PT/OT Eval and Treat)    [facility]IS signing off. Reconsult if needed. F/u [facility] cardiology    Cherry Suarez,  FNP  Cardiology  [facility]   03/17/2024

## 2024-03-21 NOTE — PROGRESS NOTES
[facility] Hospital Medicine Progress Note        Chief Complaint: Inpatient Follow-up for R groin cellulitis     HPI per admitting team: 78-year-old male with a history of aortic insufficiency/stenosis, CAD, CKD IIIb, HTN AFib on Eliquis admitted to [facility] 02/15/2024 with chest pain, found to have NSTEMI (peak troponin 0.17) and underwent C 02/20/2024 showing severe multivessel stenosis and therefore was transferred to [facility] for CABG evaluation. Cardiology was consulted Patient had Impella placed on 02/23 and was admitted to the ICU.  Was started on aggressive diuresis with IV Lasix.  CV surgery was consulted.  Urology was consulted for hematuria; Nguyen catheter placed over guidewire with dilation secondary to urethral strictures. IV Heparin infusion was initiated per CIS but held due to hematuria/hemoptysis on 02/24.  He was also noted to have an JEAN-CLAUDE. Received 2 units PRBCs on 02/26 and was planned for high-risk PCI on 02/26 which was later canceled.  Underwent CABG x 3 2/27 (LIMA to LAD, RSVG to OM 1, R SVG to RCA, bioprosthetic AVR).  Remained on mechanical ventilation thereafter.  Patient noted to have tachycardia with atrial fibrillation/RVR requiring IV amiodarone along with pressors (norepinephrine/Milrinone) for hypotension. Patient underwent cardioversion x 2 with minimal improvement in HR/BP.  Patient self-extubated overnight on 02/29 and was noted to have adequate saturations on 2 L O2 via NC thereafter.  PT/OT consulted.  Renal function noted to be improving. Continued on IV diuresis as he appeared to be significantly volume overloaded postoperatively along with elevated pulmonary artery wedge pressures.  Started on p.o. amiodarone taper on 03/03.  Patient noted to have drainage from right groin wound and started on vancomycin on 03/04. Wound culture grew Pseudomonas.  Chest tubes discontinued on 03/02.  Downgraded from ICU on 03/02.  CIS and CV surgery continued following  patient.  CV surgery signed off on 03/06 and discontinued vancomycin.  Patient was placed on oral Levaquin.  Hospital medicine consulted on 03/06 for assistance with medical management and DC planning.  Repeat CT abdomen and pelvis of 3/9 shows improving stranding and improving hematoma in the right groin, persistent left-sided effusion and atelectasis  Patient complains of low back pain, cough and his SCDs too tight on his feet bilaterally  Vitals reviewed with blood pressure low normal, heart rates in the low 100s, saturating 94% on 2 L of oxygen   Labs reviewed and fairly stable   General surgery evaluated patient, reviewed CT scan of the area, given wound waning with scant serous/purulent drainage with manual expression, recommended wound care consult and continued to manually express fluid from site Q shift.  If area stopped draining, recommended do ultrasound to better define anatomy  Palliative care on board  ID adjusted antibiotics to IV cefepime  Nephrology placed on fluid restriction  Cardiology has suspended amiodarone due to elevated liver enzymes and Lasix due to hyponatremia  EKG showed atrial fibrillation with RVR, left bundle branch block-->  received digoxin 500 mcg push followed by 250 mcg q.6 for 2 doses for rate control and now on digoxin 0.125 mg daily which has now been discontinued  Troponin 0.18, per Cardiology probably post CABG Troponinemia       Interval Hx:   No new problems   No overnight events  Vitals reviewed and stable on room air   Labs reviewed and stable   Cis signed off    Case was discussed with patient's nurse and updated  on my discussion with patient and family       Objective/physical exam:  General:  Sitting in chair, looks much better  Chest: Clear to auscultation bilaterally anteriorly  Heart: RRR, +S1, S2, no appreciable murmur  Abdomen: Soft, tender to palpate and right upper quadrant and left lower quadrant.  Bowel sounds positive x4  MSK: Warm, bilateral  lower extremity edema present, no clubbing or cyanosis  Neurologic:  Awake alert and oriented, smiling, conversation     Assessment/Plan:  Constipation- resolved  Hypotension- improved with midodrine   Right flank pain- due to right-sided lower abdomen hematoma- resolved  R Groin wound infection with Cellulitis at previous impella insertion site- Pseudomonas aeruginosa/Serratia marcescens, slowly improving   Transaminitis- multifactorial-  Congestive hepatopathy versus infection vs drug induced- now trending down  Leukocytosis- resolved  Fluid overload 2/2 HFrEF exacerbation, fairly euvolemic clinically  HFrEF/HFpEF, euvolemic   JEAN-CLAUDE on stage II CKD - resolved   Hyponatremia due to SIADH- resolved  Pulmonary hypertension   NSTEMI, CAD sp CABG x 3, S/p AVR also  Atrial Fibrillation with RVR- resolved  Normocytic anemia  Urethral stricture s/p dilation with gustafson 2/23, removed 3/18  Hyperkalemia-mild- resolved  Moderate malnutrition       Plan  Vitals reviewed and stable on room air   Labs reviewed and stable   Cis signed off   Per Cardiology, midodrine can be dosed up till 10 mg t.i.d. if needed, currently up to 7.5 mg t.i.d and blood pressure has responded pretty well  Lasix, Lisinopril and Aldactone also suspended, due to low blood pressure  Continue po metoprolol succinate 12.5 mg q.day  Cardiology has suspended amiodarone and atorvastatin due to elevated liver enzymes  Trend AST/ALT- 80/78  Ultrasound right upper quadrant shows mild-to-moderate hepatomegaly.  No biliary dilation  Pt continue to complain of abdominal and low back pain--> ordered CT Abddmen pelvis w/o contrast--> inflammatory changes noted in the right groin, possibly related to recent surgery.  Small focus of air noted.  Stool throughout the colon at would be seen with constipation.  Left pleural effusion with atelectasis, changes of left lower lobe early infection process can not be excluded  Ordered Dulcolax suppository x1 and relistor--> had a  BM last night and a huge BM 3/19  Cont Miralax BID   Added volatren get to the lower back for pain control  Wound culture grew Pseudomonas and Serratia marcescens- both sensitive to cefepime  ID Dr KELLY adjusted antibiotics to IV cefepime- day 9, Dr KELLY recommended total of 14 days treatment with end date 3/25.  Will switch to Cipro 750mg b.i.d. for that duration (renally dosed)  Nephrology placed pt on 1500 mL fluid restriction for hyponatremia, sodium improved to 134--> sodium chloride tablets discontinued per Nephrology  Creatinine slightly improved to 1.2, monitor closely, case personally discussed with Laurel BURNS, nephrology will sign off   Appreciate pulmonology input continue to monitor effusion, no need for thoracentesis at this time  Continue use of incentive spirometry and oxygen supplementation, currently on room air, pt not compliant with instructions   Appreciate Urology input, case was personally discussed with Cora Gonzalez and Isidra BURNS, per their recommendations, Nguyen removed on 3/18, no issues with voiding since then  Nguyen removed 3/18, case was personally discussed with isidra BURNS with Urology  Continued allopurinol 50 mg daily, aspirin 81 mg, FeSO4 every other day, folic acid 1 mg, Protonix 40 mg daily, sucralfate 1 g q.i.d.  P.o. Norco 5 mg q.6 p.r.n. for pain  Antitussives p.r.n.  PT/OT recommending moderate-intensity therapy, case management on board, awaiting Medicaid approval for SNF.  Patient's daughter has submitted paperwork to Medicaid--> unfortunately patient is not eligible for Medicaid given illegal status  Patient is willing to work with therapy daily.  Tentative plan for discharge on Monday if patient is strong enough to go home with family  Palliative care also on board, greatly appreciated  Wound Care also on board, case personally discussed with Lionel, informed that the wound site is very small for packing but she will continue to express drain age from the site  Continue  cyproheptadine, appetite is improving  Morning labs stable, repeat Friday     VTE prophylaxis:  Lovenox 40     Patient condition:  Fair     Anticipated discharge and Disposition:   not a candidate for Medicaid given illegal status, if physically improved, home with family on Monday (already discussed with pt and daughter vis  service)     All diagnosis and differential diagnosis have been reviewed; assessment and plan has been documented; I have personally reviewed the labs and test results that are presently available; I have reviewed the patients medication list; I have reviewed the consulting providers response and recommendations. I have reviewed or attempted to review medical records based upon their availability     All of the patient's questions have been  addressed and answered. Patient's is agreeable to the above stated plan. I will continue to monitor closely and make adjustments to medical management as needed.      VITAL SIGNS: 24 HRS MIN & MAX LAST   Temp  Min: 97.1 °F (36.2 °C)  Max: 97.9 °F (36.6 °C) 97.9 °F (36.6 °C)   BP  Min: 99/61  Max: 147/75 99/61   Pulse  Min: 43  Max: 54  (!) 50   Resp  Min: 18  Max: 20 18   SpO2  Min: 95 %  Max: 99 % 96 %     I have reviewed the following labs:  Recent Labs   Lab 03/19/24  0601 03/20/24  0635 03/21/24  0617   WBC 10.14 8.82 9.04   RBC 4.53* 4.28* 4.45*   HGB 12.4* 11.4* 11.8*   HCT 39.5* 36.0* 38.3*   MCV 87.2 84.1 86.1   MCH 27.4 26.6* 26.5*   MCHC 31.4* 31.7* 30.8*   RDW 15.6 15.8 15.9    362 330   MPV 9.7 9.5 9.7     Recent Labs   Lab 03/15/24  0402 03/16/24  0400 03/17/24  0353 03/18/24  0433 03/19/24  0553 03/20/24  0635 03/21/24  0617   * 133* 132* 131* 133* 134* 133*   K 4.9 4.5 5.1 5.2* 4.7 4.6 5.0   CO2 27 25 22* 23 23 25 26   BUN 24.6 22.5 23.7 20.8 18.1 21.3 18.3   CREATININE 1.57* 1.48* 1.29* 1.26* 1.28* 1.26* 1.24*   CALCIUM 8.3* 8.3* 8.3* 8.4* 8.8 8.9 8.8   MG  --  2.00  --   --   --   --   --    ALBUMIN 2.7* 2.4* 2.6*  2.6*  --   --   --    ALKPHOS 127  --  110 109  --   --   --    ALT 84*  --  82* 76*  --   --   --    AST 98*  --  90* 80*  --   --   --    BILITOT 1.4  --  1.1 1.1  --   --   --      Microbiology Results (last 7 days)       ** No results found for the last 168 hours. **             See below for Radiology    Scheduled Med:   allopurinoL  50 mg Oral Daily    aspirin  81 mg Oral Daily    ciprofloxacin HCl  750 mg Oral Q12H    cyproheptadine  4 mg Oral BID    diclofenac sodium  2 g Topical (Top) TID    enoxparin  40 mg Subcutaneous Daily    ferrous sulfate  1 tablet Oral Every other day    folic acid  1 mg Oral Daily    guaiFENesin-codeine 100-10 mg/5 ml  10 mL Oral TID    metoprolol succinate  12.5 mg Oral Daily    midodrine  7.5 mg Oral TID WM    pantoprazole  40 mg Oral Daily    polyethylene glycol  17 g Oral BID    sucralfate  1 g Oral QID (AC & HS)      Continuous Infusions:   loperamide        PRN Meds:  acetaminophen, acetaminophen, bisacodyL, dextrose 10%, dextrose 10%, electrolyte-A, HYDROcodone-acetaminophen, lactulose 10 gram/15 ml, loperamide, melatonin, metoclopramide, nitroGLYCERIN, ondansetron, simethicone, sodium chloride 0.9%     Assessment/Plan:      VTE prophylaxis:     Patient condition:  Stable/Fair/Guarded/ Serious/ Critical    Anticipated discharge and Disposition:         All diagnosis and differential diagnosis have been reviewed; assessment and plan has been documented; I have personally reviewed the labs and test results that are presently available; I have reviewed the patients medication list; I have reviewed the consulting providers response and recommendations. I have reviewed or attempted to review medical records based upon their availability    All of the patient's questions have been  addressed and answered. Patient's is agreeable to the above stated plan. I will continue to monitor closely and make adjustments to medical management as  needed.  _____________________________________________________________________    Nutrition Status:    Radiology:  I have personally reviewed the following imaging and agree with the radiologist.     CT Abdomen Pelvis  Without Contrast  Narrative: EXAMINATION:  CT ABDOMEN PELVIS WITHOUT CONTRAST    CLINICAL HISTORY:  Abdominal pain, acute, nonlocalized;    TECHNIQUE:  Multidetector non-contrast axial CT images of the abdomen and pelvis were obtained with coronal and sagittal reconstructions.    Automatic exposure control was utilized to reduce the patient's radiation dose.    DLP= 307    COMPARISON:  03/15/2024    FINDINGS:  01. HEPATOBILIARY: No focal hepatic lesion is identified, however evaluation is limited secondary to lack of IV contrast. The gallbladder is normal.    02. SPLEEN: Normal    03. PANCREAS: No focal masses or ductal dilatation.    04. ADRENALS: No adrenal nodules.    05. KIDNEYS: The right kidney demonstrates no stone, hydronephrosis, or hydroureter. No focal mass identified. The left kidney demonstrates no stone, hydronephrosis, or hydroureter. No focal mass identified.    06. LYMPHADENOPATHY/RETROPERITONEUM: There is no retroperitoneal lymphadenopathy. The abdominal aorta is normal in course and caliber.    07. BOWEL: Stool throughout the colon as may be seen with constipation.    08. PELVIC VISCERA: Normal. No pelvic mass.    09. PELVIC LYMPH NODES: No lymphadenopathy.    10. PERITONEUM/ABDOMINAL WALL: Inflammatory change noted within the right groin possibly related to recent surgery.  Small focus of air noted.    11. SKELETAL: No aggressive appearing lytic/blastic lesion. No acute fractures, subluxations or dislocations.    12. LUNG BASES: Left pleural effusion with atelectatic changes of the left lower lobe  Impression: Left pleural effusion with atelectatic changes of the left lower lobe early infectious process is not excluded.    Inflammatory change noted within the right groin possibly  related to recent surgery.  Small focus of air noted.  Correlate with physical exam.    Stool noted throughout the colon as would be seen with constipation.    Electronically signed by: Mann Hernandez  Date:    03/17/2024  Time:    11:25      Stan Lazar MD  Department of Hospital Medicine   [facility]   03/21/2024

## 2024-03-22 PROCEDURE — 25000003 PHARM REV CODE 250: Performed by: PHYSICIAN ASSISTANT

## 2024-03-22 PROCEDURE — 97530 THERAPEUTIC ACTIVITIES: CPT | Mod: CQ

## 2024-03-22 PROCEDURE — 97535 SELF CARE MNGMENT TRAINING: CPT

## 2024-03-22 PROCEDURE — 97116 GAIT TRAINING THERAPY: CPT | Mod: CQ

## 2024-03-22 PROCEDURE — 21400001 HC TELEMETRY ROOM

## 2024-03-22 PROCEDURE — 63600175 PHARM REV CODE 636 W HCPCS: Performed by: NURSE PRACTITIONER

## 2024-03-22 PROCEDURE — 25000003 PHARM REV CODE 250: Performed by: INTERNAL MEDICINE

## 2024-03-22 RX ORDER — MIDODRINE HYDROCHLORIDE 5 MG/1
10 TABLET ORAL
Status: DISCONTINUED | OUTPATIENT
Start: 2024-03-22 | End: 2024-03-26 | Stop reason: HOSPADM

## 2024-03-22 RX ADMIN — CYPROHEPTADINE HYDROCHLORIDE 4 MG: 4 TABLET ORAL at 08:03

## 2024-03-22 RX ADMIN — DICLOFENAC SODIUM 2 G: 10 GEL TOPICAL at 08:03

## 2024-03-22 RX ADMIN — POLYETHYLENE GLYCOL 3350 17 G: 17 POWDER, FOR SOLUTION ORAL at 08:03

## 2024-03-22 RX ADMIN — CIPROFLOXACIN HYDROCHLORIDE 750 MG: 500 TABLET, FILM COATED ORAL at 09:03

## 2024-03-22 RX ADMIN — FERROUS SULFATE TAB 325 MG (65 MG ELEMENTAL FE) 1 EACH: 325 (65 FE) TAB at 09:03

## 2024-03-22 RX ADMIN — FOLIC ACID 1 MG: 1 TABLET ORAL at 09:03

## 2024-03-22 RX ADMIN — CIPROFLOXACIN HYDROCHLORIDE 750 MG: 500 TABLET, FILM COATED ORAL at 08:03

## 2024-03-22 RX ADMIN — ASPIRIN 81 MG: 81 TABLET, COATED ORAL at 09:03

## 2024-03-22 RX ADMIN — SUCRALFATE 1 G: 1 TABLET ORAL at 05:03

## 2024-03-22 RX ADMIN — ENOXAPARIN SODIUM 40 MG: 40 INJECTION SUBCUTANEOUS at 05:03

## 2024-03-22 RX ADMIN — MIDODRINE HYDROCHLORIDE 10 MG: 5 TABLET ORAL at 05:03

## 2024-03-22 RX ADMIN — PANTOPRAZOLE SODIUM 40 MG: 40 TABLET, DELAYED RELEASE ORAL at 09:03

## 2024-03-22 RX ADMIN — ALLOPURINOL 50 MG: 300 TABLET ORAL at 09:03

## 2024-03-22 RX ADMIN — DICLOFENAC SODIUM 2 G: 10 GEL TOPICAL at 09:03

## 2024-03-22 RX ADMIN — MIDODRINE HYDROCHLORIDE 10 MG: 5 TABLET ORAL at 01:03

## 2024-03-22 RX ADMIN — CYPROHEPTADINE HYDROCHLORIDE 4 MG: 4 TABLET ORAL at 09:03

## 2024-03-22 RX ADMIN — Medication 6 MG: at 08:03

## 2024-03-22 NOTE — PT/OT/SLP PROGRESS
Occupational Therapy   Treatment    Name: Brain Snyder  MRN: [id number]  Admitting Diagnosis:  CAD (coronary artery disease)  24 Days Post-Op    Recommendations:     Recommended therapy intensity at discharge: Moderate Intensity Therapy   Discharge Equipment Recommendations:  none  Barriers to discharge:   (Ongoing medical needs)    Assessment:     Brain Snyder is a 78 y.o. male with a medical diagnosis of CAD (coronary artery disease) s/p CABG.  He presents with great effort, tolerated session well despite reporting R knee pain. Pt was overall Min-Mod A for sit<>stand with RW while maintaining sternal precautions and CGA for oral care in stand at the sink. Pt requiring seated rest break after grooming in stand. Performance deficits affecting function are weakness, impaired endurance, impaired self care skills, impaired functional mobility, impaired balance, gait instability.     Rehab Prognosis:  Good; patient would benefit from acute skilled OT services to address these deficits and reach maximum level of function.       Plan:     Patient to be seen 5 x/week to address the above listed problems via self-care/home management, therapeutic activities, therapeutic exercises  Plan of Care Expires: 04/09/24  Plan of Care Reviewed with: patient    Subjective     Pain/Comfort:  Pain Rating 1:  (Chronic R knee pain, rating not provided)  Location - Side 1: Right  Location 1: knee  Pain Addressed 1: Reposition, Distraction    Objective:     Communicated with: Nurse prior to session.  Patient found up in chair with peripheral IV, pulse ox (continuous), telemetry upon OT entry to room.    General Precautions: Standard, fall, sternal    Orthopedic Precautions:N/A  Braces: N/A  Respiratory Status: Room air     Occupational Performance:     Bed Mobility:    Patient completed Sit to Supine with contact guard assistance     Functional Mobility/Transfers:  Patient completed Sit <> Stand Transfer with minimum/ moderate  assistance  with  rolling walker   Functional Mobility: Pt was overall CGA for functional ambulation with RW, no LOB noted. Provided gait belt and education on its use for safety with transfers upon return home.     Activities of Daily Living:  Grooming: contact guard assistance oral care in stand at the sink with RW    Therapeutic Positioning    OT interventions performed during the course of today's session in an effort to prevent and/or reduce acquired pressure injuries:   Education was provided on benefits of and recommendations for therapeutic positioning     Findings: known area of altered skin integrity at sternal incision    Patient Education:  Patient provided with verbal education and demonstrations education regarding OT role/goals/POC, post op precautions, fall prevention, safety awareness, and Discharge/DME recommendations.  Understanding was verbalized, however additional teaching warranted.      Patient left HOB elevated with all lines intact, call button in reach, and nurse notified.    GOALS:   Multidisciplinary Problems       Occupational Therapy Goals          Problem: Occupational Therapy    Goal Priority Disciplines Outcome Interventions   Occupational Therapy Goal     OT, PT/OT Ongoing, Progressing    Description: Goals to be met by: 4/1/24     Patient will increase functional independence with ADLs by performing:    UE Dressing with min A   Grooming while standing at sink with Minimal Assistance.  Toileting from toilet with Moderate Assistance for hygiene and clothing management.   Sitting at edge of bed x30 minutes with Minimal Assistance. (Met)  Toilet transfer with Minimal Assistance.                         Time Tracking:     OT Date of Treatment: 03/22/24  OT Start Time: 1428  OT Stop Time: 1449  OT Total Time (min): 21 min    Billable Minutes:Self Care/Home Management 21 minutes    OT/YVETTE: OT     Number of YVETTE visits since last OT visit: 2    3/22/2024

## 2024-03-22 NOTE — PROGRESS NOTES
Inpatient Nutrition Assessment    Admit Date: 2/21/2024   Total duration of encounter: 30 days   Patient Age: 78 y.o.    Nutrition Recommendation/Prescription     Oral diet, Diet heart healthy Fluid - 1500mL as tolerated. Encourage small and frequent meals.   Boost Breeze (provides 250 kcal, 9 g protein per serving) BID as tolerated.   Appetite stimulant and bowel regimen as feasible.     Communication of Recommendations: reviewed with nurse and reviewed with patient    Nutrition Assessment     Malnutrition Assessment/Nutrition-Focused Physical Exam  Malnutrition Context: acute illness or injury (03/11/24 1350)  Malnutrition Level: moderate (03/11/24 1350)  Energy Intake (Malnutrition): less than or equal to 75% for greater than or equal to 7 days (03/22/24 1244)  Weight Loss (Malnutrition):  (unable to determine) (03/11/24 1350)  Subcutaneous Fat (Malnutrition):  (does not meet criteria) (03/11/24 1350)           Muscle Mass (Malnutrition): mild depletion (03/11/24 1350)  [place] (Muscle Loss): mild depletion     Clavicle and Acromion Bone Region (Muscle Loss): mild depletion                 Fluid Accumulation (Malnutrition): mild (03/11/24 1350)        A minimum of two characteristics is recommended for diagnosis of either severe or non-severe malnutrition.  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% - (Compensated)   JEAN-CLAUDE on CKD, resolved  Constipation, resolved   Hyperkalemia, resolved  Moderate malnutrition    Relevant Medical History: PAF on  Eliquis, Aortic insufficiency, MVCAD, HTN     Scheduled Medications:  allopurinoL, 50 mg, Daily  aspirin, 81 mg, Daily  ciprofloxacin HCl, 750 mg, Q12H  cyproheptadine, 4 mg, BID  diclofenac sodium, 2 g, TID  enoxparin, 40 mg, Daily  ferrous sulfate, 1 tablet, Every other day  folic acid, 1 mg, Daily  guaiFENesin-codeine 100-10 mg/5 ml, 10 mL, TID  metoprolol succinate, 12.5 mg, Daily  midodrine, 10 mg, TID WM  pantoprazole, 40 mg, Daily  polyethylene glycol, 17 g, BID    Continuous Infusions:  loperamide    PRN Medications: acetaminophen, acetaminophen, bisacodyL, dextrose 10%, dextrose 10%, electrolyte-A, HYDROcodone-acetaminophen, lactulose 10 gram/15 ml, loperamide, melatonin, metoclopramide, nitroGLYCERIN, ondansetron, simethicone, sodium chloride 0.9%    Calorie Containing IV Medications: no significant kcals from medications at this time    Recent Labs   Lab 03/16/24  0400 03/17/24  0353 03/18/24  0433 03/19/24  0553 03/19/24  0601 03/20/24  0635 03/21/24  0617   * 132* 131* 133*  --  134* 133*   K 4.5 5.1 5.2* 4.7  --  4.6 5.0   CALCIUM 8.3* 8.3* 8.4* 8.8  --  8.9 8.8   PHOS 2.1*  --   --   --   --   --   --    MG 2.00  --   --   --   --   --   --    CHLORIDE 101 103 101 103  --  104 102   CO2 25 22* 23 23  --  25 26   BUN 22.5 23.7 20.8 18.1  --  21.3 18.3   CREATININE 1.48* 1.29* 1.26* 1.28*  --  1.26* 1.24*   EGFRNORACEVR 48 57 59 58  --  59 60   GLUCOSE 99 88 81* 97  --  88 82   BILITOT  --  1.1 1.1  --   --   --   --    ALKPHOS  --  110 109  --   --   --   --    ALT  --  82* 76*  --   --   --   --    AST  --  90* 80*  --   --   --   --    ALBUMIN 2.4* 2.6* 2.6*  --   --   --   --    WBC  --  10.55  --   --  10.14 8.82 9.04   HGB  --  12.3*  --   --  12.4* 11.4* 11.8*   HCT  --  39.7*  --   --  39.5* 36.0* 38.3*     Nutrition Orders:  Diet heart healthy Fluid - 1500mL  Dietary nutrition supplements Boost Breeze Wild Berry; BID    Appetite/Oral Intake: fair/50-75% of meals  Factors Affecting  "Nutritional Intake: decreased appetite  Food/Muslim/Cultural Preferences: unable to obtain  Food Allergies: no known food allergies  Last Bowel Movement: 03/20/24  Wound(s):      Comments    2/27: Pt NPO this AM, in OR for CABG/AVR at time of rounds. Pt with poor intake since admission per EMR. Last BM 2/25, meds noted. Per MST, no reports of decreased appetite or unintentional weight loss prior admission. Weight fluctuations noted past week, likely related to fluid. Will trend weights.    3/1/24: Pt previously on liquid diet. Diet changed earlier due pt pocketing meds. SLP following pt. Will follow for need for TF to start if NG placed. Pt confused, not able to answer questions.     3/6/24: Spoke to family member at bedside, pt eating minimally. Reports intermittent nausea and vomiting. Having Bms, last documented on 3/3. Drinking some of Boost VHC but states "too sweet" and worsens nausea. Will trial Boost Breeze until GI symptoms improve. Receiving zofran prn.     3/11/24: Spoke to daughter at bedside, fair intake of liquids. Drinking ~1 Boost Breeze daily. Continues with intermittent nausea and vomiting affecting po intake. Has been on clear liquid diet since 3/7. Will leave PPN recommendations since he will not be able to meet nutritional needs on clear liquids or with n/v symptoms.     3/15/24: Ate only mashed potatoes for lunch, states feeling nauseated. Getting Boost Breeze BID. RN in room with , about to administer enema. Last documented BM 3/11.     3/19/24: Spoke to pt via . Ate only a banana today, denies abdominal pain but keeps talking about bowel movements. Had a small BM over night and a large BM with therapy today. Started on appetite stimulant over the weekend. Encouraged increased intake as tolerated.     3/22/24: Ate muffin, juice and some of Boost supplement for breakfast. Did not want to eat lunch at bedside yet. No family at bedside.     Anthropometrics    Height: 5' 5" " (165.1 cm),    Last Weight:  (bed malfunction unable to weigh pt) (03/21/24 0603), Weight Method: Bed Scale  BMI (Calculated): 27.6  BMI Classification: overweight (BMI 25-29.9)        Ideal Body Weight (IBW), Male: 136 lb                                Usual Weight Provided By: unable to obtain usual weight    Wt Readings from Last 5 Encounters:   02/19/24 81.1 kg (178 lb 12.8 oz)     Weight Change(s) Since Admission: -6.9kg since admit  Wt Readings from Last 1 Encounters:   03/16/24 0558 75.3 kg (166 lb 0.1 oz)   03/15/24 0555 80.6 kg (177 lb 11.2 oz)   03/13/24 0625 79.7 kg (175 lb 9.6 oz)   03/12/24 0544 80.3 kg (177 lb)   03/11/24 0555 85.6 kg (188 lb 11.2 oz)   03/09/24 0559 82.6 kg (182 lb)   03/08/24 0505 86.5 kg (190 lb 12.8 oz)   03/07/24 0500 92 kg (202 lb 14.4 oz)   03/06/24 0545 70.6 kg (155 lb 10.3 oz)   03/05/24 0500 91.6 kg (202 lb)   03/04/24 0442 90.3 kg (199 lb 1.6 oz)   03/03/24 0606 90.7 kg (200 lb)   02/23/24 0629 78.2 kg (172 lb 6.4 oz)   02/21/24 2116 82.2 kg (181 lb 3.5 oz)   Admit Weight: 82.2 kg (181 lb 3.5 oz) (02/21/24 2116), Weight Method: Bed Scale    Estimated Needs    Weight Used For Calorie Calculations: 78.2 kg (172 lb 6.4 oz)  Energy Calorie Requirements (kcal): 1863kcal (1.3 stress factor)  Energy Need Method: [place]-St Jeor  Weight Used For Protein Calculations: 78.2 kg (172 lb 6.4 oz)  Protein Requirements: 102-118gm (1.3-1.5g/kg)  Fluid Requirements (mL): 1955ml (25ml/kg)    Enteral Nutrition     Patient not receiving enteral nutrition at this time.    Parenteral Nutrition     Patient not receiving parenteral nutrition support at this time.    Evaluation of Received Nutrient Intake    Calories: not meeting estimated needs  Protein: not meeting estimated needs    Patient Education     Not applicable.    Nutrition Diagnosis     PES: Inadequate oral intake related to acute illness as evidenced by <50% intake for > 5 days. (resolved)   PES: Malnutrition related to acute illness  as evidenced by <75% EER >/=7 days, muscle loss. (active)     Nutrition Interventions     Intervention(s): general/healthful diet, commercial beverage, prescription medication, and collaboration with other providers    Goal: Meet greater than 80% of nutritional needs by follow-up. (goal progressing)  Goal: Consume % of oral supplements by follow-up. (goal progressing)    Nutrition Goals & Monitoring     Dietitian will monitor: food and beverage intake, weight, electrolyte/renal panel, glucose/endocrine profile, and gastrointestinal profile    Nutrition Risk/Follow-Up: moderate (follow-up in 3-5 days)   Please consult if re-assessment needed sooner.

## 2024-03-22 NOTE — PT/OT/SLP PROGRESS
Physical Therapy Treatment    Patient Name:  Brain Snyder   MRN:  [id number]    Recommendations:     Discharge Recommendations: Moderate Intensity Therapy  Discharge Equipment Recommendations:  (tbd)  Barriers to discharge: Decreased caregiver support    Assessment:     Brain Snyder is a 78 y.o. male admitted with a medical diagnosis of CAD (coronary artery disease).  He presents with the following impairments/functional limitations: weakness, impaired endurance, impaired self care skills, impaired functional mobility, gait instability, impaired balance, decreased lower extremity function, decreased coordination, decreased ROM, impaired coordination, edema, impaired skin, impaired cardiopulmonary response to activity .    Rehab Prognosis: Good; patient would benefit from acute skilled PT services to address these deficits and reach maximum level of function.    Recent Surgery: Procedure(s) (LRB):  CORONARY ARTERY BYPASS GRAFT (CABG) (N/A)  Replacement-valve-aortic (N/A)  SURGICAL PROCUREMENT, VEIN, ENDOSCOPIC (N/A)  Impella, Removal (Right) 24 Days Post-Op    Plan:     During this hospitalization, patient to be seen 5 x/week to address the identified rehab impairments via gait training, therapeutic activities, therapeutic exercises and progress toward the following goals:    Plan of Care Expires:  04/01/24    Subjective     Chief Complaint: nausea   Patient/Family Comments/goals: to get better per son  Pain/Comfort:  Pain Rating 1: 0/10  Pain Addressed 1: Pre-medicate for activity      Objective:     Communicated with nursing  prior to session.  Patient found HOB elevated with peripheral IV, pulse ox (continuous), telemetry, Other (comments) (pt in bed) upon PT entry to room.     General Precautions: Standard, fall, sternal  Orthopedic Precautions: N/A  Braces: N/A  Respiratory Status: Room air     Functional Mobility:  Bed Mobility:     Supine to Sit: moderate assistance and increased time with sternal pxs  maintained   Transfers:     Sit to Stand:  moderate assistance with rolling walker  Bed to Chair: contact guard assistance with  rolling walker  using  Step Transfer and slow transition   Toilet Transfer: contact guard assistance with  rolling walker  using  Step Transfer and + void - bm increased time to attempt bm   Gait: pt ambulated use of rw cga 25ft then 35ft slow michelle with chair behind seated rest break b/t trials       Treatment & Education:  Educated pt and pts son on importance of sternal pxs   Pts son use of phone for translation     Patient left up in chair with call button in reach, son and nursing  present, and pt kaila tx well c/o rt knee pain nursing [place] informed pt would benefit from aggressive therapies to improve functional independence  ..    GOALS:   Multidisciplinary Problems       Physical Therapy Goals          Problem: Physical Therapy    Goal Priority Disciplines Outcome Goal Variances Interventions   Physical Therapy Goal     PT, PT/OT Ongoing, Progressing     Description: Goals to be met by: 2024     Patient will increase functional independence with mobility by performin. Supine to sit with MInimal Assistance  2. Sit to stand transfer with Minimal Assistance  3. Gait  x 150 feet with Minimal Assistance using Rolling Walker.                          Time Tracking:     PT Received On: 24  PT Start Time: 1245     PT Stop Time: 1325  PT Total Time (min): 40 min     Billable Minutes: Gait Training 20min and Therapeutic Activity 20min    Treatment Type: Treatment  PT/PTA: PTA     Number of PTA visits since last PT visit: 2024

## 2024-03-22 NOTE — PROGRESS NOTES
[facility] Hospital Medicine Progress Note        Chief Complaint: Inpatient Follow-up for R groin cellulitis     HPI per admitting team: 78-year-old male with a history of aortic insufficiency/stenosis, CAD, CKD IIIb, HTN AFib on Eliquis admitted to [facility] 02/15/2024 with chest pain, found to have NSTEMI (peak troponin 0.17) and underwent C 02/20/2024 showing severe multivessel stenosis and therefore was transferred to [facility] for CABG evaluation. Cardiology was consulted Patient had Impella placed on 02/23 and was admitted to the ICU.  Was started on aggressive diuresis with IV Lasix.  CV surgery was consulted.  Urology was consulted for hematuria; Nguyen catheter placed over guidewire with dilation secondary to urethral strictures. IV Heparin infusion was initiated per CIS but held due to hematuria/hemoptysis on 02/24.  He was also noted to have an JEAN-CLAUDE. Received 2 units PRBCs on 02/26 and was planned for high-risk PCI on 02/26 which was later canceled.  Underwent CABG x 3 2/27 (LIMA to LAD, RSVG to OM 1, R SVG to RCA, bioprosthetic AVR).  Remained on mechanical ventilation thereafter.  Patient noted to have tachycardia with atrial fibrillation/RVR requiring IV amiodarone along with pressors (norepinephrine/Milrinone) for hypotension. Patient underwent cardioversion x 2 with minimal improvement in HR/BP.  Patient self-extubated overnight on 02/29 and was noted to have adequate saturations on 2 L O2 via NC thereafter.  PT/OT consulted.  Renal function noted to be improving. Continued on IV diuresis as he appeared to be significantly volume overloaded postoperatively along with elevated pulmonary artery wedge pressures.  Started on p.o. amiodarone taper on 03/03.  Patient noted to have drainage from right groin wound and started on vancomycin on 03/04. Wound culture grew Pseudomonas.  Chest tubes discontinued on 03/02.  Downgraded from ICU on 03/02.  CIS and CV surgery continued following  patient.  CV surgery signed off on 03/06 and discontinued vancomycin.  Patient was placed on oral Levaquin.  Hospital medicine consulted on 03/06 for assistance with medical management and DC planning.  Repeat CT abdomen and pelvis of 3/9 shows improving stranding and improving hematoma in the right groin, persistent left-sided effusion and atelectasis  Patient complains of low back pain, cough and his SCDs too tight on his feet bilaterally  Vitals reviewed with blood pressure low normal, heart rates in the low 100s, saturating 94% on 2 L of oxygen   Labs reviewed and fairly stable   General surgery evaluated patient, reviewed CT scan of the area, given wound waning with scant serous/purulent drainage with manual expression, recommended wound care consult and continued to manually express fluid from site Q shift.  If area stopped draining, recommended do ultrasound to better define anatomy  Palliative care on board  ID adjusted antibiotics to IV cefepime  Nephrology placed on fluid restriction  Cardiology has suspended amiodarone due to elevated liver enzymes and Lasix due to hyponatremia  EKG showed atrial fibrillation with RVR, left bundle branch block-->  received digoxin 500 mcg push followed by 250 mcg q.6 for 2 doses for rate control and now on digoxin 0.125 mg daily which has now been discontinued  Troponin 0.18, per Cardiology probably post CABG Troponinemia        Interval Hx:   Patient seen and examined at bedside, he has no new complaints today   Vitals significant for low normal blood pressure will increase midodrine to 10 mg t.i.d.   No overnight events  Cis signed off     Case was discussed with patient's nurse and updated  on my discussion with patient and family        Objective/physical exam:  General:  Sitting in chair, looks much better  Chest: Clear to auscultation bilaterally anteriorly  Heart: RRR, +S1, S2, no appreciable murmur  Abdomen: Soft, tender to palpate and right upper  quadrant and left lower quadrant.  Bowel sounds positive x4  MSK: Warm, bilateral lower extremity edema present, no clubbing or cyanosis  Neurologic:  Awake alert and oriented, smiling, conversation     Assessment/Plan:  Constipation- resolved  Hypotension-  Right flank pain- due to right-sided lower abdomen hematoma- resolved  R Groin wound infection with Cellulitis at previous impella insertion site- Pseudomonas aeruginosa/Serratia marcescens, slowly improving   Transaminitis- multifactorial-  Congestive hepatopathy versus infection vs drug induced- now trending down  Leukocytosis- resolved  Fluid overload 2/2 HFrEF exacerbation, fairly euvolemic clinically  HFrEF/HFpEF, euvolemic   JEAN-CLAUDE on stage II CKD - resolved   Hyponatremia due to SIADH- resolved  Pulmonary hypertension   NSTEMI, CAD sp CABG x 3, S/p AVR also  Atrial Fibrillation with RVR- resolved  Normocytic anemia  Urethral stricture s/p dilation with gustafson 2/23, removed 3/18  Hyperkalemia-mild- resolved  Moderate malnutrition        Plan  low normal blood pressure will increase midodrine to 10 mg t.i.d.   Cis signed off   Lasix, Lisinopril and Aldactone also suspended, due to low blood pressure  Continue po metoprolol succinate 12.5 mg q.day  Cardiology has suspended amiodarone and atorvastatin due to elevated liver enzymes  Trend AST/ALT- 80/78  Ultrasound right upper quadrant shows mild-to-moderate hepatomegaly.  No biliary dilation  Pt continue to complain of abdominal and low back pain--> ordered CT Abddmen pelvis w/o contrast--> inflammatory changes noted in the right groin, possibly related to recent surgery.  Small focus of air noted.  Stool throughout the colon at would be seen with constipation.  Left pleural effusion with atelectasis, changes of left lower lobe early infection process can not be excluded  Ordered Dulcolax suppository x1 and relistor--> had a BM last night and a huge BM 3/19  Cont Miralax BID   Added volatren get to the lower back  for pain control  Wound culture grew Pseudomonas and Serratia marcescens- both sensitive to cefepime  ID Dr KELLY adjusted antibiotics to IV cefepime- day 9, Dr KELLY recommended total of 14 days treatment with end date 3/25.  Will switch to Cipro 750mg b.i.d. for that duration (renally dosed)  Nephrology placed pt on 1500 mL fluid restriction for hyponatremia, sodium improved to 134--> sodium chloride tablets discontinued per Nephrology  Creatinine slightly improved to 1.2, monitor closely, case personally discussed with Laurel BURNS, nephrology will sign off   Appreciate pulmonology input continue to monitor effusion, no need for thoracentesis at this time  Continue use of incentive spirometry and oxygen supplementation, currently on room air, pt not compliant with instructions   Appreciate Urology input, case was personally discussed with Cora Gonzalez and Isidra BURNS, per their recommendations, Nguyen removed on 3/18, no issues with voiding since then  Nguyen removed 3/18, case was personally discussed with isidra BURNS with Urology  Continued allopurinol 50 mg daily, aspirin 81 mg, FeSO4 every other day, folic acid 1 mg, Protonix 40 mg daily, sucralfate 1 g q.i.d.  P.o. Norco 5 mg q.6 p.r.n. for pain  Antitussives p.r.n.  PT/OT recommending moderate-intensity therapy, case management on board, awaiting Medicaid approval for SNF.  Patient's daughter has submitted paperwork to Medicaid--> unfortunately patient is not eligible for Medicaid given illegal status  Patient is willing to work with therapy daily.  Tentative plan for discharge on Monday if patient is strong enough to go home with family  Palliative care also on board, greatly appreciated  Wound Care also on board, case personally discussed with Lionel, informed that the wound site is very small for packing but she will continue to express drain age from the site  Continue cyproheptadine, appetite is improving  Morning labs stable, repeat Friday     VTE prophylaxis:   Lovenox 40     Patient condition:  Fair     Anticipated discharge and Disposition:   not a candidate for Medicaid given illegal status, if physically improved, home with family on Monday (already discussed with pt and daughter vis  service)     All diagnosis and differential diagnosis have been reviewed; assessment and plan has been documented; I have personally reviewed the labs and test results that are presently available; I have reviewed the patients medication list; I have reviewed the consulting providers response and recommendations. I have reviewed or attempted to review medical records based upon their availability     All of the patient's questions have been  addressed and answered. Patient's is agreeable to the above stated plan. I will continue to monitor closely and make adjustments to medical management as needed.    VITAL SIGNS: 24 HRS MIN & MAX LAST   Temp  Min: 97.2 °F (36.2 °C)  Max: 98 °F (36.7 °C) 97.8 °F (36.6 °C)   BP  Min: 92/58  Max: 139/65 125/73   Pulse  Min: 48  Max: 68  68   Resp  Min: 18  Max: 18 18   SpO2  Min: 94 %  Max: 99 % 95 %     I have reviewed the following labs:  Recent Labs   Lab 03/19/24  0601 03/20/24  0635 03/21/24  0617   WBC 10.14 8.82 9.04   RBC 4.53* 4.28* 4.45*   HGB 12.4* 11.4* 11.8*   HCT 39.5* 36.0* 38.3*   MCV 87.2 84.1 86.1   MCH 27.4 26.6* 26.5*   MCHC 31.4* 31.7* 30.8*   RDW 15.6 15.8 15.9    362 330   MPV 9.7 9.5 9.7     Recent Labs   Lab 03/16/24  0400 03/17/24  0353 03/18/24  0433 03/19/24  0553 03/20/24  0635 03/21/24  0617   * 132* 131* 133* 134* 133*   K 4.5 5.1 5.2* 4.7 4.6 5.0   CO2 25 22* 23 23 25 26   BUN 22.5 23.7 20.8 18.1 21.3 18.3   CREATININE 1.48* 1.29* 1.26* 1.28* 1.26* 1.24*   CALCIUM 8.3* 8.3* 8.4* 8.8 8.9 8.8   MG 2.00  --   --   --   --   --    ALBUMIN 2.4* 2.6* 2.6*  --   --   --    ALKPHOS  --  110 109  --   --   --    ALT  --  82* 76*  --   --   --    AST  --  90* 80*  --   --   --    BILITOT  --  1.1 1.1  --   --    --      Microbiology Results (last 7 days)       ** No results found for the last 168 hours. **             See below for Radiology    Scheduled Med:   allopurinoL  50 mg Oral Daily    aspirin  81 mg Oral Daily    ciprofloxacin HCl  750 mg Oral Q12H    cyproheptadine  4 mg Oral BID    diclofenac sodium  2 g Topical (Top) TID    enoxparin  40 mg Subcutaneous Daily    ferrous sulfate  1 tablet Oral Every other day    folic acid  1 mg Oral Daily    guaiFENesin-codeine 100-10 mg/5 ml  10 mL Oral TID    metoprolol succinate  12.5 mg Oral Daily    midodrine  10 mg Oral TID WM    pantoprazole  40 mg Oral Daily    polyethylene glycol  17 g Oral BID      Continuous Infusions:   loperamide        PRN Meds:  acetaminophen, acetaminophen, bisacodyL, dextrose 10%, dextrose 10%, electrolyte-A, HYDROcodone-acetaminophen, lactulose 10 gram/15 ml, loperamide, melatonin, metoclopramide, nitroGLYCERIN, ondansetron, simethicone, sodium chloride 0.9%     Assessment/Plan:      VTE prophylaxis:     Patient condition:  Stable/Fair/Guarded/ Serious/ Critical    Anticipated discharge and Disposition:         All diagnosis and differential diagnosis have been reviewed; assessment and plan has been documented; I have personally reviewed the labs and test results that are presently available; I have reviewed the patients medication list; I have reviewed the consulting providers response and recommendations. I have reviewed or attempted to review medical records based upon their availability    All of the patient's questions have been  addressed and answered. Patient's is agreeable to the above stated plan. I will continue to monitor closely and make adjustments to medical management as needed.  _____________________________________________________________________    Nutrition Status:    Radiology:  I have personally reviewed the following imaging and agree with the radiologist.     CT Abdomen Pelvis  Without Contrast  Narrative:  EXAMINATION:  CT ABDOMEN PELVIS WITHOUT CONTRAST    CLINICAL HISTORY:  Abdominal pain, acute, nonlocalized;    TECHNIQUE:  Multidetector non-contrast axial CT images of the abdomen and pelvis were obtained with coronal and sagittal reconstructions.    Automatic exposure control was utilized to reduce the patient's radiation dose.    DLP= 307    COMPARISON:  03/15/2024    FINDINGS:  01. HEPATOBILIARY: No focal hepatic lesion is identified, however evaluation is limited secondary to lack of IV contrast. The gallbladder is normal.    02. SPLEEN: Normal    03. PANCREAS: No focal masses or ductal dilatation.    04. ADRENALS: No adrenal nodules.    05. KIDNEYS: The right kidney demonstrates no stone, hydronephrosis, or hydroureter. No focal mass identified. The left kidney demonstrates no stone, hydronephrosis, or hydroureter. No focal mass identified.    06. LYMPHADENOPATHY/RETROPERITONEUM: There is no retroperitoneal lymphadenopathy. The abdominal aorta is normal in course and caliber.    07. BOWEL: Stool throughout the colon as may be seen with constipation.    08. PELVIC VISCERA: Normal. No pelvic mass.    09. PELVIC LYMPH NODES: No lymphadenopathy.    10. PERITONEUM/ABDOMINAL WALL: Inflammatory change noted within the right groin possibly related to recent surgery.  Small focus of air noted.    11. SKELETAL: No aggressive appearing lytic/blastic lesion. No acute fractures, subluxations or dislocations.    12. LUNG BASES: Left pleural effusion with atelectatic changes of the left lower lobe  Impression: Left pleural effusion with atelectatic changes of the left lower lobe early infectious process is not excluded.    Inflammatory change noted within the right groin possibly related to recent surgery.  Small focus of air noted.  Correlate with physical exam.    Stool noted throughout the colon as would be seen with constipation.    Electronically signed by: Mann Hernandez  Date:    03/17/2024  Time:    11:25      Iradabethany  PEGGY Lazar MD  Department of Hospital Medicine   [facility]   03/22/2024

## 2024-03-22 NOTE — DISCHARGE SUMMARY
[facility] Internal Medicine Discharge Summary    Admitting Physician: Katya Centeno MD  Attending Physician: No att. providers found  Date of Admit: 2/15/2024  Date of Discharge: 3/22/2024    Condition: Fair  Outcome:  Patient was transferred for procedure  DISPOSITION:  Transferred to [facility] for procedure evaluation          Discharge Diagnoses     Patient Active Problem List   Diagnosis    Mixed hyperlipidemia    Cardiomyopathy    Aortic valve regurgitation    Chest pain    CAD (coronary artery disease)    Hypertension    NSTEMI (non-ST elevated myocardial infarction)    Atrial fibrillation       Principal Problem:  Chest pain    Consultants and Procedures     Consultants:  IP CONSULT TO CARDIOLOGY  IP CONSULT TO SOCIAL WORK/CASE MANAGEMENT  IP CONSULT TO NEPHROLOGY    Procedures:   Procedure(s) (LRB):  ANGIOGRAM, CORONARY ARTERY (N/A)     Brief Admission History      Brain Snyder is a 78 y.o. male with PMH of aortic insufficiency with aortic stenosis, coronary artery disease, hypertension, atrial fibrillation on Eliquis presented to [facility] ED on 2/15/2024  with complaint of chest pain.   A  was used for the encounter.  His daughter was also present.  Patient states that chest pain started several days ago and got worse last night with intensity of 7/10.  Described location as retrosternal in his squeezing pain.  Denies any radiation, alleviating or aggravating factors, or associated symptoms like shortness of breath, cough, abdominal pain, nausea or vomiting.  Patient also complained about nocturia ongoing for months.  Denies any urinary retention.  He also mentioned of mild swelling with mild pain of his left ankle past 2 days but has now resolved.  Of note, patient was recently [facility] to [facility] in December of 2023 and beginning of February 2024. During the recent admission also patient was noted to have AFib with RVR and was started on Eliquis.  Patient has brought his discharge  paperwork.  Some notable findings are NT-pro BNP of 5500 on 2/5/24, CT abdomen and pelvis/CTA chest findings of aortic valve calcification.  Ectasia of the ascending thoracic aorta measuring 3.6 cm.  Moderate noncalcified atheromatous change of the descending thoracic aorta.  Micronodule surface irregularity of the liver which may suggest cirrhotic morphology.     In the ED, vitals notable for hypotension 102/59, rest vital signs stable.  CBC remarkable for normocytic anemia, CMP remarkable for elevated renal indices of BUN/creatinine at 36.5/2.15.  INR elevated at 1.8.  BNP significant for 2145 (to note that decreased from the 5000 NT-pro BNP on 02/05/2024).  Troponin were elevated this visit at 0.166.  EKG showed normal sinus rhythm with vent rate of 64 and prolonged QTC of 571 millisecond.  Chest x-ray remarkable for mild cardiomegaly and some interstitial markings but no focal consolidative changes.  Hospital Medicine was consulted for admission and further management of NSTEMI.     Hospital Course with Pertinent Findings     Patient admitted to [facility] for NSTEMI.  He presented with a complaint of chest pain that was characterize as squeezing in the retrosternal area.  At that time his troponin was elevated 0.166 as well as BNP was elevated at 2145.  Per chart review it is also noted that patient was recently admitted to [facility] with a diagnosis of AFib, aortic insufficiency, and aortic stenosis.  Patient loaded with aspirin 325 and started on heparin drip.  Patient was given Lipitor 40 and troponins were trended as well as serial EKGs ordered.  Patient given a nitroglycerin tablet and home Eliquis was held.   An echo showed an ejection fraction of 40%, pulmonary hypertension, with moderate aortic valve sclerosis with severe restricted motion and severe stenosis of the aortic valve.  Cardiology was consulted which recommended continuing amiodarone.  During hospital stay patient also reported chest pain  "once again and a left heart catheterization was completed which showed several multivessel stenosis with most significant being in the 1st marginal 80% stenosis, proximal LAD lesion with 70%  stenosis in the OST circumflex and the proximal circumflex lesion at 80% stenosis.  Patient was transferred to [facility] for possible CABG and aortic valve replacement.    Discharge physical exam:  Vitals  BP: 109/63  Temp: 97.4 °F (36.3 °C)  Temp Source: Oral  Pulse: 71  Resp: 19  SpO2: 96 %  Height: 5' 5" (165.1 cm)  Weight: 81.1 kg (178 lb 12.8 oz)    Physical Exam  Vitals and nursing note reviewed.   Constitutional:       Appearance: Normal appearance.   HENT:      Head: Normocephalic and atraumatic.   Eyes:      General: No scleral icterus.     Pupils: Pupils are equal, round, and reactive to light.   Cardiovascular:      Rate and Rhythm: Regular rhythm. Bradycardia present.      Pulses: Normal pulses.      Heart sounds: Murmur (Systolic in the aortic area, no carotid bruit heard) heard.   Pulmonary:      Effort: Pulmonary effort is normal. No respiratory distress.      Breath sounds: Normal breath sounds. No wheezing or rales.   Abdominal:      General: Abdomen is flat. Bowel sounds are normal. There is no distension.      Palpations: Abdomen is soft.      Tenderness: There is no abdominal tenderness.   Musculoskeletal:         General: Tenderness present. No swelling. Normal range of motion.      Right lower leg: No edema.      Left lower leg: No edema.      Comments: Tender, warm, erythematous left 1st MTP joint   Skin:     General: Skin is warm.      Capillary Refill: Capillary refill takes less than 2 seconds.   Neurological:      General: No focal deficit present.      Mental Status: He is alert and oriented to person, place, and time.   Psychiatric:         Mood and Affect: Mood normal.         Behavior: Behavior normal.         Thought Content: Thought content normal.         Judgment: Judgment normal.     TIME SPENT ON " DISCHARGE: 60 minutes    Discharge Medications        Medication List        ASK your doctor about these medications      amiodarone 200 MG Tab  Commonly known as: PACERONE     atorvastatin 20 MG tablet  Commonly known as: LIPITOR     furosemide 40 MG tablet  Commonly known as: LASIX              Discharge Information:     Patient transferred to [facility] for evaluation of CABG versus TAVR.    Xi Levine M.D  [facility] Internal Medicine PGY-1

## 2024-03-23 PROCEDURE — 21400001 HC TELEMETRY ROOM

## 2024-03-23 PROCEDURE — 25000003 PHARM REV CODE 250: Performed by: INTERNAL MEDICINE

## 2024-03-23 PROCEDURE — 25000003 PHARM REV CODE 250: Performed by: PHYSICIAN ASSISTANT

## 2024-03-23 PROCEDURE — 63600175 PHARM REV CODE 636 W HCPCS: Performed by: NURSE PRACTITIONER

## 2024-03-23 RX ADMIN — ASPIRIN 81 MG: 81 TABLET, COATED ORAL at 08:03

## 2024-03-23 RX ADMIN — DICLOFENAC SODIUM 2 G: 10 GEL TOPICAL at 08:03

## 2024-03-23 RX ADMIN — ALLOPURINOL 50 MG: 300 TABLET ORAL at 08:03

## 2024-03-23 RX ADMIN — ACETAMINOPHEN 650 MG: 650 SOLUTION ORAL at 09:03

## 2024-03-23 RX ADMIN — CIPROFLOXACIN HYDROCHLORIDE 750 MG: 500 TABLET, FILM COATED ORAL at 08:03

## 2024-03-23 RX ADMIN — MIDODRINE HYDROCHLORIDE 10 MG: 5 TABLET ORAL at 04:03

## 2024-03-23 RX ADMIN — ACETAMINOPHEN 650 MG: 325 TABLET, FILM COATED ORAL at 04:03

## 2024-03-23 RX ADMIN — POLYETHYLENE GLYCOL 3350 17 G: 17 POWDER, FOR SOLUTION ORAL at 08:03

## 2024-03-23 RX ADMIN — ENOXAPARIN SODIUM 40 MG: 40 INJECTION SUBCUTANEOUS at 04:03

## 2024-03-23 RX ADMIN — PANTOPRAZOLE SODIUM 40 MG: 40 TABLET, DELAYED RELEASE ORAL at 08:03

## 2024-03-23 RX ADMIN — CYPROHEPTADINE HYDROCHLORIDE 4 MG: 4 TABLET ORAL at 08:03

## 2024-03-23 RX ADMIN — MIDODRINE HYDROCHLORIDE 10 MG: 5 TABLET ORAL at 11:03

## 2024-03-23 RX ADMIN — FOLIC ACID 1 MG: 1 TABLET ORAL at 08:03

## 2024-03-23 RX ADMIN — MIDODRINE HYDROCHLORIDE 10 MG: 5 TABLET ORAL at 08:03

## 2024-03-23 NOTE — PROGRESS NOTES
[facility] Hospital Medicine Progress Note        Chief Complaint: Inpatient Follow-up for R groin cellulitis     HPI per admitting team: 78-year-old male with a history of aortic insufficiency/stenosis, CAD, CKD IIIb, HTN AFib on Eliquis admitted to [facility] 02/15/2024 with chest pain, found to have NSTEMI (peak troponin 0.17) and underwent C 02/20/2024 showing severe multivessel stenosis and therefore was transferred to [facility] for CABG evaluation. Cardiology was consulted Patient had Impella placed on 02/23 and was admitted to the ICU.  Was started on aggressive diuresis with IV Lasix.  CV surgery was consulted.  Urology was consulted for hematuria; Nguyen catheter placed over guidewire with dilation secondary to urethral strictures. IV Heparin infusion was initiated per CIS but held due to hematuria/hemoptysis on 02/24.  He was also noted to have an JEAN-CLAUDE. Received 2 units PRBCs on 02/26 and was planned for high-risk PCI on 02/26 which was later canceled.  Underwent CABG x 3 2/27 (LIMA to LAD, RSVG to OM 1, R SVG to RCA, bioprosthetic AVR).  Remained on mechanical ventilation thereafter.  Patient noted to have tachycardia with atrial fibrillation/RVR requiring IV amiodarone along with pressors (norepinephrine/Milrinone) for hypotension. Patient underwent cardioversion x 2 with minimal improvement in HR/BP.  Patient self-extubated overnight on 02/29 and was noted to have adequate saturations on 2 L O2 via NC thereafter.  PT/OT consulted.  Renal function noted to be improving. Continued on IV diuresis as he appeared to be significantly volume overloaded postoperatively along with elevated pulmonary artery wedge pressures.  Started on p.o. amiodarone taper on 03/03.  Patient noted to have drainage from right groin wound and started on vancomycin on 03/04. Wound culture grew Pseudomonas.  Chest tubes discontinued on 03/02.  Downgraded from ICU on 03/02.  CIS and CV surgery continued following  patient.  CV surgery signed off on 03/06 and discontinued vancomycin.  Patient was placed on oral Levaquin.  Hospital medicine consulted on 03/06 for assistance with medical management and DC planning.  Repeat CT abdomen and pelvis of 3/9 shows improving stranding and improving hematoma in the right groin, persistent left-sided effusion and atelectasis  Patient complains of low back pain, cough and his SCDs too tight on his feet bilaterally  Vitals reviewed with blood pressure low normal, heart rates in the low 100s, saturating 94% on 2 L of oxygen   Labs reviewed and fairly stable   General surgery evaluated patient, reviewed CT scan of the area, given wound waning with scant serous/purulent drainage with manual expression, recommended wound care consult and continued to manually express fluid from site Q shift.  If area stopped draining, recommended do ultrasound to better define anatomy  Palliative care on board  ID adjusted antibiotics to IV cefepime  Nephrology placed on fluid restriction  Cardiology has suspended amiodarone due to elevated liver enzymes and Lasix due to hyponatremia  EKG showed atrial fibrillation with RVR, left bundle branch block-->  received digoxin 500 mcg push followed by 250 mcg q.6 for 2 doses for rate control and now on digoxin 0.125 mg daily which has now been discontinued  Troponin 0.18, per Cardiology probably post CABG Troponinemia        Interval Hx:   Patient seen and examined at bedside, he has no new complaints today   No overnight events  Vitals reviewed and stable     Case was discussed with patient's nurse and updated  on my discussion with patient and family        Objective/physical exam:  General:  Sitting in chair, looks much better  Chest: Clear to auscultation bilaterally anteriorly  Heart: RRR, +S1, S2, no appreciable murmur  Abdomen: Soft, tender to palpate and right upper quadrant and left lower quadrant.  Bowel sounds positive x4  MSK: Warm, bilateral  lower extremity edema present, no clubbing or cyanosis  Neurologic:  Awake alert and oriented, smiling, conversation     Assessment/Plan:  Constipation- resolved  Hypotension-  Right flank pain- due to right-sided lower abdomen hematoma- resolved  R Groin wound infection with Cellulitis at previous impella insertion site- Pseudomonas aeruginosa/Serratia marcescens, slowly improving   Transaminitis- multifactorial-  Congestive hepatopathy versus infection vs drug induced- now trending down  Leukocytosis- resolved  Fluid overload 2/2 HFrEF exacerbation, fairly euvolemic clinically  HFrEF/HFpEF, euvolemic   JEAN-CLAUDE on stage II CKD - resolved   Hyponatremia due to SIADH- resolved  Pulmonary hypertension   NSTEMI, CAD sp CABG x 3, S/p AVR also  Atrial Fibrillation with RVR- resolved  Normocytic anemia  Urethral stricture s/p dilation with gustafson 2/23, removed 3/18  Hyperkalemia-mild- resolved  Moderate malnutrition        Plan  Continue midodrine to 10 mg t.i.d.   Cis signed off   Lasix, Lisinopril and Aldactone also suspended, due to low blood pressure  Continue po metoprolol succinate 12.5 mg q.day  Cardiology has suspended amiodarone and atorvastatin due to elevated liver enzymes  Trend AST/ALT- 80/78  Ultrasound right upper quadrant shows mild-to-moderate hepatomegaly.  No biliary dilation  Pt continue to complain of abdominal and low back pain--> ordered CT Abddmen pelvis w/o contrast--> inflammatory changes noted in the right groin, possibly related to recent surgery.  Small focus of air noted.  Stool throughout the colon at would be seen with constipation.  Left pleural effusion with atelectasis, changes of left lower lobe early infection process can not be excluded  Ordered Dulcolax suppository x1 and relistor--> had a BM last night and a huge BM 3/19  Cont Miralax BID   Added volatren get to the lower back for pain control  Wound culture grew Pseudomonas and Serratia marcescens- both sensitive to cefepime  ID Dr KELLY  adjusted antibiotics to IV cefepime- day 9, Dr KELLY recommended total of 14 days treatment with end date 3/25.  Will switch to Cipro 750mg b.i.d. for that duration (renally dosed)  Nephrology placed pt on 1500 mL fluid restriction for hyponatremia, sodium improved to 134--> sodium chloride tablets discontinued per Nephrology  Creatinine slightly improved to 1.2, monitor closely, case personally discussed with Laurel BURNS, nephrology will sign off   Appreciate pulmonology input continue to monitor effusion, no need for thoracentesis at this time  Continue use of incentive spirometry and oxygen supplementation, currently on room air, pt not compliant with instructions   Appreciate Urology input, case was personally discussed with Cora Gonzalez and Isidra BURNS, per their recommendations, Nguyen removed on 3/18, no issues with voiding since then  Nguyen removed 3/18, case was personally discussed with isidra BURNS with Urology  Continued allopurinol 50 mg daily, aspirin 81 mg, FeSO4 every other day, folic acid 1 mg, Protonix 40 mg daily, sucralfate 1 g q.i.d.  P.o. Norco 5 mg q.6 p.r.n. for pain  Antitussives p.r.n.  PT/OT recommending moderate-intensity therapy, case management on board, awaiting Medicaid approval for SNF.  Patient's daughter has submitted paperwork to Medicaid--> unfortunately patient is not eligible for Medicaid given illegal status  Patient is willing to work with therapy daily.  Tentative plan for discharge on Monday if patient is strong enough to go home with family  Palliative care also on board, greatly appreciated  Wound Care also on board, case personally discussed with Lionel, informed that the wound site is very small for packing but she will continue to express drain age from the site  Continue cyproheptadine, appetite is improving  Morning labs stable, repeat Friday     VTE prophylaxis:  Lovenox 40     Patient condition:  Fair     Anticipated discharge and Disposition:   not a candidate for Medicaid  given illegal status, if physically improved, home with family on Monday (already discussed with pt and daughter vis  service)     All diagnosis and differential diagnosis have been reviewed; assessment and plan has been documented; I have personally reviewed the labs and test results that are presently available; I have reviewed the patients medication list; I have reviewed the consulting providers response and recommendations. I have reviewed or attempted to review medical records based upon their availability     All of the patient's questions have been  addressed and answered. Patient's is agreeable to the above stated plan. I will continue to monitor closely and make adjustments to medical management as needed.      VITAL SIGNS: 24 HRS MIN & MAX LAST   Temp  Min: 97.8 °F (36.6 °C)  Max: 98.3 °F (36.8 °C) 98.2 °F (36.8 °C)   BP  Min: 98/59  Max: 125/73 117/71   Pulse  Min: 46  Max: 68  (!) 54   Resp  Min: 16  Max: 18 18   SpO2  Min: 95 %  Max: 98 % 98 %     I have reviewed the following labs:  Recent Labs   Lab 03/19/24  0601 03/20/24  0635 03/21/24  0617   WBC 10.14 8.82 9.04   RBC 4.53* 4.28* 4.45*   HGB 12.4* 11.4* 11.8*   HCT 39.5* 36.0* 38.3*   MCV 87.2 84.1 86.1   MCH 27.4 26.6* 26.5*   MCHC 31.4* 31.7* 30.8*   RDW 15.6 15.8 15.9    362 330   MPV 9.7 9.5 9.7     Recent Labs   Lab 03/17/24  0353 03/18/24  0433 03/19/24  0553 03/20/24  0635 03/21/24  0617   * 131* 133* 134* 133*   K 5.1 5.2* 4.7 4.6 5.0   CO2 22* 23 23 25 26   BUN 23.7 20.8 18.1 21.3 18.3   CREATININE 1.29* 1.26* 1.28* 1.26* 1.24*   CALCIUM 8.3* 8.4* 8.8 8.9 8.8   ALBUMIN 2.6* 2.6*  --   --   --    ALKPHOS 110 109  --   --   --    ALT 82* 76*  --   --   --    AST 90* 80*  --   --   --    BILITOT 1.1 1.1  --   --   --      Microbiology Results (last 7 days)       ** No results found for the last 168 hours. **             See below for Radiology    Scheduled Med:   allopurinoL  50 mg Oral Daily    aspirin  81 mg Oral  Daily    ciprofloxacin HCl  750 mg Oral Q12H    cyproheptadine  4 mg Oral BID    diclofenac sodium  2 g Topical (Top) TID    enoxparin  40 mg Subcutaneous Daily    ferrous sulfate  1 tablet Oral Every other day    folic acid  1 mg Oral Daily    metoprolol succinate  12.5 mg Oral Daily    midodrine  10 mg Oral TID WM    pantoprazole  40 mg Oral Daily    polyethylene glycol  17 g Oral BID      Continuous Infusions:   loperamide        PRN Meds:  acetaminophen, acetaminophen, bisacodyL, dextrose 10%, dextrose 10%, electrolyte-A, HYDROcodone-acetaminophen, lactulose 10 gram/15 ml, loperamide, melatonin, metoclopramide, nitroGLYCERIN, ondansetron, simethicone, sodium chloride 0.9%     Assessment/Plan:      VTE prophylaxis:     Patient condition:  Stable/Fair/Guarded/ Serious/ Critical    Anticipated discharge and Disposition:         All diagnosis and differential diagnosis have been reviewed; assessment and plan has been documented; I have personally reviewed the labs and test results that are presently available; I have reviewed the patients medication list; I have reviewed the consulting providers response and recommendations. I have reviewed or attempted to review medical records based upon their availability    All of the patient's questions have been  addressed and answered. Patient's is agreeable to the above stated plan. I will continue to monitor closely and make adjustments to medical management as needed.  _____________________________________________________________________    Nutrition Status:    Radiology:  I have personally reviewed the following imaging and agree with the radiologist.     CT Abdomen Pelvis  Without Contrast  Narrative: EXAMINATION:  CT ABDOMEN PELVIS WITHOUT CONTRAST    CLINICAL HISTORY:  Abdominal pain, acute, nonlocalized;    TECHNIQUE:  Multidetector non-contrast axial CT images of the abdomen and pelvis were obtained with coronal and sagittal reconstructions.    Automatic exposure  control was utilized to reduce the patient's radiation dose.    DLP= 307    COMPARISON:  03/15/2024    FINDINGS:  01. HEPATOBILIARY: No focal hepatic lesion is identified, however evaluation is limited secondary to lack of IV contrast. The gallbladder is normal.    02. SPLEEN: Normal    03. PANCREAS: No focal masses or ductal dilatation.    04. ADRENALS: No adrenal nodules.    05. KIDNEYS: The right kidney demonstrates no stone, hydronephrosis, or hydroureter. No focal mass identified. The left kidney demonstrates no stone, hydronephrosis, or hydroureter. No focal mass identified.    06. LYMPHADENOPATHY/RETROPERITONEUM: There is no retroperitoneal lymphadenopathy. The abdominal aorta is normal in course and caliber.    07. BOWEL: Stool throughout the colon as may be seen with constipation.    08. PELVIC VISCERA: Normal. No pelvic mass.    09. PELVIC LYMPH NODES: No lymphadenopathy.    10. PERITONEUM/ABDOMINAL WALL: Inflammatory change noted within the right groin possibly related to recent surgery.  Small focus of air noted.    11. SKELETAL: No aggressive appearing lytic/blastic lesion. No acute fractures, subluxations or dislocations.    12. LUNG BASES: Left pleural effusion with atelectatic changes of the left lower lobe  Impression: Left pleural effusion with atelectatic changes of the left lower lobe early infectious process is not excluded.    Inflammatory change noted within the right groin possibly related to recent surgery.  Small focus of air noted.  Correlate with physical exam.    Stool noted throughout the colon as would be seen with constipation.    Electronically signed by: Mann Hernandez  Date:    03/17/2024  Time:    11:25      Stan Lazar MD  Department of Hospital Medicine   [facility]   03/23/2024

## 2024-03-24 PROCEDURE — 25000003 PHARM REV CODE 250: Performed by: INTERNAL MEDICINE

## 2024-03-24 PROCEDURE — 21400001 HC TELEMETRY ROOM

## 2024-03-24 PROCEDURE — 63600175 PHARM REV CODE 636 W HCPCS: Performed by: INTERNAL MEDICINE

## 2024-03-24 PROCEDURE — 63600175 PHARM REV CODE 636 W HCPCS: Performed by: NURSE PRACTITIONER

## 2024-03-24 PROCEDURE — 25000003 PHARM REV CODE 250: Performed by: NURSE PRACTITIONER

## 2024-03-24 PROCEDURE — 25000003 PHARM REV CODE 250: Performed by: PHYSICIAN ASSISTANT

## 2024-03-24 RX ORDER — METOCLOPRAMIDE HYDROCHLORIDE 5 MG/ML
10 INJECTION INTRAMUSCULAR; INTRAVENOUS EVERY 8 HOURS
Status: DISCONTINUED | OUTPATIENT
Start: 2024-03-24 | End: 2024-03-26 | Stop reason: HOSPADM

## 2024-03-24 RX ADMIN — CYPROHEPTADINE HYDROCHLORIDE 4 MG: 4 TABLET ORAL at 08:03

## 2024-03-24 RX ADMIN — FERROUS SULFATE TAB 325 MG (65 MG ELEMENTAL FE) 1 EACH: 325 (65 FE) TAB at 08:03

## 2024-03-24 RX ADMIN — ASPIRIN 81 MG: 81 TABLET, COATED ORAL at 08:03

## 2024-03-24 RX ADMIN — METOCLOPRAMIDE 10 MG: 5 INJECTION, SOLUTION INTRAMUSCULAR; INTRAVENOUS at 10:03

## 2024-03-24 RX ADMIN — Medication 6 MG: at 08:03

## 2024-03-24 RX ADMIN — MIDODRINE HYDROCHLORIDE 10 MG: 5 TABLET ORAL at 08:03

## 2024-03-24 RX ADMIN — DICLOFENAC SODIUM 2 G: 10 GEL TOPICAL at 08:03

## 2024-03-24 RX ADMIN — CIPROFLOXACIN HYDROCHLORIDE 750 MG: 500 TABLET, FILM COATED ORAL at 08:03

## 2024-03-24 RX ADMIN — MIDODRINE HYDROCHLORIDE 10 MG: 5 TABLET ORAL at 04:03

## 2024-03-24 RX ADMIN — METOCLOPRAMIDE 10 MG: 5 INJECTION, SOLUTION INTRAMUSCULAR; INTRAVENOUS at 12:03

## 2024-03-24 RX ADMIN — METOPROLOL SUCCINATE 12.5 MG: 25 TABLET, EXTENDED RELEASE ORAL at 08:03

## 2024-03-24 RX ADMIN — ALLOPURINOL 50 MG: 300 TABLET ORAL at 08:03

## 2024-03-24 RX ADMIN — FOLIC ACID 1 MG: 1 TABLET ORAL at 08:03

## 2024-03-24 RX ADMIN — MIDODRINE HYDROCHLORIDE 10 MG: 5 TABLET ORAL at 12:03

## 2024-03-24 RX ADMIN — PANTOPRAZOLE SODIUM 40 MG: 40 TABLET, DELAYED RELEASE ORAL at 08:03

## 2024-03-24 RX ADMIN — ENOXAPARIN SODIUM 40 MG: 40 INJECTION SUBCUTANEOUS at 04:03

## 2024-03-24 NOTE — PROGRESS NOTES
[facility] Hospital Medicine Progress Note        Chief Complaint: Inpatient Follow-up for R groin cellulitis     HPI per admitting team: 78-year-old male with a history of aortic insufficiency/stenosis, CAD, CKD IIIb, HTN AFib on Eliquis admitted to [facility] 02/15/2024 with chest pain, found to have NSTEMI (peak troponin 0.17) and underwent C 02/20/2024 showing severe multivessel stenosis and therefore was transferred to [facility] for CABG evaluation. Cardiology was consulted Patient had Impella placed on 02/23 and was admitted to the ICU.  Was started on aggressive diuresis with IV Lasix.  CV surgery was consulted.  Urology was consulted for hematuria; Nguyen catheter placed over guidewire with dilation secondary to urethral strictures. IV Heparin infusion was initiated per CIS but held due to hematuria/hemoptysis on 02/24.  He was also noted to have an JEAN-CLAUDE. Received 2 units PRBCs on 02/26 and was planned for high-risk PCI on 02/26 which was later canceled.  Underwent CABG x 3 2/27 (LIMA to LAD, RSVG to OM 1, R SVG to RCA, bioprosthetic AVR).  Remained on mechanical ventilation thereafter.  Patient noted to have tachycardia with atrial fibrillation/RVR requiring IV amiodarone along with pressors (norepinephrine/Milrinone) for hypotension. Patient underwent cardioversion x 2 with minimal improvement in HR/BP.  Patient self-extubated overnight on 02/29 and was noted to have adequate saturations on 2 L O2 via NC thereafter.  PT/OT consulted.  Renal function noted to be improving. Continued on IV diuresis as he appeared to be significantly volume overloaded postoperatively along with elevated pulmonary artery wedge pressures.  Started on p.o. amiodarone taper on 03/03.  Patient noted to have drainage from right groin wound and started on vancomycin on 03/04. Wound culture grew Pseudomonas.  Chest tubes discontinued on 03/02.  Downgraded from ICU on 03/02.  CIS and CV surgery continued following  patient.  CV surgery signed off on 03/06 and discontinued vancomycin.  Patient was placed on oral Levaquin.  Hospital medicine consulted on 03/06 for assistance with medical management and DC planning.  Repeat CT abdomen and pelvis of 3/9 shows improving stranding and improving hematoma in the right groin, persistent left-sided effusion and atelectasis  Patient complains of low back pain, cough and his SCDs too tight on his feet bilaterally  Vitals reviewed with blood pressure low normal, heart rates in the low 100s, saturating 94% on 2 L of oxygen   Labs reviewed and fairly stable   General surgery evaluated patient, reviewed CT scan of the area, given wound waning with scant serous/purulent drainage with manual expression, recommended wound care consult and continued to manually express fluid from site Q shift.  If area stopped draining, recommended do ultrasound to better define anatomy  Palliative care on board  ID adjusted antibiotics to IV cefepime  Nephrology placed on fluid restriction  Cardiology has suspended amiodarone due to elevated liver enzymes and Lasix due to hyponatremia  EKG showed atrial fibrillation with RVR, left bundle branch block-->  received digoxin 500 mcg push followed by 250 mcg q.6 for 2 doses for rate control and now on digoxin 0.125 mg daily which has now been discontinued  Troponin 0.18, per Cardiology probably post CABG Troponinemia        Interval Hx:   Patient seen and examined at bedside, family member at bedside   Vitals reviewed and stable on room air   Patient complaining of nausea, not able to tolerate his food, no vomiting      Case was discussed with patient's nurse and updated  on my discussion with patient and family        Objective/physical exam:  General:  Sitting in chair, looks much better  Chest: Clear to auscultation bilaterally anteriorly  Heart: RRR, +S1, S2, no appreciable murmur  Abdomen: Soft, tender to palpate and right upper quadrant and left  lower quadrant.  Bowel sounds positive x4  MSK: Warm, bilateral lower extremity edema present, no clubbing or cyanosis  Neurologic:  Awake alert and oriented, smiling, conversation     Assessment/Plan:  Nause   Constipation- resolved  Hypotension- improved on midodrine   Right flank pain- due to right-sided lower abdomen hematoma- resolved  R Groin wound infection with Cellulitis at previous impella insertion site- Pseudomonas aeruginosa/Serratia marcescens, slowly improving   Transaminitis- multifactorial-  Congestive hepatopathy versus infection vs drug induced- now trending down  Leukocytosis- resolved  Fluid overload 2/2 HFrEF exacerbation, fairly euvolemic clinically  HFrEF/HFpEF, euvolemic   JEAN-CLAUDE on stage II CKD - resolved   Hyponatremia due to SIADH- resolved  Pulmonary hypertension   NSTEMI, CAD sp CABG x 3, S/p AVR also  Atrial Fibrillation with RVR- resolved  Normocytic anemia  Urethral stricture s/p dilation with gustafson 2/23, removed 3/18  Hyperkalemia-mild- resolved  Moderate malnutrition        Plan  Begin IV Reglan 10 mg Q 8 scheduled for 3 days   Check KUB  Continue midodrine to 10 mg t.i.d.   Cis signed off   Lasix, Lisinopril and Aldactone also suspended, due to low blood pressure  Continue po metoprolol succinate 12.5 mg q.day  Cardiology has suspended amiodarone and atorvastatin due to elevated liver enzymes  Ultrasound right upper quadrant shows mild-to-moderate hepatomegaly.  No biliary dilation  Pt continue to complain of abdominal and low back pain--> ordered CT Abddmen pelvis w/o contrast--> inflammatory changes noted in the right groin, possibly related to recent surgery.  Small focus of air noted.  Stool throughout the colon at would be seen with constipation.  Left pleural effusion with atelectasis, changes of left lower lobe early infection process can not be excluded  Ordered Dulcolax suppository x1 and relistor--> had a BM last night and a huge BM 3/19  Cont Miralax BID   Added marcon  get to the lower back for pain control  Wound culture grew Pseudomonas and Serratia marcescens- both sensitive to cefepime  ID Dr KELLY adjusted antibiotics to IV cefepime-  Dr KELLY recommended total of 14 days treatment with end date 3/25.  Will switch to Cipro 750mg b.i.d. for that duration (renally dosed)  Nephrology placed pt on 1500 mL fluid restriction for hyponatremia, sodium improved to 134--> sodium chloride tablets discontinued per Nephrology  Creatinine slightly improved to 1.2, monitor closely, case personally discussed with Laurel BURNS, nephrology will sign off   Appreciate pulmonology input continue to monitor effusion, no need for thoracentesis at this time  Continue use of incentive spirometry and oxygen supplementation, currently on room air, pt not compliant with instructions   Appreciate Urology input, case was personally discussed with Cora Gonzalez and Isidra BURNS, per their recommendations, Nguyen removed on 3/18, no issues with voiding since then  Nguyen removed 3/18, case was personally discussed with isidra BURNS with Urology  Continued allopurinol 50 mg daily, aspirin 81 mg, FeSO4 every other day, folic acid 1 mg, Protonix 40 mg daily, sucralfate 1 g q.i.d.  P.o. Norco 5 mg q.6 p.r.n. for pain  Antitussives p.r.n.  PT/OT recommending moderate-intensity therapy, case management on board, awaiting Medicaid approval for SNF.  Patient's daughter has submitted paperwork to Medicaid--> unfortunately patient is not eligible for Medicaid given illegal status  Patient is willing to work with therapy daily.  Tentative plan for discharge on Monday if patient is strong enough to go home with family  Palliative care also on board, greatly appreciated  Wound Care also on board, case personally discussed with Lionel, informed that the wound site is very small for packing but she will continue to express drain age from the site  Continue cyproheptadine, appetite is improving  Morning labs stable, repeat Friday     VTE  prophylaxis:  Lovenox 40     Patient condition:  Fair     Anticipated discharge and Disposition:   not a candidate for Medicaid given illegal status, if physically improved, home with family on Monday (already discussed with pt and daughter vis  service)     All diagnosis and differential diagnosis have been reviewed; assessment and plan has been documented; I have personally reviewed the labs and test results that are presently available; I have reviewed the patients medication list; I have reviewed the consulting providers response and recommendations. I have reviewed or attempted to review medical records based upon their availability     All of the patient's questions have been  addressed and answered. Patient's is agreeable to the above stated plan. I will continue to monitor closely and make adjustments to medical management as needed.    VITAL SIGNS: 24 HRS MIN & MAX LAST   Temp  Min: 97.4 °F (36.3 °C)  Max: 98.9 °F (37.2 °C) 97.4 °F (36.3 °C)   BP  Min: 109/71  Max: 122/78 111/65   Pulse  Min: 47  Max: 65  65   Resp  Min: 18  Max: 19 18   SpO2  Min: 96 %  Max: 98 % 97 %     I have reviewed the following labs:  Recent Labs   Lab 03/19/24  0601 03/20/24  0635 03/21/24  0617   WBC 10.14 8.82 9.04   RBC 4.53* 4.28* 4.45*   HGB 12.4* 11.4* 11.8*   HCT 39.5* 36.0* 38.3*   MCV 87.2 84.1 86.1   MCH 27.4 26.6* 26.5*   MCHC 31.4* 31.7* 30.8*   RDW 15.6 15.8 15.9    362 330   MPV 9.7 9.5 9.7     Recent Labs   Lab 03/18/24  0433 03/19/24  0553 03/20/24  0635 03/21/24  0617   * 133* 134* 133*   K 5.2* 4.7 4.6 5.0   CO2 23 23 25 26   BUN 20.8 18.1 21.3 18.3   CREATININE 1.26* 1.28* 1.26* 1.24*   CALCIUM 8.4* 8.8 8.9 8.8   ALBUMIN 2.6*  --   --   --    ALKPHOS 109  --   --   --    ALT 76*  --   --   --    AST 80*  --   --   --    BILITOT 1.1  --   --   --      Microbiology Results (last 7 days)       ** No results found for the last 168 hours. **             See below for Radiology    Scheduled  Med:   allopurinoL  50 mg Oral Daily    aspirin  81 mg Oral Daily    ciprofloxacin HCl  750 mg Oral Q12H    cyproheptadine  4 mg Oral BID    diclofenac sodium  2 g Topical (Top) TID    enoxparin  40 mg Subcutaneous Daily    ferrous sulfate  1 tablet Oral Every other day    folic acid  1 mg Oral Daily    metoclopramide  10 mg Intravenous Q8H    metoprolol succinate  12.5 mg Oral Daily    midodrine  10 mg Oral TID WM    pantoprazole  40 mg Oral Daily    polyethylene glycol  17 g Oral BID      Continuous Infusions:   loperamide        PRN Meds:  acetaminophen, acetaminophen, bisacodyL, dextrose 10%, dextrose 10%, electrolyte-A, HYDROcodone-acetaminophen, lactulose 10 gram/15 ml, loperamide, melatonin, metoclopramide, nitroGLYCERIN, ondansetron, simethicone, sodium chloride 0.9%     Assessment/Plan:      VTE prophylaxis:     Patient condition:  Stable/Fair/Guarded/ Serious/ Critical    Anticipated discharge and Disposition:         All diagnosis and differential diagnosis have been reviewed; assessment and plan has been documented; I have personally reviewed the labs and test results that are presently available; I have reviewed the patients medication list; I have reviewed the consulting providers response and recommendations. I have reviewed or attempted to review medical records based upon their availability    All of the patient's questions have been  addressed and answered. Patient's is agreeable to the above stated plan. I will continue to monitor closely and make adjustments to medical management as needed.  _____________________________________________________________________    Nutrition Status:    Radiology:  I have personally reviewed the following imaging and agree with the radiologist.     CT Abdomen Pelvis  Without Contrast  Narrative: EXAMINATION:  CT ABDOMEN PELVIS WITHOUT CONTRAST    CLINICAL HISTORY:  Abdominal pain, acute, nonlocalized;    TECHNIQUE:  Multidetector non-contrast axial CT images of the  abdomen and pelvis were obtained with coronal and sagittal reconstructions.    Automatic exposure control was utilized to reduce the patient's radiation dose.    DLP= 307    COMPARISON:  03/15/2024    FINDINGS:  01. HEPATOBILIARY: No focal hepatic lesion is identified, however evaluation is limited secondary to lack of IV contrast. The gallbladder is normal.    02. SPLEEN: Normal    03. PANCREAS: No focal masses or ductal dilatation.    04. ADRENALS: No adrenal nodules.    05. KIDNEYS: The right kidney demonstrates no stone, hydronephrosis, or hydroureter. No focal mass identified. The left kidney demonstrates no stone, hydronephrosis, or hydroureter. No focal mass identified.    06. LYMPHADENOPATHY/RETROPERITONEUM: There is no retroperitoneal lymphadenopathy. The abdominal aorta is normal in course and caliber.    07. BOWEL: Stool throughout the colon as may be seen with constipation.    08. PELVIC VISCERA: Normal. No pelvic mass.    09. PELVIC LYMPH NODES: No lymphadenopathy.    10. PERITONEUM/ABDOMINAL WALL: Inflammatory change noted within the right groin possibly related to recent surgery.  Small focus of air noted.    11. SKELETAL: No aggressive appearing lytic/blastic lesion. No acute fractures, subluxations or dislocations.    12. LUNG BASES: Left pleural effusion with atelectatic changes of the left lower lobe  Impression: Left pleural effusion with atelectatic changes of the left lower lobe early infectious process is not excluded.    Inflammatory change noted within the right groin possibly related to recent surgery.  Small focus of air noted.  Correlate with physical exam.    Stool noted throughout the colon as would be seen with constipation.    Electronically signed by: Mann Hernandez  Date:    03/17/2024  Time:    11:25      Stan Lazar MD  Department of Hospital Medicine   [facility]   03/24/2024

## 2024-03-25 LAB
ALBUMIN SERPL-MCNC: 3.2 G/DL (ref 3.4–4.8)
ALBUMIN/GLOB SERPL: 0.8 RATIO (ref 1.1–2)
ALP SERPL-CCNC: 110 UNIT/L (ref 40–150)
ALT SERPL-CCNC: 44 UNIT/L (ref 0–55)
AST SERPL-CCNC: 38 UNIT/L (ref 5–34)
BILIRUB SERPL-MCNC: 1 MG/DL
BUN SERPL-MCNC: 12.4 MG/DL (ref 8.4–25.7)
CALCIUM SERPL-MCNC: 9.2 MG/DL (ref 8.8–10)
CHLORIDE SERPL-SCNC: 105 MMOL/L (ref 98–107)
CO2 SERPL-SCNC: 22 MMOL/L (ref 23–31)
CREAT SERPL-MCNC: 1.3 MG/DL (ref 0.73–1.18)
GFR SERPLBLD CREATININE-BSD FMLA CKD-EPI: 57 MLS/MIN/1.73/M2
GLOBULIN SER-MCNC: 3.8 GM/DL (ref 2.4–3.5)
GLUCOSE SERPL-MCNC: 117 MG/DL (ref 82–115)
POTASSIUM SERPL-SCNC: 4.6 MMOL/L (ref 3.5–5.1)
PROT SERPL-MCNC: 7 GM/DL (ref 5.8–7.6)
SODIUM SERPL-SCNC: 136 MMOL/L (ref 136–145)

## 2024-03-25 PROCEDURE — 63600175 PHARM REV CODE 636 W HCPCS: Performed by: NURSE PRACTITIONER

## 2024-03-25 PROCEDURE — 80053 COMPREHEN METABOLIC PANEL: CPT | Performed by: INTERNAL MEDICINE

## 2024-03-25 PROCEDURE — 21400001 HC TELEMETRY ROOM

## 2024-03-25 PROCEDURE — 25000003 PHARM REV CODE 250: Performed by: PHYSICIAN ASSISTANT

## 2024-03-25 PROCEDURE — 97530 THERAPEUTIC ACTIVITIES: CPT

## 2024-03-25 PROCEDURE — 25000003 PHARM REV CODE 250: Performed by: INTERNAL MEDICINE

## 2024-03-25 PROCEDURE — 97116 GAIT TRAINING THERAPY: CPT | Mod: CQ

## 2024-03-25 PROCEDURE — 97530 THERAPEUTIC ACTIVITIES: CPT | Mod: CQ

## 2024-03-25 PROCEDURE — 25000003 PHARM REV CODE 250: Performed by: NURSE PRACTITIONER

## 2024-03-25 PROCEDURE — 63600175 PHARM REV CODE 636 W HCPCS: Performed by: INTERNAL MEDICINE

## 2024-03-25 RX ADMIN — Medication 6 MG: at 10:03

## 2024-03-25 RX ADMIN — DICLOFENAC SODIUM 2 G: 10 GEL TOPICAL at 08:03

## 2024-03-25 RX ADMIN — METOPROLOL SUCCINATE 12.5 MG: 25 TABLET, EXTENDED RELEASE ORAL at 08:03

## 2024-03-25 RX ADMIN — ALLOPURINOL 50 MG: 300 TABLET ORAL at 08:03

## 2024-03-25 RX ADMIN — ASPIRIN 81 MG: 81 TABLET, COATED ORAL at 08:03

## 2024-03-25 RX ADMIN — CYPROHEPTADINE HYDROCHLORIDE 4 MG: 4 TABLET ORAL at 08:03

## 2024-03-25 RX ADMIN — PANTOPRAZOLE SODIUM 40 MG: 40 TABLET, DELAYED RELEASE ORAL at 08:03

## 2024-03-25 RX ADMIN — ENOXAPARIN SODIUM 40 MG: 40 INJECTION SUBCUTANEOUS at 04:03

## 2024-03-25 RX ADMIN — CIPROFLOXACIN HYDROCHLORIDE 750 MG: 500 TABLET, FILM COATED ORAL at 08:03

## 2024-03-25 RX ADMIN — METOCLOPRAMIDE 10 MG: 5 INJECTION, SOLUTION INTRAMUSCULAR; INTRAVENOUS at 10:03

## 2024-03-25 RX ADMIN — FOLIC ACID 1 MG: 1 TABLET ORAL at 08:03

## 2024-03-25 RX ADMIN — MIDODRINE HYDROCHLORIDE 10 MG: 5 TABLET ORAL at 08:03

## 2024-03-25 RX ADMIN — DICLOFENAC SODIUM 2 G: 10 GEL TOPICAL at 04:03

## 2024-03-25 RX ADMIN — MIDODRINE HYDROCHLORIDE 10 MG: 5 TABLET ORAL at 04:03

## 2024-03-25 RX ADMIN — MIDODRINE HYDROCHLORIDE 10 MG: 5 TABLET ORAL at 12:03

## 2024-03-25 RX ADMIN — METOCLOPRAMIDE 10 MG: 5 INJECTION, SOLUTION INTRAMUSCULAR; INTRAVENOUS at 12:03

## 2024-03-25 RX ADMIN — METOCLOPRAMIDE 10 MG: 5 INJECTION, SOLUTION INTRAMUSCULAR; INTRAVENOUS at 05:03

## 2024-03-25 NOTE — PROGRESS NOTES
[facility] Hospital Medicine Progress Note        Chief Complaint: Inpatient Follow-up for R groin cellulitis     HPI per admitting team: 78-year-old male with a history of aortic insufficiency/stenosis, CAD, CKD IIIb, HTN AFib on Eliquis admitted to [facility] 02/15/2024 with chest pain, found to have NSTEMI (peak troponin 0.17) and underwent C 02/20/2024 showing severe multivessel stenosis and therefore was transferred to [facility] for CABG evaluation. Cardiology was consulted Patient had Impella placed on 02/23 and was admitted to the ICU.  Was started on aggressive diuresis with IV Lasix.  CV surgery was consulted.  Urology was consulted for hematuria; Nguyen catheter placed over guidewire with dilation secondary to urethral strictures. IV Heparin infusion was initiated per CIS but held due to hematuria/hemoptysis on 02/24.  He was also noted to have an JEAN-CLAUDE. Received 2 units PRBCs on 02/26 and was planned for high-risk PCI on 02/26 which was later canceled.  Underwent CABG x 3 2/27 (LIMA to LAD, RSVG to OM 1, R SVG to RCA, bioprosthetic AVR).  Remained on mechanical ventilation thereafter.  Patient noted to have tachycardia with atrial fibrillation/RVR requiring IV amiodarone along with pressors (norepinephrine/Milrinone) for hypotension. Patient underwent cardioversion x 2 with minimal improvement in HR/BP.  Patient self-extubated overnight on 02/29 and was noted to have adequate saturations on 2 L O2 via NC thereafter.  PT/OT consulted.  Renal function noted to be improving. Continued on IV diuresis as he appeared to be significantly volume overloaded postoperatively along with elevated pulmonary artery wedge pressures.  Started on p.o. amiodarone taper on 03/03.  Patient noted to have drainage from right groin wound and started on vancomycin on 03/04. Wound culture grew Pseudomonas.  Chest tubes discontinued on 03/02.  Downgraded from ICU on 03/02.  CIS and CV surgery continued following  patient.  CV surgery signed off on 03/06 and discontinued vancomycin.  Patient was placed on oral Levaquin.  Hospital medicine consulted on 03/06 for assistance with medical management and DC planning.  Repeat CT abdomen and pelvis of 3/9 shows improving stranding and improving hematoma in the right groin, persistent left-sided effusion and atelectasis  Patient complains of low back pain, cough and his SCDs too tight on his feet bilaterally  Vitals reviewed with blood pressure low normal, heart rates in the low 100s, saturating 94% on 2 L of oxygen   Labs reviewed and fairly stable   General surgery evaluated patient, reviewed CT scan of the area, given wound waning with scant serous/purulent drainage with manual expression, recommended wound care consult and continued to manually express fluid from site Q shift.  If area stopped draining, recommended do ultrasound to better define anatomy  Palliative care on board  ID adjusted antibiotics to IV cefepime  Nephrology placed on fluid restriction  Cardiology has suspended amiodarone due to elevated liver enzymes and Lasix due to hyponatremia  EKG showed atrial fibrillation with RVR, left bundle branch block-->  received digoxin 500 mcg push followed by 250 mcg q.6 for 2 doses for rate control and now on digoxin 0.125 mg daily which has now been discontinued  Troponin 0.18, per Cardiology probably post CABG Troponinemia        Interval Hx:   Patient seen and examined at bedside, family member at bedside   Patient complaining of nausea,, no vomiting    no acute events overnight         Objective/physical exam:  General:  Sitting in chair, looks much better  Chest: Clear to auscultation bilaterally anteriorly  Heart: RRR, +S1, S2, no appreciable murmur  Abdomen: Soft, tender to palpate and right upper quadrant and left lower quadrant.  Bowel sounds positive x4  MSK: Warm, bilateral lower extremity edema present, no clubbing or cyanosis  Neurologic:  Awake alert and  oriented, smiling, conversation     Assessment/Plan:  Nause   Constipation- resolved  Hypotension- improved on midodrine   Right flank pain- due to right-sided lower abdomen hematoma- resolved  R Groin wound infection with Cellulitis at previous impella insertion site- Pseudomonas aeruginosa/Serratia marcescens, slowly improving   Transaminitis- multifactorial-  Congestive hepatopathy versus infection vs drug induced- now trending down  Leukocytosis- resolved  Fluid overload 2/2 HFrEF exacerbation, fairly euvolemic clinically  HFrEF/HFpEF, euvolemic   JEAN-CLAUDE on stage II CKD - resolved   Hyponatremia due to SIADH- resolved  Pulmonary hypertension   NSTEMI, CAD sp CABG x 3, S/p AVR also  Atrial Fibrillation with RVR- resolved  Normocytic anemia  Urethral stricture s/p dilation with gustafson 2/23, removed 3/18  Hyperkalemia-mild- resolved  Moderate malnutrition        Plan  Cont  IV Reglan 10 mg Q 8 scheduled for 3 days   Kub- non obstructive bowel gas pattern   Continue midodrine to 10 mg t.i.d.   Cis signed off   Lasix, Lisinopril and Aldactone also suspended, due to low blood pressure  Continue po metoprolol succinate 12.5 mg q.day  Cardiology has suspended amiodarone and atorvastatin due to elevated liver enzymes- now resolved   Ultrasound right upper quadrant shows mild-to-moderate hepatomegaly.  No biliary dilation  Pt continue to complain of abdominal and low back pain--> ordered CT Abddmen pelvis w/o contrast--> inflammatory changes noted in the right groin, possibly related to recent surgery.  Small focus of air noted.  Stool throughout the colon at would be seen with constipation.  Left pleural effusion with atelectasis, changes of left lower lobe early infection process can not be excluded  Ordered Dulcolax suppository x1 and relistor--> had a BM last night and a huge BM 3/19  Cont Miralax BID   Added volatren get to the lower back for pain control  Wound culture grew Pseudomonas and Serratia marcescens- both  sensitive to cefepime  ID Dr KELLY adjusted antibiotics to IV cefepime-  Dr KELLY recommended total of 14 days treatment with end date 3/25.  Will switch to Cipro 750mg b.i.d. for that duration (renally dosed)  Nephrology placed pt on 1500 mL fluid restriction for hyponatremia, sodium improved to 134--> sodium chloride tablets discontinued per Nephrology  Creatinine slightly improved to 1.2, monitor closely, case personally discussed with Laurel BURNS, nephrology will sign off   Appreciate pulmonology input continue to monitor effusion, no need for thoracentesis at this time  Continue use of incentive spirometry and oxygen supplementation, currently on room air, pt not compliant with instructions   Appreciate Urology input,  per their recommendations, Nguyen removed on 3/18, no issues with voiding since then  Nguyen removed 3/18,   Continued allopurinol 50 mg daily, aspirin 81 mg, FeSO4 every other day, folic acid 1 mg, Protonix 40 mg daily, sucralfate 1 g q.i.d.  P.o. Norco 5 mg q.6 p.r.n. for pain  Antitussives p.r.n.  PT/OT recommending moderate-intensity therapy, case management on board, awaiting Medicaid approval for SNF.  Patient's daughter has submitted paperwork to Medicaid--> unfortunately patient is not eligible for Medicaid given illegal status  Patient is willing to work with therapy daily.  Tentative plan for discharge on Monday if patient is strong enough to go home with family  Palliative care also on board, greatly appreciated  Wound Care also on board, case personally discussed with Lionel, informed that the wound site is very small for packing but she will continue to express drain age from the site  Continue cyproheptadine, appetite is improving       VTE prophylaxis:  Lovenox 40       Anticipated discharge and Disposition:   not a candidate for Medicaid given illegal status, possible dc home tomorrow if remains stable. Ok to resume statins on dc , lfts improved to nl. Amiodarone dced by cardiology      All  diagnosis and differential diagnosis have been reviewed; assessment and plan has been documented; I have personally reviewed the labs and test results that are presently available; I have reviewed the patients medication list; I have reviewed the consulting providers response and recommendations. I have reviewed or attempted to review medical records based upon their availability     All of the patient's questions have been  addressed and answered. Patient's is agreeable to the above stated plan. I will continue to monitor closely and make adjustments to medical management as needed.    VITAL SIGNS: 24 HRS MIN & MAX LAST   Temp  Min: 97.3 °F (36.3 °C)  Max: 98.3 °F (36.8 °C) 97.7 °F (36.5 °C)   BP  Min: 99/52  Max: 155/74 133/77   Pulse  Min: 54  Max: 63  63   Resp  Min: 18  Max: 19 18   SpO2  Min: 96 %  Max: 99 % 97 %     I have reviewed the following labs:  Recent Labs   Lab 03/19/24  0601 03/20/24  0635 03/21/24  0617   WBC 10.14 8.82 9.04   RBC 4.53* 4.28* 4.45*   HGB 12.4* 11.4* 11.8*   HCT 39.5* 36.0* 38.3*   MCV 87.2 84.1 86.1   MCH 27.4 26.6* 26.5*   MCHC 31.4* 31.7* 30.8*   RDW 15.6 15.8 15.9    362 330   MPV 9.7 9.5 9.7       Recent Labs   Lab 03/19/24  0553 03/20/24  0635 03/21/24  0617   * 134* 133*   K 4.7 4.6 5.0   CO2 23 25 26   BUN 18.1 21.3 18.3   CREATININE 1.28* 1.26* 1.24*   CALCIUM 8.8 8.9 8.8       Microbiology Results (last 7 days)       ** No results found for the last 168 hours. **             See below for Radiology    Scheduled Med:   allopurinoL  50 mg Oral Daily    aspirin  81 mg Oral Daily    ciprofloxacin HCl  750 mg Oral Q12H    cyproheptadine  4 mg Oral BID    diclofenac sodium  2 g Topical (Top) TID    enoxparin  40 mg Subcutaneous Daily    ferrous sulfate  1 tablet Oral Every other day    folic acid  1 mg Oral Daily    metoclopramide  10 mg Intravenous Q8H    metoprolol succinate  12.5 mg Oral Daily    midodrine  10 mg Oral TID WM    pantoprazole  40 mg Oral Daily     polyethylene glycol  17 g Oral BID      Continuous Infusions:   loperamide        PRN Meds:  acetaminophen, acetaminophen, bisacodyL, dextrose 10%, dextrose 10%, electrolyte-A, HYDROcodone-acetaminophen, lactulose 10 gram/15 ml, loperamide, melatonin, metoclopramide, nitroGLYCERIN, ondansetron, simethicone, sodium chloride 0.9%     Assessment/Plan:      VTE prophylaxis:     Patient condition:  Stable/Fair/Guarded/ Serious/ Critical    Anticipated discharge and Disposition:         All diagnosis and differential diagnosis have been reviewed; assessment and plan has been documented; I have personally reviewed the labs and test results that are presently available; I have reviewed the patients medication list; I have reviewed the consulting providers response and recommendations. I have reviewed or attempted to review medical records based upon their availability    All of the patient's questions have been  addressed and answered. Patient's is agreeable to the above stated plan. I will continue to monitor closely and make adjustments to medical management as needed.  _____________________________________________________________________    Nutrition Status:    Radiology:  I have personally reviewed the following imaging and agree with the radiologist.     X-Ray Abdomen AP 1 View  Narrative: EXAMINATION:  XR ABDOMEN AP 1 VIEW    CLINICAL HISTORY:  nausea - assess for ileus;    TECHNIQUE:  AP view of the abdomen.    COMPARISON:  X-rays dated 03/15/2024    FINDINGS:  There is a normal colonic stool volume.  The bowel gas pattern is nonobstructive.  There are no abnormally dilated loops of bowel.  There is an unchanged left retrocardiac opacity  Impression: Nonobstructive bowel gas pattern.    Electronically signed by: Malika Matt  Date:    03/24/2024  Time:    11:25      Karina Ty MD  Department of Hospital Medicine   [facility]   03/25/2024

## 2024-03-25 NOTE — PT/OT/SLP PROGRESS
Occupational Therapy   Treatment    Name: Brain Snyder  MRN: [id number]  Admitting Diagnosis:  CAD (coronary artery disease)  27 Days Post-Op    Recommendations:     Recommended therapy intensity at discharge: Moderate Intensity Therapy   Discharge Equipment Recommendations:  none  Barriers to discharge:   (Ongoing medical needs)    Assessment:     Brain Snyder is a 78 y.o. male with a medical diagnosis of CAD (coronary artery disease) s/p CABG.  Pt had just finished his PT session, requesting to return to bed to sleep. Therapist provided family training for safety with mobility and ADL's upon return home. Pt's dtr demo'd great return and reported understanding. Performance deficits affecting function are weakness, impaired endurance, impaired self care skills, impaired functional mobility, impaired balance, gait instability.     Rehab Prognosis:  Good; patient would benefit from acute skilled OT services to address these deficits and reach maximum level of function.       Plan:     Patient to be seen 5 x/week to address the above listed problems via self-care/home management, therapeutic activities, therapeutic exercises  Plan of Care Expires: 04/09/24  Plan of Care Reviewed with: patient, daughter    Subjective     Pain/Comfort:  Pain Rating 1: 0/10    Objective:     Communicated with: Nurse and PTA prior to session.  Patient found ambulatory in room/fonseca with PTA with peripheral IV, pulse ox (continuous), telemetry upon OT entry to room.    General Precautions: Standard, fall, sternal    Orthopedic Precautions:N/A  Braces: N/A  Respiratory Status: Room air     Occupational Performance:     Bed Mobility:    Patient completed Sit to Supine with stand by assistance     Functional Mobility/Transfers:  Patient completed Sit <> Stand Transfer with minimum assistance  with  rolling walker   Functional Mobility: Pt's dtr instructed on safety with transferring pt from recliner>chair, demo'd good  return.    Therapeutic Activities:  Therapist provided family training including use of gait belt, assist with transfers, maintaining sternal precautions during mobility and ADL's, and encouraging pt to participate in OOB upon return home. Recommended tub bench for safety with showering.     Therapeutic Positioning    OT interventions performed during the course of today's session in an effort to prevent and/or reduce acquired pressure injuries:   Education was provided on benefits of and recommendations for therapeutic positioning     Findings:  visible skin intact    Patient Education:  Patient and daughter/s were provided with verbal education and demonstrations education regarding OT role/goals/POC, post op precautions, fall prevention, safety awareness, and Discharge/DME recommendations.  Understanding was verbalized.      Patient left HOB elevated with all lines intact, call button in reach, nurse notified, and daughter present.    GOALS:   Multidisciplinary Problems       Occupational Therapy Goals          Problem: Occupational Therapy    Goal Priority Disciplines Outcome Interventions   Occupational Therapy Goal     OT, PT/OT Ongoing, Progressing    Description: Goals to be met by: 4/1/24     Patient will increase functional independence with ADLs by performing:    UE Dressing with min A   Grooming while standing at sink with Minimal Assistance.  Toileting from toilet with Moderate Assistance for hygiene and clothing management.   Sitting at edge of bed x30 minutes with Minimal Assistance. (Met)  Toilet transfer with Minimal Assistance.                         Time Tracking:     OT Date of Treatment: 03/25/24  OT Start Time: 1035  OT Stop Time: 1049  OT Total Time (min): 14 min    Billable Minutes:Therapeutic Activity 14 minutes    OT/YVETTE: OT     Number of YVETTE visits since last OT visit: 3    3/25/2024

## 2024-03-25 NOTE — PT/OT/SLP PROGRESS
Physical Therapy Treatment    Patient Name:  Brain Snyder   MRN:  [id number]    Recommendations:     Discharge therapy intensity: Moderate Intensity Therapy   Discharge Equipment Recommendations:  (tbd)  Barriers to discharge: Impaired mobility    Assessment:     Brain Snyder is a 78 y.o. male admitted with a medical diagnosis of  NSTEMI, CAD, HF, CAD, afib, s/p impella, s/p CABG x3, s/p AVR .  He presents with the following impairments/functional limitations: weakness, impaired endurance, impaired self care skills, impaired functional mobility, gait instability, impaired balance, decreased upper extremity function, decreased lower extremity function, decreased ROM, impaired cardiopulmonary response to activity .    Rehab Prognosis: Fair; patient would benefit from acute skilled PT services to address these deficits and reach maximum level of function.    Recent Surgery: Procedure(s) (LRB):  CORONARY ARTERY BYPASS GRAFT (CABG) (N/A)  Replacement-valve-aortic (N/A)  SURGICAL PROCUREMENT, VEIN, ENDOSCOPIC (N/A)  Impella, Removal (Right) 27 Days Post-Op    Plan:     During this hospitalization, patient to be seen 5 x/week to address the identified rehab impairments via gait training, therapeutic activities, therapeutic exercises and progress toward the following goals:    Plan of Care Expires:  04/01/24    Subjective     Chief Complaint: fatigue  Patient/Family Comments/goals: to return home  Pain/Comfort:  Pain Rating 1: 0/10      Objective:     Communicated with pts nurse prior to session.  Patient found supine with peripheral IV, pulse ox (continuous), telemetry upon PT entry to room.     General Precautions: Standard, fall, sternal  Orthopedic Precautions: N/A  Braces: N/A  Respiratory Status: Room air  Heart rate: 60 to 128 bpm   Skin Integrity: Visible skin intact      Functional Mobility:  Bed Mobility:     Rolling Right: minimum assistance  Scooting: contact guard assistance  Supine to Sit: minimum  assistance, moderate assistance, and worked on use of momentum to come to sit w/o using UE's.  Pt required encouragement to move w/ purpose  Transfers:     Sit to Stand:  minimum assistance with rolling walker and x 5 reps working on use of momentum to load legs  Bed to Chair: contact guard assistance with  rolling walker  using  Step Transfer  Gait: Pt ambulated 25 ft and 35 ft w/ RW, CGA working on michelle, posture and quality of gait.  Educated pt and daughter on increasing time walking, to slowly increase time ambulating with a goal of 6 mins. Daughter verbalized understanding and to work with her father upon return home.    Education:  Patient and daughter/s were provided with verbal education and demonstrations education regarding safety awareness and increase time OOB and activity .  Understanding was verbalized, however additional teaching warranted.     Patient left up in chair with all lines intact and family and OT present    GOALS:   Multidisciplinary Problems       Physical Therapy Goals          Problem: Physical Therapy    Goal Priority Disciplines Outcome Goal Variances Interventions   Physical Therapy Goal     PT, PT/OT Ongoing, Progressing     Description: Goals to be met by: 2024     Patient will increase functional independence with mobility by performin. Supine to sit with MInimal Assistance  2. Sit to stand transfer with Minimal Assistance  3. Gait  x 150 feet with Minimal Assistance using Rolling Walker.                          Time Tracking:     PT Received On: 24  PT Start Time: 1014     PT Stop Time: 1037  PT Total Time (min): 23 min     Billable Minutes: Gait Training 11 and Therapeutic Activity 12    Treatment Type: Treatment  PT/PTA: PTA     Number of PTA visits since last PT visit: 2     2024

## 2024-03-26 VITALS
BODY MASS INDEX: 25.93 KG/M2 | DIASTOLIC BLOOD PRESSURE: 70 MMHG | HEART RATE: 63 BPM | SYSTOLIC BLOOD PRESSURE: 125 MMHG | WEIGHT: 155.63 LBS | RESPIRATION RATE: 20 BRPM | TEMPERATURE: 97 F | OXYGEN SATURATION: 97 % | HEIGHT: 65 IN

## 2024-03-26 LAB
OHS QRS DURATION: 172 MS
OHS QTC CALCULATION: 538 MS

## 2024-03-26 PROCEDURE — 25000003 PHARM REV CODE 250: Performed by: INTERNAL MEDICINE

## 2024-03-26 PROCEDURE — 97116 GAIT TRAINING THERAPY: CPT | Mod: CQ

## 2024-03-26 PROCEDURE — 25000003 PHARM REV CODE 250: Performed by: PHYSICIAN ASSISTANT

## 2024-03-26 PROCEDURE — 93010 ELECTROCARDIOGRAM REPORT: CPT | Mod: ,,, | Performed by: INTERNAL MEDICINE

## 2024-03-26 PROCEDURE — 25000003 PHARM REV CODE 250: Performed by: NURSE PRACTITIONER

## 2024-03-26 PROCEDURE — 63600175 PHARM REV CODE 636 W HCPCS: Performed by: INTERNAL MEDICINE

## 2024-03-26 PROCEDURE — 93005 ELECTROCARDIOGRAM TRACING: CPT

## 2024-03-26 PROCEDURE — 97530 THERAPEUTIC ACTIVITIES: CPT | Mod: CQ

## 2024-03-26 RX ORDER — ATORVASTATIN CALCIUM 40 MG/1
40 TABLET, FILM COATED ORAL NIGHTLY
Qty: 90 TABLET | Refills: 3 | Status: SHIPPED | OUTPATIENT
Start: 2024-03-26 | End: 2025-03-26

## 2024-03-26 RX ORDER — FERROUS SULFATE 325(65) MG
325 TABLET, DELAYED RELEASE (ENTERIC COATED) ORAL DAILY
Qty: 30 TABLET | Refills: 2 | Status: SHIPPED | OUTPATIENT
Start: 2024-03-26 | End: 2024-06-24

## 2024-03-26 RX ORDER — FOLIC ACID 1 MG/1
1 TABLET ORAL DAILY
Qty: 30 TABLET | Refills: 2 | Status: SHIPPED | OUTPATIENT
Start: 2024-03-27 | End: 2024-06-25

## 2024-03-26 RX ORDER — MIDODRINE HYDROCHLORIDE 10 MG/1
10 TABLET ORAL
Qty: 90 TABLET | Refills: 11 | Status: SHIPPED | OUTPATIENT
Start: 2024-03-26 | End: 2025-03-26

## 2024-03-26 RX ORDER — PANTOPRAZOLE SODIUM 40 MG/1
40 TABLET, DELAYED RELEASE ORAL DAILY
Qty: 30 TABLET | Refills: 11 | Status: SHIPPED | OUTPATIENT
Start: 2024-03-27 | End: 2025-03-27

## 2024-03-26 RX ORDER — ALLOPURINOL 100 MG/1
50 TABLET ORAL DAILY
Qty: 15 TABLET | Refills: 11 | Status: SHIPPED | OUTPATIENT
Start: 2024-03-27 | End: 2025-03-27

## 2024-03-26 RX ORDER — METOPROLOL SUCCINATE 25 MG/1
12.5 TABLET, EXTENDED RELEASE ORAL DAILY
Qty: 15 TABLET | Refills: 11 | Status: SHIPPED | OUTPATIENT
Start: 2024-03-27 | End: 2025-03-27

## 2024-03-26 RX ORDER — ASPIRIN 81 MG/1
81 TABLET ORAL DAILY
Qty: 90 TABLET | Refills: 3 | Status: SHIPPED | OUTPATIENT
Start: 2024-03-27 | End: 2025-03-27

## 2024-03-26 RX ADMIN — DICLOFENAC SODIUM 2 G: 10 GEL TOPICAL at 08:03

## 2024-03-26 RX ADMIN — FERROUS SULFATE TAB 325 MG (65 MG ELEMENTAL FE) 1 EACH: 325 (65 FE) TAB at 08:03

## 2024-03-26 RX ADMIN — MIDODRINE HYDROCHLORIDE 10 MG: 5 TABLET ORAL at 08:03

## 2024-03-26 RX ADMIN — METOCLOPRAMIDE 10 MG: 5 INJECTION, SOLUTION INTRAMUSCULAR; INTRAVENOUS at 06:03

## 2024-03-26 RX ADMIN — METOPROLOL SUCCINATE 12.5 MG: 25 TABLET, EXTENDED RELEASE ORAL at 08:03

## 2024-03-26 RX ADMIN — ACETAMINOPHEN 650 MG: 650 SOLUTION ORAL at 06:03

## 2024-03-26 RX ADMIN — FOLIC ACID 1 MG: 1 TABLET ORAL at 08:03

## 2024-03-26 RX ADMIN — MIDODRINE HYDROCHLORIDE 10 MG: 5 TABLET ORAL at 12:03

## 2024-03-26 RX ADMIN — PANTOPRAZOLE SODIUM 40 MG: 40 TABLET, DELAYED RELEASE ORAL at 08:03

## 2024-03-26 RX ADMIN — CYPROHEPTADINE HYDROCHLORIDE 4 MG: 4 TABLET ORAL at 08:03

## 2024-03-26 RX ADMIN — ALLOPURINOL 50 MG: 300 TABLET ORAL at 08:03

## 2024-03-26 RX ADMIN — ASPIRIN 81 MG: 81 TABLET, COATED ORAL at 08:03

## 2024-03-26 NOTE — PT/OT/SLP PROGRESS
Physical Therapy Treatment    Patient Name:  Brain Snyder   MRN:  [id number]    Recommendations:     Discharge therapy intensity: Moderate Intensity Therapy   Discharge Equipment Recommendations:  (tbd)  Barriers to discharge: Ongoing medical needs    Assessment:     Brain Snyder is a 78 y.o. male admitted with a medical diagnosis of NSTEMI, CAD, HF, CAD, afib, s/p impella, s/p CABG x3, s/p AVR .  He presents with the following impairments/functional limitations: weakness, impaired endurance, impaired functional mobility, impaired self care skills, gait instability, impaired balance, decreased upper extremity function, decreased ROM, impaired cardiopulmonary response to activity .    Rehab Prognosis: Good; patient would benefit from acute skilled PT services to address these deficits and reach maximum level of function.    Recent Surgery: Procedure(s) (LRB):  CORONARY ARTERY BYPASS GRAFT (CABG) (N/A)  Replacement-valve-aortic (N/A)  SURGICAL PROCUREMENT, VEIN, ENDOSCOPIC (N/A)  Impella, Removal (Right) 28 Days Post-Op    Plan:     During this hospitalization, patient to be seen 5 x/week to address the identified rehab impairments via gait training, therapeutic exercises and progress toward the following goals:    Plan of Care Expires:  04/01/24    Subjective     Chief Complaint: none  Patient/Family Comments/goals: hopes to go home today  Pain/Comfort:         Objective:     Communicated with pts nurse prior to session.  Patient found  sitting EOB w/ family present  with peripheral IV, pulse ox (continuous), telemetry upon PT entry to room.     General Precautions: Standard, sternal, fall  Orthopedic Precautions: N/A  Braces: N/A  Respiratory Status: Room air  Blood Pressure: 110/60, HR b/t 90 to 125 bpm  Skin Integrity: Visible skin intact      Functional Mobility:  Transfers:     Sit to Stand:  contact guard assistance and minimum assistance with rolling walker and from EOB, toilet and chair, w/ cues for  avoiding use of UE's.  Bed to Chair: contact guard assistance with  rolling walker  using  Step Transfer and also to bathroom toilet  Gait: Pt ambulated 40 ft and 45 ft w/ RW., Upon returning back to room pt ambulated to bathroom w/ single HHA, 15 ft, Min A demonstrating decrease step length and speed of gait.  Pt ambulated w/ walker to return to chair. Pt requires CGA ambulating w/ RW.  Balance: Pt performed simple tasks w/ RW for support, demonstrating fair+ balance.  Pt is fearful performing tasks w/o support, in standing.     Education:  Patient and daughter/s were provided with verbal education and demonstrations education regarding  safety ambulating and performing daily tasks to decrease risks for falls .  Understanding was verbalized, however additional teaching warranted.     Patient left up in chair with all lines intact, call button in reach, nurse notified, and family present    GOALS:   Multidisciplinary Problems       Physical Therapy Goals          Problem: Physical Therapy    Goal Priority Disciplines Outcome Goal Variances Interventions   Physical Therapy Goal     PT, PT/OT Ongoing, Progressing     Description: Goals to be met by: 2024     Patient will increase functional independence with mobility by performin. Supine to sit with MInimal Assistance  2. Sit to stand transfer with Minimal Assistance  3. Gait  x 150 feet with Minimal Assistance using Rolling Walker.                          Time Tracking:     PT Received On: 24  PT Start Time: 931     PT Stop Time: 956  PT Total Time (min): 25 min     Billable Minutes: Gait Training 15 and Therapeutic Activity 10    Treatment Type: Treatment  PT/PTA: PTA     Number of PTA visits since last PT visit: 3     2024

## 2024-03-26 NOTE — NURSING
Pt educated on post op CABG. Pt will reach out to home health organization to see about purchasing home health on their own. Pt and family instructed in Beninese with . VSS. IV and tele removed. Pt and family verbalizes understanding.

## 2024-03-26 NOTE — PLAN OF CARE
Pt daughter is interested in paying for pt/ot, rehab etc. OOP. I have contacted [facility] to ask if they can accept self pay and what is price for pt/ot, cardiac program. Awaiting callback    [facility] . I have provided the information written with name of hh and number to call, prices for PT/Ot visits, nurse visits and 2 week advance payments would be due. The daughter thanked me for the info.

## 2024-03-26 NOTE — PLAN OF CARE
03/26/24 1457   Final Note   Assessment Type Final Discharge Note   Anticipated Discharge Disposition Home   Post-Acute Status   Discharge Delays None known at this time     Pt will dc home . Family will transport home.

## 2024-04-04 NOTE — DISCHARGE SUMMARY
[facility] Hospital Medicine Discharge Summary    Admit Date: 2/21/2024  Discharge Date and Time: 3/26/24 ,5:45 PM  Admitting Physician:  Team  Discharging Physician: Stan Lazar MD.  Primary Care Physician: Nidia Shepherd NP  Consults: Cardiology and Hospital Medicine    Discharge Diagnoses:  Nausea  Constipation- resolved  Hypotension- improved on midodrine   Right flank pain- due to right-sided lower abdomen hematoma- resolved  R Groin wound infection with Cellulitis at previous impella insertion site- Pseudomonas aeruginosa/Serratia marcescens, slowly improving   Transaminitis- multifactorial-  Congestive hepatopathy versus infection vs drug induced- now trending down  Leukocytosis- resolved  Fluid overload 2/2 HFrEF exacerbation, fairly euvolemic clinically  HFrEF/HFpEF, euvolemic   JEAN-CLAUDE on stage II CKD - resolved   Hyponatremia due to SIADH- resolved  Pulmonary hypertension   NSTEMI, CAD sp CABG x 3, S/p AVR also  Atrial Fibrillation with RVR- resolved  Normocytic anemia  Urethral stricture s/p dilation with gustafson 2/23, removed 3/18  Hyperkalemia-mild- resolved  Moderate malnutrition    Hospital Course:   Chief Complaint: Inpatient Follow-up for R groin cellulitis     HPI per admitting team: 78-year-old male with a history of aortic insufficiency/stenosis, CAD, CKD IIIb, HTN AFib on Eliquis admitted to [facility] 02/15/2024 with chest pain, found to have NSTEMI (peak troponin 0.17) and underwent C 02/20/2024 showing severe multivessel stenosis and therefore was transferred to [facility] for CABG evaluation. Cardiology was consulted Patient had Impella placed on 02/23 and was admitted to the ICU.  Was started on aggressive diuresis with IV Lasix.  CV surgery was consulted.  Urology was consulted for hematuria; Gustafson catheter placed over guidewire with dilation secondary to urethral strictures. IV Heparin infusion was initiated per CIS but held due to hematuria/hemoptysis on 02/24.  He  was also noted to have an JEAN-CLAUDE. Received 2 units PRBCs on 02/26 and was planned for high-risk PCI on 02/26 which was later canceled.  Underwent CABG x 3 2/27 (LIMA to LAD, RSVG to OM 1, R SVG to RCA, bioprosthetic AVR).  Remained on mechanical ventilation thereafter.  Patient noted to have tachycardia with atrial fibrillation/RVR requiring IV amiodarone along with pressors (norepinephrine/Milrinone) for hypotension. Patient underwent cardioversion x 2 with minimal improvement in HR/BP.  Patient self-extubated overnight on 02/29 and was noted to have adequate saturations on 2 L O2 via NC thereafter.  PT/OT consulted.  Renal function noted to be improving. Continued on IV diuresis as he appeared to be significantly volume overloaded postoperatively along with elevated pulmonary artery wedge pressures.  Started on p.o. amiodarone taper on 03/03.  Patient noted to have drainage from right groin wound and started on vancomycin on 03/04. Wound culture grew Pseudomonas.  Chest tubes discontinued on 03/02.  Downgraded from ICU on 03/02.  CIS and CV surgery continued following patient.  CV surgery signed off on 03/06 and discontinued vancomycin.  Patient was placed on oral Levaquin.  Hospital medicine consulted on 03/06 for assistance with medical management and DC planning.  Repeat CT abdomen and pelvis of 3/9 shows improving stranding and improving hematoma in the right groin, persistent left-sided effusion and atelectasis  Patient complains of low back pain, cough and his SCDs too tight on his feet bilaterally  Vitals reviewed with blood pressure low normal, heart rates in the low 100s, saturating 94% on 2 L of oxygen   Labs reviewed and fairly stable   General surgery evaluated patient, reviewed CT scan of the area, given wound waning with scant serous/purulent drainage with manual expression, recommended wound care consult and continued to manually express fluid from site Q shift.  If area stopped draining, recommended  do ultrasound to better define anatomy  Palliative care on board  ID adjusted antibiotics to IV cefepime  Nephrology placed on fluid restriction  Cardiology has suspended amiodarone due to elevated liver enzymes and Lasix due to hyponatremia  EKG showed atrial fibrillation with RVR, left bundle branch block-->  received digoxin 500 mcg push followed by 250 mcg q.6 for 2 doses for rate control and now on digoxin 0.125 mg daily which has now been discontinued  Troponin 0.18, per Cardiology probably post CABG Troponinemia         Plan  Patient had a long recovery period after surgery. Eventually discharged home     Kub- non obstructive bowel gas pattern   Continue midodrine to 10 mg t.i.d.   Cis signed off   Lasix, Lisinopril and Aldactone also suspended, due to low blood pressure  Continue po metoprolol succinate 12.5 mg q.day  Cardiology has suspended amiodarone and atorvastatin due to elevated liver enzymes- now resolved   Ultrasound right upper quadrant shows mild-to-moderate hepatomegaly.  No biliary dilation  Pt continue to complain of abdominal and low back pain--> ordered CT Abddmen pelvis w/o contrast--> inflammatory changes noted in the right groin, possibly related to recent surgery.  Small focus of air noted.  Stool throughout the colon at would be seen with constipation.  Left pleural effusion with atelectasis, changes of left lower lobe early infection process can not be excluded  Ordered Dulcolax suppository x1 and relistor--> had a BM last night and a huge BM 3/19  Cont Miralax BID   Added volatren get to the lower back for pain control  Wound culture grew Pseudomonas and Serratia marcescens- both sensitive to cefepime  ID Dr KELLY adjusted antibiotics to IV cefepime-  Dr KELLY recommended total of 14 days treatment with end date 3/25.  Will switch to Cipro 750mg b.i.d. for that duration (renally dosed)  Nephrology placed pt on 1500 mL fluid restriction for hyponatremia, sodium improved to 134--> sodium  chloride tablets discontinued per Nephrology  Creatinine slightly improved to 1.2, monitor closely, case personally discussed with Laurel BURNS, nephrology will sign off   Appreciate pulmonology input continue to monitor effusion, no need for thoracentesis at this time  Continue use of incentive spirometry and oxygen supplementation, currently on room air, pt not compliant with instructions   Appreciate Urology input,  per their recommendations, Nguyen removed on 3/18, no issues with voiding since then  Nguyen removed 3/18,   Continued allopurinol 50 mg daily, aspirin 81 mg, FeSO4 every other day, folic acid 1 mg, Protonix 40 mg daily, sucralfate 1 g q.i.d.  P.o. Norco 5 mg q.6 p.r.n. for pain  Antitussives p.r.n.  PT/OT recommending moderate-intensity therapy, case management on board, awaiting Medicaid approval for SNF.  Patient's daughter has submitted paperwork to Medicaid--> unfortunately patient is not eligible for Medicaid given illegal status  Patient is willing to work with therapy daily.  Tentative plan for discharge on Monday if patient is strong enough to go home with family  Palliative care also on board, greatly appreciated  Wound Care also on board, case personally discussed with Lionel, informed that the wound site is very small for packing but she will continue to express drain age from the site  Continue cyproheptadine, appetite is improving      Objective/physical exam:  General:  Sitting in chair, looks much better  Chest: Clear to auscultation bilaterally anteriorly  Heart: RRR, +S1, S2, no appreciable murmur  Abdomen: Soft, tender to palpate and right upper quadrant and left lower quadrant.  Bowel sounds positive x4  MSK: Warm, bilateral lower extremity edema present, no clubbing or cyanosis  Neurologic:  Awake alert and oriented, smiling, conversation          Pt was seen and examined on the day of discharge  Vitals:  VITAL SIGNS: 24 HRS MIN & MAX LAST   No data recorded 96.9 °F (36.1 °C)   No data  "recorded 125/70   No data recorded  63   No data recorded 20   No data recorded 97 %       Procedures Performed: No admission procedures for hospital encounter.     Significant Diagnostic Studies: See Full reports for all details    No results for input(s): "WBC", "RBC", "HGB", "HCT", "MCV", "MCH", "MCHC", "RDW", "PLT", "MPV", "GRAN", "LYMPH", "MONO", "BASO", "NRBC" in the last 168 hours.    No results for input(s): "NA", "K", "CL", "CO2", "ANIONGAP", "BUN", "CREATININE", "GLU", "CALCIUM", "PH", "MG", "ALBUMIN", "PROT", "ALKPHOS", "ALT", "AST", "BILITOT" in the last 168 hours.     Microbiology Results (last 7 days)       ** No results found for the last 168 hours. **             X-Ray Abdomen AP 1 View  Narrative: EXAMINATION:  XR ABDOMEN AP 1 VIEW    CLINICAL HISTORY:  nausea - assess for ileus;    TECHNIQUE:  AP view of the abdomen.    COMPARISON:  X-rays dated 03/15/2024    FINDINGS:  There is a normal colonic stool volume.  The bowel gas pattern is nonobstructive.  There are no abnormally dilated loops of bowel.  There is an unchanged left retrocardiac opacity  Impression: Nonobstructive bowel gas pattern.    Electronically signed by: Malika Matt  Date:    03/24/2024  Time:    11:25         Medication List        START taking these medications      allopurinoL 100 MG tablet  Commonly known as: ZYLOPRIM  Take 0.5 tablets (50 mg total) by mouth once daily.     aspirin 81 MG EC tablet  Commonly known as: ECOTRIN  Take 1 tablet (81 mg total) by mouth once daily.     ferrous sulfate 325 (65 FE) MG EC tablet  Take 1 tablet (325 mg total) by mouth once daily.     folic acid 1 MG tablet  Commonly known as: FOLVITE  Take 1 tablet (1 mg total) by mouth once daily.     metoprolol succinate 25 MG 24 hr tablet  Commonly known as: TOPROL-XL  Take 0.5 tablets (12.5 mg total) by mouth once daily.     midodrine 10 MG tablet  Commonly known as: PROAMATINE  Take 1 tablet (10 mg total) by mouth 3 (three) times daily with " meals.     pantoprazole 40 MG tablet  Commonly known as: PROTONIX  Take 1 tablet (40 mg total) by mouth once daily.            CHANGE how you take these medications      atorvastatin 40 MG tablet  Commonly known as: LIPITOR  Take 1 tablet (40 mg total) by mouth every evening.  What changed: medication strength            STOP taking these medications      amiodarone 200 MG Tab  Commonly known as: PACERONE     furosemide 40 MG tablet  Commonly known as: LASIX               Where to Get Your Medications        [facility] [place] [facility] [address] [facility]  [address] [address]      Phone: [phone]   allopurinoL 100 MG tablet  aspirin 81 MG EC tablet  atorvastatin 40 MG tablet  ferrous sulfate 325 (65 FE) MG EC tablet  folic acid 1 MG tablet  metoprolol succinate 25 MG 24 hr tablet  midodrine 10 MG tablet  pantoprazole 40 MG tablet          Explained in detail to the patient about the discharge plan, medications, and follow-up visits. Pt understands and agrees with the treatment plan  Discharge Disposition: Home or Self Care   Discharged Condition: stable  Diet-    Medications Per DC med rec  Activities as tolerated   Follow-up Information       Nita Contreras MD Follow up on 4/16/2024.    Specialty: Cardiothoracic Surgery  Why: @ 9:40AM  Contact information:  [address] [place] [address] [address]  [place] [address]  [phone]               [facility] Follow up.    Why: f/u [facility] cardiology 1 week post discharge  Contact information:  [address] [address]  [phone]               Nidia Shepherd NP Follow up in 1 week(s).    Specialties: Urgent Care, Emergency Medicine  Why: Office will call pt with appt.  Contact information:  [address] [facility] [id number] [phone]                           For further questions contact hospitalist office    Discharge time 33 minutes    For worsening symptoms,  chest pain, shortness of breath, increased abdominal pain, high grade fever, stroke or stroke like symptoms, immediately go to the nearest Emergency Room or call 911 as soon as possible.      Stan Ann M.D on 3/26/24 . at 5:45 PM.

## 2024-04-16 ENCOUNTER — OFFICE VISIT (OUTPATIENT)
Dept: CARDIAC SURGERY | Facility: CLINIC | Age: 79
End: 2024-04-16

## 2024-04-16 VITALS
WEIGHT: 166.19 LBS | BODY MASS INDEX: 27.69 KG/M2 | HEART RATE: 80 BPM | HEIGHT: 65 IN | DIASTOLIC BLOOD PRESSURE: 79 MMHG | OXYGEN SATURATION: 97 % | RESPIRATION RATE: 18 BRPM | SYSTOLIC BLOOD PRESSURE: 129 MMHG

## 2024-04-16 DIAGNOSIS — Z95.1 S/P CABG X 3: Primary | ICD-10-CM

## 2024-04-16 DIAGNOSIS — Z95.2 S/P AVR (AORTIC VALVE REPLACEMENT): ICD-10-CM

## 2024-04-16 PROCEDURE — 99024 POSTOP FOLLOW-UP VISIT: CPT | Mod: ,,, | Performed by: STUDENT IN AN ORGANIZED HEALTH CARE EDUCATION/TRAINING PROGRAM

## 2024-04-16 NOTE — PROGRESS NOTES
Patient is status post coronary artery bypass grafting x 3, bio AVR doing well without complaints   Vital signs stable/afebrile   Regular rate and rhythm without murmurs rubs or gallops  Coarse breath sounds bilaterally   Wounds CDI   A/P:  F/u in 6 months.  Plan per Cardiology

## 2024-05-27 PROBLEM — I21.4 NSTEMI (NON-ST ELEVATED MYOCARDIAL INFARCTION): Status: RESOLVED | Noted: 2024-02-22 | Resolved: 2024-05-27

## (undated) DEVICE — PUNCH AORTIC ROT TIP 4.8MM

## (undated) DEVICE — HOLDER STRIP-T SELF ADH 2X10IN

## (undated) DEVICE — SYR 10CC LUER LOCK

## (undated) DEVICE — BLOWER MISTER

## (undated) DEVICE — CATH OPTITORQUE RADIAL 5FR

## (undated) DEVICE — PATCH SEALANT EVARREST 2X4

## (undated) DEVICE — SUT PROLENE 4-0 RB-1 BL MO

## (undated) DEVICE — KIT SURGICAL TURNOVER

## (undated) DEVICE — HEMOCONCENTRATOR W TBNG ADPT

## (undated) DEVICE — SHEATH INTRODUCER 7FR 11CM

## (undated) DEVICE — INSERT INTRACK CLAMP ULTRA 88M

## (undated) DEVICE — CATH AL 1.0 5/BX

## (undated) DEVICE — GOWN SMARTSLEEVE AAMI LVL4 XXL

## (undated) DEVICE — CANNULA SOFT FLOW ANG 14IN 21F

## (undated) DEVICE — DRSNG POLYSKIN TRNSPAR 4X4.75

## (undated) DEVICE — BLADE SCALP OPHTL BEVEL STR

## (undated) DEVICE — CANNULA NON-VENT 24FR 14.5IN

## (undated) DEVICE — Device

## (undated) DEVICE — LOOP STERION MAXI YEL 1X406MM

## (undated) DEVICE — TUBING INSUFFLATOR W/ROT CONCT

## (undated) DEVICE — DRAPE RADIAL BRACHIAL 29X42IN

## (undated) DEVICE — NDL BLUNT FILL 18G 1IN

## (undated) DEVICE — CARTRIDGE HEPARIN DOSE

## (undated) DEVICE — BLANKET HYPER ADULT 24X60IN

## (undated) DEVICE — ELECTRODE BLADE INSULATED 1 IN

## (undated) DEVICE — SUCTION INTRA SUMP 20FG

## (undated) DEVICE — APPLICATOR ENDOSCP 32CM5MM2TIP

## (undated) DEVICE — WIRE INTRAMYOCARDIAL TEMP

## (undated) DEVICE — GLOVE PROTEXIS BLUE LATEX 7

## (undated) DEVICE — GUIDEWIRE STF .035X180CM STR

## (undated) DEVICE — PAD DEFIB CADENCE ADULT R2

## (undated) DEVICE — YANKAUER OPEN TIP W/O VENT

## (undated) DEVICE — PROBE DOPPLER VTI DISP 8MHZ

## (undated) DEVICE — DRESSING TELFA + BARR 4X6IN

## (undated) DEVICE — SPONGE GAUZE 16PLY 4X4

## (undated) DEVICE — HEMOCONCENTRATOR PED .09M2

## (undated) DEVICE — DRESSING TELFA + RECT 6X10IN

## (undated) DEVICE — SOL LAC RINGERS 1000ML INJ

## (undated) DEVICE — SUT PROLENE 2-0 SH 36IN BLU

## (undated) DEVICE — INSERT INTRACK CLAMP ULT 66MM

## (undated) DEVICE — CANNULA AORT ROOT VNT LN 14GA

## (undated) DEVICE — SUT PROLENE 6-0 C-1 30IN BL

## (undated) DEVICE — DRAIN CHEST DRY SUCTION

## (undated) DEVICE — GUIDEWIRE EMERALD 3MM 175X5CM

## (undated) DEVICE — CATH SWAN GANZ STND 7FR

## (undated) DEVICE — CLIP LIGATING HEMOCLP SMALL

## (undated) DEVICE — SUT PROLENE 3-0 SH DA 36 BL

## (undated) DEVICE — CANNULA NASAL ADULT

## (undated) DEVICE — ADHESIVE DERMABOND ADVANCED

## (undated) DEVICE — CARTRIDGE HEPARIN 2 CHNNL ACT

## (undated) DEVICE — CLIP JAW SPRING DBL SFT 6MM

## (undated) DEVICE — SET ANGIO ACIST CVI ANGIOTOUCH

## (undated) DEVICE — GLOVE PROTEXIS PI SYN SURG 7.5

## (undated) DEVICE — SENSOR LOW LEVEL OXYGEN

## (undated) DEVICE — SOL IRRI STRL WATER 1000ML

## (undated) DEVICE — SLEEVE CONTAMINATION 80CM

## (undated) DEVICE — GLOVE PROTEXIS PI SYN SURG 7

## (undated) DEVICE — BOWL UTILITY BLUE 32OZ

## (undated) DEVICE — SUT SILK 0 BLK BR CT-1 30IN

## (undated) DEVICE — SUT MAXON GRN 0 CLSR 27IN

## (undated) DEVICE — CONTAINER SPECIMEN SCREW 4OZ

## (undated) DEVICE — SUT MONOCRYL PLUS UD 3-0 27

## (undated) DEVICE — CATH 5FR AL2.0

## (undated) DEVICE — SUT PROLENE 7-0 BV175-6

## (undated) DEVICE — KIT C.A.T.S. FAST START

## (undated) DEVICE — APPLICATOR SURG 2 SPRY 1 PLUNG

## (undated) DEVICE — BLADE SURGICAL SAFELOCK #10 ST

## (undated) DEVICE — LOOP STERION MINI RED 8X406MM

## (undated) DEVICE — CANNULA MC2 NVENT .5IN 28/36FR

## (undated) DEVICE — CATH THOR SIL STR 6 EYELT 28FR

## (undated) DEVICE — SUT ETHBND XTRA 1 OS-8 30IN

## (undated) DEVICE — SOL ELECTROLYTE PH 7.4 500ML

## (undated) DEVICE — CARTRIDGE SILV 4CHAN 2.0-3.5MG

## (undated) DEVICE — KIT MINI STK MAX COAX 5FR 10CM

## (undated) DEVICE — SHEATH INTRODUCER 5FR 10CM

## (undated) DEVICE — CABLE PACING ALLGTR CLIP 12FT

## (undated) DEVICE — SYR LUER LOCK STERILE 10ML

## (undated) DEVICE — DISSECTOR SECTO CHERRY 3/8IN

## (undated) DEVICE — CATH IMPULSE 5F 100CM FR4

## (undated) DEVICE — SYS VIRTUOSAPH PLUS EVM

## (undated) DEVICE — INTRODUCER TRNSRADIAL 6F 10CM

## (undated) DEVICE — SHEATH PINNACLE R/0 II 7F 10CM

## (undated) DEVICE — CANNULA PERF ART RA 8X3.5MM

## (undated) DEVICE — SUT VICRYL 2-0 27 CT-1

## (undated) DEVICE — BAG MEDI-PLAST DECANTER C-FLOW

## (undated) DEVICE — CATH IMPELLA CP 14F 4.7X65MM

## (undated) DEVICE — SPONGE LAP 18X18 PREWASHED

## (undated) DEVICE — DRESSING TEGADERM CHG 3.5X4.5

## (undated) DEVICE — KIT SURGIFLO HEMOSTATIC MATRIX

## (undated) DEVICE — SUT 2 30IN SILK BLK BRAIDE

## (undated) DEVICE — SOL PLASMALYTE PH 7.4 1000ML

## (undated) DEVICE — SOL IRR NACL .9% 3000ML

## (undated) DEVICE — HEMOSTAT SURGICEL 2X14IN

## (undated) DEVICE — GLOVE PROTEXIS HYDROGEL SZ6.5

## (undated) DEVICE — TUBING PRSS MON M LL CONN 72IN

## (undated) DEVICE — COR KNOT QUICK LOAD SINGLES

## (undated) DEVICE — SUT SILK 2-0 BLK BR KS 30 I

## (undated) DEVICE — CATH EMPULSE ANGLED 5FR PIGTAI

## (undated) DEVICE — GLOVE SURG BIOGEL LATEX SZ 7.5

## (undated) DEVICE — GLOVE COTTON KNITTED CUFF

## (undated) DEVICE — COVER CIV-FLEX PROBE US 58IN

## (undated) DEVICE — SOL .9NACL PF 100 ML

## (undated) DEVICE — BOWL STERILE LARGE 32OZ

## (undated) DEVICE — OMNIPAQUE 350MG 150ML VIAL

## (undated) DEVICE — CLIP HORIZON LIG TI MED-LG

## (undated) DEVICE — CATH SWAN GANZ 8FR HEP FREE

## (undated) DEVICE — SUT PROLENE 5-0 36IN C-1

## (undated) DEVICE — STOPCOCK 4-WAY

## (undated) DEVICE — IMPLANTABLE DEVICE
Type: IMPLANTABLE DEVICE | Site: CHEST | Status: NON-FUNCTIONAL
Removed: 2024-02-27

## (undated) DEVICE — HEMOSTAT VASC BAND REG 24CM

## (undated) DEVICE — SOL NORMAL USPCA 0.9%

## (undated) DEVICE — SOL NACL IRR 3000ML

## (undated) DEVICE — CATH CATHLON IV 16G 2IN

## (undated) DEVICE — DEVICE COR KNOT MIS COMBO KIT

## (undated) DEVICE — TRAY CATH FOL SIL TEMP 10 16FR

## (undated) DEVICE — DRAPE SLUSH WARMER WITH DISC

## (undated) DEVICE — SUT PROLENE 4-0 SH BLU 36IN

## (undated) DEVICE — GUIDEWIRE INQWIRE SE 3MM JTIP

## (undated) DEVICE — SET PERFUSION

## (undated) DEVICE — KIT VAVD

## (undated) DEVICE — PACK SURG PERF CARDPULM BYPS

## (undated) DEVICE — KIT CATHGARD DBL LUMN 9FRX11.5

## (undated) DEVICE — PLEDGET TFLN FELT 9.5X4.8 ST